# Patient Record
Sex: MALE | Race: WHITE | NOT HISPANIC OR LATINO | Employment: OTHER | URBAN - METROPOLITAN AREA
[De-identification: names, ages, dates, MRNs, and addresses within clinical notes are randomized per-mention and may not be internally consistent; named-entity substitution may affect disease eponyms.]

---

## 2017-03-02 ENCOUNTER — HOSPITAL ENCOUNTER (EMERGENCY)
Facility: HOSPITAL | Age: 47
Discharge: HOME | End: 2017-03-02
Attending: EMERGENCY MEDICINE
Payer: MEDICARE

## 2017-03-02 VITALS
DIASTOLIC BLOOD PRESSURE: 87 MMHG | HEIGHT: 71 IN | HEART RATE: 80 BPM | TEMPERATURE: 98 F | OXYGEN SATURATION: 98 % | WEIGHT: 210 LBS | BODY MASS INDEX: 29.4 KG/M2 | SYSTOLIC BLOOD PRESSURE: 131 MMHG | RESPIRATION RATE: 18 BRPM

## 2017-03-02 DIAGNOSIS — M25.551 ACUTE HIP PAIN, RIGHT: Primary | ICD-10-CM

## 2017-03-02 PROCEDURE — 25000003 PHARM REV CODE 250: Performed by: PHYSICIAN ASSISTANT

## 2017-03-02 PROCEDURE — 96372 THER/PROPH/DIAG INJ SC/IM: CPT

## 2017-03-02 PROCEDURE — 63600175 PHARM REV CODE 636 W HCPCS: Performed by: PHYSICIAN ASSISTANT

## 2017-03-02 PROCEDURE — 99283 EMERGENCY DEPT VISIT LOW MDM: CPT | Mod: 25

## 2017-03-02 RX ORDER — CYCLOBENZAPRINE HCL 10 MG
10 TABLET ORAL EVERY 8 HOURS PRN
Qty: 9 TABLET | Refills: 0 | Status: SHIPPED | OUTPATIENT
Start: 2017-03-02 | End: 2017-03-25

## 2017-03-02 RX ORDER — HYDROCODONE BITARTRATE AND ACETAMINOPHEN 5; 325 MG/1; MG/1
1 TABLET ORAL EVERY 6 HOURS PRN
Qty: 12 TABLET | Refills: 0 | Status: SHIPPED | OUTPATIENT
Start: 2017-03-02 | End: 2017-03-25

## 2017-03-02 RX ORDER — CYCLOBENZAPRINE HCL 10 MG
10 TABLET ORAL
Status: COMPLETED | OUTPATIENT
Start: 2017-03-02 | End: 2017-03-02

## 2017-03-02 RX ORDER — HYDROMORPHONE HYDROCHLORIDE 2 MG/ML
1 INJECTION, SOLUTION INTRAMUSCULAR; INTRAVENOUS; SUBCUTANEOUS
Status: COMPLETED | OUTPATIENT
Start: 2017-03-02 | End: 2017-03-02

## 2017-03-02 RX ADMIN — CYCLOBENZAPRINE HYDROCHLORIDE 10 MG: 10 TABLET, FILM COATED ORAL at 07:03

## 2017-03-02 RX ADMIN — HYDROMORPHONE HYDROCHLORIDE 1 MG: 2 INJECTION INTRAMUSCULAR; INTRAVENOUS; SUBCUTANEOUS at 07:03

## 2017-03-02 NOTE — ED AVS SNAPSHOT
[facility]  [place] [address]               Keon Schneider Jr.   3/2/2017  7:07 AM   ED    Description:  Male : 1970   Department:  [facility]           Your Care was Coordinated By:     Provider Role From To    Noe Jean MD Attending Provider 17 0759 --    BROOK Bedolla Physician Assistant 17 [phone] --      Reason for Visit     Hip Pain           Diagnoses this Visit        Comments    Acute hip pain, right    -  Primary       ED Disposition     None           To Do List           Follow-up Information     Follow up with Suri Suazo MD.    Specialties:  Sports Medicine, Orthopedic Surgery    Why:  Follow up with orthopedics within 2 days.  Call to schedule an appointment.    Contact information:    [address] [place] [address] [phone]         These Medications        Disp Refills Start End    cyclobenzaprine (FLEXERIL) 10 MG tablet 9 tablet 0 3/2/2017     Take 1 tablet (10 mg total) by mouth every 8 (eight) hours as needed for Muscle spasms. - Oral    [facility] [address] [place] [address] [facility] Ph #: [phone]       Notes to Pharmacy: No alcohol, no driving, no swimming, no operating machinery, no working while taking this medication.    hydrocodone-acetaminophen 5-325mg (NORCO) 5-325 mg per tablet 12 tablet 0 3/2/2017     Take 1 tablet by mouth every 6 (six) hours as needed for Pain. - Oral    Pharmacy: [facility] [address] [place] [address]  [facility] AT [facility] Ph #: [phone]       Notes to Pharmacy: No alcohol, no driving, no swimming, no operating machinery, no working, no extra Tylenol (Acetaminophen) while taking this medication.      [facility]     [facility] On Call Nurse Care Line -  Assistance  Registered nurses in the [facility] provide clinical advisement, health  education, appointment booking, and other advisory services.  Call for this free service at [phone].             Medications           Message regarding Medications     Verify the changes and/or additions to your medication regime listed below are the same as discussed with your clinician today.  If any of these changes or additions are incorrect, please notify your healthcare provider.        START taking these NEW medications        Refills    cyclobenzaprine (FLEXERIL) 10 MG tablet 0    Sig: Take 1 tablet (10 mg total) by mouth every 8 (eight) hours as needed for Muscle spasms.    Class: Print    Route: Oral    hydrocodone-acetaminophen 5-325mg (NORCO) 5-325 mg per tablet 0    Sig: Take 1 tablet by mouth every 6 (six) hours as needed for Pain.    Class: Print    Route: Oral      These medications were administered today        Dose Freq    hydromorphone (PF) injection 1 mg 1 mg ED 1 Time    Sig: Inject 0.5 mLs (1 mg total) into the muscle ED 1 Time.    Class: Normal    Route: Intramuscular    cyclobenzaprine tablet 10 mg 10 mg ED 1 Time    Sig: Take 1 tablet (10 mg total) by mouth ED 1 Time.    Class: Normal    Route: Oral      STOP taking these medications     oxycodone-acetaminophen (PERCOCET) 7.5-325 mg per tablet Take 1 tablet by mouth every 4 (four) hours as needed. for pain.    tramadol (ULTRAM) 50 mg tablet Take 50 mg by mouth every 6 (six) hours as needed for Pain.    doxycycline (VIBRA-TABS) 100 MG tablet TK 1 T PO  BID           Verify that the below list of medications is an accurate representation of the medications you are currently taking.  If none reported, the list may be blank. If incorrect, please contact your healthcare provider. Carry this list with you in case of emergency.           Current Medications     alprazolam (XANAX) 2 MG Tab Take by mouth nightly as needed.    cyclobenzaprine (FLEXERIL) 10 MG tablet Take 1 tablet (10 mg total) by mouth every 8 (eight) hours as needed for  Muscle spasms.    cyclobenzaprine tablet 10 mg Take 1 tablet (10 mg total) by mouth ED 1 Time.    hydrocodone-acetaminophen 5-325mg (NORCO) 5-325 mg per tablet Take 1 tablet by mouth every 6 (six) hours as needed for Pain.    pregabalin (LYRICA) 75 MG capsule Take 1 capsule (75 mg total) by mouth 3 (three) times daily.    sulindac (CLINORIL) 200 MG Tab Take 1 tablet (200 mg total) by mouth 2 (two) times daily.           Clinical Reference Information           Your Vitals Were     BP                   158/92           Allergies as of 3/2/2017        Reactions    Toradol [Ketorolac] Hives, Nausea And Vomiting      Immunizations Administered on Date of Encounter - 3/2/2017     None      ED Micro, Lab, POCT     None      ED Imaging Orders     Start Ordered       Status Ordering Provider    03/02/17 0742 03/02/17 0742  X-Ray Hip 2 View Right  1 time imaging      Final result         Discharge Instructions       The patient is discharged to home.  You are to follow up as directed above.  Rest.  Return to the ED for any new or worsening symptoms: fever, increased pain, redness/warmth to your hip, numbness, weakness, or any other concerns.    [facility] Sign-Up     Activating your [facility] account is as easy as 1-2-3!     1) Visit [email or web address], select Sign Up Now, enter this activation code and your date of birth, then select Next.  [email or web address]  Expires: 4/16/2017  8:49 AM      2) Create a username and password to use when you visit [facility] in the future and select a security question in case you lose your password and select Next.    3) Enter your e-mail address and click Sign Up!    Additional Information  If you have questions, please e-mail [email or web address] or call [phone] to talk to our [facility] staff. Remember, [facility] is NOT to be used for urgent needs. For medical emergencies, dial 911.         Smoking Cessation     If you would like to quit smoking:   You may be eligible for free services  if you are a [place] resident and started smoking cigarettes before September 1, 1988.  Call the Smoking Cessation Trust (SCT) toll free at (255) 334-7439 or (952) 305-3108.   Call 1-800-QUIT-NOW if you do not meet the above criteria.             [facility] complies with applicable Federal civil rights laws and does not discriminate on the basis of race, color, national origin, age, disability, or sex.        Language Assistance Services     ATTENTION: Language assistance services are available, free of charge. Please call [phone].      ATENCIÓN: Si habla español, tiene a woods disposición servicios gratuitos de asistencia lingüística. Llame al [phone].     CHÚ Ý: N?u b?n nói Ti?ng Vi?t, có các d?ch v? h? tr? ngôn ng? mi?n phí dành cho b?n. G?i s? [phone].

## 2017-03-02 NOTE — DISCHARGE INSTRUCTIONS
The patient is discharged to home.  You are to follow up as directed above.  Rest.  Return to the ED for any new or worsening symptoms: fever, increased pain, redness/warmth to your hip, numbness, weakness, or any other concerns.

## 2017-03-02 NOTE — ED PROVIDER NOTES
"Encounter Date: 3/2/2017    SCRIBE #1 NOTE: I, Natasha Fuentes, am scribing for, and in the presence of,  Suri Garcia PA-C. I have scribed the following portions of the note - Other sections scribed: HPI and ROS.       History     Chief Complaint   Patient presents with    Hip Pain     " I have double hip implants and it feels like the right hip is moving" also right leg pain     Review of patient's allergies indicates:   Allergen Reactions    Toradol [ketorolac] Hives and Nausea And Vomiting     HPI Comments: CC: Hip Pain  HPI: This 46 year old male presents to the emergency department complaining of right hip pain for the past 3 weeks.  Patient had a hip replacement 11 years ago secondary to avascular necrosis.  He states he slipped and felt something "pop" 3 weeks ago.  Pain has gradually worsened since injury.  His pain is 10/10, burning, and constant.  It radiates down his right leg.  He denies fever, abdominal pain, chest pain, shortness of breath, numbness, bowel or bladder incontinence, and saddle anesthesia.  Patient had not had relief with Advil or Tylenol.    The history is provided by the patient. No  was used.     Past Medical History:   Diagnosis Date    Anxiety     Arthritis     Asthma     Carpal tunnel syndrome     Chronic pain     Depression     Other injury of other sites of trunk 12/28/2011    Spondylolisthesis     lumbar; myofascial pain    Staph infection     Ulnar neuropathy     left and rt arm     Past Surgical History:   Procedure Laterality Date    HIP SURGERY  3/06; 6/06    hip arthroplasty    HIP SURGERY      bilateral hip replacement    JOINT REPLACEMENT      SHOULDER SURGERY  2012    right shoulder    SHOULDER SURGERY       Family History   Problem Relation Age of Onset    Cancer Mother      Social History   Substance Use Topics    Smoking status: Former Smoker     Packs/day: 1.00     Years: 10.00     Quit date: 10/13/2012    Smokeless tobacco: " Never Used    Alcohol use 7.2 oz/week     12 Cans of beer per week      Comment: on the weekend     Review of Systems   Constitutional: Negative for fever.   HENT: Negative for trouble swallowing.    Respiratory: Negative for shortness of breath.    Cardiovascular: Negative for chest pain.   Gastrointestinal: Negative for abdominal pain, nausea and vomiting.   Genitourinary: Negative for difficulty urinating.   Musculoskeletal: Negative for back pain.        +Right hip pain.   Skin: Negative for color change.   Allergic/Immunologic: Negative for immunocompromised state.   Neurological: Negative for seizures, weakness and numbness.   Psychiatric/Behavioral: Negative for confusion.       Physical Exam   Initial Vitals   BP Pulse Resp Temp SpO2   03/02/17 0653 03/02/17 0653 03/02/17 0653 03/02/17 0653 03/02/17 0653   158/92 83 20 98.2 °F (36.8 °C) 98 %     Physical Exam    Constitutional: He appears well-developed and well-nourished. He is cooperative.  Non-toxic appearance.   The patient is distressed in pain.   HENT:   Head: Normocephalic and atraumatic.   Mouth/Throat: Mucous membranes are normal. No trismus in the jaw.   Neck: Trachea normal, normal range of motion, full passive range of motion without pain and phonation normal. Neck supple. No stridor present. No rigidity.   Cardiovascular: Normal rate, regular rhythm and normal heart sounds. Exam reveals no gallop.    Pulmonary/Chest: Effort normal and breath sounds normal. No tachypnea. No respiratory distress. He has no decreased breath sounds. He has no wheezes. He has no rhonchi. He has no rales.   Abdominal: Soft. Normal appearance. There is no tenderness. There is no rigidity, no rebound and no guarding.   Musculoskeletal:   Pelvis is stable.  +Diffuse tenderness to palpation to the left hip without erythema or warmth.  +Decreased right hip ROM secondary to pain.  No left knee tenderness to palpation.  No midline lumbar tenderness to palpation, step-off,  "or crepitus.  + 2+ right and left dorsalis pedis pulses, capillary refill less than 2 seconds.   Neurological: He is alert and oriented to person, place, and time. He has normal strength. No sensory deficit.   Skin: Skin is warm, dry and intact. No erythema.         ED Course   Procedures  Labs Reviewed - No data to display             Additional MDM:   Comments: Patient with right hip pain after slipping and feeling a "pop" in his right hip.  He has history of right hip replacement.  He is neurovascularly intact.  There is decreased right hip range of motion secondary to pain.  There is no erythema or warmth to the right hip.  There is no midline lumbar tenderness to palpation, step-off, or crepitus.  Radiographs of the right hip were independently reviewed and interpreted by Dr. Jean and myself as no fracture, dislocation, or evidence of acute hardware failure.  I doubt septic joint.  He will be discharged home with supportive care, Norco, and Flexeril.  He is to closely follow-up with orthopedics.  He was given oral Flexeril and intramuscular Dilaudid in the emergency department for pain.  Careful ED warnings and return instructions given.  This patient's case was discussed with Dr. Jean, he is in agreement with the assessment and plan..          Scribe Attestation:   Scribe #1: I performed the above scribed service and the documentation accurately describes the services I performed. I attest to the accuracy of the note.    Attending Attestation:           Physician Attestation for Scribe:  Physician Attestation Statement for Scribe #1: I, Suri Garcia PA-C, reviewed documentation, as scribed by Sada Fuentes in my presence, and it is both accurate and complete.                 ED Course     Clinical Impression:   The encounter diagnosis was Acute hip pain, right.          BROOK Bedolla  03/02/17 2307    "

## 2017-03-25 ENCOUNTER — HOSPITAL ENCOUNTER (EMERGENCY)
Facility: HOSPITAL | Age: 47
Discharge: HOME | End: 2017-03-25
Attending: EMERGENCY MEDICINE
Payer: MEDICARE

## 2017-03-25 VITALS
SYSTOLIC BLOOD PRESSURE: 140 MMHG | HEART RATE: 90 BPM | WEIGHT: 211 LBS | DIASTOLIC BLOOD PRESSURE: 88 MMHG | OXYGEN SATURATION: 98 % | BODY MASS INDEX: 29.54 KG/M2 | TEMPERATURE: 99 F | RESPIRATION RATE: 20 BRPM | HEIGHT: 71 IN

## 2017-03-25 DIAGNOSIS — R05.9 COUGH: ICD-10-CM

## 2017-03-25 DIAGNOSIS — J20.9 ACUTE BRONCHITIS WITH BRONCHOSPASM: Primary | ICD-10-CM

## 2017-03-25 PROCEDURE — 94640 AIRWAY INHALATION TREATMENT: CPT

## 2017-03-25 PROCEDURE — 99284 EMERGENCY DEPT VISIT MOD MDM: CPT | Mod: 25

## 2017-03-25 PROCEDURE — 25000242 PHARM REV CODE 250 ALT 637 W/ HCPCS: Performed by: NURSE PRACTITIONER

## 2017-03-25 PROCEDURE — 96372 THER/PROPH/DIAG INJ SC/IM: CPT

## 2017-03-25 PROCEDURE — 63600175 PHARM REV CODE 636 W HCPCS: Performed by: NURSE PRACTITIONER

## 2017-03-25 RX ORDER — HYDROCODONE BITARTRATE AND HOMATROPINE METHYLBROMIDE ORAL SOLUTION 5; 1.5 MG/5ML; MG/5ML
5 LIQUID ORAL EVERY 4 HOURS PRN
Qty: 90 ML | Refills: 0 | Status: SHIPPED | OUTPATIENT
Start: 2017-03-25 | End: 2017-05-07

## 2017-03-25 RX ORDER — GUAIFENESIN 100 MG/5ML
200 SOLUTION ORAL 3 TIMES DAILY PRN
COMMUNITY
End: 2017-07-22

## 2017-03-25 RX ORDER — ALBUTEROL SULFATE 90 UG/1
1-2 AEROSOL, METERED RESPIRATORY (INHALATION) EVERY 6 HOURS PRN
Qty: 1 INHALER | Refills: 0 | Status: SHIPPED | OUTPATIENT
Start: 2017-03-25 | End: 2017-07-22

## 2017-03-25 RX ORDER — DOXYCYCLINE 100 MG/1
100 CAPSULE ORAL 2 TIMES DAILY
Qty: 20 CAPSULE | Refills: 0 | Status: SHIPPED | OUTPATIENT
Start: 2017-03-25 | End: 2017-04-04

## 2017-03-25 RX ORDER — IPRATROPIUM BROMIDE AND ALBUTEROL SULFATE 2.5; .5 MG/3ML; MG/3ML
3 SOLUTION RESPIRATORY (INHALATION)
Status: COMPLETED | OUTPATIENT
Start: 2017-03-25 | End: 2017-03-25

## 2017-03-25 RX ORDER — BETAMETHASONE SODIUM PHOSPHATE AND BETAMETHASONE ACETATE 3; 3 MG/ML; MG/ML
6 INJECTION, SUSPENSION INTRA-ARTICULAR; INTRALESIONAL; INTRAMUSCULAR; SOFT TISSUE
Status: COMPLETED | OUTPATIENT
Start: 2017-03-25 | End: 2017-03-25

## 2017-03-25 RX ADMIN — BETAMETHASONE SODIUM PHOSPHATE AND BETAMETHASONE ACETATE 6 MG: 3; 3 INJECTION, SUSPENSION INTRA-ARTICULAR; INTRALESIONAL; INTRAMUSCULAR at 08:03

## 2017-03-25 RX ADMIN — IPRATROPIUM BROMIDE AND ALBUTEROL SULFATE 3 ML: .5; 3 SOLUTION RESPIRATORY (INHALATION) at 07:03

## 2017-03-25 NOTE — ED AVS SNAPSHOT
[facility]  [place] [address]               Keon Schneider Jr.   3/25/2017  6:58 AM   ED    Description:  Male : 1970   Department:  [facility]           Your Care was Coordinated By:     Provider Role From To    Chico Vallecillo MD Attending Provider 17 0706 --    Mai Reed NP Nurse Practitioner 17 07 --      Reason for Visit     Cough           Diagnoses this Visit        Comments    Acute bronchitis with bronchospasm    -  Primary     Cough           ED Disposition     None           To Do List           Follow-up Information     Schedule an appointment as soon as possible for a visit with Johnie Gutierrez MD.    Specialty:  Family Medicine    Why:  As needed, If symptoms worsen    Contact information:    [address] [facility] [id number] [phone]         These Medications        Disp Refills Start End    hydrocodone-homatropine 5-1.5 mg/5 ml (HYCODAN) 5-1.5 mg/5 mL Syrp 90 mL 0 3/25/2017     Take 5 mLs by mouth every 4 (four) hours as needed (cough). Please do not drink alcohol, drive, operate heavy machinery, work or swim while taking this medication as it can cause mental impairment. - Oral    Pharmacy: [facility] [address] [place] [address] [place] -  [facility] #: [phone]       albuterol 90 mcg/actuation inhaler 1 Inhaler 0 3/25/2017 3/25/2018    Inhale 1-2 puffs into the lungs every 6 (six) hours as needed for Wheezing. Rescue - Inhalation    Pharmacy: [facility] [address] [place] [address] [place] -  [facility] #: [phone]       doxycycline (VIBRAMYCIN) 100 MG Cap 20 capsule 0 3/25/2017 2017    Take 1 capsule (100 mg total) by mouth 2 (two) times daily. - Oral    Pharmacy: [facility] [address] [facility] [place] -  [facility] Ph #: [phone]         [facility]     [facility]  Nurse Care Line - 24/7 Assistance  Registered nurses in the [facility] provide clinical advisement, health education, appointment booking, and other advisory services.  Call for this free service at [phone].             Medications           Message regarding Medications     Verify the changes and/or additions to your medication regime listed below are the same as discussed with your clinician today.  If any of these changes or additions are incorrect, please notify your healthcare provider.        START taking these NEW medications        Refills    hydrocodone-homatropine 5-1.5 mg/5 ml (HYCODAN) 5-1.5 mg/5 mL Syrp 0    Sig: Take 5 mLs by mouth every 4 (four) hours as needed (cough). Please do not drink alcohol, drive, operate heavy machinery, work or swim while taking this medication as it can cause mental impairment.    Class: Print    Route: Oral    albuterol 90 mcg/actuation inhaler 0    Sig: Inhale 1-2 puffs into the lungs every 6 (six) hours as needed for Wheezing. Rescue    Class: Print    Route: Inhalation    doxycycline (VIBRAMYCIN) 100 MG Cap 0    Sig: Take 1 capsule (100 mg total) by mouth 2 (two) times daily.    Class: Print    Route: Oral      These medications were administered today        Dose Freq    albuterol-ipratropium 2.5mg-0.5mg/3mL nebulizer solution 3 mL 3 mL ED 1 Time    Sig: Take 3 mLs by nebulization ED 1 Time.    Class: Normal    Route: Nebulization    betamethasone acetate-betamethasone sodium phosphate injection 6 mg 6 mg ED 1 Time    Sig: Inject 1 mL (6 mg total) into the muscle ED 1 Time.    Class: Normal    Route: Intramuscular      STOP taking these medications     cyclobenzaprine (FLEXERIL) 10 MG tablet Take 1 tablet (10 mg total) by mouth every 8 (eight) hours as needed for Muscle spasms.    hydrocodone-acetaminophen 5-325mg (NORCO) 5-325 mg per tablet Take 1 tablet by mouth every 6 (six) hours as needed for Pain.           Verify that the below list of  "medications is an accurate representation of the medications you are currently taking.  If none reported, the list may be blank. If incorrect, please contact your healthcare provider. Carry this list with you in case of emergency.           Current Medications     guaifenesin 100 mg/5 ml (ROBITUSSIN) 100 mg/5 mL syrup Take 200 mg by mouth 3 (three) times daily as needed for Cough.    GUAIFENESIN/PHENYLEPHRINE HCL (MUCINEX COLD ORAL) Take by mouth.    albuterol 90 mcg/actuation inhaler Inhale 1-2 puffs into the lungs every 6 (six) hours as needed for Wheezing. Rescue    alprazolam (XANAX) 2 MG Tab Take by mouth nightly as needed.    betamethasone acetate-betamethasone sodium phosphate injection 6 mg Inject 1 mL (6 mg total) into the muscle ED 1 Time.    doxycycline (VIBRAMYCIN) 100 MG Cap Take 1 capsule (100 mg total) by mouth 2 (two) times daily.    hydrocodone-homatropine 5-1.5 mg/5 ml (HYCODAN) 5-1.5 mg/5 mL Syrp Take 5 mLs by mouth every 4 (four) hours as needed (cough). Please do not drink alcohol, drive, operate heavy machinery, work or swim while taking this medication as it can cause mental impairment.    pregabalin (LYRICA) 75 MG capsule Take 1 capsule (75 mg total) by mouth 3 (three) times daily.    sulindac (CLINORIL) 200 MG Tab Take 1 tablet (200 mg total) by mouth 2 (two) times daily.           Clinical Reference Information           Your Vitals Were     BP Pulse Temp Resp Height Weight    142/93 (BP Location: Left arm, Patient Position: Sitting) 94 98.9 °F (37.2 °C) (Oral) 19 5' 11" (1.803 m) 95.7 kg (211 lb)    SpO2 BMI             98% 29.43 kg/m2         Allergies as of 3/25/2017        Reactions    Toradol [Ketorolac] Hives, Nausea And Vomiting      Immunizations Administered on Date of Encounter - 3/25/2017     None      ED Micro, Lab, POCT     None      ED Imaging Orders     Start Ordered       Status Ordering Provider    03/25/17 0641 03/25/17 0641  X-Ray Chest PA And Lateral  1 time imaging      " Final result         Discharge Instructions       Please return to the ED for any new or worsening symptoms: chest pain, shortness of breath, loss of consciousness or any other concerns. Please follow up with primary care within in the week. You may also call [phone] for the [facility] same day appointment line.    Discharge References/Attachments     BRONCHITIS WITH WHEEZING (ADULT) (ENGLISH)    BRONCHITIS, ANTIOBIOTIC TREATMENT (ADULT) (ENGLISH)      [facility] Sign-Up     Activating your [facility] account is as easy as 1-2-3!     1) Visit [email or web address], select Sign Up Now, enter this activation code and your date of birth, then select Next.  [email or web address]  Expires: 4/16/2017  9:49 AM      2) Create a username and password to use when you visit [facility] in the future and select a security question in case you lose your password and select Next.    3) Enter your e-mail address and click Sign Up!    Additional Information  If you have questions, please e-mail [email or web address] or call [phone] to talk to our [facility] staff. Remember, [facility] is NOT to be used for urgent needs. For medical emergencies, dial 911.         Smoking Cessation     If you would like to quit smoking:   You may be eligible for free services if you are a [place] resident and started smoking cigarettes before September 1, 1988.  Call the Smoking Cessation Trust (SCT) toll free at (110) 454-4342 or (552) 713-6453.   Call 8-160-QUIT-NOW if you do not meet the above criteria.             [facility] complies with applicable Federal civil rights laws and does not discriminate on the basis of race, color, national origin, age, disability, or sex.        Language Assistance Services     ATTENTION: Language assistance services are available, free of charge. Please call [phone].      ATENCIÓN: Si habla español, tiene a woods disposición servicios gratuitos de asistencia lingüística. Llame al  [phone].     DAVID Ý: N?u b?n nói Ti?ng Vi?t, có các d?ch v? h? tr? ngôn ng? mi?n phí dành cho b?n. G?i s? [phone].

## 2017-03-25 NOTE — ED TRIAGE NOTES
Fever since yesterday cough for a week green phlegm sore throat head pressure lungs hurt from coughing

## 2017-03-25 NOTE — DISCHARGE INSTRUCTIONS
Please return to the ED for any new or worsening symptoms: chest pain, shortness of breath, loss of consciousness or any other concerns. Please follow up with primary care within in the week. You may also call [phone] for the [facility] same day appointment line.

## 2017-03-25 NOTE — ED PROVIDER NOTES
"Encounter Date: 3/25/2017    SCRIBE #1 NOTE: I, Irma Duttonter, am scribing for, and in the presence of,  Mai Reed NP. I have scribed the following portions of the note - Other sections scribed: HPI/ROS.       History     Chief Complaint   Patient presents with    Cough     "I think I have pneumonia I have dizziness, fever and keep coughing with muscus."     Review of patient's allergies indicates:   Allergen Reactions    Toradol [ketorolac] Hives and Nausea And Vomiting     HPI Comments: CC: Cough    HPI: This 46 y.o. M who has history of asthma, carpal tunnel syndrome, ulnar neuropathy, and spondylolisthesis presents to the ED for evaluation of acute onset productive cough with associated SOB, subjective fever, post-tussive emesis, and sore throat for 1 week. The pt reports moderate chest pain when coughing. He has attempted treatment with Robitussin and Mucinex with no relief. He notes that he has not smoked in the last 5 months but typically smokes 1ppd. The pt denies diarrhea, abdominal pain, nausea, and any other associated symptoms. PCP is Dr. Johnie Gutierrez.      The history is provided by the patient. No  was used.     Past Medical History:   Diagnosis Date    Anxiety     Arthritis     Asthma     Carpal tunnel syndrome     Chronic pain     Depression     Other injury of other sites of trunk 12/28/2011    Pneumonia     Spondylolisthesis     lumbar; myofascial pain    Staph infection     Ulnar neuropathy     left and rt arm     Past Surgical History:   Procedure Laterality Date    HIP SURGERY  3/06; 6/06    hip arthroplasty    HIP SURGERY      bilateral hip replacement    JOINT REPLACEMENT      SHOULDER SURGERY  2012    right shoulder    SHOULDER SURGERY       Family History   Problem Relation Age of Onset    Cancer Mother      Social History   Substance Use Topics    Smoking status: Current Some Day Smoker     Packs/day: 1.00     Years: 10.00     Last attempt " to quit: 10/13/2012    Smokeless tobacco: Never Used    Alcohol use 7.2 oz/week     12 Cans of beer per week      Comment: on the weekend     Review of Systems   Constitutional: Positive for fever (subjective). Negative for chills.   HENT: Positive for sore throat. Negative for congestion and rhinorrhea.    Eyes: Negative for visual disturbance.   Respiratory: Positive for cough (productive) and shortness of breath.    Cardiovascular: Positive for chest pain (when coughing).   Gastrointestinal: Positive for vomiting (post-tussive). Negative for abdominal pain, diarrhea and nausea.   Genitourinary: Negative for dysuria and frequency.   Musculoskeletal: Negative for myalgias.   Skin: Negative for rash.   Neurological: Negative for headaches.       Physical Exam   Initial Vitals   BP Pulse Resp Temp SpO2   03/25/17 0558 03/25/17 0558 03/25/17 0558 03/25/17 0558 03/25/17 0558   142/93 92 18 98.9 °F (37.2 °C) 95 %     Physical Exam    Constitutional: He appears well-developed and well-nourished.  Non-toxic appearance.   HENT:   Head: Normocephalic.   Mouth/Throat: Uvula is midline and mucous membranes are normal. No trismus in the jaw. Posterior oropharyngeal erythema present. No oropharyngeal exudate or tonsillar abscesses.   Eyes: EOM are normal.   Neck: Full passive range of motion without pain. Neck supple. No rigidity.   Cardiovascular: Normal rate, S1 normal, S2 normal and normal heart sounds. Exam reveals no gallop.    No murmur heard.  Pulmonary/Chest: Effort normal. No tachypnea. He has no decreased breath sounds. He has wheezes. He has no rhonchi. He has no rales.   Lymphadenopathy:     He has cervical adenopathy.        Right cervical: Superficial cervical adenopathy present.        Left cervical: Superficial cervical adenopathy present.   Neurological: He is alert and oriented to person, place, and time. GCS eye subscore is 4. GCS verbal subscore is 5. GCS motor subscore is 6.   Skin: Skin is warm, dry and  intact. No rash noted.   Psychiatric: He has a normal mood and affect.         ED Course   Procedures  Labs Reviewed - No data to display          Medical Decision Making:   ED Management:  This is a 46-year-old male who presents to the ED with complaints of cough, shortness of breath and subjective fever.  He is afebrile and nontoxic-appearing.  He is coughing profusely in the exam room.  On exam, faint wheezes noted.  He is not tachypnea.  Oxygen saturation is normal on room air.  Chest x-ray negative for pneumonia however, I will treat empirically given risk factors (smoking).  DuoNeb and Celestone injection given in the ED.  No evidence of PE or cardiac etiology.  Discharged home with prescriptions for Hycodan cough syrup, doxycycline and albuterol inhaler.  Instructions given for supportive care and follow-up.  Return precautions given.  Patient's case is discussed with Dr. Vallecillo, who agrees with his plan of care.            Scribe Attestation:   Scribe #1: I performed the above scribed service and the documentation accurately describes the services I performed. I attest to the accuracy of the note.    Attending Attestation:     Physician Attestation Statement for NP/PA:   I discussed this assessment and plan of this patient with the NP/PA, but I did not personally examine the patient. The face to face encounter was performed by the NP/PA.    Other NP/PA Attestation Additions:      Medical Decision Making: Patient presents with cough.  Patient is a smoker.  Patient states been coughing up colored sputum.  Chest x-ray shows pneumonia feel the patient needs steroids and antibiotics and bronchodilators.  I agree with assessment and plan.       Physician Attestation for Scribe:  Physician Attestation Statement for Scribe #1: I, Mia Reed NP, reviewed documentation, as scribed by Irma Gipson in my presence, and it is both accurate and complete.                 ED Course     Clinical Impression:    The primary encounter diagnosis was Acute bronchitis with bronchospasm. A diagnosis of Cough was also pertinent to this visit.    Disposition:   Disposition: Discharged  Condition: Stable       Mai Reed NP  03/25/17 0842       Chico Vallecillo MD  04/18/17 0838

## 2017-05-07 PROBLEM — M25.561 ACUTE PAIN OF RIGHT KNEE: Status: ACTIVE | Noted: 2017-05-07

## 2017-09-27 ENCOUNTER — HOSPITAL ENCOUNTER (EMERGENCY)
Facility: HOSPITAL | Age: 47
Discharge: LEFT BEFORE DISCHARGE | End: 2017-09-27
Attending: EMERGENCY MEDICINE
Payer: MEDICARE

## 2017-09-27 VITALS
OXYGEN SATURATION: 98 % | SYSTOLIC BLOOD PRESSURE: 143 MMHG | HEIGHT: 71 IN | HEART RATE: 70 BPM | BODY MASS INDEX: 28 KG/M2 | WEIGHT: 200 LBS | RESPIRATION RATE: 18 BRPM | DIASTOLIC BLOOD PRESSURE: 81 MMHG | TEMPERATURE: 98 F

## 2017-09-27 DIAGNOSIS — R52 PAIN: ICD-10-CM

## 2017-09-27 DIAGNOSIS — G89.29 CHRONIC HIP PAIN, UNSPECIFIED LATERALITY: Primary | ICD-10-CM

## 2017-09-27 DIAGNOSIS — M25.559 CHRONIC HIP PAIN, UNSPECIFIED LATERALITY: Primary | ICD-10-CM

## 2017-09-27 PROCEDURE — 99283 EMERGENCY DEPT VISIT LOW MDM: CPT

## 2017-09-27 RX ORDER — ACETAMINOPHEN 325 MG/1
325 TABLET ORAL EVERY 6 HOURS PRN
COMMUNITY
End: 2017-09-27 | Stop reason: ALTCHOICE

## 2017-09-27 RX ORDER — ACETAMINOPHEN 325 MG/1
650 TABLET ORAL EVERY 6 HOURS PRN
Qty: 60 TABLET | Refills: 0 | Status: ON HOLD | OUTPATIENT
Start: 2017-09-27 | End: 2018-03-26 | Stop reason: HOSPADM

## 2017-09-27 NOTE — ED TRIAGE NOTES
Right hip and lower back pain for a week after moving furniture has hip implants and say it feels like its moving

## 2017-09-27 NOTE — ED PROVIDER NOTES
"Encounter Date: 9/27/2017    SCRIBE #1 NOTE: I, Karina Hansen, am scribing for, and in the presence of,  Margareth Spence NP. I have scribed the following portions of the note - Other sections scribed: HPI/ROS.       History     Chief Complaint   Patient presents with    Hip Pain     rt hip pain. pt reports moving furniture for last week and has complete hip implants and feels light its moving     CC: Hip Pain    HPI: This 47 y.o. Male with a medical history of anxiety, arthritis, asthma, avascular necrosis, pneumonia, spondylolisthesis, and ulnar neuropathy presents to the ED c/o severe (10/10), gradually worsening right hip pain shooting down into right lower extremity and lower back for the past 6 months. Patient reports that he was moving furniture when he felt pain to his right hip in the process. He states, "it feels like it's grinding like something's in there when I turn." He notes having bilateral hip replacement (metal-on-metal) 11 years ago by a surgeon at the [facility]. Patient also reports last X-ray on hips was 6 months ago where there were no significant findings although he kept stating that his pain was getting worse and something was wrong at that time. Patient was also supposed to get an MRI completed but he reports never hearing back. No prior tx. No alleviating factors. Patient denies fever, chills, abdominal pain, N/V/D, chest pain, SOB, numbness, and weakness.     Side note: Patient reports that either Tylenol or Advil makes him bleed.       The history is provided by the patient. No  was used.     Review of patient's allergies indicates:   Allergen Reactions    Toradol [ketorolac] Hives and Nausea And Vomiting     Past Medical History:   Diagnosis Date    Anxiety     Arthritis     Asthma     Avascular necrosis     Carpal tunnel syndrome     Chronic pain     Depression     Other injury of other sites of trunk 12/28/2011    Pneumonia     " Spondylolisthesis     lumbar; myofascial pain    Staph infection     Ulnar neuropathy     left and rt arm     Past Surgical History:   Procedure Laterality Date    HIP SURGERY  3/06; 6/06    hip arthroplasty    HIP SURGERY      bilateral hip replacement    JOINT REPLACEMENT      SHOULDER SURGERY  2012    right shoulder    SHOULDER SURGERY       Family History   Problem Relation Age of Onset    Cancer Mother      Social History   Substance Use Topics    Smoking status: Former Smoker     Packs/day: 1.00     Years: 10.00     Quit date: 10/13/2012    Smokeless tobacco: Never Used    Alcohol use 7.2 oz/week     12 Cans of beer per week      Comment: on the weekend     Review of Systems   Constitutional: Negative for chills and fever.   HENT: Negative for congestion, ear pain, rhinorrhea and sore throat.    Eyes: Negative for pain and visual disturbance.   Respiratory: Negative for cough and shortness of breath.    Cardiovascular: Negative for chest pain.   Gastrointestinal: Negative for abdominal pain, diarrhea, nausea and vomiting.   Genitourinary: Negative for dysuria.   Musculoskeletal: Negative for back pain and neck pain.        (+) Right Hip Pain   Skin: Negative for rash.   Neurological: Negative for headaches.       Physical Exam     Initial Vitals [09/27/17 0657]   BP Pulse Resp Temp SpO2   (!) 143/81 70 18 97.9 °F (36.6 °C) 98 %      MAP       101.67         Physical Exam    Nursing note and vitals reviewed.  Constitutional: He appears well-developed and well-nourished.   HENT:   Head: Normocephalic.   Eyes: Conjunctivae are normal.   Neck: Normal range of motion. Neck supple.   Musculoskeletal: Normal range of motion. He exhibits no edema or tenderness.   Passive range of motion does not elicit any pop, grinding, or instability of right hip.   Neurological: He is alert and oriented to person, place, and time. He has normal strength. No sensory deficit.   Skin: Skin is warm and dry. Capillary refill  "takes less than 2 seconds.   Psychiatric: He has a normal mood and affect.         ED Course   Procedures  Labs Reviewed - No data to display          Medical Decision Making:   Initial Assessment:   47-year-old male presents with right hip pain  Differential Diagnosis:   Prosthetic loosening  Infection  Muscle strain  ED Management:  Diagnosis management comments: This is an urgent evaluation of a 47-year-old male  that presented to the ER with c/o a 6 month history of right hip pain . Pts exam was as above.     Previous notes were reviewed.  It appears patient has been having chronic pain in this hip for greater than 6 months.  Patient states that he has not followed up with sports medicine because "they just want to give me injections and those don't work".  Patient had a MRI 6 months ago which had no suspicious findings.    xrays were reviewed and discussed with pt. When I informed patient I would not be giving him opioid pain medicine and that he needs to follow-up with his orthopedic surgeon or with sports medicine for pain management, patient eloped from emergency department.     Case discussed with attending who agrees with assessment and plan.               Scribe Attestation:   Scribe #1: I performed the above scribed service and the documentation accurately describes the services I performed. I attest to the accuracy of the note.    Attending Attestation:     Physician Attestation Statement for NP/PA:   I discussed this assessment and plan of this patient with the NP/PA, but I did not personally examine the patient. The face to face encounter was performed by the NP/PA.        Physician Attestation for Scribe:  Physician Attestation Statement for Scribe #1: I, Margareth Spence NP, reviewed documentation, as scribed by Karina Hansen in my presence, and it is both accurate and complete.                 ED Course      Clinical Impression:   The primary encounter diagnosis was Chronic hip pain, " unspecified laterality. A diagnosis of Pain was also pertinent to this visit.    Disposition:   Disposition: Discharged  Condition: Stable                        Margareth Spence NP  09/27/17 0690       Juan David Ramirez MD  09/29/17 1013

## 2018-03-24 ENCOUNTER — HOSPITAL ENCOUNTER (INPATIENT)
Facility: HOSPITAL | Age: 48
LOS: 2 days | Discharge: HOME | DRG: 872 | End: 2018-03-26
Attending: INTERNAL MEDICINE | Admitting: INTERNAL MEDICINE
Payer: MEDICARE

## 2018-03-24 DIAGNOSIS — K57.32 DIVERTICULITIS OF SIGMOID COLON: Primary | ICD-10-CM

## 2018-03-24 PROBLEM — A41.9 SEPSIS: Status: ACTIVE | Noted: 2018-03-24

## 2018-03-24 PROBLEM — N17.9 AKI (ACUTE KIDNEY INJURY): Status: ACTIVE | Noted: 2018-03-24

## 2018-03-24 LAB
ALBUMIN SERPL BCP-MCNC: 4.3 G/DL
ALP SERPL-CCNC: 58 U/L
ALT SERPL W/O P-5'-P-CCNC: 28 U/L
ANION GAP SERPL CALC-SCNC: 13 MMOL/L
AST SERPL-CCNC: 23 U/L
BASOPHILS # BLD AUTO: 0.04 K/UL
BASOPHILS NFR BLD: 0.2 %
BILIRUB SERPL-MCNC: 0.8 MG/DL
BILIRUB UR QL STRIP: NEGATIVE
BUN SERPL-MCNC: 17 MG/DL
CALCIUM SERPL-MCNC: 9.8 MG/DL
CHLORIDE SERPL-SCNC: 100 MMOL/L
CLARITY UR REFRACT.AUTO: CLEAR
CO2 SERPL-SCNC: 25 MMOL/L
COLOR UR AUTO: YELLOW
CREAT SERPL-MCNC: 1.5 MG/DL
DIFFERENTIAL METHOD: ABNORMAL
EOSINOPHIL # BLD AUTO: 0 K/UL
EOSINOPHIL NFR BLD: 0 %
ERYTHROCYTE [DISTWIDTH] IN BLOOD BY AUTOMATED COUNT: 13.9 %
EST. GFR  (AFRICAN AMERICAN): >60 ML/MIN/1.73 M^2
EST. GFR  (NON AFRICAN AMERICAN): 54.7 ML/MIN/1.73 M^2
GLUCOSE SERPL-MCNC: 80 MG/DL
GLUCOSE UR QL STRIP: NEGATIVE
HCT VFR BLD AUTO: 39.8 %
HGB BLD-MCNC: 13.5 G/DL
HGB UR QL STRIP: NEGATIVE
KETONES UR QL STRIP: ABNORMAL
LACTATE SERPL-SCNC: 1.4 MMOL/L
LEUKOCYTE ESTERASE UR QL STRIP: NEGATIVE
LYMPHOCYTES # BLD AUTO: 1.7 K/UL
LYMPHOCYTES NFR BLD: 7 %
MCH RBC QN AUTO: 31.8 PG
MCHC RBC AUTO-ENTMCNC: 33.9 G/DL
MCV RBC AUTO: 94 FL
MONOCYTES # BLD AUTO: 2.2 K/UL
MONOCYTES NFR BLD: 9.3 %
NEUTROPHILS # BLD AUTO: 19.7 K/UL
NEUTROPHILS NFR BLD: 83.5 %
NITRITE UR QL STRIP: NEGATIVE
PH UR STRIP: 6 [PH] (ref 5–8)
PLATELET # BLD AUTO: 285 K/UL
PMV BLD AUTO: 9 FL
POTASSIUM SERPL-SCNC: 4 MMOL/L
PROCALCITONIN SERPL IA-MCNC: 0.11 NG/ML
PROT SERPL-MCNC: 7.9 G/DL
PROT UR QL STRIP: ABNORMAL
RBC # BLD AUTO: 4.24 M/UL
SODIUM SERPL-SCNC: 138 MMOL/L
SP GR UR STRIP: 1.01 (ref 1–1.03)
URN SPEC COLLECT METH UR: ABNORMAL
UROBILINOGEN UR STRIP-ACNC: <2 EU/DL
WBC # BLD AUTO: 23.65 K/UL

## 2018-03-24 PROCEDURE — 25000003 PHARM REV CODE 250: Performed by: INTERNAL MEDICINE

## 2018-03-24 PROCEDURE — 96361 HYDRATE IV INFUSION ADD-ON: CPT

## 2018-03-24 PROCEDURE — 83605 ASSAY OF LACTIC ACID: CPT

## 2018-03-24 PROCEDURE — 85025 COMPLETE CBC W/AUTO DIFF WBC: CPT

## 2018-03-24 PROCEDURE — G0378 HOSPITAL OBSERVATION PER HR: HCPCS

## 2018-03-24 PROCEDURE — 80053 COMPREHEN METABOLIC PANEL: CPT

## 2018-03-24 PROCEDURE — 96368 THER/DIAG CONCURRENT INF: CPT

## 2018-03-24 PROCEDURE — 25500020 PHARM REV CODE 255: Performed by: INTERNAL MEDICINE

## 2018-03-24 PROCEDURE — S0030 INJECTION, METRONIDAZOLE: HCPCS | Performed by: INTERNAL MEDICINE

## 2018-03-24 PROCEDURE — 63600175 PHARM REV CODE 636 W HCPCS: Performed by: INTERNAL MEDICINE

## 2018-03-24 PROCEDURE — 81003 URINALYSIS AUTO W/O SCOPE: CPT

## 2018-03-24 PROCEDURE — 84145 PROCALCITONIN (PCT): CPT

## 2018-03-24 PROCEDURE — 87040 BLOOD CULTURE FOR BACTERIA: CPT | Mod: 59

## 2018-03-24 PROCEDURE — 96375 TX/PRO/DX INJ NEW DRUG ADDON: CPT

## 2018-03-24 PROCEDURE — 11000001 HC ACUTE MED/SURG PRIVATE ROOM

## 2018-03-24 PROCEDURE — 96365 THER/PROPH/DIAG IV INF INIT: CPT

## 2018-03-24 PROCEDURE — 99285 EMERGENCY DEPT VISIT HI MDM: CPT | Mod: 25

## 2018-03-24 RX ORDER — ENOXAPARIN SODIUM 100 MG/ML
40 INJECTION SUBCUTANEOUS EVERY 24 HOURS
Status: DISCONTINUED | OUTPATIENT
Start: 2018-03-24 | End: 2018-03-26 | Stop reason: HOSPADM

## 2018-03-24 RX ORDER — IPRATROPIUM BROMIDE AND ALBUTEROL SULFATE 2.5; .5 MG/3ML; MG/3ML
3 SOLUTION RESPIRATORY (INHALATION) EVERY 4 HOURS PRN
Status: DISCONTINUED | OUTPATIENT
Start: 2018-03-24 | End: 2018-03-26 | Stop reason: HOSPADM

## 2018-03-24 RX ORDER — SODIUM CHLORIDE 9 MG/ML
INJECTION, SOLUTION INTRAVENOUS CONTINUOUS
Status: DISCONTINUED | OUTPATIENT
Start: 2018-03-24 | End: 2018-03-26 | Stop reason: HOSPADM

## 2018-03-24 RX ORDER — CIPROFLOXACIN 2 MG/ML
400 INJECTION, SOLUTION INTRAVENOUS
Status: COMPLETED | OUTPATIENT
Start: 2018-03-24 | End: 2018-03-24

## 2018-03-24 RX ORDER — METRONIDAZOLE 500 MG/100ML
500 INJECTION, SOLUTION INTRAVENOUS
Status: DISCONTINUED | OUTPATIENT
Start: 2018-03-24 | End: 2018-03-26 | Stop reason: HOSPADM

## 2018-03-24 RX ORDER — ONDANSETRON 2 MG/ML
4 INJECTION INTRAMUSCULAR; INTRAVENOUS
Status: COMPLETED | OUTPATIENT
Start: 2018-03-24 | End: 2018-03-24

## 2018-03-24 RX ORDER — HYDROMORPHONE HYDROCHLORIDE 1 MG/ML
1 INJECTION, SOLUTION INTRAMUSCULAR; INTRAVENOUS; SUBCUTANEOUS EVERY 4 HOURS PRN
Status: DISCONTINUED | OUTPATIENT
Start: 2018-03-24 | End: 2018-03-25

## 2018-03-24 RX ORDER — CIPROFLOXACIN 2 MG/ML
400 INJECTION, SOLUTION INTRAVENOUS
Status: DISCONTINUED | OUTPATIENT
Start: 2018-03-25 | End: 2018-03-26 | Stop reason: HOSPADM

## 2018-03-24 RX ORDER — ONDANSETRON 8 MG/1
8 TABLET, ORALLY DISINTEGRATING ORAL EVERY 8 HOURS PRN
Status: DISCONTINUED | OUTPATIENT
Start: 2018-03-24 | End: 2018-03-24

## 2018-03-24 RX ORDER — OXYCODONE HYDROCHLORIDE 5 MG/1
5 TABLET ORAL EVERY 4 HOURS PRN
Status: DISCONTINUED | OUTPATIENT
Start: 2018-03-24 | End: 2018-03-26 | Stop reason: HOSPADM

## 2018-03-24 RX ORDER — PANTOPRAZOLE SODIUM 40 MG/1
40 TABLET, DELAYED RELEASE ORAL DAILY
Status: DISCONTINUED | OUTPATIENT
Start: 2018-03-25 | End: 2018-03-26 | Stop reason: HOSPADM

## 2018-03-24 RX ORDER — FENTANYL CITRATE 50 UG/ML
50 INJECTION, SOLUTION INTRAMUSCULAR; INTRAVENOUS
Status: COMPLETED | OUTPATIENT
Start: 2018-03-24 | End: 2018-03-24

## 2018-03-24 RX ORDER — ALPRAZOLAM 1 MG/1
2 TABLET ORAL 2 TIMES DAILY PRN
Status: DISCONTINUED | OUTPATIENT
Start: 2018-03-24 | End: 2018-03-26 | Stop reason: HOSPADM

## 2018-03-24 RX ORDER — HYDRALAZINE HYDROCHLORIDE 20 MG/ML
10 INJECTION INTRAMUSCULAR; INTRAVENOUS EVERY 6 HOURS PRN
Status: DISCONTINUED | OUTPATIENT
Start: 2018-03-24 | End: 2018-03-26 | Stop reason: HOSPADM

## 2018-03-24 RX ORDER — METRONIDAZOLE 500 MG/100ML
500 INJECTION, SOLUTION INTRAVENOUS
Status: COMPLETED | OUTPATIENT
Start: 2018-03-24 | End: 2018-03-24

## 2018-03-24 RX ORDER — SODIUM CHLORIDE 9 MG/ML
1000 INJECTION, SOLUTION INTRAVENOUS
Status: COMPLETED | OUTPATIENT
Start: 2018-03-24 | End: 2018-03-24

## 2018-03-24 RX ORDER — MORPHINE SULFATE 4 MG/ML
4 INJECTION, SOLUTION INTRAMUSCULAR; INTRAVENOUS
Status: COMPLETED | OUTPATIENT
Start: 2018-03-24 | End: 2018-03-24

## 2018-03-24 RX ADMIN — METRONIDAZOLE 500 MG: 500 INJECTION, SOLUTION INTRAVENOUS at 10:03

## 2018-03-24 RX ADMIN — SODIUM CHLORIDE 1000 ML: 0.9 INJECTION, SOLUTION INTRAVENOUS at 01:03

## 2018-03-24 RX ADMIN — METRONIDAZOLE 500 MG: 500 INJECTION, SOLUTION INTRAVENOUS at 03:03

## 2018-03-24 RX ADMIN — SODIUM CHLORIDE 125 ML/HR: 0.9 INJECTION, SOLUTION INTRAVENOUS at 05:03

## 2018-03-24 RX ADMIN — ONDANSETRON 4 MG: 2 INJECTION INTRAMUSCULAR; INTRAVENOUS at 01:03

## 2018-03-24 RX ADMIN — HYDROMORPHONE HYDROCHLORIDE 1 MG: 1 INJECTION, SOLUTION INTRAMUSCULAR; INTRAVENOUS; SUBCUTANEOUS at 05:03

## 2018-03-24 RX ADMIN — FENTANYL CITRATE 50 MCG: 50 INJECTION, SOLUTION INTRAMUSCULAR; INTRAVENOUS at 02:03

## 2018-03-24 RX ADMIN — ALPRAZOLAM 2 MG: 1 TABLET ORAL at 10:03

## 2018-03-24 RX ADMIN — HYDROMORPHONE HYDROCHLORIDE 1 MG: 1 INJECTION, SOLUTION INTRAMUSCULAR; INTRAVENOUS; SUBCUTANEOUS at 10:03

## 2018-03-24 RX ADMIN — CIPROFLOXACIN 400 MG: 2 INJECTION, SOLUTION INTRAVENOUS at 02:03

## 2018-03-24 RX ADMIN — ENOXAPARIN SODIUM 40 MG: 100 INJECTION SUBCUTANEOUS at 10:03

## 2018-03-24 RX ADMIN — MORPHINE SULFATE 4 MG: 4 INJECTION INTRAVENOUS at 01:03

## 2018-03-24 RX ADMIN — IOHEXOL 75 ML: 350 INJECTION, SOLUTION INTRAVENOUS at 03:03

## 2018-03-24 RX ADMIN — SODIUM CHLORIDE 1000 ML: 0.9 INJECTION, SOLUTION INTRAVENOUS at 02:03

## 2018-03-24 NOTE — ED PROVIDER NOTES
Encounter Date: 3/24/2018       History     Chief Complaint   Patient presents with    Flank Pain     L side flank pain radiating to lower abd.     Presents with right lower abdominal pain since last night. Pain is sharp, intermittent, radiating to lower back and flank, associated to nausea, no fever. Denies hematuria or dysuria.      The history is provided by the patient.     Review of patient's allergies indicates:   Allergen Reactions    Toradol [ketorolac] Hives and Nausea And Vomiting     Past Medical History:   Diagnosis Date    Anxiety     Arthritis     Asthma     Avascular necrosis     Carpal tunnel syndrome     Chronic pain     Depression     Other injury of other sites of trunk 12/28/2011    Pneumonia     Spondylolisthesis     lumbar; myofascial pain    Staph infection     Ulnar neuropathy     left and rt arm     Past Surgical History:   Procedure Laterality Date    HIP SURGERY  3/06; 6/06    hip arthroplasty    HIP SURGERY      bilateral hip replacement    JOINT REPLACEMENT      SHOULDER SURGERY  2012    right shoulder    SHOULDER SURGERY       Family History   Problem Relation Age of Onset    Cancer Mother      Social History   Substance Use Topics    Smoking status: Former Smoker     Packs/day: 1.00     Years: 10.00     Quit date: 10/13/2012    Smokeless tobacco: Never Used    Alcohol use 7.2 oz/week     12 Cans of beer per week      Comment: on the weekend     Review of Systems   Constitutional: Negative for chills and fever.   HENT: Negative for dental problem and sore throat.    Eyes: Negative for visual disturbance.   Respiratory: Negative for cough and shortness of breath.    Cardiovascular: Negative for chest pain.   Gastrointestinal: Positive for abdominal pain and nausea. Negative for blood in stool, diarrhea and vomiting.   Genitourinary: Positive for flank pain. Negative for difficulty urinating, discharge, dysuria and testicular pain.   Musculoskeletal: Positive for  back pain. Negative for myalgias.   Skin: Negative for rash.   Neurological: Negative for weakness and headaches.   Psychiatric/Behavioral: Negative for sleep disturbance.   All other systems reviewed and are negative.      Physical Exam     Initial Vitals [03/24/18 1330]   BP Pulse Resp Temp SpO2   117/67 88 20 99.2 °F (37.3 °C) 98 %      MAP       83.67         Physical Exam    Nursing note and vitals reviewed.  Constitutional: He appears well-developed and well-nourished. No distress.   HENT:   Head: Normocephalic and atraumatic.   Eyes: Conjunctivae are normal. Pupils are equal, round, and reactive to light.   Neck: Normal range of motion. Neck supple.   Cardiovascular: Normal rate, regular rhythm, normal heart sounds and intact distal pulses.   Pulmonary/Chest: Breath sounds normal. No respiratory distress.   Abdominal: Soft. Bowel sounds are normal. He exhibits no distension. There is tenderness (Rt lower abdomen, McBurney Positive; Negative Monet). There is guarding (Voluntary, RLQ). There is no rebound.   Musculoskeletal: Normal range of motion. He exhibits no edema.   Neurological: He is alert and oriented to person, place, and time. He has normal strength.   Skin: Skin is warm and dry. No rash noted.   Psychiatric: His behavior is normal.         ED Course   Procedures  Labs Reviewed   CBC W/ AUTO DIFFERENTIAL - Abnormal; Notable for the following:        Result Value    WBC 23.65 (*)     RBC 4.24 (*)     Hemoglobin 13.5 (*)     Hematocrit 39.8 (*)     MCH 31.8 (*)     MPV 9.0 (*)     Gran # (ANC) 19.7 (*)     Mono # 2.2 (*)     Gran% 83.5 (*)     Lymph% 7.0 (*)     All other components within normal limits   COMPREHENSIVE METABOLIC PANEL - Abnormal; Notable for the following:     Creatinine 1.5 (*)     eGFR if non  54.7 (*)     All other components within normal limits   URINALYSIS - Abnormal; Notable for the following:     Protein, UA Trace (*)     Ketones, UA 1+ (*)     All other  components within normal limits   CULTURE, BLOOD   CULTURE, BLOOD   PROCALCITONIN   LACTIC ACID, PLASMA         Results for orders placed or performed during the hospital encounter of 03/24/18   CBC auto differential   Result Value Ref Range    WBC 23.65 (H) 3.90 - 12.70 K/uL    RBC 4.24 (L) 4.60 - 6.20 M/uL    Hemoglobin 13.5 (L) 14.0 - 18.0 g/dL    Hematocrit 39.8 (L) 40.0 - 54.0 %    MCV 94 82 - 98 fL    MCH 31.8 (H) 27.0 - 31.0 pg    MCHC 33.9 32.0 - 36.0 g/dL    RDW 13.9 11.5 - 14.5 %    Platelets 285 150 - 350 K/uL    MPV 9.0 (L) 9.2 - 12.9 fL    Gran # (ANC) 19.7 (H) 1.8 - 7.7 K/uL    Lymph # 1.7 1.0 - 4.8 K/uL    Mono # 2.2 (H) 0.3 - 1.0 K/uL    Eos # 0.0 0.0 - 0.5 K/uL    Baso # 0.04 0.00 - 0.20 K/uL    Gran% 83.5 (H) 38.0 - 73.0 %    Lymph% 7.0 (L) 18.0 - 48.0 %    Mono% 9.3 4.0 - 15.0 %    Eosinophil% 0.0 0.0 - 8.0 %    Basophil% 0.2 0.0 - 1.9 %    Differential Method Automated    Comprehensive metabolic panel   Result Value Ref Range    Sodium 138 136 - 145 mmol/L    Potassium 4.0 3.5 - 5.1 mmol/L    Chloride 100 95 - 110 mmol/L    CO2 25 23 - 29 mmol/L    Glucose 80 70 - 110 mg/dL    BUN, Bld 17 6 - 20 mg/dL    Creatinine 1.5 (H) 0.5 - 1.4 mg/dL    Calcium 9.8 8.7 - 10.5 mg/dL    Total Protein 7.9 6.0 - 8.4 g/dL    Albumin 4.3 3.5 - 5.2 g/dL    Total Bilirubin 0.8 0.1 - 1.0 mg/dL    Alkaline Phosphatase 58 55 - 135 U/L    AST 23 10 - 40 U/L    ALT 28 10 - 44 U/L    Anion Gap 13 8 - 16 mmol/L    eGFR if African American >60.0 >60 mL/min/1.73 m^2    eGFR if non  54.7 (A) >60 mL/min/1.73 m^2   Urinalysis Clean Catch   Result Value Ref Range    Specimen UA Urine, Clean Catch     Color, UA Yellow Yellow, Straw, Sharron    Appearance, UA Clear Clear    pH, UA 6.0 5.0 - 8.0    Specific Gravity, UA 1.015 1.005 - 1.030    Protein, UA Trace (A) Negative    Glucose, UA Negative Negative    Ketones, UA 1+ (A) Negative    Bilirubin (UA) Negative Negative    Occult Blood UA Negative Negative    Nitrite,  UA Negative Negative    Urobilinogen, UA <2.0 <2.0 EU/dL    Leukocytes, UA Negative Negative   Procalcitonin   Result Value Ref Range    Procalcitonin 0.11 <0.25 ng/mL   Lactic acid, plasma   Result Value Ref Range    Lactate (Lactic Acid) 1.4 0.5 - 2.2 mmol/L       Imaging Results          CT Abdomen Pelvis With Contrast (Final result)  Result time 03/24/18 15:59:34    Final result by Nicolas Fabian MD (03/24/18 15:59:34)                 Impression:     Sigmoid diverticulitis. See above. No complicating process identified. Suggest posttreatment followup.    All CT scans at this facility use dose modulation, iterative reconstruction, and/or weight-based dosing when appropriate to reduce radiation dose to as low as reasonably achievable.      Electronically signed by: NICOLAS FABIAN MD  Date:     03/24/18  Time:    15:59              Narrative:    Exam: CT of the abdomen and pelvis with contrast.    History: Right lower quadrant abdominal pain. Possible appendicitis.    Protocol: Thin section axial images were acquired following IV administration of 75 cc Omnipaque 350.    Findings: The liver, spleen, pancreas, kidneys, and adrenal glands are unremarkable.     The appendix is unremarkable. Sigmoid diverticulosis again noted. There is more pronounced wall thickening in the mid sigmoid segment. Regional inflammatory changes are apparent, difficult to definitively assess secondary to streak artifact emanating from patient's bilateral hip arthroplasty. A few small mesenteric air collections are apparent. No drainable fluid collection identified    Visualized lung bases are clear.                                Case discussed with Jeff (NP) and Dr. Denny of [facility]. Dr. Denny will admit the patient for Acute Diverticulitis and agrees with treatment given while in ED. Discussed test results, treatment plan, and need for admission with patient and family members who voice understanding and agree to plan. All  questions have been answered at this time.    Admitting service: Hospital Medicine    Admitting physician: Dr. Denny    Admit to: Observation Med Surg       Pt Keon Schneider Jr have a diagnosis of acute sigmoid diverticulitis with sepsis requires evaluation and management by Internal Medicine service. At this free standing facility we do not have admission capability for which will need to transfer to our [facility]  with capability and capacity. Pt was informed about his condition and the recommendation of transfer for specialty care, Pt understood and agrees with transfer recommendation. This case was discuss with Dr. Denny at [facility] who accepted the patient for transfer and assures capacity and capability for this emergency department case. Pt will be transfer by ([facility]) by ([employer]) using BLS. Treatment in route will be saline lock with NS at 150 ml/hr.  The risk of transfer are Motor Vehicle Accident, Respiratory Failure, Cardiac Dysrhythmias, Progression of Sepsis, or Death.  The benefits of the transfer are adequate evaluation and management by a specialist in the area of Internal Medicine.  At this time Pt is stable for transfer.                  Clinical Impression:   The encounter diagnosis was Diverticulitis of sigmoid colon.    Disposition:   Disposition: Admitted  Condition: Stable  Pt Keon Schneider Jr have a diagnosis of acute sigmoid diverticulitis with sepsis requires evaluation and management by Internal Medicine service. At this free standing facility we do not have admission capability for which will need to transfer to our [facility]  with capability and capacity. Pt was informed about his condition and the recommendation of transfer for specialty care, Pt understood and agrees with transfer recommendation. This case was discuss with Dr. Denny at [facility] who accepted the patient for transfer and  assures capacity and capability for this emergency department case. Pt will be transfer by ([facility]) by ([employer]) using BLS. Treatment in route will be saline lock with NS at 150 ml/hr.  The risk of transfer are Motor Vehicle Accident, Respiratory Failure, Cardiac Dysrhythmias, Progression of Sepsis, or Death.  The benefits of the transfer are adequate evaluation and management by a specialist in the area of Internal Medicine.  At this time Pt is stable for transfer.                        Osmar Lepe MD  03/24/18 4943

## 2018-03-24 NOTE — ED NOTES
Patient to er with the report of right lower quad abd pain  That radiates to back and down to testicles. He reports onset last pm. He reports having an inflamed appendix in the past and a twin brother with kidney stones. Last po intake approx 1 hour PTA.  Patient reports pain with ambulation, pain with cough to RLQ. Intermittent nausea. JULIANNE. MD at bedside to evaluate patient.

## 2018-03-24 NOTE — ED NOTES
Patient reports pain 10/10 held orders released. Patient aware that he is awaiting bed assignment and will be transferred to main campus. Patient father left to go home. Will continue to monitor.

## 2018-03-24 NOTE — ED NOTES
Patient in CR at present for his abd pain. He reports pain medication does not work well for him due to a tolerance because he had been on medication for a long time in his past. Will continue to monitor patient.

## 2018-03-25 LAB
ALBUMIN SERPL BCP-MCNC: 4.1 G/DL
ALP SERPL-CCNC: 51 U/L
ALT SERPL W/O P-5'-P-CCNC: 18 U/L
ANION GAP SERPL CALC-SCNC: 8 MMOL/L
AST SERPL-CCNC: 17 U/L
BASOPHILS # BLD AUTO: 0.02 K/UL
BASOPHILS NFR BLD: 0.1 %
BILIRUB SERPL-MCNC: 0.6 MG/DL
BUN SERPL-MCNC: 11 MG/DL
CALCIUM SERPL-MCNC: 9.7 MG/DL
CHLORIDE SERPL-SCNC: 101 MMOL/L
CHOLEST SERPL-MCNC: 161 MG/DL
CHOLEST/HDLC SERPL: 3.7 {RATIO}
CO2 SERPL-SCNC: 29 MMOL/L
CREAT SERPL-MCNC: 1.1 MG/DL
DIFFERENTIAL METHOD: ABNORMAL
EOSINOPHIL # BLD AUTO: 0.1 K/UL
EOSINOPHIL NFR BLD: 0.4 %
ERYTHROCYTE [DISTWIDTH] IN BLOOD BY AUTOMATED COUNT: 14.1 %
EST. GFR  (AFRICAN AMERICAN): >60 ML/MIN/1.73 M^2
EST. GFR  (NON AFRICAN AMERICAN): >60 ML/MIN/1.73 M^2
ESTIMATED AVG GLUCOSE: 108 MG/DL
GLUCOSE SERPL-MCNC: 84 MG/DL
HBA1C MFR BLD HPLC: 5.4 %
HCT VFR BLD AUTO: 38.6 %
HDLC SERPL-MCNC: 44 MG/DL
HDLC SERPL: 27.3 %
HGB BLD-MCNC: 12.6 G/DL
LDLC SERPL CALC-MCNC: 92.4 MG/DL
LYMPHOCYTES # BLD AUTO: 1.8 K/UL
LYMPHOCYTES NFR BLD: 12.8 %
MAGNESIUM SERPL-MCNC: 2 MG/DL
MCH RBC QN AUTO: 31.4 PG
MCHC RBC AUTO-ENTMCNC: 32.6 G/DL
MCV RBC AUTO: 96 FL
MONOCYTES # BLD AUTO: 1.7 K/UL
MONOCYTES NFR BLD: 11.6 %
NEUTROPHILS # BLD AUTO: 10.6 K/UL
NEUTROPHILS NFR BLD: 75.1 %
NONHDLC SERPL-MCNC: 117 MG/DL
PHOSPHATE SERPL-MCNC: 3.8 MG/DL
PLATELET # BLD AUTO: 214 K/UL
PMV BLD AUTO: 8.8 FL
POTASSIUM SERPL-SCNC: 4.2 MMOL/L
PROT SERPL-MCNC: 7.6 G/DL
RBC # BLD AUTO: 4.01 M/UL
SODIUM SERPL-SCNC: 138 MMOL/L
TRIGL SERPL-MCNC: 123 MG/DL
WBC # BLD AUTO: 14.17 K/UL

## 2018-03-25 PROCEDURE — 63600175 PHARM REV CODE 636 W HCPCS: Performed by: NURSE PRACTITIONER

## 2018-03-25 PROCEDURE — 84100 ASSAY OF PHOSPHORUS: CPT

## 2018-03-25 PROCEDURE — S0030 INJECTION, METRONIDAZOLE: HCPCS | Performed by: INTERNAL MEDICINE

## 2018-03-25 PROCEDURE — 25000003 PHARM REV CODE 250: Performed by: INTERNAL MEDICINE

## 2018-03-25 PROCEDURE — 83735 ASSAY OF MAGNESIUM: CPT

## 2018-03-25 PROCEDURE — 80061 LIPID PANEL: CPT

## 2018-03-25 PROCEDURE — 85025 COMPLETE CBC W/AUTO DIFF WBC: CPT

## 2018-03-25 PROCEDURE — 36415 COLL VENOUS BLD VENIPUNCTURE: CPT

## 2018-03-25 PROCEDURE — 11000001 HC ACUTE MED/SURG PRIVATE ROOM

## 2018-03-25 PROCEDURE — 63600175 PHARM REV CODE 636 W HCPCS: Performed by: INTERNAL MEDICINE

## 2018-03-25 PROCEDURE — 83036 HEMOGLOBIN GLYCOSYLATED A1C: CPT

## 2018-03-25 PROCEDURE — 80053 COMPREHEN METABOLIC PANEL: CPT

## 2018-03-25 RX ORDER — HYDROMORPHONE HYDROCHLORIDE 1 MG/ML
0.5 INJECTION, SOLUTION INTRAMUSCULAR; INTRAVENOUS; SUBCUTANEOUS EVERY 4 HOURS PRN
Status: DISCONTINUED | OUTPATIENT
Start: 2018-03-25 | End: 2018-03-26

## 2018-03-25 RX ORDER — ACETAMINOPHEN 325 MG/1
650 TABLET ORAL EVERY 6 HOURS PRN
Status: DISCONTINUED | OUTPATIENT
Start: 2018-03-25 | End: 2018-03-26 | Stop reason: HOSPADM

## 2018-03-25 RX ADMIN — HYDROMORPHONE HYDROCHLORIDE 1 MG: 1 INJECTION, SOLUTION INTRAMUSCULAR; INTRAVENOUS; SUBCUTANEOUS at 02:03

## 2018-03-25 RX ADMIN — OXYCODONE HYDROCHLORIDE 5 MG: 5 TABLET ORAL at 03:03

## 2018-03-25 RX ADMIN — CIPROFLOXACIN 400 MG: 2 INJECTION, SOLUTION INTRAVENOUS at 03:03

## 2018-03-25 RX ADMIN — OXYCODONE HYDROCHLORIDE 5 MG: 5 TABLET ORAL at 08:03

## 2018-03-25 RX ADMIN — HYDROMORPHONE HYDROCHLORIDE 0.5 MG: 1 INJECTION, SOLUTION INTRAMUSCULAR; INTRAVENOUS; SUBCUTANEOUS at 04:03

## 2018-03-25 RX ADMIN — ALPRAZOLAM 2 MG: 1 TABLET ORAL at 10:03

## 2018-03-25 RX ADMIN — HYDROMORPHONE HYDROCHLORIDE 1 MG: 1 INJECTION, SOLUTION INTRAMUSCULAR; INTRAVENOUS; SUBCUTANEOUS at 12:03

## 2018-03-25 RX ADMIN — OXYCODONE HYDROCHLORIDE 5 MG: 5 TABLET ORAL at 02:03

## 2018-03-25 RX ADMIN — SODIUM CHLORIDE: 0.9 INJECTION, SOLUTION INTRAVENOUS at 05:03

## 2018-03-25 RX ADMIN — SODIUM CHLORIDE: 0.9 INJECTION, SOLUTION INTRAVENOUS at 02:03

## 2018-03-25 RX ADMIN — CIPROFLOXACIN 400 MG: 2 INJECTION, SOLUTION INTRAVENOUS at 02:03

## 2018-03-25 RX ADMIN — ALPRAZOLAM 2 MG: 1 TABLET ORAL at 09:03

## 2018-03-25 RX ADMIN — ENOXAPARIN SODIUM 40 MG: 100 INJECTION SUBCUTANEOUS at 04:03

## 2018-03-25 RX ADMIN — HYDROMORPHONE HYDROCHLORIDE 0.5 MG: 1 INJECTION, SOLUTION INTRAMUSCULAR; INTRAVENOUS; SUBCUTANEOUS at 09:03

## 2018-03-25 RX ADMIN — METRONIDAZOLE 500 MG: 500 INJECTION, SOLUTION INTRAVENOUS at 07:03

## 2018-03-25 RX ADMIN — METRONIDAZOLE 500 MG: 500 INJECTION, SOLUTION INTRAVENOUS at 10:03

## 2018-03-25 RX ADMIN — PANTOPRAZOLE SODIUM 40 MG: 40 TABLET, DELAYED RELEASE ORAL at 08:03

## 2018-03-25 RX ADMIN — HYDROMORPHONE HYDROCHLORIDE 1 MG: 1 INJECTION, SOLUTION INTRAMUSCULAR; INTRAVENOUS; SUBCUTANEOUS at 07:03

## 2018-03-25 RX ADMIN — METRONIDAZOLE 500 MG: 500 INJECTION, SOLUTION INTRAVENOUS at 04:03

## 2018-03-25 NOTE — H&P
[facility] Hospital Medicine  History & Physical    Patient Name: Keon Schneider Jr.  MRN: [id number]  Admission Date: 3/24/2018  Attending Physician: Abner Moses MD  Primary Care Provider: Nancy Light MD         Patient information was obtained from patient and ER records.     Subjective:     Principal Problem:Sepsis    Chief Complaint:   Chief Complaint   Patient presents with    Flank Pain     L side flank pain radiating to lower abd.        HPI: Mr. Schneider is a 48 y/o  male with h/o chronic pain syndrome due to multiple orthopedic surgeries, presented to [facility] ED c.o lower abodminal pain since last night associated with dizziness and chills. In the ED, he was found to have WBC 25,000, no bands. CT abdomen shows sigmoid diverticulitis. Started on Cipro and Flagyl and IV fluids. Deneis any other complaints.    Past Medical History:   Diagnosis Date    Anxiety     Arthritis     Asthma     Avascular necrosis     Carpal tunnel syndrome     Chronic pain     Depression     Other injury of other sites of trunk 12/28/2011    Pneumonia     Spondylolisthesis     lumbar; myofascial pain    Staph infection     Ulnar neuropathy     left and rt arm       Past Surgical History:   Procedure Laterality Date    HIP SURGERY  3/06; 6/06    hip arthroplasty    HIP SURGERY      bilateral hip replacement    JOINT REPLACEMENT      SHOULDER SURGERY  2012    right shoulder    SHOULDER SURGERY         Review of patient's allergies indicates:   Allergen Reactions    Toradol [ketorolac] Hives and Nausea And Vomiting       No current facility-administered medications on file prior to encounter.      Current Outpatient Prescriptions on File Prior to Encounter   Medication Sig    acetaminophen (TYLENOL) 325 MG tablet Take 2 tablets (650 mg total) by mouth every 6 (six) hours as needed for Pain (or fever).    alprazolam (XANAX) 2 MG Tab Take 1 tablet (2 mg total) by mouth daily as  needed (back pain).    diazePAM (VALIUM) 5 MG tablet Take 1 tablet (5 mg total) by mouth nightly as needed (muscle pain).    pregabalin (LYRICA) 75 MG capsule Take 1 capsule (75 mg total) by mouth 3 (three) times daily.    sulindac (CLINORIL) 200 MG Tab Take 1 tablet (200 mg total) by mouth 2 (two) times daily.    [DISCONTINUED] pantoprazole (PROTONIX) 40 MG tablet Take 1 tablet (40 mg total) by mouth once daily.     Family History     Problem Relation (Age of Onset)    Cancer Mother        Social History Main Topics    Smoking status: Former Smoker     Packs/day: 1.00     Years: 10.00     Quit date: 10/13/2012    Smokeless tobacco: Never Used    Alcohol use 7.2 oz/week     12 Cans of beer per week      Comment: on the weekend    Drug use: No    Sexual activity: Yes     Partners: Female     Review of Systems   Constitutional: Positive for chills. Negative for fatigue and fever.   HENT: Negative for congestion, sore throat and trouble swallowing.    Eyes: Negative for photophobia and visual disturbance.   Respiratory: Negative for cough, shortness of breath and wheezing.    Cardiovascular: Negative for chest pain, palpitations and leg swelling.   Gastrointestinal: Positive for abdominal pain and nausea. Negative for blood in stool, diarrhea and vomiting.   Endocrine: Negative for polyuria.   Genitourinary: Negative for dysuria, flank pain, frequency and hematuria.   Musculoskeletal: Negative for back pain and joint swelling.   Skin: Negative for color change and pallor.   Allergic/Immunologic: Negative for food allergies and immunocompromised state.   Neurological: Negative for dizziness, speech difficulty, numbness and headaches.   Hematological: Negative for adenopathy. Does not bruise/bleed easily.   Psychiatric/Behavioral: Negative for confusion and decreased concentration. The patient is not nervous/anxious.    All other systems reviewed and are negative.    Objective:     Vital Signs (Most  Recent):  Temp: 98.8 °F (37.1 °C) (03/24/18 2124)  Pulse: 69 (03/24/18 2124)  Resp: 18 (03/24/18 2124)  BP: 128/69 (03/24/18 2124)  SpO2: 96 % (03/24/18 2124) Vital Signs (24h Range):  Temp:  [98.3 °F (36.8 °C)-99.2 °F (37.3 °C)] 98.8 °F (37.1 °C)  Pulse:  [66-88] 69  Resp:  [16-20] 18  SpO2:  [95 %-99 %] 96 %  BP: (101-128)/(56-75) 128/69     Weight: 93 kg (205 lb 0.4 oz)  Body mass index is 27.81 kg/m².    Physical Exam   Constitutional: He is oriented to person, place, and time. He appears well-developed and well-nourished. No distress.   HENT:   Head: Normocephalic and atraumatic.   Eyes: Conjunctivae and EOM are normal. Pupils are equal, round, and reactive to light. No scleral icterus.   Neck: Normal range of motion. Neck supple. No thyromegaly present.   Cardiovascular: Normal rate, regular rhythm and normal heart sounds.    No murmur heard.  Pulmonary/Chest: Effort normal and breath sounds normal. No respiratory distress. He has no wheezes. He exhibits no tenderness.   Abdominal: Soft. Bowel sounds are normal. He exhibits no distension. There is tenderness (lower abdominal and right sided).   Musculoskeletal: Normal range of motion. He exhibits no edema or tenderness.   Lymphadenopathy:     He has no cervical adenopathy.   Neurological: He is alert and oriented to person, place, and time. No cranial nerve deficit. He exhibits normal muscle tone. Coordination normal.   Skin: Skin is warm and dry. He is not diaphoretic.   Psychiatric: He has a normal mood and affect. His behavior is normal. Judgment and thought content normal.   Nursing note and vitals reviewed.        CRANIAL NERVES     CN III, IV, VI   Pupils are equal, round, and reactive to light.  Extraocular motions are normal.        Significant Labs:   BMP:   Recent Labs  Lab 03/24/18  1338   GLU 80      K 4.0      CO2 25   BUN 17   CREATININE 1.5*   CALCIUM 9.8     CBC:   Recent Labs  Lab 03/24/18  1338   WBC 23.65*   HGB 13.5*   HCT 39.8*         CMP:   Recent Labs  Lab 03/24/18  1338      K 4.0      CO2 25   GLU 80   BUN 17   CREATININE 1.5*   CALCIUM 9.8   PROT 7.9   ALBUMIN 4.3   BILITOT 0.8   ALKPHOS 58   AST 23   ALT 28   ANIONGAP 13   EGFRNONAA 54.7*     Lactic Acid:   Recent Labs  Lab 03/24/18  1445   LACTATE 1.4     Troponin: No results for input(s): TROPONINI in the last 48 hours.  Urine Studies:   Recent Labs  Lab 03/24/18  1338   COLORU Yellow   APPEARANCEUA Clear   PHUR 6.0   SPECGRAV 1.015   PROTEINUA Trace*   GLUCUA Negative   KETONESU 1+*   BILIRUBINUA Negative   OCCULTUA Negative   NITRITE Negative   UROBILINOGEN <2.0   LEUKOCYTESUR Negative     All pertinent labs within the past 24 hours have been reviewed.    Significant Imaging: I have reviewed and interpreted all pertinent imaging results/findings within the past 24 hours.     Imaging Results          CT Abdomen Pelvis With Contrast (Final result)  Result time 03/24/18 15:59:34    Final result by Nicolas Fabian MD (03/24/18 15:59:34)                 Impression:     Sigmoid diverticulitis. See above. No complicating process identified. Suggest posttreatment followup.    All CT scans at this facility use dose modulation, iterative reconstruction, and/or weight-based dosing when appropriate to reduce radiation dose to as low as reasonably achievable.      Electronically signed by: NICOLAS FABIAN MD  Date:     03/24/18  Time:    15:59              Narrative:    Exam: CT of the abdomen and pelvis with contrast.    History: Right lower quadrant abdominal pain. Possible appendicitis.    Protocol: Thin section axial images were acquired following IV administration of 75 cc Omnipaque 350.    Findings: The liver, spleen, pancreas, kidneys, and adrenal glands are unremarkable.     The appendix is unremarkable. Sigmoid diverticulosis again noted. There is more pronounced wall thickening in the mid sigmoid segment. Regional inflammatory changes are apparent, difficult to  definitively assess secondary to streak artifact emanating from patient's bilateral hip arthroplasty. A few small mesenteric air collections are apparent. No drainable fluid collection identified    Visualized lung bases are clear.                              I have independently reviewed all pertinent labs within the past 24 hours.    I have independently reviewed, visualized and interpreted all pertinent imaging results within the past 24 hours and discussed the findings with the ED physician.            Assessment/Plan:     * Sepsis    Subjective fever, chills with WBC 25,000.  Due to sigmoid diverticulitis.  IV fluids.  IV Cipro and Flagyl.            Diverticulitis of sigmoid colon    CT abdomen reviewed.  Continue IV Cipro and IV Flagyl.  Continue IV fluids.  Labs in AM.          AJ (acute kidney injury)    Creatinine 1.5, baseline 1.1  Continue IV fluids.  Labs in AM.          Chronic back pain    Chronic pain syndrome due to multiple orthopedic surgeries in the past.  Dilaudid 1 mg IV q4 prn for abdominal pain today.  Monitor in AM.          Anxiety    Resume home dose xanax.            VTE Risk Mitigation         Ordered     enoxaparin injection 40 mg  Daily     Route:  Subcutaneous        03/24/18 2220     IP VTE LOW RISK PATIENT  Once      03/24/18 2116             Abner Moses MD  Department of Hospital Medicine   [facility]

## 2018-03-25 NOTE — ASSESSMENT & PLAN NOTE
Subjective fever, chills with WBC 25,000.  Due to sigmoid diverticulitis.  IV fluids.  IV Cipro and Flagyl.    3/25:  --afebrile  --WBC 14K  --continue IVF's, IV cipro, ad IV flagyl

## 2018-03-25 NOTE — ASSESSMENT & PLAN NOTE
Subjective fever, chills with WBC 25,000.  Due to sigmoid diverticulitis.  IV fluids.  IV Cipro and Flagyl.

## 2018-03-25 NOTE — SUBJECTIVE & OBJECTIVE
Past Medical History:   Diagnosis Date    Anxiety     Arthritis     Asthma     Avascular necrosis     Carpal tunnel syndrome     Chronic pain     Depression     Other injury of other sites of trunk 12/28/2011    Pneumonia     Spondylolisthesis     lumbar; myofascial pain    Staph infection     Ulnar neuropathy     left and rt arm       Past Surgical History:   Procedure Laterality Date    HIP SURGERY  3/06; 6/06    hip arthroplasty    HIP SURGERY      bilateral hip replacement    JOINT REPLACEMENT      SHOULDER SURGERY  2012    right shoulder    SHOULDER SURGERY         Review of patient's allergies indicates:   Allergen Reactions    Toradol [ketorolac] Hives and Nausea And Vomiting       No current facility-administered medications on file prior to encounter.      Current Outpatient Prescriptions on File Prior to Encounter   Medication Sig    acetaminophen (TYLENOL) 325 MG tablet Take 2 tablets (650 mg total) by mouth every 6 (six) hours as needed for Pain (or fever).    alprazolam (XANAX) 2 MG Tab Take 1 tablet (2 mg total) by mouth daily as needed (back pain).    diazePAM (VALIUM) 5 MG tablet Take 1 tablet (5 mg total) by mouth nightly as needed (muscle pain).    pregabalin (LYRICA) 75 MG capsule Take 1 capsule (75 mg total) by mouth 3 (three) times daily.    sulindac (CLINORIL) 200 MG Tab Take 1 tablet (200 mg total) by mouth 2 (two) times daily.    [DISCONTINUED] pantoprazole (PROTONIX) 40 MG tablet Take 1 tablet (40 mg total) by mouth once daily.     Family History     Problem Relation (Age of Onset)    Cancer Mother        Social History Main Topics    Smoking status: Former Smoker     Packs/day: 1.00     Years: 10.00     Quit date: 10/13/2012    Smokeless tobacco: Never Used    Alcohol use 7.2 oz/week     12 Cans of beer per week      Comment: on the weekend    Drug use: No    Sexual activity: Yes     Partners: Female     Review of Systems   Constitutional: Positive for chills.  Negative for fatigue and fever.   HENT: Negative for congestion, sore throat and trouble swallowing.    Eyes: Negative for photophobia and visual disturbance.   Respiratory: Negative for cough, shortness of breath and wheezing.    Cardiovascular: Negative for chest pain, palpitations and leg swelling.   Gastrointestinal: Positive for abdominal pain and nausea. Negative for blood in stool, diarrhea and vomiting.   Endocrine: Negative for polyuria.   Genitourinary: Negative for dysuria, flank pain, frequency and hematuria.   Musculoskeletal: Negative for back pain and joint swelling.   Skin: Negative for color change and pallor.   Allergic/Immunologic: Negative for food allergies and immunocompromised state.   Neurological: Negative for dizziness, speech difficulty, numbness and headaches.   Hematological: Negative for adenopathy. Does not bruise/bleed easily.   Psychiatric/Behavioral: Negative for confusion and decreased concentration. The patient is not nervous/anxious.    All other systems reviewed and are negative.    Objective:     Vital Signs (Most Recent):  Temp: 98.8 °F (37.1 °C) (03/24/18 2124)  Pulse: 69 (03/24/18 2124)  Resp: 18 (03/24/18 2124)  BP: 128/69 (03/24/18 2124)  SpO2: 96 % (03/24/18 2124) Vital Signs (24h Range):  Temp:  [98.3 °F (36.8 °C)-99.2 °F (37.3 °C)] 98.8 °F (37.1 °C)  Pulse:  [66-88] 69  Resp:  [16-20] 18  SpO2:  [95 %-99 %] 96 %  BP: (101-128)/(56-75) 128/69     Weight: 93 kg (205 lb 0.4 oz)  Body mass index is 27.81 kg/m².    Physical Exam   Constitutional: He is oriented to person, place, and time. He appears well-developed and well-nourished. No distress.   HENT:   Head: Normocephalic and atraumatic.   Eyes: Conjunctivae and EOM are normal. Pupils are equal, round, and reactive to light. No scleral icterus.   Neck: Normal range of motion. Neck supple. No thyromegaly present.   Cardiovascular: Normal rate, regular rhythm and normal heart sounds.    No murmur heard.  Pulmonary/Chest:  Effort normal and breath sounds normal. No respiratory distress. He has no wheezes. He exhibits no tenderness.   Abdominal: Soft. Bowel sounds are normal. He exhibits no distension. There is tenderness (lower abdominal and right sided).   Musculoskeletal: Normal range of motion. He exhibits no edema or tenderness.   Lymphadenopathy:     He has no cervical adenopathy.   Neurological: He is alert and oriented to person, place, and time. No cranial nerve deficit. He exhibits normal muscle tone. Coordination normal.   Skin: Skin is warm and dry. He is not diaphoretic.   Psychiatric: He has a normal mood and affect. His behavior is normal. Judgment and thought content normal.   Nursing note and vitals reviewed.        CRANIAL NERVES     CN III, IV, VI   Pupils are equal, round, and reactive to light.  Extraocular motions are normal.        Significant Labs:   BMP:   Recent Labs  Lab 03/24/18  1338   GLU 80      K 4.0      CO2 25   BUN 17   CREATININE 1.5*   CALCIUM 9.8     CBC:   Recent Labs  Lab 03/24/18  1338   WBC 23.65*   HGB 13.5*   HCT 39.8*        CMP:   Recent Labs  Lab 03/24/18  1338      K 4.0      CO2 25   GLU 80   BUN 17   CREATININE 1.5*   CALCIUM 9.8   PROT 7.9   ALBUMIN 4.3   BILITOT 0.8   ALKPHOS 58   AST 23   ALT 28   ANIONGAP 13   EGFRNONAA 54.7*     Lactic Acid:   Recent Labs  Lab 03/24/18  1445   LACTATE 1.4     Troponin: No results for input(s): TROPONINI in the last 48 hours.  Urine Studies:   Recent Labs  Lab 03/24/18  1338   COLORU Yellow   APPEARANCEUA Clear   PHUR 6.0   SPECGRAV 1.015   PROTEINUA Trace*   GLUCUA Negative   KETONESU 1+*   BILIRUBINUA Negative   OCCULTUA Negative   NITRITE Negative   UROBILINOGEN <2.0   LEUKOCYTESUR Negative     All pertinent labs within the past 24 hours have been reviewed.    Significant Imaging: I have reviewed and interpreted all pertinent imaging results/findings within the past 24 hours.     Imaging Results          CT Abdomen  Pelvis With Contrast (Final result)  Result time 03/24/18 15:59:34    Final result by Nicolas Fabian MD (03/24/18 15:59:34)                 Impression:     Sigmoid diverticulitis. See above. No complicating process identified. Suggest posttreatment followup.    All CT scans at this facility use dose modulation, iterative reconstruction, and/or weight-based dosing when appropriate to reduce radiation dose to as low as reasonably achievable.      Electronically signed by: NICOLAS FABIAN MD  Date:     03/24/18  Time:    15:59              Narrative:    Exam: CT of the abdomen and pelvis with contrast.    History: Right lower quadrant abdominal pain. Possible appendicitis.    Protocol: Thin section axial images were acquired following IV administration of 75 cc Omnipaque 350.    Findings: The liver, spleen, pancreas, kidneys, and adrenal glands are unremarkable.     The appendix is unremarkable. Sigmoid diverticulosis again noted. There is more pronounced wall thickening in the mid sigmoid segment. Regional inflammatory changes are apparent, difficult to definitively assess secondary to streak artifact emanating from patient's bilateral hip arthroplasty. A few small mesenteric air collections are apparent. No drainable fluid collection identified    Visualized lung bases are clear.                              I have independently reviewed all pertinent labs within the past 24 hours.    I have independently reviewed, visualized and interpreted all pertinent imaging results within the past 24 hours and discussed the findings with the ED physician.

## 2018-03-25 NOTE — HPI
Mr. Schneider is a 46 y/o  male with h/o chronic pain syndrome due to multiple orthopedic surgeries, presented to [facility] ED c.o lower abodminal pain since last night associated with dizziness and chills. In the ED, he was found to have WBC 25,000, no bands. CT abdomen shows sigmoid diverticulitis. Started on Cipro and Flagyl and IV fluids. Deneis any other complaints.

## 2018-03-25 NOTE — PROGRESS NOTES
[facility] Hospital Medicine  Progress Note    Patient Name: Keon Schneider Jr.  MRN: [id number]  Patient Class: OP- Observation   Admission Date: 3/24/2018  Length of Stay: 0 days  Attending Physician: Odessa Denny MD  Primary Care Provider: Nancy Light MD        Subjective:     Principal Problem:Sepsis    HPI:  Mr. Schneider is a 46 y/o  male with h/o chronic pain syndrome due to multiple orthopedic surgeries, presented to [facility] ED c.o lower abodminal pain since last night associated with dizziness and chills. In the ED, he was found to have WBC 25,000, no bands. CT abdomen shows sigmoid diverticulitis. Started on Cipro and Flagyl and IV fluids. Deneis any other complaints.      Interval History: Patient was kept for OBS for sepsis under the care of Hospital Medicine. He was given IVF's, IV cipro and IV flagyl with a clear liquid diet and PRN analgesia. Pt reports he is better today, passing gas and had a bowel movement but still has significant pain. WBC decreased to 14K. Will continue current plan    Review of Systems   Constitutional: Negative for chills, fatigue and fever.   HENT: Negative for congestion, rhinorrhea and sore throat.    Eyes: Negative for pain and visual disturbance.   Respiratory: Negative for cough, shortness of breath and wheezing.    Cardiovascular: Negative for chest pain, palpitations and leg swelling.   Gastrointestinal: Positive for abdominal pain and nausea. Negative for abdominal distention, blood in stool, diarrhea and vomiting.   Endocrine: Negative for polyphagia and polyuria.   Genitourinary: Negative for dysuria, flank pain, frequency and hematuria.   Musculoskeletal: Negative for back pain and joint swelling.   Skin: Negative for color change and pallor.   Allergic/Immunologic: Negative for food allergies and immunocompromised state.   Neurological: Negative for dizziness, speech difficulty, numbness and headaches.   Hematological: Negative.     Psychiatric/Behavioral: Negative for agitation, behavioral problems and confusion. The patient is not nervous/anxious.    All other systems reviewed and are negative.    Objective:     Vital Signs (Most Recent):  Temp: 97.9 °F (36.6 °C) (03/25/18 0811)  Pulse: 62 (03/25/18 0811)  Resp: 17 (03/25/18 0811)  BP: 109/64 (03/25/18 0811)  SpO2: 95 % (03/25/18 0811) Vital Signs (24h Range):  Temp:  [97.9 °F (36.6 °C)-99.2 °F (37.3 °C)] 97.9 °F (36.6 °C)  Pulse:  [62-88] 62  Resp:  [16-20] 17  SpO2:  [95 %-99 %] 95 %  BP: (101-130)/(56-83) 109/64     Weight: 93 kg (205 lb 0.4 oz)  Body mass index is 27.81 kg/m².    Intake/Output Summary (Last 24 hours) at 03/25/18 1118  Last data filed at 03/25/18 0521   Gross per 24 hour   Intake          4296.67 ml   Output              500 ml   Net          3796.67 ml      Physical Exam   Constitutional: He is oriented to person, place, and time. He appears well-developed and well-nourished. No distress.   HENT:   Head: Normocephalic and atraumatic.   Nose: Nose normal.   Mouth/Throat: Oropharynx is clear and moist.   Eyes: Conjunctivae and EOM are normal. Pupils are equal, round, and reactive to light. No scleral icterus.   Neck: Normal range of motion. Neck supple. No JVD present.   Cardiovascular: Normal rate, regular rhythm, normal heart sounds and intact distal pulses.    No murmur heard.  Pulmonary/Chest: Effort normal and breath sounds normal. No respiratory distress. He has no wheezes. He exhibits no tenderness.   Abdominal: Soft. Bowel sounds are normal. He exhibits no distension. There is tenderness (lower abdominal and right sided).   LBM this AM   Genitourinary:   Genitourinary Comments: deferred   Musculoskeletal: Normal range of motion. He exhibits no edema or tenderness.   Neurological: He is alert and oriented to person, place, and time. No cranial nerve deficit. He exhibits normal muscle tone. Coordination normal.   Skin: Skin is warm and dry. Capillary refill takes 2  to 3 seconds. He is not diaphoretic. No erythema.   Psychiatric: He has a normal mood and affect. His behavior is normal. Judgment and thought content normal.   Nursing note and vitals reviewed.      Significant Labs:   Recent Lab Results       03/25/18  0447 03/24/18  1445 03/24/18  1338      Procalcitonin  0.11  Comment:  A concentration < 0.25 ng/mL represents a low risk bacterial   infection.  Procalcitonin may not be accurate among patients with localized   infection, recent trauma or major surgery, immunosuppressed state,   invasive fungal infection, renal dysfunction. Decisions regarding   initiation or continuation of antibiotic therapy should not be based   solely on procalcitonin levels.        Albumin 4.1  4.3     Alkaline Phosphatase 51(L)  58     ALT 18  28     Anion Gap 8  13     Appearance, UA   Clear     AST 17  23     Baso # 0.02  0.04     Basophil% 0.1  0.2     Bilirubin (UA)   Negative     Total Bilirubin 0.6  Comment:  For infants and newborns, interpretation of results should be based  on gestational age, weight and in agreement with clinical  observations.  Premature Infant recommended reference ranges:  Up to 24 hours.............<8.0 mg/dL  Up to 48 hours............<12.0 mg/dL  3-5 days..................<15.0 mg/dL  6-29 days.................<15.0 mg/dL    0.8  Comment:  For infants and newborns, interpretation of results should be based  on gestational age, weight and in agreement with clinical  observations.  Premature Infant recommended reference ranges:  Up to 24 hours.............<8.0 mg/dL  Up to 48 hours............<12.0 mg/dL  3-5 days..................<15.0 mg/dL  6-29 days.................<15.0 mg/dL       BUN, Bld 11  17     Calcium 9.7  9.8     Chloride 101  100     CO2 29  25     Color, UA   Yellow     Creatinine 1.1  1.5(H)     Differential Method Automated  Automated     eGFR if  >60  >60.0     eGFR if non  >60  Comment:  Calculation used to  obtain the estimated glomerular filtration  rate (eGFR) is the CKD-EPI equation.     54.7  Comment:  Calculation used to obtain the estimated glomerular filtration  rate (eGFR) is the CKD-EPI equation.   (A)     Eos # 0.1  0.0     Eosinophil% 0.4  0.0     Glucose 84  80     Glucose, UA   Negative     Gran # (ANC) 10.6(H)  19.7(H)     Gran% 75.1(H)  83.5(H)     Hematocrit 38.6(L)  39.8(L)     Hemoglobin 12.6(L)  13.5(L)     Ketones, UA   1+(A)     Lactate, Jaylan  1.4  Comment:  Falsely low lactic acid results can be found in samples   containing >=13.0 mg/dL total bilirubin and/or >=3.5 mg/dL   direct bilirubin.        Leukocytes, UA   Negative     Lymph # 1.8  1.7     Lymph% 12.8(L)  7.0(L)     Magnesium 2.0       MCH 31.4(H)  31.8(H)     MCHC 32.6  33.9     MCV 96  94     Mono # 1.7(H)  2.2(H)     Mono% 11.6  9.3     MPV 8.8(L)  9.0(L)     Nitrite, UA   Negative     Occult Blood UA   Negative     pH, UA   6.0     Phosphorus 3.8       Platelets 214  285     Potassium 4.2  4.0     Total Protein 7.6  7.9     Protein, UA   Trace  Comment:  Recommend a 24 hour urine protein or a urine   protein/creatinine ratio if globulin induced proteinuria is  clinically suspected.  (A)     RBC 4.01(L)  4.24(L)     RDW 14.1  13.9     Sodium 138  138     Specific Gravity, UA   1.015     Specimen UA   Urine, Clean Catch     Urobilinogen, UA   <2.0     WBC 14.17(H)  23.65(H)         All pertinent labs within the past 24 hours have been reviewed.    Significant Imaging: I have reviewed all pertinent imaging results/findings within the past 24 hours.    Assessment/Plan:      * Sepsis    Subjective fever, chills with WBC 25,000.  Due to sigmoid diverticulitis.  IV fluids.  IV Cipro and Flagyl.    3/25:  --afebrile  --WBC 14K  --continue IVF's, IV cipro, ad IV flagyl          AJ (acute kidney injury)    Creatinine 1.5, baseline 1.1  Continue IV fluids.  Labs in AM.    3/25:  --improved with IVF's        Diverticulitis of sigmoid colon    CT  abdomen reviewed.  Continue IV Cipro and IV Flagyl.  Continue IV fluids.  Labs in AM.          Chronic back pain    Chronic pain syndrome due to multiple orthopedic surgeries in the past.  Dilaudid 1 mg IV q4 prn for abdominal pain today.  Monitor in AM.          Anxiety    Resume home dose xanax.            VTE Risk Mitigation         Ordered     enoxaparin injection 40 mg  Daily     Route:  Subcutaneous        03/24/18 2220     IP VTE LOW RISK PATIENT  Once      03/24/18 2116              TORRI Gonzales-ANGELIA  Department of Hospital Medicine   [facility]

## 2018-03-25 NOTE — PLAN OF CARE
Problem: Patient Care Overview  Goal: Plan of Care Review  Outcome: Ongoing (interventions implemented as appropriate)  Shift assessment completed. Patient updated on POC for the day. Complaints of pain - PRN medication administered. IVF infusing:  NS at 125ml/hr. Neuro: Alert & oriented x4. Sats=greater than 99% on room air. Patient voids per urinal. No nausea or vomiting. Complaints of stabbing pain to the right lower quadrant. Skin:scars. Patient is on a liquid diet to be advanced as tolerated. Patient requested and ate 2 orange popsicles, 5 containers of vanilla ice cream, 2 orange jello, 1 cup of chicken broth, and 2 ice pitchers of water. Will continue to monitor.

## 2018-03-25 NOTE — SUBJECTIVE & OBJECTIVE
Interval History: Patient was kept for OBS for sepsis under the care of [facility]. He was given IVF's, IV cipro and IV flagyl with a clear liquid diet and PRN analgesia. Pt reports he is better today, passing gas and had a bowel movement but still has significant pain. WBC decreased to 14K. Will continue current plan    Review of Systems   Constitutional: Negative for chills, fatigue and fever.   HENT: Negative for congestion, rhinorrhea and sore throat.    Eyes: Negative for pain and visual disturbance.   Respiratory: Negative for cough, shortness of breath and wheezing.    Cardiovascular: Negative for chest pain, palpitations and leg swelling.   Gastrointestinal: Positive for abdominal pain and nausea. Negative for abdominal distention, blood in stool, diarrhea and vomiting.   Endocrine: Negative for polyphagia and polyuria.   Genitourinary: Negative for dysuria, flank pain, frequency and hematuria.   Musculoskeletal: Negative for back pain and joint swelling.   Skin: Negative for color change and pallor.   Allergic/Immunologic: Negative for food allergies and immunocompromised state.   Neurological: Negative for dizziness, speech difficulty, numbness and headaches.   Hematological: Negative.    Psychiatric/Behavioral: Negative for agitation, behavioral problems and confusion. The patient is not nervous/anxious.    All other systems reviewed and are negative.    Objective:     Vital Signs (Most Recent):  Temp: 97.9 °F (36.6 °C) (03/25/18 0811)  Pulse: 62 (03/25/18 0811)  Resp: 17 (03/25/18 0811)  BP: 109/64 (03/25/18 0811)  SpO2: 95 % (03/25/18 0811) Vital Signs (24h Range):  Temp:  [97.9 °F (36.6 °C)-99.2 °F (37.3 °C)] 97.9 °F (36.6 °C)  Pulse:  [62-88] 62  Resp:  [16-20] 17  SpO2:  [95 %-99 %] 95 %  BP: (101-130)/(56-83) 109/64     Weight: 93 kg (205 lb 0.4 oz)  Body mass index is 27.81 kg/m².    Intake/Output Summary (Last 24 hours) at 03/25/18 1118  Last data filed at 03/25/18 0521   Gross per 24 hour    Intake          4296.67 ml   Output              500 ml   Net          3796.67 ml      Physical Exam   Constitutional: He is oriented to person, place, and time. He appears well-developed and well-nourished. No distress.   HENT:   Head: Normocephalic and atraumatic.   Nose: Nose normal.   Mouth/Throat: Oropharynx is clear and moist.   Eyes: Conjunctivae and EOM are normal. Pupils are equal, round, and reactive to light. No scleral icterus.   Neck: Normal range of motion. Neck supple. No JVD present.   Cardiovascular: Normal rate, regular rhythm, normal heart sounds and intact distal pulses.    No murmur heard.  Pulmonary/Chest: Effort normal and breath sounds normal. No respiratory distress. He has no wheezes. He exhibits no tenderness.   Abdominal: Soft. Bowel sounds are normal. He exhibits no distension. There is tenderness (lower abdominal and right sided).   LBM this AM   Genitourinary:   Genitourinary Comments: deferred   Musculoskeletal: Normal range of motion. He exhibits no edema or tenderness.   Neurological: He is alert and oriented to person, place, and time. No cranial nerve deficit. He exhibits normal muscle tone. Coordination normal.   Skin: Skin is warm and dry. Capillary refill takes 2 to 3 seconds. He is not diaphoretic. No erythema.   Psychiatric: He has a normal mood and affect. His behavior is normal. Judgment and thought content normal.   Nursing note and vitals reviewed.      Significant Labs:   Recent Lab Results       03/25/18  0447 03/24/18  1445 03/24/18  1338      Procalcitonin  0.11  Comment:  A concentration < 0.25 ng/mL represents a low risk bacterial   infection.  Procalcitonin may not be accurate among patients with localized   infection, recent trauma or major surgery, immunosuppressed state,   invasive fungal infection, renal dysfunction. Decisions regarding   initiation or continuation of antibiotic therapy should not be based   solely on procalcitonin levels.        Albumin 4.1   4.3     Alkaline Phosphatase 51(L)  58     ALT 18  28     Anion Gap 8  13     Appearance, UA   Clear     AST 17  23     Baso # 0.02  0.04     Basophil% 0.1  0.2     Bilirubin (UA)   Negative     Total Bilirubin 0.6  Comment:  For infants and newborns, interpretation of results should be based  on gestational age, weight and in agreement with clinical  observations.  Premature Infant recommended reference ranges:  Up to 24 hours.............<8.0 mg/dL  Up to 48 hours............<12.0 mg/dL  3-5 days..................<15.0 mg/dL  6-29 days.................<15.0 mg/dL    0.8  Comment:  For infants and newborns, interpretation of results should be based  on gestational age, weight and in agreement with clinical  observations.  Premature Infant recommended reference ranges:  Up to 24 hours.............<8.0 mg/dL  Up to 48 hours............<12.0 mg/dL  3-5 days..................<15.0 mg/dL  6-29 days.................<15.0 mg/dL       BUN, Bld 11  17     Calcium 9.7  9.8     Chloride 101  100     CO2 29  25     Color, UA   Yellow     Creatinine 1.1  1.5(H)     Differential Method Automated  Automated     eGFR if  >60  >60.0     eGFR if non  >60  Comment:  Calculation used to obtain the estimated glomerular filtration  rate (eGFR) is the CKD-EPI equation.     54.7  Comment:  Calculation used to obtain the estimated glomerular filtration  rate (eGFR) is the CKD-EPI equation.   (A)     Eos # 0.1  0.0     Eosinophil% 0.4  0.0     Glucose 84  80     Glucose, UA   Negative     Gran # (ANC) 10.6(H)  19.7(H)     Gran% 75.1(H)  83.5(H)     Hematocrit 38.6(L)  39.8(L)     Hemoglobin 12.6(L)  13.5(L)     Ketones, UA   1+(A)     Lactate, Jaylan  1.4  Comment:  Falsely low lactic acid results can be found in samples   containing >=13.0 mg/dL total bilirubin and/or >=3.5 mg/dL   direct bilirubin.        Leukocytes, UA   Negative     Lymph # 1.8  1.7     Lymph% 12.8(L)  7.0(L)     Magnesium 2.0       MCH  31.4(H)  31.8(H)     MCHC 32.6  33.9     MCV 96  94     Mono # 1.7(H)  2.2(H)     Mono% 11.6  9.3     MPV 8.8(L)  9.0(L)     Nitrite, UA   Negative     Occult Blood UA   Negative     pH, UA   6.0     Phosphorus 3.8       Platelets 214  285     Potassium 4.2  4.0     Total Protein 7.6  7.9     Protein, UA   Trace  Comment:  Recommend a 24 hour urine protein or a urine   protein/creatinine ratio if globulin induced proteinuria is  clinically suspected.  (A)     RBC 4.01(L)  4.24(L)     RDW 14.1  13.9     Sodium 138  138     Specific Gravity, UA   1.015     Specimen UA   Urine, Clean Catch     Urobilinogen, UA   <2.0     WBC 14.17(H)  23.65(H)         All pertinent labs within the past 24 hours have been reviewed.    Significant Imaging: I have reviewed all pertinent imaging results/findings within the past 24 hours.

## 2018-03-25 NOTE — PROGRESS NOTES
Patient is more awake this afternoon - reporting he is having 10/10 pain to RLQ. Discussed diverticulitis s/s - that he will go home on oral Abx and need to f/u with PCP after hospitalization. Pt verbalized understanding.

## 2018-03-25 NOTE — PLAN OF CARE
Problem: Patient Care Overview  Goal: Plan of Care Review  Outcome: Ongoing (interventions implemented as appropriate)  Pt has been free of falls. PIV intact. Fluids infusing. Clear liquid. Pt is on RA. Pt has c/o pain, PRN meds given. IV antibiotics given. Call light in reach and hourly rounding made.

## 2018-03-25 NOTE — ASSESSMENT & PLAN NOTE
Chronic pain syndrome due to multiple orthopedic surgeries in the past.  Dilaudid 1 mg IV q4 prn for abdominal pain today.  Monitor in AM.

## 2018-03-26 VITALS
RESPIRATION RATE: 16 BRPM | DIASTOLIC BLOOD PRESSURE: 90 MMHG | SYSTOLIC BLOOD PRESSURE: 139 MMHG | HEART RATE: 71 BPM | OXYGEN SATURATION: 97 % | BODY MASS INDEX: 27.77 KG/M2 | HEIGHT: 72 IN | TEMPERATURE: 98 F | WEIGHT: 205 LBS

## 2018-03-26 LAB
ALBUMIN SERPL BCP-MCNC: 3.7 G/DL
ALP SERPL-CCNC: 49 U/L
ALT SERPL W/O P-5'-P-CCNC: 16 U/L
ANION GAP SERPL CALC-SCNC: 7 MMOL/L
AST SERPL-CCNC: 13 U/L
BASOPHILS # BLD AUTO: 0.02 K/UL
BASOPHILS NFR BLD: 0.2 %
BILIRUB SERPL-MCNC: 0.3 MG/DL
BUN SERPL-MCNC: 9 MG/DL
CALCIUM SERPL-MCNC: 9.4 MG/DL
CHLORIDE SERPL-SCNC: 103 MMOL/L
CO2 SERPL-SCNC: 27 MMOL/L
CREAT SERPL-MCNC: 1 MG/DL
DIFFERENTIAL METHOD: ABNORMAL
EOSINOPHIL # BLD AUTO: 0.2 K/UL
EOSINOPHIL NFR BLD: 2.2 %
ERYTHROCYTE [DISTWIDTH] IN BLOOD BY AUTOMATED COUNT: 13.7 %
EST. GFR  (AFRICAN AMERICAN): >60 ML/MIN/1.73 M^2
EST. GFR  (NON AFRICAN AMERICAN): >60 ML/MIN/1.73 M^2
GLUCOSE SERPL-MCNC: 108 MG/DL
HCT VFR BLD AUTO: 33.9 %
HGB BLD-MCNC: 11.4 G/DL
LYMPHOCYTES # BLD AUTO: 1.5 K/UL
LYMPHOCYTES NFR BLD: 13.3 %
MCH RBC QN AUTO: 31.5 PG
MCHC RBC AUTO-ENTMCNC: 33.6 G/DL
MCV RBC AUTO: 94 FL
MONOCYTES # BLD AUTO: 1.2 K/UL
MONOCYTES NFR BLD: 10.9 %
NEUTROPHILS # BLD AUTO: 8.2 K/UL
NEUTROPHILS NFR BLD: 73.4 %
PLATELET # BLD AUTO: 197 K/UL
PMV BLD AUTO: 8.9 FL
POTASSIUM SERPL-SCNC: 4 MMOL/L
PROT SERPL-MCNC: 6.9 G/DL
RBC # BLD AUTO: 3.62 M/UL
SODIUM SERPL-SCNC: 137 MMOL/L
WBC # BLD AUTO: 11.13 K/UL

## 2018-03-26 PROCEDURE — 63600175 PHARM REV CODE 636 W HCPCS: Performed by: INTERNAL MEDICINE

## 2018-03-26 PROCEDURE — 25000003 PHARM REV CODE 250: Performed by: NURSE PRACTITIONER

## 2018-03-26 PROCEDURE — 96365 THER/PROPH/DIAG IV INF INIT: CPT

## 2018-03-26 PROCEDURE — 85025 COMPLETE CBC W/AUTO DIFF WBC: CPT

## 2018-03-26 PROCEDURE — 25000003 PHARM REV CODE 250: Performed by: INTERNAL MEDICINE

## 2018-03-26 PROCEDURE — 36415 COLL VENOUS BLD VENIPUNCTURE: CPT

## 2018-03-26 PROCEDURE — 96367 TX/PROPH/DG ADDL SEQ IV INF: CPT

## 2018-03-26 PROCEDURE — 96376 TX/PRO/DX INJ SAME DRUG ADON: CPT

## 2018-03-26 PROCEDURE — 96375 TX/PRO/DX INJ NEW DRUG ADDON: CPT

## 2018-03-26 PROCEDURE — S0030 INJECTION, METRONIDAZOLE: HCPCS | Performed by: INTERNAL MEDICINE

## 2018-03-26 PROCEDURE — 80053 COMPREHEN METABOLIC PANEL: CPT

## 2018-03-26 RX ORDER — CIPROFLOXACIN 500 MG/1
500 TABLET ORAL 2 TIMES DAILY
Qty: 14 TABLET | Refills: 0 | Status: SHIPPED | OUTPATIENT
Start: 2018-03-26 | End: 2018-04-05

## 2018-03-26 RX ORDER — OXYCODONE HYDROCHLORIDE 5 MG/1
5 TABLET ORAL EVERY 6 HOURS PRN
Qty: 15 TABLET | Refills: 0 | Status: SHIPPED | OUTPATIENT
Start: 2018-03-26 | End: 2020-01-23

## 2018-03-26 RX ORDER — OXYCODONE HYDROCHLORIDE 5 MG/1
10 TABLET ORAL EVERY 4 HOURS PRN
Status: DISCONTINUED | OUTPATIENT
Start: 2018-03-26 | End: 2018-03-26 | Stop reason: HOSPADM

## 2018-03-26 RX ORDER — METRONIDAZOLE 500 MG/1
500 TABLET ORAL EVERY 8 HOURS
Qty: 21 TABLET | Refills: 0 | Status: ON HOLD | OUTPATIENT
Start: 2018-03-26 | End: 2018-04-10 | Stop reason: HOSPADM

## 2018-03-26 RX ADMIN — OXYCODONE HYDROCHLORIDE 10 MG: 5 TABLET ORAL at 08:03

## 2018-03-26 RX ADMIN — ALPRAZOLAM 2 MG: 1 TABLET ORAL at 10:03

## 2018-03-26 RX ADMIN — CIPROFLOXACIN 400 MG: 2 INJECTION, SOLUTION INTRAVENOUS at 03:03

## 2018-03-26 RX ADMIN — OXYCODONE HYDROCHLORIDE 10 MG: 5 TABLET ORAL at 02:03

## 2018-03-26 RX ADMIN — SODIUM CHLORIDE: 0.9 INJECTION, SOLUTION INTRAVENOUS at 02:03

## 2018-03-26 RX ADMIN — PANTOPRAZOLE SODIUM 40 MG: 40 TABLET, DELAYED RELEASE ORAL at 08:03

## 2018-03-26 RX ADMIN — METRONIDAZOLE 500 MG: 500 INJECTION, SOLUTION INTRAVENOUS at 06:03

## 2018-03-26 NOTE — HOSPITAL COURSE
Mr Schneider is a 47 year old male with history of Chronic pain who present to [facility] with abdominal pain and elevated WBC. CT of abdomen showed Sigmoid diverticulitis. Patient was started on Cipro and Flagyl and started on IVF's. Pt had AJ on admit, however improved with IVF. He is now tolerating regular diet and abdomen pain has decreased. Vital signs stable. WBC are now normal and blood cultures show no growth. He currently denies associated symptom of chest pain, shortness of breath, palpitations, syncope or dizziness. He was be discharge on Cipro and Flagyl, will follow up with PCP. He was seen and examined, patient is ready for discharge.

## 2018-03-26 NOTE — PLAN OF CARE
Problem: Patient Care Overview  Goal: Plan of Care Review  Outcome: Ongoing (interventions implemented as appropriate)  Pt remained free of injury during shift, stable condition, pain adequately controlled with PRN medication (reports 10/10 pain but nonverbal indicators of pain absent as he is eating frequently, watching television, calm, sleeping, and is in no apparent distress) and sleeping between care, no acute distress, receiving IV fluids, receiving antibiotics, tolerating regular diet well and having bowel movements, and will continue to monitor. 24hr chart review performed.

## 2018-03-26 NOTE — NURSING
Pt tolerating regular diet and expected to discharge tomorrow, needs to transition to oral medications. Contacted Alysa Talley NP, she ordered oxycodone immediate release 10mg PO Q4 for 7-10/10 severe pain, discontinue dilaudid 0.5mg IVP. Telephone with readback.

## 2018-03-26 NOTE — PROGRESS NOTES
Discharge instructions given, verbalized understanding. IV removed by patient. Rx received from pharmacy.

## 2018-03-26 NOTE — PLAN OF CARE
Patient discharged prior to bedside assessment.  CM completed by medical record review     03/26/18 2310   Discharge Assessment   Assessment Type Discharge Planning Assessment   Confirmed/corrected address and phone number on facesheet? Yes   Assessment information obtained from? Medical Record   Expected Length of Stay (days) 2   Prior to hospitilization cognitive status: Alert/Oriented   Current cognitive status: Alert/Oriented   Facility Arrived From: home   Able to Return to Prior Arrangements yes   Is patient able to care for self after discharge? Yes   Patient's perception of discharge disposition home or selfcare   Readmission Within The Last 30 Days no previous admission in last 30 days   Patient currently being followed by outpatient case management? No   Patient currently receives any other outside agency services? No   Dialysis Name and Scheduled days NA   Discharge Plan A Home with family   Patient/Family In Agreement With Plan unable to assess

## 2018-03-26 NOTE — PLAN OF CARE
Problem: Patient Care Overview  Goal: Plan of Care Review  Outcome: Outcome(s) achieved Date Met: 03/26/18  Remained free from injury. Tolerating diet. Ambulating independently. 12 hour chart check complete.

## 2018-03-26 NOTE — DISCHARGE SUMMARY
[facility]  Hospital Medicine  Discharge Summary      Patient Name: Keon Schneider Jr.  MRN: [id number]  Admission Date: 3/24/2018  Hospital Length of Stay: 1 days  Discharge Date and Time: 3/26/2018  2:59 PM  Attending Physician: No att. providers found   Discharging Provider: Theo Yoo NP  Primary Care Provider: Nancy Light MD      HPI:   Mr. Schneider is a 48 y/o  male with h/o chronic pain syndrome due to multiple orthopedic surgeries, presented to [facility] ED c.o lower abodminal pain since last night associated with dizziness and chills. In the ED, he was found to have WBC 25,000, no bands. CT abdomen shows sigmoid diverticulitis. Started on Cipro and Flagyl and IV fluids. Deneis any other complaints.    * No surgery found *      Hospital Course:   Mr Schneider is a 47 year old male with history of Chronic pain who present to [facility] with abdominal pain and elevated WBC. CT of abdomen showed Sigmoid diverticulitis. Patient was started on Cipro and Flagyl and started on IVF's. Pt had AJ on admit, however improved with IVF. He is now tolerating regular diet and abdomen pain has decreased. Vital signs stable. WBC are now normal and blood cultures show no growth. He currently denies associated symptom of chest pain, shortness of breath, palpitations, syncope or dizziness. He was be discharge on Cipro and Flagyl, will follow up with PCP. He was seen and examined, patient is ready for discharge.      Consults:     No new Assessment & Plan notes have been filed under this hospital service since the last note was generated.  Service: Hospital Medicine    Final Active Diagnoses:    Diagnosis Date Noted POA    PRINCIPAL PROBLEM:  Sepsis [A41.9] 03/24/2018 Yes    Diverticulitis of sigmoid colon [K57.32] 03/24/2018 Yes    AJ (acute kidney injury) [N17.9] 03/24/2018 Yes    Chronic back pain [M54.9, G89.29] 11/24/2013 Yes    Anxiety [F41.9] 11/24/2013 Yes      Problems Resolved  During this Admission:    Diagnosis Date Noted Date Resolved POA       Discharged Condition: stable    Disposition: Home or Self Care    Follow Up:  Follow-up Information     Schedule an appointment as soon as possible for a visit with Nancy Light MD.    Specialty:  Internal Medicine  Why:  hospital follow up  Contact information:  [address]  [place] [address]  [phone]                 Patient Instructions:     Activity as tolerated     Notify your health care provider if you experience any of the following:  severe uncontrolled pain         Significant Diagnostic Studies: Labs:   CMP   Recent Labs  Lab 03/25/18  0447 03/26/18  1102    137   K 4.2 4.0    103   CO2 29 27   GLU 84 108   BUN 11 9   CREATININE 1.1 1.0   CALCIUM 9.7 9.4   PROT 7.6 6.9   ALBUMIN 4.1 3.7   BILITOT 0.6 0.3   ALKPHOS 51* 49*   AST 17 13   ALT 18 16   ANIONGAP 8 7*   ESTGFRAFRICA >60 >60   EGFRNONAA >60 >60    and CBC   Recent Labs  Lab 03/25/18 0447 03/26/18  1102   WBC 14.17* 11.13   HGB 12.6* 11.4*   HCT 38.6* 33.9*    197       Pending Diagnostic Studies:     None         Medications:  Reconciled Home Medications:   Discharge Medication List as of 3/26/2018  2:53 PM      START taking these medications    Details   ciprofloxacin HCl (CIPRO) 500 MG tablet Take 1 tablet (500 mg total) by mouth 2 (two) times daily., Starting Mon 3/26/2018, Until Thu 4/5/2018, Normal      metroNIDAZOLE (FLAGYL) 500 MG tablet Take 1 tablet (500 mg total) by mouth every 8 (eight) hours., Starting Mon 3/26/2018, Normal      oxyCODONE (ROXICODONE) 5 MG immediate release tablet Take 1 tablet (5 mg total) by mouth every 6 (six) hours as needed for Pain., Starting Mon 3/26/2018, Print         CONTINUE these medications which have NOT CHANGED    Details   alprazolam (XANAX) 2 MG Tab Take 1 tablet (2 mg total) by mouth daily as needed (back pain)., Starting Sat 7/22/2017, No Print      ranitidine (ZANTAC) 150 MG tablet Take  150 mg by mouth 2 (two) times daily., Historical Med      pregabalin (LYRICA) 75 MG capsule Take 1 capsule (75 mg total) by mouth 3 (three) times daily., Starting 11/7/2016, Until Wed 12/7/16, Print      sulindac (CLINORIL) 200 MG Tab Take 1 tablet (200 mg total) by mouth 2 (two) times daily., Starting 11/7/2016, Until Wed 12/7/16, Normal         STOP taking these medications       acetaminophen (TYLENOL) 325 MG tablet Comments:   Reason for Stopping:         diazePAM (VALIUM) 5 MG tablet Comments:   Reason for Stopping:               Indwelling Lines/Drains at time of discharge:   Lines/Drains/Airways          No matching active lines, drains, or airways          Time spent on the discharge of patient: 45 minutes  Patient was seen and examined on the date of discharge and determined to be suitable for discharge.         Theo Yoo NP  Department of Hospital Medicine  [facility]

## 2018-03-27 NOTE — PLAN OF CARE
03/27/18 0846   Final Note   Assessment Type Final Discharge Note   Discharge Disposition Home   Right Care Referral Info   Post Acute Recommendation No Care

## 2018-03-30 ENCOUNTER — HOSPITAL ENCOUNTER (EMERGENCY)
Facility: HOSPITAL | Age: 48
Discharge: HOME | End: 2018-03-30
Attending: EMERGENCY MEDICINE
Payer: MEDICARE

## 2018-03-30 VITALS
OXYGEN SATURATION: 95 % | BODY MASS INDEX: 27.09 KG/M2 | SYSTOLIC BLOOD PRESSURE: 119 MMHG | WEIGHT: 200 LBS | DIASTOLIC BLOOD PRESSURE: 68 MMHG | HEIGHT: 72 IN | HEART RATE: 77 BPM | TEMPERATURE: 99 F | RESPIRATION RATE: 18 BRPM

## 2018-03-30 DIAGNOSIS — K57.30 SIGMOID DIVERTICULOSIS: Primary | ICD-10-CM

## 2018-03-30 DIAGNOSIS — R11.2 NON-INTRACTABLE VOMITING WITH NAUSEA, UNSPECIFIED VOMITING TYPE: ICD-10-CM

## 2018-03-30 LAB
ALBUMIN SERPL BCP-MCNC: 4.2 G/DL
ALP SERPL-CCNC: 51 U/L
ALT SERPL W/O P-5'-P-CCNC: 19 U/L
ANION GAP SERPL CALC-SCNC: 11 MMOL/L
AST SERPL-CCNC: 19 U/L
BACTERIA BLD CULT: NORMAL
BACTERIA BLD CULT: NORMAL
BASOPHILS # BLD AUTO: 0.05 K/UL
BASOPHILS NFR BLD: 0.6 %
BILIRUB SERPL-MCNC: 0.2 MG/DL
BILIRUB UR QL STRIP: NEGATIVE
BUN SERPL-MCNC: 25 MG/DL
CALCIUM SERPL-MCNC: 10 MG/DL
CHLORIDE SERPL-SCNC: 104 MMOL/L
CLARITY UR REFRACT.AUTO: CLEAR
CO2 SERPL-SCNC: 19 MMOL/L
COLOR UR AUTO: YELLOW
CREAT SERPL-MCNC: 1.3 MG/DL
DIFFERENTIAL METHOD: ABNORMAL
EOSINOPHIL # BLD AUTO: 0.4 K/UL
EOSINOPHIL NFR BLD: 4.8 %
ERYTHROCYTE [DISTWIDTH] IN BLOOD BY AUTOMATED COUNT: 13.1 %
EST. GFR  (AFRICAN AMERICAN): >60 ML/MIN/1.73 M^2
EST. GFR  (NON AFRICAN AMERICAN): >60 ML/MIN/1.73 M^2
GLUCOSE SERPL-MCNC: 132 MG/DL
GLUCOSE UR QL STRIP: NEGATIVE
HCT VFR BLD AUTO: 41.3 %
HGB BLD-MCNC: 14 G/DL
HGB UR QL STRIP: NEGATIVE
IMM GRANULOCYTES # BLD AUTO: 0.08 K/UL
IMM GRANULOCYTES NFR BLD AUTO: 0.9 %
KETONES UR QL STRIP: NEGATIVE
LACTATE SERPL-SCNC: 1.5 MMOL/L
LEUKOCYTE ESTERASE UR QL STRIP: ABNORMAL
LIPASE SERPL-CCNC: 49 U/L
LYMPHOCYTES # BLD AUTO: 1.8 K/UL
LYMPHOCYTES NFR BLD: 20.5 %
MCH RBC QN AUTO: 31 PG
MCHC RBC AUTO-ENTMCNC: 33.9 G/DL
MCV RBC AUTO: 92 FL
MICROSCOPIC COMMENT: NORMAL
MONOCYTES # BLD AUTO: 0.9 K/UL
MONOCYTES NFR BLD: 11 %
NEUTROPHILS # BLD AUTO: 5.3 K/UL
NEUTROPHILS NFR BLD: 62.2 %
NITRITE UR QL STRIP: NEGATIVE
NRBC BLD-RTO: 0 /100 WBC
PH UR STRIP: 5 [PH] (ref 5–8)
PLATELET # BLD AUTO: 313 K/UL
PMV BLD AUTO: 8.7 FL
POTASSIUM SERPL-SCNC: 4.5 MMOL/L
PROT SERPL-MCNC: 8.2 G/DL
PROT UR QL STRIP: NEGATIVE
RBC # BLD AUTO: 4.51 M/UL
SODIUM SERPL-SCNC: 134 MMOL/L
SP GR UR STRIP: 1.01 (ref 1–1.03)
URN SPEC COLLECT METH UR: ABNORMAL
UROBILINOGEN UR STRIP-ACNC: NEGATIVE EU/DL
WBC # BLD AUTO: 8.54 K/UL
WBC #/AREA URNS AUTO: 0 /HPF (ref 0–5)

## 2018-03-30 PROCEDURE — 96365 THER/PROPH/DIAG IV INF INIT: CPT

## 2018-03-30 PROCEDURE — 85025 COMPLETE CBC W/AUTO DIFF WBC: CPT

## 2018-03-30 PROCEDURE — 81001 URINALYSIS AUTO W/SCOPE: CPT

## 2018-03-30 PROCEDURE — 99284 EMERGENCY DEPT VISIT MOD MDM: CPT | Mod: 25

## 2018-03-30 PROCEDURE — 96376 TX/PRO/DX INJ SAME DRUG ADON: CPT

## 2018-03-30 PROCEDURE — S0030 INJECTION, METRONIDAZOLE: HCPCS | Performed by: STUDENT IN AN ORGANIZED HEALTH CARE EDUCATION/TRAINING PROGRAM

## 2018-03-30 PROCEDURE — 96375 TX/PRO/DX INJ NEW DRUG ADDON: CPT

## 2018-03-30 PROCEDURE — 96366 THER/PROPH/DIAG IV INF ADDON: CPT

## 2018-03-30 PROCEDURE — 25000003 PHARM REV CODE 250: Performed by: EMERGENCY MEDICINE

## 2018-03-30 PROCEDURE — 80053 COMPREHEN METABOLIC PANEL: CPT

## 2018-03-30 PROCEDURE — 96368 THER/DIAG CONCURRENT INF: CPT

## 2018-03-30 PROCEDURE — 83690 ASSAY OF LIPASE: CPT

## 2018-03-30 PROCEDURE — 25500020 PHARM REV CODE 255: Performed by: EMERGENCY MEDICINE

## 2018-03-30 PROCEDURE — 99285 EMERGENCY DEPT VISIT HI MDM: CPT | Mod: ,,, | Performed by: EMERGENCY MEDICINE

## 2018-03-30 PROCEDURE — 25000003 PHARM REV CODE 250: Performed by: STUDENT IN AN ORGANIZED HEALTH CARE EDUCATION/TRAINING PROGRAM

## 2018-03-30 PROCEDURE — 63600175 PHARM REV CODE 636 W HCPCS: Performed by: EMERGENCY MEDICINE

## 2018-03-30 PROCEDURE — 63600175 PHARM REV CODE 636 W HCPCS: Performed by: STUDENT IN AN ORGANIZED HEALTH CARE EDUCATION/TRAINING PROGRAM

## 2018-03-30 PROCEDURE — 83605 ASSAY OF LACTIC ACID: CPT

## 2018-03-30 PROCEDURE — 96367 TX/PROPH/DG ADDL SEQ IV INF: CPT

## 2018-03-30 RX ORDER — METRONIDAZOLE 500 MG/1
500 TABLET ORAL 3 TIMES DAILY
Qty: 18 TABLET | Refills: 0 | Status: SHIPPED | OUTPATIENT
Start: 2018-03-30 | End: 2018-04-05

## 2018-03-30 RX ORDER — MORPHINE SULFATE 4 MG/ML
4 INJECTION, SOLUTION INTRAMUSCULAR; INTRAVENOUS
Status: COMPLETED | OUTPATIENT
Start: 2018-03-30 | End: 2018-03-30

## 2018-03-30 RX ORDER — ONDANSETRON 8 MG/1
8 TABLET, ORALLY DISINTEGRATING ORAL ONCE
Status: COMPLETED | OUTPATIENT
Start: 2018-03-30 | End: 2018-03-30

## 2018-03-30 RX ORDER — METRONIDAZOLE 500 MG/100ML
500 INJECTION, SOLUTION INTRAVENOUS
Status: DISCONTINUED | OUTPATIENT
Start: 2018-03-30 | End: 2018-03-30 | Stop reason: HOSPADM

## 2018-03-30 RX ORDER — CIPROFLOXACIN 2 MG/ML
400 INJECTION, SOLUTION INTRAVENOUS
Status: DISCONTINUED | OUTPATIENT
Start: 2018-03-30 | End: 2018-03-30 | Stop reason: RX

## 2018-03-30 RX ORDER — CIPROFLOXACIN 500 MG/1
500 TABLET ORAL EVERY 12 HOURS
Qty: 12 TABLET | Refills: 0 | Status: SHIPPED | OUTPATIENT
Start: 2018-03-30 | End: 2018-04-05

## 2018-03-30 RX ORDER — ONDANSETRON 4 MG/1
4 TABLET, ORALLY DISINTEGRATING ORAL
Status: COMPLETED | OUTPATIENT
Start: 2018-03-30 | End: 2018-03-30

## 2018-03-30 RX ADMIN — SODIUM CHLORIDE, POTASSIUM CHLORIDE, SODIUM LACTATE AND CALCIUM CHLORIDE 1000 ML: 600; 310; 30; 20 INJECTION, SOLUTION INTRAVENOUS at 09:03

## 2018-03-30 RX ADMIN — MORPHINE SULFATE 4 MG: 4 INJECTION INTRAVENOUS at 01:03

## 2018-03-30 RX ADMIN — MORPHINE SULFATE 4 MG: 4 INJECTION INTRAVENOUS at 11:03

## 2018-03-30 RX ADMIN — MORPHINE SULFATE 4 MG: 4 INJECTION INTRAVENOUS at 10:03

## 2018-03-30 RX ADMIN — SODIUM CHLORIDE, POTASSIUM CHLORIDE, SODIUM LACTATE AND CALCIUM CHLORIDE 1000 ML: 600; 310; 30; 20 INJECTION, SOLUTION INTRAVENOUS at 11:03

## 2018-03-30 RX ADMIN — ONDANSETRON 4 MG: 4 TABLET, ORALLY DISINTEGRATING ORAL at 11:03

## 2018-03-30 RX ADMIN — CEFTRIAXONE SODIUM 2 G: 2 INJECTION, POWDER, FOR SOLUTION INTRAMUSCULAR; INTRAVENOUS at 10:03

## 2018-03-30 RX ADMIN — ONDANSETRON 8 MG: 8 TABLET, ORALLY DISINTEGRATING ORAL at 09:03

## 2018-03-30 RX ADMIN — METRONIDAZOLE 500 MG: 500 INJECTION, SOLUTION INTRAVENOUS at 09:03

## 2018-03-30 RX ADMIN — IOHEXOL 100 ML: 350 INJECTION, SOLUTION INTRAVENOUS at 11:03

## 2018-03-30 NOTE — ED PROVIDER NOTES
Encounter Date: 3/30/2018    SCRIBE #1 NOTE: I, Servando Douglas, am scribing for, and in the presence of,  Dr. Brink. I have scribed the following portions of the note - the Resident attestation.       History     Chief Complaint   Patient presents with    Abdominal Pain     on meds for divertic, pale     47 years old male patient recently diagnosed with sigmoidal diverticulitis on 3/24 and discharged home after 1 day with oral ciprofloxacin and flagyl, per patient he started to feel better at the hospital with IV antibiotic but since discharged he is been having nausea, vomited several time NBNB, yesterdays he vomited 7 time last one at 2 Am, unable to tolerate oral intake for days, but he is trying to keep him self hydrated, his abdominal pain mostly on the right lower side radiating to the back feels like his intestine ripping apart, associated with headache and dizziness, had fever, non measured 2 days ago, sweating, denied diarrhea, chills, dysurea.          Review of patient's allergies indicates:   Allergen Reactions    Toradol [ketorolac] Hives and Nausea And Vomiting     Past Medical History:   Diagnosis Date    Anxiety     Arthritis     Asthma     Avascular necrosis     Carpal tunnel syndrome     Chronic pain     Depression     Other injury of other sites of trunk 12/28/2011    Pneumonia     Spondylolisthesis     lumbar; myofascial pain    Staph infection     Ulnar neuropathy     left and rt arm     Past Surgical History:   Procedure Laterality Date    HIP SURGERY  3/06; 6/06    hip arthroplasty    HIP SURGERY      bilateral hip replacement    JOINT REPLACEMENT      SHOULDER SURGERY  2012    right shoulder    SHOULDER SURGERY       Family History   Problem Relation Age of Onset    Cancer Mother      Social History   Substance Use Topics    Smoking status: Former Smoker     Packs/day: 1.00     Years: 10.00     Quit date: 10/13/2012    Smokeless tobacco: Never Used    Alcohol use 7.2  oz/week     12 Cans of beer per week      Comment: on the weekend     Review of Systems   Constitutional: Positive for activity change, appetite change, fatigue and fever.   HENT: Negative for congestion and rhinorrhea.    Eyes: Negative for pain and discharge.   Respiratory: Negative for chest tightness and shortness of breath.    Cardiovascular: Negative for chest pain and palpitations.   Gastrointestinal: Positive for abdominal pain, nausea and vomiting. Negative for abdominal distention, blood in stool and diarrhea.   Endocrine: Negative for cold intolerance and heat intolerance.   Genitourinary: Negative for dysuria and flank pain.   Musculoskeletal: Negative for back pain and myalgias.   Skin: Negative for pallor and rash.   Neurological: Positive for dizziness and headaches.   Hematological: Negative for adenopathy. Does not bruise/bleed easily.   Psychiatric/Behavioral: Negative for agitation and confusion.       Physical Exam     Initial Vitals [03/30/18 0843]   BP Pulse Resp Temp SpO2   (!) 160/90 93 18 98.6 °F (37 °C) 96 %      MAP       113.33         Physical Exam    Vitals reviewed.  Constitutional: He appears well-developed and well-nourished. He is not diaphoretic. He appears distressed.   HENT:   Head: Normocephalic and atraumatic.   Mouth/Throat: No oropharyngeal exudate.   Eyes: Conjunctivae are normal. Pupils are equal, round, and reactive to light. No scleral icterus.   Neck: Normal range of motion.   Cardiovascular: Normal rate, regular rhythm and normal heart sounds.   No murmur heard.  Pulmonary/Chest: Breath sounds normal. No respiratory distress. He has no wheezes. He has no rales.   Abdominal: Soft. Bowel sounds are normal. There is tenderness (RLQ ). There is rebound.   Musculoskeletal: He exhibits no edema.   Lymphadenopathy:     He has no cervical adenopathy.   Neurological: He is alert and oriented to person, place, and time.   Skin: No rash and no abscess noted.   Psychiatric: He has  a normal mood and affect. His behavior is normal. Thought content normal.         ED Course   Procedures  Labs Reviewed   CBC W/ AUTO DIFFERENTIAL - Abnormal; Notable for the following:        Result Value    RBC 4.51 (*)     MPV 8.7 (*)     Immature Granulocytes 0.9 (*)     Immature Grans (Abs) 0.08 (*)     All other components within normal limits   COMPREHENSIVE METABOLIC PANEL - Abnormal; Notable for the following:     Sodium 134 (*)     CO2 19 (*)     Glucose 132 (*)     BUN, Bld 25 (*)     Alkaline Phosphatase 51 (*)     All other components within normal limits   URINALYSIS - Abnormal; Notable for the following:     Leukocytes, UA Trace (*)     All other components within normal limits    Narrative:     1 cup of urine   LIPASE   LACTIC ACID, PLASMA   URINALYSIS MICROSCOPIC    Narrative:     1 cup of urine        Imaging Results          CT Abdomen Pelvis With Contrast (Final result)  Result time 03/30/18 12:10:33    Final result by Sang Almaguer MD (03/30/18 12:10:33)                 Impression:      Findings consistent with sigmoid diverticulitis, improved when compared to CT examination 03/24/2018.  No focal collection to suggest abscess, noting that bilateral total hip arthroplasties obscure portions of the deep pelvis.    Additional findings as above.    COMMUNICATION  The preliminary findings were discovered/received at 11 20.  The information above was relayed directly by me by telephone to Dr. Ozzy Brink on 03/30/2018 at 11 40.    Electronically signed by resident: Nicolas Farfan  Date:    03/30/2018  Time:    11:35    Electronically signed by: Sang Almaguer MD  Date:    03/30/2018  Time:    12:10             Narrative:    EXAMINATION:  CT ABDOMEN PELVIS WITH CONTRAST    CLINICAL HISTORY:  right lower quadrant pain;    TECHNIQUE:  Low dose axial images, sagittal and coronal reformations were obtained from the lung bases to the pubic symphysis following the IV administration of 100 mL of  Omnipaque 350 and Oral contrast was not used.    COMPARISON:  CT abdomen/pelvis with contrast 03/24/2018    FINDINGS:  Note is made that deep structures of the pelvis are obscured by artifact from bilateral total hip arthroplasties.  Specifically, parts of the bladder, rectum, and prostate.    ABDOMEN/LOWER THORAX:    - Visualized heart: No cardiomegaly. No significant pericardial effusion.    - Lung bases/pleura: No consolidation, mass, or pneumothorax.  No pleural effusion.    - Liver: Normal in size, homogeneous in attenuation, and without focal lesion.    - Gallbladder: Contracted without calcified gallstones.    - Bile Ducts: No evidence of intra or extra hepatic biliary ductal dilation.    - Spleen: Negative.    - Kidneys: Normal morphology.  Concentrate/excrete contrast appropriately.  No mass or hydronephrosis.  Ureters are unremarkable.  Partially visualized bladder is unremarkable.    - Adrenals: Unremarkable.    - Pancreas: No mass or peripancreatic fat stranding.    - Retroperitoneum:  No significant adenopathy.    - Vascular: No abdominal aortic aneurysm.    - Abdominal wall:  Unremarkable.    PELVIS:    Partially obscured, as above.  Visualized structures demonstrate no mass, adenopathy, or significant free fluid.    BOWEL/MESENTERY/PERITONEUM:    No evidence of small bowel obstruction or inflammation.  There are numerous sigmoid diverticuli with mild adjacent fat stranding and edema, improved when compared to CT examination 03/24/2018.  No focal collection to suggest abscess formation.  The remaining visualized large bowel is unremarkable.  Appendix is unremarkable.  No significant mesenteric edema or adenopathy.  No free air or ascites.    BONES: No acute fracture or aggressive osseous lesions. Mild degenerative change.  Postoperative change of bilateral total hip arthroplasties.                                   Medical Decision Making:   History:   Old Medical Records: I decided to obtain old  medical records.  Initial Assessment:   47 years old with recent diagnosis of diverticulitis, has history of chronic pain on opioid, depression and anxiety on xanax, presented with abdominal pain, nausea and vomiting .  Differential Diagnosis:   - opioid withdrawal.  - benzodiazapine withdrawal.  - diverticulitis.  - intra-abdominal abscess.     Less likely to be opioid withdrawal given no increase in Bms, symptoms and imaging most consistent with sigmoid diverticulitis undertreated due to vomiting, CT abdomin confermed diverticulitis, no abscess.    Clinical Tests:   Lab Tests: Ordered and Reviewed  The following lab test(s) were unremarkable: CBC, CMP and Urinalysis  Radiological Study: Ordered and Reviewed  Medical Tests: Ordered and Reviewed  ED Management:  - RL IV for hydration.  - Cipro and flagyl for intraabdominal infection.  - Zofran for nausea.  - morphine for pain.  - Lab work significant fo r WBC improvement.  - CT abdomin -ve for appendisitis , pancreatis, +ve for sigmoid colitis.  - patient n/v improved, tolerated oral intake  - discharged home with PO flagyl and cipro for 10 days  - instruct patient to avoid alcohol while on flagyl.     Other:   I discussed test(s) with the performing physician.       APC / Resident Notes:   47 recently diagnosed with diverticulitis, started on oral ABX continue to have N/V and worsening abdominal pain, WBC improving, CT abdomin -ve for any new inflammation other than sigmoid diverticulitis.  patient n/v improved, tolerated oral intake,discharged home with PO flagyl and cipro for 10 days instruct patient to avoid alcohol while on flagyl.        Scribe Attestation:   Scribe #1: I performed the above scribed service and the documentation accurately describes the services I performed. I attest to the accuracy of the note.    Attending Attestation:   Physician Attestation Statement for Resident:  As the supervising MD   Physician Attestation Statement: I have personally  seen and examined this patient.   I agree with the above history. -: 47 y.o. man recently diagnosed with diverticulitis presents for evaluation of nausea, vomiting, and worsening abdominal pain   As the supervising MD I agree with the above PE.    As the supervising MD I agree with the above treatment, course, plan, and disposition.  I have reviewed and agree with the residents interpretation of the following: lab data and CT scans.                       Clinical Impression:   The primary encounter diagnosis was Sigmoid diverticulosis. A diagnosis of Non-intractable vomiting with nausea, unspecified vomiting type was also pertinent to this visit.    Disposition:   Disposition: Discharged  Condition: Stable                        MADHAVI Dent  Resident  03/30/18 1531       Ozzy Brink MD  03/31/18 2222

## 2018-03-30 NOTE — ED NOTES
"The patient came to the ER today with c/o abd pain, weakness, dizziness, and vomiting. States, "I ain't ate in 2 days because eating makes me sick". Recent admission to [facility] for Diverticulitis on 3/24. The patient is currently on Ranitidine, Cipro, and Flagyl  "

## 2018-04-08 ENCOUNTER — HOSPITAL ENCOUNTER (INPATIENT)
Facility: HOSPITAL | Age: 48
LOS: 2 days | Discharge: HOME | DRG: 392 | End: 2018-04-10
Attending: EMERGENCY MEDICINE | Admitting: EMERGENCY MEDICINE
Payer: MEDICARE

## 2018-04-08 DIAGNOSIS — F41.9 ANXIETY: Chronic | ICD-10-CM

## 2018-04-08 DIAGNOSIS — G89.29 CHRONIC MIDLINE LOW BACK PAIN WITH SCIATICA, SCIATICA LATERALITY UNSPECIFIED: Chronic | ICD-10-CM

## 2018-04-08 DIAGNOSIS — R55 SYNCOPE: ICD-10-CM

## 2018-04-08 DIAGNOSIS — K57.92 DIVERTICULITIS: Primary | ICD-10-CM

## 2018-04-08 DIAGNOSIS — R55 SYNCOPE, UNSPECIFIED SYNCOPE TYPE: ICD-10-CM

## 2018-04-08 DIAGNOSIS — M54.40 CHRONIC MIDLINE LOW BACK PAIN WITH SCIATICA, SCIATICA LATERALITY UNSPECIFIED: Chronic | ICD-10-CM

## 2018-04-08 PROBLEM — A41.9 SEPSIS: Status: RESOLVED | Noted: 2018-03-24 | Resolved: 2018-04-08

## 2018-04-08 PROBLEM — M25.561 ACUTE PAIN OF RIGHT KNEE: Status: RESOLVED | Noted: 2017-05-07 | Resolved: 2018-04-08

## 2018-04-08 PROBLEM — N17.9 AKI (ACUTE KIDNEY INJURY): Status: RESOLVED | Noted: 2018-03-24 | Resolved: 2018-04-08

## 2018-04-08 LAB
ALBUMIN SERPL BCP-MCNC: 4 G/DL
ALP SERPL-CCNC: 48 U/L
ALT SERPL W/O P-5'-P-CCNC: 25 U/L
ANION GAP SERPL CALC-SCNC: 9 MMOL/L
AST SERPL-CCNC: 26 U/L
BACTERIA #/AREA URNS AUTO: NORMAL /HPF
BASOPHILS # BLD AUTO: 0.07 K/UL
BASOPHILS NFR BLD: 0.8 %
BILIRUB SERPL-MCNC: 0.4 MG/DL
BILIRUB UR QL STRIP: NEGATIVE
BUN SERPL-MCNC: 9 MG/DL
CALCIUM SERPL-MCNC: 9.2 MG/DL
CHLORIDE SERPL-SCNC: 105 MMOL/L
CLARITY UR REFRACT.AUTO: CLEAR
CO2 SERPL-SCNC: 25 MMOL/L
COLOR UR AUTO: YELLOW
CREAT SERPL-MCNC: 1 MG/DL
DIFFERENTIAL METHOD: ABNORMAL
EOSINOPHIL # BLD AUTO: 0.1 K/UL
EOSINOPHIL NFR BLD: 1.2 %
ERYTHROCYTE [DISTWIDTH] IN BLOOD BY AUTOMATED COUNT: 13.1 %
EST. GFR  (AFRICAN AMERICAN): >60 ML/MIN/1.73 M^2
EST. GFR  (NON AFRICAN AMERICAN): >60 ML/MIN/1.73 M^2
GLUCOSE SERPL-MCNC: 144 MG/DL
GLUCOSE UR QL STRIP: NEGATIVE
HCT VFR BLD AUTO: 39.6 %
HGB BLD-MCNC: 13.2 G/DL
HGB UR QL STRIP: NEGATIVE
IMM GRANULOCYTES # BLD AUTO: 0.04 K/UL
IMM GRANULOCYTES NFR BLD AUTO: 0.4 %
KETONES UR QL STRIP: NEGATIVE
LEUKOCYTE ESTERASE UR QL STRIP: ABNORMAL
LIPASE SERPL-CCNC: 37 U/L
LYMPHOCYTES # BLD AUTO: 1.6 K/UL
LYMPHOCYTES NFR BLD: 18 %
MCH RBC QN AUTO: 30.6 PG
MCHC RBC AUTO-ENTMCNC: 33.3 G/DL
MCV RBC AUTO: 92 FL
MICROSCOPIC COMMENT: NORMAL
MONOCYTES # BLD AUTO: 0.5 K/UL
MONOCYTES NFR BLD: 5.1 %
NEUTROPHILS # BLD AUTO: 6.7 K/UL
NEUTROPHILS NFR BLD: 74.5 %
NITRITE UR QL STRIP: NEGATIVE
NRBC BLD-RTO: 0 /100 WBC
PH UR STRIP: 7 [PH] (ref 5–8)
PLATELET # BLD AUTO: 284 K/UL
PMV BLD AUTO: 9.1 FL
POTASSIUM SERPL-SCNC: 4.1 MMOL/L
PROT SERPL-MCNC: 7.4 G/DL
PROT UR QL STRIP: NEGATIVE
RBC # BLD AUTO: 4.31 M/UL
RBC #/AREA URNS AUTO: 1 /HPF (ref 0–4)
SODIUM SERPL-SCNC: 139 MMOL/L
SP GR UR STRIP: 1.01 (ref 1–1.03)
SQUAMOUS #/AREA URNS AUTO: 0 /HPF
URN SPEC COLLECT METH UR: ABNORMAL
UROBILINOGEN UR STRIP-ACNC: NEGATIVE EU/DL
WBC # BLD AUTO: 9.05 K/UL
WBC #/AREA URNS AUTO: 1 /HPF (ref 0–5)

## 2018-04-08 PROCEDURE — G0378 HOSPITAL OBSERVATION PER HR: HCPCS

## 2018-04-08 PROCEDURE — 99220 PR INITIAL OBSERVATION CARE,LEVL III: CPT | Mod: ,,, | Performed by: PHYSICIAN ASSISTANT

## 2018-04-08 PROCEDURE — 11000001 HC ACUTE MED/SURG PRIVATE ROOM

## 2018-04-08 PROCEDURE — 96374 THER/PROPH/DIAG INJ IV PUSH: CPT

## 2018-04-08 PROCEDURE — 63600175 PHARM REV CODE 636 W HCPCS: Performed by: EMERGENCY MEDICINE

## 2018-04-08 PROCEDURE — 25000003 PHARM REV CODE 250: Performed by: EMERGENCY MEDICINE

## 2018-04-08 PROCEDURE — 83690 ASSAY OF LIPASE: CPT

## 2018-04-08 PROCEDURE — 85025 COMPLETE CBC W/AUTO DIFF WBC: CPT

## 2018-04-08 PROCEDURE — 93010 ELECTROCARDIOGRAM REPORT: CPT | Mod: ,,, | Performed by: INTERNAL MEDICINE

## 2018-04-08 PROCEDURE — 99285 EMERGENCY DEPT VISIT HI MDM: CPT | Mod: ,,, | Performed by: EMERGENCY MEDICINE

## 2018-04-08 PROCEDURE — 80053 COMPREHEN METABOLIC PANEL: CPT

## 2018-04-08 PROCEDURE — 93005 ELECTROCARDIOGRAM TRACING: CPT

## 2018-04-08 PROCEDURE — 81001 URINALYSIS AUTO W/SCOPE: CPT

## 2018-04-08 PROCEDURE — 25500020 PHARM REV CODE 255: Performed by: EMERGENCY MEDICINE

## 2018-04-08 PROCEDURE — 99285 EMERGENCY DEPT VISIT HI MDM: CPT

## 2018-04-08 PROCEDURE — 96376 TX/PRO/DX INJ SAME DRUG ADON: CPT

## 2018-04-08 PROCEDURE — 96375 TX/PRO/DX INJ NEW DRUG ADDON: CPT

## 2018-04-08 RX ORDER — AMOXICILLIN 250 MG
1 CAPSULE ORAL 2 TIMES DAILY
Status: DISCONTINUED | OUTPATIENT
Start: 2018-04-08 | End: 2018-04-10 | Stop reason: HOSPADM

## 2018-04-08 RX ORDER — CIPROFLOXACIN 2 MG/ML
400 INJECTION, SOLUTION INTRAVENOUS
Status: DISCONTINUED | OUTPATIENT
Start: 2018-04-09 | End: 2018-04-10 | Stop reason: HOSPADM

## 2018-04-08 RX ORDER — SODIUM CHLORIDE 0.9 % (FLUSH) 0.9 %
5 SYRINGE (ML) INJECTION
Status: DISCONTINUED | OUTPATIENT
Start: 2018-04-09 | End: 2018-04-10 | Stop reason: HOSPADM

## 2018-04-08 RX ORDER — ONDANSETRON 8 MG/1
8 TABLET, ORALLY DISINTEGRATING ORAL EVERY 8 HOURS PRN
Status: DISCONTINUED | OUTPATIENT
Start: 2018-04-09 | End: 2018-04-10 | Stop reason: HOSPADM

## 2018-04-08 RX ORDER — FAMOTIDINE 20 MG/1
20 TABLET, FILM COATED ORAL 2 TIMES DAILY
Status: DISCONTINUED | OUTPATIENT
Start: 2018-04-09 | End: 2018-04-10 | Stop reason: HOSPADM

## 2018-04-08 RX ORDER — GLUCAGON 1 MG
1 KIT INJECTION
Status: DISCONTINUED | OUTPATIENT
Start: 2018-04-09 | End: 2018-04-10 | Stop reason: HOSPADM

## 2018-04-08 RX ORDER — ACETAMINOPHEN 325 MG/1
650 TABLET ORAL EVERY 4 HOURS PRN
Status: DISCONTINUED | OUTPATIENT
Start: 2018-04-09 | End: 2018-04-10 | Stop reason: HOSPADM

## 2018-04-08 RX ORDER — SODIUM CHLORIDE 9 MG/ML
1000 INJECTION, SOLUTION INTRAVENOUS CONTINUOUS
Status: ACTIVE | OUTPATIENT
Start: 2018-04-08 | End: 2018-04-10

## 2018-04-08 RX ORDER — MORPHINE SULFATE ORAL SOLUTION 10 MG/5ML
5 SOLUTION ORAL EVERY 6 HOURS PRN
Status: DISCONTINUED | OUTPATIENT
Start: 2018-04-09 | End: 2018-04-09

## 2018-04-08 RX ORDER — MORPHINE SULFATE 4 MG/ML
6 INJECTION, SOLUTION INTRAMUSCULAR; INTRAVENOUS
Status: COMPLETED | OUTPATIENT
Start: 2018-04-08 | End: 2018-04-08

## 2018-04-08 RX ORDER — IPRATROPIUM BROMIDE AND ALBUTEROL SULFATE 2.5; .5 MG/3ML; MG/3ML
3 SOLUTION RESPIRATORY (INHALATION) EVERY 4 HOURS PRN
Status: DISCONTINUED | OUTPATIENT
Start: 2018-04-09 | End: 2018-04-10 | Stop reason: HOSPADM

## 2018-04-08 RX ORDER — ALPRAZOLAM 1 MG/1
2 TABLET ORAL DAILY PRN
Status: DISCONTINUED | OUTPATIENT
Start: 2018-04-09 | End: 2018-04-10 | Stop reason: HOSPADM

## 2018-04-08 RX ORDER — METRONIDAZOLE 500 MG/100ML
500 INJECTION, SOLUTION INTRAVENOUS
Status: COMPLETED | OUTPATIENT
Start: 2018-04-08 | End: 2018-04-09

## 2018-04-08 RX ORDER — MEPERIDINE HYDROCHLORIDE 50 MG/ML
12.5 INJECTION INTRAMUSCULAR; INTRAVENOUS; SUBCUTANEOUS
Status: DISCONTINUED | OUTPATIENT
Start: 2018-04-08 | End: 2018-04-09

## 2018-04-08 RX ORDER — IBUPROFEN 200 MG
24 TABLET ORAL
Status: DISCONTINUED | OUTPATIENT
Start: 2018-04-09 | End: 2018-04-10 | Stop reason: HOSPADM

## 2018-04-08 RX ORDER — RAMELTEON 8 MG/1
8 TABLET ORAL NIGHTLY PRN
Status: DISCONTINUED | OUTPATIENT
Start: 2018-04-09 | End: 2018-04-10 | Stop reason: HOSPADM

## 2018-04-08 RX ORDER — IBUPROFEN 200 MG
16 TABLET ORAL
Status: DISCONTINUED | OUTPATIENT
Start: 2018-04-09 | End: 2018-04-10 | Stop reason: HOSPADM

## 2018-04-08 RX ORDER — METRONIDAZOLE 500 MG/100ML
500 INJECTION, SOLUTION INTRAVENOUS
Status: DISCONTINUED | OUTPATIENT
Start: 2018-04-09 | End: 2018-04-10 | Stop reason: HOSPADM

## 2018-04-08 RX ORDER — CIPROFLOXACIN 2 MG/ML
400 INJECTION, SOLUTION INTRAVENOUS
Status: COMPLETED | OUTPATIENT
Start: 2018-04-08 | End: 2018-04-08

## 2018-04-08 RX ORDER — PROMETHAZINE HYDROCHLORIDE 12.5 MG/1
25 TABLET ORAL EVERY 6 HOURS PRN
Status: DISCONTINUED | OUTPATIENT
Start: 2018-04-09 | End: 2018-04-10 | Stop reason: HOSPADM

## 2018-04-08 RX ORDER — ONDANSETRON 4 MG/1
4 TABLET, ORALLY DISINTEGRATING ORAL
Status: COMPLETED | OUTPATIENT
Start: 2018-04-08 | End: 2018-04-08

## 2018-04-08 RX ADMIN — IOHEXOL 100 ML: 350 INJECTION, SOLUTION INTRAVENOUS at 09:04

## 2018-04-08 RX ADMIN — MORPHINE SULFATE 6 MG: 4 INJECTION INTRAVENOUS at 09:04

## 2018-04-08 RX ADMIN — MORPHINE SULFATE 6 MG: 4 INJECTION INTRAVENOUS at 08:04

## 2018-04-08 RX ADMIN — ONDANSETRON 4 MG: 4 TABLET, ORALLY DISINTEGRATING ORAL at 09:04

## 2018-04-08 RX ADMIN — CIPROFLOXACIN 400 MG: 2 INJECTION, SOLUTION INTRAVENOUS at 10:04

## 2018-04-08 NOTE — ED TRIAGE NOTES
Pt reports ad pain and syncope. Pt states had 3 syncopal episodes today. Pt states was recently admitted in [place] due to diverticulitis. Pt states vomited and diarrhea. Pt alert and oriented. No distress noted.

## 2018-04-09 LAB
ALBUMIN SERPL BCP-MCNC: 3.4 G/DL
ALP SERPL-CCNC: 38 U/L
ALT SERPL W/O P-5'-P-CCNC: 22 U/L
ANION GAP SERPL CALC-SCNC: 8 MMOL/L
AST SERPL-CCNC: 20 U/L
BASOPHILS # BLD AUTO: 0.05 K/UL
BASOPHILS NFR BLD: 0.8 %
BILIRUB SERPL-MCNC: 0.2 MG/DL
BUN SERPL-MCNC: 10 MG/DL
BUN SERPL-MCNC: 8 MG/DL (ref 6–30)
CALCIUM SERPL-MCNC: 8.4 MG/DL
CHLORIDE SERPL-SCNC: 103 MMOL/L (ref 95–110)
CHLORIDE SERPL-SCNC: 108 MMOL/L
CO2 SERPL-SCNC: 23 MMOL/L
CREAT SERPL-MCNC: 0.9 MG/DL
CREAT SERPL-MCNC: 1.1 MG/DL (ref 0.5–1.4)
DIASTOLIC DYSFUNCTION: NO
DIFFERENTIAL METHOD: ABNORMAL
EOSINOPHIL # BLD AUTO: 0.3 K/UL
EOSINOPHIL NFR BLD: 4.4 %
ERYTHROCYTE [DISTWIDTH] IN BLOOD BY AUTOMATED COUNT: 13.4 %
EST. GFR  (AFRICAN AMERICAN): >60 ML/MIN/1.73 M^2
EST. GFR  (NON AFRICAN AMERICAN): >60 ML/MIN/1.73 M^2
ESTIMATED AVG GLUCOSE: 111 MG/DL
ESTIMATED PA SYSTOLIC PRESSURE: 23.61
GLUCOSE SERPL-MCNC: 151 MG/DL (ref 70–110)
GLUCOSE SERPL-MCNC: 80 MG/DL
HBA1C MFR BLD HPLC: 5.5 %
HCT VFR BLD AUTO: 34.5 %
HCT VFR BLD CALC: 40 %PCV (ref 36–54)
HGB BLD-MCNC: 11.6 G/DL
IMM GRANULOCYTES # BLD AUTO: 0.01 K/UL
IMM GRANULOCYTES NFR BLD AUTO: 0.2 %
LYMPHOCYTES # BLD AUTO: 1.7 K/UL
LYMPHOCYTES NFR BLD: 26.1 %
MAGNESIUM SERPL-MCNC: 2 MG/DL
MCH RBC QN AUTO: 30.9 PG
MCHC RBC AUTO-ENTMCNC: 33.6 G/DL
MCV RBC AUTO: 92 FL
MITRAL VALVE MOBILITY: NORMAL
MITRAL VALVE REGURGITATION: NORMAL
MONOCYTES # BLD AUTO: 0.6 K/UL
MONOCYTES NFR BLD: 9.7 %
NEUTROPHILS # BLD AUTO: 3.9 K/UL
NEUTROPHILS NFR BLD: 58.8 %
NRBC BLD-RTO: 0 /100 WBC
PHOSPHATE SERPL-MCNC: 3.9 MG/DL
PLATELET # BLD AUTO: 220 K/UL
PMV BLD AUTO: 9 FL
POC IONIZED CALCIUM: 1.15 MMOL/L (ref 1.06–1.42)
POC TCO2 (MEASURED): 26 MMOL/L (ref 23–29)
POTASSIUM BLD-SCNC: 3.9 MMOL/L (ref 3.5–5.1)
POTASSIUM SERPL-SCNC: 4.1 MMOL/L
PROT SERPL-MCNC: 6.1 G/DL
RBC # BLD AUTO: 3.75 M/UL
RETIRED EF AND QEF - SEE NOTES: 60 (ref 55–65)
SAMPLE: ABNORMAL
SODIUM BLD-SCNC: 142 MMOL/L (ref 136–145)
SODIUM SERPL-SCNC: 139 MMOL/L
TRICUSPID VALVE REGURGITATION: NORMAL
WBC # BLD AUTO: 6.58 K/UL

## 2018-04-09 PROCEDURE — 25000003 PHARM REV CODE 250: Performed by: EMERGENCY MEDICINE

## 2018-04-09 PROCEDURE — 25000003 PHARM REV CODE 250: Performed by: PHYSICIAN ASSISTANT

## 2018-04-09 PROCEDURE — S0030 INJECTION, METRONIDAZOLE: HCPCS | Performed by: EMERGENCY MEDICINE

## 2018-04-09 PROCEDURE — 36415 COLL VENOUS BLD VENIPUNCTURE: CPT

## 2018-04-09 PROCEDURE — 83036 HEMOGLOBIN GLYCOSYLATED A1C: CPT

## 2018-04-09 PROCEDURE — 63600175 PHARM REV CODE 636 W HCPCS: Performed by: PHYSICIAN ASSISTANT

## 2018-04-09 PROCEDURE — 93306 TTE W/DOPPLER COMPLETE: CPT

## 2018-04-09 PROCEDURE — 85025 COMPLETE CBC W/AUTO DIFF WBC: CPT

## 2018-04-09 PROCEDURE — 11000001 HC ACUTE MED/SURG PRIVATE ROOM

## 2018-04-09 PROCEDURE — 99232 SBSQ HOSP IP/OBS MODERATE 35: CPT | Mod: ,,, | Performed by: PHYSICIAN ASSISTANT

## 2018-04-09 PROCEDURE — 93306 TTE W/DOPPLER COMPLETE: CPT | Mod: 26,,, | Performed by: INTERNAL MEDICINE

## 2018-04-09 PROCEDURE — 84100 ASSAY OF PHOSPHORUS: CPT

## 2018-04-09 PROCEDURE — S0030 INJECTION, METRONIDAZOLE: HCPCS | Performed by: PHYSICIAN ASSISTANT

## 2018-04-09 PROCEDURE — 80053 COMPREHEN METABOLIC PANEL: CPT

## 2018-04-09 PROCEDURE — 83735 ASSAY OF MAGNESIUM: CPT

## 2018-04-09 RX ORDER — MORPHINE SULFATE ORAL SOLUTION 10 MG/5ML
5 SOLUTION ORAL EVERY 6 HOURS PRN
Status: DISCONTINUED | OUTPATIENT
Start: 2018-04-09 | End: 2018-04-10 | Stop reason: HOSPADM

## 2018-04-09 RX ORDER — OXYCODONE AND ACETAMINOPHEN 10; 325 MG/1; MG/1
1 TABLET ORAL EVERY 6 HOURS PRN
Status: DISCONTINUED | OUTPATIENT
Start: 2018-04-09 | End: 2018-04-10 | Stop reason: HOSPADM

## 2018-04-09 RX ADMIN — SODIUM CHLORIDE 1000 ML: 0.9 INJECTION, SOLUTION INTRAVENOUS at 12:04

## 2018-04-09 RX ADMIN — SODIUM CHLORIDE 1000 ML: 0.9 INJECTION, SOLUTION INTRAVENOUS at 08:04

## 2018-04-09 RX ADMIN — ALPRAZOLAM 2 MG: 1 TABLET ORAL at 06:04

## 2018-04-09 RX ADMIN — ONDANSETRON 8 MG: 8 TABLET, ORALLY DISINTEGRATING ORAL at 05:04

## 2018-04-09 RX ADMIN — SODIUM CHLORIDE 1000 ML: 0.9 INJECTION, SOLUTION INTRAVENOUS at 09:04

## 2018-04-09 RX ADMIN — ALPRAZOLAM 2 MG: 1 TABLET ORAL at 12:04

## 2018-04-09 RX ADMIN — METRONIDAZOLE 500 MG: 500 INJECTION, SOLUTION INTRAVENOUS at 08:04

## 2018-04-09 RX ADMIN — CIPROFLOXACIN 400 MG: 2 INJECTION, SOLUTION INTRAVENOUS at 09:04

## 2018-04-09 RX ADMIN — MEPERIDINE HYDROCHLORIDE 12.5 MG: 50 INJECTION INTRAMUSCULAR; INTRAVENOUS; SUBCUTANEOUS at 05:04

## 2018-04-09 RX ADMIN — FAMOTIDINE 20 MG: 20 TABLET, FILM COATED ORAL at 08:04

## 2018-04-09 RX ADMIN — METRONIDAZOLE 500 MG: 500 INJECTION, SOLUTION INTRAVENOUS at 12:04

## 2018-04-09 RX ADMIN — METRONIDAZOLE 500 MG: 500 INJECTION, SOLUTION INTRAVENOUS at 11:04

## 2018-04-09 RX ADMIN — FAMOTIDINE 20 MG: 20 TABLET, FILM COATED ORAL at 09:04

## 2018-04-09 RX ADMIN — MORPHINE SULFATE 5 MG: 10 SOLUTION ORAL at 10:04

## 2018-04-09 RX ADMIN — STANDARDIZED SENNA CONCENTRATE AND DOCUSATE SODIUM 1 TABLET: 8.6; 5 TABLET, FILM COATED ORAL at 08:04

## 2018-04-09 RX ADMIN — CIPROFLOXACIN 400 MG: 2 INJECTION, SOLUTION INTRAVENOUS at 10:04

## 2018-04-09 RX ADMIN — MEPERIDINE HYDROCHLORIDE 12.5 MG: 50 INJECTION INTRAMUSCULAR; INTRAVENOUS; SUBCUTANEOUS at 12:04

## 2018-04-09 RX ADMIN — METRONIDAZOLE 500 MG: 500 INJECTION, SOLUTION INTRAVENOUS at 02:04

## 2018-04-09 RX ADMIN — OXYCODONE HYDROCHLORIDE AND ACETAMINOPHEN 1 TABLET: 10; 325 TABLET ORAL at 11:04

## 2018-04-09 RX ADMIN — MEPERIDINE HYDROCHLORIDE 12.5 MG: 50 INJECTION INTRAMUSCULAR; INTRAVENOUS; SUBCUTANEOUS at 08:04

## 2018-04-09 RX ADMIN — STANDARDIZED SENNA CONCENTRATE AND DOCUSATE SODIUM 1 TABLET: 8.6; 5 TABLET, FILM COATED ORAL at 09:04

## 2018-04-09 RX ADMIN — OXYCODONE HYDROCHLORIDE AND ACETAMINOPHEN 1 TABLET: 10; 325 TABLET ORAL at 05:04

## 2018-04-09 NOTE — PLAN OF CARE
04/09/18 0930   Discharge Assessment   Assessment Type Discharge Planning Assessment   Confirmed/corrected address and phone number on facesheet? Yes   Assessment information obtained from? Patient   Expected Length of Stay (days) 2   Communicated expected length of stay with patient/caregiver yes   Prior to hospitilization cognitive status: Alert/Oriented   Prior to hospitalization functional status: Independent   Current cognitive status: Alert/Oriented   Current Functional Status: Independent   Lives With alone   Able to Return to Prior Arrangements yes   Is patient able to care for self after discharge? Yes   Patient's perception of discharge disposition home or selfcare   Readmission Within The Last 30 Days no previous admission in last 30 days   Patient currently being followed by outpatient case management? No   Patient currently receives any other outside agency services? No   Equipment Currently Used at Home none   Do you have any problems affording any of your prescribed medications? No   Is the patient taking medications as prescribed? yes   Does the patient have transportation home? Yes   Transportation Available family or friend will provide   Does the patient receive services at the [facility]? No   Discharge Plan A Home   Discharge Plan B Home Health   Patient/Family In Agreement With Plan yes     Patient resting quietly in bed when CM rounded. No family present. Patient was admitted with diverticulitis. Patient lives alone & is independent of all ADLs. Plan to discharge patient home with support or home with home health when medically stable. Patient denied the need for assistance with transportation at time of discharge. Hospital follow up appointment scheduled for the patient with Dr. Johnie Ferguson (PCP) on 4/20/18 at 0930. Will continue to follow.

## 2018-04-09 NOTE — ED NOTES
Psychosocial:  Patient is calm and cooperative.  Patients insight and judgement are appropriate to situation.  Appears clean, well maintained, with clothing appropriate to environment.  No evidence of hallucinations, delusions, or psychosis.    Neuro:  Eyes open spontaneously.  Awake, alert.  Oriented x 4.  Speech clear and appropriate.  Tolerating saliva secretions well.  Able to follow commands, demonstrating ability to actively and appropriately communicate within context of current conversation.  Symmetrical facial muscles.  Moving all extremities well with no noted weakness.  Adequate muscle tone present.  Movement in purposeful.     Airway:  Bilateral chest rise and fall.  RR regular and non labored.  Air entry patent.    Circulatory:  Skin warm, dry, and pink.     Abdomen: Right lower quadrant pain.    Extremities:  No redness, heat, swelling, deformity, or pain noted.     Skin:  Intact with no bruising/discolorations noted.

## 2018-04-09 NOTE — ASSESSMENT & PLAN NOTE
- Likely secondary to dehydration  - ECG showed NSR. No chest pain, SOB  - CT head with no acute changes  - Orthostatics in ER unremarkable  - 2d CFD shows pEF, no WMA  - Monitor on telemetry, IVFs, fall precautions

## 2018-04-09 NOTE — PROGRESS NOTES
[facility] Hospital Medicine  Progress Note    Patient Name: Keon Schneider Jr.  MRN: [id number]  Patient Class: OP- Observation   Admission Date: 4/8/2018  Length of Stay: 0 days  Attending Physician: Margaret Vargas MD  Primary Care Provider: Nancy Light MD    [facility] Medicine Team: [facility] F Nidia Strickland PA-C    Subjective:     Principal Problem:Diverticulitis    HPI:  Patient is a 47 year old gentleman with a h/o anxiety and chronic pain.  He presents with RLQ abdominal pain and vomiting.  Patient was admitted to [facility] on 3/24/2018 with similar symptoms.  He was diagnosed with diverticulitis and discharged the following day on Cipro and Flagyl.  Patient states he initially felt better, but then the nausea, vomiting, and pain returned.  He went to the ER here on 3/30/2018 and received IVFs, anti-emetics, morphine, Cipro, and Flagyl.  He was able to tolerate PO so was discharged with ten days of Cipro and Flagyl.  Patient states that since he has had N/V, he has not been consistently taking his antibiotics.  He has had decreased PO intake, including water.  He also states that he passed out while mowing the lawn today.  He denies chest pain, SOB, palpitations.  Complains of fever, dizziness, decreased PO intake, and one episode of diarrhea.    Hospital Course:  Pt admitted to observation for diverticulitis. Afebrile without leukocytosis. Continue cipro/flagyl. CLD, ADAT. Analgesics PRN and supportive care.    Interval History: No acute events overnight. This AM, pt lying in bed resting comfortably. Continues to endorse abdominal pain. Denies N/V/D. Tolerating PO. Discussed plan of care including advancing diet and weaning analgesics.    Review of Systems   Constitutional: Positive for activity change and appetite change. Negative for chills, diaphoresis, fatigue, fever and unexpected weight change.   Respiratory: Negative for cough, choking, chest tightness, shortness  of breath and wheezing.    Cardiovascular: Negative for chest pain, palpitations and leg swelling.   Gastrointestinal: Positive for abdominal pain. Negative for abdominal distention, anal bleeding, blood in stool, constipation, diarrhea, nausea and vomiting.   Neurological: Negative for dizziness, seizures, syncope, facial asymmetry, speech difficulty, weakness, light-headedness, numbness and headaches.     Objective:     Vital Signs (Most Recent):  Temp: 97.7 °F (36.5 °C) (04/09/18 1112)  Pulse: 77 (04/09/18 1112)  Resp: 14 (04/09/18 1112)  BP: 120/71 (04/09/18 1112)  SpO2: 96 % (04/09/18 1112) Vital Signs (24h Range):  Temp:  [96.7 °F (35.9 °C)-97.9 °F (36.6 °C)] 97.7 °F (36.5 °C)  Pulse:  [51-89] 77  Resp:  [14-22] 14  SpO2:  [95 %-99 %] 96 %  BP: (120-147)/(71-95) 120/71     Weight: 91.5 kg (201 lb 11.5 oz)  Body mass index is 27.36 kg/m².    Intake/Output Summary (Last 24 hours) at 04/09/18 1131  Last data filed at 04/09/18 0916   Gross per 24 hour   Intake             1640 ml   Output                0 ml   Net             1640 ml      Physical Exam   Constitutional: He is oriented to person, place, and time. He appears well-developed and well-nourished. No distress.   Neck: Carotid bruit is not present.   Cardiovascular: Normal rate and regular rhythm.  Exam reveals no gallop.    No murmur heard.  Pulmonary/Chest: Effort normal and breath sounds normal. No respiratory distress. He has no wheezes.   Abdominal: Soft. Bowel sounds are normal. He exhibits no distension. There is no splenomegaly or hepatomegaly. There is no tenderness.   Musculoskeletal: Normal range of motion. He exhibits no edema.   Neurological: He is alert and oriented to person, place, and time. No cranial nerve deficit or sensory deficit.   Skin: Skin is warm and dry. No rash noted. He is not diaphoretic.   Psychiatric: He has a normal mood and affect. His behavior is normal.       Significant Labs: All pertinent labs within the past 24 hours  have been reviewed.    Significant Imaging: I have reviewed all pertinent imaging results/findings within the past 24 hours.    Assessment/Plan:      * Diverticulitis    - CT abdomen/pelvis showed interval development of subtle inflammation about a prominent diverticulum within the mid descending colon and continued improvement of inflammation about the mid sigmoid colon in this patient with diverticulitis in this region on previous CT.  No new large focal collection or free air.  - Afebrile with no evidence of leukocytosis.  - Cipro and Flagyl ordered.  - Supportive care.  Clear liquid diet, advance as tolerated.  4/9: Advanced diet. Plan to transition to oral analgesics  - Continue antibiotics and supportive care        Syncope    - Likely secondary to dehydration  - ECG showed NSR. No chest pain, SOB  - CT head with no acute changes  - Orthostatics in ER unremarkable  - 2d CFD shows pEF, no WMA  - Monitor on telemetry, IVFs, fall precautions        Chronic back pain    - Supportive care  - Patient reports he did not tolerate Lyrica  - LA  reviewed        Anxiety    - Continue Xanax 2mg BID PRN          VTE Risk Mitigation         Ordered     Place TRACEY hose  Until discontinued      04/08/18 2306     Place sequential compression device  Until discontinued      04/08/18 2306     IP VTE LOW RISK PATIENT  Once      04/08/18 2306              Nidia Strickland PA-C  Department of Hospital Medicine   [facility]  Discussed with staff: Dr. Vargas

## 2018-04-09 NOTE — ASSESSMENT & PLAN NOTE
- Likely secondary to dehydration.  - ECG showed NSR.  No chest pain, SOB.  - CT head with no acute changes.  - Monitor on telemetry, IVFs, echo.  Fall precautions, ambulate with assistance.  - Orthostatics in ER unremarkable.

## 2018-04-09 NOTE — PLAN OF CARE
Problem: Patient Care Overview  Goal: Plan of Care Review  Outcome: Ongoing (interventions implemented as appropriate)  Safety measures maintained this shift. VSS on RA. Heart monitor remains in place. IV abx administered as scheduled. IVF infusing @ 100 cc/hr to IV in LAC. C/o R sided pain prn meds administered. Diet advanced to regular. Pt tolerating well. No c/o N/V. Ambulates independently. Bed in lowest locked position and call light within reach.

## 2018-04-09 NOTE — SUBJECTIVE & OBJECTIVE
Interval History: No acute events overnight. This AM, pt lying in bed resting comfortably. Continues to endorse abdominal pain. Denies N/V/D. Tolerating PO. Discussed plan of care including advancing diet and weaning analgesics.    Review of Systems   Constitutional: Positive for activity change and appetite change. Negative for chills, diaphoresis, fatigue, fever and unexpected weight change.   Respiratory: Negative for cough, choking, chest tightness, shortness of breath and wheezing.    Cardiovascular: Negative for chest pain, palpitations and leg swelling.   Gastrointestinal: Positive for abdominal pain. Negative for abdominal distention, anal bleeding, blood in stool, constipation, diarrhea, nausea and vomiting.   Neurological: Negative for dizziness, seizures, syncope, facial asymmetry, speech difficulty, weakness, light-headedness, numbness and headaches.     Objective:     Vital Signs (Most Recent):  Temp: 97.7 °F (36.5 °C) (04/09/18 1112)  Pulse: 77 (04/09/18 1112)  Resp: 14 (04/09/18 1112)  BP: 120/71 (04/09/18 1112)  SpO2: 96 % (04/09/18 1112) Vital Signs (24h Range):  Temp:  [96.7 °F (35.9 °C)-97.9 °F (36.6 °C)] 97.7 °F (36.5 °C)  Pulse:  [51-89] 77  Resp:  [14-22] 14  SpO2:  [95 %-99 %] 96 %  BP: (120-147)/(71-95) 120/71     Weight: 91.5 kg (201 lb 11.5 oz)  Body mass index is 27.36 kg/m².    Intake/Output Summary (Last 24 hours) at 04/09/18 1131  Last data filed at 04/09/18 0916   Gross per 24 hour   Intake             1640 ml   Output                0 ml   Net             1640 ml      Physical Exam   Constitutional: He is oriented to person, place, and time. He appears well-developed and well-nourished. No distress.   Neck: Carotid bruit is not present.   Cardiovascular: Normal rate and regular rhythm.  Exam reveals no gallop.    No murmur heard.  Pulmonary/Chest: Effort normal and breath sounds normal. No respiratory distress. He has no wheezes.   Abdominal: Soft. Bowel sounds are normal. He exhibits  no distension. There is no splenomegaly or hepatomegaly. There is no tenderness.   Musculoskeletal: Normal range of motion. He exhibits no edema.   Neurological: He is alert and oriented to person, place, and time. No cranial nerve deficit or sensory deficit.   Skin: Skin is warm and dry. No rash noted. He is not diaphoretic.   Psychiatric: He has a normal mood and affect. His behavior is normal.       Significant Labs: All pertinent labs within the past 24 hours have been reviewed.    Significant Imaging: I have reviewed all pertinent imaging results/findings within the past 24 hours.

## 2018-04-09 NOTE — ED PROVIDER NOTES
Encounter Date: 4/8/2018    SCRIBE #1 NOTE: I, Dr. Carrillo, am scribing for, and in the presence of, Avelina Hammond.       History     Chief Complaint   Patient presents with    Abdominal Pain     with vomiting. Passed out 3 times today. hx of diverticulitis.      Time seen by provider: 7:10 PM    This is a 47 y.o. male with medical conditions including anxiety, chronic pain, who presents with complaint of vomiting and abdominal pain. The patient reports being admitted for diverticulitis 2 times in the past few weeks. He states his pain now feels the same, sharp pain to the right lower quadrant that wraps around to his mid back. The patient reports having vomiting with any PO intake including water and he passed out from the pain today in his backyard while cutting grass.         The history is provided by the patient.     Review of patient's allergies indicates:   Allergen Reactions    Toradol [ketorolac] Hives and Nausea And Vomiting     Past Medical History:   Diagnosis Date    Anxiety     Arthritis     Asthma     Avascular necrosis     Carpal tunnel syndrome     Chronic pain     Depression     Other injury of other sites of trunk 12/28/2011    Pneumonia     Spondylolisthesis     lumbar; myofascial pain    Staph infection     Ulnar neuropathy     left and rt arm     Past Surgical History:   Procedure Laterality Date    HIP SURGERY  3/06; 6/06    hip arthroplasty    HIP SURGERY      bilateral hip replacement    JOINT REPLACEMENT      SHOULDER SURGERY  2012    right shoulder    SHOULDER SURGERY       Family History   Problem Relation Age of Onset    Cancer Mother      Social History   Substance Use Topics    Smoking status: Former Smoker     Packs/day: 1.00     Years: 10.00     Quit date: 10/13/2012    Smokeless tobacco: Never Used    Alcohol use 7.2 oz/week     12 Cans of beer per week      Comment: on the weekend     Review of Systems   Constitutional: Negative for chills and fever.   HENT:  Negative for facial swelling and nosebleeds.    Eyes: Negative for visual disturbance.   Respiratory: Negative for shortness of breath.    Cardiovascular: Negative for chest pain.   Gastrointestinal: Positive for abdominal pain, nausea and vomiting.   Genitourinary: Negative for difficulty urinating.   Musculoskeletal: Negative for gait problem.   Skin: Negative for rash.   Neurological: Positive for syncope and headaches.       Physical Exam     Initial Vitals [04/08/18 1851]   BP Pulse Resp Temp SpO2   (!) 146/87 82 18 97.8 °F (36.6 °C) 98 %      MAP       106.67         Physical Exam    Nursing note and vitals reviewed.  Constitutional: He appears well-developed and well-nourished. He is not diaphoretic. No distress.   HENT:   Head: Normocephalic and atraumatic.   Mouth/Throat: Oropharynx is clear and moist.   Eyes: Conjunctivae and EOM are normal. Pupils are equal, round, and reactive to light.   Neck: Normal range of motion. Neck supple. No JVD present.   Cardiovascular: Normal rate, regular rhythm and normal heart sounds.   No murmur heard.  Pulmonary/Chest: Breath sounds normal. No respiratory distress. He has no wheezes. He has no rhonchi. He has no rales.   Abdominal: Soft. Bowel sounds are normal. He exhibits no distension and no mass. There is tenderness. There is no rebound and no guarding.   Tenderness to lower abdomen.    Musculoskeletal: Normal range of motion. He exhibits no edema or tenderness.   Neurological: He is alert and oriented to person, place, and time. He has normal strength. No cranial nerve deficit or sensory deficit.   Normal speech   Skin: Skin is warm and dry. No rash noted.   Psychiatric: He has a normal mood and affect.         ED Course   Procedures  Labs Reviewed   CBC W/ AUTO DIFFERENTIAL - Abnormal; Notable for the following:        Result Value    RBC 4.31 (*)     Hemoglobin 13.2 (*)     Hematocrit 39.6 (*)     MPV 9.1 (*)     Gran% 74.5 (*)     All other components within  normal limits   COMPREHENSIVE METABOLIC PANEL - Abnormal; Notable for the following:     Glucose 144 (*)     Alkaline Phosphatase 48 (*)     All other components within normal limits   URINALYSIS - Abnormal; Notable for the following:     Leukocytes, UA Trace (*)     All other components within normal limits   LIPASE   URINALYSIS MICROSCOPIC   ISTAT CHEM8     EKG Readings: (Independently Interpreted)   Rhythm: Normal Sinus Rhythm. Heart Rate: 78.   Lead V2 missing          Medical Decision Making:   History:   Old Medical Records: I decided to obtain old medical records.  Initial Assessment:   Patient with persistent abdominal pain for several weeks. Will investigate with labs and CT. Patient also reports syncopal episode of unclear etiology. Will obtain head CT, EKG, and orthostatics.  Independently Interpreted Test(s):   I have ordered and independently interpreted EKG Reading(s) - see prior notes  Clinical Tests:   Lab Tests: Ordered and Reviewed  Radiological Study: Ordered and Reviewed  Medical Tests: Ordered and Reviewed            Scribe Attestation:   Scribe #1: I performed the above scribed service and the documentation accurately describes the services I performed. I attest to the accuracy of the note.       d/w Internal medicine - will obs for syncope - unclear etiology   Will give cipro/flagyl     Clinical Impression:   The primary encounter diagnosis was Diverticulitis. A diagnosis of Syncope was also pertinent to this visit.    Disposition:   Disposition: Placed in Observation  Condition: Stable                        Matthew Carrillo MD  04/08/18 4765

## 2018-04-09 NOTE — H&P
[facility] Hospital Medicine  History & Physical    Patient Name: Keon Schneider Jr.  MRN: [id number]  Admission Date: 4/8/2018  Attending Physician: Margaret Vargas MD   Primary Care Provider: Nancy Light MD    [facility] Medicine Team: [facility]OSP MED F Cece Padilla PA-C     Patient information was obtained from patient, past medical records and ER records.     Subjective:     Principal Problem:Diverticulitis    Chief Complaint:   Chief Complaint   Patient presents with    Abdominal Pain     with vomiting. Passed out 3 times today. hx of diverticulitis.         HPI: Patient is a 47 year old gentleman with a h/o anxiety and chronic pain.  He presents with RLQ abdominal pain and vomiting.  Patient was admitted to [facility] on 3/24/2018 with similar symptoms.  He was diagnosed with diverticulitis and discharged the following day on Cipro and Flagyl.  Patient states he initially felt better, but then the nausea, vomiting, and pain returned.  He went to the ER here on 3/30/2018 and received IVFs, anti-emetics, morphine, Cipro, and Flagyl.  He was able to tolerate PO so was discharged with ten days of Cipro and Flagyl.  Patient states that since he has had N/V, he has not been consistently taking his antibiotics.  He has had decreased PO intake, including water.  He also states that he passed out while mowing the lawn today.  He denies chest pain, SOB, palpitations.  Complains of fever, dizziness, decreased PO intake, and one episode of diarrhea.    Past Medical History:   Diagnosis Date    Anxiety     Arthritis     Asthma     Avascular necrosis     Carpal tunnel syndrome     Chronic pain     Depression     Other injury of other sites of trunk 12/28/2011    Pneumonia     Spondylolisthesis     lumbar; myofascial pain    Staph infection     Ulnar neuropathy     left and rt arm       Past Surgical History:   Procedure Laterality Date    HIP SURGERY  3/06; 6/06    hip  arthroplasty    HIP SURGERY      bilateral hip replacement    JOINT REPLACEMENT      SHOULDER SURGERY  2012    right shoulder    SHOULDER SURGERY         Review of patient's allergies indicates:   Allergen Reactions    Toradol [ketorolac] Hives and Nausea And Vomiting       No current facility-administered medications on file prior to encounter.      Current Outpatient Prescriptions on File Prior to Encounter   Medication Sig    alprazolam (XANAX) 2 MG Tab Take 1 tablet (2 mg total) by mouth daily as needed (back pain).    ranitidine (ZANTAC) 150 MG tablet Take 150 mg by mouth 2 (two) times daily.    metroNIDAZOLE (FLAGYL) 500 MG tablet Take 1 tablet (500 mg total) by mouth every 8 (eight) hours.    oxyCODONE (ROXICODONE) 5 MG immediate release tablet Take 1 tablet (5 mg total) by mouth every 6 (six) hours as needed for Pain.    pregabalin (LYRICA) 75 MG capsule Take 1 capsule (75 mg total) by mouth 3 (three) times daily.    sulindac (CLINORIL) 200 MG Tab Take 1 tablet (200 mg total) by mouth 2 (two) times daily.    [DISCONTINUED] pantoprazole (PROTONIX) 40 MG tablet Take 1 tablet (40 mg total) by mouth once daily.     Family History     Problem Relation (Age of Onset)    Cancer Mother        Social History Main Topics    Smoking status: Former Smoker     Packs/day: 1.00     Years: 10.00     Quit date: 10/13/2012    Smokeless tobacco: Never Used    Alcohol use 7.2 oz/week     12 Cans of beer per week      Comment: on the weekend    Drug use: No    Sexual activity: Yes     Partners: Female     Review of Systems   Constitutional: Positive for activity change, appetite change and fever. Negative for chills, diaphoresis, fatigue and unexpected weight change.   HENT: Negative for congestion, rhinorrhea, sore throat, trouble swallowing and voice change.    Eyes: Negative for visual disturbance.   Respiratory: Negative for cough, choking, chest tightness, shortness of breath and wheezing.     Cardiovascular: Negative for chest pain, palpitations and leg swelling.   Gastrointestinal: Positive for abdominal pain, diarrhea, nausea and vomiting. Negative for abdominal distention, anal bleeding, blood in stool and constipation.   Endocrine: Negative for cold intolerance, heat intolerance, polydipsia and polyuria.   Genitourinary: Negative for dysuria, flank pain, frequency, hematuria and urgency.   Musculoskeletal: Negative for arthralgias, back pain, joint swelling and myalgias.   Skin: Negative for color change and rash.   Neurological: Positive for dizziness, syncope and light-headedness. Negative for seizures, facial asymmetry, speech difficulty, weakness, numbness and headaches.   Hematological: Negative for adenopathy. Does not bruise/bleed easily.   Psychiatric/Behavioral: Negative for agitation, confusion, hallucinations and suicidal ideas.     Objective:     Vital Signs (Most Recent):  Temp: 97.8 °F (36.6 °C) (04/08/18 1851)  Pulse: 66 (04/08/18 2239)  Resp: 20 (04/08/18 2239)  BP: (!) 142/86 (04/08/18 2238)  SpO2: 97 % (04/08/18 2239) Vital Signs (24h Range):  Temp:  [97.8 °F (36.6 °C)] 97.8 °F (36.6 °C)  Pulse:  [66-89] 66  Resp:  [18-22] 20  SpO2:  [96 %-98 %] 97 %  BP: (125-147)/(78-94) 142/86     Weight: 90.7 kg (200 lb)  Body mass index is 27.12 kg/m².    Physical Exam   Constitutional: He is oriented to person, place, and time. He appears well-developed and well-nourished. No distress.   HENT:   Head: Normocephalic and atraumatic.   Neck: Neck supple. Carotid bruit is not present. No thyromegaly present.   Cardiovascular: Normal rate and regular rhythm.  Exam reveals no gallop.    No murmur heard.  Pulmonary/Chest: Effort normal and breath sounds normal. No respiratory distress. He has no wheezes.   Abdominal: Bowel sounds are normal. He exhibits no distension. There is no splenomegaly or hepatomegaly. There is tenderness in the right lower quadrant.   Musculoskeletal: Normal range of  motion. He exhibits no edema.   Neurological: He is alert and oriented to person, place, and time. No cranial nerve deficit or sensory deficit.   Skin: Skin is warm and dry. No rash noted.   Psychiatric: He has a normal mood and affect. His behavior is normal.           Significant Labs:   CBC:   Recent Labs  Lab 04/08/18 1944   WBC 9.05   HGB 13.2*   HCT 39.6*        CMP:   Recent Labs  Lab 04/08/18 1944      K 4.1      CO2 25   *   BUN 9   CREATININE 1.0   CALCIUM 9.2   PROT 7.4   ALBUMIN 4.0   BILITOT 0.4   ALKPHOS 48*   AST 26   ALT 25   ANIONGAP 9   EGFRNONAA >60.0     Lactic Acid: No results for input(s): LACTATE in the last 48 hours.  Lipase:   Recent Labs  Lab 04/08/18 1944   LIPASE 37       Significant Imaging: I have reviewed all pertinent imaging results/findings within the past 24 hours.    Assessment/Plan:     * Diverticulitis    - CT abdomen/pelvis showed interval development of subtle inflammation about a prominent diverticulum within the mid descending colon and continued improvement of inflammation about the mid sigmoid colon in this patient with diverticulitis in this region on previous CT.  No new large focal collection or free air.  - Afebrile with no evidence of leukocytosis.  - Cipro and Flagyl ordered.  - Supportive care.  Clear liquid diet, advance as tolerated.          Syncope    - Likely secondary to dehydration.  - ECG showed NSR.  No chest pain, SOB.  - CT head with no acute changes.  - Monitor on telemetry, IVFs, echo.  Fall precautions, ambulate with assistance.  - Orthostatics in ER unremarkable.          Chronic back pain    - Supportive care.  - Patient reports he did not tolerate Lyrica.  - LA  reviewed.          Anxiety    - Continue Xanax 2mg BID PRN.            VTE Risk Mitigation         Ordered     IP VTE LOW RISK PATIENT  Once      04/08/18 2306     Place TRACEY hose  Until discontinued      04/08/18 2306     Place sequential compression device   Until discontinued      04/08/18 2306     IP VTE LOW RISK PATIENT  Once      04/08/18 2306             Cece Padilla PA-C  Department of Hospital Medicine   [facility]

## 2018-04-09 NOTE — ASSESSMENT & PLAN NOTE
- CT abdomen/pelvis showed interval development of subtle inflammation about a prominent diverticulum within the mid descending colon and continued improvement of inflammation about the mid sigmoid colon in this patient with diverticulitis in this region on previous CT.  No new large focal collection or free air.  - Afebrile with no evidence of leukocytosis.  - Cipro and Flagyl ordered.  - Supportive care.  Clear liquid diet, advance as tolerated.

## 2018-04-09 NOTE — ED NOTES
ISTAT Chem8    NA  142  K  3.9  Cl  103  Ca  1.15  Co2  26  Glucose  151  BUN  8  Creat  1.1  HCT  40%

## 2018-04-09 NOTE — SUBJECTIVE & OBJECTIVE
Past Medical History:   Diagnosis Date    Anxiety     Arthritis     Asthma     Avascular necrosis     Carpal tunnel syndrome     Chronic pain     Depression     Other injury of other sites of trunk 12/28/2011    Pneumonia     Spondylolisthesis     lumbar; myofascial pain    Staph infection     Ulnar neuropathy     left and rt arm       Past Surgical History:   Procedure Laterality Date    HIP SURGERY  3/06; 6/06    hip arthroplasty    HIP SURGERY      bilateral hip replacement    JOINT REPLACEMENT      SHOULDER SURGERY  2012    right shoulder    SHOULDER SURGERY         Review of patient's allergies indicates:   Allergen Reactions    Toradol [ketorolac] Hives and Nausea And Vomiting       No current facility-administered medications on file prior to encounter.      Current Outpatient Prescriptions on File Prior to Encounter   Medication Sig    alprazolam (XANAX) 2 MG Tab Take 1 tablet (2 mg total) by mouth daily as needed (back pain).    ranitidine (ZANTAC) 150 MG tablet Take 150 mg by mouth 2 (two) times daily.    metroNIDAZOLE (FLAGYL) 500 MG tablet Take 1 tablet (500 mg total) by mouth every 8 (eight) hours.    oxyCODONE (ROXICODONE) 5 MG immediate release tablet Take 1 tablet (5 mg total) by mouth every 6 (six) hours as needed for Pain.    pregabalin (LYRICA) 75 MG capsule Take 1 capsule (75 mg total) by mouth 3 (three) times daily.    sulindac (CLINORIL) 200 MG Tab Take 1 tablet (200 mg total) by mouth 2 (two) times daily.    [DISCONTINUED] pantoprazole (PROTONIX) 40 MG tablet Take 1 tablet (40 mg total) by mouth once daily.     Family History     Problem Relation (Age of Onset)    Cancer Mother        Social History Main Topics    Smoking status: Former Smoker     Packs/day: 1.00     Years: 10.00     Quit date: 10/13/2012    Smokeless tobacco: Never Used    Alcohol use 7.2 oz/week     12 Cans of beer per week      Comment: on the weekend    Drug use: No    Sexual activity: Yes      Partners: Female     Review of Systems   Constitutional: Positive for activity change, appetite change and fever. Negative for chills, diaphoresis, fatigue and unexpected weight change.   HENT: Negative for congestion, rhinorrhea, sore throat, trouble swallowing and voice change.    Eyes: Negative for visual disturbance.   Respiratory: Negative for cough, choking, chest tightness, shortness of breath and wheezing.    Cardiovascular: Negative for chest pain, palpitations and leg swelling.   Gastrointestinal: Positive for abdominal pain, diarrhea, nausea and vomiting. Negative for abdominal distention, anal bleeding, blood in stool and constipation.   Endocrine: Negative for cold intolerance, heat intolerance, polydipsia and polyuria.   Genitourinary: Negative for dysuria, flank pain, frequency, hematuria and urgency.   Musculoskeletal: Negative for arthralgias, back pain, joint swelling and myalgias.   Skin: Negative for color change and rash.   Neurological: Positive for dizziness, syncope and light-headedness. Negative for seizures, facial asymmetry, speech difficulty, weakness, numbness and headaches.   Hematological: Negative for adenopathy. Does not bruise/bleed easily.   Psychiatric/Behavioral: Negative for agitation, confusion, hallucinations and suicidal ideas.     Objective:     Vital Signs (Most Recent):  Temp: 97.8 °F (36.6 °C) (04/08/18 1851)  Pulse: 66 (04/08/18 2239)  Resp: 20 (04/08/18 2239)  BP: (!) 142/86 (04/08/18 2238)  SpO2: 97 % (04/08/18 2239) Vital Signs (24h Range):  Temp:  [97.8 °F (36.6 °C)] 97.8 °F (36.6 °C)  Pulse:  [66-89] 66  Resp:  [18-22] 20  SpO2:  [96 %-98 %] 97 %  BP: (125-147)/(78-94) 142/86     Weight: 90.7 kg (200 lb)  Body mass index is 27.12 kg/m².    Physical Exam   Constitutional: He is oriented to person, place, and time. He appears well-developed and well-nourished. No distress.   HENT:   Head: Normocephalic and atraumatic.   Neck: Neck supple. Carotid bruit is not present.  No thyromegaly present.   Cardiovascular: Normal rate and regular rhythm.  Exam reveals no gallop.    No murmur heard.  Pulmonary/Chest: Effort normal and breath sounds normal. No respiratory distress. He has no wheezes.   Abdominal: Bowel sounds are normal. He exhibits no distension. There is no splenomegaly or hepatomegaly. There is tenderness in the right lower quadrant.   Musculoskeletal: Normal range of motion. He exhibits no edema.   Neurological: He is alert and oriented to person, place, and time. No cranial nerve deficit or sensory deficit.   Skin: Skin is warm and dry. No rash noted.   Psychiatric: He has a normal mood and affect. His behavior is normal.           Significant Labs:   CBC:   Recent Labs  Lab 04/08/18 1944   WBC 9.05   HGB 13.2*   HCT 39.6*        CMP:   Recent Labs  Lab 04/08/18 1944      K 4.1      CO2 25   *   BUN 9   CREATININE 1.0   CALCIUM 9.2   PROT 7.4   ALBUMIN 4.0   BILITOT 0.4   ALKPHOS 48*   AST 26   ALT 25   ANIONGAP 9   EGFRNONAA >60.0     Lactic Acid: No results for input(s): LACTATE in the last 48 hours.  Lipase:   Recent Labs  Lab 04/08/18 1944   LIPASE 37       Significant Imaging: I have reviewed all pertinent imaging results/findings within the past 24 hours.

## 2018-04-09 NOTE — PLAN OF CARE
Problem: Patient Care Overview  Goal: Plan of Care Review  Outcome: Ongoing (interventions implemented as appropriate)  Pt. Admitted with diverticulitis,medicated with cipro and flagyl,afebrile,WBC Count 9.05. Pt. Placed on fall precautions due to narcotic medications being given for pain. Pt. States he passed out at home due to the pain,b/p WNL,no complaints of dizziness. Pt. Medicated with MS 12 mg total in ER and as soon as he arrived on the unit he stated I need my pain medicine the Dr said it would be waiting for me. Informed his medication was ordered every 3 hours as needed. Continue current plan of care.

## 2018-04-09 NOTE — HOSPITAL COURSE
Pt admitted to observation for diverticulitis. Afebrile without leukocytosis. Initiated cipro/flagyl IV (3 doses cipro received, 5 doses of flagyl received). Pt discharged with oral cipro and flagyl to complete a total of 10 days of treatment.  Analgesics PRN and supportive care.  Pt tolerating solids at discharge.  Pt given handout on discharge and diverticulitis.

## 2018-04-09 NOTE — ASSESSMENT & PLAN NOTE
- CT abdomen/pelvis showed interval development of subtle inflammation about a prominent diverticulum within the mid descending colon and continued improvement of inflammation about the mid sigmoid colon in this patient with diverticulitis in this region on previous CT.  No new large focal collection or free air.  - Afebrile with no evidence of leukocytosis.  - Cipro and Flagyl ordered.  - Supportive care.  Clear liquid diet, advance as tolerated.  4/9: Advanced diet. Plan to transition to oral analgesics  - Continue antibiotics and supportive care

## 2018-04-09 NOTE — HPI
Patient is a 47 year old gentleman with a h/o anxiety and chronic pain.  He presents with RLQ abdominal pain and vomiting.  Patient was admitted to [facility] on 3/24/2018 with similar symptoms.  He was diagnosed with diverticulitis and discharged the following day on Cipro and Flagyl.  Patient states he initially felt better, but then the nausea, vomiting, and pain returned.  He went to the ER here on 3/30/2018 and received IVFs, anti-emetics, morphine, Cipro, and Flagyl.  He was able to tolerate PO so was discharged with ten days of Cipro and Flagyl.  Patient states that since he has had N/V, he has not been consistently taking his antibiotics.  He has had decreased PO intake, including water.  He also states that he passed out while mowing the lawn today.  He denies chest pain, SOB, palpitations.  Complains of fever, dizziness, decreased PO intake, and one episode of diarrhea.

## 2018-04-10 VITALS
HEIGHT: 72 IN | OXYGEN SATURATION: 94 % | TEMPERATURE: 96 F | DIASTOLIC BLOOD PRESSURE: 87 MMHG | WEIGHT: 204.38 LBS | BODY MASS INDEX: 27.68 KG/M2 | RESPIRATION RATE: 16 BRPM | SYSTOLIC BLOOD PRESSURE: 140 MMHG | HEART RATE: 58 BPM

## 2018-04-10 LAB
ALBUMIN SERPL BCP-MCNC: 3.2 G/DL
ALP SERPL-CCNC: 38 U/L
ALT SERPL W/O P-5'-P-CCNC: 20 U/L
ANION GAP SERPL CALC-SCNC: 6 MMOL/L
AST SERPL-CCNC: 18 U/L
BASOPHILS # BLD AUTO: 0.04 K/UL
BASOPHILS NFR BLD: 0.6 %
BILIRUB SERPL-MCNC: 0.3 MG/DL
BUN SERPL-MCNC: 9 MG/DL
CALCIUM SERPL-MCNC: 8.8 MG/DL
CHLORIDE SERPL-SCNC: 106 MMOL/L
CO2 SERPL-SCNC: 26 MMOL/L
CREAT SERPL-MCNC: 0.9 MG/DL
DIFFERENTIAL METHOD: ABNORMAL
EOSINOPHIL # BLD AUTO: 0.4 K/UL
EOSINOPHIL NFR BLD: 5.7 %
ERYTHROCYTE [DISTWIDTH] IN BLOOD BY AUTOMATED COUNT: 13.2 %
EST. GFR  (AFRICAN AMERICAN): >60 ML/MIN/1.73 M^2
EST. GFR  (NON AFRICAN AMERICAN): >60 ML/MIN/1.73 M^2
GLUCOSE SERPL-MCNC: 87 MG/DL
HCT VFR BLD AUTO: 33.9 %
HGB BLD-MCNC: 11.2 G/DL
IMM GRANULOCYTES # BLD AUTO: 0.02 K/UL
IMM GRANULOCYTES NFR BLD AUTO: 0.3 %
LYMPHOCYTES # BLD AUTO: 1.7 K/UL
LYMPHOCYTES NFR BLD: 24.6 %
MCH RBC QN AUTO: 31.3 PG
MCHC RBC AUTO-ENTMCNC: 33 G/DL
MCV RBC AUTO: 95 FL
MONOCYTES # BLD AUTO: 0.6 K/UL
MONOCYTES NFR BLD: 8.2 %
NEUTROPHILS # BLD AUTO: 4.2 K/UL
NEUTROPHILS NFR BLD: 60.6 %
NRBC BLD-RTO: 0 /100 WBC
PLATELET # BLD AUTO: 189 K/UL
PMV BLD AUTO: 9.2 FL
POTASSIUM SERPL-SCNC: 4 MMOL/L
PROT SERPL-MCNC: 5.8 G/DL
RBC # BLD AUTO: 3.58 M/UL
SODIUM SERPL-SCNC: 138 MMOL/L
WBC # BLD AUTO: 6.98 K/UL

## 2018-04-10 PROCEDURE — 25000003 PHARM REV CODE 250: Performed by: PHYSICIAN ASSISTANT

## 2018-04-10 PROCEDURE — 99239 HOSP IP/OBS DSCHRG MGMT >30: CPT | Mod: ,,, | Performed by: PHYSICIAN ASSISTANT

## 2018-04-10 PROCEDURE — 36415 COLL VENOUS BLD VENIPUNCTURE: CPT

## 2018-04-10 PROCEDURE — 80053 COMPREHEN METABOLIC PANEL: CPT

## 2018-04-10 PROCEDURE — 85025 COMPLETE CBC W/AUTO DIFF WBC: CPT

## 2018-04-10 PROCEDURE — S0030 INJECTION, METRONIDAZOLE: HCPCS | Performed by: PHYSICIAN ASSISTANT

## 2018-04-10 RX ORDER — OXYCODONE AND ACETAMINOPHEN 10; 325 MG/1; MG/1
1 TABLET ORAL EVERY 6 HOURS PRN
Qty: 8 TABLET | Refills: 0 | Status: SHIPPED | OUTPATIENT
Start: 2018-04-10 | End: 2020-01-23

## 2018-04-10 RX ORDER — CIPROFLOXACIN 500 MG/1
500 TABLET ORAL 2 TIMES DAILY
Qty: 17 TABLET | Refills: 0 | Status: SHIPPED | OUTPATIENT
Start: 2018-04-10 | End: 2019-10-18 | Stop reason: CLARIF

## 2018-04-10 RX ORDER — METRONIDAZOLE 500 MG/1
500 TABLET ORAL 3 TIMES DAILY
Qty: 25 TABLET | Refills: 0 | Status: SHIPPED | OUTPATIENT
Start: 2018-04-10 | End: 2019-10-18 | Stop reason: CLARIF

## 2018-04-10 RX ADMIN — FAMOTIDINE 20 MG: 20 TABLET, FILM COATED ORAL at 08:04

## 2018-04-10 RX ADMIN — ALPRAZOLAM 2 MG: 1 TABLET ORAL at 06:04

## 2018-04-10 RX ADMIN — STANDARDIZED SENNA CONCENTRATE AND DOCUSATE SODIUM 1 TABLET: 8.6; 5 TABLET, FILM COATED ORAL at 08:04

## 2018-04-10 RX ADMIN — MORPHINE SULFATE 5 MG: 10 SOLUTION ORAL at 05:04

## 2018-04-10 RX ADMIN — METRONIDAZOLE 500 MG: 500 INJECTION, SOLUTION INTRAVENOUS at 08:04

## 2018-04-10 NOTE — PLAN OF CARE
Problem: Patient Care Overview  Goal: Plan of Care Review  Outcome: Ongoing (interventions implemented as appropriate)  Pt progressing towards goals.remains on telemetry,NSR.continue iv antibiotics.fide regular diet.denies nausea.reports adequate pain control.safety precautions maintained.pt free of falls or injuries.continue plan of care.

## 2018-04-10 NOTE — PLAN OF CARE
Problem: Patient Care Overview  Goal: Plan of Care Review  Outcome: Ongoing (interventions implemented as appropriate)  Discharge summary reviewed with pt. Pt verbalized understanding and all questions were answered. Prescription for Percocet given to pt. IV catheter and heart monitor d/c. Pt refused transport. Ambulated off unit.

## 2018-04-10 NOTE — PLAN OF CARE
04/10/18 1415   Final Note   Assessment Type Final Discharge Note     Patient discharged home with no needs 4/10/18. Discharge summary faxed to Dr. Johnie Ferguson (PCP) f [phone].

## 2018-04-10 NOTE — ASSESSMENT & PLAN NOTE
- CT abdomen/pelvis showed interval development of subtle inflammation about a prominent diverticulum within the mid descending colon and continued improvement of inflammation about the mid sigmoid colon in this patient with diverticulitis in this region on previous CT.  No new large focal collection or free air.  - Afebrile with no evidence of leukocytosis.  - Cipro and Flagyl ordered.  - Supportive care.  Clear liquid diet, advance as tolerated.  4/9: Advanced diet. Plan to transition to oral analgesics  - Continue antibiotics and supportive care  4/10 tolerating diet and stable for discharge.  Pt to complete oral antibiotics (cipro bid, flagyl tid) to receive 10 day course of treatment.

## 2018-04-10 NOTE — DISCHARGE SUMMARY
[facility] Hospital Medicine  Discharge Summary      Patient Name: Keon Schneider Jr.  MRN: [id number]  Admission Date: 4/8/2018  Hospital Length of Stay: 1 days  Discharge Date and Time:  04/10/2018 9:16 AM  Attending Physician: DAVIS Edgar MD   Discharging Provider: Janine Gaspar PA-C  Primary Care Provider: Johnie Gutierrez MD  [facility] Medicine Team: [facility]OSP MED F Janine Gaspar PA-C    HPI:   Patient is a 47 year old gentleman with a h/o anxiety and chronic pain.  He presents with RLQ abdominal pain and vomiting.  Patient was admitted to [facility] on 3/24/2018 with similar symptoms.  He was diagnosed with diverticulitis and discharged the following day on Cipro and Flagyl.  Patient states he initially felt better, but then the nausea, vomiting, and pain returned.  He went to the ER here on 3/30/2018 and received IVFs, anti-emetics, morphine, Cipro, and Flagyl.  He was able to tolerate PO so was discharged with ten days of Cipro and Flagyl.  Patient states that since he has had N/V, he has not been consistently taking his antibiotics.  He has had decreased PO intake, including water.  He also states that he passed out while mowing the lawn today.  He denies chest pain, SOB, palpitations.  Complains of fever, dizziness, decreased PO intake, and one episode of diarrhea.    * No surgery found *      Hospital Course:   Pt admitted to observation for diverticulitis. Afebrile without leukocytosis. Initiated cipro/flagyl IV (3 doses cipro received, 5 doses of flagyl received). Pt discharged with oral cipro and flagyl to complete a total of 10 days of treatment.  Analgesics PRN and supportive care.  Pt tolerating solids at discharge.  Pt given handout on discharge and diverticulitis.     Consults:     * Diverticulitis    - CT abdomen/pelvis showed interval development of subtle inflammation about a prominent diverticulum within the mid descending colon and continued improvement of  inflammation about the mid sigmoid colon in this patient with diverticulitis in this region on previous CT.  No new large focal collection or free air.  - Afebrile with no evidence of leukocytosis.  - Cipro and Flagyl ordered.  - Supportive care.  Clear liquid diet, advance as tolerated.  4/9: Advanced diet. Plan to transition to oral analgesics  - Continue antibiotics and supportive care  4/10 tolerating diet and stable for discharge.  Pt to complete oral antibiotics (cipro bid, flagyl tid) to receive 10 day course of treatment.        Chronic back pain    - Supportive care  - Patient reports he did not tolerate Lyrica  - [place]  reviewed        Anxiety    - Continue Xanax 2mg BID PRN          Final Active Diagnoses:    Diagnosis Date Noted POA    PRINCIPAL PROBLEM:  Diverticulitis [K57.92] 04/08/2018 Yes    Chronic back pain [M54.9, G89.29] 11/24/2013 Yes     Chronic    Anxiety [F41.9] 11/24/2013 Yes     Chronic      Problems Resolved During this Admission:    Diagnosis Date Noted Date Resolved POA    Syncope [R55] 03/01/2013 04/10/2018 Yes       Discharged Condition: good    Disposition: Home or Self Care    Follow Up:  Follow-up Information     Johnie Gutierrez MD On 4/20/2018.    Specialty:  Family Medicine  Why:  at 9:30 AM  Contact information:  [address] [facility]  [place] [address] [phone]                 Patient Instructions:     Notify your health care provider if you experience any of the following:  temperature >100.4     Notify your health care provider if you experience any of the following:  persistent nausea and vomiting or diarrhea     Notify your health care provider if you experience any of the following:  severe uncontrolled pain         Significant Diagnostic Studies: Labs: All labs within the past 24 hours have been reviewed    Pending Diagnostic Studies:     None         Medications:  Reconciled Home Medications:      Medication List      START taking these medications    ciprofloxacin HCl  500 MG tablet  Commonly known as:  CIPRO  Take 1 tablet (500 mg total) by mouth 2 (two) times daily.     oxyCODONE-acetaminophen  mg per tablet  Commonly known as:  PERCOCET  Take 1 tablet by mouth every 6 (six) hours as needed for Pain.        CHANGE how you take these medications    metroNIDAZOLE 500 MG tablet  Commonly known as:  FLAGYL  Take 1 tablet (500 mg total) by mouth 3 (three) times daily.  What changed:  when to take this        CONTINUE taking these medications    ALPRAZolam 2 MG Tab  Commonly known as:  XANAX  Take 1 tablet (2 mg total) by mouth daily as needed (back pain).     oxyCODONE 5 MG immediate release tablet  Commonly known as:  ROXICODONE  Take 1 tablet (5 mg total) by mouth every 6 (six) hours as needed for Pain.     pregabalin 75 MG capsule  Commonly known as:  LYRICA  Take 1 capsule (75 mg total) by mouth 3 (three) times daily.     ranitidine 150 MG tablet  Commonly known as:  ZANTAC        STOP taking these medications    sulindac 200 MG Tab  Commonly known as:  CLINORIL           Where to Get Your Medications      These medications were sent to [id number]  [place] - [address] [address] [facility]  [address], [place] [address]    Hours:  24-hours Phone:  [phone]   · ciprofloxacin HCl 500 MG tablet  · metroNIDAZOLE 500 MG tablet     You can get these medications from any pharmacy    Bring a paper prescription for each of these medications  · oxyCODONE-acetaminophen  mg per tablet         Indwelling Lines/Drains at time of discharge:   Lines/Drains/Airways          No matching active lines, drains, or airways          Time spent on the discharge of patient: >30 minutes  Patient was seen and examined on the date of discharge and determined to be suitable for discharge.         Janine Gaspar PA-C  Department of Hospital Medicine  [facility]

## 2018-05-16 ENCOUNTER — HOSPITAL ENCOUNTER (EMERGENCY)
Facility: HOSPITAL | Age: 48
Discharge: HOME | End: 2018-05-16
Attending: EMERGENCY MEDICINE
Payer: MEDICARE

## 2018-05-16 VITALS
DIASTOLIC BLOOD PRESSURE: 80 MMHG | SYSTOLIC BLOOD PRESSURE: 127 MMHG | HEIGHT: 72 IN | BODY MASS INDEX: 25.73 KG/M2 | HEART RATE: 75 BPM | WEIGHT: 190 LBS | RESPIRATION RATE: 16 BRPM | TEMPERATURE: 98 F | OXYGEN SATURATION: 98 %

## 2018-05-16 DIAGNOSIS — T14.8XXA MUSCLE STRAIN: Primary | ICD-10-CM

## 2018-05-16 DIAGNOSIS — R52 PAIN: ICD-10-CM

## 2018-05-16 PROCEDURE — 99283 EMERGENCY DEPT VISIT LOW MDM: CPT | Mod: ,,, | Performed by: EMERGENCY MEDICINE

## 2018-05-16 PROCEDURE — 99283 EMERGENCY DEPT VISIT LOW MDM: CPT

## 2018-05-16 PROCEDURE — 25000003 PHARM REV CODE 250: Performed by: EMERGENCY MEDICINE

## 2018-05-16 RX ORDER — DIAZEPAM 5 MG/1
5 TABLET ORAL
Status: COMPLETED | OUTPATIENT
Start: 2018-05-16 | End: 2018-05-16

## 2018-05-16 RX ORDER — IBUPROFEN 600 MG/1
600 TABLET ORAL
Status: COMPLETED | OUTPATIENT
Start: 2018-05-16 | End: 2018-05-16

## 2018-05-16 RX ORDER — ACETAMINOPHEN 500 MG
1000 TABLET ORAL
Status: COMPLETED | OUTPATIENT
Start: 2018-05-16 | End: 2018-05-16

## 2018-05-16 RX ADMIN — DIAZEPAM 5 MG: 5 TABLET ORAL at 10:05

## 2018-05-16 RX ADMIN — IBUPROFEN 600 MG: 600 TABLET, FILM COATED ORAL at 10:05

## 2018-05-16 RX ADMIN — ACETAMINOPHEN 1000 MG: 500 TABLET, FILM COATED ORAL at 10:05

## 2018-05-16 NOTE — ED PROVIDER NOTES
"SCRIBE #1 NOTE: I, Corine Rich, am scribing for, and in the presence of, Dr. Amor .       CC: Cervical Spine Pain (C-spine) (was lifting motor out of car, something popped and now have knot,)      HPI: Keon Schneider Jr. is a 47 y.o. year old male who presents to the ED complaining of posterior neck pain x 2 days.   Pt states that 2 days ago, on Monday of this week, he was taking the mold out of a truck, which involved heavy lifting, when he felt a sharp pain in his posterior neck at the level of his cervical spine. He endorses neck pain and upper back pain, and limited range of motion of right shoulder due to pain since then, and reports that it feels like a "knot." Pain has been getting progressively worse over the last few days. Patient has been taking Tylenol, no relief. He has a PSHx of total right shoulder reconstruction about 5 years ago. PMHx also includes anxiety, arthritis, asthma, carpal tunnel syndrome, chronic pain, depression, pneumonia, ulnar neuropathy and spondylolisthesis.       Past Medical History:   Diagnosis Date    Anxiety     Arthritis     Asthma     Avascular necrosis     Carpal tunnel syndrome     Chronic pain     Depression     Other injury of other sites of trunk 12/28/2011    Pneumonia     Spondylolisthesis     lumbar; myofascial pain    Staph infection     Ulnar neuropathy     left and rt arm     Past Surgical History:   Procedure Laterality Date    HIP SURGERY  3/06; 6/06    hip arthroplasty    HIP SURGERY      bilateral hip replacement    JOINT REPLACEMENT      SHOULDER SURGERY  2012    right shoulder    SHOULDER SURGERY       Family History   Problem Relation Age of Onset    Cancer Mother      No current facility-administered medications on file prior to encounter.      Current Outpatient Prescriptions on File Prior to Encounter   Medication Sig Dispense Refill    alprazolam (XANAX) 2 MG Tab Take 1 tablet (2 mg total) by mouth daily as needed (back pain).  "     ranitidine (ZANTAC) 150 MG tablet Take 150 mg by mouth 2 (two) times daily.      ciprofloxacin HCl (CIPRO) 500 MG tablet Take 1 tablet (500 mg total) by mouth 2 (two) times daily. 17 tablet 0    metroNIDAZOLE (FLAGYL) 500 MG tablet Take 1 tablet (500 mg total) by mouth 3 (three) times daily. 25 tablet 0    oxyCODONE (ROXICODONE) 5 MG immediate release tablet Take 1 tablet (5 mg total) by mouth every 6 (six) hours as needed for Pain. (Patient not taking: Reported on 4/9/2018) 15 tablet 0    oxyCODONE-acetaminophen (PERCOCET)  mg per tablet Take 1 tablet by mouth every 6 (six) hours as needed for Pain. 8 tablet 0    pregabalin (LYRICA) 75 MG capsule Take 1 capsule (75 mg total) by mouth 3 (three) times daily. (Patient not taking: Reported on 4/9/2018) 90 capsule 3    [DISCONTINUED] pantoprazole (PROTONIX) 40 MG tablet Take 1 tablet (40 mg total) by mouth once daily. 30 tablet 1     Toradol [ketorolac]  Social History     Social History    Marital status: Single     Spouse name: N/A    Number of children: N/A    Years of education: N/A     Social History Main Topics    Smoking status: Former Smoker     Packs/day: 1.00     Years: 10.00     Quit date: 10/13/2012    Smokeless tobacco: Never Used    Alcohol use 7.2 oz/week     12 Cans of beer per week      Comment: on the weekend    Drug use: No    Sexual activity: Yes     Partners: Female     Other Topics Concern    None     Social History Narrative    None       ROS:     Constitutional : neg  HEENT neg  Resp neg  Cardiac  neg  GI neg   neg  Neuro neg  Heme/Immune: neg  Endo neg  Skin neg  MSK: Positive for posterior neck and upper back pain, right shoulder pain    PHYSICAL EXAM:  Vitals:    05/16/18 1047   BP: 127/80   Pulse: 75   Resp: 16   Temp: 98.2 °F (36.8 °C)         PHYSICAL EXAM:   general: comfortable, in no acute distress  VS: triage VS reviewed  HEENT: NC/AT, PERRL, EOMI  Neck: trachea midline  CV: RRR, no m/r/g, no LE pitting  edema  Resp: CTAB  ABD:  ND, + normal BS, NT  Renal: No CVAT  Neuro: AAO x 3, 5/5 muscle strength in upper and lower extremities, sensation grossly intact, face symmetric, speech normal  Ext: no deformity, +2 symmetrical peripheral pulses  Back w/ soft tissue edema no erythema/induration over C5-T3 w/ ttp  No ttp of the right shoulder, no deformity of the RUE  +2 rad pulse  5/5 right hand , elbow flex/ext, shoulder flex/ext  Able to abduct shoulder to 90 w/out pain (pain reported in the midline cervical area), full passive rom shoulder        DATA & INTERVENTIONS:    LABS reviewed:  Labs Reviewed - No data to display    RADIOLOGY reviewed:  Imaging Results          X-Ray Cervical Spine AP And Lateral (Final result)  Result time 05/16/18 10:39:28    Final result by Juan David Khan MD (05/16/18 10:39:28)                 Impression:      See above      Electronically signed by: Juan David Khan MD  Date:    05/16/2018  Time:    10:39             Narrative:    EXAMINATION:  XR CERVICAL SPINE AP LATERAL    CLINICAL HISTORY:  pain w/ swelling at approx C6-T3;    TECHNIQUE:  AP, lateral and open mouth views of the cervical spine were performed.    COMPARISON:  None.    FINDINGS:  Mild DJD.  No significant disc space narrowing identified.  No fracture or bone destruction.  Calcification in the ligamentum Nuchae noted                               X-Ray Thoracic Spine AP Lateral (Final result)  Result time 05/16/18 10:37:57    Final result by Juan David Khan MD (05/16/18 10:37:57)                 Impression:      See above      Electronically signed by: Juan David Khan MD  Date:    05/16/2018  Time:    10:37             Narrative:    EXAMINATION:  XR THORACIC SPINE AP LATERAL    CLINICAL HISTORY:  Pain, unspecified    COMPARISON:  None    FINDINGS:  Mild DJD.  Bridging osteophytosis in the lower thoracic spine.  No fracture or dislocation.  No bone destruction identified                                 MEDICATIONS/FLUIDS:  Medications   ibuprofen tablet 600 mg (600 mg Oral Given 5/16/18 1009)   acetaminophen tablet 1,000 mg (1,000 mg Oral Given 5/16/18 1055)   diazePAM tablet 5 mg (5 mg Oral Given 5/16/18 1055)         MDM:   48 yo M w/ midline soft tissue edema C5-T3 w/ ttp started while carrying a motor  xr c-spine and T spine no fx  No neurologic deficit UE  Likely muscle strain. Rest/ice/analgesics      IMPRESSION:  1.) back muscle strain      Dispo: Discharge    Critical Care Time: N/A      Scribe Attestation:   Scribe #1: I performed the above scribed service and the documentation accurately describes the services I performed. I attest to the accuracy of the note.          Ayaka Amor MD  05/16/18 [id number]

## 2018-05-16 NOTE — ED TRIAGE NOTES
"Pt lifted a engine out of a truck on tow days ago and now complains of pain to neck and right side of upper back with "big lump"  Rates his pain a 10/10  States he can not " bend my arms or my head backwards "   "

## 2018-05-16 NOTE — ED NOTES
Pt identifiers Keon Alonsomoises Schneider Jr. were checked and are correct  LOC: The patient is awake, alert, aware of environment with an appropriate affect. Oriented x3, speaking appropriately  APPEARANCE: Pt rates pain to neck and right upper back a 10/10 , in no acute distress, pt is clean and well groomed, clothing properly fastened  SKIN: Skin warm, dry and intact, normal skin turgor, moist mucus membranes  RESPIRATORY: Airway is open and patent, respirations are spontaneous, even and unlabored, normal effort and rate  CARDIAC: Normal rate and rhythm, no peripheral edema noted, capillary refill < 3 seconds, bilateral radial pulses 2+  ABDOMEN: Soft, nontender, nondistended.  NEUROLOGIC: facial expression is symmetrical, patient moving all extremities spontaneously, normal sensation in all extremities when touched with a finger.  Follows all commands appropriately  MUSCULOSKELETAL: Swelling noted to right upper back

## 2018-06-19 ENCOUNTER — PES CALL (OUTPATIENT)
Dept: ADMINISTRATIVE | Facility: CLINIC | Age: 48
End: 2018-06-19

## 2019-04-13 ENCOUNTER — HOSPITAL ENCOUNTER (EMERGENCY)
Facility: HOSPITAL | Age: 49
Discharge: HOME | End: 2019-04-13
Attending: EMERGENCY MEDICINE
Payer: MEDICARE

## 2019-04-13 VITALS
BODY MASS INDEX: 29.62 KG/M2 | HEIGHT: 69 IN | OXYGEN SATURATION: 98 % | RESPIRATION RATE: 18 BRPM | WEIGHT: 200 LBS | TEMPERATURE: 98 F | SYSTOLIC BLOOD PRESSURE: 126 MMHG | DIASTOLIC BLOOD PRESSURE: 84 MMHG | HEART RATE: 57 BPM

## 2019-04-13 DIAGNOSIS — H65.192 ACUTE MEE (MIDDLE EAR EFFUSION), LEFT: Primary | ICD-10-CM

## 2019-04-13 PROCEDURE — 99284 EMERGENCY DEPT VISIT MOD MDM: CPT | Mod: ,,, | Performed by: EMERGENCY MEDICINE

## 2019-04-13 PROCEDURE — 99284 PR EMERGENCY DEPT VISIT,LEVEL IV: ICD-10-PCS | Mod: ,,, | Performed by: EMERGENCY MEDICINE

## 2019-04-13 PROCEDURE — 99284 EMERGENCY DEPT VISIT MOD MDM: CPT | Mod: HCNC

## 2019-04-13 RX ORDER — CETIRIZINE HYDROCHLORIDE 10 MG/1
10 TABLET ORAL DAILY
Qty: 30 TABLET | Refills: 0 | Status: ON HOLD | OUTPATIENT
Start: 2019-04-13 | End: 2022-05-31

## 2019-04-13 RX ORDER — FLUTICASONE PROPIONATE 50 MCG
1 SPRAY, SUSPENSION (ML) NASAL 2 TIMES DAILY PRN
Qty: 15 G | Refills: 0 | Status: SHIPPED | OUTPATIENT
Start: 2019-04-13 | End: 2019-04-28

## 2019-04-13 NOTE — ED PROVIDER NOTES
SCRIBE #1 NOTE: IJoyce , am scribing for, and in the presence of,  Dr. Amor. I have scribed the entire note.     SCRIBE #2 NOTE: IBertin , am scribing for, and in the presence of,  Dr. Amor. I have scribed the parts of the note not scribed by Scribe #1.     CC: Otalgia (left constant ear pressure and decreased hearing x 2 weeks, and right ear buzzing. finished ax treatment on monday for strep throat.)      History provided by: Patient     HPI: Keon Schneider Jr. is a 48 y.o. year old male who presents to the ED complaining of left ear pain with decreased hearing x2 days.   Pt states that his symptoms began 2 weeks ago with sore throat and congestion. He describes his current symptoms as left ear pressure and congestion. No nausea or vomiting. No weakness or numbness in the face or anywhere in his body. No history of ear problems. He was started on antibiotics for strep throat and bilateral otitis media, and finished the course of antibiotics 5 days ago. His sore throat has resolved.still feels congested. No unsteady gait, no focal weakness/numbness, no fever        Past Medical History:   Diagnosis Date    Anxiety     Arthritis     Asthma     Avascular necrosis     Carpal tunnel syndrome     Chronic pain     Depression     Other injury of other sites of trunk 12/28/2011    Pneumonia     Spondylolisthesis     lumbar; myofascial pain    Staph infection     Ulnar neuropathy     left and rt arm     Past Surgical History:   Procedure Laterality Date    HIP SURGERY  3/06; 6/06    hip arthroplasty    HIP SURGERY      bilateral hip replacement    JOINT REPLACEMENT      SHOULDER SURGERY  2012    right shoulder    SHOULDER SURGERY       Family History   Problem Relation Age of Onset    Cancer Mother      No current facility-administered medications on file prior to encounter.      Current Outpatient Medications on File Prior to Encounter   Medication Sig Dispense Refill    alprazolam  (XANAX) 2 MG Tab Take 1 tablet (2 mg total) by mouth daily as needed (back pain).      ciprofloxacin HCl (CIPRO) 500 MG tablet Take 1 tablet (500 mg total) by mouth 2 (two) times daily. 17 tablet 0    metroNIDAZOLE (FLAGYL) 500 MG tablet Take 1 tablet (500 mg total) by mouth 3 (three) times daily. 25 tablet 0    oxyCODONE (ROXICODONE) 5 MG immediate release tablet Take 1 tablet (5 mg total) by mouth every 6 (six) hours as needed for Pain. (Patient not taking: Reported on 2018) 15 tablet 0    oxyCODONE-acetaminophen (PERCOCET)  mg per tablet Take 1 tablet by mouth every 6 (six) hours as needed for Pain. 8 tablet 0    pregabalin (LYRICA) 75 MG capsule Take 1 capsule (75 mg total) by mouth 3 (three) times daily. (Patient not taking: Reported on 2018) 90 capsule 3    ranitidine (ZANTAC) 150 MG tablet Take 150 mg by mouth 2 (two) times daily.      [DISCONTINUED] pantoprazole (PROTONIX) 40 MG tablet Take 1 tablet (40 mg total) by mouth once daily. 30 tablet 1     Toradol [ketorolac]  Social History     Socioeconomic History    Marital status: Single     Spouse name: Not on file    Number of children: Not on file    Years of education: Not on file    Highest education level: Not on file   Occupational History    Not on file   Social Needs    Financial resource strain: Not on file    Food insecurity:     Worry: Not on file     Inability: Not on file    Transportation needs:     Medical: Not on file     Non-medical: Not on file   Tobacco Use    Smoking status: Former Smoker     Packs/day: 1.00     Years: 10.00     Pack years: 10.00     Last attempt to quit: 10/13/2012     Years since quittin.5    Smokeless tobacco: Never Used   Substance and Sexual Activity    Alcohol use: Yes     Alcohol/week: 7.2 oz     Types: 12 Cans of beer per week     Comment: on the weekend    Drug use: No    Sexual activity: Yes     Partners: Female   Lifestyle    Physical activity:     Days per week: Not on  file     Minutes per session: Not on file    Stress: Not on file   Relationships    Social connections:     Talks on phone: Not on file     Gets together: Not on file     Attends Spiritism service: Not on file     Active member of club or organization: Not on file     Attends meetings of clubs or organizations: Not on file     Relationship status: Not on file   Other Topics Concern    Not on file   Social History Narrative    Not on file       ROS:     Constitutional : neg  HEENT: positive for left ear pain, + congestion.   Positive for sore throat (resolved).  Resp neg  Cardiac  neg  GI negative for nausea and vomiting.    neg  Neuro negative for weakness or numbness in the face or anywhere in the body.  Heme/Immune: neg  Endo neg  Skin neg    PHYSICAL EXAM:  Vitals:    04/13/19 0614   BP: 126/84   Pulse: (!) 57   Resp: 18   Temp: 98.2 °F (36.8 °C)         PHYSICAL EXAM:   general: comfortable, in no acute distress  VS: triage VS reviewed  HEENT: NC/AT, PERRL, EOMI, Left ear: external ear canal no erythema or discharge, TM is bulging but no erythema, no tenderness to palpation over mastoid or pulling of the ear,  Neck: trachea midline  CV: RRR, no m/r/g, no LE pitting edema  Resp: CTAB  ABD:  ND, + normal BS, NT  Renal: No CVAT  Neuro: AAO x 3, cranial nerves 2-12 grossly normal, 5/5 muscle strength in upper and lower extremities, sensation grossly intact, face symmetric, speech normal  Ext: no deformity, +2 symmetrical peripheral pulses          DATA & INTERVENTIONS:    LABS reviewed:  Labs Reviewed - No data to display    RADIOLOGY reviewed:  Imaging Results    None         MEDICATIONS/FLUIDS:  Medications - No data to display      MDM:  Keon Cabrera Wyatt Herrera is a 48 y.o. year old male who presents to the ED complaining of bilateral ear pressure, decreased hearing on the left side and tinnitus on the right side. No fever or pain over the mastoid to suggest mastoiditis. No neuro deficits or vertigo.  Effusion present on the left side. Patient still reports to be congested. Will give Flonase, Zyrtec, and referral to ENT. Signs and symptoms that require return to the ED were discussed with the patient.     DDX includes but not limited to: Otitis Media with Effusion, acute otitis media vs mastoiditis vs labyrinthitis        IMPRESSION:  1.)left side  Otitis Media with Effusion and decreased hearing      Dispo: Discharge    Critical Care Time: N/A       Ayaka Amor MD  04/13/19 1950

## 2019-04-13 NOTE — DISCHARGE INSTRUCTIONS
Please return to the ED for unsteady balance,dizziness, headache, pain behind the ear, fluid drainage from the ear,. Fever or any other concerns

## 2019-04-13 NOTE — ED NOTES
LOC: Pt is awake, alert, oriented x4. Affect is appropriate. Speech clear and appropriate.      Appearance: Pt resting comfortably in no acute distress. Pt clean and well groomed.     Skin: Skin warm and dry. Color consistent with ethnicity. Skin turgor normal. Mucus membranes moist. Skin intact. No breakdown or bruising noted.     Musculoskeletal: Pt moving upper and lower extremities without difficulty. Denies weakness.     Respiratory: Airway open and patent. Respirations spontaneous, even, easy, and unlabored. Pt has normal effort and rate. No accessory muscle use noted. Denies cough. Denies shortness of breath.     Cardiovascular: No peripheral edema noted. No complaints of chest pain. S1S2 present. Rate regular. Radial pulses 2+.     Abdominal: Abdomen soft and nontender. No distention noted. Denies n/v. Bowels sounds active x 4 quadrants.     Neurological: Eyes open spontaneously. Behavior appropriate to situation. Follows commands appropriately. Facial expression symmetrical. Purposeful motor response. Sensation intact.     Pt cc of deafness to left ear and pain and buzzing noise to right ear. Pt states recently on antibiotics for strepthroat and ear infection. Has finished antibiotics but no improvement in ear symptoms.

## 2019-04-13 NOTE — ED TRIAGE NOTES
Pt presents to ed with cc of deafness to left ear and buzzing noise in right ear. Pt recently on antibiotics for strep throat and bilateral ear infections. Denies fever and chills

## 2019-06-20 ENCOUNTER — PES CALL (OUTPATIENT)
Dept: ADMINISTRATIVE | Facility: CLINIC | Age: 49
End: 2019-06-20

## 2019-08-21 ENCOUNTER — PES CALL (OUTPATIENT)
Dept: ADMINISTRATIVE | Facility: CLINIC | Age: 49
End: 2019-08-21

## 2019-10-19 ENCOUNTER — HOSPITAL ENCOUNTER (INPATIENT)
Facility: HOSPITAL | Age: 49
LOS: 2 days | Discharge: HOME | DRG: 392 | End: 2019-10-21
Attending: EMERGENCY MEDICINE | Admitting: EMERGENCY MEDICINE
Payer: MEDICARE

## 2019-10-19 DIAGNOSIS — K57.92 DIVERTICULITIS: Primary | ICD-10-CM

## 2019-10-19 PROBLEM — R10.32 LEFT LOWER QUADRANT ABDOMINAL PAIN: Status: ACTIVE | Noted: 2019-10-19

## 2019-10-19 PROBLEM — F14.11 HISTORY OF COCAINE ABUSE: Status: ACTIVE | Noted: 2019-10-19

## 2019-10-19 LAB
ALBUMIN SERPL BCP-MCNC: 4.3 G/DL (ref 3.5–5.2)
ALP SERPL-CCNC: 50 U/L (ref 55–135)
ALT SERPL W/O P-5'-P-CCNC: 32 U/L (ref 10–44)
ANION GAP SERPL CALC-SCNC: 9 MMOL/L (ref 8–16)
AST SERPL-CCNC: 20 U/L (ref 10–40)
BASOPHILS # BLD AUTO: 0.02 K/UL (ref 0–0.2)
BASOPHILS NFR BLD: 0.2 % (ref 0–1.9)
BILIRUB SERPL-MCNC: 0.9 MG/DL (ref 0.1–1)
BUN SERPL-MCNC: 14 MG/DL (ref 6–20)
CALCIUM SERPL-MCNC: 9.2 MG/DL (ref 8.7–10.5)
CHLORIDE SERPL-SCNC: 102 MMOL/L (ref 95–110)
CO2 SERPL-SCNC: 24 MMOL/L (ref 23–29)
CREAT SERPL-MCNC: 1.1 MG/DL (ref 0.5–1.4)
DIFFERENTIAL METHOD: ABNORMAL
EOSINOPHIL # BLD AUTO: 0.1 K/UL (ref 0–0.5)
EOSINOPHIL NFR BLD: 0.5 % (ref 0–8)
ERYTHROCYTE [DISTWIDTH] IN BLOOD BY AUTOMATED COUNT: 13.1 % (ref 11.5–14.5)
EST. GFR  (AFRICAN AMERICAN): >60 ML/MIN/1.73 M^2
EST. GFR  (NON AFRICAN AMERICAN): >60 ML/MIN/1.73 M^2
GLUCOSE SERPL-MCNC: 105 MG/DL (ref 70–110)
HCT VFR BLD AUTO: 36.4 % (ref 40–54)
HGB BLD-MCNC: 12.2 G/DL (ref 14–18)
IMM GRANULOCYTES # BLD AUTO: 0.04 K/UL (ref 0–0.04)
IMM GRANULOCYTES NFR BLD AUTO: 0.3 % (ref 0–0.5)
LIPASE SERPL-CCNC: 9 U/L (ref 4–60)
LYMPHOCYTES # BLD AUTO: 0.9 K/UL (ref 1–4.8)
LYMPHOCYTES NFR BLD: 7.5 % (ref 18–48)
MCH RBC QN AUTO: 31.8 PG (ref 27–31)
MCHC RBC AUTO-ENTMCNC: 33.5 G/DL (ref 32–36)
MCV RBC AUTO: 95 FL (ref 82–98)
MONOCYTES # BLD AUTO: 1.3 K/UL (ref 0.3–1)
MONOCYTES NFR BLD: 11.6 % (ref 4–15)
NEUTROPHILS # BLD AUTO: 9.1 K/UL (ref 1.8–7.7)
NEUTROPHILS NFR BLD: 79.9 % (ref 38–73)
NRBC BLD-RTO: 0 /100 WBC
PLATELET # BLD AUTO: 187 K/UL (ref 150–350)
PMV BLD AUTO: 9.1 FL (ref 9.2–12.9)
POTASSIUM SERPL-SCNC: 3.8 MMOL/L (ref 3.5–5.1)
PROT SERPL-MCNC: 7.1 G/DL (ref 6–8.4)
RBC # BLD AUTO: 3.84 M/UL (ref 4.6–6.2)
SODIUM SERPL-SCNC: 135 MMOL/L (ref 136–145)
WBC # BLD AUTO: 11.45 K/UL (ref 3.9–12.7)

## 2019-10-19 PROCEDURE — 11000001 HC ACUTE MED/SURG PRIVATE ROOM: Mod: HCNC

## 2019-10-19 PROCEDURE — 85025 COMPLETE CBC W/AUTO DIFF WBC: CPT | Mod: HCNC

## 2019-10-19 PROCEDURE — 63600175 PHARM REV CODE 636 W HCPCS: Mod: HCNC | Performed by: EMERGENCY MEDICINE

## 2019-10-19 PROCEDURE — S0028 INJECTION, FAMOTIDINE, 20 MG: HCPCS | Mod: HCNC | Performed by: INTERNAL MEDICINE

## 2019-10-19 PROCEDURE — 99285 EMERGENCY DEPT VISIT HI MDM: CPT | Mod: 25,HCNC

## 2019-10-19 PROCEDURE — 96375 TX/PRO/DX INJ NEW DRUG ADDON: CPT | Mod: HCNC

## 2019-10-19 PROCEDURE — 25000003 PHARM REV CODE 250: Mod: HCNC | Performed by: INTERNAL MEDICINE

## 2019-10-19 PROCEDURE — 63600175 PHARM REV CODE 636 W HCPCS: Mod: HCNC | Performed by: INTERNAL MEDICINE

## 2019-10-19 PROCEDURE — S0030 INJECTION, METRONIDAZOLE: HCPCS | Mod: HCNC | Performed by: INTERNAL MEDICINE

## 2019-10-19 PROCEDURE — 80053 COMPREHEN METABOLIC PANEL: CPT | Mod: HCNC

## 2019-10-19 PROCEDURE — 83690 ASSAY OF LIPASE: CPT | Mod: HCNC

## 2019-10-19 PROCEDURE — 96365 THER/PROPH/DIAG IV INF INIT: CPT | Mod: HCNC

## 2019-10-19 RX ORDER — HYDROMORPHONE HYDROCHLORIDE 2 MG/ML
1 INJECTION, SOLUTION INTRAMUSCULAR; INTRAVENOUS; SUBCUTANEOUS EVERY 4 HOURS PRN
Status: DISCONTINUED | OUTPATIENT
Start: 2019-10-19 | End: 2019-10-21

## 2019-10-19 RX ORDER — ALPRAZOLAM 0.5 MG/1
2 TABLET ORAL 2 TIMES DAILY PRN
Status: DISCONTINUED | OUTPATIENT
Start: 2019-10-19 | End: 2019-10-21 | Stop reason: HOSPADM

## 2019-10-19 RX ORDER — SODIUM CHLORIDE AND POTASSIUM CHLORIDE 150; 900 MG/100ML; MG/100ML
INJECTION, SOLUTION INTRAVENOUS CONTINUOUS
Status: DISCONTINUED | OUTPATIENT
Start: 2019-10-19 | End: 2019-10-20

## 2019-10-19 RX ORDER — ONDANSETRON 2 MG/ML
4 INJECTION INTRAMUSCULAR; INTRAVENOUS EVERY 8 HOURS PRN
Status: DISCONTINUED | OUTPATIENT
Start: 2019-10-19 | End: 2019-10-21 | Stop reason: HOSPADM

## 2019-10-19 RX ORDER — CIPROFLOXACIN 2 MG/ML
400 INJECTION, SOLUTION INTRAVENOUS
Status: DISCONTINUED | OUTPATIENT
Start: 2019-10-19 | End: 2019-10-21 | Stop reason: HOSPADM

## 2019-10-19 RX ORDER — FAMOTIDINE 10 MG/ML
20 INJECTION INTRAVENOUS 2 TIMES DAILY
Status: DISCONTINUED | OUTPATIENT
Start: 2019-10-19 | End: 2019-10-21 | Stop reason: HOSPADM

## 2019-10-19 RX ORDER — PANTOPRAZOLE SODIUM 40 MG/1
40 TABLET, DELAYED RELEASE ORAL DAILY
Status: DISCONTINUED | OUTPATIENT
Start: 2019-10-19 | End: 2019-10-19

## 2019-10-19 RX ORDER — SODIUM CHLORIDE 0.9 % (FLUSH) 0.9 %
10 SYRINGE (ML) INJECTION
Status: DISCONTINUED | OUTPATIENT
Start: 2019-10-19 | End: 2019-10-21 | Stop reason: HOSPADM

## 2019-10-19 RX ORDER — METRONIDAZOLE 500 MG/100ML
500 INJECTION, SOLUTION INTRAVENOUS
Status: DISCONTINUED | OUTPATIENT
Start: 2019-10-19 | End: 2019-10-21 | Stop reason: HOSPADM

## 2019-10-19 RX ORDER — HYDROMORPHONE HYDROCHLORIDE 2 MG/ML
1 INJECTION, SOLUTION INTRAMUSCULAR; INTRAVENOUS; SUBCUTANEOUS
Status: COMPLETED | OUTPATIENT
Start: 2019-10-19 | End: 2019-10-19

## 2019-10-19 RX ORDER — MORPHINE SULFATE 10 MG/ML
2 INJECTION INTRAMUSCULAR; INTRAVENOUS; SUBCUTANEOUS EVERY 4 HOURS PRN
Status: DISCONTINUED | OUTPATIENT
Start: 2019-10-19 | End: 2019-10-21

## 2019-10-19 RX ORDER — HYDROMORPHONE HYDROCHLORIDE 2 MG/ML
1 INJECTION, SOLUTION INTRAMUSCULAR; INTRAVENOUS; SUBCUTANEOUS ONCE
Status: COMPLETED | OUTPATIENT
Start: 2019-10-19 | End: 2019-10-19

## 2019-10-19 RX ADMIN — ALPRAZOLAM 2 MG: 0.5 TABLET ORAL at 11:10

## 2019-10-19 RX ADMIN — PIPERACILLIN AND TAZOBACTAM 4.5 G: 4; .5 INJECTION, POWDER, LYOPHILIZED, FOR SOLUTION INTRAVENOUS; PARENTERAL at 07:10

## 2019-10-19 RX ADMIN — HYDROMORPHONE HYDROCHLORIDE 1 MG: 2 INJECTION, SOLUTION INTRAMUSCULAR; INTRAVENOUS; SUBCUTANEOUS at 11:10

## 2019-10-19 RX ADMIN — FAMOTIDINE 20 MG: 10 INJECTION INTRAVENOUS at 11:10

## 2019-10-19 RX ADMIN — METRONIDAZOLE 500 MG: 500 INJECTION, SOLUTION INTRAVENOUS at 11:10

## 2019-10-19 RX ADMIN — MORPHINE SULFATE 2 MG: 10 INJECTION INTRAVENOUS at 06:10

## 2019-10-19 RX ADMIN — CIPROFLOXACIN 400 MG: 2 INJECTION, SOLUTION INTRAVENOUS at 05:10

## 2019-10-19 RX ADMIN — HYDROMORPHONE HYDROCHLORIDE 1 MG: 2 INJECTION, SOLUTION INTRAMUSCULAR; INTRAVENOUS; SUBCUTANEOUS at 09:10

## 2019-10-19 RX ADMIN — SODIUM CHLORIDE AND POTASSIUM CHLORIDE: .9; .15 SOLUTION INTRAVENOUS at 11:10

## 2019-10-19 RX ADMIN — SODIUM CHLORIDE AND POTASSIUM CHLORIDE: .9; .15 SOLUTION INTRAVENOUS at 08:10

## 2019-10-19 RX ADMIN — MORPHINE SULFATE 2 MG: 10 INJECTION INTRAVENOUS at 02:10

## 2019-10-19 RX ADMIN — METRONIDAZOLE 500 MG: 500 INJECTION, SOLUTION INTRAVENOUS at 05:10

## 2019-10-19 RX ADMIN — SODIUM CHLORIDE 1000 ML: 0.9 INJECTION, SOLUTION INTRAVENOUS at 07:10

## 2019-10-19 RX ADMIN — HYDROMORPHONE HYDROCHLORIDE 1 MG: 2 INJECTION, SOLUTION INTRAMUSCULAR; INTRAVENOUS; SUBCUTANEOUS at 07:10

## 2019-10-19 RX ADMIN — FAMOTIDINE 20 MG: 10 INJECTION INTRAVENOUS at 08:10

## 2019-10-19 NOTE — ED TRIAGE NOTES
pt reports LLQ abdominal pain; pt reports being discharged from [facility] ED at 1am this morning after being seen for same complaint; pt was dx with diverticulitis and told to return to ED if pain worsened; pt reports pain has worsened since leaving and pain medicine prescription wasn't ready yet at the pharmacy

## 2019-10-19 NOTE — H&P
"[facility] Medicine  History & Physical    Patient Name: Keon Schneider Jr.  MRN: [id number]  Admission Date: 10/19/2019  Attending Physician: Pati Bailey MD   Primary Care Provider: Johnie Gutierrez MD         Patient information was obtained from patient, past medical records and ER records.     Subjective:     Principal Problem:Acute diverticulitis of intestine    Chief Complaint:   Chief Complaint   Patient presents with    Abdominal Pain     pt reports LLQ abdominal pain; pt reports being discharged from [facility] ED at 1am this morning after being seen for same complaint; pt was dx with diverticulitis and told to return to ED if pain worsened; pt reports pain has worsened since leaving and pain medicine prescription wasn't ready yet at the pharmacy        HPI: 50 yo male with history of hip arthroplasty, GERD, Hx of cocaine abuse and chronic use of alprazolam who presented with worsening LLQ abdominal pain of 3 days in evolution. Patient stated his symptoms are consistent with prior episodes of diverticulitis. States pain is main issue. Denied nausea, vomiting, diarrhea, constipation or bleeding. Although no nausea or vomiting patient states pain is so severe that is limiting his oral intake. Home meds include xanax and ranitidine. Patient was seen at [facility] yesterday for mentioned symptoms. A CT scan of the abdomen was obtained which showed "Circumferential wall thickening and inflammatory change involving the distal descending colon and sigmoid colon thought likely representative of acute diverticulitis as discussed above." He was prescribed oral cipro, flagyl and norco, and discharged home for uncomplicated diverticulitis. Patient states pain worsened therefore presented to our facility. States he was unable to fill his prescriptions.     In the ED, patient was afebrile, stable at room air, hypertensive. Patient appeared in pain and very tender to palpation " "at LLQ. No leukocytosis. Patient admitted as inpatient mainly for "PO intolerance" and pain control in addition to IV abx therapy.     Past Medical History:   Diagnosis Date    Anxiety     Arthritis     Asthma     Avascular necrosis     Carpal tunnel syndrome     Chronic pain     Depression     Diverticulitis     Other injury of other sites of trunk 12/28/2011    Pneumonia     Spondylolisthesis     lumbar; myofascial pain    Staph infection     Ulnar neuropathy     left and rt arm       Past Surgical History:   Procedure Laterality Date    HIP SURGERY  3/06; 6/06    hip arthroplasty    HIP SURGERY      bilateral hip replacement    JOINT REPLACEMENT      SHOULDER SURGERY  2012    right shoulder    SHOULDER SURGERY         Review of patient's allergies indicates:   Allergen Reactions    Toradol [ketorolac] Hives and Nausea And Vomiting       Current Facility-Administered Medications on File Prior to Encounter   Medication    [COMPLETED] ciprofloxacin HCl tablet 500 mg    [COMPLETED] iohexol (OMNIPAQUE 350) injection 100 mL    [COMPLETED] metoclopramide HCl injection 10 mg    [COMPLETED] metroNIDAZOLE tablet 500 mg    [COMPLETED] morphine injection 4 mg    [COMPLETED] morphine injection 4 mg    [COMPLETED] ondansetron injection 4 mg    [COMPLETED] sodium chloride 0.9% bolus 1,000 mL     Current Outpatient Medications on File Prior to Encounter   Medication Sig    alprazolam (XANAX) 2 MG Tab Take 1 tablet (2 mg total) by mouth daily as needed (back pain).    cetirizine (ZYRTEC) 10 MG tablet Take 1 tablet (10 mg total) by mouth once daily. for 15 days    ciprofloxacin HCl (CIPRO) 500 MG tablet Take 1 tablet (500 mg total) by mouth every 12 (twelve) hours. for 10 days    HYDROcodone-acetaminophen (NORCO)  mg per tablet Take 1 tablet by mouth every 4 (four) hours as needed for Pain.    metroNIDAZOLE (FLAGYL) 500 MG tablet Take 1 tablet (500 mg total) by mouth every 12 (twelve) hours. " for 10 days    oxyCODONE (ROXICODONE) 5 MG immediate release tablet Take 1 tablet (5 mg total) by mouth every 6 (six) hours as needed for Pain. (Patient not taking: Reported on 2018)    oxyCODONE-acetaminophen (PERCOCET)  mg per tablet Take 1 tablet by mouth every 6 (six) hours as needed for Pain.    pregabalin (LYRICA) 75 MG capsule Take 1 capsule (75 mg total) by mouth 3 (three) times daily. (Patient not taking: Reported on 2018)    ranitidine (ZANTAC) 150 MG tablet Take 150 mg by mouth 2 (two) times daily.    [DISCONTINUED] pantoprazole (PROTONIX) 40 MG tablet Take 1 tablet (40 mg total) by mouth once daily.     Family History     Problem Relation (Age of Onset)    Cancer Mother        Tobacco Use    Smoking status: Former Smoker     Packs/day: 1.00     Years: 10.00     Pack years: 10.00     Last attempt to quit: 10/13/2012     Years since quittin.0    Smokeless tobacco: Never Used   Substance and Sexual Activity    Alcohol use: Yes     Alcohol/week: 12.0 standard drinks     Types: 12 Cans of beer per week     Comment: on the weekend    Drug use: No    Sexual activity: Yes     Partners: Female     Review of Systems   Constitutional: Negative.    HENT: Negative.    Eyes: Negative.    Respiratory: Negative.    Cardiovascular: Negative.    Gastrointestinal: Positive for abdominal pain. Negative for abdominal distention, anal bleeding, constipation, diarrhea, nausea and vomiting.   Endocrine: Negative.    Genitourinary: Negative.    Musculoskeletal:        Chronic MSK pain   Skin: Negative.    Neurological: Negative.    Hematological: Negative.    Psychiatric/Behavioral: Negative.      Objective:     Vital Signs (Most Recent):  Temp: 98.9 °F (37.2 °C) (10/19/19 0615)  Pulse: 87 (10/19/19 0914)  Resp: 20 (10/19/19 0615)  BP: 112/63 (10/19/19 0914)  SpO2: (!) 92 % (10/19/19 0831) Vital Signs (24h Range):  Temp:  [98.3 °F (36.8 °C)-98.9 °F (37.2 °C)] 98.9 °F (37.2 °C)  Pulse:  [84-98]  87  Resp:  [18-20] 20  SpO2:  [92 %-100 %] 92 %  BP: (112-169)/(61-94) 112/63     Weight: 90.7 kg (200 lb)  Body mass index is 27.89 kg/m².    Physical Exam   Constitutional: He is oriented to person, place, and time. He appears well-developed.   Appears in distress   HENT:   Head: Normocephalic and atraumatic.   Eyes: Conjunctivae and EOM are normal.   Neck: Normal range of motion.   Cardiovascular: Normal rate and regular rhythm.   Pulmonary/Chest: Effort normal and breath sounds normal.   Abdominal: Soft. Bowel sounds are normal. He exhibits no distension. There is tenderness (LLQ). There is guarding (LLQ).   Musculoskeletal: He exhibits no edema.   Neurological: He is alert and oriented to person, place, and time.   Skin: Skin is warm and dry. Capillary refill takes less than 2 seconds. He is not diaphoretic.   Psychiatric: He has a normal mood and affect. His behavior is normal. Judgment and thought content normal.   Nursing note and vitals reviewed.        CRANIAL NERVES     CN III, IV, VI   Extraocular motions are normal.        Significant Labs: All pertinent labs within the past 24 hours have been reviewed.    Significant Imaging: I have reviewed all pertinent imaging results/findings within the past 24 hours.  I have reviewed and interpreted all pertinent imaging results/findings within the past 24 hours.    Assessment/Plan:     * Acute diverticulitis of intestine  Does not meet criteria for sepsis however with severe pain and reported oral intolerance although no nausea or vomiting.  Start with isotonic fluids, empiric IV cipro/flagyl and pain management  Antiemetics and clear liquid diet      Left lower quadrant abdominal pain  2/2 acute diverticulitis      History of cocaine abuse  Will discuss when appropriate      Chronic back pain  Pain management  Mobilize as tolerated      Anxiety  No acute issues  Resume home alprazolam PRN        VTE Risk Mitigation (From admission, onward)         Ordered     IP  VTE LOW RISK PATIENT  Once      10/19/19 0935     Place TRACEY hose  Until discontinued      10/19/19 0935                   Pati Chau MD  Department of Hospital Medicine   [facility]

## 2019-10-19 NOTE — ED PROVIDER NOTES
Encounter Date: 10/19/2019    SCRIBE #1 NOTE: I, Hao Khan, am scribing for, and in the presence of,  Chico Vallecillo MD. I have scribed the following portions of the note - Other sections scribed: HPI, ROS, PE.       History     Chief Complaint   Patient presents with    Abdominal Pain     pt reports LLQ abdominal pain; pt reports being discharged from [facility] ED at 1am this morning after being seen for same complaint; pt was dx with diverticulitis and told to return to ED if pain worsened; pt reports pain has worsened since leaving and pain medicine prescription wasn't ready yet at the pharmacy     CC: Abdominal Pain    HPI: This 49 y.o. Male with asthma, diverticulitis, pneumonia and ulnar neuropathy presents to the ED for an evaluation of LLQ abdominal pain that worsened today. Pt reports being discharged from [facility] at 1am this morning for the same complaint. She was dx with diverticulitis and told to return if the pain worsened. Pt had a CT yesterday and was rx Ciprofloxacin 500 mg. He denies fever, back pain or neck pan. Pt has abdominal burning with eating. He presents to the ED b/c his pain worsened and he was unable to  his Ciprofloxacin rx.    The history is provided by the patient. No  was used.     Review of patient's allergies indicates:   Allergen Reactions    Toradol [ketorolac] Hives and Nausea And Vomiting     Past Medical History:   Diagnosis Date    Anxiety     Arthritis     Asthma     Avascular necrosis     Carpal tunnel syndrome     Chronic pain     Depression     Diverticulitis     Other injury of other sites of trunk 12/28/2011    Pneumonia     Spondylolisthesis     lumbar; myofascial pain    Staph infection     Ulnar neuropathy     left and rt arm     Past Surgical History:   Procedure Laterality Date    HIP SURGERY  3/06; 6/06    hip arthroplasty    HIP SURGERY      bilateral hip replacement    JOINT REPLACEMENT      SHOULDER  SURGERY  2012    right shoulder    SHOULDER SURGERY       Family History   Problem Relation Age of Onset    Cancer Mother      Social History     Tobacco Use    Smoking status: Former Smoker     Packs/day: 1.00     Years: 10.00     Pack years: 10.00     Last attempt to quit: 10/13/2012     Years since quittin.0    Smokeless tobacco: Never Used   Substance Use Topics    Alcohol use: Yes     Alcohol/week: 12.0 standard drinks     Types: 12 Cans of beer per week     Comment: on the weekend    Drug use: No     Review of Systems   Constitutional: Negative for fever.   HENT: Negative for sore throat.    Respiratory: Negative for shortness of breath.    Cardiovascular: Negative for chest pain.   Gastrointestinal: Positive for abdominal pain. Negative for nausea.   Genitourinary: Negative for dysuria.   Musculoskeletal: Negative for back pain and neck pain.   Skin: Negative for rash.   Neurological: Negative for weakness.   Hematological: Does not bruise/bleed easily.       Physical Exam     Initial Vitals [10/19/19 0615]   BP Pulse Resp Temp SpO2   135/81 98 20 98.9 °F (37.2 °C) 96 %      MAP       --         Physical Exam    Constitutional: He appears well-nourished.   HENT:   Head: Normocephalic.   Eyes: Conjunctivae are normal. No scleral icterus.   Neck: Normal range of motion. Neck supple.   Cardiovascular: Normal heart sounds and intact distal pulses.   No murmur heard.  Pulmonary/Chest: Breath sounds normal. No respiratory distress.   Abdominal: Soft. He exhibits no distension. There is tenderness in the left lower quadrant. There is rebound and guarding.   Musculoskeletal: Normal range of motion. He exhibits no edema.   Neurological: He is alert and oriented to person, place, and time.   Skin: Skin is warm and dry.   Psychiatric: He has a normal mood and affect. His behavior is normal. Judgment and thought content normal.         ED Course   Procedures  Labs Reviewed   CBC W/ AUTO DIFFERENTIAL - Abnormal;  Notable for the following components:       Result Value    RBC 3.84 (*)     Hemoglobin 12.2 (*)     Hematocrit 36.4 (*)     Mean Corpuscular Hemoglobin 31.8 (*)     MPV 9.1 (*)     Gran # (ANC) 9.1 (*)     Lymph # 0.9 (*)     Mono # 1.3 (*)     Gran% 79.9 (*)     Lymph% 7.5 (*)     All other components within normal limits   COMPREHENSIVE METABOLIC PANEL - Abnormal; Notable for the following components:    Sodium 135 (*)     Alkaline Phosphatase 50 (*)     All other components within normal limits   LIPASE          Imaging Results    None     patient diagnosed with acute uncomplicated diverticulitis yesterday. States returns today due to uncontrolled n/v and pain. States unable to hold down anything including medications. Will admit for IV ab due to failed outpatient treatment. Discussed with Dr. Chau.                 Zack Attestation:   Scribe #1: I performed the above scribed service and the documentation accurately describes the services I performed. I attest to the accuracy of the note.               Clinical Impression:       ICD-10-CM ICD-9-CM   1. Diverticulitis K57.92 562.11            Scribe attestation: I, Chico Vallecillo, personally performed the services described in this documentation. All medical record entries made by the scribe were at my direction and in my presence.  I have reviewed the chart and agree that the record reflects my personal performance and is accurate and complete.                    Chico Vallecillo MD  10/20/19 1211

## 2019-10-19 NOTE — ASSESSMENT & PLAN NOTE
Does not meet criteria for sepsis however with severe pain and reported oral intolerance although no nausea or vomiting.  Start with isotonic fluids, empiric IV cipro/flagyl and pain management  Antiemetics and clear liquid diet

## 2019-10-19 NOTE — SUBJECTIVE & OBJECTIVE
Past Medical History:   Diagnosis Date    Anxiety     Arthritis     Asthma     Avascular necrosis     Carpal tunnel syndrome     Chronic pain     Depression     Diverticulitis     Other injury of other sites of trunk 12/28/2011    Pneumonia     Spondylolisthesis     lumbar; myofascial pain    Staph infection     Ulnar neuropathy     left and rt arm       Past Surgical History:   Procedure Laterality Date    HIP SURGERY  3/06; 6/06    hip arthroplasty    HIP SURGERY      bilateral hip replacement    JOINT REPLACEMENT      SHOULDER SURGERY  2012    right shoulder    SHOULDER SURGERY         Review of patient's allergies indicates:   Allergen Reactions    Toradol [ketorolac] Hives and Nausea And Vomiting       Current Facility-Administered Medications on File Prior to Encounter   Medication    [COMPLETED] ciprofloxacin HCl tablet 500 mg    [COMPLETED] iohexol (OMNIPAQUE 350) injection 100 mL    [COMPLETED] metoclopramide HCl injection 10 mg    [COMPLETED] metroNIDAZOLE tablet 500 mg    [COMPLETED] morphine injection 4 mg    [COMPLETED] morphine injection 4 mg    [COMPLETED] ondansetron injection 4 mg    [COMPLETED] sodium chloride 0.9% bolus 1,000 mL     Current Outpatient Medications on File Prior to Encounter   Medication Sig    alprazolam (XANAX) 2 MG Tab Take 1 tablet (2 mg total) by mouth daily as needed (back pain).    cetirizine (ZYRTEC) 10 MG tablet Take 1 tablet (10 mg total) by mouth once daily. for 15 days    ciprofloxacin HCl (CIPRO) 500 MG tablet Take 1 tablet (500 mg total) by mouth every 12 (twelve) hours. for 10 days    HYDROcodone-acetaminophen (NORCO)  mg per tablet Take 1 tablet by mouth every 4 (four) hours as needed for Pain.    metroNIDAZOLE (FLAGYL) 500 MG tablet Take 1 tablet (500 mg total) by mouth every 12 (twelve) hours. for 10 days    oxyCODONE (ROXICODONE) 5 MG immediate release tablet Take 1 tablet (5 mg total) by mouth every 6 (six) hours as needed  for Pain. (Patient not taking: Reported on 2018)    oxyCODONE-acetaminophen (PERCOCET)  mg per tablet Take 1 tablet by mouth every 6 (six) hours as needed for Pain.    pregabalin (LYRICA) 75 MG capsule Take 1 capsule (75 mg total) by mouth 3 (three) times daily. (Patient not taking: Reported on 2018)    ranitidine (ZANTAC) 150 MG tablet Take 150 mg by mouth 2 (two) times daily.    [DISCONTINUED] pantoprazole (PROTONIX) 40 MG tablet Take 1 tablet (40 mg total) by mouth once daily.     Family History     Problem Relation (Age of Onset)    Cancer Mother        Tobacco Use    Smoking status: Former Smoker     Packs/day: 1.00     Years: 10.00     Pack years: 10.00     Last attempt to quit: 10/13/2012     Years since quittin.0    Smokeless tobacco: Never Used   Substance and Sexual Activity    Alcohol use: Yes     Alcohol/week: 12.0 standard drinks     Types: 12 Cans of beer per week     Comment: on the weekend    Drug use: No    Sexual activity: Yes     Partners: Female     Review of Systems   Constitutional: Negative.    HENT: Negative.    Eyes: Negative.    Respiratory: Negative.    Cardiovascular: Negative.    Gastrointestinal: Positive for abdominal pain. Negative for abdominal distention, anal bleeding, constipation, diarrhea, nausea and vomiting.   Endocrine: Negative.    Genitourinary: Negative.    Musculoskeletal:        Chronic MSK pain   Skin: Negative.    Neurological: Negative.    Hematological: Negative.    Psychiatric/Behavioral: Negative.      Objective:     Vital Signs (Most Recent):  Temp: 98.9 °F (37.2 °C) (10/19/19 0615)  Pulse: 87 (10/19/19 0914)  Resp: 20 (10/19/19 0615)  BP: 112/63 (10/19/19 0914)  SpO2: (!) 92 % (10/19/19 0831) Vital Signs (24h Range):  Temp:  [98.3 °F (36.8 °C)-98.9 °F (37.2 °C)] 98.9 °F (37.2 °C)  Pulse:  [84-98] 87  Resp:  [18-20] 20  SpO2:  [92 %-100 %] 92 %  BP: (112-169)/(61-94) 112/63     Weight: 90.7 kg (200 lb)  Body mass index is 27.89  kg/m².    Physical Exam   Constitutional: He is oriented to person, place, and time. He appears well-developed.   Appears in distress   HENT:   Head: Normocephalic and atraumatic.   Eyes: Conjunctivae and EOM are normal.   Neck: Normal range of motion.   Cardiovascular: Normal rate and regular rhythm.   Pulmonary/Chest: Effort normal and breath sounds normal.   Abdominal: Soft. Bowel sounds are normal. He exhibits no distension. There is tenderness (LLQ). There is guarding (LLQ).   Musculoskeletal: He exhibits no edema.   Neurological: He is alert and oriented to person, place, and time.   Skin: Skin is warm and dry. Capillary refill takes less than 2 seconds. He is not diaphoretic.   Psychiatric: He has a normal mood and affect. His behavior is normal. Judgment and thought content normal.   Nursing note and vitals reviewed.        CRANIAL NERVES     CN III, IV, VI   Extraocular motions are normal.        Significant Labs: All pertinent labs within the past 24 hours have been reviewed.    Significant Imaging: I have reviewed all pertinent imaging results/findings within the past 24 hours.  I have reviewed and interpreted all pertinent imaging results/findings within the past 24 hours.

## 2019-10-19 NOTE — HPI
"48 yo male with history of hip arthroplasty, GERD, Hx of cocaine abuse and chronic use of alprazolam who presented with worsening LLQ abdominal pain of 3 days in evolution. Patient stated his symptoms are consistent with prior episodes of diverticulitis. States pain is main issue. Denied nausea, vomiting, diarrhea, constipation or bleeding. Although no nausea or vomiting patient states pain is so severe that is limiting his oral intake. Home meds include xanax and ranitidine. Patient was seen at [facility] in [facility] yesterday for mentioned symptoms. A CT scan of the abdomen was obtained which showed "Circumferential wall thickening and inflammatory change involving the distal descending colon and sigmoid colon thought likely representative of acute diverticulitis as discussed above." He was prescribed oral cipro, flagyl and norco, and discharged home for uncomplicated diverticulitis. Patient states pain worsened therefore presented to our facility. States he was unable to fill his prescriptions.     In the ED, patient was afebrile, stable at room air, hypertensive. Patient appeared in pain and very tender to palpation at LLQ. No leukocytosis. Patient admitted as inpatient mainly for "PO intolerance" and pain control in addition to IV abx therapy.   "

## 2019-10-20 LAB
ANION GAP SERPL CALC-SCNC: 6 MMOL/L (ref 8–16)
BASOPHILS # BLD AUTO: 0.02 K/UL (ref 0–0.2)
BASOPHILS NFR BLD: 0.2 % (ref 0–1.9)
BUN SERPL-MCNC: 8 MG/DL (ref 6–20)
CALCIUM SERPL-MCNC: 8.2 MG/DL (ref 8.7–10.5)
CHLORIDE SERPL-SCNC: 103 MMOL/L (ref 95–110)
CO2 SERPL-SCNC: 26 MMOL/L (ref 23–29)
CREAT SERPL-MCNC: 1.1 MG/DL (ref 0.5–1.4)
DIFFERENTIAL METHOD: ABNORMAL
EOSINOPHIL # BLD AUTO: 0.2 K/UL (ref 0–0.5)
EOSINOPHIL NFR BLD: 2.2 % (ref 0–8)
ERYTHROCYTE [DISTWIDTH] IN BLOOD BY AUTOMATED COUNT: 12.8 % (ref 11.5–14.5)
EST. GFR  (AFRICAN AMERICAN): >60 ML/MIN/1.73 M^2
EST. GFR  (NON AFRICAN AMERICAN): >60 ML/MIN/1.73 M^2
GLUCOSE SERPL-MCNC: 105 MG/DL (ref 70–110)
HCT VFR BLD AUTO: 34.9 % (ref 40–54)
HGB BLD-MCNC: 11.2 G/DL (ref 14–18)
IMM GRANULOCYTES # BLD AUTO: 0.03 K/UL (ref 0–0.04)
IMM GRANULOCYTES NFR BLD AUTO: 0.3 % (ref 0–0.5)
LYMPHOCYTES # BLD AUTO: 1.3 K/UL (ref 1–4.8)
LYMPHOCYTES NFR BLD: 13.8 % (ref 18–48)
MCH RBC QN AUTO: 31.4 PG (ref 27–31)
MCHC RBC AUTO-ENTMCNC: 32.1 G/DL (ref 32–36)
MCV RBC AUTO: 98 FL (ref 82–98)
MONOCYTES # BLD AUTO: 1.2 K/UL (ref 0.3–1)
MONOCYTES NFR BLD: 12.8 % (ref 4–15)
NEUTROPHILS # BLD AUTO: 6.5 K/UL (ref 1.8–7.7)
NEUTROPHILS NFR BLD: 70.7 % (ref 38–73)
NRBC BLD-RTO: 0 /100 WBC
PLATELET # BLD AUTO: 163 K/UL (ref 150–350)
PMV BLD AUTO: 9 FL (ref 9.2–12.9)
POTASSIUM SERPL-SCNC: 4.2 MMOL/L (ref 3.5–5.1)
RBC # BLD AUTO: 3.57 M/UL (ref 4.6–6.2)
SODIUM SERPL-SCNC: 135 MMOL/L (ref 136–145)
WBC # BLD AUTO: 9.15 K/UL (ref 3.9–12.7)

## 2019-10-20 PROCEDURE — 11000001 HC ACUTE MED/SURG PRIVATE ROOM: Mod: HCNC

## 2019-10-20 PROCEDURE — 63600175 PHARM REV CODE 636 W HCPCS: Mod: HCNC | Performed by: EMERGENCY MEDICINE

## 2019-10-20 PROCEDURE — 85025 COMPLETE CBC W/AUTO DIFF WBC: CPT | Mod: HCNC

## 2019-10-20 PROCEDURE — 63600175 PHARM REV CODE 636 W HCPCS: Mod: HCNC | Performed by: INTERNAL MEDICINE

## 2019-10-20 PROCEDURE — 25000003 PHARM REV CODE 250: Mod: HCNC | Performed by: INTERNAL MEDICINE

## 2019-10-20 PROCEDURE — 94761 N-INVAS EAR/PLS OXIMETRY MLT: CPT | Mod: HCNC

## 2019-10-20 PROCEDURE — 36415 COLL VENOUS BLD VENIPUNCTURE: CPT | Mod: HCNC

## 2019-10-20 PROCEDURE — S0030 INJECTION, METRONIDAZOLE: HCPCS | Mod: HCNC | Performed by: INTERNAL MEDICINE

## 2019-10-20 PROCEDURE — S0028 INJECTION, FAMOTIDINE, 20 MG: HCPCS | Mod: HCNC | Performed by: INTERNAL MEDICINE

## 2019-10-20 PROCEDURE — 80048 BASIC METABOLIC PNL TOTAL CA: CPT | Mod: HCNC

## 2019-10-20 RX ORDER — SODIUM CHLORIDE AND POTASSIUM CHLORIDE 150; 900 MG/100ML; MG/100ML
INJECTION, SOLUTION INTRAVENOUS CONTINUOUS
Status: ACTIVE | OUTPATIENT
Start: 2019-10-20 | End: 2019-10-20

## 2019-10-20 RX ADMIN — FAMOTIDINE 20 MG: 10 INJECTION INTRAVENOUS at 08:10

## 2019-10-20 RX ADMIN — HYDROMORPHONE HYDROCHLORIDE 1 MG: 2 INJECTION, SOLUTION INTRAMUSCULAR; INTRAVENOUS; SUBCUTANEOUS at 01:10

## 2019-10-20 RX ADMIN — ALPRAZOLAM 2 MG: 0.5 TABLET ORAL at 06:10

## 2019-10-20 RX ADMIN — CIPROFLOXACIN 400 MG: 2 INJECTION, SOLUTION INTRAVENOUS at 03:10

## 2019-10-20 RX ADMIN — METRONIDAZOLE 500 MG: 500 INJECTION, SOLUTION INTRAVENOUS at 11:10

## 2019-10-20 RX ADMIN — HYDROMORPHONE HYDROCHLORIDE 1 MG: 2 INJECTION, SOLUTION INTRAMUSCULAR; INTRAVENOUS; SUBCUTANEOUS at 07:10

## 2019-10-20 RX ADMIN — HYDROMORPHONE HYDROCHLORIDE 1 MG: 2 INJECTION, SOLUTION INTRAMUSCULAR; INTRAVENOUS; SUBCUTANEOUS at 06:10

## 2019-10-20 RX ADMIN — HYDROMORPHONE HYDROCHLORIDE 1 MG: 2 INJECTION, SOLUTION INTRAMUSCULAR; INTRAVENOUS; SUBCUTANEOUS at 03:10

## 2019-10-20 RX ADMIN — HYDROMORPHONE HYDROCHLORIDE 1 MG: 2 INJECTION, SOLUTION INTRAMUSCULAR; INTRAVENOUS; SUBCUTANEOUS at 10:10

## 2019-10-20 RX ADMIN — METRONIDAZOLE 500 MG: 500 INJECTION, SOLUTION INTRAVENOUS at 07:10

## 2019-10-20 RX ADMIN — FAMOTIDINE 20 MG: 10 INJECTION INTRAVENOUS at 09:10

## 2019-10-20 RX ADMIN — ALPRAZOLAM 2 MG: 0.5 TABLET ORAL at 09:10

## 2019-10-20 RX ADMIN — SODIUM CHLORIDE AND POTASSIUM CHLORIDE: .9; .15 SOLUTION INTRAVENOUS at 05:10

## 2019-10-20 RX ADMIN — METRONIDAZOLE 500 MG: 500 INJECTION, SOLUTION INTRAVENOUS at 03:10

## 2019-10-20 NOTE — ASSESSMENT & PLAN NOTE
Does not meet criteria for sepsis however with severe pain and reported oral intolerance although no nausea or vomiting.  Continue isotonic fluids, empiric IV cipro/flagyl and pain management  Upgrade diet to full liquid  Antiemetics PRN

## 2019-10-20 NOTE — PROGRESS NOTES
"[facility] Medicine  Progress Note    Patient Name: Keon Schneider Jr.  MRN: [id number]  Patient Class: IP- Inpatient   Admission Date: 10/19/2019  Length of Stay: 1 days  Attending Physician: Pati Bailey MD  Primary Care Provider: Johnie Gutierrez MD        Subjective:     Principal Problem:Acute diverticulitis of intestine        HPI:  50 yo male with history of hip arthroplasty, GERD, Hx of cocaine abuse and chronic use of alprazolam who presented with worsening LLQ abdominal pain of 3 days in evolution. Patient stated his symptoms are consistent with prior episodes of diverticulitis. States pain is main issue. Denied nausea, vomiting, diarrhea, constipation or bleeding. Although no nausea or vomiting patient states pain is so severe that is limiting his oral intake. Home meds include xanax and ranitidine. Patient was seen at [facility] in [facility] yesterday for mentioned symptoms. A CT scan of the abdomen was obtained which showed "Circumferential wall thickening and inflammatory change involving the distal descending colon and sigmoid colon thought likely representative of acute diverticulitis as discussed above." He was prescribed oral cipro, flagyl and norco, and discharged home for uncomplicated diverticulitis. Patient states pain worsened therefore presented to our facility. States he was unable to fill his prescriptions.     In the ED, patient was afebrile, stable at room air, hypertensive. Patient appeared in pain and very tender to palpation at LLQ. No leukocytosis. Patient admitted as inpatient mainly for "PO intolerance" and pain control in addition to IV abx therapy.     Overview/Hospital Course:  Mr Schneider presented with acute diverticulitis of distal descending colon and sigmoid colon. Initially diagnosed as uncomplicated diverticulitis but had worsening severe pain in LLQ limiting his PO intake and functional status. No nausea, vomiting, constipation, bleeding or " diarrhea. Did not meet sepsis criteria. Started on clear liquid diet, antiemetics PRN, empiric IV cipro/flagyl, pain management and isotonic fluids.     Interval History: feeling a little better. Seen ambulating down hallway. Well appearing but still pretty tender to palpation. Had BM this morning. No n/v. Would like slow upgrade of his diet.     Review of Systems   Constitutional: Negative.    Respiratory: Negative.    Cardiovascular: Negative.    Gastrointestinal: Positive for abdominal pain.     Objective:     Vital Signs (Most Recent):  Temp: 98.2 °F (36.8 °C) (10/20/19 0813)  Pulse: 83 (10/20/19 0813)  Resp: 18 (10/20/19 0813)  BP: 117/71 (10/20/19 0813)  SpO2: 95 % (10/20/19 0813) Vital Signs (24h Range):  Temp:  [98.2 °F (36.8 °C)-99.5 °F (37.5 °C)] 98.2 °F (36.8 °C)  Pulse:  [73-89] 83  Resp:  [18] 18  SpO2:  [95 %-97 %] 95 %  BP: (115-124)/(68-75) 117/71     Weight: 90.2 kg (198 lb 12.5 oz)  Body mass index is 27.72 kg/m².  No intake or output data in the 24 hours ending 10/20/19 1025   Physical Exam   Constitutional: He is oriented to person, place, and time. He appears well-developed. No distress.   Cardiovascular: Normal rate and regular rhythm.   Pulmonary/Chest: Effort normal and breath sounds normal.   Abdominal: Soft. He exhibits no distension. There is tenderness (LLQ). There is guarding (LLQ).   Hypoactive bowel sounds   Musculoskeletal: He exhibits no edema.   Neurological: He is alert and oriented to person, place, and time.   Skin: Skin is warm and dry. Capillary refill takes less than 2 seconds. He is not diaphoretic.   Psychiatric: He has a normal mood and affect. His behavior is normal. Judgment and thought content normal.   Nursing note and vitals reviewed.      Significant Labs: All pertinent labs within the past 24 hours have been reviewed.    Significant Imaging: I have reviewed all pertinent imaging results/findings within the past 24 hours.  I have reviewed and interpreted all pertinent  imaging results/findings within the past 24 hours.      Assessment/Plan:      * Acute diverticulitis of intestine  Does not meet criteria for sepsis however with severe pain and reported oral intolerance although no nausea or vomiting.  Continue isotonic fluids, empiric IV cipro/flagyl and pain management  Upgrade diet to full liquid  Antiemetics PRN      Left lower quadrant abdominal pain  2/2 acute diverticulitis      History of cocaine abuse  Will discuss when appropriate      Chronic back pain  Pain management  Mobilize as tolerated      Anxiety  No acute issues  Resumed home alprazolam PRN      VTE Risk Mitigation (From admission, onward)         Ordered     IP VTE LOW RISK PATIENT  Once      10/19/19 0935     Place TRACEY hose  Until discontinued      10/19/19 0935              Dispo: home in 1-2 days pending clinical improvement.     Pati Chau MD  Department of Hospital Medicine   [facility]

## 2019-10-20 NOTE — NURSING
AOX4, remains free from fall or injury. Pain managed with PRN pain medication. VSS. Remains on IVF. Remains on IV antibiotics. Safety maintained. Call light in reach. Will continue to monitor.

## 2019-10-20 NOTE — PLAN OF CARE
"SW met with patient to complete discharge needs assessment. SW reviewed with patient contents of "[facility]" including "help at home", "things patient responsible for to manage his health at home" and "preferences". Patient reported that he lacks help due to his family members living out of state. SW discussed with patient the things he's responsible for to manage his  health at home would be by going on his doctor appointments, taking medications as prescribed, and getting prescriptions filled. SW wrote name and phone number on white communication board. Patient prefers afternoon appointments.    Johnie Gutierrez MD     [facility]  [address] [place] [address]  [phone]    Extended Emergency Contact Information  Primary Emergency Contact: Keon Schneider Sr.  Address: [address] [place] [address]           [place] [address] [facility]  Home Phone: [phone]  Mobile Phone: [phone]  Relation: Father  Secondary Emergency Contact: London Schneider   [facility]  Home Phone: [phone]  Mobile Phone: [phone]  Relation: Brother         10/20/19 1355   Discharge Assessment   Assessment Type Discharge Planning Assessment   Confirmed/corrected address and phone number on facesheet? Yes   Assessment information obtained from? Patient   Prior to hospitilization cognitive status: Alert/Oriented   Prior to hospitalization functional status: Independent   Current cognitive status: Alert/Oriented   Current Functional Status: Independent   Lives With alone   Able to Return to Prior Arrangements yes   Is patient able to care for self after discharge? Yes   Who are your caregiver(s) and their phone number(s)? Pt reported that his family members live out of state.     Patient's perception of discharge disposition home or selfcare   Readmission Within the Last 30 Days no previous admission in last 30 days   Patient currently being followed by outpatient case " management? No   Patient currently receives any other outside agency services? No   Equipment Currently Used at Home none   Do you have any problems affording any of your prescribed medications? No   Is the patient taking medications as prescribed? yes   Does the patient have transportation home? Yes   Transportation Anticipated car, drives self   Does the patient receive services at the [facility]? No   Discharge Plan A Home   DME Needed Upon Discharge  none   Patient/Family in Agreement with Plan yes

## 2019-10-20 NOTE — SUBJECTIVE & OBJECTIVE
Interval History: feeling a little better. Seen ambulating down hallway. Well appearing but still pretty tender to palpation. Had BM this morning. No n/v. Would like slow upgrade of his diet.     Review of Systems   Constitutional: Negative.    Respiratory: Negative.    Cardiovascular: Negative.    Gastrointestinal: Positive for abdominal pain.     Objective:     Vital Signs (Most Recent):  Temp: 98.2 °F (36.8 °C) (10/20/19 0813)  Pulse: 83 (10/20/19 0813)  Resp: 18 (10/20/19 0813)  BP: 117/71 (10/20/19 0813)  SpO2: 95 % (10/20/19 0813) Vital Signs (24h Range):  Temp:  [98.2 °F (36.8 °C)-99.5 °F (37.5 °C)] 98.2 °F (36.8 °C)  Pulse:  [73-89] 83  Resp:  [18] 18  SpO2:  [95 %-97 %] 95 %  BP: (115-124)/(68-75) 117/71     Weight: 90.2 kg (198 lb 12.5 oz)  Body mass index is 27.72 kg/m².  No intake or output data in the 24 hours ending 10/20/19 1025   Physical Exam   Constitutional: He is oriented to person, place, and time. He appears well-developed. No distress.   Cardiovascular: Normal rate and regular rhythm.   Pulmonary/Chest: Effort normal and breath sounds normal.   Abdominal: Soft. He exhibits no distension. There is tenderness (LLQ). There is guarding (LLQ).   Hypoactive bowel sounds   Musculoskeletal: He exhibits no edema.   Neurological: He is alert and oriented to person, place, and time.   Skin: Skin is warm and dry. Capillary refill takes less than 2 seconds. He is not diaphoretic.   Psychiatric: He has a normal mood and affect. His behavior is normal. Judgment and thought content normal.   Nursing note and vitals reviewed.      Significant Labs: All pertinent labs within the past 24 hours have been reviewed.    Significant Imaging: I have reviewed all pertinent imaging results/findings within the past 24 hours.  I have reviewed and interpreted all pertinent imaging results/findings within the past 24 hours.

## 2019-10-20 NOTE — NURSING
Patient ambulating in the marks independently. No complaint of pain. No SOB. Will continue to monitor. WIll continue with plan of care.

## 2019-10-20 NOTE — HOSPITAL COURSE
Mr Schneider presented with acute diverticulitis of distal descending colon and sigmoid colon. Initially diagnosed as uncomplicated diverticulitis but had worsening severe pain in LLQ limiting his PO intake and functional status. No nausea, vomiting, constipation, bleeding or diarrhea. Did not meet sepsis criteria. Started on clear liquid diet, antiemetics PRN, empiric IV cipro/flagyl, pain management and isotonic fluids. LLQ pain persisted but improved some and able to advance diet to full liquid. Was seen ambulating in hallway multiple times and well appearing. He was advised to fill prescriptions for cipro and flagyl provided in the ED day prior to presenting to our facility. Prescription is for 10 days of abx which is appropriate addition to 2 days of IV abx he received here. He was also prescribed norco which he hasn't filled either. Advance diet as tolerated. Activity as tolerated. SW to schedule f/u with PCP.

## 2019-10-21 VITALS
TEMPERATURE: 98 F | RESPIRATION RATE: 18 BRPM | WEIGHT: 198.81 LBS | OXYGEN SATURATION: 97 % | HEART RATE: 66 BPM | SYSTOLIC BLOOD PRESSURE: 129 MMHG | HEIGHT: 71 IN | BODY MASS INDEX: 27.83 KG/M2 | DIASTOLIC BLOOD PRESSURE: 86 MMHG

## 2019-10-21 PROCEDURE — S0030 INJECTION, METRONIDAZOLE: HCPCS | Mod: HCNC | Performed by: INTERNAL MEDICINE

## 2019-10-21 PROCEDURE — 63600175 PHARM REV CODE 636 W HCPCS: Mod: HCNC | Performed by: EMERGENCY MEDICINE

## 2019-10-21 PROCEDURE — S0028 INJECTION, FAMOTIDINE, 20 MG: HCPCS | Mod: HCNC | Performed by: INTERNAL MEDICINE

## 2019-10-21 PROCEDURE — 63600175 PHARM REV CODE 636 W HCPCS: Mod: HCNC | Performed by: INTERNAL MEDICINE

## 2019-10-21 PROCEDURE — 25000003 PHARM REV CODE 250: Mod: HCNC | Performed by: INTERNAL MEDICINE

## 2019-10-21 RX ORDER — HYDROMORPHONE HYDROCHLORIDE 2 MG/ML
0.5 INJECTION, SOLUTION INTRAMUSCULAR; INTRAVENOUS; SUBCUTANEOUS ONCE
Status: COMPLETED | OUTPATIENT
Start: 2019-10-21 | End: 2019-10-21

## 2019-10-21 RX ADMIN — HYDROMORPHONE HYDROCHLORIDE 0.5 MG: 2 INJECTION, SOLUTION INTRAMUSCULAR; INTRAVENOUS; SUBCUTANEOUS at 08:10

## 2019-10-21 RX ADMIN — HYDROMORPHONE HYDROCHLORIDE 1 MG: 2 INJECTION, SOLUTION INTRAMUSCULAR; INTRAVENOUS; SUBCUTANEOUS at 02:10

## 2019-10-21 RX ADMIN — FAMOTIDINE 20 MG: 10 INJECTION INTRAVENOUS at 08:10

## 2019-10-21 RX ADMIN — METRONIDAZOLE 500 MG: 500 INJECTION, SOLUTION INTRAVENOUS at 07:10

## 2019-10-21 RX ADMIN — CIPROFLOXACIN 400 MG: 2 INJECTION, SOLUTION INTRAVENOUS at 04:10

## 2019-10-21 NOTE — PROGRESS NOTES
[facility] WRITTEN HEALTHCARE AND DISCHARGE INFORMATION:.  Follow-up Information     Johnie Gutierrez MD On 10/25/2019.    Specialty:  Family Medicine  Why:  out patient services:  1:00pm follow up from the hospital  Contact information:  [address] [place] [address]  [phone]                   Help at Home           [phone]  After discharge for assistance [facility] On Call Nurse Care Line 24/7  Assistance   Things You are responsible For To Manage Your Care At Home:  1.    Getting your prescriptions filled   2.    Taking your medications as directed, DO NOT MISS ANY DOSES!  3.    Going to your follow-up doctor appointment. This is important because it  allow the doctor to monitor your progress and determine if  any changes need to made to your treatment plan.   Thank you for choosing [facility] for your care.  Please answer any calls you may receive from [facility] we want to continue to support you as you manage your healthcare needs. [facility] is happy to have the opportunity to serve you.    Sincerely,  Your [facility] Team,  Mary Kate Nova RN, BSN, STN [facility]  10/21/2019  [phone]

## 2019-10-21 NOTE — PLAN OF CARE
10/21/19 1032   Post-Acute Status   Post-Acute Authorization Other   Discharge Delays (!) Other  (d/c TEACHING)     D/C TIME WAS BACKED UP.    Mary Kate Nova, RN, BSN, STN [facility]  10/21/2019

## 2019-10-21 NOTE — DISCHARGE SUMMARY
"[facility] Medicine  Discharge Summary      Patient Name: Keon Schneider Jr.  MRN: [id number]  Admission Date: 10/19/2019  Hospital Length of Stay: 2 days  Discharge Date and Time:  10/21/2019 8:21 AM  Attending Physician: Pati Bailey MD   Discharging Provider: Pati Chau MD  Primary Care Provider: Johnie Gutierrez MD      HPI:   50 yo male with history of hip arthroplasty, GERD, Hx of cocaine abuse and chronic use of alprazolam who presented with worsening LLQ abdominal pain of 3 days in evolution. Patient stated his symptoms are consistent with prior episodes of diverticulitis. States pain is main issue. Denied nausea, vomiting, diarrhea, constipation or bleeding. Although no nausea or vomiting patient states pain is so severe that is limiting his oral intake. Home meds include xanax and ranitidine. Patient was seen at [facility] in [facility] yesterday for mentioned symptoms. A CT scan of the abdomen was obtained which showed "Circumferential wall thickening and inflammatory change involving the distal descending colon and sigmoid colon thought likely representative of acute diverticulitis as discussed above." He was prescribed oral cipro, flagyl and norco, and discharged home for uncomplicated diverticulitis. Patient states pain worsened therefore presented to our facility. States he was unable to fill his prescriptions.     In the ED, patient was afebrile, stable at room air, hypertensive. Patient appeared in pain and very tender to palpation at LLQ. No leukocytosis. Patient admitted as inpatient mainly for "PO intolerance" and pain control in addition to IV abx therapy.     * No surgery found *      Hospital Course:   Mr Schneider presented with acute diverticulitis of distal descending colon and sigmoid colon. Initially diagnosed as uncomplicated diverticulitis but had worsening severe pain in LLQ limiting his PO intake and functional status. No nausea, vomiting, constipation, " bleeding or diarrhea. Did not meet sepsis criteria. Started on clear liquid diet, antiemetics PRN, empiric IV cipro/flagyl, pain management and isotonic fluids. LLQ pain persisted but improved some and able to advance diet to full liquid. Was seen ambulating in hallway multiple times and well appearing. He was advised to fill prescriptions for cipro and flagyl provided in the ED day prior to presenting to our facility. Prescription is for 10 days of abx which is appropriate addition to 2 days of IV abx he received here. He was also prescribed norco which he hasn't filled either. Advance diet as tolerated. Activity as tolerated. SW to schedule f/u with PCP.      Final Active Diagnoses:    Diagnosis Date Noted POA    PRINCIPAL PROBLEM:  Acute diverticulitis of intestine [K57.92] 04/08/2018 Yes    Left lower quadrant abdominal pain [R10.32] 10/19/2019 Yes    History of cocaine abuse [F14.11] 10/19/2019 Yes    Anxiety [F41.9] 11/24/2013 Yes     Chronic    Chronic back pain [M54.9, G89.29] 11/24/2013 Yes     Chronic      Problems Resolved During this Admission:       Discharged Condition: good    Disposition: Home or Self Care    Follow Up: SW to schedule f/u with Dr Gutierrez (PCP)    Medications:  Reconciled Home Medications:      Medication List      CONTINUE taking these medications    ALPRAZolam 2 MG Tab  Commonly known as:  XANAX  Take 1 tablet (2 mg total) by mouth daily as needed (back pain).     cetirizine 10 MG tablet  Commonly known as:  ZYRTEC  Take 1 tablet (10 mg total) by mouth once daily. for 15 days     ciprofloxacin HCl 500 MG tablet  Commonly known as:  CIPRO  Take 1 tablet (500 mg total) by mouth every 12 (twelve) hours. for 10 days     HYDROcodone-acetaminophen  mg per tablet  Commonly known as:  NORCO  Take 1 tablet by mouth every 4 (four) hours as needed for Pain.     metroNIDAZOLE 500 MG tablet  Commonly known as:  FLAGYL  Take 1 tablet (500 mg total) by mouth every 12 (twelve) hours. for  10 days     pregabalin 75 MG capsule  Commonly known as:  LYRICA  Take 1 capsule (75 mg total) by mouth 3 (three) times daily.     ranitidine 150 MG tablet  Commonly known as:  ZANTAC  Take 150 mg by mouth 2 (two) times daily.            Indwelling Lines/Drains at time of discharge:   Lines/Drains/Airways     None                 Time spent on the discharge of patient: > 35 minutes  Patient was seen and examined on the date of discharge and determined to be suitable for discharge.         Pati Chau MD  Department of Hospital Medicine  [facility]

## 2019-10-21 NOTE — PROGRESS NOTES
10:37 AM TN was unable to to talk with patient, med surg nurse Yasemin, says she let patient go after she talked with RADHA Cronin who Yasemin say OK'd that the patient could leave. TN did not receive a call...Mary Kate Nova RN, BSN, [facility]  10/21/2019

## 2019-10-21 NOTE — NURSING
Dr Baliey came to me and told me that the patient is ready to be discharged- send him out . I asked about follow up and she said he's ready to go now. IV removed. Reviewed discharge instructions with patient. Answered questions. Escorted to car by staff.

## 2019-10-21 NOTE — NURSING
"Patient noted to have confusion and hallucinations. He does not know where, why, or how he got here. Nurse oriented pt on treatment plan and why he is here. VSS. Patient is in and out of confusion. Patient states I'll just take my xanax, when asked if he took anything besides what he is getting here he says "no". He says his xanax is in his truck. States he wants to go home, is now fully dressed and asking where his truck is. MD notified. Bed alarm on and audible. Call light in reach.  Will cont to monitor.   "

## 2019-10-21 NOTE — NURSING
AOX4, remains free from fall or injury. Patient ambulating in hallways calmly throughout shift, seen conversing with staff and others. Patient requesting pain medication every 4 hours around the clock, c/o severe pain each time. Remains on IV antibiotics as ordered. Call light in reach. Will cont to monitor.

## 2019-10-21 NOTE — PLAN OF CARE
10/21/19 1044   Final Note   Assessment Type Final Discharge Note   Anticipated Discharge Disposition Home   What phone number can be called within the next 1-3 days to see how you are doing after discharge?   (SEE CHART)   Hospital Follow Up  Appt(s) scheduled? Yes   Discharge plans and expectations educations in teach back method with documentation complete? No  (TN was unable to to talk with patient, med surg nurse Yasemin, says she let patient go after she talked with RADHA Cronin who Yasemin say OK'd that the patient could leave. TN did not receive a call.)   Right Care Referral Info   Referral Type NO CARE

## 2019-10-28 ENCOUNTER — HOSPITAL ENCOUNTER (EMERGENCY)
Facility: HOSPITAL | Age: 49
Discharge: HOME | End: 2019-10-28
Attending: EMERGENCY MEDICINE
Payer: MEDICARE

## 2019-10-28 VITALS
SYSTOLIC BLOOD PRESSURE: 106 MMHG | HEART RATE: 96 BPM | OXYGEN SATURATION: 95 % | BODY MASS INDEX: 30.8 KG/M2 | TEMPERATURE: 98 F | HEIGHT: 71 IN | DIASTOLIC BLOOD PRESSURE: 67 MMHG | WEIGHT: 220 LBS | RESPIRATION RATE: 19 BRPM

## 2019-10-28 DIAGNOSIS — K21.9 GASTROESOPHAGEAL REFLUX DISEASE, ESOPHAGITIS PRESENCE NOT SPECIFIED: ICD-10-CM

## 2019-10-28 DIAGNOSIS — R07.9 CHEST PAIN, UNSPECIFIED TYPE: Primary | ICD-10-CM

## 2019-10-28 DIAGNOSIS — R07.9 CHEST PAIN: ICD-10-CM

## 2019-10-28 LAB
ALBUMIN SERPL BCP-MCNC: 4.3 G/DL (ref 3.5–5.2)
ALP SERPL-CCNC: 40 U/L (ref 55–135)
ALT SERPL W/O P-5'-P-CCNC: 25 U/L (ref 10–44)
ANION GAP SERPL CALC-SCNC: 8 MMOL/L (ref 8–16)
AST SERPL-CCNC: 22 U/L (ref 10–40)
BASOPHILS # BLD AUTO: 0.05 K/UL (ref 0–0.2)
BASOPHILS NFR BLD: 0.7 % (ref 0–1.9)
BILIRUB SERPL-MCNC: 0.3 MG/DL (ref 0.1–1)
BNP SERPL-MCNC: 13 PG/ML (ref 0–99)
BUN SERPL-MCNC: 20 MG/DL (ref 6–20)
CALCIUM SERPL-MCNC: 9.3 MG/DL (ref 8.7–10.5)
CHLORIDE SERPL-SCNC: 107 MMOL/L (ref 95–110)
CO2 SERPL-SCNC: 24 MMOL/L (ref 23–29)
CREAT SERPL-MCNC: 1.3 MG/DL (ref 0.5–1.4)
DIFFERENTIAL METHOD: ABNORMAL
EOSINOPHIL # BLD AUTO: 0.2 K/UL (ref 0–0.5)
EOSINOPHIL NFR BLD: 2.8 % (ref 0–8)
ERYTHROCYTE [DISTWIDTH] IN BLOOD BY AUTOMATED COUNT: 12.8 % (ref 11.5–14.5)
EST. GFR  (AFRICAN AMERICAN): >60 ML/MIN/1.73 M^2
EST. GFR  (NON AFRICAN AMERICAN): >60 ML/MIN/1.73 M^2
GLUCOSE SERPL-MCNC: 99 MG/DL (ref 70–110)
HCT VFR BLD AUTO: 38.6 % (ref 40–54)
HGB BLD-MCNC: 12.7 G/DL (ref 14–18)
IMM GRANULOCYTES # BLD AUTO: 0.03 K/UL (ref 0–0.04)
IMM GRANULOCYTES NFR BLD AUTO: 0.4 % (ref 0–0.5)
LYMPHOCYTES # BLD AUTO: 1.3 K/UL (ref 1–4.8)
LYMPHOCYTES NFR BLD: 18.7 % (ref 18–48)
MCH RBC QN AUTO: 31 PG (ref 27–31)
MCHC RBC AUTO-ENTMCNC: 32.9 G/DL (ref 32–36)
MCV RBC AUTO: 94 FL (ref 82–98)
MONOCYTES # BLD AUTO: 0.7 K/UL (ref 0.3–1)
MONOCYTES NFR BLD: 11 % (ref 4–15)
NEUTROPHILS # BLD AUTO: 4.5 K/UL (ref 1.8–7.7)
NEUTROPHILS NFR BLD: 66.4 % (ref 38–73)
NRBC BLD-RTO: 0 /100 WBC
PLATELET # BLD AUTO: 258 K/UL (ref 150–350)
PMV BLD AUTO: 8.8 FL (ref 9.2–12.9)
POTASSIUM SERPL-SCNC: 4 MMOL/L (ref 3.5–5.1)
PROT SERPL-MCNC: 7.3 G/DL (ref 6–8.4)
RBC # BLD AUTO: 4.1 M/UL (ref 4.6–6.2)
SODIUM SERPL-SCNC: 139 MMOL/L (ref 136–145)
TROPONIN I SERPL DL<=0.01 NG/ML-MCNC: <0.006 NG/ML (ref 0–0.03)
WBC # BLD AUTO: 6.75 K/UL (ref 3.9–12.7)

## 2019-10-28 PROCEDURE — 83880 ASSAY OF NATRIURETIC PEPTIDE: CPT | Mod: HCNC

## 2019-10-28 PROCEDURE — 80053 COMPREHEN METABOLIC PANEL: CPT | Mod: HCNC

## 2019-10-28 PROCEDURE — 85025 COMPLETE CBC W/AUTO DIFF WBC: CPT | Mod: HCNC

## 2019-10-28 PROCEDURE — 99285 EMERGENCY DEPT VISIT HI MDM: CPT | Mod: 25,HCNC

## 2019-10-28 PROCEDURE — 84484 ASSAY OF TROPONIN QUANT: CPT | Mod: HCNC

## 2019-10-28 PROCEDURE — 93010 ELECTROCARDIOGRAM REPORT: CPT | Mod: HCNC,,, | Performed by: INTERNAL MEDICINE

## 2019-10-28 PROCEDURE — 93005 ELECTROCARDIOGRAM TRACING: CPT | Mod: HCNC

## 2019-10-28 PROCEDURE — 63600175 PHARM REV CODE 636 W HCPCS: Mod: HCNC | Performed by: EMERGENCY MEDICINE

## 2019-10-28 PROCEDURE — 93010 EKG 12-LEAD: ICD-10-PCS | Mod: HCNC,,, | Performed by: INTERNAL MEDICINE

## 2019-10-28 PROCEDURE — 25000003 PHARM REV CODE 250: Mod: HCNC | Performed by: EMERGENCY MEDICINE

## 2019-10-28 RX ORDER — NITROGLYCERIN 0.4 MG/1
0.4 TABLET SUBLINGUAL EVERY 5 MIN PRN
Status: DISCONTINUED | OUTPATIENT
Start: 2019-10-28 | End: 2019-10-28 | Stop reason: HOSPADM

## 2019-10-28 RX ORDER — PANTOPRAZOLE SODIUM 40 MG/1
40 TABLET, DELAYED RELEASE ORAL 2 TIMES DAILY
Qty: 60 TABLET | Refills: 1 | Status: ON HOLD | OUTPATIENT
Start: 2019-10-28 | End: 2022-05-31

## 2019-10-28 RX ORDER — PANTOPRAZOLE SODIUM 40 MG/1
40 TABLET, DELAYED RELEASE ORAL
Status: COMPLETED | OUTPATIENT
Start: 2019-10-28 | End: 2019-10-28

## 2019-10-28 RX ADMIN — PANTOPRAZOLE SODIUM 40 MG: 40 TABLET, DELAYED RELEASE ORAL at 01:10

## 2019-10-28 RX ADMIN — LIDOCAINE HYDROCHLORIDE: 20 SOLUTION ORAL; TOPICAL at 01:10

## 2019-10-28 RX ADMIN — NITROGLYCERIN 0.4 MG: 0.4 TABLET SUBLINGUAL at 11:10

## 2019-10-28 NOTE — ED TRIAGE NOTES
Pt reports sharp left sided chest pain and SOB that started last night. Pt. Also reports dizziness upon standing. Pt. Took one 325 asa at 6pm last night, another at 12 am and another at 6 am today. Pt is alert and oriented, ambulatory, respirations even unlabored.

## 2019-10-28 NOTE — MEDICAL/APP STUDENT
"  History     Chief Complaint   Patient presents with    Chest Pain     " I started having chest pain (left) last night". Pt reports takign aspirin this morning.     48 y/o male with a significant hx of recurrent diverticulitis, asthma, HLD, and GERD presents to the ED c/o of chest pain. Pain is sharp, stabbing, intermittent (every 5-10minutes) 10/10 radiating to the right shoulder, up the right neck, and head. Pain comes on at rest. Patient states that the chest pain is followed by a squeezing pressure like sensation. He also c/o pain on inspiration, dizziness , SOB, and sweating during these episodes. Patient has never had this pain before. Onset was 6pm last night, he has taken 3 ASA that has not helped. No personal or family hx of heart problems.     The history is provided by the patient.       Past Medical History:   Diagnosis Date    Anxiety     Arthritis     Asthma     Avascular necrosis     Carpal tunnel syndrome     Chronic pain     Depression     Diverticulitis     Other injury of other sites of trunk 2011    Pneumonia     Spondylolisthesis     lumbar; myofascial pain    Staph infection     Ulnar neuropathy     left and rt arm       Past Surgical History:   Procedure Laterality Date    HIP SURGERY  3/06;     hip arthroplasty    HIP SURGERY      bilateral hip replacement    JOINT REPLACEMENT      SHOULDER SURGERY  2012    right shoulder    SHOULDER SURGERY         Family History   Problem Relation Age of Onset    Cancer Mother        Social History     Tobacco Use    Smoking status: Former Smoker     Packs/day: 1.00     Years: 10.00     Pack years: 10.00     Last attempt to quit: 10/13/2012     Years since quittin.0    Smokeless tobacco: Never Used   Substance Use Topics    Alcohol use: Yes     Alcohol/week: 12.0 standard drinks     Types: 12 Cans of beer per week     Comment: on the weekend    Drug use: No       Review of Systems   Constitutional: Positive for " "diaphoresis. Negative for chills and fever.   HENT: Negative for congestion and trouble swallowing.    Eyes: Negative.    Respiratory: Positive for chest tightness and shortness of breath. Negative for cough, wheezing and stridor.    Cardiovascular: Positive for chest pain. Negative for palpitations and leg swelling.   Gastrointestinal: Positive for abdominal pain. Negative for anal bleeding, constipation, diarrhea, nausea and vomiting.   Genitourinary: Negative.    Musculoskeletal: Positive for arthralgias and myalgias.   Neurological: Negative for dizziness, syncope, light-headedness and headaches.       Physical Exam   /72 (BP Location: Right arm, Patient Position: Sitting)   Pulse 70   Temp 98.2 °F (36.8 °C) (Oral)   Resp 20   Ht 5' 11" (1.803 m)   Wt 99.8 kg (220 lb)   SpO2 98%   BMI 30.68 kg/m²     Physical Exam    Constitutional: He appears well-developed and well-nourished. No distress.   HENT:   Head: Normocephalic and atraumatic.   Cardiovascular: Normal rate, regular rhythm, normal heart sounds and normal pulses.   Pulmonary/Chest: Breath sounds normal. No respiratory distress. He has no wheezes. He exhibits no tenderness.   Abdominal: Soft. Normal appearance and bowel sounds are normal. He exhibits no distension and no abdominal bruit. There is tenderness in the left lower quadrant.   Neurological: He is alert and oriented to person, place, and time.   Skin: Skin is warm and dry.         ED Course         "

## 2019-10-28 NOTE — ED PROVIDER NOTES
"Encounter Date: 10/28/2019    SCRIBE #1 NOTE: I, Melissa Flanagan, am scribing for, and in the presence of,  Chico Vallecillo MD. I have scribed the following portions of the note - Other sections scribed: HPI, ROS, PE.       History     Chief Complaint   Patient presents with    Chest Pain     " I started having chest pain (left) last night". Pt reports takign aspirin this morning.     CC: Chest pain    HPI: The patient is a 49 y.o. M who has Asthma, Diverticulitis, and Hx of Pneumonia who presents to the ED for emergent evaluation of acute and intermittent chest pain with associated intermittent SOB that began 2 days ago. Pt's CP is severe, and 10/10 lasting 5-10 minutes during each episode. Pt describes the CP as stabbing/shocking pain during initial and pressure-like sensation as the pain settles. He states that the pain radiates to the posterior aspect of the neck. He took 3 Masood aspirins without relief. Chest pain and SOB is exacerbated with deep breathing. CP is alleviating with ambulating. Pt denies SOB on exertion. Pt does not have a Hx of similar problem. Additionally, the pt reports acid reflux flare for the past 2 days, which he usually takes Ranitidine for acid reflux. Pt was recently hospitalized for 3 days for Diverticulitis and discharged a couple of days ago. He was prescribed a 14 day course of antibiotics 8 days ago, which he is compliant. Pt reports improvement with abdominal pain since previous hospitalization. Pt is a former smoker. He quit smoking 1 year ago. Pt denies calf pain or leg swelling.     The history is provided by the patient. No  was used.     Review of patient's allergies indicates:   Allergen Reactions    Toradol [ketorolac] Hives and Nausea And Vomiting     Past Medical History:   Diagnosis Date    Anxiety     Arthritis     Asthma     Avascular necrosis     Carpal tunnel syndrome     Chronic pain     Depression     Diverticulitis     Other injury " of other sites of trunk 2011    Pneumonia     Spondylolisthesis     lumbar; myofascial pain    Staph infection     Ulnar neuropathy     left and rt arm     Past Surgical History:   Procedure Laterality Date    HIP SURGERY  3/06;     hip arthroplasty    HIP SURGERY      bilateral hip replacement    JOINT REPLACEMENT      SHOULDER SURGERY  2012    right shoulder    SHOULDER SURGERY       Family History   Problem Relation Age of Onset    Cancer Mother      Social History     Tobacco Use    Smoking status: Former Smoker     Packs/day: 1.00     Years: 10.00     Pack years: 10.00     Last attempt to quit: 10/13/2012     Years since quittin.0    Smokeless tobacco: Never Used   Substance Use Topics    Alcohol use: Yes     Alcohol/week: 12.0 standard drinks     Types: 12 Cans of beer per week     Comment: on the weekend    Drug use: No     Review of Systems   Constitutional: Negative for fever.   HENT: Negative for sore throat.    Respiratory: Negative for shortness of breath.    Cardiovascular: Positive for chest pain. Negative for leg swelling.   Gastrointestinal: Negative for abdominal pain and nausea.   Genitourinary: Negative for dysuria.   Musculoskeletal: Negative for back pain.   Skin: Negative for rash.   Neurological: Negative for weakness.   Hematological: Does not bruise/bleed easily.       Physical Exam     Initial Vitals [10/28/19 1128]   BP Pulse Resp Temp SpO2   139/72 70 20 98.2 °F (36.8 °C) 98 %      MAP       --         Physical Exam    Nursing note and vitals reviewed.  Constitutional: He appears well-developed and well-nourished.   HENT:   Head: Atraumatic.   Eyes: EOM are normal. Pupils are equal, round, and reactive to light.   Neck: Normal range of motion. Neck supple. No JVD present.   Cardiovascular: Normal rate, regular rhythm, normal heart sounds and intact distal pulses. Exam reveals no gallop and no friction rub.    No murmur heard.  Pulmonary/Chest: Effort normal and  breath sounds normal. No accessory muscle usage or stridor. No tachypnea. No respiratory distress. He has no decreased breath sounds. He has no wheezes. He has no rhonchi. He has no rales.   Abdominal: Soft. Bowel sounds are normal.   Musculoskeletal: Normal range of motion.   Lymphadenopathy:     He has no cervical adenopathy.   Neurological: He is alert and oriented to person, place, and time. He has normal strength.   Skin: Skin is warm and dry.   Psychiatric: He has a normal mood and affect. Thought content normal.         ED Course   Procedures  Labs Reviewed   CBC W/ AUTO DIFFERENTIAL - Abnormal; Notable for the following components:       Result Value    RBC 4.10 (*)     Hemoglobin 12.7 (*)     Hematocrit 38.6 (*)     MPV 8.8 (*)     All other components within normal limits   COMPREHENSIVE METABOLIC PANEL - Abnormal; Notable for the following components:    Alkaline Phosphatase 40 (*)     All other components within normal limits   TROPONIN I   B-TYPE NATRIURETIC PEPTIDE     EKG Readings: (Independently Interpreted)   Initial Reading: No STEMI. Rhythm: Normal Sinus Rhythm. Heart Rate: 63. Ectopy: No Ectopy. Conduction: Normal. ST Segments: Normal ST Segments.     ECG Results          EKG 12-lead (In process)  Result time 10/28/19 13:05:21    In process by Interface, Lab In [facility] (10/28/19 13:05:21)                 Narrative:    Test Reason : R07.9,    Vent. Rate : 065 BPM     Atrial Rate : 357 BPM     P-R Int : 000 ms          QRS Dur : 076 ms      QT Int : 390 ms       P-R-T Axes : 000 043 035 degrees     QTc Int : 405 ms    Junctional rhythm  Abnormal ECG  When compared with ECG of 08-APR-2018 20:06,  Significant changes have occurred    Referred By: [email or web address]   SELF           Confirmed By:                   In process by Interface, Lab In [facility] (10/28/19 13:01:08)                 Narrative:    Test Reason : R07.9,    Vent. Rate : 065 BPM     Atrial Rate : 357 BPM     P-R Int : 000 ms          QRS Dur  : 076 ms      QT Int : 390 ms       P-R-T Axes : 000 043 035 degrees     QTc Int : 405 ms    Junctional rhythm  Abnormal ECG  When compared with ECG of 28-OCT-2019 11:23,  Significant changes have occurred    Referred By: [email or web address]   SELF           Confirmed By:                             Imaging Results          X-Ray Chest AP Portable (Final result)  Result time 10/28/19 12:50:15    Final result by Mei Crocker MD (10/28/19 12:50:15)                 Impression:      Clear lungs.      Electronically signed by: Mei Crocker MD  Date:    10/28/2019  Time:    12:50             Narrative:    EXAMINATION:  XR CHEST AP PORTABLE    CLINICAL HISTORY:  Chest Pain;    TECHNIQUE:  Single frontal view of the chest was performed.    COMPARISON:  Prior from 04/08/2018    FINDINGS:  The mediastinal structures are midline.  The cardiac silhouette is not enlarged the lungs appear grossly clear.    There is degenerative change of the spine and shoulders.                              X-Rays:   Independently Interpreted Readings:   Chest X-Ray: Normal heart size.  No infiltrates.  No acute abnormalities.     Patient presents with markedly atypical chest pain.  States it is a sharp shocking pain localized in 1 area in the left lower sternal border that only last for seconds to min ongoing intermittently for several days.  He then feels a little bit of chest pressure pressure lasts about 2 min.  This shocking pain radiates to the back and neck.  EKG is normal. Chest x-ray is normal.  Lab work is normal. Patient has no tachypnea, hypoxia, tachycardia.  Patient was in the recently in the hospital.  No calf pain or leg swelling. The during my evaluation patient took deep breaths without pain.  I do not feel the patient is having pulmonary embolism.                       Clinical Impression:       ICD-10-CM ICD-9-CM   1. Chest pain, unspecified type R07.9 786.50   2. Chest pain R07.9 786.50   3. Gastroesophageal reflux  disease, esophagitis presence not specified K21.9 530.81                                Chico Vallecillo MD  10/28/19 2399

## 2020-05-16 ENCOUNTER — HOSPITAL ENCOUNTER (INPATIENT)
Facility: HOSPITAL | Age: 50
LOS: 3 days | Discharge: LEFT BEFORE DISCHARGE | DRG: 392 | End: 2020-05-19
Attending: EMERGENCY MEDICINE | Admitting: EMERGENCY MEDICINE
Payer: MEDICARE

## 2020-05-16 DIAGNOSIS — K57.92 DIVERTICULITIS: ICD-10-CM

## 2020-05-16 LAB
ALBUMIN SERPL BCP-MCNC: 4.1 G/DL (ref 3.5–5.2)
ALP SERPL-CCNC: 62 U/L (ref 55–135)
ALT SERPL W/O P-5'-P-CCNC: 28 U/L (ref 10–44)
ANION GAP SERPL CALC-SCNC: 12 MMOL/L (ref 8–16)
AST SERPL-CCNC: 28 U/L (ref 10–40)
BASOPHILS # BLD AUTO: 0.04 K/UL (ref 0–0.2)
BASOPHILS NFR BLD: 0.3 % (ref 0–1.9)
BILIRUB SERPL-MCNC: 0.6 MG/DL (ref 0.1–1)
BILIRUB UR QL STRIP: NEGATIVE
BUN SERPL-MCNC: 16 MG/DL (ref 6–20)
CALCIUM SERPL-MCNC: 9.7 MG/DL (ref 8.7–10.5)
CHLORIDE SERPL-SCNC: 102 MMOL/L (ref 95–110)
CLARITY UR: CLEAR
CO2 SERPL-SCNC: 20 MMOL/L (ref 23–29)
COLOR UR: NORMAL
CREAT SERPL-MCNC: 1 MG/DL (ref 0.5–1.4)
DIFFERENTIAL METHOD: ABNORMAL
EOSINOPHIL # BLD AUTO: 0.1 K/UL (ref 0–0.5)
EOSINOPHIL NFR BLD: 1.1 % (ref 0–8)
ERYTHROCYTE [DISTWIDTH] IN BLOOD BY AUTOMATED COUNT: 12.8 % (ref 11.5–14.5)
EST. GFR  (AFRICAN AMERICAN): >60 ML/MIN/1.73 M^2
EST. GFR  (NON AFRICAN AMERICAN): >60 ML/MIN/1.73 M^2
GLUCOSE SERPL-MCNC: 94 MG/DL (ref 70–110)
GLUCOSE UR QL STRIP: NEGATIVE
HCT VFR BLD AUTO: 39 % (ref 40–54)
HGB BLD-MCNC: 13.1 G/DL (ref 14–18)
HGB UR QL STRIP: NEGATIVE
IMM GRANULOCYTES # BLD AUTO: 0.05 K/UL (ref 0–0.04)
IMM GRANULOCYTES NFR BLD AUTO: 0.4 % (ref 0–0.5)
KETONES UR QL STRIP: NEGATIVE
LACTATE SERPL-SCNC: 0.9 MMOL/L (ref 0.5–2.2)
LEUKOCYTE ESTERASE UR QL STRIP: NEGATIVE
LIPASE SERPL-CCNC: 14 U/L (ref 4–60)
LYMPHOCYTES # BLD AUTO: 1.5 K/UL (ref 1–4.8)
LYMPHOCYTES NFR BLD: 11.6 % (ref 18–48)
MCH RBC QN AUTO: 31.5 PG (ref 27–31)
MCHC RBC AUTO-ENTMCNC: 33.6 G/DL (ref 32–36)
MCV RBC AUTO: 94 FL (ref 82–98)
MONOCYTES # BLD AUTO: 1.5 K/UL (ref 0.3–1)
MONOCYTES NFR BLD: 11.5 % (ref 4–15)
NEUTROPHILS # BLD AUTO: 9.5 K/UL (ref 1.8–7.7)
NEUTROPHILS NFR BLD: 75.1 % (ref 38–73)
NITRITE UR QL STRIP: NEGATIVE
NRBC BLD-RTO: 0 /100 WBC
PH UR STRIP: 6 [PH] (ref 5–8)
PLATELET # BLD AUTO: 233 K/UL (ref 150–350)
PMV BLD AUTO: 9.3 FL (ref 9.2–12.9)
POTASSIUM SERPL-SCNC: 4.1 MMOL/L (ref 3.5–5.1)
PROT SERPL-MCNC: 7.9 G/DL (ref 6–8.4)
PROT UR QL STRIP: NEGATIVE
RBC # BLD AUTO: 4.16 M/UL (ref 4.6–6.2)
SODIUM SERPL-SCNC: 134 MMOL/L (ref 136–145)
SP GR UR STRIP: 1 (ref 1–1.03)
URN SPEC COLLECT METH UR: NORMAL
UROBILINOGEN UR STRIP-ACNC: NEGATIVE EU/DL
WBC # BLD AUTO: 12.71 K/UL (ref 3.9–12.7)

## 2020-05-16 PROCEDURE — 99285 EMERGENCY DEPT VISIT HI MDM: CPT | Mod: 25

## 2020-05-16 PROCEDURE — 96376 TX/PRO/DX INJ SAME DRUG ADON: CPT

## 2020-05-16 PROCEDURE — 25500020 PHARM REV CODE 255: Performed by: EMERGENCY MEDICINE

## 2020-05-16 PROCEDURE — 81003 URINALYSIS AUTO W/O SCOPE: CPT

## 2020-05-16 PROCEDURE — 12000002 HC ACUTE/MED SURGE SEMI-PRIVATE ROOM

## 2020-05-16 PROCEDURE — 96361 HYDRATE IV INFUSION ADD-ON: CPT

## 2020-05-16 PROCEDURE — 85025 COMPLETE CBC W/AUTO DIFF WBC: CPT

## 2020-05-16 PROCEDURE — 83605 ASSAY OF LACTIC ACID: CPT

## 2020-05-16 PROCEDURE — 25000003 PHARM REV CODE 250: Performed by: EMERGENCY MEDICINE

## 2020-05-16 PROCEDURE — 63600175 PHARM REV CODE 636 W HCPCS: Performed by: EMERGENCY MEDICINE

## 2020-05-16 PROCEDURE — 83690 ASSAY OF LIPASE: CPT

## 2020-05-16 PROCEDURE — 96365 THER/PROPH/DIAG IV INF INIT: CPT

## 2020-05-16 PROCEDURE — 80053 COMPREHEN METABOLIC PANEL: CPT

## 2020-05-16 PROCEDURE — 96375 TX/PRO/DX INJ NEW DRUG ADDON: CPT

## 2020-05-16 RX ORDER — FAMOTIDINE 40 MG/1
TABLET, FILM COATED ORAL
COMMUNITY
Start: 2020-04-27 | End: 2020-06-08

## 2020-05-16 RX ORDER — HYDROMORPHONE HYDROCHLORIDE 2 MG/ML
1 INJECTION, SOLUTION INTRAMUSCULAR; INTRAVENOUS; SUBCUTANEOUS
Status: COMPLETED | OUTPATIENT
Start: 2020-05-16 | End: 2020-05-16

## 2020-05-16 RX ADMIN — HYDROMORPHONE HYDROCHLORIDE 1 MG: 2 INJECTION INTRAMUSCULAR; INTRAVENOUS; SUBCUTANEOUS at 10:05

## 2020-05-16 RX ADMIN — SODIUM CHLORIDE 1000 ML: 0.9 INJECTION, SOLUTION INTRAVENOUS at 08:05

## 2020-05-16 RX ADMIN — PIPERACILLIN SODIUM AND TAZOBACTAM SODIUM 4.5 G: 4; .5 INJECTION, POWDER, LYOPHILIZED, FOR SOLUTION INTRAVENOUS at 10:05

## 2020-05-16 RX ADMIN — HYDROMORPHONE HYDROCHLORIDE 1 MG: 2 INJECTION INTRAMUSCULAR; INTRAVENOUS; SUBCUTANEOUS at 09:05

## 2020-05-16 RX ADMIN — IOHEXOL 85 ML: 350 INJECTION, SOLUTION INTRAVENOUS at 10:05

## 2020-05-17 PROBLEM — K57.92 DIVERTICULITIS: Status: ACTIVE | Noted: 2020-05-17

## 2020-05-17 PROBLEM — K21.9 GERD (GASTROESOPHAGEAL REFLUX DISEASE): Status: ACTIVE | Noted: 2020-05-17

## 2020-05-17 PROBLEM — K76.0 HEPATIC STEATOSIS: Status: ACTIVE | Noted: 2020-05-17

## 2020-05-17 LAB
ALBUMIN SERPL BCP-MCNC: 3.7 G/DL (ref 3.5–5.2)
ALP SERPL-CCNC: 60 U/L (ref 55–135)
ALT SERPL W/O P-5'-P-CCNC: 26 U/L (ref 10–44)
ANION GAP SERPL CALC-SCNC: 10 MMOL/L (ref 8–16)
AST SERPL-CCNC: 23 U/L (ref 10–40)
BASOPHILS # BLD AUTO: 0.03 K/UL (ref 0–0.2)
BASOPHILS NFR BLD: 0.3 % (ref 0–1.9)
BILIRUB SERPL-MCNC: 0.5 MG/DL (ref 0.1–1)
BUN SERPL-MCNC: 14 MG/DL (ref 6–20)
CALCIUM SERPL-MCNC: 8.7 MG/DL (ref 8.7–10.5)
CHLORIDE SERPL-SCNC: 105 MMOL/L (ref 95–110)
CHOLEST SERPL-MCNC: 174 MG/DL (ref 120–199)
CHOLEST/HDLC SERPL: 4.5 {RATIO} (ref 2–5)
CO2 SERPL-SCNC: 20 MMOL/L (ref 23–29)
CREAT SERPL-MCNC: 1 MG/DL (ref 0.5–1.4)
DIFFERENTIAL METHOD: ABNORMAL
EOSINOPHIL # BLD AUTO: 0.2 K/UL (ref 0–0.5)
EOSINOPHIL NFR BLD: 2.1 % (ref 0–8)
ERYTHROCYTE [DISTWIDTH] IN BLOOD BY AUTOMATED COUNT: 12.9 % (ref 11.5–14.5)
EST. GFR  (AFRICAN AMERICAN): >60 ML/MIN/1.73 M^2
EST. GFR  (NON AFRICAN AMERICAN): >60 ML/MIN/1.73 M^2
GLUCOSE SERPL-MCNC: 84 MG/DL (ref 70–110)
HCT VFR BLD AUTO: 38.1 % (ref 40–54)
HDLC SERPL-MCNC: 39 MG/DL (ref 40–75)
HDLC SERPL: 22.4 % (ref 20–50)
HGB BLD-MCNC: 12.4 G/DL (ref 14–18)
IMM GRANULOCYTES # BLD AUTO: 0.04 K/UL (ref 0–0.04)
IMM GRANULOCYTES NFR BLD AUTO: 0.4 % (ref 0–0.5)
LDLC SERPL CALC-MCNC: 123.4 MG/DL (ref 63–159)
LYMPHOCYTES # BLD AUTO: 1.7 K/UL (ref 1–4.8)
LYMPHOCYTES NFR BLD: 16.1 % (ref 18–48)
MCH RBC QN AUTO: 31.9 PG (ref 27–31)
MCHC RBC AUTO-ENTMCNC: 32.5 G/DL (ref 32–36)
MCV RBC AUTO: 98 FL (ref 82–98)
MONOCYTES # BLD AUTO: 1.3 K/UL (ref 0.3–1)
MONOCYTES NFR BLD: 12.4 % (ref 4–15)
NEUTROPHILS # BLD AUTO: 7.3 K/UL (ref 1.8–7.7)
NEUTROPHILS NFR BLD: 68.7 % (ref 38–73)
NONHDLC SERPL-MCNC: 135 MG/DL
NRBC BLD-RTO: 0 /100 WBC
PLATELET # BLD AUTO: 216 K/UL (ref 150–350)
PMV BLD AUTO: 9 FL (ref 9.2–12.9)
POTASSIUM SERPL-SCNC: 4.3 MMOL/L (ref 3.5–5.1)
PROT SERPL-MCNC: 7.3 G/DL (ref 6–8.4)
RBC # BLD AUTO: 3.89 M/UL (ref 4.6–6.2)
SARS-COV-2 RDRP RESP QL NAA+PROBE: NEGATIVE
SODIUM SERPL-SCNC: 135 MMOL/L (ref 136–145)
TRIGL SERPL-MCNC: 58 MG/DL (ref 30–150)
WBC # BLD AUTO: 10.64 K/UL (ref 3.9–12.7)

## 2020-05-17 PROCEDURE — 25000003 PHARM REV CODE 250: Performed by: EMERGENCY MEDICINE

## 2020-05-17 PROCEDURE — 80061 LIPID PANEL: CPT

## 2020-05-17 PROCEDURE — 25000003 PHARM REV CODE 250: Performed by: NURSE PRACTITIONER

## 2020-05-17 PROCEDURE — 63600175 PHARM REV CODE 636 W HCPCS: Performed by: EMERGENCY MEDICINE

## 2020-05-17 PROCEDURE — 36415 COLL VENOUS BLD VENIPUNCTURE: CPT

## 2020-05-17 PROCEDURE — 12000002 HC ACUTE/MED SURGE SEMI-PRIVATE ROOM

## 2020-05-17 PROCEDURE — 25000003 PHARM REV CODE 250: Performed by: FAMILY MEDICINE

## 2020-05-17 PROCEDURE — 80074 ACUTE HEPATITIS PANEL: CPT

## 2020-05-17 PROCEDURE — 85025 COMPLETE CBC W/AUTO DIFF WBC: CPT

## 2020-05-17 PROCEDURE — 80053 COMPREHEN METABOLIC PANEL: CPT

## 2020-05-17 PROCEDURE — 63600175 PHARM REV CODE 636 W HCPCS: Performed by: FAMILY MEDICINE

## 2020-05-17 PROCEDURE — 63600175 PHARM REV CODE 636 W HCPCS: Performed by: NURSE PRACTITIONER

## 2020-05-17 PROCEDURE — U0002 COVID-19 LAB TEST NON-CDC: HCPCS

## 2020-05-17 RX ORDER — HYDROMORPHONE HYDROCHLORIDE 2 MG/ML
1 INJECTION, SOLUTION INTRAMUSCULAR; INTRAVENOUS; SUBCUTANEOUS
Status: DISCONTINUED | OUTPATIENT
Start: 2020-05-17 | End: 2020-05-18

## 2020-05-17 RX ORDER — ACETAMINOPHEN 325 MG/1
650 TABLET ORAL EVERY 6 HOURS PRN
Status: DISCONTINUED | OUTPATIENT
Start: 2020-05-17 | End: 2020-05-19 | Stop reason: HOSPADM

## 2020-05-17 RX ORDER — SODIUM CHLORIDE 9 MG/ML
INJECTION, SOLUTION INTRAVENOUS CONTINUOUS
Status: DISCONTINUED | OUTPATIENT
Start: 2020-05-17 | End: 2020-05-19 | Stop reason: HOSPADM

## 2020-05-17 RX ORDER — ALPRAZOLAM 0.5 MG/1
2 TABLET ORAL DAILY PRN
Status: DISCONTINUED | OUTPATIENT
Start: 2020-05-17 | End: 2020-05-19 | Stop reason: HOSPADM

## 2020-05-17 RX ORDER — HYDROMORPHONE HYDROCHLORIDE 2 MG/ML
2 INJECTION, SOLUTION INTRAMUSCULAR; INTRAVENOUS; SUBCUTANEOUS
Status: COMPLETED | OUTPATIENT
Start: 2020-05-17 | End: 2020-05-17

## 2020-05-17 RX ORDER — ENOXAPARIN SODIUM 100 MG/ML
40 INJECTION SUBCUTANEOUS EVERY 24 HOURS
Status: DISCONTINUED | OUTPATIENT
Start: 2020-05-17 | End: 2020-05-19 | Stop reason: HOSPADM

## 2020-05-17 RX ORDER — PANTOPRAZOLE SODIUM 40 MG/1
40 TABLET, DELAYED RELEASE ORAL DAILY
Status: DISCONTINUED | OUTPATIENT
Start: 2020-05-17 | End: 2020-05-19 | Stop reason: HOSPADM

## 2020-05-17 RX ORDER — ONDANSETRON 2 MG/ML
4 INJECTION INTRAMUSCULAR; INTRAVENOUS EVERY 8 HOURS PRN
Status: DISCONTINUED | OUTPATIENT
Start: 2020-05-17 | End: 2020-05-19 | Stop reason: HOSPADM

## 2020-05-17 RX ORDER — SODIUM CHLORIDE 0.9 % (FLUSH) 0.9 %
10 SYRINGE (ML) INJECTION
Status: DISCONTINUED | OUTPATIENT
Start: 2020-05-17 | End: 2020-05-19 | Stop reason: HOSPADM

## 2020-05-17 RX ORDER — HYDROMORPHONE HYDROCHLORIDE 2 MG/ML
1 INJECTION, SOLUTION INTRAMUSCULAR; INTRAVENOUS; SUBCUTANEOUS EVERY 4 HOURS PRN
Status: DISCONTINUED | OUTPATIENT
Start: 2020-05-17 | End: 2020-05-17

## 2020-05-17 RX ADMIN — PIPERACILLIN SODIUM AND TAZOBACTAM SODIUM 4.5 G: 4; .5 INJECTION, POWDER, LYOPHILIZED, FOR SOLUTION INTRAVENOUS at 11:05

## 2020-05-17 RX ADMIN — PIPERACILLIN SODIUM AND TAZOBACTAM SODIUM 4.5 G: 4; .5 INJECTION, POWDER, LYOPHILIZED, FOR SOLUTION INTRAVENOUS at 02:05

## 2020-05-17 RX ADMIN — HYDROMORPHONE HYDROCHLORIDE 1 MG: 2 INJECTION INTRAMUSCULAR; INTRAVENOUS; SUBCUTANEOUS at 10:05

## 2020-05-17 RX ADMIN — HYDROMORPHONE HYDROCHLORIDE 1 MG: 2 INJECTION INTRAMUSCULAR; INTRAVENOUS; SUBCUTANEOUS at 01:05

## 2020-05-17 RX ADMIN — PIPERACILLIN SODIUM AND TAZOBACTAM SODIUM 4.5 G: 4; .5 INJECTION, POWDER, LYOPHILIZED, FOR SOLUTION INTRAVENOUS at 08:05

## 2020-05-17 RX ADMIN — PANTOPRAZOLE SODIUM 40 MG: 40 TABLET, DELAYED RELEASE ORAL at 08:05

## 2020-05-17 RX ADMIN — HYDROMORPHONE HYDROCHLORIDE 1 MG: 2 INJECTION INTRAMUSCULAR; INTRAVENOUS; SUBCUTANEOUS at 08:05

## 2020-05-17 RX ADMIN — SODIUM CHLORIDE: 0.9 INJECTION, SOLUTION INTRAVENOUS at 03:05

## 2020-05-17 RX ADMIN — ALPRAZOLAM 2 MG: 0.5 TABLET ORAL at 02:05

## 2020-05-17 RX ADMIN — HYDROMORPHONE HYDROCHLORIDE 1 MG: 2 INJECTION INTRAMUSCULAR; INTRAVENOUS; SUBCUTANEOUS at 06:05

## 2020-05-17 RX ADMIN — HYDROMORPHONE HYDROCHLORIDE 1 MG: 2 INJECTION INTRAMUSCULAR; INTRAVENOUS; SUBCUTANEOUS at 05:05

## 2020-05-17 RX ADMIN — HYDROMORPHONE HYDROCHLORIDE 2 MG: 2 INJECTION INTRAMUSCULAR; INTRAVENOUS; SUBCUTANEOUS at 12:05

## 2020-05-17 RX ADMIN — HYDROMORPHONE HYDROCHLORIDE 1 MG: 2 INJECTION INTRAMUSCULAR; INTRAVENOUS; SUBCUTANEOUS at 11:05

## 2020-05-17 RX ADMIN — HYDROMORPHONE HYDROCHLORIDE 1 MG: 2 INJECTION INTRAMUSCULAR; INTRAVENOUS; SUBCUTANEOUS at 02:05

## 2020-05-17 RX ADMIN — ENOXAPARIN SODIUM 40 MG: 100 INJECTION SUBCUTANEOUS at 05:05

## 2020-05-17 NOTE — HPI
This is a 48 y/o male with medical history of recurrent diverticulitis, asthma, anxiety, hip arthroplasty, GERD, hx of cocaine abuse and chronic use of alprazolam who presents to the hospital with a complaint of worsening LLQ abdominal pain for 5 days. Patient describes the pain is dull aching and sometime feel like cramping on LLQ and not radiating and intensity 10/10 . Pain worsens with moving/turing and when apply pressure. Patient denies any fever, chill, chest pain, N/V/D, dysuria or blood in urine/stool. Last BM was yesterday and normal. He has hx of diverticulitis and he states this is the 4th time happened to him for the last 2 years. He reports today pain is similar with the pain he got when he had diverticulitis in the past.    In ED, CT abdominal pelvic with contrast shows acute sigmoid diverticulitis, hepatic steatosis, hepatomegaly, and 3 mm left lower lobe pulmonary nodule. No COVID test result.    Labs with leukocytosis WBC of 12. Unremarkable CMP and normal lipase and normal lactate. UA no infection    Patient is admitted to inpatient with hospital medicine for further evaluation of diverticulitis.

## 2020-05-17 NOTE — PLAN OF CARE
Problem: Adult Inpatient Plan of Care  Goal: Plan of Care Review  Outcome: Ongoing, Progressing  Pt remained free of falls during current shift. Pt reported having pain to the abdomen and received prn pain medications. IV fluid and IV antibiotics are infusing per MD order. General surgery consulted and pt remains NPO. Plan of care and fall precautions reviewed with pt and verbalized understanding. Bed locked, lowered, SR up x2 and call light placed within reach.

## 2020-05-17 NOTE — NURSING TRANSFER
Nursing Transfer Note      5/17/2020     Transfer From: ED; To: Tele room 330B    Transfer via wheelchair    Transfer with IV fluid    Transported by Transport tech    Medicines sent: Normal Saline    Chart send with patient: Yes    Notified: brother    Patient reassessed at: 0501 on 5/17/2020    Upon arrival to floor: cardiac monitor applied, patient oriented to room, call bell in reach and bed in lowest position and SR up x2. Pt is AAO x4. Pt reports having pain to left lower abdomen, however pt is in no distress. Skin is clean, dry and intact. IV fluid infusing to PIV in right AC. Admit completed per Navigator. Plan of care reviewed with pt and pt verbalized understanding. Will continue to monitor pt closely.

## 2020-05-17 NOTE — ED TRIAGE NOTES
"Pt presents to ED via personal transportation with c/o abdominal pain 10/10. Pt states "I have diverticulitis which is causing me to have abdominal pain."   "

## 2020-05-17 NOTE — PROGRESS NOTES
[facility] WRITTEN HEALTHCARE AND DISCHARGE INFORMATION.  Follow-up Information     Johnie Gutierrez MD. Schedule an appointment as soon as possible for a visit in 1 week.    Specialty:  Family Medicine  Why:  out patient services  Contact information:  [address] [place] [address]  [phone]                   Help at Home           [phone]  After discharge for assistance [facility] On Call Nurse Care Line 24/7 Assistance.   Things You are responsible For To Manage Your Care At Home:  1.    Getting your prescriptions filled   2.    Taking your medications as directed, DO NOT MISS ANY DOSES!  3.    Going to your follow-up doctor appointment. This is important because it  allow the doctor to monitor your progress and determine if  any changes need to made to your treatment plan.   Thank you for choosing [facility] for your care. [facility] is happy to have the opportunity to serve you.    Sincerely,  Your [facility] Team,  Mary Kate Nova RN, BSN, [facility]  [phone]  5/17/2020

## 2020-05-17 NOTE — ASSESSMENT & PLAN NOTE
CT shows hepatic steatosis and hepatomegaly. AST/ALT/Lipase normal. Not on any statin      Lab Results   Component Value Date    CHOL 161 03/25/2018    CHOL 197 08/25/2016    CHOL 207 (H) 08/12/2011     Lab Results   Component Value Date    HDL 44 03/25/2018    HDL 41 08/25/2016    HDL 33 (L) 08/12/2011     Lab Results   Component Value Date    LDLCALC 92.4 03/25/2018    LDLCALC 132.6 08/25/2016    LDLCALC 152.4 08/12/2011     Lab Results   Component Value Date    TRIG 123 03/25/2018    TRIG 117 08/25/2016    TRIG 108 08/12/2011     Lab Results   Component Value Date    CHOLHDL 27.3 03/25/2018    CHOLHDL 20.8 08/25/2016    CHOLHDL 15.9 (L) 08/12/2011     - Lipid panel/Hepatitis panel pending

## 2020-05-17 NOTE — ASSESSMENT & PLAN NOTE
C/o LLQ pain x 5 days. Denies any N/V/D. Last BM was yesterday and normal. Leukocytosis WBC 12. Denies fever. CT abd with acute sigmoid diverticulitis. Hx recurrent diverticulitis. Patient reports this is the 4th time for him to have this problem in 2 years. Last admission for his diverticulitis was 10/2019 and was treated with Cipro/Flagyl. Of note, the patient just recently finished his Augmentin abx course for dog bite last month 4/13/2020.    - Zosyn started in ED - will continue  - Bowel rest NPO  - IVF  - General surgery consult for recurrent diverticulitis  - Control pain

## 2020-05-17 NOTE — ED PROVIDER NOTES
Encounter Date: 2020       History     Chief Complaint   Patient presents with    Abdominal Pain     Patient c/o LLQ abd pain x 5 days.  Reports hx of Diverticulitis.  Denies n/v, diarrhea, urinary symptoms, or fever.     HPI   49-year-old male with a history of diverticulitis, other musculoskeletal medical problems, anxiety, asthma presents to the ER with left lower quadrant pain for 4 days.  The pain is rated 10/10 and is dull with exacerbations of feel like cramping.  Some elevated temperatures at home of 100.  Denies any chills, nausea, vomiting, diarrhea.  He has not seen any blood in his stool.  He states that this has happened to him before when he has had diverticulitis in the past.  Review of patient's allergies indicates:   Allergen Reactions    Toradol [ketorolac] Hives and Nausea And Vomiting     Past Medical History:   Diagnosis Date    Anxiety     Arthritis     Asthma     Avascular necrosis     Carpal tunnel syndrome     Chronic pain     Depression     Diverticulitis     Other injury of other sites of trunk 2011    Pneumonia     Spondylolisthesis     lumbar; myofascial pain    Staph infection     Ulnar neuropathy     left and rt arm     Past Surgical History:   Procedure Laterality Date    HIP SURGERY  3/06;     hip arthroplasty    HIP SURGERY      bilateral hip replacement (titanium)    JOINT REPLACEMENT      SHOULDER SURGERY  2012    right shoulder    SHOULDER SURGERY       Family History   Problem Relation Age of Onset    Cancer Mother      Social History     Tobacco Use    Smoking status: Former Smoker     Packs/day: 1.00     Years: 10.00     Pack years: 10.00     Last attempt to quit: 10/13/2012     Years since quittin.5    Smokeless tobacco: Never Used   Substance Use Topics    Alcohol use: Yes     Alcohol/week: 12.0 standard drinks     Types: 12 Cans of beer per week     Comment: on the weekend    Drug use: No     Review of Systems   Constitutional:  Negative for fever.   HENT: Negative for sore throat.    Respiratory: Negative for shortness of breath.    Cardiovascular: Negative for chest pain.   Gastrointestinal: Positive for abdominal pain. Negative for nausea.   Genitourinary: Negative for dysuria.   Musculoskeletal: Negative for back pain.   Skin: Negative for rash.   Neurological: Negative for weakness.   Hematological: Does not bruise/bleed easily.       Physical Exam     Initial Vitals [05/16/20 1953]   BP Pulse Resp Temp SpO2   139/86 106 18 98.7 °F (37.1 °C) 97 %      MAP       --         Physical Exam    Constitutional: He appears well-nourished. No distress.   HENT:   Head: Normocephalic.   Eyes: Conjunctivae and EOM are normal.   Cardiovascular: Normal rate, regular rhythm, normal heart sounds and intact distal pulses.   No murmur heard.  Pulmonary/Chest: Breath sounds normal. No respiratory distress.   Abdominal: Soft. He exhibits no distension. There is tenderness in the left lower quadrant. There is guarding. There is no rigidity.   Musculoskeletal: Normal range of motion. He exhibits no edema or tenderness.   Neurological: He is alert and oriented to person, place, and time.   Skin: Skin is warm.   Psychiatric: He has a normal mood and affect. His behavior is normal. Judgment and thought content normal.         ED Course   Procedures  Labs Reviewed   CBC W/ AUTO DIFFERENTIAL - Abnormal; Notable for the following components:       Result Value    WBC 12.71 (*)     RBC 4.16 (*)     Hemoglobin 13.1 (*)     Hematocrit 39.0 (*)     Mean Corpuscular Hemoglobin 31.5 (*)     Gran # (ANC) 9.5 (*)     Immature Grans (Abs) 0.05 (*)     Mono # 1.5 (*)     Gran% 75.1 (*)     Lymph% 11.6 (*)     All other components within normal limits   COMPREHENSIVE METABOLIC PANEL - Abnormal; Notable for the following components:    Sodium 134 (*)     CO2 20 (*)     All other components within normal limits   LIPASE   URINALYSIS, REFLEX TO URINE CULTURE    Narrative:      Preferred Collection Type->Urine, Clean Catch   LACTIC ACID, PLASMA    Narrative:     Recoll. [id number] by [facility] at 05/16/2020 22:23, reason: GROSSLY   HEMOLYZED. SPOKE TO LALITHA   SARS-COV-2 RNA AMPLIFICATION, QUAL          Imaging Results           CT Abdomen Pelvis With Contrast (Final result)  Result time 05/16/20 22:54:00    Final result by Kiki Castelan MD (05/16/20 22:54:00)                 Impression:      Acute sigmoid diverticulitis.    Hepatic steatosis.  Hepatomegaly.    3 mm left lower lobe pulmonary nodule.  For a solid nodule <6 mm, Fleischner Society 2017 guidelines recommend no routine follow up for a low risk patient, or follow-up with non-contrast chest CT at 12 months in a high risk patient.    This report was flagged in Epic as abnormal.      Electronically signed by: Kiki Castelan  Date:    05/16/2020  Time:    22:54             Narrative:    EXAMINATION:  CT OF ABDOMEN PELVIS WITH    CLINICAL HISTORY:  LLQ pain, suspect diverticulitis;    TECHNIQUE:  5 mm enhanced axial images were obtained from the lung bases through the greater trochanters.  Eighty-five mL of Omnipaque 350 was injected.    COMPARISON:  10/18/2019    FINDINGS:  The liver is enlarged and fatty infiltrated.    Spleen, pancreas, kidneys, and adrenal glands are unremarkable. The gallbladder .    There is no definite evidence for abdominal adenopathy or ascites.  Mild vascular calcifications are present.    Streak artifact from bilateral hip arthroplasties limits interpretation of the pelvis.  There is focal thickening of a short segment of the sigmoid colon with extensive pericolonic infiltration.  There is thickening along the left pericolic gutter fascia.  Findings are concerning for acute diverticulitis.  There are no pelvic masses or adenopathy.  The appendix is unremarkable.    There is no free fluid in the pelvis.    There is mild bibasilar atelectasis.  There is a 3 mm left lower lobe pulmonary nodule (series 2  axial image 16).                                49-year-old male with history of diverticulitis among other things presents with left lower quadrant pain that is consistent with his previous diverticulitis.  My differential includes diverticulitis but also considering other intra-abdominal process such as but not limited to appendicitis, other colitis, gastritis, cholecystitis. Vascular and renal etiologies also considered but less likely given that his history is consistent with but is feeling today.  Labs ordered, CT ordered, fluids ordered and pain control.  Patient is not nauseous at this time.      CT consistent with diverticulitis.  Labs also with elevated white count.  Zosyn was ordered and given.  Patient has required multiple doses of IV pain medication.  He remains NPO at this time and admitted for continued evaluation and treatment of his diverticulitis.                                     Clinical Impression:       ICD-10-CM ICD-9-CM   1. Diverticulitis K57.92 562.11             ED Disposition Condition    Admit                           Radha Montano MD  05/17/20 0815       Radha Montano MD  05/17/20 0815

## 2020-05-17 NOTE — SUBJECTIVE & OBJECTIVE
Past Medical History:   Diagnosis Date    Anxiety     Arthritis     Asthma     Avascular necrosis     Carpal tunnel syndrome     Chronic pain     Depression     Diverticulitis     Other injury of other sites of trunk 2011    Pneumonia     Spondylolisthesis     lumbar; myofascial pain    Staph infection     Ulnar neuropathy     left and rt arm       Past Surgical History:   Procedure Laterality Date    HIP SURGERY  3/06;     hip arthroplasty    HIP SURGERY      bilateral hip replacement    JOINT REPLACEMENT      SHOULDER SURGERY  2012    right shoulder    SHOULDER SURGERY         Review of patient's allergies indicates:   Allergen Reactions    Toradol [ketorolac] Hives and Nausea And Vomiting       No current facility-administered medications on file prior to encounter.      Current Outpatient Medications on File Prior to Encounter   Medication Sig    alprazolam (XANAX) 2 MG Tab Take 1 tablet (2 mg total) by mouth daily as needed (back pain).    famotidine (PEPCID) 40 MG tablet     amoxicillin-clavulanate 875-125mg (AUGMENTIN) 875-125 mg per tablet Take 1 tablet by mouth 2 (two) times daily.    betamethasone dipropionate (DIPROLENE) 0.05 % cream Apply topically 2 (two) times daily.    cetirizine (ZYRTEC) 10 MG tablet Take 1 tablet (10 mg total) by mouth once daily. for 15 days    oxyCODONE-acetaminophen (PERCOCET) 5-325 mg per tablet Take 1 tablet by mouth every 4 (four) hours as needed for Pain.    pantoprazole (PROTONIX) 40 MG tablet Take 1 tablet (40 mg total) by mouth 2 (two) times daily.     Family History     Problem Relation (Age of Onset)    Cancer Mother        Tobacco Use    Smoking status: Former Smoker     Packs/day: 1.00     Years: 10.00     Pack years: 10.00     Last attempt to quit: 10/13/2012     Years since quittin.5    Smokeless tobacco: Never Used   Substance and Sexual Activity    Alcohol use: Yes     Alcohol/week: 12.0 standard drinks     Types: 12 Cans  of beer per week     Comment: on the weekend    Drug use: No    Sexual activity: Yes     Partners: Female     Review of Systems   Constitutional: Negative for appetite change, chills, fatigue and fever.   HENT: Negative for congestion and sore throat.    Eyes: Negative for visual disturbance.   Respiratory: Negative for apnea, cough, chest tightness, shortness of breath and wheezing.    Cardiovascular: Negative for chest pain, palpitations and leg swelling.   Gastrointestinal: Positive for abdominal distention and abdominal pain (dull/aching constant pain at LLQ with intensity 10/10). Negative for blood in stool, diarrhea, nausea and vomiting.   Genitourinary: Negative for flank pain and hematuria.   Musculoskeletal: Negative for myalgias.   Skin: Negative.    Neurological: Positive for dizziness. Negative for syncope, weakness and headaches.   Psychiatric/Behavioral: Negative for confusion.     Objective:     Vital Signs (Most Recent):  Temp: 98.7 °F (37.1 °C) (05/16/20 1953)  Pulse: 90 (05/17/20 0129)  Resp: 18 (05/17/20 0129)  BP: 132/78 (05/17/20 0129)  SpO2: 96 % (05/17/20 0129) Vital Signs (24h Range):  Temp:  [98.7 °F (37.1 °C)] 98.7 °F (37.1 °C)  Pulse:  [] 90  Resp:  [18] 18  SpO2:  [96 %-97 %] 96 %  BP: (132-142)/(76-86) 132/78     Weight: 97.5 kg (215 lb)  Body mass index is 31.75 kg/m².    Physical Exam   Constitutional: He is oriented to person, place, and time. He appears well-developed and well-nourished.   HENT:   Head: Normocephalic and atraumatic.   Mouth/Throat: Oropharynx is clear and moist.   Eyes: Pupils are equal, round, and reactive to light.   Neck: Normal range of motion. No JVD present.   Cardiovascular: Normal rate and regular rhythm.   Pulmonary/Chest: Effort normal and breath sounds normal. He has no wheezes. He has no rales. He exhibits no tenderness.   Abdominal: Soft. Bowel sounds are normal. There is tenderness (tenderness at LLQ. Worse with pressure. rate 7/10 on exam).  There is no rebound and no guarding.   Negative for Monet's sign. Negative or Rovsing's sign.   Musculoskeletal: Normal range of motion. He exhibits no edema.   Neurological: He is alert and oriented to person, place, and time.   Skin: Skin is warm and dry. Capillary refill takes less than 2 seconds.   Psychiatric: He has a normal mood and affect.         CRANIAL NERVES     CN III, IV, VI   Pupils are equal, round, and reactive to light.       Significant Labs:   CBC:   Recent Labs   Lab 05/16/20 2043   WBC 12.71*   HGB 13.1*   HCT 39.0*        CMP:   Recent Labs   Lab 05/16/20 2043   *   K 4.1      CO2 20*   GLU 94   BUN 16   CREATININE 1.0   CALCIUM 9.7   PROT 7.9   ALBUMIN 4.1   BILITOT 0.6   ALKPHOS 62   AST 28   ALT 28   ANIONGAP 12   EGFRNONAA >60     Cardiac Markers: No results for input(s): CKMB, MYOGLOBIN, BNP, TROPISTAT in the last 48 hours.  Lipase:   Recent Labs   Lab 05/16/20 2043   LIPASE 14     POCT Glucose: No results for input(s): POCTGLUCOSE in the last 48 hours.  Troponin: No results for input(s): TROPONINI in the last 48 hours.  Urine Studies:   Recent Labs   Lab 05/16/20 2054   COLORU Straw   APPEARANCEUA Clear   PHUR 6.0   SPECGRAV 1.005   PROTEINUA Negative   GLUCUA Negative   KETONESU Negative   BILIRUBINUA Negative   OCCULTUA Negative   NITRITE Negative   UROBILINOGEN Negative   LEUKOCYTESUR Negative       Significant Imaging: I have reviewed and interpreted all pertinent imaging results/findings within the past 24 hours.

## 2020-05-17 NOTE — CONSULTS
gen surg  Consult  Consult from Dr. Erickson  Consult directed to Dr. Nicolas Geiger concerning her current diverticulitis  This is a 49-year-old male who has been hospitalized 4 times in the last 2 years for sigmoid diverticulitis.  Several days ago after eating crawfish he had a recurrent episode of left lower abdominal pain and cramping.  He has been having about 1 bowel movement each day where he normally has 2 each day.  He denies fever or chills.  He denies pneumaturia or fecal urea.  He states that his last colonoscopy was about 4 years ago.  Past medical history arthritis carpal tunnel syndrome as above pneumonia  Past surgical history bilateral total knee arthroplasty, right shoulder surgery  Allergies Toradol which produces nausea  Social history he is a former smoker who quit about 7 years ago he drinks about 12 beers per week  Home medications Xanax Pepcid Zyrtec Percocet Protonix  Physical exam  Vital signs blood pressure 108/62, respiratory rate 16, pulse 69, temperature 98.6°  General alert and oriented x4 in no acute distress  HEENT no cervical or supraclavicular masses  Lungs clear to auscultation bilaterally  Cardiovascular regular rate and rhythm  Axilla no masses bilaterally  Extremities palpable radial pulse bilaterally   no inguinal hernia bilaterally  Abdomen soft, positive bowel sounds, nondistended, no masses, moderate tenderness to palpation left lower quadrant with guarding, no peritoneal signs  White blood cell count 10 which is down from 12 yesterday, hematocrit 36, platelet count 216  CT scan of the abdomen revealed fatty liver and acute sigmoid diverticulitis-I have reviewed the films assessment Fort/plan recurrent sigmoid diverticulitis-agree with admit bowel rest and intravenous antibiotics, if fails to improve could require resection this hospital stay, if resolves with IV antibiotics which is likely would recommend elective resection in near future due to multiple recurrences,  discussed with patient at length, he should follow up in our office as an outpatient to arrange this, thank you for consult, will follow

## 2020-05-17 NOTE — PLAN OF CARE
05/17/20 1632   Discharge Assessment   Assessment Type Discharge Planning Assessment   Assessment information obtained from? Medical Record   Prior to hospitilization cognitive status: Alert/Oriented   Prior to hospitalization functional status: Independent   Current cognitive status: Alert/Oriented   Current Functional Status: Independent   Lives With alone   Able to Return to Prior Arrangements yes   Is patient able to care for self after discharge? Yes   Who are your caregiver(s) and their phone number(s)?   (brother, London Schneider [phone])   Patient's perception of discharge disposition admitted as an inpatient   Readmission Within the Last 30 Days no previous admission in last 30 days   Patient currently being followed by outpatient case management? No   Patient currently receives any other outside agency services? No   Equipment Currently Used at Home none   Do you have any problems affording any of your prescribed medications? No   Is the patient taking medications as prescribed? yes   Does the patient have transportation home? Yes   Transportation Anticipated family or friend will provide   Discharge Plan A Home   Discharge Plan B Home   DME Needed Upon Discharge  none   Patient/Family in Agreement with Plan yes   Does the patient have transportation to healthcare appointments? Yes   [facility] on [address]

## 2020-05-17 NOTE — H&P
[facility] Hospital Medicine  History & Physical    Patient Name: Keon Schneider Jr.  MRN: [id number]  Admission Date: 5/16/2020  Attending Physician: Radha Montano MD   Primary Care Provider: Johnie Gutierrez MD         Patient information was obtained from patient and ER records.     Subjective:     Principal Problem:Diverticulitis of sigmoid colon    Chief Complaint:   Chief Complaint   Patient presents with    Abdominal Pain     Patient c/o LLQ abd pain x 5 days.  Reports hx of Diverticulitis.  Denies n/v, diarrhea, urinary symptoms, or fever.        HPI: This is a 48 y/o male with medical history of recurrent diverticulitis, asthma, anxiety, hip arthroplasty, GERD, hx of cocaine abuse and chronic use of alprazolam who presents to the hospital with a complaint of worsening LLQ abdominal pain for 5 days. Patient describes the pain is dull aching and sometime feel like cramping on LLQ and not radiating and intensity 10/10 . Pain worsens with moving/turing and when apply pressure. Patient denies any fever, chill, chest pain, N/V/D, dysuria or blood in urine/stool. Last BM was yesterday and normal. He has hx of diverticulitis and he states this is the 4th time happened to him for the last 2 years. He reports today pain is similar with the pain he got when he had diverticulitis in the past.    In ED, CT abdominal pelvic with contrast shows acute sigmoid diverticulitis, hepatic steatosis, hepatomegaly, and 3 mm left lower lobe pulmonary nodule. No COVID test result.    Labs with leukocytosis WBC of 12. Unremarkable CMP and normal lipase and normal lactate. UA no infection    Patient is admitted to inpatient with hospital medicine for further evaluation of diverticulitis.      Past Medical History:   Diagnosis Date    Anxiety     Arthritis     Asthma     Avascular necrosis     Carpal tunnel syndrome     Chronic pain     Depression     Diverticulitis     Other injury of other sites of  trunk 2011    Pneumonia     Spondylolisthesis     lumbar; myofascial pain    Staph infection     Ulnar neuropathy     left and rt arm       Past Surgical History:   Procedure Laterality Date    HIP SURGERY  3/06;     hip arthroplasty    HIP SURGERY      bilateral hip replacement    JOINT REPLACEMENT      SHOULDER SURGERY  2012    right shoulder    SHOULDER SURGERY         Review of patient's allergies indicates:   Allergen Reactions    Toradol [ketorolac] Hives and Nausea And Vomiting       No current facility-administered medications on file prior to encounter.      Current Outpatient Medications on File Prior to Encounter   Medication Sig    alprazolam (XANAX) 2 MG Tab Take 1 tablet (2 mg total) by mouth daily as needed (back pain).    famotidine (PEPCID) 40 MG tablet     amoxicillin-clavulanate 875-125mg (AUGMENTIN) 875-125 mg per tablet Take 1 tablet by mouth 2 (two) times daily.    betamethasone dipropionate (DIPROLENE) 0.05 % cream Apply topically 2 (two) times daily.    cetirizine (ZYRTEC) 10 MG tablet Take 1 tablet (10 mg total) by mouth once daily. for 15 days    oxyCODONE-acetaminophen (PERCOCET) 5-325 mg per tablet Take 1 tablet by mouth every 4 (four) hours as needed for Pain.    pantoprazole (PROTONIX) 40 MG tablet Take 1 tablet (40 mg total) by mouth 2 (two) times daily.     Family History     Problem Relation (Age of Onset)    Cancer Mother        Tobacco Use    Smoking status: Former Smoker     Packs/day: 1.00     Years: 10.00     Pack years: 10.00     Last attempt to quit: 10/13/2012     Years since quittin.5    Smokeless tobacco: Never Used   Substance and Sexual Activity    Alcohol use: Yes     Alcohol/week: 12.0 standard drinks     Types: 12 Cans of beer per week     Comment: on the weekend    Drug use: No    Sexual activity: Yes     Partners: Female     Review of Systems   Constitutional: Negative for appetite change, chills, fatigue and fever.   HENT:  Negative for congestion and sore throat.    Eyes: Negative for visual disturbance.   Respiratory: Negative for apnea, cough, chest tightness, shortness of breath and wheezing.    Cardiovascular: Negative for chest pain, palpitations and leg swelling.   Gastrointestinal: Positive for abdominal distention and abdominal pain (dull/aching constant pain at LLQ with intensity 10/10). Negative for blood in stool, diarrhea, nausea and vomiting.   Genitourinary: Negative for flank pain and hematuria.   Musculoskeletal: Negative for myalgias.   Skin: Negative.    Neurological: Positive for dizziness. Negative for syncope, weakness and headaches.   Psychiatric/Behavioral: Negative for confusion.     Objective:     Vital Signs (Most Recent):  Temp: 98.7 °F (37.1 °C) (05/16/20 1953)  Pulse: 90 (05/17/20 0129)  Resp: 18 (05/17/20 0129)  BP: 132/78 (05/17/20 0129)  SpO2: 96 % (05/17/20 0129) Vital Signs (24h Range):  Temp:  [98.7 °F (37.1 °C)] 98.7 °F (37.1 °C)  Pulse:  [] 90  Resp:  [18] 18  SpO2:  [96 %-97 %] 96 %  BP: (132-142)/(76-86) 132/78     Weight: 97.5 kg (215 lb)  Body mass index is 31.75 kg/m².    Physical Exam   Constitutional: He is oriented to person, place, and time. He appears well-developed and well-nourished.   HENT:   Head: Normocephalic and atraumatic.   Mouth/Throat: Oropharynx is clear and moist.   Eyes: Pupils are equal, round, and reactive to light.   Neck: Normal range of motion. No JVD present.   Cardiovascular: Normal rate and regular rhythm.   Pulmonary/Chest: Effort normal and breath sounds normal. He has no wheezes. He has no rales. He exhibits no tenderness.   Abdominal: Soft. Bowel sounds are normal. There is tenderness (tenderness at LLQ. Worse with pressure. rate 7/10 on exam). There is no rebound and no guarding.   Negative for Monet's sign. Negative or Rovsing's sign.   Musculoskeletal: Normal range of motion. He exhibits no edema.   Neurological: He is alert and oriented to person,  place, and time.   Skin: Skin is warm and dry. Capillary refill takes less than 2 seconds.   Psychiatric: He has a normal mood and affect.         CRANIAL NERVES     CN III, IV, VI   Pupils are equal, round, and reactive to light.       Significant Labs:   CBC:   Recent Labs   Lab 05/16/20 2043   WBC 12.71*   HGB 13.1*   HCT 39.0*        CMP:   Recent Labs   Lab 05/16/20 2043   *   K 4.1      CO2 20*   GLU 94   BUN 16   CREATININE 1.0   CALCIUM 9.7   PROT 7.9   ALBUMIN 4.1   BILITOT 0.6   ALKPHOS 62   AST 28   ALT 28   ANIONGAP 12   EGFRNONAA >60     Cardiac Markers: No results for input(s): CKMB, MYOGLOBIN, BNP, TROPISTAT in the last 48 hours.  Lipase:   Recent Labs   Lab 05/16/20 2043   LIPASE 14     POCT Glucose: No results for input(s): POCTGLUCOSE in the last 48 hours.  Troponin: No results for input(s): TROPONINI in the last 48 hours.  Urine Studies:   Recent Labs   Lab 05/16/20 2054   COLORU Straw   APPEARANCEUA Clear   PHUR 6.0   SPECGRAV 1.005   PROTEINUA Negative   GLUCUA Negative   KETONESU Negative   BILIRUBINUA Negative   OCCULTUA Negative   NITRITE Negative   UROBILINOGEN Negative   LEUKOCYTESUR Negative       Significant Imaging: I have reviewed and interpreted all pertinent imaging results/findings within the past 24 hours.    Assessment/Plan:     * Diverticulitis of sigmoid colon  C/o LLQ pain x 5 days. Denies any N/V/D. Last BM was yesterday and normal. Leukocytosis WBC 12. Denies fever. CT abd with acute sigmoid diverticulitis. Hx recurrent diverticulitis. Patient reports this is the 4th time for him to have this problem in 2 years. Last admission for his diverticulitis was 10/2019 and was treated with Cipro/Flagyl. Of note, the patient just recently finished his Augmentin abx course for dog bite last month 4/13/2020.    - Zosyn started in ED - will continue  - Bowel rest NPO  - IVF  - General surgery consult for recurrent diverticulitis  - Control pain      Hepatic  steatosis  CT shows hepatic steatosis and hepatomegaly. AST/ALT/Lipase normal. Not on any statin      Lab Results   Component Value Date    CHOL 161 03/25/2018    CHOL 197 08/25/2016    CHOL 207 (H) 08/12/2011     Lab Results   Component Value Date    HDL 44 03/25/2018    HDL 41 08/25/2016    HDL 33 (L) 08/12/2011     Lab Results   Component Value Date    LDLCALC 92.4 03/25/2018    LDLCALC 132.6 08/25/2016    LDLCALC 152.4 08/12/2011     Lab Results   Component Value Date    TRIG 123 03/25/2018    TRIG 117 08/25/2016    TRIG 108 08/12/2011     Lab Results   Component Value Date    CHOLHDL 27.3 03/25/2018    CHOLHDL 20.8 08/25/2016    CHOLHDL 15.9 (L) 08/12/2011     - Lipid panel/Hepatitis panel pending    Anxiety  On home Xanax.  reviewed. Last filled date was 4/28/2020  - May resume home Xanax if needed      GERD (gastroesophageal reflux disease)  On home Pepcid 40 mg.  - Protonix while inpatient      Asthma  No wheezing on exam. Patient reports he doesn't need inhaler at home. No asthma flare up      VTE Risk Mitigation (From admission, onward)         Ordered     enoxaparin injection 40 mg  Daily      05/17/20 0252     IP VTE HIGH RISK PATIENT  Once      05/17/20 0249     Place sequential compression device  Until discontinued      05/17/20 0249                   Alfred Willams NP  Department of Hospital Medicine   [facility]

## 2020-05-18 LAB
ALBUMIN SERPL BCP-MCNC: 3.5 G/DL (ref 3.5–5.2)
ALP SERPL-CCNC: 61 U/L (ref 55–135)
ALT SERPL W/O P-5'-P-CCNC: 22 U/L (ref 10–44)
ANION GAP SERPL CALC-SCNC: 8 MMOL/L (ref 8–16)
AST SERPL-CCNC: 19 U/L (ref 10–40)
BASOPHILS # BLD AUTO: 0.04 K/UL (ref 0–0.2)
BASOPHILS NFR BLD: 0.5 % (ref 0–1.9)
BILIRUB SERPL-MCNC: 0.5 MG/DL (ref 0.1–1)
BUN SERPL-MCNC: 10 MG/DL (ref 6–20)
CALCIUM SERPL-MCNC: 8.5 MG/DL (ref 8.7–10.5)
CHLORIDE SERPL-SCNC: 102 MMOL/L (ref 95–110)
CO2 SERPL-SCNC: 24 MMOL/L (ref 23–29)
CREAT SERPL-MCNC: 1 MG/DL (ref 0.5–1.4)
DIFFERENTIAL METHOD: ABNORMAL
EOSINOPHIL # BLD AUTO: 0.3 K/UL (ref 0–0.5)
EOSINOPHIL NFR BLD: 3.5 % (ref 0–8)
ERYTHROCYTE [DISTWIDTH] IN BLOOD BY AUTOMATED COUNT: 12.6 % (ref 11.5–14.5)
EST. GFR  (AFRICAN AMERICAN): >60 ML/MIN/1.73 M^2
EST. GFR  (NON AFRICAN AMERICAN): >60 ML/MIN/1.73 M^2
ETHANOL SERPL-MCNC: <10 MG/DL
GLUCOSE SERPL-MCNC: 80 MG/DL (ref 70–110)
HAV IGM SERPL QL IA: NEGATIVE
HBV CORE IGM SERPL QL IA: NEGATIVE
HBV SURFACE AG SERPL QL IA: NEGATIVE
HCT VFR BLD AUTO: 36.5 % (ref 40–54)
HCV AB SERPL QL IA: NEGATIVE
HGB BLD-MCNC: 11.9 G/DL (ref 14–18)
IMM GRANULOCYTES # BLD AUTO: 0.02 K/UL (ref 0–0.04)
IMM GRANULOCYTES NFR BLD AUTO: 0.2 % (ref 0–0.5)
LYMPHOCYTES # BLD AUTO: 1.7 K/UL (ref 1–4.8)
LYMPHOCYTES NFR BLD: 19.3 % (ref 18–48)
MCH RBC QN AUTO: 31.9 PG (ref 27–31)
MCHC RBC AUTO-ENTMCNC: 32.6 G/DL (ref 32–36)
MCV RBC AUTO: 98 FL (ref 82–98)
MONOCYTES # BLD AUTO: 0.9 K/UL (ref 0.3–1)
MONOCYTES NFR BLD: 10.3 % (ref 4–15)
NEUTROPHILS # BLD AUTO: 5.8 K/UL (ref 1.8–7.7)
NEUTROPHILS NFR BLD: 66.2 % (ref 38–73)
NRBC BLD-RTO: 0 /100 WBC
PLATELET # BLD AUTO: 211 K/UL (ref 150–350)
PMV BLD AUTO: 8.9 FL (ref 9.2–12.9)
POTASSIUM SERPL-SCNC: 4.1 MMOL/L (ref 3.5–5.1)
PROT SERPL-MCNC: 7 G/DL (ref 6–8.4)
RBC # BLD AUTO: 3.73 M/UL (ref 4.6–6.2)
SODIUM SERPL-SCNC: 134 MMOL/L (ref 136–145)
WBC # BLD AUTO: 8.82 K/UL (ref 3.9–12.7)

## 2020-05-18 PROCEDURE — 12000002 HC ACUTE/MED SURGE SEMI-PRIVATE ROOM

## 2020-05-18 PROCEDURE — 25000003 PHARM REV CODE 250: Performed by: EMERGENCY MEDICINE

## 2020-05-18 PROCEDURE — 63600175 PHARM REV CODE 636 W HCPCS: Performed by: EMERGENCY MEDICINE

## 2020-05-18 PROCEDURE — 80320 DRUG SCREEN QUANTALCOHOLS: CPT

## 2020-05-18 PROCEDURE — 36415 COLL VENOUS BLD VENIPUNCTURE: CPT

## 2020-05-18 PROCEDURE — 25000003 PHARM REV CODE 250: Performed by: NURSE PRACTITIONER

## 2020-05-18 PROCEDURE — 63600175 PHARM REV CODE 636 W HCPCS: Performed by: SURGERY

## 2020-05-18 PROCEDURE — 85025 COMPLETE CBC W/AUTO DIFF WBC: CPT

## 2020-05-18 PROCEDURE — 80053 COMPREHEN METABOLIC PANEL: CPT

## 2020-05-18 PROCEDURE — 63600175 PHARM REV CODE 636 W HCPCS: Performed by: FAMILY MEDICINE

## 2020-05-18 PROCEDURE — 25000003 PHARM REV CODE 250: Performed by: FAMILY MEDICINE

## 2020-05-18 RX ORDER — HYDROMORPHONE HYDROCHLORIDE 2 MG/ML
1 INJECTION, SOLUTION INTRAMUSCULAR; INTRAVENOUS; SUBCUTANEOUS EVERY 4 HOURS PRN
Status: DISCONTINUED | OUTPATIENT
Start: 2020-05-18 | End: 2020-05-19

## 2020-05-18 RX ADMIN — PIPERACILLIN SODIUM AND TAZOBACTAM SODIUM 4.5 G: 4; .5 INJECTION, POWDER, LYOPHILIZED, FOR SOLUTION INTRAVENOUS at 06:05

## 2020-05-18 RX ADMIN — PIPERACILLIN SODIUM AND TAZOBACTAM SODIUM 4.5 G: 4; .5 INJECTION, POWDER, LYOPHILIZED, FOR SOLUTION INTRAVENOUS at 11:05

## 2020-05-18 RX ADMIN — HYDROMORPHONE HYDROCHLORIDE 1 MG: 2 INJECTION INTRAMUSCULAR; INTRAVENOUS; SUBCUTANEOUS at 07:05

## 2020-05-18 RX ADMIN — ALPRAZOLAM 2 MG: 0.5 TABLET ORAL at 10:05

## 2020-05-18 RX ADMIN — HYDROMORPHONE HYDROCHLORIDE 1 MG: 2 INJECTION INTRAMUSCULAR; INTRAVENOUS; SUBCUTANEOUS at 02:05

## 2020-05-18 RX ADMIN — PANTOPRAZOLE SODIUM 40 MG: 40 TABLET, DELAYED RELEASE ORAL at 10:05

## 2020-05-18 RX ADMIN — HYDROMORPHONE HYDROCHLORIDE 1 MG: 2 INJECTION INTRAMUSCULAR; INTRAVENOUS; SUBCUTANEOUS at 06:05

## 2020-05-18 RX ADMIN — PIPERACILLIN SODIUM AND TAZOBACTAM SODIUM 4.5 G: 4; .5 INJECTION, POWDER, LYOPHILIZED, FOR SOLUTION INTRAVENOUS at 03:05

## 2020-05-18 RX ADMIN — HYDROMORPHONE HYDROCHLORIDE 1 MG: 2 INJECTION INTRAMUSCULAR; INTRAVENOUS; SUBCUTANEOUS at 03:05

## 2020-05-18 RX ADMIN — SODIUM CHLORIDE: 0.9 INJECTION, SOLUTION INTRAVENOUS at 10:05

## 2020-05-18 RX ADMIN — SODIUM CHLORIDE: 0.9 INJECTION, SOLUTION INTRAVENOUS at 01:05

## 2020-05-18 RX ADMIN — HYDROMORPHONE HYDROCHLORIDE 1 MG: 2 INJECTION INTRAMUSCULAR; INTRAVENOUS; SUBCUTANEOUS at 11:05

## 2020-05-18 RX ADMIN — HYDROMORPHONE HYDROCHLORIDE 1 MG: 2 INJECTION INTRAMUSCULAR; INTRAVENOUS; SUBCUTANEOUS at 10:05

## 2020-05-18 NOTE — ASSESSMENT & PLAN NOTE
-CT abd with acute sigmoid diverticulitis. Hx recurrent diverticulitis. Patient reports this is the 4th time for him to have this problem in 2 years. Last admission for his diverticulitis was 10/2019 and was treated with Cipro/Flagyl. Of note, the patient just recently finished his Augmentin abx course for dog bite last month 4/13/2020.  - Zosyn started in ED - continue  - IVF  - General surgery consult for recurrent diverticulitis.  Surgery deferred till after infection clears  - Control pain  -Clear liquid diet started today  -Dc home on oral antibiotics when tolerates solid diet and surgery clears for DC

## 2020-05-18 NOTE — PROGRESS NOTES
[facility] Medicine  Progress Note    Patient Name: Keon Schneider Jr.  MRN: [id number]  Patient Class: IP- Inpatient   Admission Date: 5/16/2020  Length of Stay: 1 days  Attending Physician: Brendon Forman MD  Primary Care Provider: Johnie Gutierrez MD        Subjective:     Principal Problem:Diverticulitis of sigmoid colon        HPI:  This is a 50 y/o male with medical history of recurrent diverticulitis, asthma, anxiety, hip arthroplasty, GERD, hx of cocaine abuse and chronic use of alprazolam who presents to the hospital with a complaint of worsening LLQ abdominal pain for 5 days. Patient describes the pain is dull aching and sometime feel like cramping on LLQ and not radiating and intensity 10/10 . Pain worsens with moving/turing and when apply pressure. Patient denies any fever, chill, chest pain, N/V/D, dysuria or blood in urine/stool. Last BM was yesterday and normal. He has hx of diverticulitis and he states this is the 4th time happened to him for the last 2 years. He reports today pain is similar with the pain he got when he had diverticulitis in the past.    In ED, CT abdominal pelvic with contrast shows acute sigmoid diverticulitis, hepatic steatosis, hepatomegaly, and 3 mm left lower lobe pulmonary nodule. No COVID test result.    Labs with leukocytosis WBC of 12. Unremarkable CMP and normal lipase and normal lactate. UA no infection    Patient is admitted to inpatient with hospital medicine for further evaluation of diverticulitis.      Overview/Hospital Course:  Pt admitted for recurrent diverticulitis (4th episode) in 2 years.  Surgery was consulted and elective surgery option chosen after resolution of infection.  Pt improved on IV antibiotics and started on clears by surgery.      Interval History: Pt stable, in NAD.  Denies any new complaints.  Pain is better.  Started on clears by surgery.      Review of Systems  Objective:     Vital Signs (Most Recent):  Temp: 97.4 °F  (36.3 °C) (05/18/20 1522)  Pulse: 70 (05/18/20 1522)  Resp: 17 (05/18/20 1522)  BP: (!) 140/82 (05/18/20 1522)  SpO2: 95 % (05/18/20 1522) Vital Signs (24h Range):  Temp:  [97.4 °F (36.3 °C)-98.1 °F (36.7 °C)] 97.4 °F (36.3 °C)  Pulse:  [70-87] 70  Resp:  [17-18] 17  SpO2:  [94 %-98 %] 95 %  BP: (111-140)/(67-82) 140/82     Weight: 100.1 kg (220 lb 10.9 oz)  Body mass index is 32.59 kg/m².  No intake or output data in the 24 hours ending 05/18/20 1556   Physical Exam   Constitutional: He is oriented to person, place, and time.   Cardiovascular: Normal rate, regular rhythm and normal heart sounds.   No murmur heard.  Pulmonary/Chest: Effort normal and breath sounds normal. No respiratory distress. He has no wheezes. He has no rales.   Abdominal: Soft. Bowel sounds are normal. He exhibits no distension. There is tenderness.   Musculoskeletal: He exhibits no edema.   Neurological: He is alert and oriented to person, place, and time.             Assessment/Plan:      * Diverticulitis of sigmoid colon  -CT abd with acute sigmoid diverticulitis. Hx recurrent diverticulitis. Patient reports this is the 4th time for him to have this problem in 2 years. Last admission for his diverticulitis was 10/2019 and was treated with Cipro/Flagyl. Of note, the patient just recently finished his Augmentin abx course for dog bite last month 4/13/2020.  - Zosyn started in ED - continue  - IVF  - General surgery consult for recurrent diverticulitis.  Surgery deferred till after infection clears  - Control pain  -Clear liquid diet started today  -Dc home on oral antibiotics when tolerates solid diet and surgery clears for DC      GERD (gastroesophageal reflux disease)  On home Pepcid 40 mg.  - Protonix while inpatient      Hepatic steatosis  CT shows hepatic steatosis and hepatomegaly. AST/ALT/Lipase normal. Not on any statin      Lab Results   Component Value Date    CHOL 161 03/25/2018    CHOL 197 08/25/2016    CHOL 207 (H) 08/12/2011      Lab Results   Component Value Date    HDL 44 03/25/2018    HDL 41 08/25/2016    HDL 33 (L) 08/12/2011     Lab Results   Component Value Date    LDLCALC 92.4 03/25/2018    LDLCALC 132.6 08/25/2016    LDLCALC 152.4 08/12/2011     Lab Results   Component Value Date    TRIG 123 03/25/2018    TRIG 117 08/25/2016    TRIG 108 08/12/2011     Lab Results   Component Value Date    CHOLHDL 27.3 03/25/2018    CHOLHDL 20.8 08/25/2016    CHOLHDL 15.9 (L) 08/12/2011     - Lipid panel/Hepatitis panel pending    Asthma  No wheezing on exam. Patient reports he doesn't need inhaler at home. No asthma flare up      Anxiety  On home Xanax.  reviewed. Last filled date was 4/28/2020  - May resume home Xanax if needed        VTE Risk Mitigation (From admission, onward)         Ordered     enoxaparin injection 40 mg  Daily      05/17/20 0252     IP VTE HIGH RISK PATIENT  Once      05/17/20 0249     Place sequential compression device  Until discontinued      05/17/20 0249                      Brendon Forman MD  Department of Hospital Medicine   [facility]

## 2020-05-18 NOTE — HOSPITAL COURSE
This is a 50 y/o male with medical history of recurrent diverticulitis, asthma, anxiety, hip arthroplasty, GERD, hx of cocaine abuse and chronic use of alprazolam who presents to the hospital with a complaint of worsening LLQ abdominal pain for 5 days. Patient describes the pain is dull aching and sometime feel like cramping on LLQ and not radiating and intensity 10/10 . Pain worsens with moving/turing and when apply pressure. Patient denies any fever, chill, chest pain, N/V/D, dysuria or blood in urine/stool. Last BM was day before admission,and normal. He has hx of diverticulitis and he states this is the 4th time happened to him for the last 2 years. He reports  pain is similar with the pain he got when he had diverticulitis in the past.    In ED, CT abdominal pelvic with contrast shows acute sigmoid diverticulitis, hepatic steatosis, hepatomegaly, and 3 mm left lower lobe pulmonary nodule. No COVID test result.    Labs with leukocytosis WBC of 12. Unremarkable CMP and normal lipase and normal lactate. UA no infection  Pt admitted for recurrent diverticulitis (4th episode) in 2 years.  Surgery was consulted and elective surgery option chosen after resolution of infection.  Pt improved on IV antibiotics and started on clears by surgery. His diet is advanced by surgery,but he still was complaining of pain.unfortunately patient decide leave AMA.

## 2020-05-18 NOTE — PROGRESS NOTES
"gen surg  Hungry, having bms, less RLQ pain  Blood pressure 126/78, pulse 87, temperature 97.9 °F (36.6 °C), temperature source Oral, resp. rate 18, height 5' 9" (1.753 m), weight 100.1 kg (220 lb 10.9 oz), SpO2 98 %.  abd soft, bs present, nd, less tender LLQ but still guarding, no mass  A/p recurrent sigmoid diverticulitis- ok for clears, cont iv abx, improving, hopefully convert to elective resection  "

## 2020-05-18 NOTE — SUBJECTIVE & OBJECTIVE
Interval History: Pt stable, in NAD.  Denies any new complaints.  Pain is better.  Started on clears by surgery.      Review of Systems  Objective:     Vital Signs (Most Recent):  Temp: 97.4 °F (36.3 °C) (05/18/20 1522)  Pulse: 70 (05/18/20 1522)  Resp: 17 (05/18/20 1522)  BP: (!) 140/82 (05/18/20 1522)  SpO2: 95 % (05/18/20 1522) Vital Signs (24h Range):  Temp:  [97.4 °F (36.3 °C)-98.1 °F (36.7 °C)] 97.4 °F (36.3 °C)  Pulse:  [70-87] 70  Resp:  [17-18] 17  SpO2:  [94 %-98 %] 95 %  BP: (111-140)/(67-82) 140/82     Weight: 100.1 kg (220 lb 10.9 oz)  Body mass index is 32.59 kg/m².  No intake or output data in the 24 hours ending 05/18/20 1556   Physical Exam   Constitutional: He is oriented to person, place, and time.   Cardiovascular: Normal rate, regular rhythm and normal heart sounds.   No murmur heard.  Pulmonary/Chest: Effort normal and breath sounds normal. No respiratory distress. He has no wheezes. He has no rales.   Abdominal: Soft. Bowel sounds are normal. He exhibits no distension. There is tenderness.   Musculoskeletal: He exhibits no edema.   Neurological: He is alert and oriented to person, place, and time.

## 2020-05-19 VITALS
HEART RATE: 69 BPM | RESPIRATION RATE: 16 BRPM | BODY MASS INDEX: 31.87 KG/M2 | OXYGEN SATURATION: 97 % | HEIGHT: 69 IN | SYSTOLIC BLOOD PRESSURE: 122 MMHG | TEMPERATURE: 98 F | DIASTOLIC BLOOD PRESSURE: 90 MMHG | WEIGHT: 215.19 LBS

## 2020-05-19 PROBLEM — J45.20 MILD INTERMITTENT ASTHMA WITHOUT COMPLICATION: Status: ACTIVE | Noted: 2020-05-17

## 2020-05-19 LAB
ALBUMIN SERPL BCP-MCNC: 3.7 G/DL (ref 3.5–5.2)
ALP SERPL-CCNC: 51 U/L (ref 55–135)
ALT SERPL W/O P-5'-P-CCNC: 18 U/L (ref 10–44)
ANION GAP SERPL CALC-SCNC: 8 MMOL/L (ref 8–16)
AST SERPL-CCNC: 21 U/L (ref 10–40)
BASOPHILS # BLD AUTO: 0.03 K/UL (ref 0–0.2)
BASOPHILS NFR BLD: 0.5 % (ref 0–1.9)
BILIRUB SERPL-MCNC: 0.4 MG/DL (ref 0.1–1)
BUN SERPL-MCNC: 8 MG/DL (ref 6–20)
CALCIUM SERPL-MCNC: 8.9 MG/DL (ref 8.7–10.5)
CHLORIDE SERPL-SCNC: 106 MMOL/L (ref 95–110)
CO2 SERPL-SCNC: 25 MMOL/L (ref 23–29)
CREAT SERPL-MCNC: 1 MG/DL (ref 0.5–1.4)
DIFFERENTIAL METHOD: ABNORMAL
EOSINOPHIL # BLD AUTO: 0.4 K/UL (ref 0–0.5)
EOSINOPHIL NFR BLD: 5.9 % (ref 0–8)
ERYTHROCYTE [DISTWIDTH] IN BLOOD BY AUTOMATED COUNT: 12.5 % (ref 11.5–14.5)
EST. GFR  (AFRICAN AMERICAN): >60 ML/MIN/1.73 M^2
EST. GFR  (NON AFRICAN AMERICAN): >60 ML/MIN/1.73 M^2
GLUCOSE SERPL-MCNC: 88 MG/DL (ref 70–110)
HCT VFR BLD AUTO: 36 % (ref 40–54)
HGB BLD-MCNC: 11.9 G/DL (ref 14–18)
IMM GRANULOCYTES # BLD AUTO: 0.02 K/UL (ref 0–0.04)
IMM GRANULOCYTES NFR BLD AUTO: 0.3 % (ref 0–0.5)
LYMPHOCYTES # BLD AUTO: 1.5 K/UL (ref 1–4.8)
LYMPHOCYTES NFR BLD: 23.7 % (ref 18–48)
MCH RBC QN AUTO: 31.6 PG (ref 27–31)
MCHC RBC AUTO-ENTMCNC: 33.1 G/DL (ref 32–36)
MCV RBC AUTO: 96 FL (ref 82–98)
MONOCYTES # BLD AUTO: 0.9 K/UL (ref 0.3–1)
MONOCYTES NFR BLD: 13.7 % (ref 4–15)
NEUTROPHILS # BLD AUTO: 3.6 K/UL (ref 1.8–7.7)
NEUTROPHILS NFR BLD: 55.9 % (ref 38–73)
NRBC BLD-RTO: 0 /100 WBC
PLATELET # BLD AUTO: 224 K/UL (ref 150–350)
PMV BLD AUTO: 9.5 FL (ref 9.2–12.9)
POTASSIUM SERPL-SCNC: 4.2 MMOL/L (ref 3.5–5.1)
PROT SERPL-MCNC: 7.3 G/DL (ref 6–8.4)
RBC # BLD AUTO: 3.77 M/UL (ref 4.6–6.2)
SODIUM SERPL-SCNC: 139 MMOL/L (ref 136–145)
WBC # BLD AUTO: 6.49 K/UL (ref 3.9–12.7)

## 2020-05-19 PROCEDURE — 25000003 PHARM REV CODE 250: Performed by: SURGERY

## 2020-05-19 PROCEDURE — 63600175 PHARM REV CODE 636 W HCPCS: Performed by: SURGERY

## 2020-05-19 PROCEDURE — 63600175 PHARM REV CODE 636 W HCPCS: Performed by: EMERGENCY MEDICINE

## 2020-05-19 PROCEDURE — 25000003 PHARM REV CODE 250: Performed by: EMERGENCY MEDICINE

## 2020-05-19 PROCEDURE — 85025 COMPLETE CBC W/AUTO DIFF WBC: CPT

## 2020-05-19 PROCEDURE — 80053 COMPREHEN METABOLIC PANEL: CPT

## 2020-05-19 PROCEDURE — 25000003 PHARM REV CODE 250: Performed by: NURSE PRACTITIONER

## 2020-05-19 RX ORDER — HYDROCODONE BITARTRATE AND ACETAMINOPHEN 10; 325 MG/1; MG/1
1 TABLET ORAL EVERY 4 HOURS PRN
Status: DISCONTINUED | OUTPATIENT
Start: 2020-05-19 | End: 2020-05-19 | Stop reason: HOSPADM

## 2020-05-19 RX ORDER — HYDROMORPHONE HYDROCHLORIDE 2 MG/ML
1 INJECTION, SOLUTION INTRAMUSCULAR; INTRAVENOUS; SUBCUTANEOUS EVERY 4 HOURS PRN
Status: DISCONTINUED | OUTPATIENT
Start: 2020-05-19 | End: 2020-05-19 | Stop reason: HOSPADM

## 2020-05-19 RX ADMIN — PANTOPRAZOLE SODIUM 40 MG: 40 TABLET, DELAYED RELEASE ORAL at 11:05

## 2020-05-19 RX ADMIN — HYDROMORPHONE HYDROCHLORIDE 1 MG: 2 INJECTION INTRAMUSCULAR; INTRAVENOUS; SUBCUTANEOUS at 06:05

## 2020-05-19 RX ADMIN — HYDROCODONE BITARTRATE AND ACETAMINOPHEN 1 TABLET: 10; 325 TABLET ORAL at 11:05

## 2020-05-19 RX ADMIN — HYDROMORPHONE HYDROCHLORIDE 1 MG: 2 INJECTION INTRAMUSCULAR; INTRAVENOUS; SUBCUTANEOUS at 03:05

## 2020-05-19 RX ADMIN — PIPERACILLIN SODIUM AND TAZOBACTAM SODIUM 4.5 G: 4; .5 INJECTION, POWDER, LYOPHILIZED, FOR SOLUTION INTRAVENOUS at 03:05

## 2020-05-19 RX ADMIN — HYDROCODONE BITARTRATE AND ACETAMINOPHEN 1 TABLET: 10; 325 TABLET ORAL at 03:05

## 2020-05-19 RX ADMIN — PIPERACILLIN SODIUM AND TAZOBACTAM SODIUM 4.5 G: 4; .5 INJECTION, POWDER, LYOPHILIZED, FOR SOLUTION INTRAVENOUS at 06:05

## 2020-05-19 NOTE — NURSING
Remains talkative and pacing in room but  easily directed.  Tolerating pain meds well. No significant events throughout night.

## 2020-05-19 NOTE — PROGRESS NOTES
Pt heard moving around room. He called for more pain medication. When I went into room this pt was seen turning head quickly and pretending to be asleep (Snoring). It had been one minute to his request. I simply informed him it was too early for the next dose of pain medication. He said I hope the doctor don't forget to write my pain medication for home.

## 2020-05-19 NOTE — SUBJECTIVE & OBJECTIVE
Interval History: still complain of pain.    Review of Systems   Constitutional: Positive for appetite change. Negative for activity change.   HENT: Negative for congestion and drooling.    Eyes: Negative for pain.   Gastrointestinal: Positive for abdominal pain.   Endocrine: Negative for heat intolerance.   Genitourinary: Negative for difficulty urinating.   Musculoskeletal: Negative for arthralgias and back pain.   Neurological: Negative for dizziness and facial asymmetry.     Objective:     Vital Signs (Most Recent):  Temp: 98.1 °F (36.7 °C) (05/19/20 0800)  Pulse: 67 (05/19/20 0800)  Resp: 16 (05/19/20 0800)  BP: 128/85 (05/19/20 0800)  SpO2: 97 % (05/19/20 0800) Vital Signs (24h Range):  Temp:  [97.4 °F (36.3 °C)-98.4 °F (36.9 °C)] 98.1 °F (36.7 °C)  Pulse:  [67-81] 67  Resp:  [16-20] 16  SpO2:  [95 %-97 %] 97 %  BP: (122-142)/(81-92) 128/85     Weight: 97.6 kg (215 lb 2.7 oz)  Body mass index is 31.77 kg/m².    Intake/Output Summary (Last 24 hours) at 5/19/2020 0920  Last data filed at 5/18/2020 1800  Gross per 24 hour   Intake 5100 ml   Output --   Net 5100 ml      Physical Exam   HENT:   Head: Atraumatic.   Eyes: EOM are normal.   Neck: Neck supple.   Cardiovascular: Normal rate and regular rhythm.   Pulmonary/Chest: Effort normal and breath sounds normal.   Abdominal: Soft. There is tenderness.   Musculoskeletal: Normal range of motion. He exhibits no deformity.   Neurological: No cranial nerve deficit. Coordination normal.       Significant Labs:   CBC:   Recent Labs   Lab 05/18/20  0345 05/19/20  0509   WBC 8.82 6.49   HGB 11.9* 11.9*   HCT 36.5* 36.0*    224     CMP:   Recent Labs   Lab 05/18/20  0345 05/19/20  0509   * 139   K 4.1 4.2    106   CO2 24 25   GLU 80 88   BUN 10 8   CREATININE 1.0 1.0   CALCIUM 8.5* 8.9   PROT 7.0 7.3   ALBUMIN 3.5 3.7   BILITOT 0.5 0.4   ALKPHOS 61 51*   AST 19 21   ALT 22 18   ANIONGAP 8 8   EGFRNONAA >60 >60       Significant Imaging:reviewed.

## 2020-05-19 NOTE — PROGRESS NOTES
[facility] Hospital Medicine  Progress Note    Patient Name: Keon Schneider Jr.  MRN: [id number]  Patient Class: IP- Inpatient   Admission Date: 5/16/2020  Length of Stay: 2 days  Attending Physician: Jim Villarreal MD  Primary Care Provider: Johnie Gutierrez MD        Subjective:     Principal Problem:Diverticulitis of sigmoid colon        HPI:  This is a 50 y/o male with medical history of recurrent diverticulitis, asthma, anxiety, hip arthroplasty, GERD, hx of cocaine abuse and chronic use of alprazolam who presents to the hospital with a complaint of worsening LLQ abdominal pain for 5 days. Patient describes the pain is dull aching and sometime feel like cramping on LLQ and not radiating and intensity 10/10 . Pain worsens with moving/turing and when apply pressure. Patient denies any fever, chill, chest pain, N/V/D, dysuria or blood in urine/stool. Last BM was yesterday and normal. He has hx of diverticulitis and he states this is the 4th time happened to him for the last 2 years. He reports today pain is similar with the pain he got when he had diverticulitis in the past.    In ED, CT abdominal pelvic with contrast shows acute sigmoid diverticulitis, hepatic steatosis, hepatomegaly, and 3 mm left lower lobe pulmonary nodule. No COVID test result.    Labs with leukocytosis WBC of 12. Unremarkable CMP and normal lipase and normal lactate. UA no infection    Patient is admitted to inpatient with hospital medicine for further evaluation of diverticulitis.      Overview/Hospital Course:  Pt admitted for recurrent diverticulitis (4th episode) in 2 years.  Surgery was consulted and elective surgery option chosen after resolution of infection.  Pt improved on IV antibiotics and started on clears by surgery. His diet is advanced by surgery,but he still complain of pain.    Interval History: still complain of pain.    Review of Systems   Constitutional: Positive for appetite change. Negative for  activity change.   HENT: Negative for congestion and drooling.    Eyes: Negative for pain.   Gastrointestinal: Positive for abdominal pain.   Endocrine: Negative for heat intolerance.   Genitourinary: Negative for difficulty urinating.   Musculoskeletal: Negative for arthralgias and back pain.   Neurological: Negative for dizziness and facial asymmetry.     Objective:     Vital Signs (Most Recent):  Temp: 98.1 °F (36.7 °C) (05/19/20 0800)  Pulse: 67 (05/19/20 0800)  Resp: 16 (05/19/20 0800)  BP: 128/85 (05/19/20 0800)  SpO2: 97 % (05/19/20 0800) Vital Signs (24h Range):  Temp:  [97.4 °F (36.3 °C)-98.4 °F (36.9 °C)] 98.1 °F (36.7 °C)  Pulse:  [67-81] 67  Resp:  [16-20] 16  SpO2:  [95 %-97 %] 97 %  BP: (122-142)/(81-92) 128/85     Weight: 97.6 kg (215 lb 2.7 oz)  Body mass index is 31.77 kg/m².    Intake/Output Summary (Last 24 hours) at 5/19/2020 0920  Last data filed at 5/18/2020 1800  Gross per 24 hour   Intake 5100 ml   Output --   Net 5100 ml      Physical Exam   HENT:   Head: Atraumatic.   Eyes: EOM are normal.   Neck: Neck supple.   Cardiovascular: Normal rate and regular rhythm.   Pulmonary/Chest: Effort normal and breath sounds normal.   Abdominal: Soft. There is tenderness.   Musculoskeletal: Normal range of motion. He exhibits no deformity.   Neurological: No cranial nerve deficit. Coordination normal.       Significant Labs:   CBC:   Recent Labs   Lab 05/18/20  0345 05/19/20  0509   WBC 8.82 6.49   HGB 11.9* 11.9*   HCT 36.5* 36.0*    224     CMP:   Recent Labs   Lab 05/18/20  0345 05/19/20  0509   * 139   K 4.1 4.2    106   CO2 24 25   GLU 80 88   BUN 10 8   CREATININE 1.0 1.0   CALCIUM 8.5* 8.9   PROT 7.0 7.3   ALBUMIN 3.5 3.7   BILITOT 0.5 0.4   ALKPHOS 61 51*   AST 19 21   ALT 22 18   ANIONGAP 8 8   EGFRNONAA >60 >60       Significant Imaging:reviewed.      Assessment/Plan:      * Diverticulitis of sigmoid colon  -CT abd with acute sigmoid diverticulitis. Hx recurrent diverticulitis.  Patient reports this is the 4th time for him to have this problem in 2 years. Last admission for his diverticulitis was 10/2019 and was treated with Cipro/Flagyl. Of note, the patient just recently finished his Augmentin abx course for dog bite last month 4/13/2020.  - Zosyn started in ED - continue  - IVF  - General surgery consult for recurrent diverticulitis.  Surgery deferred till after infection clears  - Control pain   His diet is advanced by surgery,but he still complain of pain.      GERD (gastroesophageal reflux disease)  On home Pepcid 40 mg.  - Protonix while inpatient      Hepatic steatosis  CT shows hepatic steatosis and hepatomegaly. AST/ALT/Lipase normal. Not on any statin      Lab Results   Component Value Date    CHOL 161 03/25/2018    CHOL 197 08/25/2016    CHOL 207 (H) 08/12/2011     Lab Results   Component Value Date    HDL 44 03/25/2018    HDL 41 08/25/2016    HDL 33 (L) 08/12/2011     Lab Results   Component Value Date    LDLCALC 92.4 03/25/2018    LDLCALC 132.6 08/25/2016    LDLCALC 152.4 08/12/2011     Lab Results   Component Value Date    TRIG 123 03/25/2018    TRIG 117 08/25/2016    TRIG 108 08/12/2011     Lab Results   Component Value Date    CHOLHDL 27.3 03/25/2018    CHOLHDL 20.8 08/25/2016    CHOLHDL 15.9 (L) 08/12/2011     - Lipid panel/Hepatitis panel pending    Mild intermittent asthma without complication  No wheezing on exam. Patient reports he doesn't need inhaler at home. No asthma flare up      Anxiety  On home Xanax.  reviewed. Last filled date was 4/28/2020  - May resume home Xanax if needed        VTE Risk Mitigation (From admission, onward)         Ordered     enoxaparin injection 40 mg  Daily      05/17/20 0252     IP VTE HIGH RISK PATIENT  Once      05/17/20 0249     Place sequential compression device  Until discontinued      05/17/20 0249                      Jim Villarreal MD  Department of Hospital Medicine   [facility]

## 2020-05-19 NOTE — PROGRESS NOTES
Pt left AMA. He understood risk by leaving AMA. Provider notified. IV device removed prior to pt leaving

## 2020-05-19 NOTE — PLAN OF CARE
05/19/20 1824   Final Note   Assessment Type Final Discharge Note   Anticipated Discharge Disposition Left Against

## 2020-05-19 NOTE — PROGRESS NOTES
"gen surg  Cont to feel better, less LLQ pain, fide liquids, hungry, having bms  Blood pressure 130/85, pulse 72, temperature 97.8 °F (36.6 °C), temperature source Oral, resp. rate 19, height 5' 9" (1.753 m), weight 97.6 kg (215 lb 2.7 oz), SpO2 95 %.  abd soft, nl bs, nd, less LLQ tenderness, no mass  A/p recurrent sigmoid diverticultis-cont iv abx, adv diet, may be ready to transisiton to po abx in next day or so, hopefully convert to elective resection  "

## 2020-05-19 NOTE — ASSESSMENT & PLAN NOTE
-CT abd with acute sigmoid diverticulitis. Hx recurrent diverticulitis. Patient reports this is the 4th time for him to have this problem in 2 years. Last admission for his diverticulitis was 10/2019 and was treated with Cipro/Flagyl. Of note, the patient just recently finished his Augmentin abx course for dog bite last month 4/13/2020.  - Zosyn started in ED - continue  - IVF  - General surgery consult for recurrent diverticulitis.  Surgery deferred till after infection clears  - Control pain   His diet is advanced by surgery,but he still complain of pain.

## 2020-05-19 NOTE — PROGRESS NOTES
Monitored this shift. This patient has requested/eaten at least 8 pitchers of ice. Pt appears over stimulated after receiving Dilaudid for pain and goes to the bathroom a lot.  I expressed safety issues with him. Pt verbalizes understanding.

## 2020-05-20 NOTE — DISCHARGE SUMMARY
[facility] Hospital Medicine  Discharge Summary      Patient Name: Keon Schneider Jr.  MRN: [id number]  Admission Date: 5/16/2020  Hospital Length of Stay: 3 days  Discharge Date and Time: 5.19.20  Attending Physician: No att. providers found   Discharging Provider: Jim Villarreal MD  Primary Care Provider: Johnie Gutierrez MD      HPI:   This is a 50 y/o male with medical history of recurrent diverticulitis, asthma, anxiety, hip arthroplasty, GERD, hx of cocaine abuse and chronic use of alprazolam who presents to the hospital with a complaint of worsening LLQ abdominal pain for 5 days. Patient describes the pain is dull aching and sometime feel like cramping on LLQ and not radiating and intensity 10/10 . Pain worsens with moving/turing and when apply pressure. Patient denies any fever, chill, chest pain, N/V/D, dysuria or blood in urine/stool. Last BM was yesterday and normal. He has hx of diverticulitis and he states this is the 4th time happened to him for the last 2 years. He reports today pain is similar with the pain he got when he had diverticulitis in the past.    In ED, CT abdominal pelvic with contrast shows acute sigmoid diverticulitis, hepatic steatosis, hepatomegaly, and 3 mm left lower lobe pulmonary nodule. No COVID test result.    Labs with leukocytosis WBC of 12. Unremarkable CMP and normal lipase and normal lactate. UA no infection    Patient is admitted to inpatient with hospital medicine for further evaluation of diverticulitis.      * No surgery found *      Hospital Course:   This is a 50 y/o male with medical history of recurrent diverticulitis, asthma, anxiety, hip arthroplasty, GERD, hx of cocaine abuse and chronic use of alprazolam who presents to the hospital with a complaint of worsening LLQ abdominal pain for 5 days. Patient describes the pain is dull aching and sometime feel like cramping on LLQ and not radiating and intensity 10/10 . Pain worsens with  moving/turing and when apply pressure. Patient denies any fever, chill, chest pain, N/V/D, dysuria or blood in urine/stool. Last BM was day before admission,and normal. He has hx of diverticulitis and he states this is the 4th time happened to him for the last 2 years. He reports  pain is similar with the pain he got when he had diverticulitis in the past.    In ED, CT abdominal pelvic with contrast shows acute sigmoid diverticulitis, hepatic steatosis, hepatomegaly, and 3 mm left lower lobe pulmonary nodule. No COVID test result.    Labs with leukocytosis WBC of 12. Unremarkable CMP and normal lipase and normal lactate. UA no infection  Pt admitted for recurrent diverticulitis (4th episode) in 2 years.  Surgery was consulted and elective surgery option chosen after resolution of infection.  Pt improved on IV antibiotics and started on clears by surgery. His diet is advanced by surgery,but he still was complaining of pain.unfortunately patient decide leave AMA.     Consults:   Consults (From admission, onward)        Status Ordering Provider     Inpatient consult to General Surgery  Once     Provider:  Nicolas Geiger III, MD    Completed DARSHANA VICENTE new Assessment & Plan notes have been filed under this hospital service since the last note was generated.  Service: Hospital Medicine    Final Active Diagnoses:    Diagnosis Date Noted POA    PRINCIPAL PROBLEM:  Diverticulitis of sigmoid colon [K57.32] 03/24/2018 Yes    Mild intermittent asthma without complication [J45.20] 05/17/2020 Yes    Hepatic steatosis [K76.0] 05/17/2020 Yes    GERD (gastroesophageal reflux disease) [K21.9] 05/17/2020 Yes    Anxiety [F41.9] 11/24/2013 Yes     Chronic      Problems Resolved During this Admission:       Discharged Condition: against medical advice    Disposition: Left Against Medical Adv*    Follow Up:  Follow-up Information     Johnie Gutierrez MD. Schedule an appointment as soon as possible for a visit  in 1 week.    Specialty:  Family Medicine  Why:  out patient services  Contact information:  [id number] [place] [id number] [phone]                 Patient Instructions:   No discharge procedures on file.    Significant Diagnostic Studies: Labs:   BMP:   Recent Labs   Lab 05/19/20  0509   GLU 88      K 4.2      CO2 25   BUN 8   CREATININE 1.0   CALCIUM 8.9   , CMP   Recent Labs   Lab 05/19/20  0509      K 4.2      CO2 25   GLU 88   BUN 8   CREATININE 1.0   CALCIUM 8.9   PROT 7.3   ALBUMIN 3.7   BILITOT 0.4   ALKPHOS 51*   AST 21   ALT 18   ANIONGAP 8   ESTGFRAFRICA >60   EGFRNONAA >60    and CBC   Recent Labs   Lab 05/19/20  0509   WBC 6.49   HGB 11.9*   HCT 36.0*        Microbiology:   Blood Culture   Lab Results   Component Value Date    LABBLOO No growth after 5 days. 03/24/2018     Radiology: CT scan: CT ABDOMEN PELVIS WITH CONTRAST:   Results for orders placed or performed during the hospital encounter of 05/16/20   CT Abdomen Pelvis With Contrast    Narrative    EXAMINATION:  CT OF ABDOMEN PELVIS WITH    CLINICAL HISTORY:  LLQ pain, suspect diverticulitis;    TECHNIQUE:  5 mm enhanced axial images were obtained from the lung bases through the greater trochanters.  Eighty-five mL of Omnipaque 350 was injected.    COMPARISON:  10/18/2019    FINDINGS:  The liver is enlarged and fatty infiltrated.    Spleen, pancreas, kidneys, and adrenal glands are unremarkable. The gallbladder .    There is no definite evidence for abdominal adenopathy or ascites.  Mild vascular calcifications are present.    Streak artifact from bilateral hip arthroplasties limits interpretation of the pelvis.  There is focal thickening of a short segment of the sigmoid colon with extensive pericolonic infiltration.  There is thickening along the left pericolic gutter fascia.  Findings are concerning for acute diverticulitis.  There are no pelvic masses or adenopathy.  The appendix is  unremarkable.    There is no free fluid in the pelvis.    There is mild bibasilar atelectasis.  There is a 3 mm left lower lobe pulmonary nodule (series 2 axial image 16).      Impression    Acute sigmoid diverticulitis.    Hepatic steatosis.  Hepatomegaly.    3 mm left lower lobe pulmonary nodule.  For a solid nodule <6 mm, Fleischner Society 2017 guidelines recommend no routine follow up for a low risk patient, or follow-up with non-contrast chest CT at 12 months in a high risk patient.    This report was flagged in Epic as abnormal.      Electronically signed by: Kiki Castelan  Date:    05/16/2020  Time:    22:54    and CT ABDOMEN PELVIS WITHOUT CONTRAST: No results found for this visit on 05/16/20.    Pending Diagnostic Studies:     None         Medications:  Reconciled Home Medications:      Medication List      ASK your doctor about these medications    ALPRAZolam 2 MG Tab  Commonly known as:  XANAX  Take 1 tablet (2 mg total) by mouth daily as needed (back pain).     amoxicillin-clavulanate 875-125mg 875-125 mg per tablet  Commonly known as:  AUGMENTIN  Take 1 tablet by mouth 2 (two) times daily.     betamethasone dipropionate 0.05 % cream  Commonly known as:  DIPROLENE  Apply topically 2 (two) times daily.     cetirizine 10 MG tablet  Commonly known as:  ZYRTEC  Take 1 tablet (10 mg total) by mouth once daily. for 15 days     famotidine 40 MG tablet  Commonly known as:  PEPCID     oxyCODONE-acetaminophen 5-325 mg per tablet  Commonly known as:  PERCOCET  Take 1 tablet by mouth every 4 (four) hours as needed for Pain.     pantoprazole 40 MG tablet  Commonly known as:  PROTONIX  Take 1 tablet (40 mg total) by mouth 2 (two) times daily.            Indwelling Lines/Drains at time of discharge:   Lines/Drains/Airways     None                 Time spent on the discharge of patient: over 30  minutes  Patient was seen and examined on the date of discharge and determined to be suitable for discharge.          Jim Villarreal MD  Department of Hospital Medicine  [facility]

## 2020-06-08 ENCOUNTER — HOSPITAL ENCOUNTER (OUTPATIENT)
Facility: HOSPITAL | Age: 50
LOS: 1 days | Discharge: HOME | End: 2020-06-08
Attending: EMERGENCY MEDICINE | Admitting: INTERNAL MEDICINE
Payer: MEDICARE

## 2020-06-08 VITALS
RESPIRATION RATE: 16 BRPM | WEIGHT: 222 LBS | OXYGEN SATURATION: 96 % | HEIGHT: 69 IN | TEMPERATURE: 97 F | DIASTOLIC BLOOD PRESSURE: 70 MMHG | SYSTOLIC BLOOD PRESSURE: 108 MMHG | HEART RATE: 56 BPM | BODY MASS INDEX: 32.88 KG/M2

## 2020-06-08 DIAGNOSIS — K57.92 DIVERTICULITIS: Primary | ICD-10-CM

## 2020-06-08 DIAGNOSIS — K57.92 ACUTE DIVERTICULITIS: ICD-10-CM

## 2020-06-08 DIAGNOSIS — R07.9 CHEST PAIN: ICD-10-CM

## 2020-06-08 PROBLEM — Z75.8 DISCHARGE PLANNING ISSUES: Status: ACTIVE | Noted: 2020-06-08

## 2020-06-08 PROBLEM — Z75.8 DISCHARGE PLANNING ISSUES: Status: RESOLVED | Noted: 2020-06-08 | Resolved: 2020-06-08

## 2020-06-08 PROBLEM — F10.10 ALCOHOL ABUSE: Status: RESOLVED | Noted: 2020-06-08 | Resolved: 2020-06-08

## 2020-06-08 PROBLEM — F10.10 ALCOHOL ABUSE: Status: ACTIVE | Noted: 2020-06-08

## 2020-06-08 LAB
ALBUMIN SERPL BCP-MCNC: 4.2 G/DL (ref 3.5–5.2)
ALP SERPL-CCNC: 71 U/L (ref 55–135)
ALT SERPL W/O P-5'-P-CCNC: 23 U/L (ref 10–44)
ANION GAP SERPL CALC-SCNC: 9 MMOL/L (ref 8–16)
AST SERPL-CCNC: 17 U/L (ref 10–40)
BASOPHILS # BLD AUTO: 0.04 K/UL (ref 0–0.2)
BASOPHILS NFR BLD: 0.4 % (ref 0–1.9)
BILIRUB SERPL-MCNC: 0.3 MG/DL (ref 0.1–1)
BILIRUB UR QL STRIP: NEGATIVE
BUN SERPL-MCNC: 15 MG/DL (ref 6–20)
CALCIUM SERPL-MCNC: 9.7 MG/DL (ref 8.7–10.5)
CHLORIDE SERPL-SCNC: 105 MMOL/L (ref 95–110)
CLARITY UR REFRACT.AUTO: CLEAR
CO2 SERPL-SCNC: 22 MMOL/L (ref 23–29)
COLOR UR AUTO: COLORLESS
CREAT SERPL-MCNC: 1 MG/DL (ref 0.5–1.4)
DIFFERENTIAL METHOD: ABNORMAL
EOSINOPHIL # BLD AUTO: 0.5 K/UL (ref 0–0.5)
EOSINOPHIL NFR BLD: 4.6 % (ref 0–8)
ERYTHROCYTE [DISTWIDTH] IN BLOOD BY AUTOMATED COUNT: 13 % (ref 11.5–14.5)
EST. GFR  (AFRICAN AMERICAN): >60 ML/MIN/1.73 M^2
EST. GFR  (NON AFRICAN AMERICAN): >60 ML/MIN/1.73 M^2
GLUCOSE SERPL-MCNC: 99 MG/DL (ref 70–110)
GLUCOSE UR QL STRIP: NEGATIVE
HCT VFR BLD AUTO: 40.8 % (ref 40–54)
HGB BLD-MCNC: 13.6 G/DL (ref 14–18)
HGB UR QL STRIP: NEGATIVE
IMM GRANULOCYTES # BLD AUTO: 0.04 K/UL (ref 0–0.04)
IMM GRANULOCYTES NFR BLD AUTO: 0.4 % (ref 0–0.5)
KETONES UR QL STRIP: NEGATIVE
LACTATE SERPL-SCNC: 0.8 MMOL/L (ref 0.5–2.2)
LEUKOCYTE ESTERASE UR QL STRIP: NEGATIVE
LIPASE SERPL-CCNC: 31 U/L (ref 4–60)
LYMPHOCYTES # BLD AUTO: 1.8 K/UL (ref 1–4.8)
LYMPHOCYTES NFR BLD: 18.1 % (ref 18–48)
MCH RBC QN AUTO: 32 PG (ref 27–31)
MCHC RBC AUTO-ENTMCNC: 33.3 G/DL (ref 32–36)
MCV RBC AUTO: 96 FL (ref 82–98)
MONOCYTES # BLD AUTO: 1 K/UL (ref 0.3–1)
MONOCYTES NFR BLD: 10 % (ref 4–15)
NEUTROPHILS # BLD AUTO: 6.7 K/UL (ref 1.8–7.7)
NEUTROPHILS NFR BLD: 66.5 % (ref 38–73)
NITRITE UR QL STRIP: NEGATIVE
NRBC BLD-RTO: 0 /100 WBC
PH UR STRIP: 6 [PH] (ref 5–8)
PLATELET # BLD AUTO: 192 K/UL (ref 150–350)
PMV BLD AUTO: 9.4 FL (ref 9.2–12.9)
POTASSIUM SERPL-SCNC: 4.4 MMOL/L (ref 3.5–5.1)
PROT SERPL-MCNC: 7.9 G/DL (ref 6–8.4)
PROT UR QL STRIP: NEGATIVE
RBC # BLD AUTO: 4.25 M/UL (ref 4.6–6.2)
SARS-COV-2 RDRP RESP QL NAA+PROBE: NEGATIVE
SODIUM SERPL-SCNC: 136 MMOL/L (ref 136–145)
SP GR UR STRIP: 1 (ref 1–1.03)
URN SPEC COLLECT METH UR: ABNORMAL
WBC # BLD AUTO: 10.08 K/UL (ref 3.9–12.7)

## 2020-06-08 PROCEDURE — G0378 HOSPITAL OBSERVATION PER HR: HCPCS

## 2020-06-08 PROCEDURE — 83605 ASSAY OF LACTIC ACID: CPT

## 2020-06-08 PROCEDURE — 25000003 PHARM REV CODE 250: Performed by: STUDENT IN AN ORGANIZED HEALTH CARE EDUCATION/TRAINING PROGRAM

## 2020-06-08 PROCEDURE — S0030 INJECTION, METRONIDAZOLE: HCPCS | Performed by: STUDENT IN AN ORGANIZED HEALTH CARE EDUCATION/TRAINING PROGRAM

## 2020-06-08 PROCEDURE — 83690 ASSAY OF LIPASE: CPT

## 2020-06-08 PROCEDURE — 96376 TX/PRO/DX INJ SAME DRUG ADON: CPT

## 2020-06-08 PROCEDURE — S0030 INJECTION, METRONIDAZOLE: HCPCS | Performed by: EMERGENCY MEDICINE

## 2020-06-08 PROCEDURE — 99285 EMERGENCY DEPT VISIT HI MDM: CPT | Mod: ,,, | Performed by: EMERGENCY MEDICINE

## 2020-06-08 PROCEDURE — 81003 URINALYSIS AUTO W/O SCOPE: CPT

## 2020-06-08 PROCEDURE — U0002 COVID-19 LAB TEST NON-CDC: HCPCS

## 2020-06-08 PROCEDURE — 63600175 PHARM REV CODE 636 W HCPCS: Performed by: STUDENT IN AN ORGANIZED HEALTH CARE EDUCATION/TRAINING PROGRAM

## 2020-06-08 PROCEDURE — 80053 COMPREHEN METABOLIC PANEL: CPT

## 2020-06-08 PROCEDURE — 63600175 PHARM REV CODE 636 W HCPCS: Performed by: EMERGENCY MEDICINE

## 2020-06-08 PROCEDURE — 99285 EMERGENCY DEPT VISIT HI MDM: CPT | Mod: 25,CS

## 2020-06-08 PROCEDURE — 99224 PR SUBSEQUENT OBSERVATION CARE,LEVEL I: ICD-10-PCS | Mod: GC,,, | Performed by: INTERNAL MEDICINE

## 2020-06-08 PROCEDURE — 99224 PR SUBSEQUENT OBSERVATION CARE,LEVEL I: CPT | Mod: GC,,, | Performed by: INTERNAL MEDICINE

## 2020-06-08 PROCEDURE — 25000003 PHARM REV CODE 250: Performed by: EMERGENCY MEDICINE

## 2020-06-08 PROCEDURE — 96374 THER/PROPH/DIAG INJ IV PUSH: CPT | Mod: 59

## 2020-06-08 PROCEDURE — 25500020 PHARM REV CODE 255: Performed by: EMERGENCY MEDICINE

## 2020-06-08 PROCEDURE — 85025 COMPLETE CBC W/AUTO DIFF WBC: CPT

## 2020-06-08 PROCEDURE — 96361 HYDRATE IV INFUSION ADD-ON: CPT

## 2020-06-08 PROCEDURE — 99285 PR EMERGENCY DEPT VISIT,LEVEL V: ICD-10-PCS | Mod: ,,, | Performed by: EMERGENCY MEDICINE

## 2020-06-08 PROCEDURE — 96375 TX/PRO/DX INJ NEW DRUG ADDON: CPT

## 2020-06-08 RX ORDER — ONDANSETRON 8 MG/1
8 TABLET, ORALLY DISINTEGRATING ORAL EVERY 8 HOURS PRN
Status: DISCONTINUED | OUTPATIENT
Start: 2020-06-08 | End: 2020-06-08 | Stop reason: HOSPADM

## 2020-06-08 RX ORDER — OXYCODONE HYDROCHLORIDE 5 MG/1
5 TABLET ORAL EVERY 6 HOURS PRN
Status: DISCONTINUED | OUTPATIENT
Start: 2020-06-08 | End: 2020-06-08 | Stop reason: HOSPADM

## 2020-06-08 RX ORDER — PANTOPRAZOLE SODIUM 40 MG/1
40 TABLET, DELAYED RELEASE ORAL 2 TIMES DAILY
Status: DISCONTINUED | OUTPATIENT
Start: 2020-06-08 | End: 2020-06-08 | Stop reason: HOSPADM

## 2020-06-08 RX ORDER — ONDANSETRON 2 MG/ML
4 INJECTION INTRAMUSCULAR; INTRAVENOUS
Status: COMPLETED | OUTPATIENT
Start: 2020-06-08 | End: 2020-06-08

## 2020-06-08 RX ORDER — GLUCAGON 1 MG
1 KIT INJECTION
Status: DISCONTINUED | OUTPATIENT
Start: 2020-06-08 | End: 2020-06-08 | Stop reason: HOSPADM

## 2020-06-08 RX ORDER — OXYCODONE AND ACETAMINOPHEN 5; 325 MG/1; MG/1
1 TABLET ORAL EVERY 4 HOURS PRN
Status: DISCONTINUED | OUTPATIENT
Start: 2020-06-08 | End: 2020-06-08

## 2020-06-08 RX ORDER — DEXTROSE MONOHYDRATE 100 MG/ML
25 INJECTION, SOLUTION INTRAVENOUS
Status: DISCONTINUED | OUTPATIENT
Start: 2020-06-08 | End: 2020-06-08

## 2020-06-08 RX ORDER — METRONIDAZOLE 500 MG/100ML
500 INJECTION, SOLUTION INTRAVENOUS
Status: COMPLETED | OUTPATIENT
Start: 2020-06-08 | End: 2020-06-08

## 2020-06-08 RX ORDER — SODIUM CHLORIDE 0.9 % (FLUSH) 0.9 %
10 SYRINGE (ML) INJECTION
Status: DISCONTINUED | OUTPATIENT
Start: 2020-06-08 | End: 2020-06-08 | Stop reason: HOSPADM

## 2020-06-08 RX ORDER — IBUPROFEN 200 MG
16 TABLET ORAL
Status: DISCONTINUED | OUTPATIENT
Start: 2020-06-08 | End: 2020-06-08 | Stop reason: HOSPADM

## 2020-06-08 RX ORDER — OXYCODONE HYDROCHLORIDE 5 MG/1
5 TABLET ORAL EVERY 6 HOURS PRN
Qty: 10 TABLET | Refills: 0 | Status: SHIPPED | OUTPATIENT
Start: 2020-06-08 | End: 2020-06-08

## 2020-06-08 RX ORDER — ACETAMINOPHEN 325 MG/1
650 TABLET ORAL EVERY 4 HOURS PRN
Status: DISCONTINUED | OUTPATIENT
Start: 2020-06-08 | End: 2020-06-08 | Stop reason: HOSPADM

## 2020-06-08 RX ORDER — OXYCODONE HYDROCHLORIDE 5 MG/1
5 TABLET ORAL EVERY 6 HOURS PRN
Qty: 10 TABLET | Refills: 0 | Status: SHIPPED | OUTPATIENT
Start: 2020-06-08 | End: 2020-06-13

## 2020-06-08 RX ORDER — CIPROFLOXACIN 2 MG/ML
400 INJECTION, SOLUTION INTRAVENOUS
Status: COMPLETED | OUTPATIENT
Start: 2020-06-08 | End: 2020-06-08

## 2020-06-08 RX ORDER — CIPROFLOXACIN 500 MG/1
500 TABLET ORAL EVERY 12 HOURS
Qty: 14 TABLET | Refills: 0 | Status: SHIPPED | OUTPATIENT
Start: 2020-06-08 | End: 2020-06-15

## 2020-06-08 RX ORDER — IBUPROFEN 200 MG
24 TABLET ORAL
Status: DISCONTINUED | OUTPATIENT
Start: 2020-06-08 | End: 2020-06-08 | Stop reason: HOSPADM

## 2020-06-08 RX ORDER — METRONIDAZOLE 500 MG/100ML
500 INJECTION, SOLUTION INTRAVENOUS
Status: DISCONTINUED | OUTPATIENT
Start: 2020-06-08 | End: 2020-06-08 | Stop reason: HOSPADM

## 2020-06-08 RX ORDER — DEXTROSE MONOHYDRATE 100 MG/ML
12.5 INJECTION, SOLUTION INTRAVENOUS
Status: DISCONTINUED | OUTPATIENT
Start: 2020-06-08 | End: 2020-06-08

## 2020-06-08 RX ORDER — METRONIDAZOLE 500 MG/1
500 TABLET ORAL 3 TIMES DAILY
Qty: 21 TABLET | Refills: 0 | Status: SHIPPED | OUTPATIENT
Start: 2020-06-08 | End: 2020-06-15

## 2020-06-08 RX ORDER — TALC
6 POWDER (GRAM) TOPICAL NIGHTLY PRN
Status: DISCONTINUED | OUTPATIENT
Start: 2020-06-08 | End: 2020-06-08 | Stop reason: HOSPADM

## 2020-06-08 RX ORDER — ALPRAZOLAM 0.5 MG/1
2 TABLET ORAL DAILY PRN
Status: DISCONTINUED | OUTPATIENT
Start: 2020-06-08 | End: 2020-06-08

## 2020-06-08 RX ORDER — ONDANSETRON 8 MG/1
8 TABLET, ORALLY DISINTEGRATING ORAL EVERY 8 HOURS PRN
Qty: 15 TABLET | Refills: 0 | Status: SHIPPED | OUTPATIENT
Start: 2020-06-08 | End: 2020-06-13

## 2020-06-08 RX ORDER — MORPHINE SULFATE 2 MG/ML
6 INJECTION, SOLUTION INTRAMUSCULAR; INTRAVENOUS
Status: COMPLETED | OUTPATIENT
Start: 2020-06-08 | End: 2020-06-08

## 2020-06-08 RX ORDER — CIPROFLOXACIN 2 MG/ML
400 INJECTION, SOLUTION INTRAVENOUS
Status: DISCONTINUED | OUTPATIENT
Start: 2020-06-08 | End: 2020-06-08 | Stop reason: HOSPADM

## 2020-06-08 RX ORDER — MORPHINE SULFATE 4 MG/ML
4 INJECTION, SOLUTION INTRAMUSCULAR; INTRAVENOUS EVERY 4 HOURS PRN
Status: DISCONTINUED | OUTPATIENT
Start: 2020-06-08 | End: 2020-06-08

## 2020-06-08 RX ORDER — NALOXONE HCL 0.4 MG/ML
0.4 VIAL (ML) INJECTION
Status: DISCONTINUED | OUTPATIENT
Start: 2020-06-08 | End: 2020-06-08

## 2020-06-08 RX ADMIN — IOHEXOL 100 ML: 350 INJECTION, SOLUTION INTRAVENOUS at 02:06

## 2020-06-08 RX ADMIN — METRONIDAZOLE 500 MG: 500 INJECTION, SOLUTION INTRAVENOUS at 01:06

## 2020-06-08 RX ADMIN — MORPHINE SULFATE 2 MG: 2 INJECTION, SOLUTION INTRAMUSCULAR; INTRAVENOUS at 02:06

## 2020-06-08 RX ADMIN — METRONIDAZOLE 500 MG: 500 INJECTION, SOLUTION INTRAVENOUS at 04:06

## 2020-06-08 RX ADMIN — OXYCODONE 5 MG: 5 TABLET ORAL at 04:06

## 2020-06-08 RX ADMIN — CIPROFLOXACIN 400 MG: 2 INJECTION, SOLUTION INTRAVENOUS at 03:06

## 2020-06-08 RX ADMIN — MORPHINE SULFATE 4 MG: 4 INJECTION INTRAVENOUS at 10:06

## 2020-06-08 RX ADMIN — PANTOPRAZOLE SODIUM 40 MG: 40 TABLET, DELAYED RELEASE ORAL at 08:06

## 2020-06-08 RX ADMIN — MORPHINE SULFATE 4 MG: 4 INJECTION INTRAVENOUS at 06:06

## 2020-06-08 RX ADMIN — OXYCODONE 5 MG: 5 TABLET ORAL at 02:06

## 2020-06-08 RX ADMIN — SODIUM CHLORIDE 1000 ML: 0.9 INJECTION, SOLUTION INTRAVENOUS at 02:06

## 2020-06-08 RX ADMIN — MORPHINE SULFATE 6 MG: 2 INJECTION, SOLUTION INTRAMUSCULAR; INTRAVENOUS at 02:06

## 2020-06-08 RX ADMIN — ONDANSETRON HYDROCHLORIDE 4 MG: 2 SOLUTION INTRAMUSCULAR; INTRAVENOUS at 01:06

## 2020-06-08 NOTE — H&P
[facility] Medicine  History & Physical    Patient Name: Keon Schneider Jr.  MRN: [id number]  Admission Date: 6/8/2020  Attending Physician: Ozzy Hinojosa MD   Primary Care Provider: Johnie Gutierrez MD    [facility] Medicine Team: [facility] 2 Abner Stevens MD     Patient information was obtained from patient, past medical records and ER records.     Subjective:     Principal Problem:Acute diverticulitis    Chief Complaint:   Chief Complaint   Patient presents with    Abdominal Pain     Pt presents with abdominal pain x3 days stating hx of diverticulitis. Pt states lower abdominal pain with nasuea and vomitting. Pt denies blood in stool and urine. Pt states admitted 3 weeks ago for same problem.        HPI: Mr. Schneider is a 49 year old gentleman with a past medical history significant for anxiety, chronic pain, cocaine abuse, and asthma who presents to [facility] ED in the early AM of 6/8 for evaluation of abdominal pain. Of note the patient was recently hospitalized on 5/19 for similar symptoms. He has had multiple admissions for diverticulitis over the past 3 years during which he receives antibiotics and improves. During the last hospitalization he was started on IV antibiotics. General surgery was consulted and discussed an elective sigmoidectomy after acute inflammation improved as an outpatient. During that hospitalization the patient's father got into a car accident and was in intensive care so patient left AMA to be with his father. He was not prescribed any antibiotics at that time. Since discharge the patient states he felt well until about roughly 5 days ago when he noticed a return or 10/10 cramping LLQ pain which is typical of his diverticular pain. He denies any bloody bowel movements. The patient is a heavy drinker and states that earlier that day he was at a party and drank heavily. He and his friends drink multiple 12/pack cases of beer frequently. Denies any  history of withdrawals. He tries to avoid seeds or other foods that he states may exacerbate his diverticulitis. He denies an fevers or chills. He has been having diarrhea with intermittent mucous.     In the ED, the patient was hemodynamically stable and afebrile. Labs were largely WNL, however patient with significant abdominal pain requiring multiple doses of IV morphine so patient was admitted for uncomplicated diverticulitis with sever abdominal pain.        Past Medical History:   Diagnosis Date    Anxiety     Arthritis     Asthma     Avascular necrosis     Carpal tunnel syndrome     Chronic pain     Depression     Diverticulitis     Other injury of other sites of trunk 12/28/2011    Pneumonia     Spondylolisthesis     lumbar; myofascial pain    Staph infection     Ulnar neuropathy     left and rt arm       Past Surgical History:   Procedure Laterality Date    HIP SURGERY  3/06; 6/06    hip arthroplasty    HIP SURGERY      bilateral hip replacement (titanium)    JOINT REPLACEMENT      SHOULDER SURGERY  2012    right shoulder    SHOULDER SURGERY         Review of patient's allergies indicates:   Allergen Reactions    Toradol [ketorolac] Hives and Nausea And Vomiting       No current facility-administered medications on file prior to encounter.      Current Outpatient Medications on File Prior to Encounter   Medication Sig    alprazolam (XANAX) 2 MG Tab Take 1 tablet (2 mg total) by mouth daily as needed (back pain).    cetirizine (ZYRTEC) 10 MG tablet Take 1 tablet (10 mg total) by mouth once daily. for 15 days    oxyCODONE-acetaminophen (PERCOCET) 5-325 mg per tablet Take 1 tablet by mouth every 4 (four) hours as needed for Pain.    pantoprazole (PROTONIX) 40 MG tablet Take 1 tablet (40 mg total) by mouth 2 (two) times daily.    [DISCONTINUED] amoxicillin-clavulanate 875-125mg (AUGMENTIN) 875-125 mg per tablet Take 1 tablet by mouth 2 (two) times daily.    [DISCONTINUED] betamethasone  dipropionate (DIPROLENE) 0.05 % cream Apply topically 2 (two) times daily.    [DISCONTINUED] famotidine (PEPCID) 40 MG tablet      Family History     Problem Relation (Age of Onset)    Cancer Mother        Tobacco Use    Smoking status: Former Smoker     Packs/day: 1.00     Years: 10.00     Pack years: 10.00     Last attempt to quit: 10/13/2012     Years since quittin.6    Smokeless tobacco: Never Used   Substance and Sexual Activity    Alcohol use: Yes     Alcohol/week: 12.0 standard drinks     Types: 12 Cans of beer per week     Comment: on the weekend    Drug use: No    Sexual activity: Yes     Partners: Female     Review of Systems   Constitutional: Negative for chills, diaphoresis, fever and unexpected weight change.   HENT: Negative for sore throat.    Eyes: Negative for visual disturbance.   Respiratory: Negative for cough, shortness of breath and wheezing.    Cardiovascular: Negative for chest pain, palpitations and leg swelling.   Gastrointestinal: Positive for abdominal pain and diarrhea. Negative for abdominal distention, constipation, nausea and vomiting.   Genitourinary: Negative for difficulty urinating and dysuria.   Musculoskeletal: Positive for arthralgias (chronic pain from prior joint surgeries). Negative for myalgias.   Skin: Negative for pallor and rash.   Neurological: Negative for weakness and headaches.   Hematological: Negative for adenopathy.   Psychiatric/Behavioral: Negative for agitation and confusion.     Objective:     Vital Signs (Most Recent):  Temp: 98.1 °F (36.7 °C) (20 0540)  Pulse: 63 (20 0540)  Resp: 16 (20 0540)  BP: 117/69 (20 0540)  SpO2: 96 % (20 0540) Vital Signs (24h Range):  Temp:  [98.1 °F (36.7 °C)-98.8 °F (37.1 °C)] 98.1 °F (36.7 °C)  Pulse:  [63-79] 63  Resp:  [16-20] 16  SpO2:  [96 %-99 %] 96 %  BP: (117-198)/() 117/69     Weight: 100.7 kg (222 lb)  Body mass index is 32.78 kg/m².    Physical Exam   Constitutional: He is  oriented to person, place, and time. He appears well-developed and well-nourished. No distress.   HENT:   Head: Normocephalic and atraumatic.   Mouth/Throat: No oropharyngeal exudate.   Eyes: Pupils are equal, round, and reactive to light. EOM are normal. No scleral icterus.   Neck: Normal range of motion. Neck supple.   Cardiovascular: Normal rate, regular rhythm, normal heart sounds and intact distal pulses.   No murmur heard.  Pulmonary/Chest: Effort normal and breath sounds normal. No respiratory distress. He has no wheezes. He has no rales.   Abdominal: Soft. Bowel sounds are normal. He exhibits no distension and no mass. There is tenderness (LLQ). There is no guarding.   Musculoskeletal: Normal range of motion. He exhibits no edema.   Lymphadenopathy:     He has no cervical adenopathy.   Neurological: He is alert and oriented to person, place, and time.   Skin: Skin is warm and dry.   Nursing note and vitals reviewed.        CRANIAL NERVES     CN III, IV, VI   Pupils are equal, round, and reactive to light.  Extraocular motions are normal.        Significant Labs:   CBC:   Recent Labs   Lab 06/08/20  0109   WBC 10.08   HGB 13.6*   HCT 40.8        CMP:   Recent Labs   Lab 06/08/20  0109      K 4.4      CO2 22*   GLU 99   BUN 15   CREATININE 1.0   CALCIUM 9.7   PROT 7.9   ALBUMIN 4.2   BILITOT 0.3   ALKPHOS 71   AST 17   ALT 23   ANIONGAP 9   EGFRNONAA >60.0     All pertinent labs within the past 24 hours have been reviewed.    Significant Imaging: I have reviewed all pertinent imaging results/findings within the past 24 hours.     Narrative     EXAMINATION:  CT ABDOMEN PELVIS WITH CONTRAST    CLINICAL HISTORY:  LLQ pain, suspect diverticulitis;    TECHNIQUE:  Low dose axial images, sagittal and coronal reformations were obtained from the lung bases to the pubic symphysis following the IV administration of 100 mL of Omnipaque 350.Oral contrast was not given.  Evaluation of pelvis is limited  secondary to beam hardening artifact from bilateral hip prostheses.    COMPARISON:  CT abdomen pelvis 05/16/2020, 10/18/2019, 4818, 03/30/2018, 03/24/2018, 01/18/2016    FINDINGS:  Heart/Vasculature: No cardiomegaly or pericardial effusion.  Aorta is normal in caliber with atherosclerotic calcification along the course of the infrarenal abdominal aorta.    Lung Bases: Symmetrically expanded and clear.  Stable 3 mm nodule left base posteriorly.    Liver: Enlarged measuring up to approximately 21.1 cm in maximal craniocaudal dimension.  No focal lesion identified.    Gallbladder: Normal appearance without evidence for cholecystitis.    Bile Ducts: No intra or extrahepatic biliary ductal dilation.    Pancreas: No pancreatic mass lesion or peripancreatic inflammatory change.    Spleen: Normal.    Adrenals: Normal.    Kidneys/Ureters: Stable in size and location.  No focal renal abnormality, nephrolithiasis, or hydroureteronephrosis.  Urinary bladder is not well evaluated secondary to beam hardening artifact.    Reproductive organs: Not well evaluated secondary to beam hardening artifact.    GI tract/Mesentery: Several diverticula again noted in the sigmoid colon with associated colonic wall thickening and fat stranding and prominence of the vascular recta in this region.  No perforation or abscess noting evaluation of pelvis is limited secondary to beam hardening artifact from bilateral hip prostheses.  Findings appear improved when compared to prior examination dated 05/16/2020.  The small bowel is normal caliber without evidence for inflammation or obstruction.  The appendix is normal.    Peritoneal Space: No abdominopelvic ascites, intraperitoneal free air or significant adenopathy.    Abdominal wall/Extraperitoneal soft tissues: Unremarkable.    Bones: Degenerative changes without acute fracture or osseous destructive lesion identified.  Bilateral total hip prostheses noted.      Impression       Several diverticula  again noted in the sigmoid colon with associated wall thickening, adjacent fat stranding, and prominence of vascular recta.  Findings are slightly improved when compared with prior studies and suggest resolving diverticulitis or colitis.  Given the persistence of findings, sigmoidoscopy follow up to exclude colon carcinoma should also be considered.  No complication such as perforation or abscess.    Hepatomegaly.    Stable 3 mm nodule left lung base posteriorly.  See prior report May 16, 2020 for recommendations.    Additional findings as above.    This report was flagged in Epic as abnormal.    Electronically signed by resident: Saman Marley  Date: 06/08/2020  Time: 02:17    Electronically signed by: Josh Corrales MD  Date: 06/08/2020  Time: 02:50         Assessment/Plan:     * Acute diverticulitis  Longstanding history of known sigmoid diverticulitis. Patient with multiple hospitalizations, most recently on 5/20 for similar symptoms. At that time patient with mild WBC and fever, treated with IV antibiotics with clinical improvement however patient had to leave AMA 2/2 family emergency. He states he was not prescribed any oral antibiotics at time of discharge (MAR includes augmentin but that was not a new prescription at that time).    Repeat CT in ED showing moderate improvement in fat stranding / inflammatory changes in area of known sigmoid diverticulitis. No complications on CT, however patient with intractable pain in ED requiring IV narcotics so patient admitted. Of note, patient has been discussing with surgery about possible sigmoidectomy as an outpatient.    Plan:  Suspect 2/2 inadequately treated prior diverticulitis  - Cipro and flagyl IV for diverticulitis  - Patient tolerating clears so holding off on IVF  - Advance diet as tolerated  - Will need follow up with general surgery on discharge for possible operative management    Discharge planning issues  Dispo: Likely home with no needs  Follow-Up:  Will need follow up with general surgery on discharge      Alcohol abuse  Endorsing significant amount of alcohol use, can drink a case of beer per day. Denies history if withdrawals. Did have significant amount of alcohol prior to onset of symptoms 4 days ago. Lipase negative and no signs of pancreatitis on history or CT.     Plan:  - CIWA-AR score q4h ordered  - Consider benzo taper if patient becomes symptomatic  - Discuss quitting with patient and provide resources if interested      Mild intermittent asthma without complication  Patient reports history of asthma but not on any inhalers, even rescue albuterol. No wheeze on exam, on room air.    - continue to monitor      Chronic back pain  Prior history of opiate dependence, now not on any opiates.   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 Will not add to PRN at this time as patient also receiving opiates for pain and would like to avoid combination of medications that would depress respiratory drive.        VTE Risk Mitigation (From admission, onward)         Ordered     IP VTE HIGH RISK PATIENT  Once      06/08/20 0330     Place sequential compression device  Until discontinued      06/08/20 0330                   Abner Stevens MD  Department of Hospital Medicine   [facility]

## 2020-06-08 NOTE — ASSESSMENT & PLAN NOTE
Longstanding history of known sigmoid diverticulitis. Patient with multiple hospitalizations, most recently on 5/20 for similar symptoms. At that time patient with mild WBC and fever, treated with IV antibiotics with clinical improvement however patient had to leave AMA 2/2 family emergency. He states he was not prescribed any oral antibiotics at time of discharge (MAR includes augmentin but that was not a new prescription at that time).    Repeat CT in ED showing moderate improvement in fat stranding / inflammatory changes in area of known sigmoid diverticulitis. No complications on CT, however patient with intractable pain in ED requiring IV narcotics so patient admitted. Of note, patient has been discussing with surgery about possible sigmoidectomy as an outpatient.    Plan:  Suspect 2/2 inadequately treated prior diverticulitis  - Cipro and flagyl oral for 7 days for diverticulitis  - Patient tolerating adult regular diet  - Will need follow up with general surgery on discharge for possible operative management

## 2020-06-08 NOTE — ED PROVIDER NOTES
Source of History:  Patient    Chief complaint:  Abdominal Pain (Pt presents with abdominal pain x3 days stating hx of diverticulitis. Pt states lower abdominal pain with nasuea and vomitting. Pt denies blood in stool and urine. Pt states admitted 3 weeks ago for same problem.)    HPI:  Keon Schneider Jr. is a 50 y.o. male with history of recurrent sigmoid diverticulitis, GERD, asthma, bipolar disorder, cocaine abuse, opioid dependence, presenting to emergency department with complaint of abdominal pain.  Patient states pain has been gradually worsening over the past 3 days.  Pain is located in the left lower quadrant, is severe, constant, nonradiating.  No testicular pain or swelling.  No urinary complaints.  Normal stools.  He has not noted fevers or chills.  Patient states pain feels similar to his previous episodes of diverticulitis.    Patient had recent 3 day admission from 05/16 through 05/19/2020 for diverticulitis.  Surgery was consulted, and elective option shows after resolution of infection.  Patient improved on IV antibiotics.  He left AMA on the 3rd day after a family member had a car accident.    ROS: As per HPI and below:  Review of Systems   Constitutional: Negative for fever.   HENT: Negative for sore throat.    Eyes: Negative for double vision.   Respiratory: Negative for cough and shortness of breath.    Cardiovascular: Negative for chest pain.   Gastrointestinal: Positive for abdominal pain. Negative for vomiting.   Genitourinary: Negative for dysuria.   Musculoskeletal: Negative for falls.   Skin: Negative for rash.   Neurological: Negative for headaches.     Review of patient's allergies indicates:   Allergen Reactions    Toradol [ketorolac] Hives and Nausea And Vomiting       No current facility-administered medications on file prior to encounter.      Current Outpatient Medications on File Prior to Encounter   Medication Sig Dispense Refill    alprazolam (XANAX) 2 MG Tab Take 1 tablet  (2 mg total) by mouth daily as needed (back pain).      cetirizine (ZYRTEC) 10 MG tablet Take 1 tablet (10 mg total) by mouth once daily. for 15 days 30 tablet 0    oxyCODONE-acetaminophen (PERCOCET) 5-325 mg per tablet Take 1 tablet by mouth every 4 (four) hours as needed for Pain. 10 tablet 0    pantoprazole (PROTONIX) 40 MG tablet Take 1 tablet (40 mg total) by mouth 2 (two) times daily. 60 tablet 1    [DISCONTINUED] amoxicillin-clavulanate 875-125mg (AUGMENTIN) 875-125 mg per tablet Take 1 tablet by mouth 2 (two) times daily. 14 tablet 0    [DISCONTINUED] betamethasone dipropionate (DIPROLENE) 0.05 % cream Apply topically 2 (two) times daily. 15 g 0    [DISCONTINUED] famotidine (PEPCID) 40 MG tablet          PMH:  As per HPI and below:  Past Medical History:   Diagnosis Date    Anxiety     Arthritis     Asthma     Avascular necrosis     Carpal tunnel syndrome     Chronic pain     Depression     Diverticulitis     Other injury of other sites of trunk 12/28/2011    Pneumonia     Spondylolisthesis     lumbar; myofascial pain    Staph infection     Ulnar neuropathy     left and rt arm     Past Surgical History:   Procedure Laterality Date    HIP SURGERY  3/06; 6/06    hip arthroplasty    HIP SURGERY      bilateral hip replacement (titanium)    JOINT REPLACEMENT      SHOULDER SURGERY  2012    right shoulder    SHOULDER SURGERY         Social History     Socioeconomic History    Marital status: Single     Spouse name: Not on file    Number of children: Not on file    Years of education: Not on file    Highest education level: Not on file   Occupational History    Not on file   Social Needs    Financial resource strain: Not on file    Food insecurity:     Worry: Not on file     Inability: Not on file    Transportation needs:     Medical: Not on file     Non-medical: Not on file   Tobacco Use    Smoking status: Former Smoker     Packs/day: 1.00     Years: 10.00     Pack years: 10.00      Last attempt to quit: 10/13/2012     Years since quittin.6    Smokeless tobacco: Never Used   Substance and Sexual Activity    Alcohol use: Yes     Alcohol/week: 12.0 standard drinks     Types: 12 Cans of beer per week     Comment: on the weekend    Drug use: No    Sexual activity: Yes     Partners: Female   Lifestyle    Physical activity:     Days per week: Not on file     Minutes per session: Not on file    Stress: Not on file   Relationships    Social connections:     Talks on phone: Not on file     Gets together: Not on file     Attends Jew service: Not on file     Active member of club or organization: Not on file     Attends meetings of clubs or organizations: Not on file     Relationship status: Not on file   Other Topics Concern    Not on file   Social History Narrative    Not on file       Family History   Problem Relation Age of Onset    Cancer Mother        Physical Exam:      Vitals:    20 0257   BP: 121/74   Pulse: 66   Resp: 20   Temp:      Gen:  Uncomfortable.  Mental Status:  Alert and oriented x 3.  Appropriate, conversant.  Skin: Warm, dry. No rashes seen.  Eyes: No conjunctival injection.  Pulm: No increased work of breathing.  No significant tachypnea.  No audible stridor or wheezing.  No conversational dyspnea.    CV: Regular rate. Regular rhythm. Normal peripheral perfusion. No lower extremity edema.  Abd: Soft.  Not distended.  Left lower quadrant tenderness to palpation without rebound tenderness or guarding.  MSK: Good range of motion all joints.  No deformities.    Neuro: Awake. Speech normal. No focal neuro deficit observed.    Laboratory Studies:  Labs Reviewed   CBC W/ AUTO DIFFERENTIAL - Abnormal; Notable for the following components:       Result Value    RBC 4.25 (*)     Hemoglobin 13.6 (*)     Mean Corpuscular Hemoglobin 32.0 (*)     All other components within normal limits   COMPREHENSIVE METABOLIC PANEL - Abnormal; Notable for the following components:     CO2 22 (*)     All other components within normal limits   URINALYSIS, REFLEX TO URINE CULTURE - Abnormal; Notable for the following components:    Color, UA Colorless (*)     All other components within normal limits    Narrative:     Preferred Collection Type->Urine, Clean Catch   LIPASE   LACTIC ACID, PLASMA   SARS-COV-2 RNA AMPLIFICATION, QUAL     Chart reviewed.     Imaging Results           CT Abdomen Pelvis With Contrast (Final result)  Result time 06/08/20 02:50:12    Final result by Josh Corrales MD (06/08/20 02:50:12)                 Impression:      Several diverticula again noted in the sigmoid colon with associated wall thickening, adjacent fat stranding, and prominence of vascular recta.  Findings are slightly improved when compared with prior studies and suggest resolving diverticulitis or colitis.  Given the persistence of findings, sigmoidoscopy follow up to exclude colon carcinoma should also be considered.  No complication such as perforation or abscess.    Hepatomegaly.    Stable 3 mm nodule left lung base posteriorly.  See prior report May 16, 2020 for recommendations.    Additional findings as above.    This report was flagged in Epic as abnormal.    Electronically signed by resident: Saman Marley  Date:    06/08/2020  Time:    02:17    Electronically signed by: Josh Corrales MD  Date:    06/08/2020  Time:    02:50             Narrative:    EXAMINATION:  CT ABDOMEN PELVIS WITH CONTRAST    CLINICAL HISTORY:  LLQ pain, suspect diverticulitis;    TECHNIQUE:  Low dose axial images, sagittal and coronal reformations were obtained from the lung bases to the pubic symphysis following the IV administration of 100 mL of Omnipaque 350.Oral contrast was not given.  Evaluation of pelvis is limited secondary to beam hardening artifact from bilateral hip prostheses.    COMPARISON:  CT abdomen pelvis 05/16/2020, 10/18/2019, 4818, 03/30/2018, 03/24/2018, 01/18/2016    FINDINGS:  Heart/Vasculature: No  cardiomegaly or pericardial effusion.  Aorta is normal in caliber with atherosclerotic calcification along the course of the infrarenal abdominal aorta.    Lung Bases: Symmetrically expanded and clear.  Stable 3 mm nodule left base posteriorly.    Liver: Enlarged measuring up to approximately 21.1 cm in maximal craniocaudal dimension.  No focal lesion identified.    Gallbladder: Normal appearance without evidence for cholecystitis.    Bile Ducts: No intra or extrahepatic biliary ductal dilation.    Pancreas: No pancreatic mass lesion or peripancreatic inflammatory change.    Spleen: Normal.    Adrenals: Normal.    Kidneys/Ureters: Stable in size and location.  No focal renal abnormality, nephrolithiasis, or hydroureteronephrosis.  Urinary bladder is not well evaluated secondary to beam hardening artifact.    Reproductive organs: Not well evaluated secondary to beam hardening artifact.    GI tract/Mesentery: Several diverticula again noted in the sigmoid colon with associated colonic wall thickening and fat stranding and prominence of the vascular recta in this region.  No perforation or abscess noting evaluation of pelvis is limited secondary to beam hardening artifact from bilateral hip prostheses.  Findings appear improved when compared to prior examination dated 05/16/2020.  The small bowel is normal caliber without evidence for inflammation or obstruction.  The appendix is normal.    Peritoneal Space: No abdominopelvic ascites, intraperitoneal free air or significant adenopathy.    Abdominal wall/Extraperitoneal soft tissues: Unremarkable.    Bones: Degenerative changes without acute fracture or osseous destructive lesion identified.  Bilateral total hip prostheses noted.                                Medications Given:  Medications   metronidazole IVPB 500 mg (has no administration in time range)   ALPRAZolam tablet 2 mg (has no administration in time range)   oxyCODONE-acetaminophen 5-325 mg per tablet 1  tablet (has no administration in time range)   pantoprazole EC tablet 40 mg (has no administration in time range)   sodium chloride 0.9% flush 10 mL (has no administration in time range)   glucose chewable tablet 16 g (has no administration in time range)   glucose chewable tablet 24 g (has no administration in time range)   dextrose 10 % infusion (has no administration in time range)   dextrose 10 % infusion (has no administration in time range)   glucagon (human recombinant) injection 1 mg (has no administration in time range)   acetaminophen tablet 650 mg (has no administration in time range)   oxyCODONE immediate release tablet 5 mg (5 mg Oral Given 6/8/20 0415)   morphine injection 4 mg (has no administration in time range)   ondansetron disintegrating tablet 8 mg (has no administration in time range)   melatonin tablet 6 mg (has no administration in time range)   ciprofloxacin (CIPRO)400mg/200ml D5W IVPB 400 mg (has no administration in time range)   metronidazole IVPB 500 mg (has no administration in time range)   sodium chloride 0.9% bolus 1,000 mL (0 mLs Intravenous Stopped 6/8/20 0408)   ondansetron injection 4 mg (4 mg Intravenous Given 6/8/20 0159)   morphine injection 6 mg (2 mg Intravenous Given 6/8/20 0200)   iohexoL (OMNIPAQUE 350) injection 100 mL (100 mLs Intravenous Given 6/8/20 0208)   morphine injection 6 mg (6 mg Intravenous Given 6/8/20 0257)   ciprofloxacin (CIPRO)400mg/200ml D5W IVPB 400 mg (400 mg Intravenous New Bag 6/8/20 0322)     Discussed with:  Internal medicine    MDM:    50 y.o. male with complaint of left lower quadrant abdominal pain.  He is afebrile, stable, nontoxic.    Suspect recurrent diverticulitis as this feels similar to his previous episodes.    Labs reviewed, no acute abnormality noted.  Urinalysis unremarkable.  COVID negative.  CT with diverticulitis.    Patient has required multiple doses of opioid analgesics for pain control.  With his diverticulitis in the past, he  is typically require hospitalization for IV antibiotics and has been hospitalized for 3+ days.  He does not feel comfortable with discharge.  Will give Cipro and Flagyl.  Patient admitted to Internal Medicine.    Diagnostic Impression:    1. Diverticulitis    2. Chest pain         ED Disposition Condition    Admit              Patient and/or family understands the plan and is in agreement, verbalized understanding, questions answered    Nidia Horner MD  Emergency Medicine       Nidia Horner MD  06/08/20 [id number]

## 2020-06-08 NOTE — PLAN OF CARE
Spoke with Patient to complete d/c planning assessment. Patient lives alone and drove himself to ED, his vehicle remains in the parking lot. Independent with ADL's. PCP and Pharmacy verified. Spoke with Dr. Peter of  2 who reports patient transferring to 7th floor OBS unit with potential for discharge later this afternoon if symptoms remain managed. 7th floor CM will follow for d/c needs as none are noted at this time.     06/08/20 0944   Discharge Assessment   Assessment Type Discharge Planning Assessment   Confirmed/corrected address and phone number on facesheet? Yes   Assessment information obtained from? Patient   Expected Length of Stay (days)   (1)   Communicated expected length of stay with patient/caregiver yes   Prior to hospitilization cognitive status: Alert/Oriented   Prior to hospitalization functional status: Independent   Current cognitive status: Alert/Oriented   Current Functional Status: Independent   Facility Arrived From: Home   Lives With alone   Able to Return to Prior Arrangements yes   Is patient able to care for self after discharge? Yes   Who are your caregiver(s) and their phone number(s)?   (Keon Schneider SR. [phone])   Patient's perception of discharge disposition home or selfcare   Readmission Within the Last 30 Days no previous admission in last 30 days   Patient currently being followed by outpatient case management? No   Patient currently receives any other outside agency services? No   Equipment Currently Used at Home none   Do you have any problems affording any of your prescribed medications? No   Is the patient taking medications as prescribed? yes   Does the patient have transportation home? Yes   Transportation Anticipated car, drives self   Does the patient receive services at the [facility]? No   Discharge Plan A Home   Discharge Plan B Home   DME Needed Upon Discharge  none   Patient/Family in Agreement with Plan yes

## 2020-06-08 NOTE — ASSESSMENT & PLAN NOTE
Longstanding history of known sigmoid diverticulitis. Patient with multiple hospitalizations, most recently on 5/20 for similar symptoms. At that time patient with mild WBC and fever, treated with IV antibiotics with clinical improvement however patient had to leave AMA 2/2 family emergency. He states he was not prescribed any oral antibiotics at time of discharge (MAR includes augmentin but that was not a new prescription at that time).    Repeat CT in ED showing moderate improvement in fat stranding / inflammatory changes in area of known sigmoid diverticulitis. No complications on CT, however patient with intractable pain in ED requiring IV narcotics so patient admitted. Of note, patient has been discussing with surgery about possible sigmoidectomy as an outpatient.    Plan:  Suspect 2/2 inadequately treated prior diverticulitis  - Cipro and flagyl IV for diverticulitis  - Patient tolerating clears so holding off on IVF  - Advance diet as tolerated  - Will need follow up with general surgery on discharge for possible operative management

## 2020-06-08 NOTE — ASSESSMENT & PLAN NOTE
Prior history of opiate dependence, now not on any opiates.   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

## 2020-06-08 NOTE — ASSESSMENT & PLAN NOTE
Chronic, patient states that almost every day he takes one ativan. Will not add to PRN at this time as patient also receiving opiates for pain and would like to avoid combination of medications that would depress respiratory drive.

## 2020-06-08 NOTE — HOSPITAL COURSE
Patient admitted to Hospital Medicine due to the fact that he had severe abdominal pain,diverticulitis diagnosed on CT [place], and that he felt uncomfortable going home. Patient started on cipro/flagyl IV and given morphine for pain and zofran for nausea. Patient improved overnight and diet advanced. As long as patient can tolerate a meal, he will be diagnosed with 1 week Cipro Flagyl and close follow up with General Surgery.

## 2020-06-08 NOTE — ASSESSMENT & PLAN NOTE
Patient reports history of asthma but not on any inhalers, even rescue albuterol. No wheeze on exam, on room air.    - continue to monitor

## 2020-06-08 NOTE — ASSESSMENT & PLAN NOTE
Endorsing significant amount of alcohol use, can drink a case of beer per day. Denies history if withdrawals. Did have significant amount of alcohol prior to onset of symptoms 4 days ago. Lipase negative and no signs of pancreatitis on history or CT.     Plan:  - DERICK-AR score q4h ordered  - Consider benzo taper if patient becomes symptomatic  - Discuss quitting with patient and provide resources if interested

## 2020-06-08 NOTE — NURSING
Pt arrived to the 5th floor via wheelchair @ 0523 and placed into room 505. Pt orientated to the room, white board updated, call light within reach and instructed on how to use it, bed in lowest position, side rails up x2. IS and hibiclens @ bedside. SCD applied and turned on. VS taken and stable, IVF continued.     No indicators present

## 2020-06-08 NOTE — HPI
Mr. Schneider is a 49 year old gentleman with a past medical history significant for anxiety, chronic pain, cocaine abuse, and asthma who presents to [facility] ED in the early AM of 6/8 for evaluation of abdominal pain. Of note the patient was recently hospitalized on 5/19 for similar symptoms. He has had multiple admissions for diverticulitis over the past 3 years during which he receives antibiotics and improves. During the last hospitalization he was started on IV antibiotics. General surgery was consulted and discussed an elective sigmoidectomy after acute inflammation improved as an outpatient. During that hospitalization the patient's father got into a car accident and was in intensive care so patient left AMA to be with his father. He was not prescribed any antibiotics at that time. Since discharge the patient states he felt well until about roughly 5 days ago when he noticed a return or 10/10 cramping LLQ pain which is typical of his diverticular pain. He denies any bloody bowel movements. The patient is a heavy drinker and states that earlier that day he was at a party and drank heavily. He and his friends drink multiple 12/pack cases of beer frequently. Denies any history of withdrawals. He tries to avoid seeds or other foods that he states may exacerbate his diverticulitis. He denies an fevers or chills. He has been having diarrhea with intermittent mucous.     In the ED, the patient was hemodynamically stable and afebrile. Labs were largely WNL, however patient with significant abdominal pain requiring multiple doses of IV morphine so patient was admitted for uncomplicated diverticulitis with sever abdominal pain.

## 2020-06-08 NOTE — ASSESSMENT & PLAN NOTE
Dispo: Likely home with no needs  Follow-Up: Will need follow up with general surgery on discharge

## 2020-06-08 NOTE — DISCHARGE SUMMARY
[facility] Hospital Medicine  Discharge Summary      Patient Name: Keon Schneider Jr.  MRN: [id number]  Admission Date: 6/8/2020  Hospital Length of Stay: 1 days  Discharge Date and Time:  06/08/2020 3:27 PM  Attending Physician: Ozzy Hinojosa MD   Discharging Provider: Anita Peter MD  Primary Care Provider: Johnie Gutierrez MD  Hospital Medicine Team: [facility] HOSP MED 2 Anita Peter MD    HPI:   Mr. Schneider is a 49 year old gentleman with a past medical history significant for anxiety, chronic pain, cocaine abuse, and asthma who presents to [facility] ED in the early AM of 6/8 for evaluation of abdominal pain. Of note the patient was recently hospitalized on 5/19 for similar symptoms. He has had multiple admissions for diverticulitis over the past 3 years during which he receives antibiotics and improves. During the last hospitalization he was started on IV antibiotics. General surgery was consulted and discussed an elective sigmoidectomy after acute inflammation improved as an outpatient. During that hospitalization the patient's father got into a car accident and was in intensive care so patient left AMA to be with his father. He was not prescribed any antibiotics at that time. Since discharge the patient states he felt well until about roughly 5 days ago when he noticed a return or 10/10 cramping LLQ pain which is typical of his diverticular pain. He denies any bloody bowel movements. The patient is a heavy drinker and states that earlier that day he was at a party and drank heavily. He and his friends drink multiple 12/pack cases of beer frequently. Denies any history of withdrawals. He tries to avoid seeds or other foods that he states may exacerbate his diverticulitis. He denies an fevers or chills. He has been having diarrhea with intermittent mucous.     In the ED, the patient was hemodynamically stable and afebrile. Labs were largely WNL, however patient with significant abdominal pain  requiring multiple doses of IV morphine so patient was admitted for uncomplicated diverticulitis with sever abdominal pain.        * No surgery found *      Hospital Course:   Patient admitted to Hospital Medicine due to the fact that he had severe abdominal pain,diverticulitis diagnosed on CT Abdo, and that he felt uncomfortable going home. Patient started on cipro/flagyl IV and given morphine for pain and zofran for nausea. Patient improved overnight and diet advanced. As long as patient can tolerate a meal, he will be diagnosed with 1 week Cipro Flagyl and close follow up with General Surgery.      Vitals:    06/08/20 0824   BP: 108/70   Pulse: (!) 56   Resp: 16   Temp: 96.8 °F (36 °C)     Physical Exam   Constitutional: He is oriented to person, place, and time. He appears well-developed and well-nourished.   Neck: Normal range of motion. Neck supple.   Cardiovascular: Normal rate, regular rhythm and normal heart sounds.   No murmur heard.  Pulmonary/Chest: Effort normal and breath sounds normal. No respiratory distress. He has no rales.   Abdominal: He exhibits distension. There is tenderness (LLQ).   Hyperactive bowel sounds   Musculoskeletal: Normal range of motion. He exhibits no edema or tenderness.   Neurological: He is alert and oriented to person, place, and time. No cranial nerve deficit.   Skin: Skin is warm and dry.   Psychiatric: He has a normal mood and affect. His behavior is normal. Thought content normal.   Nursing note and vitals reviewed.        Consults:     * Acute diverticulitis  Longstanding history of known sigmoid diverticulitis. Patient with multiple hospitalizations, most recently on 5/20 for similar symptoms. At that time patient with mild WBC and fever, treated with IV antibiotics with clinical improvement however patient had to leave A 2/2 family emergency. He states he was not prescribed any oral antibiotics at time of discharge (MAR includes augmentin but that was not a new  prescription at that time).    Repeat CT in ED showing moderate improvement in fat stranding / inflammatory changes in area of known sigmoid diverticulitis. No complications on CT, however patient with intractable pain in ED requiring IV narcotics so patient admitted. Of note, patient has been discussing with surgery about possible sigmoidectomy as an outpatient.    Plan:  Suspect 2/2 inadequately treated prior diverticulitis  - Cipro and flagyl oral for 7 days for diverticulitis  - Patient tolerating adult regular diet  - Will need follow up with general surgery on discharge for possible operative management    Chronic back pain  Prior history of opiate dependence, now not on any opiates.   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        Final Active Diagnoses:    Diagnosis Date Noted POA    PRINCIPAL PROBLEM:  Acute diverticulitis [K57.92] 05/17/2020 Yes    Mild intermittent asthma without complication [J45.20] 05/17/2020 Yes    Anxiety [F41.9] 11/24/2013 Yes     Chronic    Chronic back pain [M54.9, G89.29] 11/24/2013 Yes     Chronic      Problems Resolved During this Admission:    Diagnosis Date Noted Date Resolved POA    Alcohol abuse [F10.10] 06/08/2020 06/08/2020 Yes    Discharge planning issues [Z02.9] 06/08/2020 06/08/2020 Not Applicable       Discharged Condition: good    Disposition:     Follow Up:  Follow-up Information     Austyn Carrillo MD.    Specialty:  General Surgery  Why:  Surgery follow-up scheduled for 6/22/20 @ 1030AM  Contact information:  [address] [facility] [address]  [phone]             Johnie Gutierrez MD.    Specialty:  Family Medicine  Why:  Patient may schedule appt as needed  Contact information:  [address]  [place] [address]  [phone]                 Patient Instructions:      Ambulatory referral/consult to General Surgery   Standing Status: Future   Referral Priority: Routine Referral Type: Consultation   Referral Reason: Specialty Services Required   Requested Specialty: General Surgery   Number of Visits Requested: 1       Significant Diagnostic Studies: Labs:   CMP   Recent Labs   Lab 06/08/20  0109      K 4.4      CO2 22*   GLU 99   BUN 15   CREATININE 1.0   CALCIUM 9.7   PROT 7.9   ALBUMIN 4.2   BILITOT 0.3   ALKPHOS 71   AST 17   ALT 23   ANIONGAP 9   ESTGFRAFRICA >60.0   EGFRNONAA >60.0   , CBC   Recent Labs   Lab 06/08/20  0109   WBC 10.08   HGB 13.6*   HCT 40.8       and INR   Lab Results   Component Value Date    INR 0.9 01/18/2016    INR 1.0 07/16/2014    INR 1.0 11/23/2013     Radiology: CT scan: CT ABDOMEN PELVIS WITH CONTRAST:   Results for orders placed or performed during the hospital encounter of 06/08/20   CT Abdomen Pelvis With Contrast    Narrative    EXAMINATION:  CT ABDOMEN PELVIS WITH CONTRAST    CLINICAL HISTORY:  LLQ pain, suspect diverticulitis;    TECHNIQUE:  Low dose axial images, sagittal and coronal reformations were obtained from the lung bases to the pubic symphysis following the IV administration of 100 mL of Omnipaque 350.Oral contrast was not given.  Evaluation of pelvis is limited secondary to beam hardening artifact from bilateral hip prostheses.    COMPARISON:  CT abdomen pelvis 05/16/2020, 10/18/2019, 4818, 03/30/2018, 03/24/2018, 01/18/2016    FINDINGS:  Heart/Vasculature: No cardiomegaly or pericardial effusion.  Aorta is normal in caliber with atherosclerotic calcification along the course of the infrarenal abdominal aorta.    Lung Bases: Symmetrically expanded and clear.  Stable 3 mm nodule left base posteriorly.    Liver: Enlarged measuring up to approximately 21.1 cm in maximal craniocaudal dimension.  No focal lesion identified.    Gallbladder:  Normal appearance without evidence for cholecystitis.    Bile Ducts: No intra or extrahepatic biliary ductal dilation.    Pancreas: No pancreatic mass lesion or peripancreatic inflammatory change.    Spleen: Normal.    Adrenals: Normal.    Kidneys/Ureters: Stable in size and location.  No focal renal abnormality, nephrolithiasis, or hydroureteronephrosis.  Urinary bladder is not well evaluated secondary to beam hardening artifact.    Reproductive organs: Not well evaluated secondary to beam hardening artifact.    GI tract/Mesentery: Several diverticula again noted in the sigmoid colon with associated colonic wall thickening and fat stranding and prominence of the vascular recta in this region.  No perforation or abscess noting evaluation of pelvis is limited secondary to beam hardening artifact from bilateral hip prostheses.  Findings appear improved when compared to prior examination dated 05/16/2020.  The small bowel is normal caliber without evidence for inflammation or obstruction.  The appendix is normal.    Peritoneal Space: No abdominopelvic ascites, intraperitoneal free air or significant adenopathy.    Abdominal wall/Extraperitoneal soft tissues: Unremarkable.    Bones: Degenerative changes without acute fracture or osseous destructive lesion identified.  Bilateral total hip prostheses noted.      Impression    Several diverticula again noted in the sigmoid colon with associated wall thickening, adjacent fat stranding, and prominence of vascular recta.  Findings are slightly improved when compared with prior studies and suggest resolving diverticulitis or colitis.  Given the persistence of findings, sigmoidoscopy follow up to exclude colon carcinoma should also be considered.  No complication such as perforation or abscess.    Hepatomegaly.    Stable 3 mm nodule left lung base posteriorly.  See prior report May 16, 2020 for recommendations.    Additional findings as above.    This report was flagged in Epic  as abnormal.    Electronically signed by resident: Saman Marley  Date:    06/08/2020  Time:    02:17    Electronically signed by: Josh Corrales MD  Date:    06/08/2020  Time:    02:50       Pending Diagnostic Studies:     None         Medications:  Reconciled Home Medications:      Medication List      START taking these medications    ciprofloxacin HCl 500 MG tablet  Commonly known as:  CIPRO  Take 1 tablet (500 mg total) by mouth every 12 (twelve) hours. for 7 days     metroNIDAZOLE 500 MG tablet  Commonly known as:  FLAGYL  Take 1 tablet (500 mg total) by mouth 3 (three) times daily. for 7 days. DO NOT DRINK ALCOHOL WHILE ON THIS MEDICATION     ondansetron 8 MG Tbdl  Commonly known as:  ZOFRAN-ODT  Dissolve 1 tablet (8 mg total) by mouth every 8 (eight) hours as needed.     oxyCODONE 5 MG immediate release tablet  Commonly known as:  ROXICODONE  Take 1 tablet (5 mg total) by mouth every 6 (six) hours as needed for Pain.        CONTINUE taking these medications    ALPRAZolam 2 MG Tab  Commonly known as:  XANAX  Take 1 tablet (2 mg total) by mouth daily as needed (back pain).     cetirizine 10 MG tablet  Commonly known as:  ZYRTEC  Take 1 tablet (10 mg total) by mouth once daily. for 15 days     pantoprazole 40 MG tablet  Commonly known as:  PROTONIX  Take 1 tablet (40 mg total) by mouth 2 (two) times daily.        STOP taking these medications    amoxicillin-clavulanate 875-125mg 875-125 mg per tablet  Commonly known as:  AUGMENTIN     betamethasone dipropionate 0.05 % cream  Commonly known as:  DIPROLENE     famotidine 40 MG tablet  Commonly known as:  PEPCID            Indwelling Lines/Drains at time of discharge:   Lines/Drains/Airways     None                 Time spent on the discharge of patient: 45 minutes  Patient was seen and examined on the date of discharge and determined to be suitable for discharge.         Anita Peter MD  Department of Hospital Medicine  [facility]

## 2020-06-08 NOTE — SUBJECTIVE & OBJECTIVE
Past Medical History:   Diagnosis Date    Anxiety     Arthritis     Asthma     Avascular necrosis     Carpal tunnel syndrome     Chronic pain     Depression     Diverticulitis     Other injury of other sites of trunk 2011    Pneumonia     Spondylolisthesis     lumbar; myofascial pain    Staph infection     Ulnar neuropathy     left and rt arm       Past Surgical History:   Procedure Laterality Date    HIP SURGERY  3/06;     hip arthroplasty    HIP SURGERY      bilateral hip replacement (titanium)    JOINT REPLACEMENT      SHOULDER SURGERY  2012    right shoulder    SHOULDER SURGERY         Review of patient's allergies indicates:   Allergen Reactions    Toradol [ketorolac] Hives and Nausea And Vomiting       No current facility-administered medications on file prior to encounter.      Current Outpatient Medications on File Prior to Encounter   Medication Sig    alprazolam (XANAX) 2 MG Tab Take 1 tablet (2 mg total) by mouth daily as needed (back pain).    cetirizine (ZYRTEC) 10 MG tablet Take 1 tablet (10 mg total) by mouth once daily. for 15 days    oxyCODONE-acetaminophen (PERCOCET) 5-325 mg per tablet Take 1 tablet by mouth every 4 (four) hours as needed for Pain.    pantoprazole (PROTONIX) 40 MG tablet Take 1 tablet (40 mg total) by mouth 2 (two) times daily.    [DISCONTINUED] amoxicillin-clavulanate 875-125mg (AUGMENTIN) 875-125 mg per tablet Take 1 tablet by mouth 2 (two) times daily.    [DISCONTINUED] betamethasone dipropionate (DIPROLENE) 0.05 % cream Apply topically 2 (two) times daily.    [DISCONTINUED] famotidine (PEPCID) 40 MG tablet      Family History     Problem Relation (Age of Onset)    Cancer Mother        Tobacco Use    Smoking status: Former Smoker     Packs/day: 1.00     Years: 10.00     Pack years: 10.00     Last attempt to quit: 10/13/2012     Years since quittin.6    Smokeless tobacco: Never Used   Substance and Sexual Activity    Alcohol use: Yes      Alcohol/week: 12.0 standard drinks     Types: 12 Cans of beer per week     Comment: on the weekend    Drug use: No    Sexual activity: Yes     Partners: Female     Review of Systems   Constitutional: Negative for chills, diaphoresis, fever and unexpected weight change.   HENT: Negative for sore throat.    Eyes: Negative for visual disturbance.   Respiratory: Negative for cough, shortness of breath and wheezing.    Cardiovascular: Negative for chest pain, palpitations and leg swelling.   Gastrointestinal: Positive for abdominal pain and diarrhea. Negative for abdominal distention, constipation, nausea and vomiting.   Genitourinary: Negative for difficulty urinating and dysuria.   Musculoskeletal: Positive for arthralgias (chronic pain from prior joint surgeries). Negative for myalgias.   Skin: Negative for pallor and rash.   Neurological: Negative for weakness and headaches.   Hematological: Negative for adenopathy.   Psychiatric/Behavioral: Negative for agitation and confusion.     Objective:     Vital Signs (Most Recent):  Temp: 98.1 °F (36.7 °C) (06/08/20 0540)  Pulse: 63 (06/08/20 0540)  Resp: 16 (06/08/20 0540)  BP: 117/69 (06/08/20 0540)  SpO2: 96 % (06/08/20 0540) Vital Signs (24h Range):  Temp:  [98.1 °F (36.7 °C)-98.8 °F (37.1 °C)] 98.1 °F (36.7 °C)  Pulse:  [63-79] 63  Resp:  [16-20] 16  SpO2:  [96 %-99 %] 96 %  BP: (117-198)/() 117/69     Weight: 100.7 kg (222 lb)  Body mass index is 32.78 kg/m².    Physical Exam   Constitutional: He is oriented to person, place, and time. He appears well-developed and well-nourished. No distress.   HENT:   Head: Normocephalic and atraumatic.   Mouth/Throat: No oropharyngeal exudate.   Eyes: Pupils are equal, round, and reactive to light. EOM are normal. No scleral icterus.   Neck: Normal range of motion. Neck supple.   Cardiovascular: Normal rate, regular rhythm, normal heart sounds and intact distal pulses.   No murmur heard.  Pulmonary/Chest: Effort normal and  breath sounds normal. No respiratory distress. He has no wheezes. He has no rales.   Abdominal: Soft. Bowel sounds are normal. He exhibits no distension and no mass. There is tenderness (LLQ). There is no guarding.   Musculoskeletal: Normal range of motion. He exhibits no edema.   Lymphadenopathy:     He has no cervical adenopathy.   Neurological: He is alert and oriented to person, place, and time.   Skin: Skin is warm and dry.   Nursing note and vitals reviewed.        CRANIAL NERVES     CN III, IV, VI   Pupils are equal, round, and reactive to light.  Extraocular motions are normal.        Significant Labs:   CBC:   Recent Labs   Lab 06/08/20  0109   WBC 10.08   HGB 13.6*   HCT 40.8        CMP:   Recent Labs   Lab 06/08/20 0109      K 4.4      CO2 22*   GLU 99   BUN 15   CREATININE 1.0   CALCIUM 9.7   PROT 7.9   ALBUMIN 4.2   BILITOT 0.3   ALKPHOS 71   AST 17   ALT 23   ANIONGAP 9   EGFRNONAA >60.0     All pertinent labs within the past 24 hours have been reviewed.    Significant Imaging: I have reviewed all pertinent imaging results/findings within the past 24 hours.     Narrative     EXAMINATION:  CT ABDOMEN PELVIS WITH CONTRAST    CLINICAL HISTORY:  LLQ pain, suspect diverticulitis;    TECHNIQUE:  Low dose axial images, sagittal and coronal reformations were obtained from the lung bases to the pubic symphysis following the IV administration of 100 mL of Omnipaque 350.Oral contrast was not given.  Evaluation of pelvis is limited secondary to beam hardening artifact from bilateral hip prostheses.    COMPARISON:  CT abdomen pelvis 05/16/2020, 10/18/2019, 4818, 03/30/2018, 03/24/2018, 01/18/2016    FINDINGS:  Heart/Vasculature: No cardiomegaly or pericardial effusion.  Aorta is normal in caliber with atherosclerotic calcification along the course of the infrarenal abdominal aorta.    Lung Bases: Symmetrically expanded and clear.  Stable 3 mm nodule left base posteriorly.    Liver: Enlarged  measuring up to approximately 21.1 cm in maximal craniocaudal dimension.  No focal lesion identified.    Gallbladder: Normal appearance without evidence for cholecystitis.    Bile Ducts: No intra or extrahepatic biliary ductal dilation.    Pancreas: No pancreatic mass lesion or peripancreatic inflammatory change.    Spleen: Normal.    Adrenals: Normal.    Kidneys/Ureters: Stable in size and location.  No focal renal abnormality, nephrolithiasis, or hydroureteronephrosis.  Urinary bladder is not well evaluated secondary to beam hardening artifact.    Reproductive organs: Not well evaluated secondary to beam hardening artifact.    GI tract/Mesentery: Several diverticula again noted in the sigmoid colon with associated colonic wall thickening and fat stranding and prominence of the vascular recta in this region.  No perforation or abscess noting evaluation of pelvis is limited secondary to beam hardening artifact from bilateral hip prostheses.  Findings appear improved when compared to prior examination dated 05/16/2020.  The small bowel is normal caliber without evidence for inflammation or obstruction.  The appendix is normal.    Peritoneal Space: No abdominopelvic ascites, intraperitoneal free air or significant adenopathy.    Abdominal wall/Extraperitoneal soft tissues: Unremarkable.    Bones: Degenerative changes without acute fracture or osseous destructive lesion identified.  Bilateral total hip prostheses noted.      Impression       Several diverticula again noted in the sigmoid colon with associated wall thickening, adjacent fat stranding, and prominence of vascular recta.  Findings are slightly improved when compared with prior studies and suggest resolving diverticulitis or colitis.  Given the persistence of findings, sigmoidoscopy follow up to exclude colon carcinoma should also be considered.  No complication such as perforation or abscess.    Hepatomegaly.    Stable 3 mm nodule left lung base  posteriorly.  See prior report May 16, 2020 for recommendations.    Additional findings as above.    This report was flagged in Epic as abnormal.    Electronically signed by resident: Saman Marley  Date: 06/08/2020  Time: 02:17    Electronically signed by: Josh Corrales MD  Date: 06/08/2020  Time: 02:50

## 2020-06-09 NOTE — PLAN OF CARE
Patient discharged home to care of self on 6/8/20.     06/09/20 1334   Final Note   Assessment Type Final Discharge Note   Anticipated Discharge Disposition Home   What phone number can be called within the next 1-3 days to see how you are doing after discharge?   ([phone])   Hospital Follow Up  Appt(s) scheduled? Yes   Discharge plans and expectations educations in teach back method with documentation complete? Yes   Right Care Referral Info   Post Acute Recommendation No Care

## 2020-06-22 ENCOUNTER — OFFICE VISIT (OUTPATIENT)
Dept: SURGERY | Facility: CLINIC | Age: 50
End: 2020-06-22
Payer: MEDICARE

## 2020-06-22 VITALS
BODY MASS INDEX: 32.57 KG/M2 | DIASTOLIC BLOOD PRESSURE: 77 MMHG | HEART RATE: 57 BPM | WEIGHT: 219.94 LBS | SYSTOLIC BLOOD PRESSURE: 126 MMHG | TEMPERATURE: 98 F | HEIGHT: 69 IN

## 2020-06-22 DIAGNOSIS — K57.92 ACUTE DIVERTICULITIS: ICD-10-CM

## 2020-06-22 DIAGNOSIS — K57.32 DIVERTICULITIS OF LARGE INTESTINE WITHOUT PERFORATION OR ABSCESS WITHOUT BLEEDING: Primary | ICD-10-CM

## 2020-06-22 PROCEDURE — 99999 PR PBB SHADOW E&M-EST. PATIENT-LVL III: CPT | Mod: PBBFAC,,, | Performed by: SURGERY

## 2020-06-22 PROCEDURE — 99213 OFFICE O/P EST LOW 20 MIN: CPT | Mod: PBBFAC | Performed by: SURGERY

## 2020-06-22 PROCEDURE — 99999 PR PBB SHADOW E&M-EST. PATIENT-LVL III: ICD-10-PCS | Mod: PBBFAC,,, | Performed by: SURGERY

## 2020-06-22 PROCEDURE — 99203 OFFICE O/P NEW LOW 30 MIN: CPT | Mod: S$PBB,,, | Performed by: SURGERY

## 2020-06-22 PROCEDURE — 99203 PR OFFICE/OUTPT VISIT, NEW, LEVL III, 30-44 MIN: ICD-10-PCS | Mod: S$PBB,,, | Performed by: SURGERY

## 2020-06-22 RX ORDER — METRONIDAZOLE 500 MG/1
500 TABLET ORAL SEE ADMIN INSTRUCTIONS
Qty: 3 TABLET | Refills: 0 | Status: SHIPPED | OUTPATIENT
Start: 2020-06-22 | End: 2021-06-10

## 2020-06-22 RX ORDER — POLYETHYLENE GLYCOL 3350 17 G/17G
17 POWDER, FOR SOLUTION ORAL DAILY
Qty: 2 PACKET | Refills: 0 | Status: SHIPPED | OUTPATIENT
Start: 2020-06-22 | End: 2021-06-10 | Stop reason: ALTCHOICE

## 2020-06-22 RX ORDER — NEOMYCIN SULFATE 500 MG/1
1000 TABLET ORAL SEE ADMIN INSTRUCTIONS
Qty: 6 TABLET | Refills: 0 | Status: SHIPPED | OUTPATIENT
Start: 2020-06-22 | End: 2021-06-10

## 2020-06-22 NOTE — PROGRESS NOTES
History & Physical    SUBJECTIVE:     History of Present Illness:  Patient is a 50 y.o. male with no significant medical hx presents for evaluation of sigmoid diverticulitis. The patient has had >10 hospital admissions over the past few years, most recently 2 times in one month, for diverticulitis without perforation or abscess formation. He states that the frequent recurrences are becoming life altering and he would like to discuss definitive management. He is active, hunts and races cars on a regular basis, has no cardiac hx, is a remote smoker, is non diabetic, has had no previous abdominal surgeries. He is interested in surgical treatment.     Chief Complaint   Patient presents with    Consult       Review of patient's allergies indicates:   Allergen Reactions    Toradol [ketorolac] Hives and Nausea And Vomiting       Current Outpatient Medications   Medication Sig Dispense Refill    alprazolam (XANAX) 2 MG Tab Take 1 tablet (2 mg total) by mouth daily as needed (back pain).      cetirizine (ZYRTEC) 10 MG tablet Take 1 tablet (10 mg total) by mouth once daily. for 15 days 30 tablet 0    metroNIDAZOLE (FLAGYL) 500 MG tablet Take 1 tablet (500 mg total) by mouth As instructed. Take at 2pm, 3pm, and 10pm day before surgery 3 tablet 0    neomycin (MYCIFRADIN) 500 mg Tab Take 2 tablets (1,000 mg total) by mouth As instructed. Take at 2pm day before surgery, then 3pm and again at 10pm. 6 tablet 0    pantoprazole (PROTONIX) 40 MG tablet Take 1 tablet (40 mg total) by mouth 2 (two) times daily. 60 tablet 1    polyethylene glycol (GLYCOLAX) 17 gram PwPk Take 17 g by mouth once daily. Take twice the day before surgery 2 packet 0     No current facility-administered medications for this visit.        Past Medical History:   Diagnosis Date    Anxiety     Arthritis     Asthma     Avascular necrosis     Carpal tunnel syndrome     Chronic pain     Depression     Diverticulitis     Other injury of other sites  "of trunk 2011    Pneumonia     Spondylolisthesis     lumbar; myofascial pain    Staph infection     Ulnar neuropathy     left and rt arm     Past Surgical History:   Procedure Laterality Date    HIP SURGERY  3/06;     hip arthroplasty    HIP SURGERY      bilateral hip replacement (titanium)    JOINT REPLACEMENT      SHOULDER SURGERY  2012    right shoulder    SHOULDER SURGERY       Family History   Problem Relation Age of Onset    Cancer Mother      Social History     Tobacco Use    Smoking status: Former Smoker     Packs/day: 1.00     Years: 10.00     Pack years: 10.00     Quit date: 10/13/2012     Years since quittin.6    Smokeless tobacco: Never Used   Substance Use Topics    Alcohol use: Yes     Alcohol/week: 12.0 standard drinks     Types: 12 Cans of beer per week     Comment: on the weekend    Drug use: No        Review of Systems:  Review of Systems   Constitutional: Negative for chills and fever.   Respiratory: Negative for chest tightness and shortness of breath.    Cardiovascular: Negative for chest pain.   Gastrointestinal: Negative for abdominal pain, constipation, diarrhea, nausea and vomiting.       OBJECTIVE:     Vital Signs (Most Recent)  Temp: 98.1 °F (36.7 °C) (20 1043)  Pulse: (!) 57 (20 1043)  BP: 126/77 (20 1043)  5' 9" (1.753 m)  99.7 kg (219 lb 14.5 oz)     Physical Exam:  Physical Exam  Constitutional:       Appearance: Normal appearance.   HENT:      Head: Normocephalic and atraumatic.   Cardiovascular:      Rate and Rhythm: Normal rate.   Pulmonary:      Effort: No respiratory distress.   Abdominal:      General: Abdomen is flat. There is no distension.      Palpations: Abdomen is soft. There is no mass.      Tenderness: There is no abdominal tenderness. There is no guarding or rebound.      Hernia: No hernia is present.   Neurological:      General: No focal deficit present.      Mental Status: He is alert and oriented to person, place, and " time.   Psychiatric:         Mood and Affect: Mood normal.         Behavior: Behavior normal.         Diagnostic Results:  CT: Reviewed    ASSESSMENT/PLAN:     Keon Schneider Jr. is a 50 y.o. male with recurrent bouts of diverticulitis with repeated and frequent hospital admissions requiring IV abx, pt desires surgery    Plan for sigmoid colectomy  Clear liquid diet x1 day prior to surgery  Prep and abx Rx'd  c-scope prior to surgery    Consent not obtained prior to patient leaving clinic    Gilmar Garcia MD PGY V  [phone]

## 2020-06-22 NOTE — LETTER
June 22, 2020      Anita Peter MD  [address] [facility] [address]           [facility] - General Surgery  [address]  [place] [address]  Phone: [phone]          Patient: Keon Schneider Jr.   MR Number: [id number]   YOB: 1970   Date of Visit: 6/22/2020       Dear Dr. Anita Peter:    Thank you for referring Keon Schneider to me for evaluation. Attached you will find relevant portions of my assessment and plan of care.    If you have questions, please do not hesitate to call me. I look forward to following Keon Schneider along with you.    Sincerely,    Austyn Carrillo MD    Enclosure  CC:  No Recipients    If you would like to receive this communication electronically, please contact [email or web address] or [phone] to request more information on [employer] access.    For providers and/or their staff who would like to refer a patient to [facility], please contact us through our one-stop-shop provider referral line, [facility], [place], at [phone].    If you feel you have received this communication in error or would no longer like to receive these types of communications, please e-mail [email or web address]

## 2020-06-23 DIAGNOSIS — K57.32 DIVERTICULITIS OF LARGE INTESTINE WITHOUT PERFORATION OR ABSCESS WITHOUT BLEEDING: Primary | ICD-10-CM

## 2020-06-24 DIAGNOSIS — Z12.11 COLON CANCER SCREENING: Primary | ICD-10-CM

## 2020-07-06 ENCOUNTER — ANESTHESIA (OUTPATIENT)
Dept: ENDOSCOPY | Facility: HOSPITAL | Age: 50
End: 2020-07-06
Payer: MEDICARE

## 2020-07-06 ENCOUNTER — HOSPITAL ENCOUNTER (OUTPATIENT)
Facility: HOSPITAL | Age: 50
Discharge: HOME | End: 2020-07-06
Attending: INTERNAL MEDICINE | Admitting: INTERNAL MEDICINE
Payer: MEDICARE

## 2020-07-06 ENCOUNTER — ANESTHESIA EVENT (OUTPATIENT)
Dept: ENDOSCOPY | Facility: HOSPITAL | Age: 50
End: 2020-07-06
Payer: MEDICARE

## 2020-07-06 VITALS
SYSTOLIC BLOOD PRESSURE: 138 MMHG | TEMPERATURE: 98 F | OXYGEN SATURATION: 98 % | RESPIRATION RATE: 18 BRPM | HEART RATE: 72 BPM | DIASTOLIC BLOOD PRESSURE: 90 MMHG

## 2020-07-06 DIAGNOSIS — Z12.11 COLON CANCER SCREENING: ICD-10-CM

## 2020-07-06 LAB — SARS-COV-2 RDRP RESP QL NAA+PROBE: NEGATIVE

## 2020-07-06 PROCEDURE — D9220A PRA ANESTHESIA: ICD-10-PCS | Mod: ANES,,, | Performed by: ANESTHESIOLOGY

## 2020-07-06 PROCEDURE — U0002 COVID-19 LAB TEST NON-CDC: HCPCS

## 2020-07-06 PROCEDURE — 45385 COLONOSCOPY W/LESION REMOVAL: CPT | Performed by: INTERNAL MEDICINE

## 2020-07-06 PROCEDURE — 37000008 HC ANESTHESIA 1ST 15 MINUTES: Performed by: INTERNAL MEDICINE

## 2020-07-06 PROCEDURE — 63600175 PHARM REV CODE 636 W HCPCS: Performed by: NURSE ANESTHETIST, CERTIFIED REGISTERED

## 2020-07-06 PROCEDURE — 27201089 HC SNARE, DISP (ANY): Performed by: INTERNAL MEDICINE

## 2020-07-06 PROCEDURE — 88305 TISSUE EXAM BY PATHOLOGIST: ICD-10-PCS | Mod: 26,,, | Performed by: PATHOLOGY

## 2020-07-06 PROCEDURE — 88305 TISSUE EXAM BY PATHOLOGIST: CPT | Mod: 26,,, | Performed by: PATHOLOGY

## 2020-07-06 PROCEDURE — 45385 COLONOSCOPY W/LESION REMOVAL: CPT | Mod: ,,, | Performed by: INTERNAL MEDICINE

## 2020-07-06 PROCEDURE — 88305 TISSUE EXAM BY PATHOLOGIST: CPT | Performed by: PATHOLOGY

## 2020-07-06 PROCEDURE — D9220A PRA ANESTHESIA: Mod: ANES,,, | Performed by: ANESTHESIOLOGY

## 2020-07-06 PROCEDURE — 37000009 HC ANESTHESIA EA ADD 15 MINS: Performed by: INTERNAL MEDICINE

## 2020-07-06 PROCEDURE — D9220A PRA ANESTHESIA: Mod: CRNA,,, | Performed by: NURSE ANESTHETIST, CERTIFIED REGISTERED

## 2020-07-06 PROCEDURE — D9220A PRA ANESTHESIA: ICD-10-PCS | Mod: CRNA,,, | Performed by: NURSE ANESTHETIST, CERTIFIED REGISTERED

## 2020-07-06 PROCEDURE — 45385 PR COLONOSCOPY,REMV LESN,SNARE: ICD-10-PCS | Mod: ,,, | Performed by: INTERNAL MEDICINE

## 2020-07-06 PROCEDURE — 25000003 PHARM REV CODE 250: Performed by: NURSE ANESTHETIST, CERTIFIED REGISTERED

## 2020-07-06 RX ORDER — LIDOCAINE HYDROCHLORIDE 20 MG/ML
INJECTION INTRAVENOUS
Status: DISCONTINUED | OUTPATIENT
Start: 2020-07-06 | End: 2020-07-06

## 2020-07-06 RX ORDER — PROPOFOL 10 MG/ML
INJECTION, EMULSION INTRAVENOUS
Status: COMPLETED
Start: 2020-07-06 | End: 2020-07-06

## 2020-07-06 RX ORDER — PROPOFOL 10 MG/ML
VIAL (ML) INTRAVENOUS
Status: DISCONTINUED | OUTPATIENT
Start: 2020-07-06 | End: 2020-07-06

## 2020-07-06 RX ORDER — LIDOCAINE HYDROCHLORIDE 20 MG/ML
INJECTION, SOLUTION EPIDURAL; INFILTRATION; INTRACAUDAL; PERINEURAL
Status: DISCONTINUED
Start: 2020-07-06 | End: 2020-07-06 | Stop reason: HOSPADM

## 2020-07-06 RX ORDER — SODIUM CHLORIDE 9 MG/ML
INJECTION, SOLUTION INTRAVENOUS CONTINUOUS PRN
Status: DISCONTINUED | OUTPATIENT
Start: 2020-07-06 | End: 2020-07-06

## 2020-07-06 RX ADMIN — PROPOFOL 50 MG: 10 INJECTION, EMULSION INTRAVENOUS at 08:07

## 2020-07-06 RX ADMIN — Medication 60 MG: at 08:07

## 2020-07-06 RX ADMIN — PROPOFOL 100 MG: 10 INJECTION, EMULSION INTRAVENOUS at 08:07

## 2020-07-06 RX ADMIN — PROPOFOL 20 MG: 10 INJECTION, EMULSION INTRAVENOUS at 08:07

## 2020-07-06 RX ADMIN — SODIUM CHLORIDE: 0.9 INJECTION, SOLUTION INTRAVENOUS at 07:07

## 2020-07-06 NOTE — ANESTHESIA POSTPROCEDURE EVALUATION
Anesthesia Post Evaluation    Patient: Keon Schneider Jr.    Procedure(s) Performed: Procedure(s) (LRB):  COLONOSCOPY (N/A)    Final Anesthesia Type: general    Patient location during evaluation: GI PACU  Patient participation: Yes- Able to Participate  Level of consciousness: awake and alert  Post-procedure vital signs: reviewed and stable  Pain management: adequate  Airway patency: patent    PONV status at discharge: No PONV  Anesthetic complications: no      Cardiovascular status: blood pressure returned to baseline  Respiratory status: unassisted and spontaneous ventilation  Hydration status: euvolemic  Follow-up not needed.          Vitals Value Taken Time   /90 07/06/20 0915   Temp 36.6 °C (97.9 °F) 07/06/20 0849   Pulse 72 07/06/20 0915   Resp 18 07/06/20 0915   SpO2 98 % 07/06/20 0915         Event Time   Out of Recovery 09:15:59         Pain/Ilda Score: Ilda Score: 10 (7/6/2020  9:15 AM)

## 2020-07-06 NOTE — DISCHARGE SUMMARY
[facility]  Discharge Summary      Admit Date: 7/6/2020    Discharge Date and Time:  07/06/2020 8:47 AM    Attending Physician: Broderick Moise MD     Reason for Admission: Diverticulitis    Procedures Performed: Procedure(s) (LRB):  COLONOSCOPY (N/A)    Hospital Course (synopsis of major diagnoses, care, treatment, and services provided during the course of the hospital stay): Outpatient colonoscopy     Consults: none    Significant Diagnostic Studies: Colonoscopy    Final Diagnoses:    Principal Problem: <principal problem not specified>   Secondary Diagnoses: Diverticulitis    Discharged Condition: good    Disposition: Home or Self Care    Follow Up/Patient Instructions: Follow-up with referring physician             Resume previous diet and activity.    Medications:  Reconciled Home Medications:      Medication List      ASK your doctor about these medications    ALPRAZolam 2 MG Tab  Commonly known as: XANAX  Take 1 tablet (2 mg total) by mouth daily as needed (back pain).     cetirizine 10 MG tablet  Commonly known as: ZYRTEC  Take 1 tablet (10 mg total) by mouth once daily. for 15 days     metroNIDAZOLE 500 MG tablet  Commonly known as: FLAGYL  Take 1 tablet (500 mg total) by mouth As instructed. Take at 2pm, 3pm, and 10pm day before surgery     neomycin 500 mg Tab  Commonly known as: MYCIFRADIN  Take 2 tablets (1,000 mg total) by mouth As instructed. Take at 2pm day before surgery, then 3pm and again at 10pm.     pantoprazole 40 MG tablet  Commonly known as: PROTONIX  Take 1 tablet (40 mg total) by mouth 2 (two) times daily.     polyethylene glycol 17 gram Pwpk  Commonly known as: GLYCOLAX  Take 17 g by mouth once daily. Take twice the day before surgery     polyethylene glycol [id number] -5.84 gram Solr  Commonly known as: COLYTE  Take 4,000 mLs (4 L total) by mouth once. for 1 dose  Ask about: Should I take this medication?          No discharge procedures on file.
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
## 2020-07-06 NOTE — H&P
"Chief Complaint: "I need a colonoscopy."    HPI:  The patient is a 50 year old man presenting for a colonoscopy.  He had a colonoscopy in  in which he had a fair prep to the cecum but an otherwise normal colonoscopy.  The patient recently had diverticulitis.  The patient denies any abdominal pain, weight loss, nausea, emesis, diarrhea, constipation, melena, or hematochezia.  The patient also denies a family history of colon cancer.    Past Medical History:   Diagnosis Date    Anxiety     Arthritis     Asthma     Avascular necrosis     Carpal tunnel syndrome     Chronic pain     Depression     Diverticulitis     Other injury of other sites of trunk 2011    Pneumonia     Spondylolisthesis     lumbar; myofascial pain    Staph infection     Ulnar neuropathy     left and rt arm     Past Surgical History:   Procedure Laterality Date    HIP SURGERY  3/06;     hip arthroplasty    HIP SURGERY      bilateral hip replacement (titanium)    JOINT REPLACEMENT      SHOULDER SURGERY  2012    right shoulder    SHOULDER SURGERY       Family History   Problem Relation Age of Onset    Cancer Mother      Social History     Socioeconomic History    Marital status: Single     Spouse name: Not on file    Number of children: Not on file    Years of education: Not on file    Highest education level: Not on file   Occupational History    Not on file   Social Needs    Financial resource strain: Not on file    Food insecurity     Worry: Not on file     Inability: Not on file    Transportation needs     Medical: Not on file     Non-medical: Not on file   Tobacco Use    Smoking status: Former Smoker     Packs/day: 1.00     Years: 10.00     Pack years: 10.00     Quit date: 10/13/2012     Years since quittin.7    Smokeless tobacco: Never Used   Substance and Sexual Activity    Alcohol use: Yes     Alcohol/week: 12.0 standard drinks     Types: 12 Cans of beer per week     Comment: on the weekend    " Drug use: No    Sexual activity: Yes     Partners: Female   Lifestyle    Physical activity     Days per week: Not on file     Minutes per session: Not on file    Stress: Not on file   Relationships    Social connections     Talks on phone: Not on file     Gets together: Not on file     Attends Tenriism service: Not on file     Active member of club or organization: Not on file     Attends meetings of clubs or organizations: Not on file     Relationship status: Not on file   Other Topics Concern    Not on file   Social History Narrative    Not on file      lidocaine (PF) 20 mg/mL (2%)        propofoL         Review of patient's allergies indicates:   Allergen Reactions    Toradol [ketorolac] Hives and Nausea And Vomiting     ROS:  No chest pain or dyspnea.  No dysuria.  No heartburn or dysphagia.  Otherwise as stated above.  Ten other systems negative.    Vitals:    07/06/20 0727   BP: (!) 135/90   Pulse: 69   Resp: 18   Temp: 98.9 °F (37.2 °C)   SpO2: 97%     P.E.:  GEN: A x O x 3, NAD  SKIN: No jaundice  HEENT: EOMI, PERRL, anicteric sclera  CV: RRR, no M/R/G  Chest: CTA B  Abdomen: soft, NTND, normoactive BS  Ext: No C/C/E.  2+ dorsalis pedis pulses B  Neuro: No asterixes or tremors.  CN II-XII intact  Musculoskeletal: 5/5 strength bilaterally    Labs:  Lab Results   Component Value Date    WBC 7.05 06/18/2020    HGB 13.7 (L) 06/18/2020    HCT 40.4 06/18/2020    MCV 94 06/18/2020     06/18/2020       CMP  Sodium   Date Value Ref Range Status   06/18/2020 144 136 - 145 mmol/L Final     Potassium   Date Value Ref Range Status   06/18/2020 3.6 3.5 - 5.1 mmol/L Final     Chloride   Date Value Ref Range Status   06/18/2020 105 95 - 110 mmol/L Final     CO2   Date Value Ref Range Status   06/18/2020 22 (L) 23 - 29 mmol/L Final     Glucose   Date Value Ref Range Status   06/18/2020 115 (H) 70 - 110 mg/dL Final     BUN, Bld   Date Value Ref Range Status   06/18/2020 12 2 - 20 mg/dL Final     Creatinine    Date Value Ref Range Status   06/18/2020 0.85 0.50 - 1.40 mg/dL Final     Calcium   Date Value Ref Range Status   06/18/2020 9.8 8.7 - 10.5 mg/dL Final     Total Protein   Date Value Ref Range Status   06/18/2020 8.0 6.0 - 8.4 g/dL Final     Albumin   Date Value Ref Range Status   06/18/2020 4.9 3.5 - 5.2 g/dL Final     Total Bilirubin   Date Value Ref Range Status   06/18/2020 0.3 0.1 - 1.0 mg/dL Final     Comment:     For infants and newborns, interpretation of results should be based  on gestational age, weight and in agreement with clinical  observations.  Premature Infant recommended reference ranges:  Up to 24 hours.............<8.0 mg/dL  Up to 48 hours............<12.0 mg/dL  3-5 days..................<15.0 mg/dL  6-29 days.................<15.0 mg/dL       Alkaline Phosphatase   Date Value Ref Range Status   06/18/2020 50 38 - 126 U/L Final     AST   Date Value Ref Range Status   06/18/2020 43 15 - 46 U/L Final     ALT   Date Value Ref Range Status   06/18/2020 41 10 - 44 U/L Final     Anion Gap   Date Value Ref Range Status   06/18/2020 17 (H) 8 - 16 mmol/L Final     eGFR if    Date Value Ref Range Status   06/18/2020 >60.0 >60 mL/min/1.73 m^2 Final     eGFR if non    Date Value Ref Range Status   06/18/2020 >60.0 >60 mL/min/1.73 m^2 Final     Comment:     Calculation used to obtain the estimated glomerular filtration  rate (eGFR) is the CKD-EPI equation.          A/P:  The patient is a 50 year old man presenting for a colonoscopy.  1.  Colonoscopy - he can undergo a colonoscopy.  I have explained the risks, benefits, and alternatives of the procedure in detail.  The patient voices understanding and all questions have been answered.  The patient agrees to proceed as planned.

## 2020-07-06 NOTE — [employer] PATIENT INSTRUCTIONS
Discharge Summary/Instructions after an Endoscopic Procedure  Patient Name: Keon Schneider  Patient MRN: [id number]  Patient YOB: 1970 Monday, July 6, 2020  Broderick Moise MD  RESTRICTIONS:  During your procedure today, you received medications for sedation.  These   medications may affect your judgment, balance and coordination.  Therefore,   for 24 hours, you have the following restrictions:   - DO NOT drive a car, operate machinery, make legal/financial decisions,   sign important papers or drink alcohol.    ACTIVITY:  Today: no heavy lifting, straining or running due to procedural   sedation/anesthesia.  The following day: return to full activity including work.  DIET:  Eat and drink normally unless instructed otherwise.     TREATMENT FOR COMMON SIDE EFFECTS:  - Mild abdominal pain, nausea, belching, bloating or excessive gas:  rest,   eat lightly and use a heating pad.  - Sore Throat: treat with throat lozenges and/or gargle with warm salt   water.  - Because air was used during the procedure, expelling large amounts of air   from your rectum or belching is normal.  - If a bowel prep was taken, you may not have a bowel movement for 1-3 days.    This is normal.  SYMPTOMS TO WATCH FOR AND REPORT TO YOUR PHYSICIAN:  1. Abdominal pain or bloating, other than gas cramps.  2. Chest pain.  3. Back pain.  4. Signs of infection such as: chills or fever occurring within 24 hours   after the procedure.  5. Rectal bleeding, which would show as bright red, maroon, or black stools.   (A tablespoon of blood from the rectum is not serious, especially if   hemorrhoids are present.)  6. Vomiting.  7. Weakness or dizziness.  GO DIRECTLY TO THE NEAREST EMERGENCY ROOM IF YOU HAVE ANY OF THE FOLLOWING:      Difficulty breathing              Chills and/or fever over 101 F   Persistent vomiting and/or vomiting blood   Severe abdominal pain   Severe chest pain   Black, tarry stools   Bleeding- more than one  tablespoon   Any other symptom or condition that you feel may need urgent attention  Your doctor recommends these additional instructions:  If any biopsies were taken, your doctors clinic will contact you in 1 to 2   weeks with any results.  - Await pathology results.   - Repeat colonoscopy in 5 years for surveillance based on pathology results.     - Return to referring physician as previously scheduled.   - Discharge patient to home (via wheelchair).  For questions, problems or results please call your physician - Broderick Moise MD at Work:  [phone].  [facility] can be reached at [phone]     IF A COMPLICATION OR EMERGENCY SITUATION ARISES AND YOU ARE UNABLE TO REACH   YOUR PHYSICIAN - GO DIRECTLY TO THE EMERGENCY ROOM.  Broderick Moise MD  7/6/2020 8:50:53 AM  This report has been verified and signed electronically.  [employer]

## 2020-07-06 NOTE — TRANSFER OF CARE
Anesthesia Transfer of Care Note    Patient: Keon Schneider Jr.    Procedure(s) Performed: Procedure(s) (LRB):  COLONOSCOPY (N/A)    Patient location: GI    Anesthesia Type: general    Transport from OR: Transported from OR on room air with adequate spontaneous ventilation    Post pain: adequate analgesia    Post assessment: no apparent anesthetic complications and tolerated procedure well    Post vital signs: stable    Level of consciousness: awake, alert and oriented    Nausea/Vomiting: no nausea/vomiting    Complications: none    Transfer of care protocol was followed      Last vitals:   Visit Vitals  /85 (BP Location: Left arm, Patient Position: Lying)   Pulse 84   Temp 36.6 °C (97.9 °F) (Oral)   Resp 16   SpO2 99%

## 2020-07-08 LAB
FINAL PATHOLOGIC DIAGNOSIS: NORMAL
GROSS: NORMAL

## 2020-09-26 ENCOUNTER — HOSPITAL ENCOUNTER (EMERGENCY)
Facility: HOSPITAL | Age: 50
Discharge: HOME | End: 2020-09-26
Attending: EMERGENCY MEDICINE
Payer: MEDICARE

## 2020-09-26 VITALS
WEIGHT: 210 LBS | SYSTOLIC BLOOD PRESSURE: 112 MMHG | HEIGHT: 69 IN | BODY MASS INDEX: 31.1 KG/M2 | TEMPERATURE: 98 F | RESPIRATION RATE: 18 BRPM | HEART RATE: 84 BPM | DIASTOLIC BLOOD PRESSURE: 70 MMHG | OXYGEN SATURATION: 93 %

## 2020-09-26 DIAGNOSIS — R07.89 CHEST WALL PAIN: ICD-10-CM

## 2020-09-26 DIAGNOSIS — M25.551 RIGHT HIP PAIN: ICD-10-CM

## 2020-09-26 DIAGNOSIS — M79.601 RIGHT ARM PAIN: ICD-10-CM

## 2020-09-26 LAB
ALBUMIN SERPL BCP-MCNC: 4.5 G/DL (ref 3.5–5.2)
ALP SERPL-CCNC: 61 U/L (ref 55–135)
ALT SERPL W/O P-5'-P-CCNC: 30 U/L (ref 10–44)
ANION GAP SERPL CALC-SCNC: 11 MMOL/L (ref 8–16)
AST SERPL-CCNC: 25 U/L (ref 10–40)
BASOPHILS # BLD AUTO: 0.05 K/UL (ref 0–0.2)
BASOPHILS NFR BLD: 0.6 % (ref 0–1.9)
BILIRUB SERPL-MCNC: 0.3 MG/DL (ref 0.1–1)
BILIRUB UR QL STRIP: NEGATIVE
BUN SERPL-MCNC: 15 MG/DL (ref 6–20)
BUN SERPL-MCNC: 15 MG/DL (ref 6–30)
CALCIUM SERPL-MCNC: 9.2 MG/DL (ref 8.7–10.5)
CHLORIDE SERPL-SCNC: 104 MMOL/L (ref 95–110)
CHLORIDE SERPL-SCNC: 104 MMOL/L (ref 95–110)
CLARITY UR REFRACT.AUTO: CLEAR
CO2 SERPL-SCNC: 22 MMOL/L (ref 23–29)
COLOR UR AUTO: COLORLESS
CREAT SERPL-MCNC: 1.1 MG/DL (ref 0.5–1.4)
CREAT SERPL-MCNC: 1.3 MG/DL (ref 0.5–1.4)
CTP QC/QA: YES
DIFFERENTIAL METHOD: ABNORMAL
EOSINOPHIL # BLD AUTO: 0.3 K/UL (ref 0–0.5)
EOSINOPHIL NFR BLD: 3 % (ref 0–8)
ERYTHROCYTE [DISTWIDTH] IN BLOOD BY AUTOMATED COUNT: 13.2 % (ref 11.5–14.5)
EST. GFR  (AFRICAN AMERICAN): >60 ML/MIN/1.73 M^2
EST. GFR  (NON AFRICAN AMERICAN): >60 ML/MIN/1.73 M^2
GLUCOSE SERPL-MCNC: 102 MG/DL (ref 70–110)
GLUCOSE SERPL-MCNC: 98 MG/DL (ref 70–110)
GLUCOSE UR QL STRIP: NEGATIVE
HCT VFR BLD AUTO: 40.4 % (ref 40–54)
HCT VFR BLD CALC: 41 %PCV (ref 36–54)
HGB BLD-MCNC: 13.4 G/DL (ref 14–18)
HGB UR QL STRIP: NEGATIVE
IMM GRANULOCYTES # BLD AUTO: 0.03 K/UL (ref 0–0.04)
IMM GRANULOCYTES NFR BLD AUTO: 0.3 % (ref 0–0.5)
KETONES UR QL STRIP: NEGATIVE
LEUKOCYTE ESTERASE UR QL STRIP: NEGATIVE
LYMPHOCYTES # BLD AUTO: 2.3 K/UL (ref 1–4.8)
LYMPHOCYTES NFR BLD: 25.6 % (ref 18–48)
MCH RBC QN AUTO: 31.6 PG (ref 27–31)
MCHC RBC AUTO-ENTMCNC: 33.2 G/DL (ref 32–36)
MCV RBC AUTO: 95 FL (ref 82–98)
MONOCYTES # BLD AUTO: 0.8 K/UL (ref 0.3–1)
MONOCYTES NFR BLD: 8.6 % (ref 4–15)
NEUTROPHILS # BLD AUTO: 5.5 K/UL (ref 1.8–7.7)
NEUTROPHILS NFR BLD: 61.9 % (ref 38–73)
NITRITE UR QL STRIP: NEGATIVE
NRBC BLD-RTO: 0 /100 WBC
PH UR STRIP: 6 [PH] (ref 5–8)
PLATELET # BLD AUTO: 213 K/UL (ref 150–350)
PMV BLD AUTO: 8.8 FL (ref 9.2–12.9)
POC IONIZED CALCIUM: 1.15 MMOL/L (ref 1.06–1.42)
POC TCO2 (MEASURED): 23 MMOL/L (ref 23–29)
POTASSIUM BLD-SCNC: 3.8 MMOL/L (ref 3.5–5.1)
POTASSIUM SERPL-SCNC: 3.9 MMOL/L (ref 3.5–5.1)
PROT SERPL-MCNC: 8.1 G/DL (ref 6–8.4)
PROT UR QL STRIP: NEGATIVE
RBC # BLD AUTO: 4.24 M/UL (ref 4.6–6.2)
SAMPLE: NORMAL
SARS-COV-2 RDRP RESP QL NAA+PROBE: NEGATIVE
SODIUM BLD-SCNC: 137 MMOL/L (ref 136–145)
SODIUM SERPL-SCNC: 137 MMOL/L (ref 136–145)
SP GR UR STRIP: 1 (ref 1–1.03)
URN SPEC COLLECT METH UR: ABNORMAL
WBC # BLD AUTO: 8.86 K/UL (ref 3.9–12.7)

## 2020-09-26 PROCEDURE — 80047 BASIC METABLC PNL IONIZED CA: CPT

## 2020-09-26 PROCEDURE — 82330 ASSAY OF CALCIUM: CPT

## 2020-09-26 PROCEDURE — 99285 EMERGENCY DEPT VISIT HI MDM: CPT | Mod: ,,, | Performed by: PHYSICIAN ASSISTANT

## 2020-09-26 PROCEDURE — 81003 URINALYSIS AUTO W/O SCOPE: CPT

## 2020-09-26 PROCEDURE — 63600175 PHARM REV CODE 636 W HCPCS: Performed by: PHYSICIAN ASSISTANT

## 2020-09-26 PROCEDURE — 80053 COMPREHEN METABOLIC PANEL: CPT

## 2020-09-26 PROCEDURE — 25500020 PHARM REV CODE 255: Performed by: EMERGENCY MEDICINE

## 2020-09-26 PROCEDURE — U0002 COVID-19 LAB TEST NON-CDC: HCPCS | Performed by: PHYSICIAN ASSISTANT

## 2020-09-26 PROCEDURE — 99285 EMERGENCY DEPT VISIT HI MDM: CPT | Mod: 25

## 2020-09-26 PROCEDURE — 96376 TX/PRO/DX INJ SAME DRUG ADON: CPT | Mod: 59

## 2020-09-26 PROCEDURE — 96374 THER/PROPH/DIAG INJ IV PUSH: CPT

## 2020-09-26 PROCEDURE — 25000003 PHARM REV CODE 250: Performed by: PHYSICIAN ASSISTANT

## 2020-09-26 PROCEDURE — 85025 COMPLETE CBC W/AUTO DIFF WBC: CPT

## 2020-09-26 PROCEDURE — 99285 PR EMERGENCY DEPT VISIT,LEVEL V: ICD-10-PCS | Mod: ,,, | Performed by: PHYSICIAN ASSISTANT

## 2020-09-26 RX ORDER — HYDROMORPHONE HYDROCHLORIDE 1 MG/ML
INJECTION, SOLUTION INTRAMUSCULAR; INTRAVENOUS; SUBCUTANEOUS
Status: DISCONTINUED
Start: 2020-09-26 | End: 2020-09-26 | Stop reason: HOSPADM

## 2020-09-26 RX ORDER — HYDROMORPHONE HYDROCHLORIDE 1 MG/ML
1 INJECTION, SOLUTION INTRAMUSCULAR; INTRAVENOUS; SUBCUTANEOUS
Status: COMPLETED | OUTPATIENT
Start: 2020-09-26 | End: 2020-09-26

## 2020-09-26 RX ORDER — HYDROCODONE BITARTRATE AND ACETAMINOPHEN 5; 325 MG/1; MG/1
1 TABLET ORAL
Status: COMPLETED | OUTPATIENT
Start: 2020-09-26 | End: 2020-09-26

## 2020-09-26 RX ORDER — HYDROCODONE BITARTRATE AND ACETAMINOPHEN 5; 325 MG/1; MG/1
1 TABLET ORAL EVERY 4 HOURS PRN
Qty: 12 TABLET | Refills: 0 | Status: SHIPPED | OUTPATIENT
Start: 2020-09-26 | End: 2021-06-10

## 2020-09-26 RX ADMIN — HYDROMORPHONE HYDROCHLORIDE 1 MG: 1 INJECTION, SOLUTION INTRAMUSCULAR; INTRAVENOUS; SUBCUTANEOUS at 12:09

## 2020-09-26 RX ADMIN — HYDROCODONE BITARTRATE AND ACETAMINOPHEN 1 TABLET: 5; 325 TABLET ORAL at 03:09

## 2020-09-26 RX ADMIN — HYDROMORPHONE HYDROCHLORIDE 1 MG: 1 INJECTION, SOLUTION INTRAMUSCULAR; INTRAVENOUS; SUBCUTANEOUS at 02:09

## 2020-09-26 RX ADMIN — IOHEXOL 100 ML: 350 INJECTION, SOLUTION INTRAVENOUS at 01:09

## 2020-09-26 NOTE — ED PROVIDER NOTES
Encounter Date: 9/26/2020       History     Chief Complaint   Patient presents with    Fall     PT fell 8 feet from shrimp boat to ground onto right shoulder. PT has HX of full reconstructive SX to shoulder in 2008. -LOC, - blood thinners PT denies headache or vision changes.       50-year-old male presents to the ER after mechanical slip and fall from 10 ft in the air.  The patient was working on his boat when he lost his footing and fell to the ground.  The patient has  Had multiple orthopedic surgeries in the past including procedures to the right shoulder and bilateral hips.  He landed on his right shoulder and injured his neck, right shoulder, right flank and right hip.  He is complaining of pain primarily to the right shoulder and the right hip.   He states that he is unable to move the right arm of the right shoulder secondary to the pain.  There appears to be a deformity noted to the right shoulder.  He also has severe pain to the right hip.  He is unable to flex the right hip or perform any abduction or adduction.  He does appear to be moderately distressed.  A C-collar was placed by triage.  He does have discomfort over the neck.  There was no head injury or LOC.  He does not take blood thinners or anti-platelet medications.  He denies any abdominal pain.        Review of patient's allergies indicates:   Allergen Reactions    Toradol [ketorolac] Hives and Nausea And Vomiting     Past Medical History:   Diagnosis Date    Anxiety     Arthritis     Asthma     Avascular necrosis     Carpal tunnel syndrome     Chronic pain     Depression     Diverticulitis     Other injury of other sites of trunk 12/28/2011    Pneumonia     Spondylolisthesis     lumbar; myofascial pain    Staph infection     Ulnar neuropathy     left and rt arm     Past Surgical History:   Procedure Laterality Date    COLONOSCOPY N/A 7/6/2020    Procedure: COLONOSCOPY;  Surgeon: Broderick Moise MD;  Location: [facility];   Service: Endoscopy;  Laterality: N/A;    HIP SURGERY  3/06;     hip arthroplasty    HIP SURGERY      bilateral hip replacement (titanium)    JOINT REPLACEMENT      SHOULDER SURGERY      right shoulder    SHOULDER SURGERY       Family History   Problem Relation Age of Onset    Cancer Mother      Social History     Tobacco Use    Smoking status: Former Smoker     Packs/day: 1.00     Years: 10.00     Pack years: 10.00     Quit date: 10/13/2012     Years since quittin.9    Smokeless tobacco: Never Used   Substance Use Topics    Alcohol use: Yes     Alcohol/week: 12.0 standard drinks     Types: 12 Cans of beer per week     Comment: on the weekend    Drug use: No     Review of Systems   Constitutional: Negative for fever.   HENT: Negative for sore throat.    Respiratory: Positive for chest tightness. Negative for shortness of breath.    Cardiovascular: Negative for chest pain.   Gastrointestinal: Negative for nausea.   Genitourinary: Negative for dysuria.   Musculoskeletal: Positive for arthralgias, gait problem and neck pain. Negative for back pain.   Skin: Negative for rash.   Neurological: Negative for weakness.   Hematological: Does not bruise/bleed easily.       Physical Exam     Initial Vitals [20 0011]   BP Pulse Resp Temp SpO2   (!) 137/91 95 16 97.7 °F (36.5 °C) 95 %      MAP       --         Physical Exam    Constitutional: Vital signs are normal. He appears well-developed and well-nourished. He appears distressed.   HENT:   Head: Normocephalic and atraumatic.   Right Ear: Hearing normal.   Left Ear: Hearing normal.   Eyes: Conjunctivae are normal.   Cardiovascular: Normal rate and regular rhythm.   Pulmonary/Chest:   Right-sided chest wall tenderness, lungs clear  No signs of a flail chest on exam   Abdominal: Soft. Normal appearance and bowel sounds are normal.   The right side of the abdomen is tender   Musculoskeletal: Normal range of motion.      Comments: The right shoulder and  humerus are exquisitely tender, range of motion is limited due to pain.  There appears to be a deformity of the proximal humerus.    That forearm and hand appear to be unaffected.  He has normal range of motion in this area and is without discomfort.    The right hip is very tender.  The patient is unable to form any flexion extension or abduction of the right hip   the neck is also tender.  The patient is in a C-collar.  The lumbar spine and the thoracic spine is nontender.   Neurological: He is alert and oriented to person, place, and time.   Skin: Skin is warm and intact.   Psychiatric: He has a normal mood and affect. His speech is normal and behavior is normal. Cognition and memory are normal.         ED Course   Procedures  Labs Reviewed   CBC W/ AUTO DIFFERENTIAL - Abnormal; Notable for the following components:       Result Value    RBC 4.24 (*)     Hemoglobin 13.4 (*)     Mean Corpuscular Hemoglobin 31.6 (*)     MPV 8.8 (*)     All other components within normal limits   COMPREHENSIVE METABOLIC PANEL - Abnormal; Notable for the following components:    CO2 22 (*)     All other components within normal limits   URINALYSIS, REFLEX TO URINE CULTURE - Abnormal; Notable for the following components:    Color, UA Colorless (*)     Specific Stanton, UA 1.000 (*)     All other components within normal limits    Narrative:     Specimen Source->Urine   SARS-COV-2 RDRP GENE   ISTAT PROCEDURE   ISTAT CHEM8          Imaging Results          X-Ray Shoulder Trauma Right (Final result)  Result time 09/26/20 03:22:45    Final result by Josh Corrales MD (09/26/20 03:22:45)                 Impression:      No acute fracture.    Widening of the right acromioclavicular joint appears improved compared to prior.      Electronically signed by: Josh Corrales MD  Date:    09/26/2020  Time:    03:22             Narrative:    EXAMINATION:  XR SHOULDER TRAUMA 3 VIEW RIGHT    CLINICAL HISTORY:  Pain in right  arm    TECHNIQUE:  Three views of the right shoulder were performed.    COMPARISON:  May 19, 2016.    FINDINGS:  Bones: No fracture.  No lytic or blastic lesion.    Joints: No evidence for glenohumeral dislocation.  Widening of the right acromioclavicular joint, improved compared to prior.    Soft tissues: Unremarkable.                               X-Ray Pelvis Routine AP (Final result)  Result time 09/26/20 03:13:13    Final result by Noel Nguyen MD (09/26/20 03:13:13)                 Impression:      Postoperative change of bilateral hip arthroplasties.  No radiographic evidence of acute fracture or dislocation.      Electronically signed by: Noel Nguyen MD  Date:    09/26/2020  Time:    03:13             Narrative:    EXAMINATION:  XR PELVIS ROUTINE AP    CLINICAL HISTORY:  right hip pain;    TECHNIQUE:  AP view of the pelvis was performed.    COMPARISON:  09/27/2017    FINDINGS:  There is postoperative change of prior bilateral hip arthroplasties.  No definite evidence of acute displaced fracture or dislocation.  The Ilioischial and iliopectineal lines appear maintained.  There is no pubic symphyseal or sacroiliac joint diastasis.                               X-Ray Humerus 2 View Right (Final result)  Result time 09/26/20 03:10:54    Final result by Josh Corrales MD (09/26/20 03:10:54)                 Impression:      There is no evidence of fracture or subluxation.      Electronically signed by: Josh Corrales MD  Date:    09/26/2020  Time:    03:10             Narrative:    EXAMINATION:  XR HUMERUS 2 VIEW RIGHT    CLINICAL HISTORY:  Pain in right arm    TECHNIQUE:  Right humerus two views    COMPARISON:  February 26, 2012.    FINDINGS:  No fractures or dislocations.  Unremarkable visualized bony structures.                               X-Ray Femur Ap/Lat Right (Final result)  Result time 09/26/20 03:15:39    Final result by Josh Corrales MD (09/26/20 03:15:39)                  Impression:      There is no evidence of fracture or subluxation.    Right hip prosthesis appears unchanged from prior.      Electronically signed by: Josh Corrales MD  Date:    09/26/2020  Time:    03:15             Narrative:    EXAMINATION:  XR FEMUR 2 VIEW RIGHT    CLINICAL HISTORY:  Pain in right hip    TECHNIQUE:  AP and lateral views of the right femur were performed.    COMPARISON:  Right hip 09/27/2017.    FINDINGS:  Right hip prosthesis appears unchanged.  No acute fracture right femur.  No dislocation.                               X-Ray Elbow Complete Right (Final result)  Result time 09/26/20 03:10:03    Final result by Josh Corrales MD (09/26/20 03:10:03)                 Impression:      No acute fracture.      Electronically signed by: Josh Corrales MD  Date:    09/26/2020  Time:    03:10             Narrative:    EXAMINATION:  XR ELBOW COMPLETE 3 VIEW RIGHT    CLINICAL HISTORY:  . Pain in right arm    TECHNIQUE:  AP, lateral, and radial head views of the right elbow were performed.    COMPARISON:  May 19, 2016.    FINDINGS:  No fracture or dislocation.  Degenerative spurring at the coronoid process of the ulna, similar to prior.  No fat pad elevation.  Cartilage spaces are maintained.  Soft tissues are unremarkable.                               X-Ray Chest 1 View (Final result)  Result time 09/26/20 03:08:27    Final result by Josh Corrales MD (09/26/20 03:08:27)                 Impression:      No acute findings.    No significant change from prior study.      Electronically signed by: Josh Corrales MD  Date:    09/26/2020  Time:    03:08             Narrative:    EXAMINATION:  XR CHEST 1 VIEW    CLINICAL HISTORY:  Other chest pain    TECHNIQUE:  Single frontal view of the chest was performed.    COMPARISON:  October 28, 2019.    FINDINGS:  Lungs clear.  No pneumothorax or effusion.    Heart and lungs  appear unchanged when allowing for differences in technique and positioning.                                CT Chest Abdomen Pelvis With Contrast (Final result)  Result time 09/26/20 03:36:22    Final result by Josh Corrales MD (09/26/20 03:36:22)                 Impression:      1. No acute abnormalities.  2. Hepatomegaly.  Ill-defined region of hyperattenuation left hepatic lobe likely variant perfusion or artifact, as above.  3. Splenomegaly.  4. Diverticulosis coli without evidence of acute diverticulitis.  5. Postsurgical changes of the bilateral hips.  6. Additional findings as above.    Electronically signed by resident: Huber Genao  Date:    09/26/2020  Time:    03:09    Electronically signed by: Josh Corrales MD  Date:    09/26/2020  Time:    03:36             Narrative:    EXAMINATION:  CT CHEST ABDOMEN PELVIS WITH CONTRAST (XPD)    CLINICAL HISTORY:  Chest-abdomen-pelvis trauma, blunt;    TECHNIQUE:  Low dose axial images, sagittal and coronal reformations were obtained from the neck base to the pubic symphysis after the administration of 100 cc Omnipaque 350 intravenous contrast.  Oral contrast was not administered.    COMPARISON:  CT cervical spine of same date    CT abdomen pelvis 06/18/2020, 06/08/2020    FINDINGS:  Structures at the base of the neck normal.  Normal 3 vessel aortic arch.  Heart is normal in size, no pericardial effusion.  Lungs are clear, no focal consolidation or pleural effusion.  Subtle bibasilar atelectasis.    Mild hepatomegaly, ill-defined region of hyperattenuation in left hepatic lobe likely representing variant perfusion or artifact from adjacent gastric air bubble, no other focal hepatic lesions.  Gallbladder is normal, no intra or extrahepatic biliary ductal dilatation.  Splenomegaly.  Pancreas and adrenal glands appear normal.  No mesenteric free fluid, inflammatory changes or lymphadenopathy noting 0.7 cm mason hepatis lymph node.    Kidneys are normal in size, normal cortical enhancement, no hydronephrosis or hydroureter, no obstructive  nephrolithiasis or appreciable renal mass.    Esophagus, stomach and duodenum are normal.  Visualized loops of bowel demonstrate no inflammatory changes or obstructive pattern.  Diverticulosis coli of the descending and sigmoid colon without evidence of acute diverticulitis.    Postsurgical changes of the bilateral hips resulting in streak artifact and limiting pelvic evaluation.  No obvious free fluid or lymphadenopathy in the pelvis, bladder appears normal.    Osseous structures: Degenerative changes of the spine without evidence of acute fracture.  No lytic or destructive osseous lesions.  Bilateral hip postsurgical changes.    Vasculature: Aorta tapers normally, no aneurysmal dilatation.  Mild aortic calcification.  Major aortic branch vessels appear patent and otherwise unremarkable.    Extra thoracic/extraperitoneal soft tissues: No significant abnormalities.                               CT Cervical Spine Without Contrast (Final result)  Result time 09/26/20 03:26:23    Final result by Josh Corrales MD (09/26/20 03:26:23)                 Impression:      No acute fracture or significant spinal canal stenosis throughout the cervical spine.    Cervical spondylosis resulting in degenerative changes as detailed above.    Numerous bilateral cervical chain lymph nodes measuring up to 1.1 cm.    Electronically signed by resident: Huber Genao  Date:    09/26/2020  Time:    03:00    Electronically signed by: Josh Corrales MD  Date:    09/26/2020  Time:    03:26             Narrative:    EXAMINATION:  CT CERVICAL SPINE WITHOUT CONTRAST    CLINICAL HISTORY:  Neck pain, recent trauma    TECHNIQUE:  Low dose axial images, sagittal and coronal reformations were performed through the cervical spine.  Contrast was not administered.    COMPARISON:  CT chest abdomen pelvis of same date    Cervical spine radiograph 05/16/2018    FINDINGS:  Skull base and craniocervical junction (partially imaged): No significant  abnormality.    Spinal alignment: Straightening of the normal cervical lordosis.  No spondylolisthesis.    Vertebrae: Anterior and posterior arches of C1 are normal. Ondontoid process is intact. Vertebral body heights are well maintained.  No evidence of fracture or dislocation.    Discs: Multilevel reduced disc space height most prominent at the C5-C6, C7-T1 and T1-T2 levels.    Degenerative changes:    C2-C3:No significant spinal stenosis or neural foraminal narrowing.    C3-C4:Right-sided facet hypertrophy, no significant spinal canal stenosis or neural foraminal narrowing.    C4-C5:Right-sided facet hypertrophy, no significant spinal canal stenosis or neural foraminal narrowing.    C5-C6:Right-sided facet hypertrophy and bilateral uncovertebral spurring resulting in mild right sided neural foraminal narrowing.    C6-C7:Bilateral facet hypertrophy and uncovertebral spurring resulting in mild bilateral neural foraminal narrowing.    C7-T1:No significant spinal canal stenosis or neural foraminal narrowing.    The soft tissue structures visualized in the neck demonstrate numerous bilateral cervical chain lymph nodes measuring up to 1.1 cm.    The airway is patent and the lung apices are unremarkable.  The visualized portions of the brain demonstrate no significant abnormality.                    Preliminary result by Josh Corrales MD (09/26/20 03:07:06)                 Impression:      No acute fracture or significant spinal canal stenosis throughout the cervical spine.    Cervical spondylosis resulting in degenerative changes as detailed above.    Numerous bilateral cervical chain lymph nodes measuring up to 1.1 cm.    Electronically signed by resident: Huber Genao  Date:    09/26/2020  Time:    03:00               Narrative:    EXAMINATION:  CT CERVICAL SPINE WITHOUT CONTRAST    CLINICAL HISTORY:  Neck pain, recent trauma;Neck trauma, dangerous injury mechanism (Age < 65y);    TECHNIQUE:  Low dose axial  images, sagittal and coronal reformations were performed through the cervical spine.  Contrast was not administered.    COMPARISON:  CT chest abdomen pelvis of same date    Cervical spine radiograph 05/16/2018    FINDINGS:  Skull base and craniocervical junction (partially imaged): No significant abnormality.    Spinal alignment: Straightening of the normal cervical lordosis.  No spondylolisthesis.    Vertebrae: Anterior and posterior arches of C1 are normal. Ondontoid process is intact. Vertebral body heights are well maintained.  No evidence of fracture or dislocation.    Discs: Multilevel reduced disc space height most prominent at the C5-C6, C7-T1 and T1-T2 levels.    Degenerative changes:    C2-C3:No significant spinal stenosis or neural foraminal narrowing.    C3-C4:Right-sided facet hypertrophy, no significant spinal canal stenosis or neural foraminal narrowing.    C4-C5:Right-sided facet hypertrophy, no significant spinal canal stenosis or neural foraminal narrowing.    C5-C6:Right-sided facet hypertrophy and bilateral uncovertebral spurring resulting in mild right sided neural foraminal narrowing.    C6-C7:Bilateral facet hypertrophy and uncovertebral spurring resulting in mild bilateral neural foraminal narrowing.    C7-T1:No significant spinal canal stenosis or neural foraminal narrowing.    The soft tissue structures visualized in the neck demonstrate numerous bilateral cervical chain lymph nodes measuring up to 1.1 cm.    The airway is patent and the lung apices are unremarkable.  The visualized portions of the brain demonstrate no significant abnormality.                                 Medical Decision Making:   Initial Assessment:     50-year-old male presenting to the ER after mechanical slip and fall 10 ft in the air  Differential Diagnosis:    Fracture, contusion, dislocation, trauma, intra-abdominal bleed  Clinical Tests:   Lab Tests: Ordered and Reviewed  Radiological Study: Ordered and  Reviewed  ED Management:   Plan:   routine labs, fast exam, CT scan of the neck chest abdomen and pelvis and multiple x-rays.  Labs without acute findings  Imaging - Xrays and CT without acute findings, specifically, no evidence of fracture, dislocation, or acute pathology  I will DC the pt home with pain medication and clinic followup.   Return information given  Other:   I discussed test(s) with the performing physician.                             Clinical Impression:       ICD-10-CM ICD-9-CM   1. Right arm pain  M79.601 729.5   2. Right arm pain  M79.601 729.5   3. Right arm pain  M79.601 729.5   4. Chest wall pain  R07.89 786.52   5. Right hip pain  M25.551 719.45                      Disposition:   Disposition: Discharged  Condition: Stable     ED Disposition Condition    Discharge Stable        ED Prescriptions     Medication Sig Dispense Start Date End Date Auth. Provider    HYDROcodone-acetaminophen (NORCO) 5-325 mg per tablet Take 1 tablet by mouth every 4 (four) hours as needed for Pain. 12 tablet 9/26/2020  Juan Hung PA-C        Follow-up Information     Follow up With Specialties Details Why Contact Info    Johnie Gutierrez MD Family Medicine   [address]  [place] [address]  [phone]                                         Juan Hung PA-C  09/26/20 0344

## 2020-09-26 NOTE — DISCHARGE INSTRUCTIONS
Use norco for severe pain, tylenol for mild pain  Followup with primary care  Return to the ED as needed

## 2020-09-26 NOTE — ED NOTES
I-STAT Chem-8+ Results:   Value Reference Range   Sodium 137 136-145 mmol/L   Potassium  3.8 3.5-5.1 mmol/L   Chloride 104  mmol/L   Ionized Calcium 1.15 1.06-1.42 mmol/L   CO2 (measured) 23 23-29 mmol/L   Glucose 102  mg/dL   BUN 15 6-30 mg/dL   Creatinine 1.3 0.5-1.4 mg/dL   Hematocrit 41 36-54%

## 2020-09-26 NOTE — ED NOTES
Pt reports to ED after 10 foot fall from shrimp boat onto ground. Pt fell onto right shoulder, right hip, and right side. Pt c/o neck pain, right shoulder pain and decreased movement, right hip pain and no movement, chest tightness, and right side pain. Pt denies SOB, LOC, hitting head, vision changes, dizziness, blood thinner use, HA, abdominal pain, changes in urination/BM, N/V, numbness/tingling, loss of sensation.

## 2021-06-15 ENCOUNTER — HOSPITAL ENCOUNTER (EMERGENCY)
Facility: HOSPITAL | Age: 51
Discharge: HOME | End: 2021-06-15
Attending: EMERGENCY MEDICINE
Payer: MEDICARE

## 2021-06-15 VITALS
RESPIRATION RATE: 18 BRPM | TEMPERATURE: 98 F | WEIGHT: 210 LBS | HEART RATE: 88 BPM | DIASTOLIC BLOOD PRESSURE: 96 MMHG | OXYGEN SATURATION: 99 % | BODY MASS INDEX: 31.01 KG/M2 | SYSTOLIC BLOOD PRESSURE: 158 MMHG

## 2021-06-15 DIAGNOSIS — M54.50 ACUTE BILATERAL LOW BACK PAIN WITHOUT SCIATICA: Primary | ICD-10-CM

## 2021-06-15 LAB
ALBUMIN SERPL BCP-MCNC: 4.6 G/DL (ref 3.5–5.2)
ALP SERPL-CCNC: 67 U/L (ref 55–135)
ALT SERPL W/O P-5'-P-CCNC: 29 U/L (ref 10–44)
ANION GAP SERPL CALC-SCNC: 12 MMOL/L (ref 8–16)
AST SERPL-CCNC: 22 U/L (ref 10–40)
BASOPHILS # BLD AUTO: 0.07 K/UL (ref 0–0.2)
BASOPHILS NFR BLD: 0.7 % (ref 0–1.9)
BILIRUB SERPL-MCNC: 0.3 MG/DL (ref 0.1–1)
BILIRUB UR QL STRIP: NEGATIVE
BUN SERPL-MCNC: 24 MG/DL (ref 6–20)
CALCIUM SERPL-MCNC: 9.8 MG/DL (ref 8.7–10.5)
CHLORIDE SERPL-SCNC: 102 MMOL/L (ref 95–110)
CLARITY UR REFRACT.AUTO: CLEAR
CO2 SERPL-SCNC: 21 MMOL/L (ref 23–29)
COLOR UR AUTO: NORMAL
CREAT SERPL-MCNC: 1.5 MG/DL (ref 0.5–1.4)
CRP SERPL-MCNC: 2.4 MG/L (ref 0–8.2)
DIFFERENTIAL METHOD: ABNORMAL
EOSINOPHIL # BLD AUTO: 0.6 K/UL (ref 0–0.5)
EOSINOPHIL NFR BLD: 5.6 % (ref 0–8)
ERYTHROCYTE [DISTWIDTH] IN BLOOD BY AUTOMATED COUNT: 13.4 % (ref 11.5–14.5)
ERYTHROCYTE [SEDIMENTATION RATE] IN BLOOD BY WESTERGREN METHOD: 24 MM/HR (ref 0–23)
EST. GFR  (AFRICAN AMERICAN): >60 ML/MIN/1.73 M^2
EST. GFR  (NON AFRICAN AMERICAN): 53.1 ML/MIN/1.73 M^2
GLUCOSE SERPL-MCNC: 103 MG/DL (ref 70–110)
GLUCOSE UR QL STRIP: NEGATIVE
HCT VFR BLD AUTO: 44.3 % (ref 40–54)
HGB BLD-MCNC: 14.9 G/DL (ref 14–18)
HGB UR QL STRIP: NEGATIVE
IMM GRANULOCYTES # BLD AUTO: 0.08 K/UL (ref 0–0.04)
IMM GRANULOCYTES NFR BLD AUTO: 0.8 % (ref 0–0.5)
KETONES UR QL STRIP: NEGATIVE
LEUKOCYTE ESTERASE UR QL STRIP: NEGATIVE
LYMPHOCYTES # BLD AUTO: 2.5 K/UL (ref 1–4.8)
LYMPHOCYTES NFR BLD: 23.9 % (ref 18–48)
MCH RBC QN AUTO: 31.3 PG (ref 27–31)
MCHC RBC AUTO-ENTMCNC: 33.6 G/DL (ref 32–36)
MCV RBC AUTO: 93 FL (ref 82–98)
MONOCYTES # BLD AUTO: 1.3 K/UL (ref 0.3–1)
MONOCYTES NFR BLD: 12.4 % (ref 4–15)
NEUTROPHILS # BLD AUTO: 5.8 K/UL (ref 1.8–7.7)
NEUTROPHILS NFR BLD: 56.6 % (ref 38–73)
NITRITE UR QL STRIP: NEGATIVE
NRBC BLD-RTO: 0 /100 WBC
PH UR STRIP: 6 [PH] (ref 5–8)
PLATELET # BLD AUTO: 267 K/UL (ref 150–450)
PMV BLD AUTO: 8.9 FL (ref 9.2–12.9)
POTASSIUM SERPL-SCNC: 4.7 MMOL/L (ref 3.5–5.1)
PROT SERPL-MCNC: 8.3 G/DL (ref 6–8.4)
PROT UR QL STRIP: NEGATIVE
RBC # BLD AUTO: 4.76 M/UL (ref 4.6–6.2)
SODIUM SERPL-SCNC: 135 MMOL/L (ref 136–145)
SP GR UR STRIP: 1.01 (ref 1–1.03)
URN SPEC COLLECT METH UR: NORMAL
WBC # BLD AUTO: 10.32 K/UL (ref 3.9–12.7)

## 2021-06-15 PROCEDURE — 63600175 PHARM REV CODE 636 W HCPCS: Performed by: EMERGENCY MEDICINE

## 2021-06-15 PROCEDURE — 86703 HIV-1/HIV-2 1 RESULT ANTBDY: CPT | Performed by: EMERGENCY MEDICINE

## 2021-06-15 PROCEDURE — 85652 RBC SED RATE AUTOMATED: CPT | Performed by: EMERGENCY MEDICINE

## 2021-06-15 PROCEDURE — 81003 URINALYSIS AUTO W/O SCOPE: CPT | Performed by: EMERGENCY MEDICINE

## 2021-06-15 PROCEDURE — 86140 C-REACTIVE PROTEIN: CPT | Performed by: EMERGENCY MEDICINE

## 2021-06-15 PROCEDURE — 96374 THER/PROPH/DIAG INJ IV PUSH: CPT

## 2021-06-15 PROCEDURE — 85025 COMPLETE CBC W/AUTO DIFF WBC: CPT | Performed by: EMERGENCY MEDICINE

## 2021-06-15 PROCEDURE — 99285 PR EMERGENCY DEPT VISIT,LEVEL V: ICD-10-PCS | Mod: ,,, | Performed by: EMERGENCY MEDICINE

## 2021-06-15 PROCEDURE — 99285 EMERGENCY DEPT VISIT HI MDM: CPT | Mod: ,,, | Performed by: EMERGENCY MEDICINE

## 2021-06-15 PROCEDURE — 80053 COMPREHEN METABOLIC PANEL: CPT | Performed by: EMERGENCY MEDICINE

## 2021-06-15 PROCEDURE — 99284 EMERGENCY DEPT VISIT MOD MDM: CPT | Mod: 25

## 2021-06-15 PROCEDURE — 86803 HEPATITIS C AB TEST: CPT | Performed by: EMERGENCY MEDICINE

## 2021-06-15 PROCEDURE — 25000003 PHARM REV CODE 250: Performed by: EMERGENCY MEDICINE

## 2021-06-15 RX ORDER — LIDOCAINE 50 MG/G
1 PATCH TOPICAL DAILY
Qty: 14 PATCH | Refills: 0 | Status: SHIPPED | OUTPATIENT
Start: 2021-06-15 | End: 2021-06-29

## 2021-06-15 RX ORDER — DEXAMETHASONE SODIUM PHOSPHATE 4 MG/ML
12 INJECTION, SOLUTION INTRA-ARTICULAR; INTRALESIONAL; INTRAMUSCULAR; INTRAVENOUS; SOFT TISSUE
Status: COMPLETED | OUTPATIENT
Start: 2021-06-15 | End: 2021-06-15

## 2021-06-15 RX ORDER — ACETAMINOPHEN 500 MG
500 TABLET ORAL
Qty: 42 TABLET | Refills: 0 | Status: SHIPPED | OUTPATIENT
Start: 2021-06-15 | End: 2021-06-22

## 2021-06-15 RX ORDER — METHYLPREDNISOLONE 4 MG/1
TABLET ORAL
Qty: 1 PACKAGE | Refills: 0 | Status: SHIPPED | OUTPATIENT
Start: 2021-06-15 | End: 2021-07-06

## 2021-06-15 RX ORDER — ALPRAZOLAM 2 MG/1
2 TABLET ORAL 2 TIMES DAILY
COMMUNITY

## 2021-06-15 RX ORDER — ACETAMINOPHEN 500 MG
1000 TABLET ORAL
Status: COMPLETED | OUTPATIENT
Start: 2021-06-15 | End: 2021-06-15

## 2021-06-15 RX ORDER — LIDOCAINE 50 MG/G
1 PATCH TOPICAL
Status: DISCONTINUED | OUTPATIENT
Start: 2021-06-15 | End: 2021-06-16 | Stop reason: HOSPADM

## 2021-06-15 RX ORDER — OXYCODONE HYDROCHLORIDE 5 MG/1
10 TABLET ORAL
Status: COMPLETED | OUTPATIENT
Start: 2021-06-15 | End: 2021-06-15

## 2021-06-15 RX ADMIN — DEXAMETHASONE SODIUM PHOSPHATE 12 MG: 4 INJECTION INTRA-ARTICULAR; INTRALESIONAL; INTRAMUSCULAR; INTRAVENOUS; SOFT TISSUE at 08:06

## 2021-06-15 RX ADMIN — ACETAMINOPHEN 1000 MG: 500 TABLET, FILM COATED ORAL at 08:06

## 2021-06-15 RX ADMIN — OXYCODONE 10 MG: 5 TABLET ORAL at 08:06

## 2021-06-15 RX ADMIN — LIDOCAINE 1 PATCH: 50 PATCH TOPICAL at 08:06

## 2021-06-16 LAB
HCV AB SERPL QL IA: NEGATIVE
HIV 1+2 AB+HIV1 P24 AG SERPL QL IA: NEGATIVE

## 2021-09-19 ENCOUNTER — HOSPITAL ENCOUNTER (EMERGENCY)
Facility: HOSPITAL | Age: 51
Discharge: HOME | End: 2021-09-19
Attending: EMERGENCY MEDICINE
Payer: MEDICARE

## 2021-09-19 VITALS
DIASTOLIC BLOOD PRESSURE: 84 MMHG | HEART RATE: 65 BPM | TEMPERATURE: 98 F | BODY MASS INDEX: 29.62 KG/M2 | WEIGHT: 200 LBS | SYSTOLIC BLOOD PRESSURE: 134 MMHG | HEIGHT: 69 IN | RESPIRATION RATE: 18 BRPM | OXYGEN SATURATION: 99 %

## 2021-09-19 DIAGNOSIS — M25.519 SHOULDER PAIN: Primary | ICD-10-CM

## 2021-09-19 PROCEDURE — 25000003 PHARM REV CODE 250: Performed by: EMERGENCY MEDICINE

## 2021-09-19 PROCEDURE — 99284 PR EMERGENCY DEPT VISIT,LEVEL IV: ICD-10-PCS | Mod: ,,, | Performed by: EMERGENCY MEDICINE

## 2021-09-19 PROCEDURE — 99284 EMERGENCY DEPT VISIT MOD MDM: CPT | Mod: ,,, | Performed by: EMERGENCY MEDICINE

## 2021-09-19 PROCEDURE — 99282 EMERGENCY DEPT VISIT SF MDM: CPT | Mod: 25

## 2021-09-19 RX ORDER — ACETAMINOPHEN 500 MG
1000 TABLET ORAL
Status: DISCONTINUED | OUTPATIENT
Start: 2021-09-19 | End: 2021-09-19

## 2021-09-19 RX ORDER — OXYCODONE AND ACETAMINOPHEN 5; 325 MG/1; MG/1
2 TABLET ORAL
Status: COMPLETED | OUTPATIENT
Start: 2021-09-19 | End: 2021-09-19

## 2021-09-19 RX ADMIN — OXYCODONE HYDROCHLORIDE AND ACETAMINOPHEN 2 TABLET: 5; 325 TABLET ORAL at 07:09

## 2021-09-21 ENCOUNTER — HOSPITAL ENCOUNTER (EMERGENCY)
Facility: HOSPITAL | Age: 51
Discharge: HOME | End: 2021-09-21
Attending: EMERGENCY MEDICINE
Payer: MEDICARE

## 2021-09-21 VITALS
WEIGHT: 200 LBS | RESPIRATION RATE: 18 BRPM | TEMPERATURE: 98 F | BODY MASS INDEX: 28 KG/M2 | OXYGEN SATURATION: 99 % | SYSTOLIC BLOOD PRESSURE: 152 MMHG | HEART RATE: 72 BPM | HEIGHT: 71 IN | DIASTOLIC BLOOD PRESSURE: 79 MMHG

## 2021-09-21 DIAGNOSIS — M25.512 ACUTE PAIN OF LEFT SHOULDER: Primary | ICD-10-CM

## 2021-09-21 PROCEDURE — 25000003 PHARM REV CODE 250: Performed by: PHYSICIAN ASSISTANT

## 2021-09-21 PROCEDURE — 99284 EMERGENCY DEPT VISIT MOD MDM: CPT

## 2021-09-21 RX ORDER — HYDROCODONE BITARTRATE AND ACETAMINOPHEN 5; 325 MG/1; MG/1
1 TABLET ORAL
Status: COMPLETED | OUTPATIENT
Start: 2021-09-21 | End: 2021-09-21

## 2021-09-21 RX ORDER — CYCLOBENZAPRINE HCL 10 MG
10 TABLET ORAL
Status: COMPLETED | OUTPATIENT
Start: 2021-09-21 | End: 2021-09-21

## 2021-09-21 RX ORDER — NAPROXEN 500 MG/1
500 TABLET ORAL 2 TIMES DAILY WITH MEALS
Qty: 10 TABLET | Refills: 0 | Status: SHIPPED | OUTPATIENT
Start: 2021-09-21 | End: 2022-05-31

## 2021-09-21 RX ORDER — CYCLOBENZAPRINE HCL 10 MG
10 TABLET ORAL 3 TIMES DAILY PRN
Qty: 15 TABLET | Refills: 0 | Status: SHIPPED | OUTPATIENT
Start: 2021-09-21 | End: 2021-09-26

## 2021-09-21 RX ADMIN — CYCLOBENZAPRINE 10 MG: 10 TABLET, FILM COATED ORAL at 06:09

## 2021-09-21 RX ADMIN — HYDROCODONE BITARTRATE AND ACETAMINOPHEN 1 TABLET: 5; 325 TABLET ORAL at 06:09

## 2021-12-24 ENCOUNTER — HOSPITAL ENCOUNTER (EMERGENCY)
Facility: HOSPITAL | Age: 51
Discharge: HOME | End: 2021-12-24
Attending: EMERGENCY MEDICINE
Payer: MEDICARE

## 2021-12-24 VITALS
WEIGHT: 200 LBS | SYSTOLIC BLOOD PRESSURE: 184 MMHG | OXYGEN SATURATION: 97 % | HEART RATE: 88 BPM | HEIGHT: 72 IN | DIASTOLIC BLOOD PRESSURE: 95 MMHG | RESPIRATION RATE: 18 BRPM | BODY MASS INDEX: 27.09 KG/M2 | TEMPERATURE: 98 F

## 2021-12-24 DIAGNOSIS — M54.50 CHRONIC BILATERAL LOW BACK PAIN WITHOUT SCIATICA: Primary | ICD-10-CM

## 2021-12-24 DIAGNOSIS — G89.29 CHRONIC BILATERAL LOW BACK PAIN WITHOUT SCIATICA: Primary | ICD-10-CM

## 2021-12-24 PROCEDURE — 99284 PR EMERGENCY DEPT VISIT,LEVEL IV: ICD-10-PCS | Mod: ,,, | Performed by: EMERGENCY MEDICINE

## 2021-12-24 PROCEDURE — 96372 THER/PROPH/DIAG INJ SC/IM: CPT

## 2021-12-24 PROCEDURE — 63600175 PHARM REV CODE 636 W HCPCS: Performed by: EMERGENCY MEDICINE

## 2021-12-24 PROCEDURE — 25000003 PHARM REV CODE 250: Performed by: EMERGENCY MEDICINE

## 2021-12-24 PROCEDURE — 99284 EMERGENCY DEPT VISIT MOD MDM: CPT | Mod: ,,, | Performed by: EMERGENCY MEDICINE

## 2021-12-24 PROCEDURE — 99284 EMERGENCY DEPT VISIT MOD MDM: CPT | Mod: 25

## 2021-12-24 RX ORDER — OXYCODONE AND ACETAMINOPHEN 5; 325 MG/1; MG/1
1 TABLET ORAL
Status: COMPLETED | OUTPATIENT
Start: 2021-12-24 | End: 2021-12-24

## 2021-12-24 RX ORDER — LIDOCAINE 50 MG/G
1 PATCH TOPICAL
Status: DISCONTINUED | OUTPATIENT
Start: 2021-12-24 | End: 2021-12-24 | Stop reason: HOSPADM

## 2021-12-24 RX ORDER — LIDOCAINE 50 MG/G
1 PATCH TOPICAL DAILY
Qty: 5 PATCH | Refills: 0 | Status: SHIPPED | OUTPATIENT
Start: 2021-12-24 | End: 2021-12-29

## 2021-12-24 RX ORDER — CYCLOBENZAPRINE HCL 5 MG
10 TABLET ORAL
Status: COMPLETED | OUTPATIENT
Start: 2021-12-24 | End: 2021-12-24

## 2021-12-24 RX ORDER — METHOCARBAMOL 500 MG/1
1000 TABLET, FILM COATED ORAL 3 TIMES DAILY
Qty: 18 TABLET | Refills: 0 | Status: SHIPPED | OUTPATIENT
Start: 2021-12-24 | End: 2021-12-27

## 2021-12-24 RX ADMIN — OXYCODONE HYDROCHLORIDE AND ACETAMINOPHEN 1 TABLET: 5; 325 TABLET ORAL at 10:12

## 2021-12-24 RX ADMIN — LIDOCAINE 5% 1 PATCH: 700 PATCH TOPICAL at 10:12

## 2021-12-24 RX ADMIN — METHYLPREDNISOLONE SODIUM SUCCINATE 40 MG: 40 INJECTION, POWDER, FOR SOLUTION INTRAMUSCULAR; INTRAVENOUS at 10:12

## 2021-12-24 RX ADMIN — CYCLOBENZAPRINE HYDROCHLORIDE 10 MG: 5 TABLET, FILM COATED ORAL at 10:12

## 2021-12-24 NOTE — ED TRIAGE NOTES
Keon Schneider Jr., a 51 y.o. male presents to the ED w/ complaint of lower and mid back pain worsening x1 week. Hx of chronic back pain. Able to independently ambulate; does not shoot down legs but will radiate to neck if pt turns head to right side. Taking ASA and ibuprofen without relief. Reports stabbing 10/10 pain to lower and mid back.     Triage note:  Chief Complaint   Patient presents with    Back Pain     Review of patient's allergies indicates:   Allergen Reactions    Toradol [ketorolac] Hives and Nausea And Vomiting    Ibuprofen Other (See Comments)     States GI BLEED     Past Medical History:   Diagnosis Date    Anxiety     Arthritis     Asthma     Avascular necrosis     Carpal tunnel syndrome     Chronic pain     Depression     Diverticulitis     Gout     Other injury of other sites of trunk 12/28/2011    Pneumonia     Spondylolisthesis     lumbar; myofascial pain    Staph infection     Ulnar neuropathy     left and rt arm     LOC: The patient is awake, alert, and oriented to self, place, time, and situation. Pt is calm and cooperative. Affect is appropriate.  Speech is appropriate and clear.     APPEARANCE: Patient resting comfortably in no acute distress.  Patient is clean and well groomed.    SKIN: The skin is warm and dry; color consistent with ethnicity.  Patient has normal skin turgor and moist mucus membranes.  Skin intact; no breakdown or bruising noted.     MUSCULOSKELETAL: Patient moving upper and lower extremities without difficulty; denies pain in the extremities or back.  Denies weakness. Stable gait but difficulty standing d/t pain. C/o 10/10, stabbing back pain.    RESPIRATORY: Airway is open and patent. Respirations spontaneous, even, easy, and non-labored.  Patient has a normal effort and rate.  No accessory muscle use noted. Denies cough and SOB.     CARDIAC:  Normal rate noted.  No peripheral edema noted. No complaints of chest pain.     ABDOMEN: Soft and non  tender to palpation.  No distention noted. Pt denies abdominal pain; denies nausea, vomiting, diarrhea, or constipation.    NEUROLOGIC: Eyes open spontaneously.  Behavior appropriate to situation.  Follows commands; facial expression symmetrical.  Purposeful motor response noted; normal sensation in all extremities. Pt denies headache; denies lightheadedness or dizziness; denies visual disturbances; denies loss of balance; denies unilateral weakness.

## 2021-12-24 NOTE — DISCHARGE INSTRUCTIONS
Take Robaxin as prescribed if needed for muscle spasm.  Do not drink alcohol, drive, or operate machinery while taking Robaxin as it can make you drowsy.  Please follow-up with orthopedics for your back pain.

## 2021-12-24 NOTE — ED NOTES
Patient resting in chair with NAD noted. VSS, following commands, and speech clear and appropriate. Pt speaking excitedly on cell phone. All belongings within reach. Patient reports improvement in pain. Patient updated on plan of care and all questions addressed. Safety precautions remain in place.

## 2021-12-24 NOTE — ED PROVIDER NOTES
Encounter Date: 12/24/2021    SCRIBE #1 NOTE: I, Fela Brewster, am scribing for, and in the presence of,  Imani Oviedo MD. I have scribed the following portions of the note - Other sections scribed: HPI, ROS, PE, MDM.       History     Chief Complaint   Patient presents with    Back Pain     Time patient was seen by the provider: 10:02 AM      The patient is a 51 y.o. male with PMHx including: chronic back pain, arthritis, gout, anxiety, asthma, diverticulitis who presents to the ED with a complaint of back pain with onset last week. He reports having pain for the past 20 years, but it worsened after moving tarps and tools a couple of days ago. He reports that the pain radiates upwards from his mid back to his shoulders. He has taken aspirin and tylenol for the pain with little to no improvement. He has not followed up with his PCP. He explains that he tried to get an appointment with an orthopedist but never heard back. Denies SOB, cough, fever chills, and numbness. This document was recorded by a scribe and is not considered final until signed by attending physician.    The history is provided by the patient and medical records. No  was used.     Review of patient's allergies indicates:   Allergen Reactions    Toradol [ketorolac] Hives and Nausea And Vomiting    Ibuprofen Other (See Comments)     States GI BLEED     Past Medical History:   Diagnosis Date    Anxiety     Arthritis     Asthma     Avascular necrosis     Carpal tunnel syndrome     Chronic pain     Depression     Diverticulitis     Gout     Other injury of other sites of trunk 12/28/2011    Pneumonia     Spondylolisthesis     lumbar; myofascial pain    Staph infection     Ulnar neuropathy     left and rt arm     Past Surgical History:   Procedure Laterality Date    COLONOSCOPY N/A 7/6/2020    Procedure: COLONOSCOPY;  Surgeon: Broderick Moise MD;  Location: [facility];  Service: Endoscopy;  Laterality: N/A;     HIP SURGERY  3/06;     hip arthroplasty    HIP SURGERY      bilateral hip replacement (titanium)    JOINT REPLACEMENT      SHOULDER SURGERY  2012    right shoulder    SHOULDER SURGERY       Family History   Problem Relation Age of Onset    Cancer Mother      Social History     Tobacco Use    Smoking status: Former Smoker     Packs/day: 1.00     Years: 10.00     Pack years: 10.00     Quit date: 10/13/2012     Years since quittin.3    Smokeless tobacco: Never Used   Substance Use Topics    Alcohol use: Yes     Alcohol/week: 12.0 standard drinks     Types: 12 Cans of beer per week     Comment: on the weekend    Drug use: No     Review of Systems   Constitutional: Negative for chills, diaphoresis, fatigue and fever.   Respiratory: Negative for cough and shortness of breath.    Genitourinary: Negative for decreased urine volume, difficulty urinating, flank pain and urgency.   Musculoskeletal: Positive for arthralgias (Shoulder pain) and back pain. Negative for joint swelling.   Allergic/Immunologic: Negative for immunocompromised state.   Neurological: Negative for dizziness, weakness, numbness and headaches.       Physical Exam     Initial Vitals [21 0908]   BP Pulse Resp Temp SpO2   (!) 184/95 88 16 98.1 °F (36.7 °C) 97 %      MAP       --         Physical Exam    Nursing note and vitals reviewed.  Constitutional: He appears well-developed and well-nourished. He is not diaphoretic. No distress.   HENT:   Head: Normocephalic and atraumatic.   Nose: Nose normal.   Eyes: Conjunctivae and EOM are normal. Pupils are equal, round, and reactive to light.   Neck:   Normal range of motion.  Cardiovascular: Normal rate and regular rhythm.   Pulmonary/Chest: Breath sounds normal. No respiratory distress.   Musculoskeletal:         General: Tenderness present.      Cervical back: Normal range of motion.      Comments: Diffuse thoracolumbar tenderness  Paraspinal greater than midline  No CVAT      Neurological: He is alert and oriented to person, place, and time. He has normal strength.   Skin: Skin is warm and dry.   Psychiatric: He has a normal mood and affect. His behavior is normal. Thought content normal.         ED Course   Procedures  Labs Reviewed - No data to display       Imaging Results    None          Medications   cyclobenzaprine tablet 10 mg (10 mg Oral Given 12/24/21 1022)   methylPREDNISolone sodium succinate injection 40 mg (40 mg Intramuscular Given 12/24/21 1022)   oxyCODONE-acetaminophen 5-325 mg per tablet 1 tablet (1 tablet Oral Given 12/24/21 1022)     Medical Decision Making:   History:   Old Medical Records: I decided to obtain old medical records.  Old Records Summarized: records from previous admission(s) and other records.       <> Summary of Records: History of lower back pain, arthritis, gout, bilateral hip replacement, diverticulitis, and opioid dependence. Records reviewed. Seen in ED June 2021 for lumbar radiculopathy. MRI at that time showed degeneration and mild lumbar disc bulge without canal stenosis.  Initial Assessment:   Likely thoracolumbar muscle strain as more tender paraspinal with minimal midline tenderness. No symptoms of cauda equina or infection. Patient has a history of opioid abuse. Will review .  Differential Diagnosis:   Muscle strain, muscle spasm, see above  ED Management:  Patient prescribed Xanax 2 mg #60 last filled 2 weeks ago. Will avoid benzos for muscle spasm. Will give lidoderm patch, flexeril and steriod injection.     11:30 AM  Patient reports pain resolved. Able to stand erect and ambulate without difficulty. Stable for discharge.    Stable for d/c, I discussed outpatient follow up and return precautions with pt and answered all questions.          Scribe Attestation:   Scribe #1: I performed the above scribed service and the documentation accurately describes the services I performed. I attest to the accuracy of the note.                  Clinical Impression:   Final diagnoses:  [M54.50, G89.29] Chronic bilateral low back pain without sciatica (Primary)          ED Disposition Condition    Discharge Stable        ED Prescriptions     Medication Sig Dispense Start Date End Date Auth. Provider    LIDOcaine (LIDODERM) 5 % () Place 1 patch onto the skin once daily. Remove & Discard patch within 12 hours or as directed by MD for 5 days 5 patch 2021 Imani Oviedo MD    methocarbamoL (ROBAXIN) 500 MG Tab () Take 2 tablets (1,000 mg total) by mouth 3 (three) times daily. for 3 days 18 tablet 2021 Imani Oviedo MD        Follow-up Information     Follow up With Specialties Details Why Contact Info Additional Information    Jonathon Dean - Orthopedics [facility] Schedule an appointment as soon as possible for a visit   [address]  [facility] [address] [phone] [facility] - Main Building, 5th Floor Please park in [facility] and take Atrium elevator    Johnie Gutierrez MD Family Medicine Go to  As needed [address] [facility]  [place] [address]  [phone]       Jonathon Dean - Emergency Dept Emergency Medicine Go to  If symptoms worsen, As needed [address]  [facility] [address] [phone]            Imani Oviedo MD  [phone]

## 2022-05-30 ENCOUNTER — HOSPITAL ENCOUNTER (OUTPATIENT)
Facility: HOSPITAL | Age: 52
Discharge: HOME | End: 2022-06-01
Attending: EMERGENCY MEDICINE | Admitting: HOSPITALIST
Payer: MEDICARE

## 2022-05-30 DIAGNOSIS — F13.20 BENZODIAZEPINE DEPENDENCE: ICD-10-CM

## 2022-05-30 DIAGNOSIS — R10.32 LEFT LOWER QUADRANT ABDOMINAL PAIN: ICD-10-CM

## 2022-05-30 DIAGNOSIS — K57.32 DIVERTICULITIS OF SIGMOID COLON: ICD-10-CM

## 2022-05-30 DIAGNOSIS — K57.92 ACUTE DIVERTICULITIS: Primary | ICD-10-CM

## 2022-05-30 DIAGNOSIS — M54.40 CHRONIC MIDLINE LOW BACK PAIN WITH SCIATICA, SCIATICA LATERALITY UNSPECIFIED: Chronic | ICD-10-CM

## 2022-05-30 DIAGNOSIS — F41.9 ANXIETY: Chronic | ICD-10-CM

## 2022-05-30 DIAGNOSIS — G89.29 CHRONIC MIDLINE LOW BACK PAIN WITH SCIATICA, SCIATICA LATERALITY UNSPECIFIED: Chronic | ICD-10-CM

## 2022-05-30 LAB
ALBUMIN SERPL BCP-MCNC: 4.2 G/DL (ref 3.5–5.2)
ALP SERPL-CCNC: 69 U/L (ref 55–135)
ALT SERPL W/O P-5'-P-CCNC: 37 U/L (ref 10–44)
ANION GAP SERPL CALC-SCNC: 11 MMOL/L (ref 8–16)
AST SERPL-CCNC: 24 U/L (ref 10–40)
BASOPHILS # BLD AUTO: 0.04 K/UL (ref 0–0.2)
BASOPHILS NFR BLD: 0.4 % (ref 0–1.9)
BILIRUB SERPL-MCNC: 0.3 MG/DL (ref 0.1–1)
BILIRUB UR QL STRIP: NEGATIVE
BUN SERPL-MCNC: 15 MG/DL (ref 6–20)
CALCIUM SERPL-MCNC: 9.3 MG/DL (ref 8.7–10.5)
CHLORIDE SERPL-SCNC: 104 MMOL/L (ref 95–110)
CLARITY UR REFRACT.AUTO: CLEAR
CO2 SERPL-SCNC: 20 MMOL/L (ref 23–29)
COLOR UR AUTO: COLORLESS
CREAT SERPL-MCNC: 0.9 MG/DL (ref 0.5–1.4)
CTP QC/QA: YES
DIFFERENTIAL METHOD: ABNORMAL
EOSINOPHIL # BLD AUTO: 0.3 K/UL (ref 0–0.5)
EOSINOPHIL NFR BLD: 2.6 % (ref 0–8)
ERYTHROCYTE [DISTWIDTH] IN BLOOD BY AUTOMATED COUNT: 13.3 % (ref 11.5–14.5)
EST. GFR  (AFRICAN AMERICAN): >60 ML/MIN/1.73 M^2
EST. GFR  (NON AFRICAN AMERICAN): >60 ML/MIN/1.73 M^2
GLUCOSE SERPL-MCNC: 138 MG/DL (ref 70–110)
GLUCOSE UR QL STRIP: NEGATIVE
HCT VFR BLD AUTO: 38.2 % (ref 40–54)
HGB BLD-MCNC: 13.1 G/DL (ref 14–18)
HGB UR QL STRIP: NEGATIVE
IMM GRANULOCYTES # BLD AUTO: 0.03 K/UL (ref 0–0.04)
IMM GRANULOCYTES NFR BLD AUTO: 0.3 % (ref 0–0.5)
KETONES UR QL STRIP: NEGATIVE
LEUKOCYTE ESTERASE UR QL STRIP: NEGATIVE
LIPASE SERPL-CCNC: 26 U/L (ref 4–60)
LYMPHOCYTES # BLD AUTO: 1.5 K/UL (ref 1–4.8)
LYMPHOCYTES NFR BLD: 13.3 % (ref 18–48)
MCH RBC QN AUTO: 30.5 PG (ref 27–31)
MCHC RBC AUTO-ENTMCNC: 34.3 G/DL (ref 32–36)
MCV RBC AUTO: 89 FL (ref 82–98)
MONOCYTES # BLD AUTO: 1 K/UL (ref 0.3–1)
MONOCYTES NFR BLD: 9.4 % (ref 4–15)
NEUTROPHILS # BLD AUTO: 8.1 K/UL (ref 1.8–7.7)
NEUTROPHILS NFR BLD: 74 % (ref 38–73)
NITRITE UR QL STRIP: NEGATIVE
NRBC BLD-RTO: 0 /100 WBC
PH UR STRIP: 5 [PH] (ref 5–8)
PLATELET # BLD AUTO: 233 K/UL (ref 150–450)
PMV BLD AUTO: 8.9 FL (ref 9.2–12.9)
POTASSIUM SERPL-SCNC: 4.5 MMOL/L (ref 3.5–5.1)
PROT SERPL-MCNC: 7.5 G/DL (ref 6–8.4)
PROT UR QL STRIP: NEGATIVE
RBC # BLD AUTO: 4.3 M/UL (ref 4.6–6.2)
SARS-COV-2 RDRP RESP QL NAA+PROBE: NEGATIVE
SODIUM SERPL-SCNC: 135 MMOL/L (ref 136–145)
SP GR UR STRIP: 1.01 (ref 1–1.03)
URN SPEC COLLECT METH UR: ABNORMAL
WBC # BLD AUTO: 10.96 K/UL (ref 3.9–12.7)

## 2022-05-30 PROCEDURE — 83690 ASSAY OF LIPASE: CPT | Performed by: PHYSICIAN ASSISTANT

## 2022-05-30 PROCEDURE — 96365 THER/PROPH/DIAG IV INF INIT: CPT

## 2022-05-30 PROCEDURE — 96376 TX/PRO/DX INJ SAME DRUG ADON: CPT

## 2022-05-30 PROCEDURE — 87389 HIV-1 AG W/HIV-1&-2 AB AG IA: CPT | Performed by: EMERGENCY MEDICINE

## 2022-05-30 PROCEDURE — 80053 COMPREHEN METABOLIC PANEL: CPT | Performed by: PHYSICIAN ASSISTANT

## 2022-05-30 PROCEDURE — 99285 EMERGENCY DEPT VISIT HI MDM: CPT | Mod: 25

## 2022-05-30 PROCEDURE — 96375 TX/PRO/DX INJ NEW DRUG ADDON: CPT

## 2022-05-30 PROCEDURE — 99285 EMERGENCY DEPT VISIT HI MDM: CPT | Mod: CS,,, | Performed by: EMERGENCY MEDICINE

## 2022-05-30 PROCEDURE — 96374 THER/PROPH/DIAG INJ IV PUSH: CPT

## 2022-05-30 PROCEDURE — U0002 COVID-19 LAB TEST NON-CDC: HCPCS | Performed by: PHYSICIAN ASSISTANT

## 2022-05-30 PROCEDURE — 63600175 PHARM REV CODE 636 W HCPCS: Performed by: PHYSICIAN ASSISTANT

## 2022-05-30 PROCEDURE — 81003 URINALYSIS AUTO W/O SCOPE: CPT | Performed by: PHYSICIAN ASSISTANT

## 2022-05-30 PROCEDURE — 63600175 PHARM REV CODE 636 W HCPCS: Performed by: EMERGENCY MEDICINE

## 2022-05-30 PROCEDURE — G0378 HOSPITAL OBSERVATION PER HR: HCPCS

## 2022-05-30 PROCEDURE — 99285 PR EMERGENCY DEPT VISIT,LEVEL V: ICD-10-PCS | Mod: CS,,, | Performed by: EMERGENCY MEDICINE

## 2022-05-30 PROCEDURE — 85025 COMPLETE CBC W/AUTO DIFF WBC: CPT | Performed by: EMERGENCY MEDICINE

## 2022-05-30 PROCEDURE — 96361 HYDRATE IV INFUSION ADD-ON: CPT

## 2022-05-30 PROCEDURE — 86803 HEPATITIS C AB TEST: CPT | Performed by: EMERGENCY MEDICINE

## 2022-05-30 PROCEDURE — 25000003 PHARM REV CODE 250: Performed by: EMERGENCY MEDICINE

## 2022-05-30 RX ORDER — HYDROMORPHONE HYDROCHLORIDE 1 MG/ML
1 INJECTION, SOLUTION INTRAMUSCULAR; INTRAVENOUS; SUBCUTANEOUS
Status: COMPLETED | OUTPATIENT
Start: 2022-05-30 | End: 2022-05-30

## 2022-05-30 RX ORDER — MORPHINE SULFATE 4 MG/ML
4 INJECTION, SOLUTION INTRAMUSCULAR; INTRAVENOUS
Status: COMPLETED | OUTPATIENT
Start: 2022-05-30 | End: 2022-05-30

## 2022-05-30 RX ORDER — ONDANSETRON 2 MG/ML
4 INJECTION INTRAMUSCULAR; INTRAVENOUS
Status: COMPLETED | OUTPATIENT
Start: 2022-05-30 | End: 2022-05-30

## 2022-05-30 RX ORDER — AMOXICILLIN AND CLAVULANATE POTASSIUM 875; 125 MG/1; MG/1
1 TABLET, FILM COATED ORAL EVERY 12 HOURS
Qty: 19 TABLET | Refills: 0 | Status: SHIPPED | OUTPATIENT
Start: 2022-05-30 | End: 2022-05-30 | Stop reason: ALTCHOICE

## 2022-05-30 RX ADMIN — HYDROMORPHONE HYDROCHLORIDE 1 MG: 1 INJECTION, SOLUTION INTRAMUSCULAR; INTRAVENOUS; SUBCUTANEOUS at 09:05

## 2022-05-30 RX ADMIN — ONDANSETRON 4 MG: 2 INJECTION INTRAMUSCULAR; INTRAVENOUS at 07:05

## 2022-05-30 RX ADMIN — MORPHINE SULFATE 4 MG: 4 INJECTION INTRAVENOUS at 07:05

## 2022-05-30 RX ADMIN — HYDROMORPHONE HYDROCHLORIDE 1 MG: 1 INJECTION, SOLUTION INTRAMUSCULAR; INTRAVENOUS; SUBCUTANEOUS at 11:05

## 2022-05-30 RX ADMIN — PIPERACILLIN SODIUM AND TAZOBACTAM SODIUM 4.5 G: 4; .5 INJECTION, POWDER, LYOPHILIZED, FOR SOLUTION INTRAVENOUS at 07:05

## 2022-05-30 RX ADMIN — SODIUM CHLORIDE, SODIUM LACTATE, POTASSIUM CHLORIDE, AND CALCIUM CHLORIDE 1000 ML: .6; .31; .03; .02 INJECTION, SOLUTION INTRAVENOUS at 08:05

## 2022-05-31 PROBLEM — K57.92 ACUTE DIVERTICULITIS OF INTESTINE: Status: RESOLVED | Noted: 2018-04-08 | Resolved: 2022-05-31

## 2022-05-31 LAB
ALBUMIN SERPL BCP-MCNC: 4 G/DL (ref 3.5–5.2)
ALP SERPL-CCNC: 59 U/L (ref 55–135)
ALT SERPL W/O P-5'-P-CCNC: 29 U/L (ref 10–44)
ANION GAP SERPL CALC-SCNC: 9 MMOL/L (ref 8–16)
AST SERPL-CCNC: 19 U/L (ref 10–40)
BASOPHILS # BLD AUTO: 0.04 K/UL (ref 0–0.2)
BASOPHILS NFR BLD: 0.4 % (ref 0–1.9)
BILIRUB SERPL-MCNC: 0.5 MG/DL (ref 0.1–1)
BUN SERPL-MCNC: 13 MG/DL (ref 6–20)
CALCIUM SERPL-MCNC: 9.1 MG/DL (ref 8.7–10.5)
CHLORIDE SERPL-SCNC: 103 MMOL/L (ref 95–110)
CO2 SERPL-SCNC: 26 MMOL/L (ref 23–29)
CREAT SERPL-MCNC: 0.9 MG/DL (ref 0.5–1.4)
DIFFERENTIAL METHOD: ABNORMAL
EOSINOPHIL # BLD AUTO: 0.3 K/UL (ref 0–0.5)
EOSINOPHIL NFR BLD: 3 % (ref 0–8)
ERYTHROCYTE [DISTWIDTH] IN BLOOD BY AUTOMATED COUNT: 13.2 % (ref 11.5–14.5)
EST. GFR  (AFRICAN AMERICAN): >60 ML/MIN/1.73 M^2
EST. GFR  (NON AFRICAN AMERICAN): >60 ML/MIN/1.73 M^2
GLUCOSE SERPL-MCNC: 84 MG/DL (ref 70–110)
HCT VFR BLD AUTO: 38 % (ref 40–54)
HCV AB SERPL QL IA: ABNORMAL
HGB BLD-MCNC: 12.3 G/DL (ref 14–18)
HIV 1+2 AB+HIV1 P24 AG SERPL QL IA: NEGATIVE
IMM GRANULOCYTES # BLD AUTO: 0.03 K/UL (ref 0–0.04)
IMM GRANULOCYTES NFR BLD AUTO: 0.3 % (ref 0–0.5)
LYMPHOCYTES # BLD AUTO: 1.9 K/UL (ref 1–4.8)
LYMPHOCYTES NFR BLD: 19 % (ref 18–48)
MAGNESIUM SERPL-MCNC: 1.7 MG/DL (ref 1.6–2.6)
MCH RBC QN AUTO: 31 PG (ref 27–31)
MCHC RBC AUTO-ENTMCNC: 32.4 G/DL (ref 32–36)
MCV RBC AUTO: 96 FL (ref 82–98)
MONOCYTES # BLD AUTO: 1.1 K/UL (ref 0.3–1)
MONOCYTES NFR BLD: 11 % (ref 4–15)
NEUTROPHILS # BLD AUTO: 6.5 K/UL (ref 1.8–7.7)
NEUTROPHILS NFR BLD: 66.3 % (ref 38–73)
NRBC BLD-RTO: 0 /100 WBC
PHOSPHATE SERPL-MCNC: 3.7 MG/DL (ref 2.7–4.5)
PLATELET # BLD AUTO: 216 K/UL (ref 150–450)
PMV BLD AUTO: 9.7 FL (ref 9.2–12.9)
POTASSIUM SERPL-SCNC: 4.4 MMOL/L (ref 3.5–5.1)
PROT SERPL-MCNC: 7 G/DL (ref 6–8.4)
RBC # BLD AUTO: 3.97 M/UL (ref 4.6–6.2)
SODIUM SERPL-SCNC: 138 MMOL/L (ref 136–145)
WBC # BLD AUTO: 9.79 K/UL (ref 3.9–12.7)

## 2022-05-31 PROCEDURE — 80053 COMPREHEN METABOLIC PANEL: CPT

## 2022-05-31 PROCEDURE — 96361 HYDRATE IV INFUSION ADD-ON: CPT

## 2022-05-31 PROCEDURE — 25000003 PHARM REV CODE 250

## 2022-05-31 PROCEDURE — 63600175 PHARM REV CODE 636 W HCPCS

## 2022-05-31 PROCEDURE — G0378 HOSPITAL OBSERVATION PER HR: HCPCS

## 2022-05-31 PROCEDURE — 96375 TX/PRO/DX INJ NEW DRUG ADDON: CPT | Performed by: EMERGENCY MEDICINE

## 2022-05-31 PROCEDURE — 85025 COMPLETE CBC W/AUTO DIFF WBC: CPT

## 2022-05-31 PROCEDURE — 96376 TX/PRO/DX INJ SAME DRUG ADON: CPT | Performed by: EMERGENCY MEDICINE

## 2022-05-31 PROCEDURE — 96361 HYDRATE IV INFUSION ADD-ON: CPT | Performed by: EMERGENCY MEDICINE

## 2022-05-31 PROCEDURE — 96376 TX/PRO/DX INJ SAME DRUG ADON: CPT

## 2022-05-31 PROCEDURE — 96365 THER/PROPH/DIAG IV INF INIT: CPT | Mod: 59 | Performed by: EMERGENCY MEDICINE

## 2022-05-31 PROCEDURE — 84100 ASSAY OF PHOSPHORUS: CPT

## 2022-05-31 PROCEDURE — 36415 COLL VENOUS BLD VENIPUNCTURE: CPT

## 2022-05-31 PROCEDURE — 99220 PR INITIAL OBSERVATION CARE,LEVL III: ICD-10-PCS | Mod: GC,,, | Performed by: HOSPITALIST

## 2022-05-31 PROCEDURE — 83735 ASSAY OF MAGNESIUM: CPT

## 2022-05-31 PROCEDURE — 99220 PR INITIAL OBSERVATION CARE,LEVL III: CPT | Mod: GC,,, | Performed by: HOSPITALIST

## 2022-05-31 RX ORDER — MORPHINE SULFATE 4 MG/ML
4 INJECTION, SOLUTION INTRAMUSCULAR; INTRAVENOUS EVERY 4 HOURS PRN
Status: DISCONTINUED | OUTPATIENT
Start: 2022-05-31 | End: 2022-06-01 | Stop reason: HOSPADM

## 2022-05-31 RX ORDER — AMOXICILLIN 250 MG
1 CAPSULE ORAL 2 TIMES DAILY
Status: DISCONTINUED | OUTPATIENT
Start: 2022-05-31 | End: 2022-06-01 | Stop reason: HOSPADM

## 2022-05-31 RX ORDER — NALOXONE HCL 0.4 MG/ML
0.02 VIAL (ML) INJECTION
Status: DISCONTINUED | OUTPATIENT
Start: 2022-05-31 | End: 2022-06-01 | Stop reason: HOSPADM

## 2022-05-31 RX ORDER — GLUCAGON 1 MG
1 KIT INJECTION
Status: DISCONTINUED | OUTPATIENT
Start: 2022-05-31 | End: 2022-06-01 | Stop reason: HOSPADM

## 2022-05-31 RX ORDER — METRONIDAZOLE 500 MG/1
500 TABLET ORAL EVERY 8 HOURS
Status: DISCONTINUED | OUTPATIENT
Start: 2022-05-31 | End: 2022-06-01 | Stop reason: HOSPADM

## 2022-05-31 RX ORDER — SODIUM CHLORIDE, SODIUM LACTATE, POTASSIUM CHLORIDE, CALCIUM CHLORIDE 600; 310; 30; 20 MG/100ML; MG/100ML; MG/100ML; MG/100ML
INJECTION, SOLUTION INTRAVENOUS CONTINUOUS
Status: ACTIVE | OUTPATIENT
Start: 2022-05-31 | End: 2022-05-31

## 2022-05-31 RX ORDER — FAMOTIDINE 40 MG/1
40 TABLET, FILM COATED ORAL DAILY
COMMUNITY
Start: 2022-05-06

## 2022-05-31 RX ORDER — ALPRAZOLAM 1 MG/1
2 TABLET ORAL 2 TIMES DAILY PRN
Status: DISCONTINUED | OUTPATIENT
Start: 2022-05-31 | End: 2022-06-01 | Stop reason: HOSPADM

## 2022-05-31 RX ORDER — IBUPROFEN 200 MG
16 TABLET ORAL
Status: DISCONTINUED | OUTPATIENT
Start: 2022-05-31 | End: 2022-06-01 | Stop reason: HOSPADM

## 2022-05-31 RX ORDER — SODIUM CHLORIDE 0.9 % (FLUSH) 0.9 %
10 SYRINGE (ML) INJECTION EVERY 12 HOURS PRN
Status: DISCONTINUED | OUTPATIENT
Start: 2022-05-31 | End: 2022-06-01 | Stop reason: HOSPADM

## 2022-05-31 RX ORDER — ONDANSETRON 8 MG/1
8 TABLET, ORALLY DISINTEGRATING ORAL EVERY 8 HOURS PRN
Status: DISCONTINUED | OUTPATIENT
Start: 2022-05-31 | End: 2022-06-01 | Stop reason: HOSPADM

## 2022-05-31 RX ORDER — IBUPROFEN 200 MG
24 TABLET ORAL
Status: DISCONTINUED | OUTPATIENT
Start: 2022-05-31 | End: 2022-06-01 | Stop reason: HOSPADM

## 2022-05-31 RX ADMIN — METRONIDAZOLE 500 MG: 500 TABLET ORAL at 02:05

## 2022-05-31 RX ADMIN — SODIUM CHLORIDE, SODIUM LACTATE, POTASSIUM CHLORIDE, AND CALCIUM CHLORIDE 100 ML/HR: .6; .31; .03; .02 INJECTION, SOLUTION INTRAVENOUS at 01:05

## 2022-05-31 RX ADMIN — SENNOSIDES AND DOCUSATE SODIUM 1 TABLET: 50; 8.6 TABLET ORAL at 01:05

## 2022-05-31 RX ADMIN — METRONIDAZOLE 500 MG: 500 TABLET ORAL at 08:05

## 2022-05-31 RX ADMIN — MORPHINE SULFATE 4 MG: 4 INJECTION INTRAVENOUS at 11:05

## 2022-05-31 RX ADMIN — ALPRAZOLAM 2 MG: 1 TABLET ORAL at 02:05

## 2022-05-31 RX ADMIN — METRONIDAZOLE 500 MG: 500 TABLET ORAL at 06:05

## 2022-05-31 RX ADMIN — ALPRAZOLAM 2 MG: 1 TABLET ORAL at 09:05

## 2022-05-31 RX ADMIN — CEFTRIAXONE 2 G: 2 INJECTION, SOLUTION INTRAVENOUS at 04:05

## 2022-05-31 RX ADMIN — SENNOSIDES AND DOCUSATE SODIUM 1 TABLET: 50; 8.6 TABLET ORAL at 09:05

## 2022-05-31 RX ADMIN — SENNOSIDES AND DOCUSATE SODIUM 1 TABLET: 50; 8.6 TABLET ORAL at 08:05

## 2022-05-31 RX ADMIN — MORPHINE SULFATE 4 MG: 4 INJECTION INTRAVENOUS at 07:05

## 2022-05-31 RX ADMIN — MORPHINE SULFATE 4 MG: 4 INJECTION INTRAVENOUS at 01:05

## 2022-05-31 RX ADMIN — MORPHINE SULFATE 4 MG: 4 INJECTION INTRAVENOUS at 03:05

## 2022-05-31 RX ADMIN — MORPHINE SULFATE 4 MG: 4 INJECTION INTRAVENOUS at 06:05

## 2022-05-31 NOTE — ASSESSMENT & PLAN NOTE
Patient prescribed Xanax 2 mg p.o. p.r.n..  He states that he takes his Xanax nightly with an occasional 2nd dose during the day.     -- continue home Xanax

## 2022-05-31 NOTE — ED PROVIDER NOTES
Encounter Date: 2022       History     Chief Complaint   Patient presents with    Abdominal Pain     L lower abd pain. Hx divertic     51-year-old man with comorbidities of arthritis, anxiety, as well as recurrent bouts of diverticulitis presents to the emergency department for evaluation of worsening left-sided lower abdominal discomfort for the past 3 days similar in character to his for previous diverticulitis episodes.  He describes associated nausea with single episode of nonbilious vomiting without associated fever, chills, shortness of breath, chest pain, or syncope.        Review of patient's allergies indicates:   Allergen Reactions    Toradol [ketorolac] Hives and Nausea And Vomiting    Ibuprofen Other (See Comments)     States GI BLEED     Past Medical History:   Diagnosis Date    Anxiety     Benzodiazepine dependence    Arthritis     Asthma     Avascular necrosis     Carpal tunnel syndrome     Chronic pain     Depression     Diverticulitis     Gout     Other injury of other sites of trunk 2011    Pneumonia     Spondylolisthesis     lumbar; myofascial pain    Staph infection     Ulnar neuropathy     left and rt arm     Past Surgical History:   Procedure Laterality Date    COLONOSCOPY N/A 2020    Procedure: COLONOSCOPY;  Surgeon: Broderick Moise MD;  Location: [facility];  Service: Endoscopy;  Laterality: N/A;    HIP SURGERY  3/06;     hip arthroplasty    HIP SURGERY      bilateral hip replacement (titanium)    JOINT REPLACEMENT      SHOULDER SURGERY      right shoulder    SHOULDER SURGERY       Family History   Problem Relation Age of Onset    Cancer Mother      Social History     Tobacco Use    Smoking status: Former Smoker     Packs/day: 1.00     Years: 10.00     Pack years: 10.00     Quit date: 10/13/2012     Years since quittin.6    Smokeless tobacco: Never Used   Substance Use Topics    Alcohol use: Yes     Alcohol/week: 12.0 standard drinks      Types: 12 Cans of beer per week     Comment: 2x/week    Drug use: No     Review of Systems   Constitutional: Negative for chills and fever.   HENT: Negative for nosebleeds and trouble swallowing.    Respiratory: Negative for chest tightness and shortness of breath.    Cardiovascular: Negative for chest pain.   Gastrointestinal: Positive for abdominal pain, nausea and vomiting. Negative for anal bleeding.   Genitourinary: Negative for dysuria and flank pain.   Musculoskeletal: Negative for neck pain and neck stiffness.   Skin: Negative for rash and wound.   Neurological: Negative for seizures and syncope.       Physical Exam     Initial Vitals [05/30/22 1848]   BP Pulse Resp Temp SpO2   (!) 186/100 88 18 98.7 °F (37.1 °C) 96 %      MAP       --         Physical Exam    Vitals reviewed.  Constitutional:   51-year-old  man in moderate discomfort   HENT:   Head: Normocephalic and atraumatic.   Moderately desiccated mucous membranes noted without additional intraoral injury   Eyes: EOM are normal. Pupils are equal, round, and reactive to light.   Neck: No tracheal deviation present.   Cardiovascular:   Mild tachycardia is noted with intact distal pulses   Pulmonary/Chest: Breath sounds normal. No stridor. No respiratory distress.   Abdominal: Abdomen is soft.   There is localized exquisite tenderness to the left lower quadrant only   Musculoskeletal:         General: No edema. Normal range of motion.     Neurological: He is alert and oriented to person, place, and time.   Skin: Skin is warm and dry. No pallor.   Psychiatric: He has a normal mood and affect. Thought content normal.         ED Course   Procedures  Labs Reviewed   COMPREHENSIVE METABOLIC PANEL - Abnormal; Notable for the following components:       Result Value    Sodium 135 (*)     CO2 20 (*)     Glucose 138 (*)     All other components within normal limits    Narrative:     Release to patient->Immediate   URINALYSIS, REFLEX TO URINE CULTURE -  Abnormal; Notable for the following components:    Color, UA Colorless (*)     All other components within normal limits    Narrative:     Specimen Source->Urine   CBC W/ AUTO DIFFERENTIAL - Abnormal; Notable for the following components:    RBC 4.30 (*)     Hemoglobin 13.1 (*)     Hematocrit 38.2 (*)     MPV 8.9 (*)     Gran # (ANC) 8.1 (*)     Gran % 74.0 (*)     Lymph % 13.3 (*)     All other components within normal limits   LIPASE    Narrative:     Release to patient->Immediate   SARS-COV-2 RDRP GENE    Narrative:     This test utilizes isothermal nucleic acid amplification   technology to detect the SARS-CoV-2 RdRp nucleic acid segment.   The analytical sensitivity (limit of detection) is 125 genome   equivalents/mL.   A POSITIVE result implies infection with the SARS-CoV-2 virus;   the patient is presumed to be contagious.     A NEGATIVE result means that SARS-CoV-2 nucleic acids are not   present above the limit of detection. A NEGATIVE result should be   treated as presumptive. It does not rule out the possibility of   COVID-19 and should not be the sole basis for treatment decisions.   If COVID-19 is strongly suspected based on clinical and exposure   history, re-testing using an alternate molecular assay should be   considered.   This test is only for use under the Food and Drug   Administration s Emergency Use Authorization (EUA).   Commercial kits are provided by [employer].   Performance characteristics of the EUA have been independently   verified by [facility] Department of   Pathology and Laboratory Medicine.   _________________________________________________________________   The authorized Fact Sheet for Healthcare Providers and the authorized Fact   Sheet for Patients of the ID NOW COVID-19 are available on the FDA   website:     https://www.fda.gov/media/112726/download  https://www.fda.gov/media/545177/download                 Imaging Results           CT Abdomen Pelvis   Without Contrast (Final result)  Result time 05/30/22 21:04:14    Final result by Josué Musa MD (05/30/22 21:04:14)                 Impression:      Acute uncomplicated diverticulitis of the proximal sigmoid colon.    Unchanged 4 mm left lung base nodule.  No further follow-up is required as Fleischner society 2017 guidelines have been satisfied.    Hepatomegaly and hepatic steatosis.    Bilateral hip arthroplasties.    This report was flagged in Epic as abnormal.    Electronically signed by resident: Eyad Kellogg  Date:    05/30/2022  Time:    20:38    Electronically signed by: Josué Musa MD  Date:    05/30/2022  Time:    21:04             Narrative:    EXAMINATION:  CT ABDOMEN PELVIS WITHOUT CONTRAST    CLINICAL HISTORY:  LLQ abdominal pain;    TECHNIQUE:  Low dose axial images, sagittal and coronal reformations were obtained from the lung bases to the pubic symphysis.  Oral contrast was not administered.    COMPARISON:  CT chest abdomen pelvis 09/26/2020.    Multiple prior CT abdomen pelvis, most recents 06/08/2020, 05/16/2020.    FINDINGS:  Lower chest: Heart size is normal. Unchanged 4 mm left lung base nodule.  Otherwise, lung bases are essentially clear. No pleural or pericardial effusion.    Abdomen:    Evaluation of the solid abdominal organs and bowel is limited in the absence of IV contrast.    Liver is enlarged in size measuring up to 23 cm in craniocaudal dimension.  No contour deforming lesion.  There is mild hepatic steatosis.  Gallbladder is unremarkable. No calcified gallstones. No intra-or extrahepatic biliary ductal dilatation.    Spleen, adrenals, and pancreas are unremarkable.    Kidneys are symmetric.  No renal or ureteral stones, noting distal ureters are obscured by streak metal artifact from hip arthroplasty.  No hydronephrosis.    No distended loops of small bowel. Normal appendix.  Diverticular disease involving the colon with wall thickening and phlegmon surrounding the  proximal sigmoid colon.  No organized abscess.    Abdominal aorta is normal in course and caliber.  Minimal calcific atherosclerosis.    No abdominal lymphadenopathy.    No abdominal free fluid or pneumoperitoneum.    Pelvis: Pelvic contents are obscured by streak metal artifact from bilateral hip arthroplasties.  No free fluid in the pelvis. Partially imaged urinary bladder and rectum appear unremarkable.    Bones and soft tissues: No aggressive osseous lesions. Postoperative changes of bilateral hip arthroplasties.                                 Medications   lactated ringers infusion (0 mL/hr Intravenous Stopped 5/31/22 2037)   lactated ringers bolus 1,000 mL (0 mLs Intravenous Stopped 5/30/22 2106)   piperacillin-tazobactam 4.5 g in sodium chloride 0.9% 100 mL IVPB (ready to mix system) (0 g Intravenous Stopped 5/30/22 2024)   morphine injection 4 mg (4 mg Intravenous Given 5/30/22 1954)   ondansetron injection 4 mg (4 mg Intravenous Given 5/30/22 1954)   HYDROmorphone injection 1 mg (1 mg Intravenous Given 5/30/22 2123)   HYDROmorphone injection 1 mg (1 mg Intravenous Given 5/30/22 2315)     Medical Decision Making:   History:   Old Medical Records: I decided to obtain old medical records.  Differential Diagnosis:   Diverticulitis, intestinal abscess, intestinal perforation, sepsis, a KI  Clinical Tests:   Lab Tests: Ordered and Reviewed  Radiological Study: Ordered and Reviewed            Attending Attestation:             Attending ED Notes:   Shortly following initial evaluation, patient underwent fluid resuscitation as well as early empiric therapy with Zosyn 4.5 g IVPB as well as Opiate analgesia in this patient presenting for evaluation of worsening left lower quadrant abdominal discomfort.  CT scan of the abdomen pelvis without contrast veals evidence of acute uncomplicated diverticulitis.  The patient will be placed in observation for further management and therapy in fair  Condition.                Clinical Impression:   Final diagnoses:  [K57.92] Acute diverticulitis (Primary)  [R10.32] Left lower quadrant abdominal pain          ED Disposition Condition    Observation               Ozzy Brink MD  06/01/22 [id number]

## 2022-05-31 NOTE — ASSESSMENT & PLAN NOTE
Patient with a longstanding history of chronic low back pain.    -- IV analgesia (patient currently nauseous)

## 2022-05-31 NOTE — ED NOTES
Patient states LLQ abd pain x 3 days, emesis x3 today, reports seen x5 for h/o diverticulitis, Advil 0900

## 2022-05-31 NOTE — HPI
Keon Schneider Jr. Is a 51-year-old male with a past medical history of diverticulitis, arthritis, chronic pain, depression, anxiety, asthma who is being admitted for acute diverticulitis.  Patient presents to the ED with a 2 day history of left lower quadrant pain that began after eating his niece is cooking.  He describes his pain as a constant 9/10 sharp nonradiating pain in his left lower quadrant.  He states that his pain it is similar to his previous diverticulitis flares.  His initial diverticulitis flare was roughly 10 years ago and he has had 5 episodes since his initial flare.  He states that his last flare up was over a year ago.   Patient states that he drinks a case (24 pack) of beer through out the weekend.  He quit smoking 20 years ago.  He denies any illicit drug use.  His last bowel movement was today and was nonbloody. Patient endorses chills without fever, to episodes of emesis yesterday, continued nausea, intermittent dizziness, and fatigue.  Patient denies chest pain, shortness a breath, constipation, diarrhea, hematochezia, weakness, headaches.    In the ED patient had a negative UA, wbc's 10.9, CO2 20, and non elevated lipase.  CT abdomen pelvis demonstrates acute uncomplicated diverticulitis of the proximal sigmoid colon without evidence of abscess or perforation.  He received Zosyn 1 time, morphine 4 mg once, Dilaudid 1 mg twice, Zofran, 1 L of lactated Ringer's.

## 2022-05-31 NOTE — PROVIDER PROGRESS NOTES - EMERGENCY DEPT.
Encounter Date: 5/30/2022    ED Physician Progress Notes        Physician Note:   I took report on this patient From Dr Brink due to pending CT report and end of his shift. The patient presented to the ER for an acute episode of LLQ abdominal pain similar to previous episodes of diverticulitis. IV antibiotic and analgesic administered prior to hand off. ER physician wet read of CT completed- confirmed diverticulitis, also concern for abscess - discussed plan for CRS consult and admission - contingent on CT report.    Update: CT report positive for acute uncomplicated diverticulitis without abscess or perforation. I discussed results with the PM ER attending physician, Dr Hernandez, who recommends admission if uncontrolled pain and discharge of patient feels better and prefers to go home. I re-evaluated the patient and updated him on results. I offered him admission to the hospital for observation, pain control and IV antibiotic. However, the patient reports feeling better and is eager for discharge, declined admission, states that he will have a low thresh hold to return if unimproved or if worse.        Update: Pt now stating pain is returning and is requesting additional IV pain medication. Will cancel discharge and seek admission to hospital medicine.  specifies obs. Hospital medicine requests admission under Dr Delacruz.

## 2022-05-31 NOTE — PLAN OF CARE
[facility] ([place] [address])  Initial Discharge Assessment       Primary Care Provider: Johnie Gutierrez MD    Admission Diagnosis: Acute diverticulitis [K57.92]  Left lower quadrant abdominal pain [R10.32]    Admission Date: 5/30/2022  Expected Discharge Date: 6/2/2022    Discharge Barriers Identified: None    Payor: HU[employer] MANAGED MEDICARE / Plan: HUMANA SNP (SPECIAL NEEDS PLAN) / Product Type: Medicare Advantage /     Extended Emergency Contact Information  Primary Emergency Contact: Keon Schneider Sr.  Address: [address] [place] [address]           [place] [address] [facility]  Home Phone: [phone]  Mobile Phone: [phone]  Relation: Father  Secondary Emergency Contact: London Schneider   [facility]  Home Phone: [phone]  Mobile Phone: [phone]  Relation: Brother    Discharge Plan A: Home  Discharge Plan B: Home  [facility]  [address] [place] [address]          SW met w/ patient at bedside for discharge planning assessment. Patient was alert and coherent and able to answer questions. Per pt, HE lives alone in a one story home with 23 steps to enter.  Per pt., she was independent with ADL's and used no DME for ambulation.  [facility]  All questions were addressed and SW will f/u as needed.               No Pharmacies Listed    Initial Assessment (most recent)     Adult Discharge Assessment - 05/31/22 1003        Discharge Assessment    Assessment Type Discharge Planning Assessment     Confirmed/corrected address, phone number and insurance Yes     Confirmed Demographics Correct on Facesheet     Source of Information patient     Does patient/caregiver understand observation status Yes     Communicated VIVIANE with patient/caregiver Yes     Reason For Admission Diverticulitis of sigmoid colon     Lives With alone     Do you expect to return to your current living situation? Yes     Who are your  caregiver(s) and their phone number(s)? London Crowley [phone], Brother     Prior to hospitilization cognitive status: Alert/Oriented     Current cognitive status: Alert/Oriented     Walking or Climbing Stairs Difficulty none     Dressing/Bathing Difficulty none     Home Accessibility stairs to enter home     Number of Stairs, Main Entrance other (see comments)   23 stairs    Stairs Comment, Main Entrance Raised home     Home Layout Able to live on 1st floor     Equipment Currently Used at Home none     Readmission within 30 days? No     Do you have any problems affording any of your prescribed medications? No     Who is going to help you get home at discharge? London Crowley [phone], Brother     How do you get to doctors appointments? car, drives self     Are you on dialysis? No     Do you take coumadin? No     Discharge Plan A Home     Discharge Plan B Home     DME Needed Upon Discharge  none     Discharge Barriers Identified None

## 2022-05-31 NOTE — PHARMACY MED REC
"Admission Medication History     The home medication history was taken by Ligia Rowland.    You may go to "Admission" then "Reconcile Home Medications" tabs to review and/or act upon these items.      The home medication list has been updated by the Pharmacy department.    Please read ALL comments highlighted in yellow.    Please address this information as you see fit.     Feel free to contact us if you have any questions or require assistance.        Medications listed below were obtained from: Patient/family  PTA Medications   Medication Sig    ALPRAZolam (XANAX) 2 MG Tab Take 2 mg by mouth 2 (two) times a day.    famotidine (PEPCID) 40 MG tablet Take 40 mg by mouth once daily.           Ligia Rowland  EXT [id number]                .        "

## 2022-05-31 NOTE — SUBJECTIVE & OBJECTIVE
Past Medical History:   Diagnosis Date    Anxiety     Arthritis     Asthma     Avascular necrosis     Carpal tunnel syndrome     Chronic pain     Depression     Diverticulitis     Gout     Other injury of other sites of trunk 2011    Pneumonia     Spondylolisthesis     lumbar; myofascial pain    Staph infection     Ulnar neuropathy     left and rt arm       Past Surgical History:   Procedure Laterality Date    COLONOSCOPY N/A 2020    Procedure: COLONOSCOPY;  Surgeon: Broderick Moise MD;  Location: [facility];  Service: Endoscopy;  Laterality: N/A;    HIP SURGERY  3/06;     hip arthroplasty    HIP SURGERY      bilateral hip replacement (titanium)    JOINT REPLACEMENT      SHOULDER SURGERY      right shoulder    SHOULDER SURGERY         Review of patient's allergies indicates:   Allergen Reactions    Toradol [ketorolac] Hives and Nausea And Vomiting    Ibuprofen Other (See Comments)     States GI BLEED       No current facility-administered medications on file prior to encounter.     Current Outpatient Medications on File Prior to Encounter   Medication Sig    ALPRAZolam (XANAX) 2 MG Tab Take by mouth nightly as needed.    cetirizine (ZYRTEC) 10 MG tablet Take 1 tablet (10 mg total) by mouth once daily. for 15 days    pantoprazole (PROTONIX) 40 MG tablet Take 1 tablet (40 mg total) by mouth 2 (two) times daily.    [DISCONTINUED] naproxen (NAPROSYN) 500 MG tablet Take 1 tablet (500 mg total) by mouth 2 (two) times daily with meals.     Family History       Problem Relation (Age of Onset)    Cancer Mother          Tobacco Use    Smoking status: Former Smoker     Packs/day: 1.00     Years: 10.00     Pack years: 10.00     Quit date: 10/13/2012     Years since quittin.6    Smokeless tobacco: Never Used   Substance and Sexual Activity    Alcohol use: Yes     Alcohol/week: 12.0 standard drinks     Types: 12 Cans of beer per week     Comment: 2x/week    Drug use: No    Sexual activity: Yes     Partners:  Female     Review of Systems   Constitutional:  Positive for chills and fatigue. Negative for fever.   HENT:  Negative for sore throat and trouble swallowing.    Eyes:  Negative for visual disturbance.   Respiratory:  Negative for cough and shortness of breath.    Cardiovascular:  Negative for chest pain, palpitations and leg swelling.   Gastrointestinal:  Positive for abdominal pain, nausea and vomiting. Negative for constipation and diarrhea.   Genitourinary:  Negative for difficulty urinating and dysuria.   Musculoskeletal:  Positive for arthralgias and back pain (Chronic).   Skin:  Negative for rash.   Neurological:  Positive for dizziness. Negative for seizures, weakness and headaches.   Psychiatric/Behavioral:  Negative for confusion.    Objective:     Vital Signs (Most Recent):  Temp: 98.6 °F (37 °C) (05/30/22 2223)  Pulse: 85 (05/30/22 2223)  Resp: 16 (05/30/22 2315)  BP: (!) 173/82 (05/30/22 2223)  SpO2: 97 % (05/30/22 2223)   Vital Signs (24h Range):  Temp:  [98.6 °F (37 °C)-98.7 °F (37.1 °C)] 98.6 °F (37 °C)  Pulse:  [85-88] 85  Resp:  [16-20] 16  SpO2:  [96 %-97 %] 97 %  BP: (173-186)/() 173/82     Weight: 90.7 kg (200 lb)  Body mass index is 27.12 kg/m².    Physical Exam  Constitutional:       Appearance: Normal appearance.   HENT:      Head: Normocephalic and atraumatic.      Nose: Nose normal.      Mouth/Throat:      Mouth: Mucous membranes are moist.      Pharynx: Oropharynx is clear.   Eyes:      General: No scleral icterus.     Extraocular Movements: Extraocular movements intact.      Pupils: Pupils are equal, round, and reactive to light.   Cardiovascular:      Rate and Rhythm: Normal rate and regular rhythm.      Pulses: Normal pulses.      Heart sounds: Normal heart sounds.   Pulmonary:      Effort: Pulmonary effort is normal. No respiratory distress.      Breath sounds: Normal breath sounds.   Abdominal:      General: Abdomen is flat. Bowel sounds are normal.      Palpations: Abdomen is  soft.      Tenderness: There is abdominal tenderness (LLQ). There is no right CVA tenderness or left CVA tenderness.   Musculoskeletal:         General: No swelling.      Right lower leg: No edema.      Left lower leg: No edema.   Skin:     General: Skin is warm and dry.   Neurological:      General: No focal deficit present.      Mental Status: He is alert and oriented to person, place, and time.   Psychiatric:         Mood and Affect: Mood normal.         Behavior: Behavior normal.         CRANIAL NERVES     CN III, IV, VI   Pupils are equal, round, and reactive to light.     Significant Labs: All pertinent labs within the past 24 hours have been reviewed.  CBC:   Recent Labs   Lab 05/30/22 2012   WBC 10.96   HGB 13.1*   HCT 38.2*        CMP:   Recent Labs   Lab 05/30/22 1915   *   K 4.5      CO2 20*   *   BUN 15   CREATININE 0.9   CALCIUM 9.3   PROT 7.5   ALBUMIN 4.2   BILITOT 0.3   ALKPHOS 69   AST 24   ALT 37   ANIONGAP 11   EGFRNONAA >60.0     Lipase:   Recent Labs   Lab 05/30/22 1915   LIPASE 26     Urine Studies:   Recent Labs   Lab 05/30/22  2143   COLORU Colorless*   APPEARANCEUA Clear   PHUR 5.0   SPECGRAV 1.010   PROTEINUA Negative   GLUCUA Negative   KETONESU Negative   BILIRUBINUA Negative   OCCULTUA Negative   NITRITE Negative   LEUKOCYTESUR Negative       Significant Imaging: I have reviewed all pertinent imaging results/findings within the past 24 hours.  CT: I have reviewed all pertinent results/findings within the past 24 hours and my personal findings are:  Acute uncomplicated diverticulitis of the sigmoid colon without evidence of abscess or perforation.

## 2022-05-31 NOTE — H&P
[facility] - Emergency Dept  [facility] Medicine  History & Physical    Patient Name: Keon Schneider Jr.  MRN: [id number]  Patient Class: OP- Observation  Admission Date: 5/30/2022  Attending Physician: Thomas Delacruz MD   Primary Care Provider: Johnie Gutierrez MD         Patient information was obtained from patient, past medical records and ER records.     Subjective:     Principal Problem:Diverticulitis of sigmoid colon    Chief Complaint:   Chief Complaint   Patient presents with    Abdominal Pain     L lower abd pain. Hx divertic        HPI: Keon Schneider Jr. Is a 51-year-old male with a past medical history of diverticulitis, arthritis, chronic pain, depression, anxiety, asthma who is being admitted for acute diverticulitis.  Patient presents to the ED with a 2 day history of left lower quadrant pain that began after eating his niece is cooking.  He describes his pain as a constant 9/10 sharp nonradiating pain in his left lower quadrant.  He states that his pain it is similar to his previous diverticulitis flares.  His initial diverticulitis flare was roughly 10 years ago and he has had 5 episodes since his initial flare.  He states that his last flare up was over a year ago.   Patient states that he drinks a case (24 pack) of beer through out the weekend.  He quit smoking 20 years ago.  He denies any illicit drug use.  His last bowel movement was today and was nonbloody. Patient endorses chills without fever, to episodes of emesis yesterday, continued nausea, intermittent dizziness, and fatigue.  Patient denies chest pain, shortness a breath, constipation, diarrhea, hematochezia, weakness, headaches.    In the ED patient had a negative UA, wbc's 10.9, CO2 20, and non elevated lipase.  CT abdomen pelvis demonstrates acute uncomplicated diverticulitis of the proximal sigmoid colon without evidence of abscess or perforation.  He received Zosyn 1 time, morphine 4 mg once, Dilaudid 1 mg twice, Zofran, 1 L of  lactated Ringer's.      Past Medical History:   Diagnosis Date    Anxiety     Arthritis     Asthma     Avascular necrosis     Carpal tunnel syndrome     Chronic pain     Depression     Diverticulitis     Gout     Other injury of other sites of trunk 2011    Pneumonia     Spondylolisthesis     lumbar; myofascial pain    Staph infection     Ulnar neuropathy     left and rt arm       Past Surgical History:   Procedure Laterality Date    COLONOSCOPY N/A 2020    Procedure: COLONOSCOPY;  Surgeon: Broderick Moise MD;  [facility];  Service: Endoscopy;  Laterality: N/A;    HIP SURGERY  3/06;     hip arthroplasty    HIP SURGERY      bilateral hip replacement (titanium)    JOINT REPLACEMENT      SHOULDER SURGERY      right shoulder    SHOULDER SURGERY         Review of patient's allergies indicates:   Allergen Reactions    Toradol [ketorolac] Hives and Nausea And Vomiting    Ibuprofen Other (See Comments)     States GI BLEED       No current facility-administered medications on file prior to encounter.     Current Outpatient Medications on File Prior to Encounter   Medication Sig    ALPRAZolam (XANAX) 2 MG Tab Take by mouth nightly as needed.    cetirizine (ZYRTEC) 10 MG tablet Take 1 tablet (10 mg total) by mouth once daily. for 15 days    pantoprazole (PROTONIX) 40 MG tablet Take 1 tablet (40 mg total) by mouth 2 (two) times daily.    [DISCONTINUED] naproxen (NAPROSYN) 500 MG tablet Take 1 tablet (500 mg total) by mouth 2 (two) times daily with meals.     Family History       Problem Relation (Age of Onset)    Cancer Mother          Tobacco Use    Smoking status: Former Smoker     Packs/day: 1.00     Years: 10.00     Pack years: 10.00     Quit date: 10/13/2012     Years since quittin.6    Smokeless tobacco: Never Used   Substance and Sexual Activity    Alcohol use: Yes     Alcohol/week: 12.0 standard drinks     Types: 12 Cans of beer per week     Comment: 2x/week     Drug use: No    Sexual activity: Yes     Partners: Female     Review of Systems   Constitutional:  Positive for chills and fatigue. Negative for fever.   HENT:  Negative for sore throat and trouble swallowing.    Eyes:  Negative for visual disturbance.   Respiratory:  Negative for cough and shortness of breath.    Cardiovascular:  Negative for chest pain, palpitations and leg swelling.   Gastrointestinal:  Positive for abdominal pain, nausea and vomiting. Negative for constipation and diarrhea.   Genitourinary:  Negative for difficulty urinating and dysuria.   Musculoskeletal:  Positive for arthralgias and back pain (Chronic).   Skin:  Negative for rash.   Neurological:  Positive for dizziness. Negative for seizures, weakness and headaches.   Psychiatric/Behavioral:  Negative for confusion.    Objective:     Vital Signs (Most Recent):  Temp: 98.6 °F (37 °C) (05/30/22 2223)  Pulse: 85 (05/30/22 2223)  Resp: 16 (05/30/22 2315)  BP: (!) 173/82 (05/30/22 2223)  SpO2: 97 % (05/30/22 2223)   Vital Signs (24h Range):  Temp:  [98.6 °F (37 °C)-98.7 °F (37.1 °C)] 98.6 °F (37 °C)  Pulse:  [85-88] 85  Resp:  [16-20] 16  SpO2:  [96 %-97 %] 97 %  BP: (173-186)/() 173/82     Weight: 90.7 kg (200 lb)  Body mass index is 27.12 kg/m².    Physical Exam  Constitutional:       Appearance: Normal appearance.   HENT:      Head: Normocephalic and atraumatic.      Nose: Nose normal.      Mouth/Throat:      Mouth: Mucous membranes are moist.      Pharynx: Oropharynx is clear.   Eyes:      General: No scleral icterus.     Extraocular Movements: Extraocular movements intact.      Pupils: Pupils are equal, round, and reactive to light.   Cardiovascular:      Rate and Rhythm: Normal rate and regular rhythm.      Pulses: Normal pulses.      Heart sounds: Normal heart sounds.   Pulmonary:      Effort: Pulmonary effort is normal. No respiratory distress.      Breath sounds: Normal breath sounds.   Abdominal:      General: Abdomen is  flat. Bowel sounds are normal.      Palpations: Abdomen is soft.      Tenderness: There is abdominal tenderness (LLQ). There is no right CVA tenderness or left CVA tenderness.   Musculoskeletal:         General: No swelling.      Right lower leg: No edema.      Left lower leg: No edema.   Skin:     General: Skin is warm and dry.   Neurological:      General: No focal deficit present.      Mental Status: He is alert and oriented to person, place, and time.   Psychiatric:         Mood and Affect: Mood normal.         Behavior: Behavior normal.         CRANIAL NERVES     CN III, IV, VI   Pupils are equal, round, and reactive to light.     Significant Labs: All pertinent labs within the past 24 hours have been reviewed.  CBC:   Recent Labs   Lab 05/30/22 2012   WBC 10.96   HGB 13.1*   HCT 38.2*        CMP:   Recent Labs   Lab 05/30/22 1915   *   K 4.5      CO2 20*   *   BUN 15   CREATININE 0.9   CALCIUM 9.3   PROT 7.5   ALBUMIN 4.2   BILITOT 0.3   ALKPHOS 69   AST 24   ALT 37   ANIONGAP 11   EGFRNONAA >60.0     Lipase:   Recent Labs   Lab 05/30/22 1915   LIPASE 26     Urine Studies:   Recent Labs   Lab 05/30/22  2143   COLORU Colorless*   APPEARANCEUA Clear   PHUR 5.0   SPECGRAV 1.010   PROTEINUA Negative   GLUCUA Negative   KETONESU Negative   BILIRUBINUA Negative   OCCULTUA Negative   NITRITE Negative   LEUKOCYTESUR Negative       Significant Imaging: I have reviewed all pertinent imaging results/findings within the past 24 hours.  CT: I have reviewed all pertinent results/findings within the past 24 hours and my personal findings are:  Acute uncomplicated diverticulitis of the sigmoid colon without evidence of abscess or perforation.    Assessment/Plan:     * Diverticulitis of sigmoid colon  Patient past medical history diverticulitis (initial episode 10 years ago, and 5 episodes since) presents to the ED with 2 day history of left lower quadrant pain described as 10/10 constant sharp and  nonradiating pain.  Patient states that his pain is similar to his previous diverticulitis episodes.  His last colonoscopy was 2 years ago demonstrating mild diverticulosis and a single polyp which was removed.  In the ED he was found to have a non elevated white count and a non elevated lipase.  CT abdomen pelvis demonstrates acute uncomplicated diverticulitis is a sigmoid colon without evidence abscess or perforation.  He received Zosyn in the ED and IV analgesia.      -- NPO  -- Rocephin 2 g IV daily  -- Flagyl 500 mg p.o. q.8 hours  -- IVF  -- IV analgesia (patient currently nauseous)      Chronic back pain  Patient with a longstanding history of chronic low back pain.    -- IV analgesia (patient currently nauseous)    Anxiety  Patient prescribed Xanax 2 mg p.o. p.r.n..  He states that he takes his Xanax nightly with an occasional 2nd dose during the day.     -- continue home Xanax        VTE Risk Mitigation (From admission, onward)         Ordered     IP VTE LOW RISK PATIENT  Once         05/31/22 0020     Place sequential compression device  Until discontinued         05/31/22 0020                   Zafar Tam MD   PGY-1  Department of Hospital Medicine   [facility] Dept

## 2022-05-31 NOTE — ASSESSMENT & PLAN NOTE
Patient past medical history diverticulitis (initial episode 10 years ago, and 5 episodes since) presents to the ED with 2 day history of left lower quadrant pain described as 10/10 constant sharp and nonradiating pain.  Patient states that his pain is similar to his previous diverticulitis episodes.  His last colonoscopy was 2 years ago demonstrating mild diverticulosis and a single polyp which was removed.  In the ED he was found to have a non elevated white count and a non elevated lipase.  CT abdomen pelvis demonstrates acute uncomplicated diverticulitis is a sigmoid colon without evidence abscess or perforation.  He received Zosyn in the ED and IV analgesia.      -- NPO  -- Rocephin 2 g IV daily  -- Flagyl 500 mg p.o. q.8 hours  -- IVF  -- IV analgesia (patient currently nauseous)

## 2022-05-31 NOTE — FIRST PROVIDER EVALUATION
Emergency Department TeleTriage Encounter Note      CHIEF COMPLAINT    Chief Complaint   Patient presents with    Abdominal Pain     L lower abd pain. Hx divertic       VITAL SIGNS   Initial Vitals [05/30/22 1848]   BP Pulse Resp Temp SpO2   (!) 186/100 88 18 98.7 °F (37.1 °C) 96 %      MAP       --            ALLERGIES    Review of patient's allergies indicates:   Allergen Reactions    Toradol [ketorolac] Hives and Nausea And Vomiting    Ibuprofen Other (See Comments)     States GI BLEED       PROVIDER TRIAGE NOTE  This is a teletriage evaluation of a 51 y.o. male presenting to the ED complaining of abdominal pain. Patient reports LLQ abdominal pain for 3 days. Denies vomiting or diarrhea. States feels similar to previous diverticulitis flares.     Initial orders will be placed and care will be transferred to an alternate provider when patient is roomed for a full evaluation. Any additional orders and the final disposition will be determined by that provider.           ORDERS  Labs Reviewed   HIV 1 / 2 ANTIBODY   HEPATITIS C ANTIBODY   CBC W/ AUTO DIFFERENTIAL   COMPREHENSIVE METABOLIC PANEL   LIPASE   URINALYSIS, REFLEX TO URINE CULTURE       ED Orders (720h ago, onward)    Start Ordered     Status Ordering Provider    05/30/22 1903 05/30/22 1902  Vital signs  Every 2 hours         Ordered DANIAL, SUZI G.    05/30/22 1903 05/30/22 1902  Diet NPO  Diet effective now         Ordered DANIAL, SUZI G.    05/30/22 1903 05/30/22 1902  Insert peripheral IV  Once         Ordered DANIAL, SUZI G.    05/30/22 1903 05/30/22 1902  CBC W/ AUTO DIFFERENTIAL  STAT         Ordered DANIAL, SUZI G.    05/30/22 1903 05/30/22 1902  Comp. Metabolic Panel  STAT         Ordered DANIAL, SUZI G.    05/30/22 1903 05/30/22 1902  Lipase  STAT         Ordered DANIAL, SUZI G.    05/30/22 1903 05/30/22 1902  Urinalysis, Reflex to Urine Culture Urine, Clean Catch  STAT         Ordered DANIAL, SUZI G.    05/30/22 1850 05/30/22 1849  HIV 1/2  Ag/Ab (4th Gen)  STAT         Ordered JANA HAZEL    05/30/22 1850 05/30/22 1849  Hepatitis C Antibody  STAT         Ordered JANA HAZEL            Virtual Visit Note: The provider triage portion of this emergency department evaluation and documentation was performed via [employer], a HIPAA-compliant telemedicine application, in concert with a tele-presenter in the room. A face to face patient evaluation with one of my colleagues will occur once the patient is placed in an emergency department room.      DISCLAIMER: This note was prepared with [employer] voice recognition transcription software. Garbled syntax, mangled pronouns, and other bizarre constructions may be attributed to that software system.

## 2022-05-31 NOTE — ED NOTES
Patient verbalizes understanding of need of urine sample, patient unable to urinate at this time. Sample cup at recliner. Will continue to monitor.

## 2022-06-01 VITALS
RESPIRATION RATE: 18 BRPM | DIASTOLIC BLOOD PRESSURE: 87 MMHG | BODY MASS INDEX: 27.09 KG/M2 | SYSTOLIC BLOOD PRESSURE: 129 MMHG | TEMPERATURE: 98 F | HEART RATE: 102 BPM | OXYGEN SATURATION: 94 % | HEIGHT: 72 IN | WEIGHT: 200 LBS

## 2022-06-01 LAB
ALBUMIN SERPL BCP-MCNC: 3.8 G/DL (ref 3.5–5.2)
ALP SERPL-CCNC: 59 U/L (ref 55–135)
ALT SERPL W/O P-5'-P-CCNC: 31 U/L (ref 10–44)
ANION GAP SERPL CALC-SCNC: 8 MMOL/L (ref 8–16)
AST SERPL-CCNC: 22 U/L (ref 10–40)
BASOPHILS # BLD AUTO: 0.05 K/UL (ref 0–0.2)
BASOPHILS NFR BLD: 0.7 % (ref 0–1.9)
BILIRUB SERPL-MCNC: 0.5 MG/DL (ref 0.1–1)
BUN SERPL-MCNC: 9 MG/DL (ref 6–20)
CALCIUM SERPL-MCNC: 9.1 MG/DL (ref 8.7–10.5)
CHLORIDE SERPL-SCNC: 101 MMOL/L (ref 95–110)
CO2 SERPL-SCNC: 26 MMOL/L (ref 23–29)
CREAT SERPL-MCNC: 0.8 MG/DL (ref 0.5–1.4)
DIFFERENTIAL METHOD: ABNORMAL
EOSINOPHIL # BLD AUTO: 0.4 K/UL (ref 0–0.5)
EOSINOPHIL NFR BLD: 4.6 % (ref 0–8)
ERYTHROCYTE [DISTWIDTH] IN BLOOD BY AUTOMATED COUNT: 13.2 % (ref 11.5–14.5)
EST. GFR  (AFRICAN AMERICAN): >60 ML/MIN/1.73 M^2
EST. GFR  (NON AFRICAN AMERICAN): >60 ML/MIN/1.73 M^2
GLUCOSE SERPL-MCNC: 87 MG/DL (ref 70–110)
HCT VFR BLD AUTO: 37.7 % (ref 40–54)
HGB BLD-MCNC: 12.7 G/DL (ref 14–18)
IMM GRANULOCYTES # BLD AUTO: 0.02 K/UL (ref 0–0.04)
IMM GRANULOCYTES NFR BLD AUTO: 0.3 % (ref 0–0.5)
LYMPHOCYTES # BLD AUTO: 1.1 K/UL (ref 1–4.8)
LYMPHOCYTES NFR BLD: 14.8 % (ref 18–48)
MAGNESIUM SERPL-MCNC: 1.8 MG/DL (ref 1.6–2.6)
MCH RBC QN AUTO: 30.1 PG (ref 27–31)
MCHC RBC AUTO-ENTMCNC: 33.7 G/DL (ref 32–36)
MCV RBC AUTO: 89 FL (ref 82–98)
MONOCYTES # BLD AUTO: 0.7 K/UL (ref 0.3–1)
MONOCYTES NFR BLD: 9.8 % (ref 4–15)
NEUTROPHILS # BLD AUTO: 5.3 K/UL (ref 1.8–7.7)
NEUTROPHILS NFR BLD: 69.8 % (ref 38–73)
NRBC BLD-RTO: 0 /100 WBC
PHOSPHATE SERPL-MCNC: 3.4 MG/DL (ref 2.7–4.5)
PLATELET # BLD AUTO: 210 K/UL (ref 150–450)
PMV BLD AUTO: 9.1 FL (ref 9.2–12.9)
POTASSIUM SERPL-SCNC: 4.2 MMOL/L (ref 3.5–5.1)
PROT SERPL-MCNC: 6.9 G/DL (ref 6–8.4)
RBC # BLD AUTO: 4.22 M/UL (ref 4.6–6.2)
SODIUM SERPL-SCNC: 135 MMOL/L (ref 136–145)
WBC # BLD AUTO: 7.58 K/UL (ref 3.9–12.7)

## 2022-06-01 PROCEDURE — 80053 COMPREHEN METABOLIC PANEL: CPT

## 2022-06-01 PROCEDURE — 84100 ASSAY OF PHOSPHORUS: CPT

## 2022-06-01 PROCEDURE — 85025 COMPLETE CBC W/AUTO DIFF WBC: CPT

## 2022-06-01 PROCEDURE — 96376 TX/PRO/DX INJ SAME DRUG ADON: CPT | Performed by: EMERGENCY MEDICINE

## 2022-06-01 PROCEDURE — 80321 ALCOHOLS BIOMARKERS 1OR 2: CPT | Performed by: HOSPITALIST

## 2022-06-01 PROCEDURE — 83735 ASSAY OF MAGNESIUM: CPT

## 2022-06-01 PROCEDURE — 63600175 PHARM REV CODE 636 W HCPCS

## 2022-06-01 PROCEDURE — 36415 COLL VENOUS BLD VENIPUNCTURE: CPT | Performed by: HOSPITALIST

## 2022-06-01 PROCEDURE — 96366 THER/PROPH/DIAG IV INF ADDON: CPT | Performed by: EMERGENCY MEDICINE

## 2022-06-01 PROCEDURE — G0378 HOSPITAL OBSERVATION PER HR: HCPCS

## 2022-06-01 PROCEDURE — 25000003 PHARM REV CODE 250

## 2022-06-01 PROCEDURE — 99217 PR OBSERVATION CARE DISCHARGE: ICD-10-PCS | Mod: GC,,, | Performed by: HOSPITALIST

## 2022-06-01 PROCEDURE — 99217 PR OBSERVATION CARE DISCHARGE: CPT | Mod: GC,,, | Performed by: HOSPITALIST

## 2022-06-01 RX ORDER — METRONIDAZOLE 500 MG/1
500 TABLET ORAL EVERY 8 HOURS
Qty: 17 TABLET | Refills: 0 | Status: SHIPPED | OUTPATIENT
Start: 2022-06-01 | End: 2022-06-08

## 2022-06-01 RX ORDER — CIPROFLOXACIN 750 MG/1
750 TABLET, FILM COATED ORAL EVERY 12 HOURS
Qty: 10 TABLET | Refills: 0 | Status: SHIPPED | OUTPATIENT
Start: 2022-06-02 | End: 2022-06-07

## 2022-06-01 RX ADMIN — MORPHINE SULFATE 4 MG: 4 INJECTION INTRAVENOUS at 04:06

## 2022-06-01 RX ADMIN — MORPHINE SULFATE 4 MG: 4 INJECTION INTRAVENOUS at 08:06

## 2022-06-01 RX ADMIN — CEFTRIAXONE 2 G: 2 INJECTION, SOLUTION INTRAVENOUS at 04:06

## 2022-06-01 RX ADMIN — METRONIDAZOLE 500 MG: 500 TABLET ORAL at 05:06

## 2022-06-01 NOTE — NURSING
Patient given d/c instructions; IV removed with catheter intact; patient opted to  prescriptions in person at pharmacy due to lack of cash, stating his ride was bringing cash for him. Patient ambulated off unit independently with personal belongings.

## 2022-06-01 NOTE — HOSPITAL COURSE
Patient admitted to hospital medicine for diverticulitis. Treated with IV CTX and Flagyl and conservative management of IVF, anti-emetics, PO pain meds and CLD. Patient was able to tolerate his diet and it was advanced. Ultimately had significant symptomatic improvement of his abdominal pain and was stable on discharge with PO Flagyl and Cipro with plans to complete 7 day course. Has close follow up with his PCP.

## 2022-06-01 NOTE — PLAN OF CARE
Problem: Adult Inpatient Plan of Care  Goal: Readiness for Transition of Care  5/31/2022 1901 by Latosha Garces RN  Outcome: Ongoing, Progressing       Problem: Pain Acute  Goal: Acceptable Pain Control and Functional Ability  Outcome: Ongoing, Progressing   Pt c/o pain. Morphine given.    Pt informed of results. She has never followed up with a Urologist. Please advise.

## 2022-06-01 NOTE — DISCHARGE SUMMARY
[facility] - Intensive Care ([place] [address])  [facility] Medicine  Discharge Summary      Patient Name: Keon Schneider Jr.  MRN: [id number]  Patient Class: OP- Observation  Admission Date: 5/30/2022  Hospital Length of Stay: 0 days  Discharge Date and Time:  06/01/2022 12:22 PM  Attending Physician: Anjelica att. providers found   Discharging Provider: Mary Kate Suárez MD  Primary Care Provider: Johnie Gutierrez MD  [facility] Medicine Team: [facility] HOSP MED 1 Mary Kate Suárez MD    HPI:   Keon Schneider Jr. Is a 51-year-old male with a past medical history of diverticulitis, arthritis, chronic pain, depression, anxiety, asthma who is being admitted for acute diverticulitis.  Patient presents to the ED with a 2 day history of left lower quadrant pain that began after eating his niece is cooking.  He describes his pain as a constant 9/10 sharp nonradiating pain in his left lower quadrant.  He states that his pain it is similar to his previous diverticulitis flares.  His initial diverticulitis flare was roughly 10 years ago and he has had 5 episodes since his initial flare.  He states that his last flare up was over a year ago.   Patient states that he drinks a case (24 pack) of beer through out the weekend.  He quit smoking 20 years ago.  He denies any illicit drug use.  His last bowel movement was today and was nonbloody. Patient endorses chills without fever, to episodes of emesis yesterday, continued nausea, intermittent dizziness, and fatigue.  Patient denies chest pain, shortness a breath, constipation, diarrhea, hematochezia, weakness, headaches.    In the ED patient had a negative UA, wbc's 10.9, CO2 20, and non elevated lipase.  CT abdomen pelvis demonstrates acute uncomplicated diverticulitis of the proximal sigmoid colon without evidence of abscess or perforation.  He received Zosyn 1 time, morphine 4 mg once, Dilaudid 1 mg twice, Zofran, 1 L of lactated Ringer's.      * No surgery found *      Hospital Course:   Patient  admitted to hospital medicine for diverticulitis. Treated with IV CTX and Flagyl and conservative management of IVF, anti-emetics, PO pain meds and CLD. Patient was able to tolerate his diet and it was advanced. Ultimately had significant symptomatic improvement of his abdominal pain and was stable on discharge with PO Flagyl and Cipro with plans to complete 7 day course (estimated end date June 6th, 2022) . CM/SW alerted to set up close f/u with his PCP.     Physical Exam  Constitutional:       Appearance: Normal appearance.   HENT:      Head: Normocephalic and atraumatic.      Nose: Nose normal.      Mouth/Throat:      Mouth: Mucous membranes are moist.      Pharynx: Oropharynx is clear.   Eyes:      General: No scleral icterus.     Extraocular Movements: Extraocular movements intact.      Pupils: Pupils are equal, round, and reactive to light.   Cardiovascular:      Rate and Rhythm: Normal rate and regular rhythm.      Pulses: Normal pulses.      Heart sounds: Normal heart sounds.   Pulmonary:      Effort: Pulmonary effort is normal. No respiratory distress.      Breath sounds: Normal breath sounds.   Abdominal:      General: Abdomen is flat. Bowel sounds are normal.      Palpations: Abdomen is soft.      Tenderness: There is no abdominal tenderness. There is no right CVA tenderness or left CVA tenderness.   Musculoskeletal:         General: No swelling.      Right lower leg: No edema.      Left lower leg: No edema.   Skin:     General: Skin is warm and dry.   Neurological:      General: No focal deficit present.      Mental Status: He is alert and oriented to person, place, and time.   Psychiatric:         Mood and Affect: Mood normal.         Behavior: Behavior normal.      Goals of Care Treatment Preferences:  Code Status: Full Code      Consults:     No new Assessment & Plan notes have been filed under this hospital service since the last note was generated.  Service: Hospital Medicine    Final Active  Diagnoses:    Diagnosis Date Noted POA    PRINCIPAL PROBLEM:  Diverticulitis of sigmoid colon [K57.32] 03/24/2018 Yes    Anxiety [F41.9] 11/24/2013 Yes     Chronic    Chronic back pain [M54.9, G89.29] 11/24/2013 Yes     Chronic    Benzodiazepine dependence [F13.20] 12/11/2011 Yes      Problems Resolved During this Admission:       Discharged Condition: stable    Disposition: Home or Self Care    Follow Up:   Follow-up Information     Johnie Gutierrez MD. Schedule an appointment as soon as possible for a visit in 2 days.    Specialty: Family Medicine  Contact information:  [address] [facility]  [place] [address]  [phone]             [facility] - Emergency Dept .    Specialty: Emergency Medicine  Why: If symptoms worsen  Contact information:  [address] [facility] [address] [phone]           Johnie Gutierrez MD Follow up on 6/2/2022.    Specialty: Family Medicine  Why: @12pm  Contact information:  [address] [facility] [id number] [phone]                       Patient Instructions:   No discharge procedures on file.    Significant Diagnostic Studies: Labs:   CMP   Recent Labs   Lab 05/30/22 1915 05/31/22 0357 06/01/22  0400   * 138 135*   K 4.5 4.4 4.2    103 101   CO2 20* 26 26   * 84 87   BUN 15 13 9   CREATININE 0.9 0.9 0.8   CALCIUM 9.3 9.1 9.1   PROT 7.5 7.0 6.9   ALBUMIN 4.2 4.0 3.8   BILITOT 0.3 0.5 0.5   ALKPHOS 69 59 59   AST 24 19 22   ALT 37 29 31   ANIONGAP 11 9 8   ESTGFRAFRICA >60.0 >60.0 >60.0   EGFRNONAA >60.0 >60.0 >60.0    and CBC   Recent Labs   Lab 05/30/22 2012 05/31/22 0357 06/01/22  0400   WBC 10.96 9.79 7.58   HGB 13.1* 12.3* 12.7*   HCT 38.2* 38.0* 37.7*    216 210       Pending Diagnostic Studies:     Procedure Component Value Units Date/Time    Phosphatidylethanol (PETH) [[id number]] Collected: 06/01/22 0400    Order Status: Sent Lab Status: In process Updated: 06/01/22 0510    Specimen: Blood          Medications:  Reconciled Home  Medications:      Medication List      START taking these medications    ciprofloxacin HCl 750 MG tablet  Commonly known as: CIPRO  Take 1 tablet (750 mg total) by mouth every 12 (twelve) hours. for 5 days  Start taking on: June 2, 2022     metroNIDAZOLE 500 MG tablet  Commonly known as: FLAGYL  Take 1 tablet (500 mg total) by mouth every 8 (eight) hours. for 6 days        CONTINUE taking these medications    ALPRAZolam 2 MG Tab  Commonly known as: XANAX  Take 2 mg by mouth 2 (two) times a day.     famotidine 40 MG tablet  Commonly known as: PEPCID  Take 40 mg by mouth once daily.            Indwelling Lines/Drains at time of discharge:   Lines/Drains/Airways     None                 Time spent on the discharge of patient: 35 minutes         Mary Kate Suárez MD  Department of Hospital Medicine  [facility] - Intensive Care ([place])

## 2022-06-03 ENCOUNTER — HOSPITAL ENCOUNTER (EMERGENCY)
Facility: HOSPITAL | Age: 52
Discharge: HOME | End: 2022-06-03
Attending: EMERGENCY MEDICINE
Payer: MEDICARE

## 2022-06-03 VITALS
WEIGHT: 200 LBS | BODY MASS INDEX: 28 KG/M2 | RESPIRATION RATE: 19 BRPM | HEART RATE: 80 BPM | OXYGEN SATURATION: 96 % | SYSTOLIC BLOOD PRESSURE: 153 MMHG | TEMPERATURE: 98 F | DIASTOLIC BLOOD PRESSURE: 97 MMHG | HEIGHT: 71 IN

## 2022-06-03 DIAGNOSIS — R10.30 LOWER ABDOMINAL PAIN: ICD-10-CM

## 2022-06-03 DIAGNOSIS — K57.92 DIVERTICULITIS: Primary | ICD-10-CM

## 2022-06-03 LAB
ALBUMIN SERPL BCP-MCNC: 4.5 G/DL (ref 3.5–5.2)
ALP SERPL-CCNC: 60 U/L (ref 55–135)
ALT SERPL W/O P-5'-P-CCNC: 41 U/L (ref 10–44)
ANION GAP SERPL CALC-SCNC: 11 MMOL/L (ref 8–16)
AST SERPL-CCNC: 30 U/L (ref 10–40)
BASOPHILS # BLD AUTO: 0.06 K/UL (ref 0–0.2)
BASOPHILS NFR BLD: 0.7 % (ref 0–1.9)
BILIRUB SERPL-MCNC: 0.2 MG/DL (ref 0.1–1)
BILIRUB UR QL STRIP: NEGATIVE
BUN SERPL-MCNC: 18 MG/DL (ref 6–20)
CALCIUM SERPL-MCNC: 10.5 MG/DL (ref 8.7–10.5)
CHLORIDE SERPL-SCNC: 104 MMOL/L (ref 95–110)
CLARITY UR: CLEAR
CO2 SERPL-SCNC: 20 MMOL/L (ref 23–29)
COLOR UR: COLORLESS
CREAT SERPL-MCNC: 1.1 MG/DL (ref 0.5–1.4)
DIFFERENTIAL METHOD: ABNORMAL
EOSINOPHIL # BLD AUTO: 0.5 K/UL (ref 0–0.5)
EOSINOPHIL NFR BLD: 6 % (ref 0–8)
ERYTHROCYTE [DISTWIDTH] IN BLOOD BY AUTOMATED COUNT: 13 % (ref 11.5–14.5)
EST. GFR  (AFRICAN AMERICAN): >60 ML/MIN/1.73 M^2
EST. GFR  (NON AFRICAN AMERICAN): >60 ML/MIN/1.73 M^2
GLUCOSE SERPL-MCNC: 94 MG/DL (ref 70–110)
GLUCOSE UR QL STRIP: NEGATIVE
HCT VFR BLD AUTO: 43.9 % (ref 40–54)
HGB BLD-MCNC: 14.5 G/DL (ref 14–18)
HGB UR QL STRIP: NEGATIVE
IMM GRANULOCYTES # BLD AUTO: 0.03 K/UL (ref 0–0.04)
IMM GRANULOCYTES NFR BLD AUTO: 0.4 % (ref 0–0.5)
KETONES UR QL STRIP: NEGATIVE
LEUKOCYTE ESTERASE UR QL STRIP: NEGATIVE
LIPASE SERPL-CCNC: 47 U/L (ref 4–60)
LYMPHOCYTES # BLD AUTO: 1.5 K/UL (ref 1–4.8)
LYMPHOCYTES NFR BLD: 18.5 % (ref 18–48)
MAGNESIUM SERPL-MCNC: 2.3 MG/DL (ref 1.6–2.6)
MCH RBC QN AUTO: 31 PG (ref 27–31)
MCHC RBC AUTO-ENTMCNC: 33 G/DL (ref 32–36)
MCV RBC AUTO: 94 FL (ref 82–98)
MONOCYTES # BLD AUTO: 0.8 K/UL (ref 0.3–1)
MONOCYTES NFR BLD: 10.2 % (ref 4–15)
NEUTROPHILS # BLD AUTO: 5.2 K/UL (ref 1.8–7.7)
NEUTROPHILS NFR BLD: 64.2 % (ref 38–73)
NITRITE UR QL STRIP: NEGATIVE
NRBC BLD-RTO: 0 /100 WBC
PH UR STRIP: 7 [PH] (ref 5–8)
PLATELET # BLD AUTO: 281 K/UL (ref 150–450)
PMV BLD AUTO: 9.1 FL (ref 9.2–12.9)
POTASSIUM SERPL-SCNC: 5 MMOL/L (ref 3.5–5.1)
PROT SERPL-MCNC: 8.5 G/DL (ref 6–8.4)
PROT UR QL STRIP: NEGATIVE
RBC # BLD AUTO: 4.67 M/UL (ref 4.6–6.2)
SODIUM SERPL-SCNC: 135 MMOL/L (ref 136–145)
SP GR UR STRIP: 1.01 (ref 1–1.03)
URN SPEC COLLECT METH UR: ABNORMAL
UROBILINOGEN UR STRIP-ACNC: NEGATIVE EU/DL
WBC # BLD AUTO: 8.15 K/UL (ref 3.9–12.7)

## 2022-06-03 PROCEDURE — 80053 COMPREHEN METABOLIC PANEL: CPT | Performed by: EMERGENCY MEDICINE

## 2022-06-03 PROCEDURE — 81003 URINALYSIS AUTO W/O SCOPE: CPT | Performed by: EMERGENCY MEDICINE

## 2022-06-03 PROCEDURE — 96374 THER/PROPH/DIAG INJ IV PUSH: CPT

## 2022-06-03 PROCEDURE — 99285 EMERGENCY DEPT VISIT HI MDM: CPT | Mod: 25

## 2022-06-03 PROCEDURE — 25000003 PHARM REV CODE 250: Performed by: EMERGENCY MEDICINE

## 2022-06-03 PROCEDURE — 96376 TX/PRO/DX INJ SAME DRUG ADON: CPT

## 2022-06-03 PROCEDURE — 63600175 PHARM REV CODE 636 W HCPCS: Performed by: EMERGENCY MEDICINE

## 2022-06-03 PROCEDURE — 96375 TX/PRO/DX INJ NEW DRUG ADDON: CPT

## 2022-06-03 PROCEDURE — 96361 HYDRATE IV INFUSION ADD-ON: CPT

## 2022-06-03 PROCEDURE — 83690 ASSAY OF LIPASE: CPT | Performed by: EMERGENCY MEDICINE

## 2022-06-03 PROCEDURE — 85025 COMPLETE CBC W/AUTO DIFF WBC: CPT | Performed by: EMERGENCY MEDICINE

## 2022-06-03 PROCEDURE — 83735 ASSAY OF MAGNESIUM: CPT | Performed by: EMERGENCY MEDICINE

## 2022-06-03 RX ORDER — ONDANSETRON 2 MG/ML
4 INJECTION INTRAMUSCULAR; INTRAVENOUS
Status: COMPLETED | OUTPATIENT
Start: 2022-06-03 | End: 2022-06-03

## 2022-06-03 RX ORDER — OXYCODONE AND ACETAMINOPHEN 5; 325 MG/1; MG/1
1 TABLET ORAL
Status: COMPLETED | OUTPATIENT
Start: 2022-06-03 | End: 2022-06-03

## 2022-06-03 RX ORDER — CIPROFLOXACIN 500 MG/1
500 TABLET ORAL
Status: COMPLETED | OUTPATIENT
Start: 2022-06-03 | End: 2022-06-03

## 2022-06-03 RX ORDER — OXYCODONE AND ACETAMINOPHEN 5; 325 MG/1; MG/1
1 TABLET ORAL EVERY 6 HOURS PRN
Qty: 12 TABLET | Refills: 0 | Status: SHIPPED | OUTPATIENT
Start: 2022-06-03 | End: 2022-06-06

## 2022-06-03 RX ORDER — HYDROMORPHONE HYDROCHLORIDE 2 MG/ML
1 INJECTION, SOLUTION INTRAMUSCULAR; INTRAVENOUS; SUBCUTANEOUS
Status: COMPLETED | OUTPATIENT
Start: 2022-06-03 | End: 2022-06-03

## 2022-06-03 RX ORDER — DEXTROMETHORPHAN HYDROBROMIDE, GUAIFENESIN 5; 100 MG/5ML; MG/5ML
650 LIQUID ORAL EVERY 6 HOURS PRN
Qty: 20 TABLET | Refills: 0 | Status: SHIPPED | OUTPATIENT
Start: 2022-06-03 | End: 2022-06-08

## 2022-06-03 RX ORDER — METRONIDAZOLE 500 MG/1
500 TABLET ORAL
Status: COMPLETED | OUTPATIENT
Start: 2022-06-03 | End: 2022-06-03

## 2022-06-03 RX ADMIN — CIPROFLOXACIN 500 MG: 500 TABLET, FILM COATED ORAL at 10:06

## 2022-06-03 RX ADMIN — HYDROMORPHONE HYDROCHLORIDE 1 MG: 2 INJECTION, SOLUTION INTRAMUSCULAR; INTRAVENOUS; SUBCUTANEOUS at 07:06

## 2022-06-03 RX ADMIN — SODIUM CHLORIDE 1000 ML: 0.9 INJECTION, SOLUTION INTRAVENOUS at 06:06

## 2022-06-03 RX ADMIN — OXYCODONE AND ACETAMINOPHEN 1 TABLET: 5; 325 TABLET ORAL at 10:06

## 2022-06-03 RX ADMIN — METRONIDAZOLE 500 MG: 500 TABLET ORAL at 10:06

## 2022-06-03 RX ADMIN — ONDANSETRON 4 MG: 2 INJECTION INTRAMUSCULAR; INTRAVENOUS at 06:06

## 2022-06-03 RX ADMIN — HYDROMORPHONE HYDROCHLORIDE 1 MG: 2 INJECTION, SOLUTION INTRAMUSCULAR; INTRAVENOUS; SUBCUTANEOUS at 06:06

## 2022-06-03 NOTE — ED TRIAGE NOTES
"Pt to the ED with complaints of left lower abdominal pain described as "stabbing", nausea and vomiting x2 days, last episode of emesis this morning. Pt reports hx of diverticulitis and states this is what a flare up usually feels like. Pt was last admitted for diverticulitis on 05/30/2022 and was discharged 3 days ago. Pt denies chest pain, shortness of breath, dysuria.  "

## 2022-06-03 NOTE — ED PROVIDER NOTES
"Encounter Date: 6/3/2022    SCRIBE #1 NOTE: I, Lara Thomas, am scribing for, and in the presence of,  Vj Mcrae MD. I have scribed the following portions of the note - Other sections scribed: HPI, ROS, PE.       History     Chief Complaint   Patient presents with    Abdominal Pain     PT to ER with c/p right lower abd pain x 3 days with nausea. Pt reports recent discharge from hospital for diverticulitis flair up      Keon Schneider Jr. is a 51 y.o. male, with a past medical history of Diverticulitis, who presents to the ED with worsening abdominal pain onset prior to arrival. Patient describes his abdominal pain as located in the right lower quadrant. He notes associated symptoms of nausea, vomiting, diaphoresis, dizziness, and diarrhea. Patient reports that his diarrhea is exacerbated by the stool softeners he was recently prescribed. No other exacerbating or alleviating factors. Patient states that he was discharged 2 days ago from [facility] after being admitted for 2 days for acute diverticulitis. He states that his pain "went away" after this admission, but "is coming back." Patient denies a past medical history of DM, heart issues, or lung issues. He denies any past abdominal surgical history. Patient endorses being allergic to Toradol. Patient denies constipation, fever, chest pain, hematochezia, or other associated symptoms.       The history is provided by the patient.     Review of patient's allergies indicates:   Allergen Reactions    Toradol [ketorolac] Hives and Nausea And Vomiting    Ibuprofen Other (See Comments)     States GI BLEED     Past Medical History:   Diagnosis Date    Anxiety     Benzodiazepine dependence    Arthritis     Asthma     Avascular necrosis     Carpal tunnel syndrome     Chronic pain     Depression     Diverticulitis     Gout     Other injury of other sites of trunk 12/28/2011    Pneumonia     Spondylolisthesis     lumbar; myofascial pain    " Staph infection     Ulnar neuropathy     left and rt arm     Past Surgical History:   Procedure Laterality Date    COLONOSCOPY N/A 2020    Procedure: COLONOSCOPY;  Surgeon: Broderick Moise MD;  [facility];  Service: Endoscopy;  Laterality: N/A;    HIP SURGERY  3/06;     hip arthroplasty    HIP SURGERY      bilateral hip replacement (titanium)    JOINT REPLACEMENT      SHOULDER SURGERY      right shoulder    SHOULDER SURGERY       Family History   Problem Relation Age of Onset    Cancer Mother      Social History     Tobacco Use    Smoking status: Former Smoker     Packs/day: 1.00     Years: 10.00     Pack years: 10.00     Quit date: 10/13/2012     Years since quittin.6    Smokeless tobacco: Never Used   Substance Use Topics    Alcohol use: Yes     Alcohol/week: 12.0 standard drinks     Types: 12 Cans of beer per week     Comment: 2x/week    Drug use: No     Review of Systems   Constitutional: Positive for diaphoresis. Negative for chills and fever.   HENT: Negative for congestion, rhinorrhea and sore throat.    Eyes: Negative for pain and redness.   Respiratory: Negative for cough and shortness of breath.    Cardiovascular: Negative for chest pain.   Gastrointestinal: Positive for abdominal pain (RLQ), diarrhea, nausea and vomiting. Negative for abdominal distention, blood in stool and constipation.   Genitourinary: Negative for dysuria, frequency and hematuria.   Musculoskeletal: Negative for arthralgias and back pain.   Skin: Negative for rash and wound.   Neurological: Positive for dizziness. Negative for weakness and headaches.   Psychiatric/Behavioral: Negative for agitation, behavioral problems and confusion.       Physical Exam     Initial Vitals [22 1734]   BP Pulse Resp Temp SpO2   134/86 75 20 98.2 °F (36.8 °C) 96 %      MAP       --         Physical Exam    Nursing note and vitals reviewed.  Constitutional: He appears well-developed and well-nourished. He is not  diaphoretic. No distress.   HENT:   Head: Normocephalic and atraumatic.   Right Ear: External ear normal.   Left Ear: External ear normal.   Mouth/Throat: Oropharynx is clear and moist.   Eyes: Conjunctivae are normal. Right eye exhibits no discharge. Left eye exhibits no discharge. No scleral icterus.   Neck: No tracheal deviation present. No JVD present.   Cardiovascular: Normal rate, regular rhythm, normal heart sounds and intact distal pulses. Exam reveals no gallop and no friction rub.    No murmur heard.  Pulmonary/Chest: Breath sounds normal. No stridor. No respiratory distress. He has no wheezes. He has no rhonchi. He has no rales. He exhibits no tenderness.   Abdominal: Abdomen is soft. He exhibits no distension and no mass. There is abdominal tenderness (moderate, lower quadrants). There is no rebound and no guarding.   Musculoskeletal:         General: No tenderness or edema. Normal range of motion.     Neurological: He is alert and oriented to person, place, and time. He has normal strength. GCS score is 15. GCS eye subscore is 4. GCS verbal subscore is 5. GCS motor subscore is 6.   CELINE with NGND's   Skin: Skin is warm and dry. Capillary refill takes less than 2 seconds. No rash and no abscess noted. No erythema. No pallor.   Psychiatric: He has a normal mood and affect. His behavior is normal. Judgment and thought content normal.         ED Course   Procedures  Labs Reviewed   CBC W/ AUTO DIFFERENTIAL - Abnormal; Notable for the following components:       Result Value    MPV 9.1 (*)     All other components within normal limits   COMPREHENSIVE METABOLIC PANEL - Abnormal; Notable for the following components:    Sodium 135 (*)     CO2 20 (*)     Total Protein 8.5 (*)     All other components within normal limits   URINALYSIS, REFLEX TO URINE CULTURE - Abnormal; Notable for the following components:    Color, UA Colorless (*)     All other components within normal limits    Narrative:     Specimen  Source->Urine   LIPASE   MAGNESIUM          Imaging Results          CT Abdomen Pelvis  Without Contrast (Final result)  Result time 06/03/22 18:45:18    Final result by Susan Monroe MD (06/03/22 18:45:18)                 Impression:      1. Acute uncomplicated diverticulitis involving the proximal sigmoid colon.  No evidence of abscess or perforation.  2. Otherwise no significant change from recent CT exam 05/30/2022.      Electronically signed by: Susan Monroe MD  Date:    06/03/2022  Time:    18:45             Narrative:    EXAMINATION:  CT ABDOMEN PELVIS WITHOUT CONTRAST    CLINICAL HISTORY:  LLQ abdominal pain;Recently discharge following OBS for diverticulitis.  Returns with severe LLQ abdominal pain;    TECHNIQUE:  Low dose axial images, sagittal and coronal reformations were obtained from the lung bases to the pubic symphysis.  Oral contrast was not administered.    COMPARISON:  Recent CT abdomen and pelvis from 05/30/2022.    FINDINGS:  The visualized portion of the heart is unremarkable.  The lung bases are clear.    No significant hepatic abnormality seen on this noncontrast exam.  There is no intra-or extrahepatic biliary ductal dilatation.  The gallbladder is unremarkable.  The stomach, pancreas, spleen, and adrenal glands are unremarkable.    Kidneys show no evidence of stones or hydronephrosis. Ureters are unremarkable along their courses, however noting extensive metallic beam hardening artifact from bilateral hip arthroplasties obscures evaluation of the distal ureteral courses and lower pelvis including the urinary bladder.    Appendix is visualized and is unremarkable.  Acute uncomplicated diverticulitis is seen involving the proximal sigmoid colon.  Overall degree of inflammation appears mildly improved from recent exam.  No evidence of abscess or perforation.  No evidence of bowel obstruction.  No free air or free fluid.    Aorta tapers normally.    No acute osseous abnormality  identified.  Postsurgical changes of bilateral total hip arthroplasties are visualized.  Degenerative changes are seen in the spine.  Subcutaneous soft tissue structures are unremarkable.                                 Medications   HYDROmorphone (PF) injection 1 mg (1 mg Intravenous Given 6/3/22 1815)   ondansetron injection 4 mg (4 mg Intravenous Given 6/3/22 1815)   sodium chloride 0.9% bolus 1,000 mL (0 mLs Intravenous Stopped 6/3/22 1951)   HYDROmorphone (PF) injection 1 mg (1 mg Intravenous Given 6/3/22 1951)   oxyCODONE-acetaminophen 5-325 mg per tablet 1 tablet (1 tablet Oral Given 6/3/22 2225)   ciprofloxacin HCl tablet 500 mg (500 mg Oral Given 6/3/22 2225)   metroNIDAZOLE tablet 500 mg (500 mg Oral Given 6/3/22 2224)     Medical Decision Making:   History:   Old Medical Records: I decided to obtain old medical records.  Clinical Tests:   Lab Tests: Ordered and Reviewed  Radiological Study: Ordered and Reviewed          Scribe Attestation:   Scribe #1: I performed the above scribed service and the documentation accurately describes the services I performed. I attest to the accuracy of the note.                 Pt arrived alert, afebrile, non-toxic in appearance, in no acute respiratory distress with VSS.  Pain well improved following administration of meds. Labs returned unremarkable. CT revealed findings of diverticulitis with no associated abscess or perforation. Secondary exam was unremarkable with no findings to warrant further evaluation. Pt discharged and counseled on the need to return to the nearest emergency room if they experience any other concerning signs or symptoms.  Pt given information for outpatient follow-up as well.    Vj Mcrae MD              Clinical Impression:   Final diagnoses:  [R10.30] Lower abdominal pain  [K57.92] Diverticulitis (Primary)        I, Vj Mcrae, personally performed the services described in this documentation. All medical record entries made by  the scribe were at my direction and in my presence.  I have reviewed the chart and agree that the record reflects my personal performance and is accurate and complete.    Vj Mcrae MD           ED Disposition Condition    Discharge Stable        ED Prescriptions     Medication Sig Dispense Start Date End Date Auth. Provider    oxyCODONE-acetaminophen (PERCOCET) 5-325 mg per tablet Take 1 tablet by mouth every 6 (six) hours as needed for Pain. 12 tablet 6/3/2022 6/6/2022 Vj Mcrae MD    acetaminophen (TYLENOL) 650 MG TbSR Take 1 tablet (650 mg total) by mouth every 6 (six) hours as needed (pain). 20 tablet 6/3/2022 6/8/2022 Vj Mcrae MD        Follow-up Information     Follow up With Specialties Details Why Contact Info    Johnie Gutierrez MD Family Medicine Schedule an appointment as soon as possible for a visit in 1 week to follow-up on today's visit [address] [facility] [address]  [phone]      [facility] [place] Emergency Medicine Go to  As needed, If symptoms worsen [address]  [facility] [address] [phone]           Vj Mcrae MD  06/05/22 1942

## 2022-06-04 LAB
PETH 16:0/18.1 (POPETH): 128 NG/ML
PETH 16:0/18.2 (PLPETH): 172 NG/ML

## 2022-06-04 NOTE — ED NOTES
Pt is alert, calm, and oriented x4, speech coherent, ambulates with steady gait, no assistance needed. Pts brother providing ride home.

## 2022-06-04 NOTE — ED NOTES
Pt reports he is feeling nauseous again and pain has returned and is currently 10/10 to the LLQ MD notified.

## 2022-06-04 NOTE — ED NOTES
Received report from JOHN Lopez. Introduced self at bedside, NS bolus currently infusing.  Pt is alert, calm, and oriented x4, no acute distress noted.

## 2022-06-09 DIAGNOSIS — R76.8 HEPATITIS C ANTIBODY POSITIVE IN BLOOD: Primary | ICD-10-CM

## 2022-07-31 ENCOUNTER — HOSPITAL ENCOUNTER (EMERGENCY)
Facility: HOSPITAL | Age: 52
Discharge: HOME | End: 2022-07-31
Attending: EMERGENCY MEDICINE
Payer: MEDICARE

## 2022-07-31 VITALS
BODY MASS INDEX: 28 KG/M2 | DIASTOLIC BLOOD PRESSURE: 84 MMHG | WEIGHT: 200 LBS | RESPIRATION RATE: 18 BRPM | HEART RATE: 85 BPM | TEMPERATURE: 98 F | OXYGEN SATURATION: 97 % | SYSTOLIC BLOOD PRESSURE: 131 MMHG | HEIGHT: 71 IN

## 2022-07-31 DIAGNOSIS — M10.9 ACUTE GOUT INVOLVING TOE OF RIGHT FOOT, UNSPECIFIED CAUSE: Primary | ICD-10-CM

## 2022-07-31 PROCEDURE — 99284 EMERGENCY DEPT VISIT MOD MDM: CPT

## 2022-07-31 PROCEDURE — 25000003 PHARM REV CODE 250: Performed by: EMERGENCY MEDICINE

## 2022-07-31 PROCEDURE — 63600175 PHARM REV CODE 636 W HCPCS: Performed by: EMERGENCY MEDICINE

## 2022-07-31 RX ORDER — PREDNISONE 20 MG/1
60 TABLET ORAL DAILY
Qty: 15 TABLET | Refills: 0 | Status: SHIPPED | OUTPATIENT
Start: 2022-07-31 | End: 2022-08-05

## 2022-07-31 RX ORDER — OXYCODONE AND ACETAMINOPHEN 5; 325 MG/1; MG/1
1 TABLET ORAL EVERY 6 HOURS PRN
Qty: 5 TABLET | Refills: 0 | OUTPATIENT
Start: 2022-07-31 | End: 2023-02-05

## 2022-07-31 RX ORDER — PREDNISONE 20 MG/1
60 TABLET ORAL
Status: COMPLETED | OUTPATIENT
Start: 2022-07-31 | End: 2022-07-31

## 2022-07-31 RX ORDER — OXYCODONE AND ACETAMINOPHEN 5; 325 MG/1; MG/1
1 TABLET ORAL
Status: COMPLETED | OUTPATIENT
Start: 2022-07-31 | End: 2022-07-31

## 2022-07-31 RX ADMIN — PREDNISONE 60 MG: 20 TABLET ORAL at 11:07

## 2022-07-31 RX ADMIN — OXYCODONE AND ACETAMINOPHEN 1 TABLET: 5; 325 TABLET ORAL at 11:07

## 2022-08-01 NOTE — ED PROVIDER NOTES
Encounter Date: 2022       History     Chief Complaint   Patient presents with    Toe Pain     Right great toe pain x 3 days. Hx of gout. Denies taking any medication for it.      52-year-old male with history of gout presenting with right great toe pain.  Patient denies trauma.  Notes pain, redness, tenderness consistent with prior gout attacks.  Patient reports he feels like he cannot keep that she is on top of it due to pain.  Denies fevers, chills, nausea, vomiting, chest pain, shortness of breath, abdominal pain, rash.  Previously in usual state of health.        Review of patient's allergies indicates:   Allergen Reactions    Toradol [ketorolac] Hives and Nausea And Vomiting    Ibuprofen Other (See Comments)     States GI BLEED     Past Medical History:   Diagnosis Date    Anxiety     Benzodiazepine dependence    Arthritis     Asthma     Avascular necrosis     Carpal tunnel syndrome     Chronic pain     Depression     Diverticulitis     Gout     Other injury of other sites of trunk 2011    Pneumonia     Spondylolisthesis     lumbar; myofascial pain    Staph infection     Ulnar neuropathy     left and rt arm     Past Surgical History:   Procedure Laterality Date    COLONOSCOPY N/A 2020    Procedure: COLONOSCOPY;  Surgeon: Broderick Moise MD;  Location: [facility];  Service: Endoscopy;  Laterality: N/A;    HIP SURGERY  3/06;     hip arthroplasty    HIP SURGERY      bilateral hip replacement (titanium)    JOINT REPLACEMENT      SHOULDER SURGERY      right shoulder    SHOULDER SURGERY       Family History   Problem Relation Age of Onset    Cancer Mother      Social History     Tobacco Use    Smoking status: Former Smoker     Packs/day: 1.00     Years: 10.00     Pack years: 10.00     Quit date: 10/13/2012     Years since quittin.8    Smokeless tobacco: Never Used   Substance Use Topics    Alcohol use: Yes     Alcohol/week: 12.0 standard drinks     Types: 12 Cans  of beer per week     Comment: 2x/week    Drug use: No     Review of Systems   Constitutional: Negative for chills and fever.   HENT: Negative for sore throat.    Respiratory: Negative for shortness of breath.    Cardiovascular: Negative for chest pain.   Gastrointestinal: Negative for nausea.   Genitourinary: Negative for dysuria.   Musculoskeletal: Positive for arthralgias. Negative for back pain.   Skin: Negative for rash.   Neurological: Negative for weakness.       Physical Exam     Initial Vitals [07/31/22 2155]   BP Pulse Resp Temp SpO2   131/84 85 17 98.1 °F (36.7 °C) 97 %      MAP       --         Physical Exam    Nursing note and vitals reviewed.  Constitutional: He appears well-developed and well-nourished. He is not diaphoretic. No distress.   HENT:   Head: Normocephalic and atraumatic.   Right Ear: External ear normal.   Left Ear: External ear normal.   Eyes: EOM are normal. Pupils are equal, round, and reactive to light. Right eye exhibits no discharge. Left eye exhibits no discharge.   Neck:   Normal range of motion.  Cardiovascular: Normal rate.   Pulmonary/Chest: No respiratory distress.   Abdominal: He exhibits no distension. There is no guarding.   Musculoskeletal:         General: Tenderness and edema present.      Cervical back: Normal range of motion.      Comments: Right great toe warm, tender to touch, difficulty moving     Neurological: He is alert and oriented to person, place, and time. He has normal strength. GCS score is 15. GCS eye subscore is 4. GCS verbal subscore is 5. GCS motor subscore is 6.   Skin: Skin is warm and dry.   Psychiatric: He has a normal mood and affect. His behavior is normal.         ED Course   Procedures  Labs Reviewed - No data to display       Imaging Results    None          Medications   predniSONE tablet 60 mg (60 mg Oral Given 7/31/22 2311)   oxyCODONE-acetaminophen 5-325 mg per tablet 1 tablet (1 tablet Oral Given 7/31/22 2311)     Medical Decision Making:    Initial Assessment:   52-year-old male presenting with right great toe pain consistent with prior gout attacks.  On exam well appearing no acute distress.  Vitals normal.  Exam, history consistent with gout.  Prednisone worked in the past.  Will prescribe same.  Patient given dose of prednisone as well as analgesia here.  Discussed need to follow-up with primary care provider.  Discussed need to start allopurinol but will defer prescription to primary care.  Discussed return precautions.                      Clinical Impression:   Final diagnoses:  [M10.9] Acute gout involving toe of right foot, unspecified cause (Primary)          ED Disposition Condition    Discharge Stable        ED Prescriptions     Medication Sig Dispense Start Date End Date Auth. Provider    predniSONE (DELTASONE) 20 MG tablet Take 3 tablets (60 mg total) by mouth once daily. for 5 days 15 tablet 7/31/2022 8/5/2022 Atif Flores MD    oxyCODONE-acetaminophen (PERCOCET) 5-325 mg per tablet Take 1 tablet by mouth every 6 (six) hours as needed for Pain. 5 tablet 7/31/2022  Atif Flores MD        Follow-up Information     Follow up With Specialties Details Why Contact Info    Johnie Gutierrez MD Family Medicine Schedule an appointment as soon as possible for a visit   [address] [facility] [address]  [phone]      [facility] [place] - Emergency Dept Emergency Medicine  As needed, If symptoms worsen [facility] [place] [address] [phone]           Atif Flores MD  08/01/22 0351

## 2022-08-01 NOTE — DISCHARGE INSTRUCTIONS
You were seen in the emergency department for redness and pain in your foot.  We gave you anti-inflammatories and steroids and you felt better.  We think you have gout.  Please take your medications as directed.  Please return for any new or worsening redness, warmth, fever, swelling, changes in color, or you become concerned in any other way.  It is likely that you should start a medication to prevent gout attacks.  Please discuss this with your primary care provider.

## 2022-08-01 NOTE — ED NOTES
Pt states that his right great toe started hurting 3 days ago but today is the worst and has caused pain in all 5 toes. States that he has been at the camp where they have been eating MREs and drinking beer.

## 2022-09-02 ENCOUNTER — TELEPHONE (OUTPATIENT)
Dept: ORTHOPEDICS | Facility: CLINIC | Age: 52
End: 2022-09-02
Payer: MEDICARE

## 2022-09-02 ENCOUNTER — HOSPITAL ENCOUNTER (EMERGENCY)
Facility: HOSPITAL | Age: 52
Discharge: HOME | End: 2022-09-02
Attending: EMERGENCY MEDICINE
Payer: MEDICARE

## 2022-09-02 VITALS
WEIGHT: 200 LBS | SYSTOLIC BLOOD PRESSURE: 139 MMHG | HEIGHT: 72 IN | DIASTOLIC BLOOD PRESSURE: 81 MMHG | HEART RATE: 69 BPM | TEMPERATURE: 98 F | RESPIRATION RATE: 18 BRPM | BODY MASS INDEX: 27.09 KG/M2 | OXYGEN SATURATION: 97 %

## 2022-09-02 DIAGNOSIS — M54.30 SCIATICA, UNSPECIFIED LATERALITY: ICD-10-CM

## 2022-09-02 DIAGNOSIS — M54.41 MIDLINE LOW BACK PAIN WITH RIGHT-SIDED SCIATICA, UNSPECIFIED CHRONICITY: Primary | ICD-10-CM

## 2022-09-02 DIAGNOSIS — M51.36 DDD (DEGENERATIVE DISC DISEASE), LUMBAR: Primary | ICD-10-CM

## 2022-09-02 LAB
ALBUMIN SERPL BCP-MCNC: 4.1 G/DL (ref 3.5–5.2)
ALP SERPL-CCNC: 57 U/L (ref 55–135)
ALT SERPL W/O P-5'-P-CCNC: 28 U/L (ref 10–44)
ANION GAP SERPL CALC-SCNC: 7 MMOL/L (ref 8–16)
AST SERPL-CCNC: 17 U/L (ref 10–40)
BASOPHILS # BLD AUTO: 0.05 K/UL (ref 0–0.2)
BASOPHILS NFR BLD: 0.6 % (ref 0–1.9)
BILIRUB SERPL-MCNC: 0.2 MG/DL (ref 0.1–1)
BUN SERPL-MCNC: 19 MG/DL (ref 6–20)
CALCIUM SERPL-MCNC: 9.4 MG/DL (ref 8.7–10.5)
CHLORIDE SERPL-SCNC: 107 MMOL/L (ref 95–110)
CO2 SERPL-SCNC: 23 MMOL/L (ref 23–29)
CREAT SERPL-MCNC: 0.9 MG/DL (ref 0.5–1.4)
DIFFERENTIAL METHOD: ABNORMAL
EOSINOPHIL # BLD AUTO: 0.2 K/UL (ref 0–0.5)
EOSINOPHIL NFR BLD: 2.4 % (ref 0–8)
ERYTHROCYTE [DISTWIDTH] IN BLOOD BY AUTOMATED COUNT: 13.6 % (ref 11.5–14.5)
EST. GFR  (NO RACE VARIABLE): >60 ML/MIN/1.73 M^2
GLUCOSE SERPL-MCNC: 98 MG/DL (ref 70–110)
HCT VFR BLD AUTO: 38 % (ref 40–54)
HGB BLD-MCNC: 12.7 G/DL (ref 14–18)
IMM GRANULOCYTES # BLD AUTO: 0.1 K/UL (ref 0–0.04)
IMM GRANULOCYTES NFR BLD AUTO: 1.1 % (ref 0–0.5)
LYMPHOCYTES # BLD AUTO: 2 K/UL (ref 1–4.8)
LYMPHOCYTES NFR BLD: 22.4 % (ref 18–48)
MCH RBC QN AUTO: 31.4 PG (ref 27–31)
MCHC RBC AUTO-ENTMCNC: 33.4 G/DL (ref 32–36)
MCV RBC AUTO: 94 FL (ref 82–98)
MONOCYTES # BLD AUTO: 0.9 K/UL (ref 0.3–1)
MONOCYTES NFR BLD: 9.6 % (ref 4–15)
NEUTROPHILS # BLD AUTO: 5.7 K/UL (ref 1.8–7.7)
NEUTROPHILS NFR BLD: 63.9 % (ref 38–73)
NRBC BLD-RTO: 0 /100 WBC
PLATELET # BLD AUTO: 206 K/UL (ref 150–450)
PMV BLD AUTO: 8.9 FL (ref 9.2–12.9)
POTASSIUM SERPL-SCNC: 4.3 MMOL/L (ref 3.5–5.1)
PROT SERPL-MCNC: 7.1 G/DL (ref 6–8.4)
RBC # BLD AUTO: 4.05 M/UL (ref 4.6–6.2)
SODIUM SERPL-SCNC: 137 MMOL/L (ref 136–145)
WBC # BLD AUTO: 8.98 K/UL (ref 3.9–12.7)

## 2022-09-02 PROCEDURE — 80053 COMPREHEN METABOLIC PANEL: CPT | Performed by: EMERGENCY MEDICINE

## 2022-09-02 PROCEDURE — 99284 EMERGENCY DEPT VISIT MOD MDM: CPT | Mod: 25

## 2022-09-02 PROCEDURE — 99284 EMERGENCY DEPT VISIT MOD MDM: CPT | Mod: ,,, | Performed by: EMERGENCY MEDICINE

## 2022-09-02 PROCEDURE — 25000003 PHARM REV CODE 250: Performed by: EMERGENCY MEDICINE

## 2022-09-02 PROCEDURE — 85025 COMPLETE CBC W/AUTO DIFF WBC: CPT | Performed by: EMERGENCY MEDICINE

## 2022-09-02 PROCEDURE — 99284 PR EMERGENCY DEPT VISIT,LEVEL IV: ICD-10-PCS | Mod: ,,, | Performed by: EMERGENCY MEDICINE

## 2022-09-02 RX ORDER — OXYCODONE HYDROCHLORIDE 5 MG/1
5 TABLET ORAL
Status: COMPLETED | OUTPATIENT
Start: 2022-09-02 | End: 2022-09-02

## 2022-09-02 RX ORDER — ACETAMINOPHEN 500 MG
1000 TABLET ORAL
Status: COMPLETED | OUTPATIENT
Start: 2022-09-02 | End: 2022-09-02

## 2022-09-02 RX ADMIN — ACETAMINOPHEN 1000 MG: 500 TABLET ORAL at 07:09

## 2022-09-02 RX ADMIN — OXYCODONE 5 MG: 5 TABLET ORAL at 07:09

## 2022-09-02 NOTE — ED TRIAGE NOTES
"Keon Schneider Jr., a 52 y.o. male presents to the ED w/ complaint of lower back pain that is now going up to mid-back. Pt reports history of bulging disks and arthritis. Denies any new trauma/falls. Denies numbness/tingling to extremities. Pt reports his back pain is so severe, he "just wants  to stay in bed all day." Denies chest pain/SOB/n/v/d.    Triage note:  Chief Complaint   Patient presents with    Back Pain     Pt c/o worsening mid-lower back pain x 4 days. Hx of arthritis and bulging disks. Denies injury, urine/bowel incontinence, saddle anesthesia.      Review of patient's allergies indicates:   Allergen Reactions    Toradol [ketorolac] Hives and Nausea And Vomiting    Ibuprofen Other (See Comments)     States GI BLEED     Past Medical History:   Diagnosis Date    Anxiety     Benzodiazepine dependence    Arthritis     Asthma     Avascular necrosis     Carpal tunnel syndrome     Chronic pain     Depression     Diverticulitis     Gout     Other injury of other sites of trunk 12/28/2011    Pneumonia     Spondylolisthesis     lumbar; myofascial pain    Staph infection     Ulnar neuropathy     left and rt arm     Patient identifiers verified and correct for Keon Schneider Jr.    LOC: The patient is awake, alert, and aware of environment. The patient is AOX4 and speaking appropriately.   APPEARANCE: No acute distress noted.   HEENT: WDL, PERRLA  PSYCHOSOCIAL: Patient is calm and cooperative. Denies SI/HI.  SKIN: The skin is warm, dry, color consistent with ethnicity. No breakdown or brusing visible.  RESPIRATORY: Airway is open and patent. Bilateral chest rise and fall. Respiratory rate even and unlabored.  No accessory muscle use noted.  CARDIAC: Patient has a normal rate and rhythm. No complaints of chest pain.  ABDOMEN/GI: Soft, non tender. No distention noted. Denies n/v/d.   URINARY:  Voids independently without difficulty. No complaints of frequency, urgency, burning, or blood in urine. "   NEUROLOGIC: Eyes open spontaneously. Speech clear.  Able to follow commands, demonstrating ability to actively and appropriately communicate within context of current conversation. Symmetrical facial muscles. Movement is purposeful. Denies dizziness/lightheadedness.  MUSCULOSKELETAL: Reports mid and lower back pain x4days. No obvious deformities noted. Full ROM in all extremities.  PERIPHERAL VASCULAR: Cap refill <3 secs bilaterally. No peripheral edema noted. Denies numbness and tingling in extremities.

## 2022-09-02 NOTE — ED NOTES
Patient states he had Hepatitis drawn in PMD office in June, does not want blood drawn, CN aware, will cancel

## 2022-09-02 NOTE — Clinical Note
"Keon Valenzuela" Wyatt was seen and treated in our emergency department on 9/2/2022.  He may return to work on 09/06/2022.       If you have any questions or concerns, please don't hesitate to call.      Edilson Russell MD"

## 2022-09-02 NOTE — PROVIDER PROGRESS NOTES - EMERGENCY DEPT.
CELE Veterans Health Administration Carl T. Hayden Medical Center Phoenixing Thompson Memorial Medical Center Encounter Date: 9/2/2022    ED Physician Progress Notes            ED Physician Hand-off Note:    ED Course: I assumed care of patient from off-going ED physician, ***.  Briefly, Patient is a ***.    At the time of signout plan was pending ***.    Disposition: ***    Patient comfortable with ***. Patient counseled regarding exam, results, diagnosis, treatment, and plan.    Impression: ***    Final diagnoses:  None         

## 2022-09-06 ENCOUNTER — HOSPITAL ENCOUNTER (OUTPATIENT)
Dept: RADIOLOGY | Facility: HOSPITAL | Age: 52
Discharge: HOME | End: 2022-09-06
Attending: ORTHOPAEDIC SURGERY
Payer: MEDICARE

## 2022-09-06 ENCOUNTER — OFFICE VISIT (OUTPATIENT)
Dept: ORTHOPEDICS | Facility: CLINIC | Age: 52
End: 2022-09-06
Payer: MEDICARE

## 2022-09-06 VITALS — BODY MASS INDEX: 32.03 KG/M2 | WEIGHT: 236.44 LBS | HEIGHT: 72 IN

## 2022-09-06 DIAGNOSIS — M54.9 DORSALGIA, UNSPECIFIED: ICD-10-CM

## 2022-09-06 DIAGNOSIS — M51.36 DDD (DEGENERATIVE DISC DISEASE), LUMBAR: ICD-10-CM

## 2022-09-06 DIAGNOSIS — M47.816 LUMBAR SPONDYLOSIS: ICD-10-CM

## 2022-09-06 DIAGNOSIS — M51.36 DDD (DEGENERATIVE DISC DISEASE), LUMBAR: Primary | ICD-10-CM

## 2022-09-06 PROCEDURE — 99204 OFFICE O/P NEW MOD 45 MIN: CPT | Mod: S$GLB,,, | Performed by: ORTHOPAEDIC SURGERY

## 2022-09-06 PROCEDURE — 1160F PR REVIEW ALL MEDS BY PRESCRIBER/CLIN PHARMACIST DOCUMENTED: ICD-10-PCS | Mod: CPTII,S$GLB,, | Performed by: ORTHOPAEDIC SURGERY

## 2022-09-06 PROCEDURE — 72110 X-RAY EXAM L-2 SPINE 4/>VWS: CPT | Mod: TC

## 2022-09-06 PROCEDURE — 1160F RVW MEDS BY RX/DR IN RCRD: CPT | Mod: CPTII,S$GLB,, | Performed by: ORTHOPAEDIC SURGERY

## 2022-09-06 PROCEDURE — 3008F PR BODY MASS INDEX (BMI) DOCUMENTED: ICD-10-PCS | Mod: CPTII,S$GLB,, | Performed by: ORTHOPAEDIC SURGERY

## 2022-09-06 PROCEDURE — 72110 XR LUMBAR SPINE AP AND LAT WITH FLEX/EXT: ICD-10-PCS | Mod: 26,,, | Performed by: RADIOLOGY

## 2022-09-06 PROCEDURE — 99204 PR OFFICE/OUTPT VISIT, NEW, LEVL IV, 45-59 MIN: ICD-10-PCS | Mod: S$GLB,,, | Performed by: ORTHOPAEDIC SURGERY

## 2022-09-06 PROCEDURE — 1159F PR MEDICATION LIST DOCUMENTED IN MEDICAL RECORD: ICD-10-PCS | Mod: CPTII,S$GLB,, | Performed by: ORTHOPAEDIC SURGERY

## 2022-09-06 PROCEDURE — 3008F BODY MASS INDEX DOCD: CPT | Mod: CPTII,S$GLB,, | Performed by: ORTHOPAEDIC SURGERY

## 2022-09-06 PROCEDURE — 99999 PR PBB SHADOW E&M-EST. PATIENT-LVL III: CPT | Mod: PBBFAC,,, | Performed by: ORTHOPAEDIC SURGERY

## 2022-09-06 PROCEDURE — 99999 PR PBB SHADOW E&M-EST. PATIENT-LVL III: ICD-10-PCS | Mod: PBBFAC,,, | Performed by: ORTHOPAEDIC SURGERY

## 2022-09-06 PROCEDURE — 72110 X-RAY EXAM L-2 SPINE 4/>VWS: CPT | Mod: 26,,, | Performed by: RADIOLOGY

## 2022-09-06 PROCEDURE — 1159F MED LIST DOCD IN RCRD: CPT | Mod: CPTII,S$GLB,, | Performed by: ORTHOPAEDIC SURGERY

## 2022-09-06 RX ORDER — CYCLOBENZAPRINE HCL 10 MG
10 TABLET ORAL 3 TIMES DAILY PRN
Qty: 60 TABLET | Refills: 1 | Status: SHIPPED | OUTPATIENT
Start: 2022-09-06 | End: 2023-04-04

## 2022-09-06 NOTE — PROGRESS NOTES
DATE: 9/6/2022  PATIENT: Keon Schneider Jr.    Attending Physician: Tip Carrion M.D.    CHIEF COMPLAINT: LBP    HISTORY:  Keon Schneider Jr. is a 52 y.o. male here for initial evaluation of low back pain (Back - 8). The pain has been present for 9 years but it was acutely exacerbated in the past 2 months when he fell off 12 feet. The patient describes the pain as dull but it does not go down legs.  The pain is worse with activity and improved by rest. There is no associated numbness and tingling. There is LLE subjective weakness. Prior treatments have included nothing.    The Patient denies myelopathic symptoms such as handwriting changes or difficulty with buttons/coins/keys. Denies perineal paresthesias, bowel/bladder dysfunction.    The patient does not smoke, have DM or endorse IVDU. The patient is not on any blood thinners and does not take chronic narcotics. He works in construction. He likes to stay active; he likes to fish.    PAST MEDICAL/SURGICAL HISTORY:  Past Medical History:   Diagnosis Date    Anxiety     Benzodiazepine dependence    Arthritis     Asthma     Avascular necrosis     Carpal tunnel syndrome     Chronic pain     Depression     Diverticulitis     Gout     Other injury of other sites of trunk 12/28/2011    Pneumonia     Spondylolisthesis     lumbar; myofascial pain    Staph infection     Ulnar neuropathy     left and rt arm     Past Surgical History:   Procedure Laterality Date    COLONOSCOPY N/A 7/6/2020    Procedure: COLONOSCOPY;  Surgeon: Broderick Moise MD;  Location: [facility];  Service: Endoscopy;  Laterality: N/A;    HIP SURGERY  3/06; 6/06    hip arthroplasty    HIP SURGERY      bilateral hip replacement (titanium)    JOINT REPLACEMENT      SHOULDER SURGERY  2012    right shoulder    SHOULDER SURGERY         Current Medications:   Current Outpatient Medications:     ALPRAZolam (XANAX) 2 MG Tab, Take 2 mg by mouth 2 (two) times a day., Disp: , Rfl:     famotidine (PEPCID)  40 MG tablet, Take 40 mg by mouth once daily., Disp: , Rfl:     oxyCODONE-acetaminophen (PERCOCET) 5-325 mg per tablet, Take 1 tablet by mouth every 6 (six) hours as needed for Pain., Disp: 5 tablet, Rfl: 0    cyclobenzaprine (FLEXERIL) 10 MG tablet, Take 1 tablet (10 mg total) by mouth 3 (three) times daily as needed for Muscle spasms., Disp: 60 tablet, Rfl: 1    Social History:   Social History     Socioeconomic History    Marital status: Single   Tobacco Use    Smoking status: Former     Packs/day: 1.00     Years: 10.00     Pack years: 10.00     Types: Cigarettes     Quit date: 10/13/2012     Years since quittin.9    Smokeless tobacco: Never   Substance and Sexual Activity    Alcohol use: Yes     Alcohol/week: 12.0 standard drinks     Types: 12 Cans of beer per week     Comment: 2x/week    Drug use: No    Sexual activity: Yes     Partners: Female       REVIEW OF SYSTEMS:  Constitution: Negative. Negative for chills, fever and night sweats.   Cardiovascular: Negative for chest pain and syncope.   Respiratory: Negative for cough and shortness of breath.   Gastrointestinal: See HPI. Negative for nausea/vomiting. Negative for abdominal pain.  Genitourinary: See HPI. Negative for discoloration or dysuria.  Skin: Negative for dry skin, itching and rash.   Hematologic/Lymphatic: negative for bleeding/clotting disorders.   Musculoskeletal: Negative for falls and muscle weakness.   Neurological: See HPI. no history of seizures. no history of cranial surgery or shunts.  Endocrine: Negative for polydipsia, polyphagia and polyuria.   Allergic/Immunologic: Negative for hives and persistent infections.    PHYSICAL EXAMINATION:    Ht 6' (1.829 m)   Wt 107.2 kg (236 lb 7.1 oz)   BMI 32.07 kg/m²     General: The patient is a 52 y.o. male in no apparent distress, the patient is orientatied to person, place and time.   Psych: Normal mood and affect  HEENT: Vision grossly intact, hearing intact to the spoken word.  Lungs:  Respirations unlabored.  Gait: Normal station and gait, no difficulty with toe or heel walk.   Skin: Dorsal lumbar skin negative for rashes, lesions, hairy patches and surgical scars.  Range of motion: Lumbar range of motion is acceptable. There is lower lumbar tenderness to palpation.  Spinal Balance: Global saggital and coronal spinal balance acceptable, no significant for scoliosis and kyphosis.  Musculoskeletal: No pain with the range of motion of the bilateral hips. No trochanteric tenderness to palpation.  Vascular: Bilateral lower extremities warm and well perfused, Dorsalis pedis pulses 2+ bilaterally.  Neurological: Normal strength and tone in all major motor groups in the bilateral lower extremities. Normal sensation to light touch in the L2-S1 dermatomes bilaterally.  Deep tendon reflexes symmetric 2+ in the bilateral lower extremities.  Negative Babinski bilaterally.    IMAGING:   Today I independently reviewed the following images and my interpretations are as follows:    AP, Lat and Flex/Ex upright L-spine films demonstrate lumbar spondylosis and DDD without fractures or listhesis.     Body mass index is 32.07 kg/m².  Hemoglobin A1C   Date Value Ref Range Status   04/09/2018 5.5 4.0 - 5.6 % Final     Comment:     According to ADA guidelines, hemoglobin A1c <7.0% represents  optimal control in non-pregnant diabetic patients. Different  metrics may apply to specific patient populations.   Standards of Medical Care in Diabetes-2016.  For the purpose of screening for the presence of diabetes:  <5.7%     Consistent with the absence of diabetes  5.7-6.4%  Consistent with increasing risk for diabetes   (prediabetes)  >or=6.5%  Consistent with diabetes  Currently, no consensus exists for use of hemoglobin A1c  for diagnosis of diabetes for children.  This Hemoglobin A1c assay has significant interference with fetal   hemoglobin   (HbF). The results are invalid for patients with abnormal amounts of   HbF,    including those with known Hereditary Persistence   of Fetal Hemoglobin. Heterozygous hemoglobin variants (HbAS, HbAC,   HbAD, HbAE, HbA2) do not significantly interfere with this assay;   however, presence of multiple variants in a sample may impact the %   interference.     03/25/2018 5.4 4.0 - 5.6 % Final     Comment:     According to ADA guidelines, hemoglobin A1c <7.0% represents  optimal control in non-pregnant diabetic patients. Different  metrics may apply to specific patient populations.   Standards of Medical Care in Diabetes-2016.  For the purpose of screening for the presence of diabetes:  <5.7%     Consistent with the absence of diabetes  5.7-6.4%  Consistent with increasing risk for diabetes   (prediabetes)  >or=6.5%  Consistent with diabetes  Currently, no consensus exists for use of hemoglobin A1c  for diagnosis of diabetes for children.  This Hemoglobin A1c assay has significant interference with fetal   hemoglobin   (HbF). The results are invalid for patients with abnormal amounts of   HbF,   including those with known Hereditary Persistence   of Fetal Hemoglobin. Heterozygous hemoglobin variants (HbAS, HbAC,   HbAD, HbAE, HbA2) do not significantly interfere with this assay;   however, presence of multiple variants in a sample may impact the %   interference.           ASSESSMENT/PLAN:    Keon was seen today for pain.    Diagnoses and all orders for this visit:    DDD (degenerative disc disease), lumbar    Lumbar spondylosis  -     Ambulatory referral/consult to Physical/Occupational Therapy; Future  -     cyclobenzaprine (FLEXERIL) 10 MG tablet; Take 1 tablet (10 mg total) by mouth 3 (three) times daily as needed for Muscle spasms.    Dorsalgia, unspecified  -     MRI Lumbar Spine Without Contrast; Future      Follow up in about 3 weeks (around 9/27/2022).    Patient has lumbar spondylosis. I discussed the natural history of their diagnoses as well as surgical and nonsurgical treatment  options. I educated the patient on the importance of core/back strengthening, correct posture, bending/lifting ergonomics, and low-impact aerobic exercises (walking, elliptical, and aquatherapy). I prescribed flexeril. Continue medications. I will refer the patient to PT for core/back strengthening. I ordered lumbar MRI to look for stenosis or occult fractures. Patient will follow up in 3 weeks for MRI review.    Tip Carrion MD  Orthopaedic Spine Surgeon  Department of Orthopaedic Surgery  [phone]

## 2022-10-25 ENCOUNTER — HOSPITAL ENCOUNTER (OUTPATIENT)
Dept: RADIOLOGY | Facility: HOSPITAL | Age: 52
Discharge: HOME | End: 2022-10-25
Attending: ORTHOPAEDIC SURGERY
Payer: MEDICARE

## 2022-10-25 ENCOUNTER — OFFICE VISIT (OUTPATIENT)
Dept: ORTHOPEDICS | Facility: CLINIC | Age: 52
End: 2022-10-25
Payer: MEDICARE

## 2022-10-25 VITALS — HEIGHT: 72 IN | WEIGHT: 236 LBS | BODY MASS INDEX: 31.97 KG/M2

## 2022-10-25 DIAGNOSIS — M54.9 DORSALGIA, UNSPECIFIED: ICD-10-CM

## 2022-10-25 DIAGNOSIS — M54.16 LUMBAR RADICULOPATHY: ICD-10-CM

## 2022-10-25 DIAGNOSIS — M47.816 LUMBAR SPONDYLOSIS: Primary | ICD-10-CM

## 2022-10-25 DIAGNOSIS — M51.36 DDD (DEGENERATIVE DISC DISEASE), LUMBAR: ICD-10-CM

## 2022-10-25 PROCEDURE — 1159F PR MEDICATION LIST DOCUMENTED IN MEDICAL RECORD: ICD-10-PCS | Mod: CPTII,S$GLB,, | Performed by: ORTHOPAEDIC SURGERY

## 2022-10-25 PROCEDURE — 1160F RVW MEDS BY RX/DR IN RCRD: CPT | Mod: CPTII,S$GLB,, | Performed by: ORTHOPAEDIC SURGERY

## 2022-10-25 PROCEDURE — 99999 PR PBB SHADOW E&M-EST. PATIENT-LVL III: CPT | Mod: PBBFAC,,, | Performed by: ORTHOPAEDIC SURGERY

## 2022-10-25 PROCEDURE — 99999 PR PBB SHADOW E&M-EST. PATIENT-LVL III: ICD-10-PCS | Mod: PBBFAC,,, | Performed by: ORTHOPAEDIC SURGERY

## 2022-10-25 PROCEDURE — 1159F MED LIST DOCD IN RCRD: CPT | Mod: CPTII,S$GLB,, | Performed by: ORTHOPAEDIC SURGERY

## 2022-10-25 PROCEDURE — 1160F PR REVIEW ALL MEDS BY PRESCRIBER/CLIN PHARMACIST DOCUMENTED: ICD-10-PCS | Mod: CPTII,S$GLB,, | Performed by: ORTHOPAEDIC SURGERY

## 2022-10-25 PROCEDURE — 72148 MRI LUMBAR SPINE W/O DYE: CPT | Mod: 26,,, | Performed by: RADIOLOGY

## 2022-10-25 PROCEDURE — 99213 OFFICE O/P EST LOW 20 MIN: CPT | Mod: S$GLB,,, | Performed by: ORTHOPAEDIC SURGERY

## 2022-10-25 PROCEDURE — 99213 PR OFFICE/OUTPT VISIT, EST, LEVL III, 20-29 MIN: ICD-10-PCS | Mod: S$GLB,,, | Performed by: ORTHOPAEDIC SURGERY

## 2022-10-25 PROCEDURE — 72148 MRI LUMBAR SPINE WITHOUT CONTRAST: ICD-10-PCS | Mod: 26,,, | Performed by: RADIOLOGY

## 2022-10-25 PROCEDURE — 72148 MRI LUMBAR SPINE W/O DYE: CPT | Mod: TC

## 2022-10-25 NOTE — PROGRESS NOTES
DATE: 10/25/2022  PATIENT: Keon Schneider Jr.    Attending Physician: Tip Carrion M.D.    HISTORY:  Keon Schneider Jr. is a 52 y.o. male who returns to me today for MRI review. Patient continues to have LBP but the pain is now radiating posteriorly down RLE to the foot. He recently obtained Cobalt and Chromium levels and they are elevated. He wants to see a joints surgeon.    The patient does not smoke, have DM or endorse IVDU. The patient is not on any blood thinners and does not take chronic narcotics. He works in construction. He likes to stay active; he likes to fish.    PMH/PSH/FamHx/SocHx:  Unchanged from prior visit    ROS:  Positive for LBP and RLE pain  Denies perineal paresthesias, bowel or bladder incontinence    EXAM:  Ht 6' (1.829 m)   Wt 107 kg (236 lb)   BMI 32.01 kg/m²     My physical examination was notable for the following findings: motor intact BLE; SILT    IMAGING:  Today I independently reviewed the following images and my interpretations are as follows:    Previous L-spine XRs showed lumbar spondylosis and DDD without fractures or listhesis.      Lumbar MRI showed mild R L4-S1 foraminal stenosis. No significant central stenosis.    ASSESSMENT/PLAN:  Patient has lumbar spondylosis and RLE radiculopathy. No ortho spine surgical intervention warranted. I discussed the natural history of their diagnoses as well as surgical and nonsurgical treatment options. I educated the patient on the importance of core/back strengthening, correct posture, bending/lifting ergonomics, and low-impact aerobic exercises (walking, elliptical, and aquatherapy). Continue medications and exercises. I referred pt to pain mgmt for possible injections. I referred him to Joints for his R hip. Patient will follow up PRN.    Tip Carrion MD  Orthopaedic Spine Surgeon  Department of Orthopaedic Surgery  [phone]

## 2022-11-01 ENCOUNTER — HOSPITAL ENCOUNTER (OUTPATIENT)
Dept: RADIOLOGY | Facility: HOSPITAL | Age: 52
Discharge: HOME | End: 2022-11-01
Attending: PHYSICIAN ASSISTANT
Payer: MEDICARE

## 2022-11-01 ENCOUNTER — OFFICE VISIT (OUTPATIENT)
Dept: ORTHOPEDICS | Facility: CLINIC | Age: 52
End: 2022-11-01
Payer: MEDICARE

## 2022-11-01 VITALS
SYSTOLIC BLOOD PRESSURE: 141 MMHG | BODY MASS INDEX: 33.76 KG/M2 | DIASTOLIC BLOOD PRESSURE: 101 MMHG | HEIGHT: 72 IN | WEIGHT: 249.25 LBS | HEART RATE: 83 BPM

## 2022-11-01 DIAGNOSIS — M25.551 BILATERAL HIP PAIN: ICD-10-CM

## 2022-11-01 DIAGNOSIS — Z96.643 PRESENCE OF BOTH ARTIFICIAL HIP JOINTS: ICD-10-CM

## 2022-11-01 DIAGNOSIS — Z96.643 STATUS POST BILATERAL HIP REPLACEMENTS: ICD-10-CM

## 2022-11-01 DIAGNOSIS — T56.94XS: Primary | ICD-10-CM

## 2022-11-01 DIAGNOSIS — K21.9 GASTROESOPHAGEAL REFLUX DISEASE, UNSPECIFIED WHETHER ESOPHAGITIS PRESENT: ICD-10-CM

## 2022-11-01 DIAGNOSIS — F31.9 BIPOLAR AFFECTIVE DISORDER, REMISSION STATUS UNSPECIFIED: ICD-10-CM

## 2022-11-01 DIAGNOSIS — F11.29 OPIOID DEPENDENCE WITH OPIOID-INDUCED DISORDER: ICD-10-CM

## 2022-11-01 DIAGNOSIS — M25.552 BILATERAL HIP PAIN: ICD-10-CM

## 2022-11-01 PROCEDURE — 3008F PR BODY MASS INDEX (BMI) DOCUMENTED: ICD-10-PCS | Mod: CPTII,S$GLB,, | Performed by: PHYSICIAN ASSISTANT

## 2022-11-01 PROCEDURE — 1160F RVW MEDS BY RX/DR IN RCRD: CPT | Mod: CPTII,S$GLB,, | Performed by: PHYSICIAN ASSISTANT

## 2022-11-01 PROCEDURE — 3080F PR MOST RECENT DIASTOLIC BLOOD PRESSURE >= 90 MM HG: ICD-10-PCS | Mod: CPTII,S$GLB,, | Performed by: PHYSICIAN ASSISTANT

## 2022-11-01 PROCEDURE — 73521 X-RAY EXAM HIPS BI 2 VIEWS: CPT | Mod: 26,,, | Performed by: RADIOLOGY

## 2022-11-01 PROCEDURE — 73521 X-RAY EXAM HIPS BI 2 VIEWS: CPT | Mod: TC

## 2022-11-01 PROCEDURE — 99999 PR PBB SHADOW E&M-EST. PATIENT-LVL IV: CPT | Mod: PBBFAC,,, | Performed by: PHYSICIAN ASSISTANT

## 2022-11-01 PROCEDURE — 1159F PR MEDICATION LIST DOCUMENTED IN MEDICAL RECORD: ICD-10-PCS | Mod: CPTII,S$GLB,, | Performed by: PHYSICIAN ASSISTANT

## 2022-11-01 PROCEDURE — 3077F PR MOST RECENT SYSTOLIC BLOOD PRESSURE >= 140 MM HG: ICD-10-PCS | Mod: CPTII,S$GLB,, | Performed by: PHYSICIAN ASSISTANT

## 2022-11-01 PROCEDURE — 99214 PR OFFICE/OUTPT VISIT, EST, LEVL IV, 30-39 MIN: ICD-10-PCS | Mod: S$GLB,,, | Performed by: PHYSICIAN ASSISTANT

## 2022-11-01 PROCEDURE — 3008F BODY MASS INDEX DOCD: CPT | Mod: CPTII,S$GLB,, | Performed by: PHYSICIAN ASSISTANT

## 2022-11-01 PROCEDURE — 73521 XR HIPS BILATERAL 2 VIEW INCL AP PELVIS: ICD-10-PCS | Mod: 26,,, | Performed by: RADIOLOGY

## 2022-11-01 PROCEDURE — 1160F PR REVIEW ALL MEDS BY PRESCRIBER/CLIN PHARMACIST DOCUMENTED: ICD-10-PCS | Mod: CPTII,S$GLB,, | Performed by: PHYSICIAN ASSISTANT

## 2022-11-01 PROCEDURE — 3080F DIAST BP >= 90 MM HG: CPT | Mod: CPTII,S$GLB,, | Performed by: PHYSICIAN ASSISTANT

## 2022-11-01 PROCEDURE — 99214 OFFICE O/P EST MOD 30 MIN: CPT | Mod: S$GLB,,, | Performed by: PHYSICIAN ASSISTANT

## 2022-11-01 PROCEDURE — 3077F SYST BP >= 140 MM HG: CPT | Mod: CPTII,S$GLB,, | Performed by: PHYSICIAN ASSISTANT

## 2022-11-01 PROCEDURE — 99999 PR PBB SHADOW E&M-EST. PATIENT-LVL IV: ICD-10-PCS | Mod: PBBFAC,,, | Performed by: PHYSICIAN ASSISTANT

## 2022-11-01 PROCEDURE — 1159F MED LIST DOCD IN RCRD: CPT | Mod: CPTII,S$GLB,, | Performed by: PHYSICIAN ASSISTANT

## 2022-11-01 NOTE — PROGRESS NOTES
SUBJECTIVE:     Chief Complaint & History of Present Illness:  Keon Schneider Jr. is a Established patient 52 y.o. male who is seen here today with a complaint of    Chief Complaint   Patient presents with    Right Hip - Pain    .  He has patient well-known to us was last seen treated the clinic 10/25/2022 for his low back.  Patient complains of pain soreness in bilateral hips right much greater than left.  Primarily in the groin region with rotational movements.  He is status post bilateral total hip arthroplasties from 2006 performed by Dr. [facility].  For AVN.  He has done very well since his surgeries but has demonstrated elevated chromium and cobalt levels.  Last chromium level performed [place] was 4.4.  He comes to the clinic today with lab values from an outside lab of 4.8, and cobalt level of 3.9.  He denies any constitutional symptoms no fevers chills malaise decrease in energy body aches or fatigue  On a scale of 1-10, with 10 being worst pain imaginable, he rates this pain as 1 on good days and 4 on bad days.  he describes the pain as tender.      Past Medical History:   Diagnosis Date    Anxiety     Benzodiazepine dependence    Arthritis     Asthma     Avascular necrosis     Carpal tunnel syndrome     Chronic pain     Depression     Diverticulitis     Gout     Other injury of other sites of trunk 12/28/2011    Pneumonia     Spondylolisthesis     lumbar; myofascial pain    Staph infection     Ulnar neuropathy     left and rt arm       Past Surgical History:   Procedure Laterality Date    COLONOSCOPY N/A 7/6/2020    Procedure: COLONOSCOPY;  Surgeon: Broderick Moise MD;  Location: [facility];  Service: Endoscopy;  Laterality: N/A;    HIP SURGERY  3/06; 6/06    hip arthroplasty    HIP SURGERY      bilateral hip replacement (titanium)    JOINT REPLACEMENT      SHOULDER SURGERY  2012    right shoulder    SHOULDER SURGERY         Family History   Problem Relation Age of Onset    Cancer Mother     Cancer  Sister        Review of patient's allergies indicates:   Allergen Reactions    Toradol [ketorolac] Hives and Nausea And Vomiting    Ibuprofen Other (See Comments)     States GI BLEED         Current Outpatient Medications:     ALPRAZolam (XANAX) 2 MG Tab, Take 2 mg by mouth 2 (two) times a day., Disp: , Rfl:     cyclobenzaprine (FLEXERIL) 10 MG tablet, Take 1 tablet (10 mg total) by mouth 3 (three) times daily as needed for Muscle spasms., Disp: 60 tablet, Rfl: 1    famotidine (PEPCID) 40 MG tablet, Take 40 mg by mouth once daily., Disp: , Rfl:     oxyCODONE-acetaminophen (PERCOCET) 5-325 mg per tablet, Take 1 tablet by mouth every 6 (six) hours as needed for Pain., Disp: 5 tablet, Rfl: 0    Review of Systems:  ROS:  Constitutional: no fever or chills  Eyes: no visual changes  ENT: no nasal congestion or sore throat  Respiratory: no cough or shortness of breath, positive intermittent asthma  Cardiovascular: no chest pain or palpitations  Gastrointestinal: no nausea or vomiting, tolerating diet, diverticulitis GERD, Hepatic steatosis  Genitourinary: no hematuria or dysuria  Integument/Breast: no rash or pruritis  Hematologic/Lymphatic: no easy bruising or lymphadenopathy  Musculoskeletal: no arthralgias or myalgias, spondylolisthesis, gout, chronic low back pain status post bilateral hip replacements  Neurological: no seizures or tremors, positive chronic pain disorder, ulnar neuropathy, carpal tunnel syndrome, lumbar spondylosis, cerebral ischemia peripheral neuropathy  Behavioral/Psych: no auditory or visual hallucinations, positive depression, anxiety, opioid dependence, benzodiazepine dependence, bipolar disorder, history of cocaine abuse  Endocrine: no heat or cold intolerance      PE:  BP (!) 141/101 (BP Location: Left arm, Patient Position: Sitting, BP Method: X-Large (Automatic))   Pulse 83   Ht 6' (1.829 m)   Wt 113 kg (249 lb 3.7 oz)   BMI 33.80 kg/m²   General: Pleasant, cooperative, NAD   HEENT: NCAT,  sclera nonicteric   Lungs: Respirations are equal and unlabored.   Abdomen: Soft and non-tender.  CV: 2+ bilateral upper and lower extremity pulses.   Skin: Intact throughout LE with no rashes, erythema, or lesions  Extremities: No LE edema, NVI lower extremities        Hip Exam:   rightpositives: pain with rotation and negatives: no tenderness  no pain with heel impact  pulses full    125 degrees flexion  -05 degrees extension   30 degrees internal rotation  25 degrees external rotation  35 degrees abduction  -05 degrees adduction   0 flexion contracture    leftpositives: pain with rotation and negatives: no tenderness  no pain with heel impact  pulses full    125 degrees flexion  -05 degrees extension   40 degrees internal rotation  40 degrees external rotation  35 degrees abduction  -05 degrees adduction   0 flexion contracture    RADIOGRAPHS:  X-rays of the hips taken today films reviewed by me demonstrate well-fixed well-aligned prostheses no evidence of loosening wear damage or osteal lysis    ASSESSMENT/PLAN:       ICD-10-CM ICD-9-CM   1. Presence of both artificial hip joints  Z96.643 V43.64   2. Bilateral hip pain  M25.551 719.45    M25.552    3. Status post bilateral hip replacements  Z96.643 V43.64   4. Metallosis, undetermined intent, sequela  T56.94XS 909.1       Plan: We discussed with the patient at length all the different treatment options available for arthrosis of the hip including anti-inflammatories, acetaminophen, rest, ice, lower extremity strengthening exercise, occasional cortisone injections for temporary relief, and finally total hip arthroplasty.   CBC  ESR  CRP  MARS MRI Moustapha hips   Contact the patient following the MRI to discuss treatment options

## 2022-11-09 ENCOUNTER — HOSPITAL ENCOUNTER (EMERGENCY)
Facility: HOSPITAL | Age: 52
Discharge: HOME | End: 2022-11-09
Attending: EMERGENCY MEDICINE
Payer: MEDICARE

## 2022-11-09 VITALS
DIASTOLIC BLOOD PRESSURE: 98 MMHG | SYSTOLIC BLOOD PRESSURE: 169 MMHG | HEART RATE: 80 BPM | RESPIRATION RATE: 18 BRPM | TEMPERATURE: 99 F | OXYGEN SATURATION: 99 %

## 2022-11-09 DIAGNOSIS — M10.9 ACUTE GOUTY ARTHRITIS: Primary | ICD-10-CM

## 2022-11-09 PROCEDURE — 99284 PR EMERGENCY DEPT VISIT,LEVEL IV: ICD-10-PCS | Mod: ,,, | Performed by: EMERGENCY MEDICINE

## 2022-11-09 PROCEDURE — 25000003 PHARM REV CODE 250: Performed by: EMERGENCY MEDICINE

## 2022-11-09 PROCEDURE — 99284 EMERGENCY DEPT VISIT MOD MDM: CPT

## 2022-11-09 PROCEDURE — 63600175 PHARM REV CODE 636 W HCPCS: Performed by: EMERGENCY MEDICINE

## 2022-11-09 PROCEDURE — 99284 EMERGENCY DEPT VISIT MOD MDM: CPT | Mod: ,,, | Performed by: EMERGENCY MEDICINE

## 2022-11-09 RX ORDER — OXYCODONE HYDROCHLORIDE 5 MG/1
5 TABLET ORAL
Status: COMPLETED | OUTPATIENT
Start: 2022-11-09 | End: 2022-11-09

## 2022-11-09 RX ORDER — COLCHICINE 0.6 MG/1
0.6 CAPSULE ORAL DAILY PRN
Qty: 10 CAPSULE | Refills: 0 | Status: SHIPPED | OUTPATIENT
Start: 2022-11-09 | End: 2022-11-19

## 2022-11-09 RX ORDER — HYDROCODONE BITARTRATE AND ACETAMINOPHEN 5; 325 MG/1; MG/1
1 TABLET ORAL
Status: DISCONTINUED | OUTPATIENT
Start: 2022-11-09 | End: 2022-11-09 | Stop reason: HOSPADM

## 2022-11-09 RX ORDER — COLCHICINE 0.6 MG/1
0.6 TABLET, FILM COATED ORAL
Status: COMPLETED | OUTPATIENT
Start: 2022-11-09 | End: 2022-11-09

## 2022-11-09 RX ORDER — OXYCODONE HYDROCHLORIDE 5 MG/1
5 TABLET ORAL EVERY 4 HOURS PRN
Qty: 12 TABLET | Refills: 0 | Status: SHIPPED | OUTPATIENT
Start: 2022-11-09 | End: 2022-11-12

## 2022-11-09 RX ORDER — PREDNISONE 10 MG/1
10 TABLET ORAL DAILY
Qty: 21 TABLET | Refills: 0 | Status: SHIPPED | OUTPATIENT
Start: 2022-11-09 | End: 2023-04-04

## 2022-11-09 RX ORDER — PREDNISONE 20 MG/1
60 TABLET ORAL
Status: COMPLETED | OUTPATIENT
Start: 2022-11-09 | End: 2022-11-09

## 2022-11-09 RX ORDER — COLCHICINE 0.6 MG/1
0.6 CAPSULE ORAL DAILY PRN
Qty: 10 CAPSULE | Refills: 0 | OUTPATIENT
Start: 2022-11-09 | End: 2023-08-15

## 2022-11-09 RX ADMIN — OXYCODONE 5 MG: 5 TABLET ORAL at 05:11

## 2022-11-09 RX ADMIN — COLCHICINE 0.6 MG: 0.6 TABLET, FILM COATED ORAL at 05:11

## 2022-11-09 RX ADMIN — PREDNISONE 60 MG: 20 TABLET ORAL at 05:11

## 2022-11-09 NOTE — ED PROVIDER NOTES
"Encounter Date: 11/9/2022       History     Chief Complaint   Patient presents with    Gout     Pt reports right big toe gout flair up tonight     52-year-old male with history of gout, anxiety, arthritis, presents to the ED for right toe pain.  Patient reports that distal have been swelling and pain for for last 3 days, denies fever, chill, nausea or vomiting, no injury to his foot. States that it feels "exactly" like his gout flare before. Endorse heavy drinker and high protein diet. Reports having a PCP but hasn't been follow up, not on allopurinol. Unable to take NSAIDs due to hx of GI bleed.       Review of patient's allergies indicates:   Allergen Reactions    Toradol [ketorolac] Hives and Nausea And Vomiting    Ibuprofen Other (See Comments)     States GI BLEED     Past Medical History:   Diagnosis Date    Anxiety     Benzodiazepine dependence    Arthritis     Asthma     Avascular necrosis     Carpal tunnel syndrome     Chronic pain     Depression     Diverticulitis     Gout     Other injury of other sites of trunk 12/28/2011    Pneumonia     Spondylolisthesis     lumbar; myofascial pain    Staph infection     Ulnar neuropathy     left and rt arm     Past Surgical History:   Procedure Laterality Date    COLONOSCOPY N/A 7/6/2020    Procedure: COLONOSCOPY;  Surgeon: Broderick Moise MD;  Location: [facility];  Service: Endoscopy;  Laterality: N/A;    HIP SURGERY  3/06; 6/06    hip arthroplasty    HIP SURGERY      bilateral hip replacement (titanium)    JOINT REPLACEMENT      SHOULDER SURGERY  2012    right shoulder    SHOULDER SURGERY       Family History   Problem Relation Age of Onset    Cancer Mother     Cancer Sister      Social History     Tobacco Use    Smoking status: Former     Packs/day: 1.00     Years: 10.00     Pack years: 10.00     Types: Cigarettes     Quit date: 10/13/2012     Years since quitting: 10.0    Smokeless tobacco: Never   Substance Use Topics    Alcohol use: Yes     Alcohol/week: 12.0 " standard drinks     Types: 12 Cans of beer per week     Comment: 2x/week    Drug use: No     Review of Systems   Constitutional:  Negative for chills and fever.   HENT:  Negative for congestion and sore throat.    Eyes:  Negative for pain, redness and visual disturbance.   Respiratory:  Negative for cough and shortness of breath.    Cardiovascular:  Negative for chest pain and leg swelling.   Gastrointestinal:  Negative for abdominal pain, nausea and vomiting.   Genitourinary:  Negative for dysuria and flank pain.   Musculoskeletal:  Positive for joint swelling (right toe). Negative for back pain and neck pain.   Skin:  Negative for rash.   Neurological:  Negative for weakness and headaches.   Psychiatric/Behavioral:  Negative for behavioral problems, confusion and decreased concentration.      Physical Exam     Initial Vitals [11/09/22 0458]   BP Pulse Resp Temp SpO2   (!) 174/103 86 16 98.6 °F (37 °C) 95 %      MAP       --         Physical Exam    Nursing note and vitals reviewed.  Constitutional: He appears well-developed. No distress.   HENT:   Head: Normocephalic and atraumatic.   Nose: Nose normal.   Eyes: Conjunctivae and EOM are normal. Pupils are equal, round, and reactive to light.   Neck: No JVD present.   Normal range of motion.  Cardiovascular:  Normal rate, regular rhythm and normal heart sounds.           Pulmonary/Chest: Breath sounds normal. No respiratory distress.   Abdominal: Abdomen is soft. He exhibits no distension. There is no abdominal tenderness.   Musculoskeletal:         General: Tenderness and edema (right 1st MTP joint) present.      Cervical back: Normal range of motion.     Neurological: He is alert and oriented to person, place, and time. He has normal strength. No cranial nerve deficit.   Skin: Skin is warm and dry. Capillary refill takes less than 2 seconds.         ED Course   Procedures  Labs Reviewed - No data to display       Imaging Results    None          Medications    HYDROcodone-acetaminophen 5-325 mg per tablet 1 tablet (1 tablet Oral Not Given 11/9/22 0700)   colchicine capsule/tablet 0.6 mg (0.6 mg Oral Given 11/9/22 0524)   oxyCODONE immediate release tablet 5 mg (5 mg Oral Given 11/9/22 0524)   predniSONE tablet 60 mg (60 mg Oral Given 11/9/22 0524)     Medical Decision Making:   History:   Old Medical Records: I decided to obtain old medical records.  Initial Assessment:   52-year-old male with history of gout, anxiety, arthritis, presents to the ED for right toe pain.  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   Differential Diagnosis:   Gout arthritis, pseudogout arthritis, doubt septic arthritis, acute fracture  ED Management:  His pain is treated with colchicine, oxycodone, and prednisone in the ED. patient reports pain improvement, patient agreed to go home with pain medicine and prednisone taper.  Provided extended conversation about lifestyle change, decreased alcohol use and protein consumption, discuss about allopurinol for gout prophylaxis, will let his PCP initiate therapy.  Provided discharge instruction and return to the ED precaution.  No other question.          Attending Attestation:   Physician Attestation Statement for Resident:  As the supervising MD   Physician Attestation Statement: I have personally seen and examined this patient.   I agree with the above history.  -: Gout   As the supervising MD I agree with the above PE.     As the supervising MD I agree with the above treatment, course, plan, and disposition.                               Clinical Impression:   Final diagnoses:  [M10.9] Acute gouty arthritis (Primary)      ED Disposition Condition    Discharge Stable          ED Prescriptions       Medication Sig Dispense Start Date End Date  Auth. Provider    oxyCODONE (ROXICODONE) 5 MG immediate release tablet Take 1 tablet (5 mg total) by mouth every 4 (four) hours as needed for Pain. 12 tablet 11/9/2022 11/12/2022 Phoenix Elias MD    predniSONE (DELTASONE) 10 MG tablet Take 4 tablets by mouth once a day x 3 days, then take 2 tabs once daily x 3 days, then take 1 tab once daily x 3 days. 21 tablet 11/9/2022 -- Phoenix Elias MD    colchicine (MITIGARE) 0.6 mg Cap Take 1 capsule (0.6 mg total) by mouth daily as needed. 10 capsule 11/9/2022 11/19/2022 Phoenix Elias MD    colchicine (MITIGARE) 0.6 mg Cap Take 1 capsule (0.6 mg total) by mouth daily as needed. 10 capsule 11/9/2022 11/19/2022 Phoenix Elias MD          Follow-up Information       Follow up With Specialties Details Why Contact Info    Johnie Gutierrez MD Family Medicine In 1 week  [address]  [place] [address]  [phone]      [facility] - Emergency Dept Emergency Medicine  As needed [address] [address] [phone]             Phoenix Elias MD  Resident  11/09/22 0242       Tigre Mantilla III, MD  11/09/22 6704

## 2022-11-09 NOTE — ED NOTES
Pt arrives to Ed with c/o pain to 1st digit on right foot. Pt reports eating seafood and drinking beer, noncompliance with gout diet. Pt sitting in chair, respirations even, unlabored, NAD noted, answering questions appropriately

## 2022-11-09 NOTE — DISCHARGE INSTRUCTIONS
Please follow-up with your primary care provider within 1 week, consider allopurinol for gout prophylaxis.  Return to the ER if he has severe uncontrolled pain, developed fever, or other concerns.

## 2022-11-18 ENCOUNTER — HOSPITAL ENCOUNTER (OUTPATIENT)
Dept: RADIOLOGY | Facility: HOSPITAL | Age: 52
Discharge: HOME | End: 2022-11-18
Attending: PHYSICIAN ASSISTANT
Payer: MEDICARE

## 2022-11-18 DIAGNOSIS — T56.94XS: ICD-10-CM

## 2022-11-18 DIAGNOSIS — Z96.643 STATUS POST BILATERAL HIP REPLACEMENTS: ICD-10-CM

## 2022-11-18 DIAGNOSIS — Z96.643 PRESENCE OF BOTH ARTIFICIAL HIP JOINTS: ICD-10-CM

## 2022-11-18 PROCEDURE — 73721 MRI JNT OF LWR EXTRE W/O DYE: CPT | Mod: TC,RT

## 2022-11-18 PROCEDURE — 73721 MRI JNT OF LWR EXTRE W/O DYE: CPT | Mod: 26,LT,, | Performed by: RADIOLOGY

## 2022-11-18 PROCEDURE — 73721 MRI HIP WITHOUT CONTRAST LEFT: ICD-10-PCS | Mod: 26,LT,, | Performed by: RADIOLOGY

## 2022-11-18 PROCEDURE — 73721 MRI JNT OF LWR EXTRE W/O DYE: CPT | Mod: TC,LT

## 2022-11-18 PROCEDURE — 73721 MRI JNT OF LWR EXTRE W/O DYE: CPT | Mod: 26,RT,, | Performed by: RADIOLOGY

## 2022-11-18 PROCEDURE — 73721 MRI HIP WITHOUT CONTRAST RIGHT: ICD-10-PCS | Mod: 26,RT,, | Performed by: RADIOLOGY

## 2022-11-23 ENCOUNTER — TELEPHONE (OUTPATIENT)
Dept: ORTHOPEDICS | Facility: CLINIC | Age: 52
End: 2022-11-23
Payer: MEDICARE

## 2022-12-12 ENCOUNTER — OFFICE VISIT (OUTPATIENT)
Dept: PAIN MEDICINE | Facility: CLINIC | Age: 52
End: 2022-12-12
Payer: MEDICARE

## 2022-12-12 VITALS
HEART RATE: 94 BPM | DIASTOLIC BLOOD PRESSURE: 103 MMHG | SYSTOLIC BLOOD PRESSURE: 135 MMHG | HEIGHT: 72 IN | WEIGHT: 249 LBS | BODY MASS INDEX: 33.72 KG/M2 | RESPIRATION RATE: 18 BRPM

## 2022-12-12 DIAGNOSIS — M25.552 CHRONIC PAIN OF BOTH HIPS: Primary | ICD-10-CM

## 2022-12-12 DIAGNOSIS — M25.551 CHRONIC PAIN OF BOTH HIPS: Primary | ICD-10-CM

## 2022-12-12 DIAGNOSIS — Z96.643 S/P HIP REPLACEMENT, BILATERAL: ICD-10-CM

## 2022-12-12 DIAGNOSIS — G89.29 CHRONIC PAIN OF BOTH HIPS: Primary | ICD-10-CM

## 2022-12-12 DIAGNOSIS — M54.16 LUMBAR RADICULOPATHY: ICD-10-CM

## 2022-12-12 PROCEDURE — 1160F PR REVIEW ALL MEDS BY PRESCRIBER/CLIN PHARMACIST DOCUMENTED: ICD-10-PCS | Mod: CPTII,S$GLB,, | Performed by: STUDENT IN AN ORGANIZED HEALTH CARE EDUCATION/TRAINING PROGRAM

## 2022-12-12 PROCEDURE — 99999 PR PBB SHADOW E&M-EST. PATIENT-LVL III: CPT | Mod: PBBFAC,,, | Performed by: STUDENT IN AN ORGANIZED HEALTH CARE EDUCATION/TRAINING PROGRAM

## 2022-12-12 PROCEDURE — 3080F PR MOST RECENT DIASTOLIC BLOOD PRESSURE >= 90 MM HG: ICD-10-PCS | Mod: CPTII,S$GLB,, | Performed by: STUDENT IN AN ORGANIZED HEALTH CARE EDUCATION/TRAINING PROGRAM

## 2022-12-12 PROCEDURE — 3008F PR BODY MASS INDEX (BMI) DOCUMENTED: ICD-10-PCS | Mod: CPTII,S$GLB,, | Performed by: STUDENT IN AN ORGANIZED HEALTH CARE EDUCATION/TRAINING PROGRAM

## 2022-12-12 PROCEDURE — 3080F DIAST BP >= 90 MM HG: CPT | Mod: CPTII,S$GLB,, | Performed by: STUDENT IN AN ORGANIZED HEALTH CARE EDUCATION/TRAINING PROGRAM

## 2022-12-12 PROCEDURE — 99204 PR OFFICE/OUTPT VISIT, NEW, LEVL IV, 45-59 MIN: ICD-10-PCS | Mod: S$GLB,,, | Performed by: STUDENT IN AN ORGANIZED HEALTH CARE EDUCATION/TRAINING PROGRAM

## 2022-12-12 PROCEDURE — 1159F MED LIST DOCD IN RCRD: CPT | Mod: CPTII,S$GLB,, | Performed by: STUDENT IN AN ORGANIZED HEALTH CARE EDUCATION/TRAINING PROGRAM

## 2022-12-12 PROCEDURE — 1160F RVW MEDS BY RX/DR IN RCRD: CPT | Mod: CPTII,S$GLB,, | Performed by: STUDENT IN AN ORGANIZED HEALTH CARE EDUCATION/TRAINING PROGRAM

## 2022-12-12 PROCEDURE — 99204 OFFICE O/P NEW MOD 45 MIN: CPT | Mod: S$GLB,,, | Performed by: STUDENT IN AN ORGANIZED HEALTH CARE EDUCATION/TRAINING PROGRAM

## 2022-12-12 PROCEDURE — 99999 PR PBB SHADOW E&M-EST. PATIENT-LVL III: ICD-10-PCS | Mod: PBBFAC,,, | Performed by: STUDENT IN AN ORGANIZED HEALTH CARE EDUCATION/TRAINING PROGRAM

## 2022-12-12 PROCEDURE — 3075F PR MOST RECENT SYSTOLIC BLOOD PRESS GE 130-139MM HG: ICD-10-PCS | Mod: CPTII,S$GLB,, | Performed by: STUDENT IN AN ORGANIZED HEALTH CARE EDUCATION/TRAINING PROGRAM

## 2022-12-12 PROCEDURE — 3008F BODY MASS INDEX DOCD: CPT | Mod: CPTII,S$GLB,, | Performed by: STUDENT IN AN ORGANIZED HEALTH CARE EDUCATION/TRAINING PROGRAM

## 2022-12-12 PROCEDURE — 1159F PR MEDICATION LIST DOCUMENTED IN MEDICAL RECORD: ICD-10-PCS | Mod: CPTII,S$GLB,, | Performed by: STUDENT IN AN ORGANIZED HEALTH CARE EDUCATION/TRAINING PROGRAM

## 2022-12-12 PROCEDURE — 3075F SYST BP GE 130 - 139MM HG: CPT | Mod: CPTII,S$GLB,, | Performed by: STUDENT IN AN ORGANIZED HEALTH CARE EDUCATION/TRAINING PROGRAM

## 2022-12-12 RX ORDER — BACLOFEN 10 MG/1
10 TABLET ORAL 3 TIMES DAILY
Qty: 90 TABLET | Refills: 5 | Status: SHIPPED | OUTPATIENT
Start: 2022-12-12 | End: 2023-04-04

## 2022-12-12 RX ORDER — GABAPENTIN 300 MG/1
300 CAPSULE ORAL 3 TIMES DAILY
Qty: 90 CAPSULE | Refills: 5 | Status: SHIPPED | OUTPATIENT
Start: 2022-12-12 | End: 2023-04-04

## 2022-12-12 NOTE — PROGRESS NOTES
Chronic Pain - New Consult    Referring Physician: Tip Carrion MD    Date: 12/12/2022     Re: Keon Schneider Jr.  MR#: [id number]  YOB: 1970  Age: 52 y.o.    Chief Complaint:   Chief Complaint   Patient presents with    Hip Pain     B/L     Leg Pain     B/L     **This note is dictated using the M*Modal Fluency Direct word recognition program. There are word recognition mistakes that are occasionally missed on review.**    ASSESSMENT: 52 y.o. year old male with hip pain, consistent with     1. Chronic pain of both hips  baclofen (LIORESAL) 10 MG tablet    gabapentin (NEURONTIN) 300 MG capsule      2. Lumbar radiculopathy  Ambulatory referral/consult to Pain Clinic    baclofen (LIORESAL) 10 MG tablet    gabapentin (NEURONTIN) 300 MG capsule      3. S/P hip replacement, bilateral  baclofen (LIORESAL) 10 MG tablet    gabapentin (NEURONTIN) 300 MG capsule            PLAN:     Bilateral hip pain and metallosis  -most of his pain seems to be coming from the hips.  Provocative hip maneuvers are positive. Discussed possible nerve ablation of the hip as a option, but not very optimistic that it would give significant relief.  -he is seeing ortho on 12/22.  I think his best option is to get hip revision.  -TRIAL baclofen for muscle spasms  -TRIAL gabapentin for pain    Lumbar spondylosis  -some arthritis on MRI.  Hip pain is his bigger issues.  Back pain is not radiating into the legs.  -we could consider MBB/RFA of the low back if it becomes a worse issues, but not indicated at this time    - RTC PRN  - Counseled patient regarding the importance of activity modification.    The above plan and management options were discussed at length with patient. Patient is in agreement with the above and verbalized understanding. It will be communicated with the referring physician via electronic record, fax, or mail.  Lab/study reports reviewed were important and necessary because subsequent medical and treatment  recommendations required review of the above lab/study reports. Images viewed/reviewed above were important and necessary because subsequent medical and treatment recommendations required review of the reviewed image(s).     Electronically signed by:  Servando Olmstead DO  12/12/2022    =========================================================================================================    SUBJECTIVE:    Keon Schneider Sarah is a 52 y.o. male presents to the clinic for the evaluation of b/l hips pain. The pain started 10+ years ago following surgery on hips(replacement)  and symptoms have been worsening.  He has bad pain in the hips and gets radiation down the right leg.  The pain starts in the front of the hip (R>L).  He gets some back pain in the low back which does not really radiate into the legs. States that the pain has taken over his life again.  He had his hips replaced in 2006 for AVN and was doing well until this year (around April 2022) when it flared back up. He can't sleep. Cant work. Can't walk up and down stairs.    He fell 16ft off a scaffold 3 months ago and landed on the right leg.     The patient has to climb 16 stairs to get up to his house and that has been very difficult.      Pain Description:    The pain is located in the b/l hip area and radiates to the b/l legs and up the back .    At BEST  7/10   At WORST  10/10 on the WORST day.    On average pain is rated as 7/10.   Today the pain is rated as 8/10  The pain is continuous.  The pain is described as pulsating, sharp, stabbing, and tight band    Symptoms interfere with daily activity, sleeping, and work.   Exacerbating factors: Sitting and Laying.    Mitigating factors nothing.   He reports 3 hours of sleep per night.    Physical Therapy/Home Exercise: Yes, currently has a home exercise program.  He is pretty active.    Current Pain Medications:    - warren back and body    Failed Pain Medications:    - NSAIDs due to prior GI  bleed.    Pain Treatment Therapies:    Pain procedures:   Epidural, hip injection  Physical Therapy: 2007  Chiropractor: none  Acupuncture: none  TENS unit: none  Spinal decompression: none  Joint replacement: b/l hip replacement    Patient denies urinary incontinence, bowel incontinence, and loss of sensations.  Patient denies any suicidal or homicidal ideations     report:  Reviewed and consistent with medication use as prescribed.    Imaging:   MRI lumbar 09/2022:  Degenerative findings:     T12-L1: No spinal canal stenosis or neural foraminal narrowing.     L1-L2: Facet arthropathy.  No spinal canal stenosis or neural foraminal narrowing.     L2-L3: Mild posterior disc bulge and facet arthropathy.  No spinal canal stenosis or neural foraminal narrowing.     L3-L4: Mild posterior disc bulge and facet arthropathy.  No spinal canal stenosis or neural foraminal narrowing.     L4-L5: Posterior disc bulge with protrusion into the central, paracentral and resulting in mild right neural foraminal narrowing.  No spinal canal stenosis.     L5-S1: Facet arthropathy.  No central canal stenosis.     Paraspinal muscles & soft tissues: Unremarkable.     Impression:     1. Overall stable appearance of degenerative findings in the lumbar spine including mild right neural foraminal narrowing at L4-L5.    Past Medical History:   Diagnosis Date    Anxiety     Benzodiazepine dependence    Arthritis     Asthma     Avascular necrosis     Carpal tunnel syndrome     Chronic pain     Depression     Diverticulitis     Gout     Other injury of other sites of trunk 12/28/2011    Pneumonia     Spondylolisthesis     lumbar; myofascial pain    Staph infection     Ulnar neuropathy     left and rt arm     Past Surgical History:   Procedure Laterality Date    COLONOSCOPY N/A 7/6/2020    Procedure: COLONOSCOPY;  Surgeon: Broderick Moise MD;  Location: [facility];  Service: Endoscopy;  Laterality: N/A;    HIP SURGERY  3/06; 6/06    hip  arthroplasty    HIP SURGERY      bilateral hip replacement (titanium)    JOINT REPLACEMENT      SHOULDER SURGERY  2012    right shoulder    SHOULDER SURGERY       Social History     Socioeconomic History    Marital status: Single   Tobacco Use    Smoking status: Former     Packs/day: 1.00     Years: 10.00     Pack years: 10.00     Types: Cigarettes     Quit date: 10/13/2012     Years since quitting: 10.1    Smokeless tobacco: Never   Substance and Sexual Activity    Alcohol use: Yes     Alcohol/week: 12.0 standard drinks     Types: 12 Cans of beer per week     Comment: 2x/week    Drug use: No    Sexual activity: Yes     Partners: Female     Family History   Problem Relation Age of Onset    Cancer Mother     Cancer Sister        Review of patient's allergies indicates:   Allergen Reactions    Toradol [ketorolac] Hives and Nausea And Vomiting    Ibuprofen Other (See Comments)     States GI BLEED       Current Outpatient Medications   Medication Sig    ALPRAZolam (XANAX) 2 MG Tab Take 2 mg by mouth 2 (two) times a day.    colchicine (MITIGARE) 0.6 mg Cap Take 1 capsule (0.6 mg total) by mouth daily as needed.    cyclobenzaprine (FLEXERIL) 10 MG tablet Take 1 tablet (10 mg total) by mouth 3 (three) times daily as needed for Muscle spasms.    famotidine (PEPCID) 40 MG tablet Take 40 mg by mouth once daily.    oxyCODONE-acetaminophen (PERCOCET) 5-325 mg per tablet Take 1 tablet by mouth every 6 (six) hours as needed for Pain.    predniSONE (DELTASONE) 10 MG tablet Take 4 tablets by mouth once a day x 3 days, then take 2 tabs once daily x 3 days, then take 1 tab once daily x 3 days.    baclofen (LIORESAL) 10 MG tablet Take 1 tablet (10 mg total) by mouth 3 (three) times daily.    gabapentin (NEURONTIN) 300 MG capsule Take 1 capsule (300 mg total) by mouth 3 (three) times daily.     No current facility-administered medications for this visit.       REVIEW OF SYSTEMS:    GENERAL:  No weight loss, malaise or fevers.  HEENT:    No recent changes in vision or hearing  NECK:  Negative for lumps, no difficulty with swallowing.  RESPIRATORY:  Negative for cough, wheezing or shortness of breath, patient denies any recent URI.  CARDIOVASCULAR:  Negative for chest pain, leg swelling or palpitations.  GI:  Negative for abdominal discomfort, blood in stools or black stools or change in bowel habits.  MUSCULOSKELETAL:  See HPI.  SKIN:  Negative for lesions, rash, and itching.  PSYCH:  No mood disorder or recent psychosocial stressors.  Patients sleep is not disturbed secondary to pain.  HEMATOLOGY/LYMPHOLOGY:  Negative for prolonged bleeding, bruising easily or swollen nodes.  Patient is not currently taking any anti-coagulants  NEURO:   No history of headaches, syncope, paralysis, seizures or tremors.  All other reviewed and negative other than HPI.    OBJECTIVE:    BP (!) 135/103   Pulse 94   Resp 18   Ht 6' (1.829 m)   Wt 112.9 kg (249 lb)   BMI 33.77 kg/m²     PHYSICAL EXAMINATION:    GENERAL: Well appearing, in no acute distress, alert and oriented x3.  PSYCH:  Mood and affect appropriate.  SKIN: Skin color, texture, turgor normal, no rashes or lesions.  HEAD/FACE:  Normocephalic, atraumatic. Cranial nerves grossly intact.  CV: RRR with palpation of the radial artery.  PULM: CTAB. No evidence of respiratory difficulty, symmetric chest rise.  GI:  Soft and non-tender.    BACK:   - No obvious deformity or signs of trauma, Normal lumbar lordotic curve  - Negative spinous process tenderness  - Positive paravertebral tenderness  - Positive pain to palpation over the facet joints of the lumbar spine.   - Negative QL / Iliac crest / Glut tenderness  - no pain with extension of the lumbar spine  - no pain with facet loading  - lateral flexion causes anterior hip pain on the ipsilateral side    MUSKULOSKELETAL:    EXTREMITIES:   Hip Exam:  - Log Roll Positive  - FADIR Positive  - Stinchfield Positive  - Hip Scour Positive  - GTB Tenderness  Negative    MUSCULOSKELETAL:  No atrophy or tone abnormalities are noted in the UE or LE.  No deformities, edema, or skin discoloration are noted on visible skin. Good capillary refill.    NEURO: Bilateral upper and lower extremity coordination and muscle stretch reflexes are physiologic and symmetric.      NEUROLOGICAL EXAM:  MENTAL STATUS: A x O x 3, good concentration, speech is fluent and goal directed  MEMORY: recent and remote are intact  CN: CN2-12 grossly intact  MOTOR: 5/5 in all muscle groups  DTRs: 2+ intact symmetric  Sensation:    -no Loss of sensation in a left lower and right lower L-1, L-2, L-3, L-4, and L-5 bilaterally distribution.  Gordon: absent on the bilateral side(s)  Clonus: absent on the bilateral side(s)    GAIT: antalgic.

## 2022-12-14 NOTE — PROGRESS NOTES
"  Subjective:     HPI:   Keon Schneider Jr. is a 52 y.o. 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 [facility] -     Implants: Depuy System    Cup: Size 56 pinnacle  Liner: 36 metal liner  Stem: S-ROM [id number], 11/ taper  Proxicaml Sleeve- 16D large  Head: 36+9 metal head    Right post NAEEM (2006) with Dr. [facility] -     Implants: Depuy System    Cup: Size 56 pinnacle  Liner: 36 metal liner  Stem: S-ROM [id number], 11/3 taper  Proxicaml Sleeve: 16D small  Head: 36+12 metal head    He says he is had a couple years of right hip pain but there was notes from 2016 from Dr. [facility] documenting right hip pain with similar symptoms.  He says he has groin pain with activity.  He also has chronic low back buttock pain bilateral hip pain and in a spinal stenosis type distribution.  Some occasional anterior thigh pain.  He says he gets some radicular pain down his legs when he uses ladders.  However he says walking is my best and sitting is his worst.    In 2016 Dr. [facility] recommended a right arthroscopic iliopsoas release and biopsy for adverse local tissue reaction, he says he did not want to pursue that    Medications: Rx's for flexeril, percocet, elizabeth in the past year, last narcotic Rx . 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     Social support: The patient stated that they live at home with alone with his dog. The patient stated that their "drinking buddies" " and brother in law  would be able to help take care of them if they were to have surgery.     Lives in house 16 ft off the ground    Brother in law, Mr Figueredo, TKA patient     ROS:  The updated medical history is in the chart and has been reviewed. A review of systems is updated and there is no reported vision changes, ear/nose/mouth/throat complaints,  chest pain, shortness of breath, abdominal pain, urological complaints, fevers or chills, psychiatric complaints. Musculoskeletal and neurologcial symptoms are as documented. All other systems are negative.      Objective:   Exam:  There were no vitals filed for this visit.  Body mass index is 32.64 kg/m².    Physical examination included assessment of the patient's general appearance with particular attention to development, nutrition, body habitus, attention to grooming, and any evidence of distress.  Constitutional: The patient is a well-developed, well-nourished patient in no acute distress.   Cardiovascular: Vascular examination included warmth and capillary refill as well inspection for edema and assessment of pedal pulses. Pulses are palpable and regular.  Musculoskeletal: Gait was assessed as to whether it was steady, non-antalgic, and/or required the use of an assist device. The patient was also asked to walk independently and get onto the examination table.  Skin: The skin was examined for any obvious rashes or lesions in the extremity.  Neurologic: Sensation is intact to light touch in the extremity. The patient has good coordination without hyperreflexia and is alert and oriented to person, place and time and has normal mood and affect.     All of the above were examined and found to be within normal limits except for the following pertinent clinical findings:    No limp nonantalgic gait negative Trendelenburg.  He is tender palpation over both greater trochanters.  He has well-healed posterior hip and core decompression incisions at the right lateral hip.   "He is anterior hip discomfort with active straight leg raise.  And resisted hip flexion.  0-90 hip flexion pain at terminal flexion.  Thirty abduction 20 abduction 30 external 20 internal rotation.  He has significant anterior hip pain at 90° of hip flexion with internal-external rotation.  He has no pain with internal-external rotation at 30° flexion and with log roll    1 centimeter leg length discrepancy supine right side short he does not notice a difference      Imaging:    Bilateral total hip replacements with [place] cups and S-ROM stems appear well fixed.  Right cup abduction about 26° left side about 30°, he is got some resorption around the troch at both S-ROM stems proximally which appears physiologic.  No obvious osteolysis    22:   CRP 1.8  ESR 12    AST 17  ALT 28    10/23/12  Co 2.4  Cr 4.4    22  Co 3.9  Cr 4.8    He has bilateral hip MRIs.  Technique is poor.  He has some physiologic fluid in both joints but I do not see a lot of capsular thickening or debris certainly not a huge amount of adverse local tissue reaction    He has a CT abdomen and pelvis recently I do not see anterior cup overhang      Assessment:       ICD-10-CM ICD-9-CM   1. Iliopsoas bursitis of right hip  M70.71 726.5   2. Status post bilateral hip replacements  Z96.643 V43.64      B post NAEEM for AVN, MOM + metal heads on  S-ROM stems with low abduction angles  Co/Cr levels minimally changed over 10 years  MRI not convincing for high level ALTR response  Asymetric hip pain with overlay of spinal stenosis distribution pain    R hip flexor pain ?iliopsoas tendinopathy with increased length and offset     Bipolar documented  Hx B hip AVN  Etoh: 2-3 beer/night, 2 cases on weekends  Smokin pack/3 days "only when I drink"  Documented Hx cocaine, recent UDS negative  CBP, Hx ZEUS, no Sx  Gout  Staph  No social support    Would need Hep C testing pre-op, has Hep C RNA ordered  but not done. But LFTs normal     Plan: " "      I am not convinced that this is classic adverse local tissue reaction although he is certainly at risk for it.      His symptoms more localized to hip flexor iliopsoas tendon.  Dr. [facility] felt this in 2016 as well when he recommended arthroscopic iliopsoas release.      Think he certainly has an overlay of spinal stenosis type pain down both hips.      I would like to start with ultrasound-guided right iliopsoas tendon injection and see him back 2 weeks after injection for repeat exam.      I think following serial metal ion levels would be reasonable.      Unfortunately there are no titanium sleeved ceramic head options for his 11/13 tapers  I think 36 is largest head available and could use ceramic and +4 ply liner (he says "I dont want any of that plastic crap in me" "I want to die with these hips") heads but with cup positioning and iliopsoas concerns would consider cup revision     Options are limited to avoid CoCr ball on trunion:    Could consider non-TS ceramic ball 11/13 options available in 36 up to +12    Could consider conversion to pinnacle dual mobility: 56/49 liner = 28/49 bearing = 43mm OD DM bearing + 28mm ceramic head but only available up to +6 in 28mm tomas 11/13 taper    Merete bioball system has 11/13 MSSR adapter standard with 0, 3.5, 7 neck length with 28mm ceramic heads    Or cement in depuy dual mobility bearing for more offset than pinnacle DM liner    Orders Placed This Encounter   Procedures    Ambulatory referral/consult to Sports Medicine     Standing Status:   Future     Standing Expiration Date:   1/22/2024     Referral Priority:   Routine     Referral Type:   Consultation     Referral Reason:   Specialty Services Required     Requested Specialty:   Sports Medicine     Number of Visits Requested:   1             Past Medical History:   Diagnosis Date    Anxiety     Benzodiazepine dependence    Arthritis     Asthma     Avascular necrosis     Carpal tunnel syndrome     Chronic " pain     Depression     Diverticulitis     Gout     Other injury of other sites of trunk 12/28/2011    Pneumonia     Spondylolisthesis     lumbar; myofascial pain    Staph infection     Ulnar neuropathy     left and rt arm       Past Surgical History:   Procedure Laterality Date    COLONOSCOPY N/A 7/6/2020    Procedure: COLONOSCOPY;  Surgeon: Broderick Moise MD;  Location: [facility] ENDO;  Service: Endoscopy;  Laterality: N/A;    HIP SURGERY  3/06; 6/06    hip arthroplasty    HIP SURGERY      bilateral hip replacement (titanium)    JOINT REPLACEMENT      SHOULDER SURGERY  2012    right shoulder    SHOULDER SURGERY         Family History   Problem Relation Age of Onset    Cancer Mother     Cancer Sister        Social History     Socioeconomic History    Marital status: Single   Tobacco Use    Smoking status: Former     Packs/day: 1.00     Years: 10.00     Pack years: 10.00     Types: Cigarettes     Quit date: 10/13/2012     Years since quitting: 10.1    Smokeless tobacco: Never   Substance and Sexual Activity    Alcohol use: Yes     Alcohol/week: 12.0 standard drinks     Types: 12 Cans of beer per week     Comment: 2x/week    Drug use: No    Sexual activity: Yes     Partners: Female

## 2022-12-22 ENCOUNTER — OFFICE VISIT (OUTPATIENT)
Dept: ORTHOPEDICS | Facility: CLINIC | Age: 52
End: 2022-12-22
Payer: MEDICARE

## 2022-12-22 VITALS — BODY MASS INDEX: 32.59 KG/M2 | HEIGHT: 72 IN | WEIGHT: 240.63 LBS

## 2022-12-22 DIAGNOSIS — M70.71 ILIOPSOAS BURSITIS OF RIGHT HIP: Primary | ICD-10-CM

## 2022-12-22 DIAGNOSIS — Z96.643 STATUS POST BILATERAL HIP REPLACEMENTS: ICD-10-CM

## 2022-12-22 PROCEDURE — 1159F PR MEDICATION LIST DOCUMENTED IN MEDICAL RECORD: ICD-10-PCS | Mod: CPTII,S$GLB,, | Performed by: ORTHOPAEDIC SURGERY

## 2022-12-22 PROCEDURE — 99999 PR PBB SHADOW E&M-EST. PATIENT-LVL III: CPT | Mod: PBBFAC,,, | Performed by: ORTHOPAEDIC SURGERY

## 2022-12-22 PROCEDURE — 3008F BODY MASS INDEX DOCD: CPT | Mod: CPTII,S$GLB,, | Performed by: ORTHOPAEDIC SURGERY

## 2022-12-22 PROCEDURE — 99999 PR PBB SHADOW E&M-EST. PATIENT-LVL III: ICD-10-PCS | Mod: PBBFAC,,, | Performed by: ORTHOPAEDIC SURGERY

## 2022-12-22 PROCEDURE — 99215 OFFICE O/P EST HI 40 MIN: CPT | Mod: S$GLB,,, | Performed by: ORTHOPAEDIC SURGERY

## 2022-12-22 PROCEDURE — 1159F MED LIST DOCD IN RCRD: CPT | Mod: CPTII,S$GLB,, | Performed by: ORTHOPAEDIC SURGERY

## 2022-12-22 PROCEDURE — 99215 PR OFFICE/OUTPT VISIT, EST, LEVL V, 40-54 MIN: ICD-10-PCS | Mod: S$GLB,,, | Performed by: ORTHOPAEDIC SURGERY

## 2022-12-22 PROCEDURE — 3008F PR BODY MASS INDEX (BMI) DOCUMENTED: ICD-10-PCS | Mod: CPTII,S$GLB,, | Performed by: ORTHOPAEDIC SURGERY

## 2023-01-24 ENCOUNTER — TELEPHONE (OUTPATIENT)
Dept: SPORTS MEDICINE | Facility: CLINIC | Age: 53
End: 2023-01-24
Payer: MEDICARE

## 2023-01-24 NOTE — TELEPHONE ENCOUNTER
Left VM regarding missed appointment with Dr. Rivera. Left clinic call back number.    Mai Salazar MS, OTC  Clinical Assistant to Dr. Gilmar Rivera

## 2023-02-05 ENCOUNTER — HOSPITAL ENCOUNTER (EMERGENCY)
Facility: HOSPITAL | Age: 53
Discharge: HOME | End: 2023-02-05
Attending: EMERGENCY MEDICINE
Payer: MEDICARE

## 2023-02-05 VITALS
WEIGHT: 240 LBS | DIASTOLIC BLOOD PRESSURE: 94 MMHG | BODY MASS INDEX: 32.55 KG/M2 | SYSTOLIC BLOOD PRESSURE: 155 MMHG | OXYGEN SATURATION: 95 % | RESPIRATION RATE: 18 BRPM | TEMPERATURE: 98 F | HEART RATE: 82 BPM

## 2023-02-05 DIAGNOSIS — S46.011A TRAUMATIC TEAR OF RIGHT ROTATOR CUFF, UNSPECIFIED TEAR EXTENT, INITIAL ENCOUNTER: Primary | ICD-10-CM

## 2023-02-05 DIAGNOSIS — S49.91XA RIGHT SHOULDER INJURY, INITIAL ENCOUNTER: ICD-10-CM

## 2023-02-05 PROCEDURE — 99284 EMERGENCY DEPT VISIT MOD MDM: CPT

## 2023-02-05 PROCEDURE — 63600175 PHARM REV CODE 636 W HCPCS: Performed by: EMERGENCY MEDICINE

## 2023-02-05 PROCEDURE — 99284 PR EMERGENCY DEPT VISIT,LEVEL IV: ICD-10-PCS | Mod: ,,, | Performed by: EMERGENCY MEDICINE

## 2023-02-05 PROCEDURE — 99284 EMERGENCY DEPT VISIT MOD MDM: CPT | Mod: ,,, | Performed by: EMERGENCY MEDICINE

## 2023-02-05 PROCEDURE — 96372 THER/PROPH/DIAG INJ SC/IM: CPT | Performed by: EMERGENCY MEDICINE

## 2023-02-05 PROCEDURE — 25000003 PHARM REV CODE 250: Performed by: EMERGENCY MEDICINE

## 2023-02-05 RX ORDER — FENTANYL CITRATE 50 UG/ML
100 INJECTION, SOLUTION INTRAMUSCULAR; INTRAVENOUS
Status: COMPLETED | OUTPATIENT
Start: 2023-02-05 | End: 2023-02-05

## 2023-02-05 RX ORDER — OXYCODONE AND ACETAMINOPHEN 5; 325 MG/1; MG/1
2 TABLET ORAL
Status: COMPLETED | OUTPATIENT
Start: 2023-02-05 | End: 2023-02-05

## 2023-02-05 RX ORDER — OXYCODONE AND ACETAMINOPHEN 5; 325 MG/1; MG/1
1 TABLET ORAL EVERY 6 HOURS PRN
Qty: 10 TABLET | Refills: 0 | Status: SHIPPED | OUTPATIENT
Start: 2023-02-05 | End: 2023-02-07

## 2023-02-05 RX ADMIN — OXYCODONE HYDROCHLORIDE AND ACETAMINOPHEN 2 TABLET: 5; 325 TABLET ORAL at 08:02

## 2023-02-05 RX ADMIN — FENTANYL CITRATE 100 MCG: 50 INJECTION, SOLUTION INTRAMUSCULAR; INTRAVENOUS at 06:02

## 2023-02-05 NOTE — ED TRIAGE NOTES
Pt presents to ED with c/o right shoulder pain. Pt with Hx of rotator cuff tear and repair in right shoulder. Pt states he was working on a transmission and felt something tear in his right shoulder. Pt unable to lift arm up or  things, has lateral motion. Pt denies numbness or tingling. Pt rates pain as 10 out of 10.

## 2023-02-05 NOTE — ED NOTES
Discharge instructions/ handed to patient, pt verbalized understanding. Advised pt to return to ED if symptoms worsen. Pt ambulatory.

## 2023-02-05 NOTE — ED PROVIDER NOTES
"Encounter Date: 2/5/2023       History     Chief Complaint   Patient presents with    Shoulder Injury     Pt reports R shoulder pain after trying to pull a transmission out of his truck yesterday. Pt states he felt something "tear." Pt reports hx of rotator cuff sx. No deformity noted in triage.      52M with PMH rotator cuff repair (9 yr ago) presents with 2 days of R shoulder pain and weakness. Pt reports he was working on his car and trying to pull a transmission when he felt something in his R shoulder tear, states it "feels just like last time". Denies numbness or weakness distally.     Review of patient's allergies indicates:   Allergen Reactions    Toradol [ketorolac] Hives and Nausea And Vomiting    Ibuprofen Other (See Comments)     States GI BLEED     Past Medical History:   Diagnosis Date    Anxiety     Benzodiazepine dependence    Arthritis     Asthma     Avascular necrosis     Carpal tunnel syndrome     Chronic pain     Depression     Diverticulitis     Gout     Other injury of other sites of trunk 12/28/2011    Pneumonia     Spondylolisthesis     lumbar; myofascial pain    Staph infection     Ulnar neuropathy     left and rt arm     Past Surgical History:   Procedure Laterality Date    COLONOSCOPY N/A 7/6/2020    Procedure: COLONOSCOPY;  Surgeon: Broderick Moise MD;  Location: [facility] ENDO;  Service: Endoscopy;  Laterality: N/A;    HIP SURGERY  3/06; 6/06    hip arthroplasty    HIP SURGERY      bilateral hip replacement (titanium)    JOINT REPLACEMENT      SHOULDER SURGERY  2012    right shoulder    SHOULDER SURGERY       Family History   Problem Relation Age of Onset    Cancer Mother     Cancer Sister      Social History     Tobacco Use    Smoking status: Former     Packs/day: 1.00     Years: 10.00     Pack years: 10.00     Types: Cigarettes     Quit date: 10/13/2012     Years since quitting: 10.3    Smokeless tobacco: Never   Substance Use Topics    Alcohol use: Yes     Alcohol/week: 12.0 standard " drinks     Types: 12 Cans of beer per week     Comment: 2x/week    Drug use: No     Review of Systems   All other systems reviewed and are negative.    Physical Exam     Initial Vitals [02/05/23 0555]   BP Pulse Resp Temp SpO2   (!) 160/90 76 18 98.1 °F (36.7 °C) 96 %      MAP       --         Physical Exam    Nursing note and vitals reviewed.  Constitutional: He appears well-developed and well-nourished. He is not diaphoretic. No distress.   HENT:   Head: Normocephalic and atraumatic.   Nose: Nose normal.   Eyes: Conjunctivae and EOM are normal. Pupils are equal, round, and reactive to light. No scleral icterus.   Neck:   Normal range of motion.  Cardiovascular:  Normal rate, regular rhythm and intact distal pulses.           Musculoskeletal:         General: Tenderness present. Normal range of motion.      Cervical back: Normal range of motion.      Comments: R shoulder with decrease extension and abduction, TTP over anterior-lateral region, CSM intact distally.      Neurological: He is alert and oriented to person, place, and time. He has normal strength.   Skin: Skin is warm and dry. Capillary refill takes less than 2 seconds. No rash and no abscess noted. No erythema. No pallor.   Psychiatric: He has a normal mood and affect. His behavior is normal. Judgment and thought content normal.       ED Course   Procedures  Labs Reviewed - No data to display       Imaging Results              X-Ray Shoulder Trauma Right (Final result)  Result time 02/05/23 06:50:15      Final result by Jt Castaneda MD (02/05/23 06:50:15)                   Impression:      No evidence of acute fracture.Likely chronic posttraumatic etiology of the distal clavicle with widening of the AC joint.      Electronically signed by: Jt Castaneda MD  Date:    02/05/2023  Time:    06:50               Narrative:    EXAMINATION:  XR SHOULDER TRAUMA 3 VIEW RIGHT    CLINICAL HISTORY:  Unspecified injury of right shoulder and upper arm, initial  encounter    TECHNIQUE:  Three-view RIGHT shoulder    COMPARISON:  None.    FINDINGS:  The alignment is within normal limits.  No displaced fractures identified.  Widening of the AC joint,, likely chronic posttraumatic etiology. Soft tissues are unremarkable.                                       Medications   fentaNYL 50 mcg/mL injection 100 mcg (100 mcg Intramuscular Given 2/5/23 0631)   oxyCODONE-acetaminophen 5-325 mg per tablet 2 tablet (2 tablets Oral Given 2/5/23 0800)     Medical Decision Making:   History:   Old Medical Records: I decided to obtain old medical records.  Old Records Summarized: records from clinic visits, records from previous admission(s) and records from another hospital.       <> Summary of Records: Status post AC and CC ligament reconstruction by Dr. Davis in 2013.  Initial Assessment:   R shoulder with decreased ROM and TTP anteriorly, but no deformity concerning for dislocation, no skin changes concerning for infection, and NV intact  Differential Diagnosis:   Rotator cuff tear, AC separation, shoulder dislocation, less likely fracture  Independently Interpreted Test(s):   I have ordered and independently interpreted X-rays - see summary below.       <> Summary of X-Ray Reading(s): Widened AC joint  Clinical Tests:   Radiological Study: Ordered and Reviewed  ED Management:  This is an acute presentation of an emergent condition.    XR shows widening of the AC joint, but no fx or dislocation. Pt placed in sling/swathe for rotator cuff tear and referred to Ortho for further mgmt. Pt cannot take NSAIDs due to GIB, advised Tylenol and will Rx short course of Percocet for severe acute pain after reviewing . Advised pt to avoid concurrent use of benzos and opiates. Stable for d/c, I discussed outpatient follow up and return precautions with pt and answered all questions.    MDM Points:   Complexity Points:  1 undiagnosed new problem with uncertain prognosis - Moderate     Data  Reviewed Points:  Review of prior external note(s) from each unique source, Review of the result(s) of each one or more unique test(s), Ordering of one or more unique test(s) , Assessment requiring an independent historian(s), and Independent interpretation of test     Risk:  Low Risk                            Clinical Impression:   Final diagnoses:  [S49.91XA] Right shoulder injury, initial encounter  [S46.011A] Traumatic tear of right rotator cuff, unspecified tear extent, initial encounter (Primary)        ED Disposition Condition    Discharge Stable          ED Prescriptions       Medication Sig Dispense Start Date End Date Auth. Provider    oxyCODONE-acetaminophen (PERCOCET) 5-325 mg per tablet Take 1 tablet by mouth every 6 (six) hours as needed for Pain. 10 tablet 2/5/2023 -- Imani Oviedo MD          Follow-up Information       Follow up With Specialties Details Why Contact Info Additional Information    [facility] - Emergency Dept Emergency Medicine Go to  As needed, If symptoms worsen [address]  [facility] [id number] [phone]     [facility] - Orthopedics [facility] Orthopedics Schedule an appointment as soon as possible for a visit in 1 week  [address], [address]  [facility] [id number] [phone] [facility] - Main Building, 5th Floor Please park in [facility] and take Atrium elevator             Imani Oviedo MD  02/05/23 4807

## 2023-02-05 NOTE — ED NOTES
Patient resting in stretcher and is in NAD at this time. Pt is awake alert and oriented x4, VSS. Pt denies pain at this time. Pt updated on POC. Bed low and locked, SR up x2, call bell in patient reach. Pt remains on continuous cardiac monitor, continuous pulse ox, and auto BP cuff. Pt voices no needs at this time.

## 2023-02-05 NOTE — DISCHARGE INSTRUCTIONS
You were seen in the ED for a shoulder injury. Wear the provided sling and follow up with Orthopedics. Take Tylenol as indicated for shoulder pain. For severe pain, you may take Percocet.       USE CAUTION WHILE TAKING YOUR CHRONIC BENZODIAZEPINES. Do not take alprazolam and Percocet together. Do not drink alcohol,drive, or operate machinery while taking Percocet as it can make you drowsy. Do not take Tylenol while taking Percocet as they contain the same ingredient (acetaminophen).

## 2023-02-07 ENCOUNTER — OFFICE VISIT (OUTPATIENT)
Dept: ORTHOPEDICS | Facility: CLINIC | Age: 53
End: 2023-02-07
Payer: MEDICARE

## 2023-02-07 VITALS
HEIGHT: 72 IN | HEART RATE: 84 BPM | SYSTOLIC BLOOD PRESSURE: 139 MMHG | BODY MASS INDEX: 32.43 KG/M2 | DIASTOLIC BLOOD PRESSURE: 93 MMHG | WEIGHT: 239.44 LBS

## 2023-02-07 DIAGNOSIS — S46.011A TRAUMATIC TEAR OF RIGHT ROTATOR CUFF, UNSPECIFIED TEAR EXTENT, INITIAL ENCOUNTER: Primary | ICD-10-CM

## 2023-02-07 DIAGNOSIS — M70.71 ILIOPSOAS BURSITIS OF RIGHT HIP: ICD-10-CM

## 2023-02-07 DIAGNOSIS — Z96.643 STATUS POST BILATERAL HIP REPLACEMENTS: ICD-10-CM

## 2023-02-07 PROCEDURE — 3008F BODY MASS INDEX DOCD: CPT | Mod: CPTII,S$GLB,, | Performed by: PHYSICIAN ASSISTANT

## 2023-02-07 PROCEDURE — 3008F PR BODY MASS INDEX (BMI) DOCUMENTED: ICD-10-PCS | Mod: CPTII,S$GLB,, | Performed by: PHYSICIAN ASSISTANT

## 2023-02-07 PROCEDURE — 1159F PR MEDICATION LIST DOCUMENTED IN MEDICAL RECORD: ICD-10-PCS | Mod: CPTII,S$GLB,, | Performed by: PHYSICIAN ASSISTANT

## 2023-02-07 PROCEDURE — 3080F DIAST BP >= 90 MM HG: CPT | Mod: CPTII,S$GLB,, | Performed by: PHYSICIAN ASSISTANT

## 2023-02-07 PROCEDURE — 99999 PR PBB SHADOW E&M-EST. PATIENT-LVL III: ICD-10-PCS | Mod: PBBFAC,,, | Performed by: PHYSICIAN ASSISTANT

## 2023-02-07 PROCEDURE — 99214 PR OFFICE/OUTPT VISIT, EST, LEVL IV, 30-39 MIN: ICD-10-PCS | Mod: S$GLB,,, | Performed by: PHYSICIAN ASSISTANT

## 2023-02-07 PROCEDURE — 99999 PR PBB SHADOW E&M-EST. PATIENT-LVL III: CPT | Mod: PBBFAC,,, | Performed by: PHYSICIAN ASSISTANT

## 2023-02-07 PROCEDURE — 3075F SYST BP GE 130 - 139MM HG: CPT | Mod: CPTII,S$GLB,, | Performed by: PHYSICIAN ASSISTANT

## 2023-02-07 PROCEDURE — 1159F MED LIST DOCD IN RCRD: CPT | Mod: CPTII,S$GLB,, | Performed by: PHYSICIAN ASSISTANT

## 2023-02-07 PROCEDURE — 3075F PR MOST RECENT SYSTOLIC BLOOD PRESS GE 130-139MM HG: ICD-10-PCS | Mod: CPTII,S$GLB,, | Performed by: PHYSICIAN ASSISTANT

## 2023-02-07 PROCEDURE — 99214 OFFICE O/P EST MOD 30 MIN: CPT | Mod: S$GLB,,, | Performed by: PHYSICIAN ASSISTANT

## 2023-02-07 PROCEDURE — 3080F PR MOST RECENT DIASTOLIC BLOOD PRESSURE >= 90 MM HG: ICD-10-PCS | Mod: CPTII,S$GLB,, | Performed by: PHYSICIAN ASSISTANT

## 2023-02-07 RX ORDER — TRAMADOL HYDROCHLORIDE 50 MG/1
50 TABLET ORAL EVERY 8 HOURS PRN
Qty: 15 TABLET | Refills: 0 | Status: SHIPPED | OUTPATIENT
Start: 2023-02-07 | End: 2023-08-15 | Stop reason: CLARIF

## 2023-02-07 NOTE — PROGRESS NOTES
Subjective:       Patient ID: Keon Schneider Jr. is a 52 y.o. male.    Chief Complaint: Pain and Injury of the Right Shoulder    HPI  51 y/o male presents to clinic for suspected rotator cuff tear and right shoulder injury. He states he had a previous rotator cuff tear and injury to his right shoulder 10 years ago.  Pt stated he was moving a transmission out of his truck 5 days ago when it slipped and while trying to catch it with his right shoulder, he felt a tear and a pop.  He states that pain is mostly anterior of the shoulder and radiates into bicep and upper back. He states pain is a constant 12/10 sharp-shooting and throbbing pain. Pain is worse while moving  and slightly relieved when at rest. He states sleeping at night is difficult due to pain. He states he is only able to fall asleep and get comfortable from taking the Percocet that was prescribed in the ED three days ago.  He states he has been taking Percocet x1-2/day along with Ibuprofen and Tylenol in between. Hot showers help relax his shoulder, he has not tried ice application. He states they gave him a sling to wear at the ED three days ago, but has not worn it due to pain. Denies any radiation into elbow or wrist. Denies numbness or tingling. Denies erythema, warmth, or swelling to the joint.     Review of Systems   Constitutional:  Negative for activity change, appetite change, chills and fatigue.   HENT:  Negative for ear pain.    Eyes:  Negative for pain.   Respiratory:  Negative for chest tightness and shortness of breath.    Cardiovascular:  Negative for chest pain.   Gastrointestinal:  Negative for abdominal pain, diarrhea and vomiting.   Musculoskeletal:  Negative for back pain and leg pain.        Right shoulder pain   Integumentary:  Negative for rash.   Neurological:  Negative for light-headedness and headaches.   Psychiatric/Behavioral:  The patient is not nervous/anxious.        Objective:      Physical Exam  Constitutional:        General: He is not in acute distress.     Appearance: Normal appearance. He is not ill-appearing.   HENT:      Head: Normocephalic and atraumatic.      Nose: Nose normal.   Eyes:      Extraocular Movements: Extraocular movements intact.   Cardiovascular:      Rate and Rhythm: Normal rate.   Pulmonary:      Effort: Pulmonary effort is normal.   Musculoskeletal:      Right shoulder: Tenderness and bony tenderness present. No swelling or effusion. Decreased range of motion. Decreased strength. Normal pulse.      Left shoulder: Normal.      Right upper arm: Tenderness present. No swelling or edema.      Left upper arm: Normal.      Right elbow: Normal.      Left elbow: Normal.      Right forearm: Normal.      Left forearm: Normal.      Right wrist: Normal.      Left wrist: Normal.        Arms:       Cervical back: Normal range of motion.      Comments: AROM Right Shoulder: E:10, F:75, Abd:40, Ex:10, In: unable to perform.   AROM Left Shoulder: E: -30, F: 170, Ab:150, Ex: 60, In: Thoracic  PROM Right Shoulder: E:10, F: 80, Abd:50, Ex: 10, In: unable to complete.  PROM Left Shoulder: E: -30, F:180, Abd:160, Ex:70, In: thoracic.     Graded 3/5 strength Right shoulder: flexion, extension, abduction, adduction, external/internal rotation, elbow flexion/extension.   Graded 5/5 strength Left shoulder: flexion, extension, abduction, adduction, external/internal rotation, elbow flexion/extension.     Unable to perform cross body test, drop arm test, speeds test, and internal lag on right shoulder due to pain.   Negative cross body, drop arm, and speeds test on Left shoulder.   Normal sensation bilaterally.      Skin:     General: Skin is warm.      Capillary Refill: Capillary refill takes less than 2 seconds.   Neurological:      General: No focal deficit present.      Mental Status: He is alert and oriented to person, place, and time.   Psychiatric:         Mood and Affect: Mood normal.         RADIOGRAPHS:   FINDINGS:  X-RAY  shoulder trauma Right (2/5/23): The alignment is within normal limits.  No displaced fractures identified.  Widening of the AC joint,, likely chronic posttraumatic etiology. Soft tissues are unremarkable      Assessment:       Problem List Items Addressed This Visit    None  Visit Diagnoses       Traumatic tear of right rotator cuff, unspecified tear extent, initial encounter    -  Primary    Relevant Orders    MRI Shoulder Without Contrast Right              Plan:       - Scheduled MRI for Right Shoulder. Patient will be called when results come in.   - Take Tramadol 50 mg x2/day as needed for symptom relief.   - Continue to take Oxycodone-acetaminophen and Tylenol as needed for symptom relief. Pt is encouraged to continue heat and ice application. Avoid any high impact activity.   - Patient received new sling today. Patient is to wear sling at all times and can take it off when resting.   - Pt is to call back if symptoms worsen or if any new complaints arise.       BROOK Conklin-S3  [facility]  Physician Assistant Program    I have reviewed the patients notes and discussed the physical exam, documentation, diagnosis, and treatment plan with Ruma DIAZ and I am in agreement with the documentation and treatment plan.

## 2023-02-16 ENCOUNTER — HOSPITAL ENCOUNTER (OUTPATIENT)
Dept: RADIOLOGY | Facility: HOSPITAL | Age: 53
Discharge: HOME | End: 2023-02-16
Attending: PHYSICIAN ASSISTANT
Payer: MEDICARE

## 2023-02-16 DIAGNOSIS — S46.011A TRAUMATIC TEAR OF RIGHT ROTATOR CUFF, UNSPECIFIED TEAR EXTENT, INITIAL ENCOUNTER: ICD-10-CM

## 2023-02-16 PROCEDURE — 73221 MRI JOINT UPR EXTREM W/O DYE: CPT | Mod: 26,RT,GC, | Performed by: INTERNAL MEDICINE

## 2023-02-16 PROCEDURE — 73221 MRI SHOULDER WITHOUT CONTRAST RIGHT: ICD-10-PCS | Mod: 26,RT,GC, | Performed by: INTERNAL MEDICINE

## 2023-02-16 PROCEDURE — 73221 MRI JOINT UPR EXTREM W/O DYE: CPT | Mod: TC,RT

## 2023-03-22 ENCOUNTER — HOSPITAL ENCOUNTER (OUTPATIENT)
Facility: HOSPITAL | Age: 53
Discharge: LEFT BEFORE DISCHARGE | End: 2023-03-22
Attending: EMERGENCY MEDICINE | Admitting: HOSPITALIST
Payer: MEDICARE

## 2023-03-22 VITALS
SYSTOLIC BLOOD PRESSURE: 122 MMHG | OXYGEN SATURATION: 96 % | BODY MASS INDEX: 32.41 KG/M2 | TEMPERATURE: 99 F | DIASTOLIC BLOOD PRESSURE: 80 MMHG | HEART RATE: 75 BPM | RESPIRATION RATE: 15 BRPM | WEIGHT: 239 LBS

## 2023-03-22 DIAGNOSIS — R07.9 CHEST PAIN: Primary | ICD-10-CM

## 2023-03-22 LAB
ALBUMIN SERPL BCP-MCNC: 4.1 G/DL (ref 3.5–5.2)
ALP SERPL-CCNC: 79 U/L (ref 55–135)
ALT SERPL W/O P-5'-P-CCNC: 24 U/L (ref 10–44)
ANION GAP SERPL CALC-SCNC: 10 MMOL/L (ref 8–16)
AST SERPL-CCNC: 17 U/L (ref 10–40)
BASOPHILS # BLD AUTO: 0.05 K/UL (ref 0–0.2)
BASOPHILS NFR BLD: 0.5 % (ref 0–1.9)
BILIRUB SERPL-MCNC: 0.3 MG/DL (ref 0.1–1)
BNP SERPL-MCNC: <10 PG/ML (ref 0–99)
BUN SERPL-MCNC: 13 MG/DL (ref 6–20)
CALCIUM SERPL-MCNC: 9.7 MG/DL (ref 8.7–10.5)
CHLORIDE SERPL-SCNC: 105 MMOL/L (ref 95–110)
CO2 SERPL-SCNC: 22 MMOL/L (ref 23–29)
CREAT SERPL-MCNC: 1.1 MG/DL (ref 0.5–1.4)
D DIMER PPP IA.FEU-MCNC: 1.12 MG/L FEU
DIFFERENTIAL METHOD: ABNORMAL
EOSINOPHIL # BLD AUTO: 0.3 K/UL (ref 0–0.5)
EOSINOPHIL NFR BLD: 3.3 % (ref 0–8)
ERYTHROCYTE [DISTWIDTH] IN BLOOD BY AUTOMATED COUNT: 13 % (ref 11.5–14.5)
EST. GFR  (NO RACE VARIABLE): >60 ML/MIN/1.73 M^2
GLUCOSE SERPL-MCNC: 115 MG/DL (ref 70–110)
HCT VFR BLD AUTO: 40.2 % (ref 40–54)
HGB BLD-MCNC: 13.3 G/DL (ref 14–18)
IMM GRANULOCYTES # BLD AUTO: 0.02 K/UL (ref 0–0.04)
IMM GRANULOCYTES NFR BLD AUTO: 0.2 % (ref 0–0.5)
LYMPHOCYTES # BLD AUTO: 1.8 K/UL (ref 1–4.8)
LYMPHOCYTES NFR BLD: 18.5 % (ref 18–48)
MCH RBC QN AUTO: 30.4 PG (ref 27–31)
MCHC RBC AUTO-ENTMCNC: 33.1 G/DL (ref 32–36)
MCV RBC AUTO: 92 FL (ref 82–98)
MONOCYTES # BLD AUTO: 1.1 K/UL (ref 0.3–1)
MONOCYTES NFR BLD: 11.5 % (ref 4–15)
NEUTROPHILS # BLD AUTO: 6.2 K/UL (ref 1.8–7.7)
NEUTROPHILS NFR BLD: 66 % (ref 38–73)
NRBC BLD-RTO: 0 /100 WBC
PLATELET # BLD AUTO: 226 K/UL (ref 150–450)
PMV BLD AUTO: 9 FL (ref 9.2–12.9)
POC CARDIAC TROPONIN I: 0 NG/ML (ref 0–0.08)
POTASSIUM SERPL-SCNC: 4 MMOL/L (ref 3.5–5.1)
PROT SERPL-MCNC: 7.7 G/DL (ref 6–8.4)
RBC # BLD AUTO: 4.38 M/UL (ref 4.6–6.2)
SAMPLE: NORMAL
SODIUM SERPL-SCNC: 137 MMOL/L (ref 136–145)
TROPONIN I SERPL DL<=0.01 NG/ML-MCNC: <0.006 NG/ML (ref 0–0.03)
TROPONIN I SERPL DL<=0.01 NG/ML-MCNC: <0.006 NG/ML (ref 0–0.03)
WBC # BLD AUTO: 9.44 K/UL (ref 3.9–12.7)

## 2023-03-22 PROCEDURE — 83880 ASSAY OF NATRIURETIC PEPTIDE: CPT | Performed by: EMERGENCY MEDICINE

## 2023-03-22 PROCEDURE — 93010 EKG 12-LEAD: ICD-10-PCS | Mod: ,,, | Performed by: INTERNAL MEDICINE

## 2023-03-22 PROCEDURE — 25500020 PHARM REV CODE 255: Performed by: EMERGENCY MEDICINE

## 2023-03-22 PROCEDURE — 96374 THER/PROPH/DIAG INJ IV PUSH: CPT | Mod: 59

## 2023-03-22 PROCEDURE — 99285 EMERGENCY DEPT VISIT HI MDM: CPT | Mod: 25

## 2023-03-22 PROCEDURE — 25000242 PHARM REV CODE 250 ALT 637 W/ HCPCS: Performed by: EMERGENCY MEDICINE

## 2023-03-22 PROCEDURE — 85025 COMPLETE CBC W/AUTO DIFF WBC: CPT | Performed by: EMERGENCY MEDICINE

## 2023-03-22 PROCEDURE — 93010 ELECTROCARDIOGRAM REPORT: CPT | Mod: ,,, | Performed by: INTERNAL MEDICINE

## 2023-03-22 PROCEDURE — 84484 ASSAY OF TROPONIN QUANT: CPT

## 2023-03-22 PROCEDURE — 96376 TX/PRO/DX INJ SAME DRUG ADON: CPT | Mod: 59

## 2023-03-22 PROCEDURE — G0378 HOSPITAL OBSERVATION PER HR: HCPCS

## 2023-03-22 PROCEDURE — 99285 PR EMERGENCY DEPT VISIT,LEVEL V: ICD-10-PCS | Mod: ,,, | Performed by: EMERGENCY MEDICINE

## 2023-03-22 PROCEDURE — 63600175 PHARM REV CODE 636 W HCPCS: Performed by: EMERGENCY MEDICINE

## 2023-03-22 PROCEDURE — 80053 COMPREHEN METABOLIC PANEL: CPT | Performed by: EMERGENCY MEDICINE

## 2023-03-22 PROCEDURE — 99285 EMERGENCY DEPT VISIT HI MDM: CPT | Mod: ,,, | Performed by: EMERGENCY MEDICINE

## 2023-03-22 PROCEDURE — 93005 ELECTROCARDIOGRAM TRACING: CPT

## 2023-03-22 PROCEDURE — 25000003 PHARM REV CODE 250

## 2023-03-22 PROCEDURE — 85379 FIBRIN DEGRADATION QUANT: CPT | Performed by: EMERGENCY MEDICINE

## 2023-03-22 PROCEDURE — 84484 ASSAY OF TROPONIN QUANT: CPT | Performed by: EMERGENCY MEDICINE

## 2023-03-22 PROCEDURE — 96375 TX/PRO/DX INJ NEW DRUG ADDON: CPT

## 2023-03-22 PROCEDURE — 25000003 PHARM REV CODE 250: Performed by: EMERGENCY MEDICINE

## 2023-03-22 RX ORDER — BISACODYL 10 MG
10 SUPPOSITORY, RECTAL RECTAL DAILY PRN
Status: DISCONTINUED | OUTPATIENT
Start: 2023-03-22 | End: 2023-03-22 | Stop reason: HOSPADM

## 2023-03-22 RX ORDER — SODIUM CHLORIDE 0.9 % (FLUSH) 0.9 %
10 SYRINGE (ML) INJECTION EVERY 12 HOURS PRN
Status: DISCONTINUED | OUTPATIENT
Start: 2023-03-22 | End: 2023-03-22 | Stop reason: HOSPADM

## 2023-03-22 RX ORDER — ONDANSETRON 8 MG/1
8 TABLET, ORALLY DISINTEGRATING ORAL EVERY 8 HOURS PRN
Status: DISCONTINUED | OUTPATIENT
Start: 2023-03-22 | End: 2023-03-22 | Stop reason: HOSPADM

## 2023-03-22 RX ORDER — POLYETHYLENE GLYCOL 3350 17 G/17G
17 POWDER, FOR SOLUTION ORAL DAILY PRN
Status: DISCONTINUED | OUTPATIENT
Start: 2023-03-22 | End: 2023-03-22 | Stop reason: HOSPADM

## 2023-03-22 RX ORDER — TALC
6 POWDER (GRAM) TOPICAL NIGHTLY PRN
Status: DISCONTINUED | OUTPATIENT
Start: 2023-03-22 | End: 2023-03-22 | Stop reason: HOSPADM

## 2023-03-22 RX ORDER — PROMETHAZINE HYDROCHLORIDE 25 MG/1
25 TABLET ORAL EVERY 6 HOURS PRN
Status: DISCONTINUED | OUTPATIENT
Start: 2023-03-22 | End: 2023-03-22 | Stop reason: HOSPADM

## 2023-03-22 RX ORDER — MORPHINE SULFATE 4 MG/ML
4 INJECTION, SOLUTION INTRAMUSCULAR; INTRAVENOUS
Status: COMPLETED | OUTPATIENT
Start: 2023-03-22 | End: 2023-03-22

## 2023-03-22 RX ORDER — ACETAMINOPHEN 325 MG/1
650 TABLET ORAL EVERY 4 HOURS PRN
Status: DISCONTINUED | OUTPATIENT
Start: 2023-03-22 | End: 2023-03-22 | Stop reason: HOSPADM

## 2023-03-22 RX ORDER — LIDOCAINE 50 MG/G
1 PATCH TOPICAL
Status: DISCONTINUED | OUTPATIENT
Start: 2023-03-22 | End: 2023-03-22 | Stop reason: HOSPADM

## 2023-03-22 RX ORDER — FENTANYL CITRATE 50 UG/ML
50 INJECTION, SOLUTION INTRAMUSCULAR; INTRAVENOUS
Status: COMPLETED | OUTPATIENT
Start: 2023-03-22 | End: 2023-03-22

## 2023-03-22 RX ORDER — NITROGLYCERIN 0.4 MG/1
0.4 TABLET SUBLINGUAL EVERY 5 MIN PRN
Status: DISCONTINUED | OUTPATIENT
Start: 2023-03-22 | End: 2023-03-22 | Stop reason: HOSPADM

## 2023-03-22 RX ORDER — DEXTROSE 40 %
30 GEL (GRAM) ORAL
Status: DISCONTINUED | OUTPATIENT
Start: 2023-03-22 | End: 2023-03-22 | Stop reason: HOSPADM

## 2023-03-22 RX ORDER — DEXTROSE 40 %
15 GEL (GRAM) ORAL
Status: DISCONTINUED | OUTPATIENT
Start: 2023-03-22 | End: 2023-03-22 | Stop reason: HOSPADM

## 2023-03-22 RX ORDER — GLUCAGON 1 MG
1 KIT INJECTION
Status: DISCONTINUED | OUTPATIENT
Start: 2023-03-22 | End: 2023-03-22 | Stop reason: HOSPADM

## 2023-03-22 RX ORDER — NALOXONE HCL 0.4 MG/ML
0.02 VIAL (ML) INJECTION
Status: DISCONTINUED | OUTPATIENT
Start: 2023-03-22 | End: 2023-03-22 | Stop reason: HOSPADM

## 2023-03-22 RX ORDER — ENOXAPARIN SODIUM 100 MG/ML
40 INJECTION SUBCUTANEOUS EVERY 24 HOURS
Status: DISCONTINUED | OUTPATIENT
Start: 2023-03-22 | End: 2023-03-22 | Stop reason: HOSPADM

## 2023-03-22 RX ADMIN — NITROGLYCERIN 0.4 MG: 0.4 TABLET, ORALLY DISINTEGRATING SUBLINGUAL at 02:03

## 2023-03-22 RX ADMIN — LIDOCAINE 1 PATCH: 50 PATCH CUTANEOUS at 04:03

## 2023-03-22 RX ADMIN — MORPHINE SULFATE 4 MG: 4 INJECTION INTRAVENOUS at 02:03

## 2023-03-22 RX ADMIN — ACETAMINOPHEN 650 MG: 325 TABLET ORAL at 06:03

## 2023-03-22 RX ADMIN — MORPHINE SULFATE 4 MG: 4 INJECTION INTRAVENOUS at 03:03

## 2023-03-22 RX ADMIN — IOHEXOL 100 ML: 350 INJECTION, SOLUTION INTRAVENOUS at 03:03

## 2023-03-22 RX ADMIN — FENTANYL CITRATE 50 MCG: 50 INJECTION INTRAMUSCULAR; INTRAVENOUS at 04:03

## 2023-03-22 NOTE — Clinical Note
Diagnosis: Chest pain [822640]   Future Attending Provider: LULY BARKER [[id number]]   Is the patient being sent to ED Observation?: No   Admitting Provider:: LULY BARKER [[id number]]   Special Needs:: No Special Needs [1]

## 2023-03-22 NOTE — HPI
Keon Schneider Jr. is a 52 y.o. male with PMHx intermittent smoker, history of pneumonia, diverticulitis, chronic pain, and anxiety who presents to [facility] ED with chief complaint of chest pain x 2 days. Describes chest pain as midsternal with radiation to right side. He states that the pain initially woke him from sleep and has moved to his right side. He describes pain as stabbing and sharp as if someone is stabbing him with a knife. Chest pain is non reproducible. Currently chest pain free on my exam and he states he has only had the one episode of chest pain that woke him from sleep. Since then, he describes his pain as chest tightness. He endorses associated shortness of breath. He also endorses pleuritic back pain, reproducible on exam. He has never had anything like this before, has been taking aspirin at home without improvement. Denies associated HA, fever, chills, cough, abdominal pain, N/V/D, urinary symptoms, numbness or tingling.     In the ED: VSSAF. Normal oxygen sats on 3L NC. CBC without leukocytosis. CMP without emergent electrolyte abnormalities. D-Dimer elevated 1.12. BNP wnl. Trop wnl. EKG with sinus tach, otherwise normal when compared to previous. CXR without acute findings. CT PE study without evidence of pulmonary thromboembolism. Received fentanyl 50mcg injection x 1, lidocaine patch, morphine 4mg injection x 2, and nitro 0.4mg x 3. Patient admitted to Hospital Medicine for further management.

## 2023-03-22 NOTE — ED TRIAGE NOTES
"Keon Schneider Jr., a 52 y.o. male presents to the ED w/ complaint of intermittent left upper back/chest pain x 2 days. +SOB. Pt states "feels like something is sitting on my chest." "Feels like my lung has a hole in it."    Triage note:  Chief Complaint   Patient presents with    Chest Pain     C/o R sided chest pain and R sided rib cage rib cage pain x2 days. Pt states it feels like someone is stabbing him in the R lung everytime he takes a deep breath. Pt also endorses chest pressure.      Review of patient's allergies indicates:   Allergen Reactions    Toradol [ketorolac] Hives and Nausea And Vomiting    Ibuprofen Other (See Comments)     States GI BLEED     Past Medical History:   Diagnosis Date    Anxiety     Benzodiazepine dependence    Arthritis     Asthma     Avascular necrosis     Carpal tunnel syndrome     Chronic pain     Depression     Diverticulitis     Gout     Other injury of other sites of trunk 12/28/2011    Pneumonia     Spondylolisthesis     lumbar; myofascial pain    Staph infection     Ulnar neuropathy     left and rt arm       "

## 2023-03-22 NOTE — CARE UPDATE
Care Update    Patient requesting to leave AMA prior to being able to complete hospital admission and exam. Patient informed of risks of leaving AMA, patient verbalized understanding. AMA paperwork signed with patient and witness.     Fransico Gabriel PA-C  [facility]  Department of Hospital Medicine

## 2023-03-22 NOTE — ED PROVIDER NOTES
Encounter Date: 3/22/2023       History     Chief Complaint   Patient presents with    Chest Pain     C/o R sided chest pain and R sided rib cage rib cage pain x2 days. Pt states it feels like someone is stabbing him in the R lung everytime he takes a deep breath. Pt also endorses chest pressure.      HPI    Pleasant 52-year-old male, intermittent smoker, history of pneumonia diverticulitis chronic pain anxiety presents the ER for evaluation of 2 days of chest pain.  Patient reports woke up midsternal now right-sided pleuritic.  Never had anything like this before, has been taking aspirin with no improvement came the ER for further evaluation.    Review of patient's allergies indicates:   Allergen Reactions    Toradol [ketorolac] Hives and Nausea And Vomiting    Ibuprofen Other (See Comments)     States GI BLEED     Past Medical History:   Diagnosis Date    Anxiety     Benzodiazepine dependence    Arthritis     Asthma     Avascular necrosis     Carpal tunnel syndrome     Chronic pain     Depression     Diverticulitis     Gout     Other injury of other sites of trunk 12/28/2011    Pneumonia     Spondylolisthesis     lumbar; myofascial pain    Staph infection     Ulnar neuropathy     left and rt arm     Past Surgical History:   Procedure Laterality Date    COLONOSCOPY N/A 7/6/2020    Procedure: COLONOSCOPY;  Surgeon: Broderick Moise MD;  Location: [facility];  Service: Endoscopy;  Laterality: N/A;    HIP SURGERY  3/06; 6/06    hip arthroplasty    HIP SURGERY      bilateral hip replacement (titanium)    JOINT REPLACEMENT      SHOULDER SURGERY  2012    right shoulder    SHOULDER SURGERY       Family History   Problem Relation Age of Onset    Cancer Mother     Cancer Sister      Social History     Tobacco Use    Smoking status: Some Days     Packs/day: 1.00     Years: 10.00     Pack years: 10.00     Types: Cigarettes     Last attempt to quit: 10/13/2012     Years since quitting: 10.4    Smokeless tobacco: Never    Substance Use Topics    Alcohol use: Yes     Alcohol/week: 12.0 standard drinks     Types: 12 Cans of beer per week     Comment: 2x/week    Drug use: No     Review of Systems   Constitutional:  Positive for fatigue.   Respiratory:  Positive for shortness of breath.    Cardiovascular:  Positive for chest pain.   All other systems reviewed and are negative.    Physical Exam     Initial Vitals   BP Pulse Resp Temp SpO2   03/22/23 0133 03/22/23 0133 03/22/23 0133 03/22/23 0134 03/22/23 0133   (!) 153/100 (!) 118 16 99.1 °F (37.3 °C) 100 %      MAP       --                Physical Exam    Nursing note and vitals reviewed.  Constitutional: He appears well-developed and well-nourished.   HENT:   Head: Normocephalic and atraumatic.   Eyes: Pupils are equal, round, and reactive to light.   Neck:   Normal range of motion.  Cardiovascular:  Normal rate, regular rhythm and normal heart sounds.           Pulmonary/Chest: No respiratory distress.   Abdominal: Abdomen is soft. He exhibits no distension.   Musculoskeletal:         General: Normal range of motion.      Cervical back: Normal range of motion.     Neurological: He is alert and oriented to person, place, and time. GCS score is 15. GCS eye subscore is 4. GCS verbal subscore is 5. GCS motor subscore is 6.   Skin: Skin is warm and dry. Capillary refill takes less than 2 seconds.   Psychiatric: He has a normal mood and affect. Thought content normal.       ED Course   Procedures  Labs Reviewed   CBC W/ AUTO DIFFERENTIAL - Abnormal; Notable for the following components:       Result Value    RBC 4.38 (*)     Hemoglobin 13.3 (*)     MPV 9.0 (*)     Mono # 1.1 (*)     All other components within normal limits   COMPREHENSIVE METABOLIC PANEL - Abnormal; Notable for the following components:    CO2 22 (*)     Glucose 115 (*)     All other components within normal limits   D DIMER, QUANTITATIVE - Abnormal; Notable for the following components:    D-Dimer 1.12 (*)     All other  components within normal limits   TROPONIN I   TROPONIN I   B-TYPE NATRIURETIC PEPTIDE   TROPONIN ISTAT   URINALYSIS, REFLEX TO URINE CULTURE   POCT GLUCOSE, HAND-HELD DEVICE   POCT TROPONIN   POCT TROPONIN     EKG Readings: (Independently Interpreted)   Sinus tachycardia no STEMI     Imaging Results              CTA Chest Non-Coronary (PE Studies) (Final result)  Result time 03/22/23 03:45:45      Final result by Noel Nguyen MD (03/22/23 03:45:45)                   Impression:      1.  Technically limited study as discussed above. No evidence of central pulmonary embolus or convincing evidence of pulmonary thromboembolism to the proximal segmental levels or pulmonary infarction. Pulmonary embolus distal to the proximal segmental levels, particularly at the lung bases cannot be excluded. Correlation with d-dimer and lower extremity venous Doppler ultrasound as clinically warranted.    2.  Mild scattered coronary artery calcification.      Electronically signed by: Noel Nguyen MD  Date:    03/22/2023  Time:    03:45               Narrative:    EXAMINATION:  CTA CHEST NON CORONARY (PE STUDIES)    CLINICAL HISTORY:  Pulmonary embolism (PE) suspected, positive D-dimer;    TECHNIQUE:  Low dose axial images, sagittal and coronal reformations were obtained from the thoracic inlet to the lung bases following the IV administration of 100 mL of Omnipaque 350.  Contrast timing was optimized to evaluate the pulmonary arteries.  MIP images were performed.    COMPARISON:  CT chest 09/26/2020,    FINDINGS:  The visualized soft tissue structures at the base of the neck appear within normal limits.    The thoracic aorta maintains normal caliber, contour, and course without significant atherosclerotic calcification within its course.  There is no evidence of aneurysmal dilation or dissection. The heart is not enlarged and there is no evidence of pericardial effusion.  There are few scattered coronary artery calcifications  present.The esophagus maintains a normal course and caliber.There is no axillary, mediastinal, or hilar lymph node enlargement.    The trachea is midline and proximal airways are patent. Detailed evaluation of the lung parenchyma is limited by respiratory motion.  There is no pneumothorax.  There is no confluent airspace consolidation present.  There is no significant pleural fluid.    Evaluation of the pulmonary arteries is limited secondary to respiratory motion artifact, particularly at the lung bases.  There is no evidence of large central saddle type pulmonary embolus or definite filling defect to the level of the proximal segmental arteries.  No definite evidence to suggest pulmonary infarct.    Limited images of the upper abdomen obtained during the course of this dedicated thoracic CT demonstrate no acute abnormalities.    The osseous structures demonstrate degenerative changes of the visualized spine with multilevel osteophytosis.  There are degenerative changes of the visualized right shoulder.                                       X-Ray Chest AP Portable (Final result)  Result time 03/22/23 03:39:18      Final result by Josh Corrales MD (03/22/23 03:39:18)                   Impression:      No acute findings.    No significant change from prior study.      Electronically signed by: Josh Corrales MD  Date:    03/22/2023  Time:    03:39               Narrative:    EXAMINATION:  XR CHEST AP PORTABLE    CLINICAL HISTORY:  Chest Pain;    TECHNIQUE:  Single frontal view of the chest was performed.    COMPARISON:  09/26/2020.    FINDINGS:  Lordotic positioning.    Heart and lungs  appear unchanged when allowing for differences in technique and positioning.                                       Medications   nitroGLYCERIN SL tablet 0.4 mg (0.4 mg Sublingual Given 3/22/23 8004)   LIDOcaine 5 % patch 1 patch (1 patch Transdermal Patch Applied 3/22/23 5281)   sodium chloride 0.9% flush 10 mL (has no  administration in time range)   enoxaparin injection 40 mg (has no administration in time range)   melatonin tablet 6 mg (has no administration in time range)   ondansetron disintegrating tablet 8 mg (has no administration in time range)   promethazine tablet 25 mg (has no administration in time range)   polyethylene glycol packet 17 g (has no administration in time range)   bisacodyL suppository 10 mg (has no administration in time range)   acetaminophen tablet 650 mg (650 mg Oral Given 3/22/23 0619)   naloxone 0.4 mg/mL injection 0.02 mg (has no administration in time range)   glucagon (human recombinant) injection 1 mg (has no administration in time range)   dextrose 10% bolus 125 mL 125 mL (has no administration in time range)   dextrose 10% bolus 250 mL 250 mL (has no administration in time range)   dextrose 40 % gel 15,000 mg (has no administration in time range)   dextrose 40 % gel 30,000 mg (has no administration in time range)   morphine injection 4 mg (4 mg Intravenous Given 3/22/23 0215)   morphine injection 4 mg (4 mg Intravenous Given 3/22/23 0301)   iohexoL (OMNIPAQUE 350) injection 100 mL (100 mLs Intravenous Given 3/22/23 0314)   fentaNYL 50 mcg/mL injection 50 mcg (50 mcg Intravenous Given 3/22/23 0417)     Medical Decision Making:   Initial Assessment:   This is a 52-year-old male, current smoker presents the ER for evaluation of 2 days of chest pain.  Initially midsternal now right-sided pleuritic worse with deep inspiration.  Patient endorses the pain has intensified never had anything like this before came the ER for further evaluation.  Patient arrived in the ER he appears uncomfortable secondary to pain.  His EKG was sinus tach without any signs of significant ischemia.  Differential includes NSTEMI, ACS, pleurisy pneumonia PE other cause.  Will plan blood work symptomatic support and reassess likely admit.  ED Management:  Patient's labs reviewed.  Two set troponin within normal limits,  D-dimer elevated, CTA no large PE or pneumonia noted.  CBC reassuring.  Given patient's midsternal chest pain concern for cardiac process.  Will plan admission to Medicine.  Additional MDM:   Heart Score:    History:          Moderately suspicious.  ECG:             Nonspecific repolarisation disturbance  Age:               45-65 years  Risk factors: >= 3 risk factors or history of atherosclerotic disease  Troponin:       Less than or equal to normal limit  Final Score: 5                           Clinical Impression:   Final diagnoses:  [R07.9] Chest pain (Primary)        ED Disposition Condition    WALESKA Peoples MD  03/22/23 0659

## 2023-03-27 ENCOUNTER — HOSPITAL ENCOUNTER (OUTPATIENT)
Facility: HOSPITAL | Age: 53
Discharge: HOME | End: 2023-03-28
Attending: EMERGENCY MEDICINE | Admitting: INTERNAL MEDICINE
Payer: MEDICARE

## 2023-03-27 DIAGNOSIS — R07.9 CHEST PAIN: ICD-10-CM

## 2023-03-27 DIAGNOSIS — R07.9 CHEST PAIN, RULE OUT ACUTE MYOCARDIAL INFARCTION: ICD-10-CM

## 2023-03-27 LAB
ALBUMIN SERPL BCP-MCNC: 3.9 G/DL (ref 3.5–5.2)
ALLENS TEST: ABNORMAL
ALP SERPL-CCNC: 66 U/L (ref 55–135)
ALT SERPL W/O P-5'-P-CCNC: 35 U/L (ref 10–44)
ANION GAP SERPL CALC-SCNC: 10 MMOL/L (ref 8–16)
AST SERPL-CCNC: 27 U/L (ref 10–40)
BASOPHILS # BLD AUTO: 0.05 K/UL (ref 0–0.2)
BASOPHILS NFR BLD: 0.7 % (ref 0–1.9)
BILIRUB SERPL-MCNC: 0.2 MG/DL (ref 0.1–1)
BNP SERPL-MCNC: 13 PG/ML (ref 0–99)
BUN SERPL-MCNC: 15 MG/DL (ref 6–20)
BUN SERPL-MCNC: 15 MG/DL (ref 6–30)
CALCIUM SERPL-MCNC: 9.9 MG/DL (ref 8.7–10.5)
CHLORIDE SERPL-SCNC: 105 MMOL/L (ref 95–110)
CHLORIDE SERPL-SCNC: 106 MMOL/L (ref 95–110)
CO2 SERPL-SCNC: 22 MMOL/L (ref 23–29)
CREAT SERPL-MCNC: 0.9 MG/DL (ref 0.5–1.4)
CREAT SERPL-MCNC: 0.9 MG/DL (ref 0.5–1.4)
DIFFERENTIAL METHOD: ABNORMAL
EOSINOPHIL # BLD AUTO: 0.5 K/UL (ref 0–0.5)
EOSINOPHIL NFR BLD: 7.4 % (ref 0–8)
ERYTHROCYTE [DISTWIDTH] IN BLOOD BY AUTOMATED COUNT: 12.8 % (ref 11.5–14.5)
EST. GFR  (NO RACE VARIABLE): >60 ML/MIN/1.73 M^2
GLUCOSE SERPL-MCNC: 85 MG/DL (ref 70–110)
GLUCOSE SERPL-MCNC: 89 MG/DL (ref 70–110)
HCO3 UR-SCNC: 24.2 MMOL/L (ref 24–28)
HCT VFR BLD AUTO: 37.3 % (ref 40–54)
HCT VFR BLD CALC: 38 %PCV (ref 36–54)
HCV AB SERPL QL IA: NORMAL
HGB BLD-MCNC: 12.6 G/DL (ref 14–18)
HIV 1+2 AB+HIV1 P24 AG SERPL QL IA: NORMAL
IMM GRANULOCYTES # BLD AUTO: 0.02 K/UL (ref 0–0.04)
IMM GRANULOCYTES NFR BLD AUTO: 0.3 % (ref 0–0.5)
LYMPHOCYTES # BLD AUTO: 1.6 K/UL (ref 1–4.8)
LYMPHOCYTES NFR BLD: 21.9 % (ref 18–48)
MCH RBC QN AUTO: 30.5 PG (ref 27–31)
MCHC RBC AUTO-ENTMCNC: 33.8 G/DL (ref 32–36)
MCV RBC AUTO: 90 FL (ref 82–98)
MONOCYTES # BLD AUTO: 0.8 K/UL (ref 0.3–1)
MONOCYTES NFR BLD: 11.2 % (ref 4–15)
NEUTROPHILS # BLD AUTO: 4.2 K/UL (ref 1.8–7.7)
NEUTROPHILS NFR BLD: 58.5 % (ref 38–73)
NRBC BLD-RTO: 0 /100 WBC
PCO2 BLDA: 37 MMHG (ref 35–45)
PH SMN: 7.42 [PH] (ref 7.35–7.45)
PLATELET # BLD AUTO: 263 K/UL (ref 150–450)
PMV BLD AUTO: 8.7 FL (ref 9.2–12.9)
PO2 BLDA: 62 MMHG (ref 40–60)
POC BE: 0 MMOL/L
POC CARDIAC TROPONIN I: 0 NG/ML (ref 0–0.08)
POC IONIZED CALCIUM: 1.21 MMOL/L (ref 1.06–1.42)
POC SATURATED O2: 92 % (ref 95–100)
POC TCO2 (MEASURED): 25 MMOL/L (ref 23–29)
POC TCO2: 25 MMOL/L (ref 24–29)
POTASSIUM BLD-SCNC: 3.9 MMOL/L (ref 3.5–5.1)
POTASSIUM SERPL-SCNC: 4 MMOL/L (ref 3.5–5.1)
PROT SERPL-MCNC: 7.2 G/DL (ref 6–8.4)
RBC # BLD AUTO: 4.13 M/UL (ref 4.6–6.2)
SALICYLATES SERPL-MCNC: <5 MG/DL (ref 15–30)
SAMPLE: ABNORMAL
SAMPLE: NORMAL
SAMPLE: NORMAL
SITE: ABNORMAL
SODIUM BLD-SCNC: 139 MMOL/L (ref 136–145)
SODIUM SERPL-SCNC: 138 MMOL/L (ref 136–145)
TROPONIN I SERPL DL<=0.01 NG/ML-MCNC: <0.006 NG/ML (ref 0–0.03)
TROPONIN I SERPL DL<=0.01 NG/ML-MCNC: <0.006 NG/ML (ref 0–0.03)
WBC # BLD AUTO: 7.21 K/UL (ref 3.9–12.7)

## 2023-03-27 PROCEDURE — G0378 HOSPITAL OBSERVATION PER HR: HCPCS

## 2023-03-27 PROCEDURE — 99900035 HC TECH TIME PER 15 MIN (STAT)

## 2023-03-27 PROCEDURE — 84484 ASSAY OF TROPONIN QUANT: CPT

## 2023-03-27 PROCEDURE — 86803 HEPATITIS C AB TEST: CPT | Performed by: PHYSICIAN ASSISTANT

## 2023-03-27 PROCEDURE — 84484 ASSAY OF TROPONIN QUANT: CPT | Mod: 91 | Performed by: PHYSICIAN ASSISTANT

## 2023-03-27 PROCEDURE — 99285 EMERGENCY DEPT VISIT HI MDM: CPT | Mod: 25

## 2023-03-27 PROCEDURE — 93010 EKG 12-LEAD: ICD-10-PCS | Mod: ,,, | Performed by: INTERNAL MEDICINE

## 2023-03-27 PROCEDURE — 96374 THER/PROPH/DIAG INJ IV PUSH: CPT

## 2023-03-27 PROCEDURE — 87389 HIV-1 AG W/HIV-1&-2 AB AG IA: CPT | Performed by: PHYSICIAN ASSISTANT

## 2023-03-27 PROCEDURE — 99223 1ST HOSP IP/OBS HIGH 75: CPT | Mod: ,,, | Performed by: PHYSICIAN ASSISTANT

## 2023-03-27 PROCEDURE — 93005 ELECTROCARDIOGRAM TRACING: CPT

## 2023-03-27 PROCEDURE — 80047 BASIC METABLC PNL IONIZED CA: CPT | Mod: 59

## 2023-03-27 PROCEDURE — 85025 COMPLETE CBC W/AUTO DIFF WBC: CPT | Performed by: EMERGENCY MEDICINE

## 2023-03-27 PROCEDURE — 63600175 PHARM REV CODE 636 W HCPCS: Performed by: PHYSICIAN ASSISTANT

## 2023-03-27 PROCEDURE — 99223 PR INITIAL HOSPITAL CARE,LEVL III: ICD-10-PCS | Mod: ,,, | Performed by: PHYSICIAN ASSISTANT

## 2023-03-27 PROCEDURE — 80053 COMPREHEN METABOLIC PANEL: CPT | Performed by: EMERGENCY MEDICINE

## 2023-03-27 PROCEDURE — 83880 ASSAY OF NATRIURETIC PEPTIDE: CPT | Performed by: EMERGENCY MEDICINE

## 2023-03-27 PROCEDURE — 99285 PR EMERGENCY DEPT VISIT,LEVEL V: ICD-10-PCS | Mod: ,,, | Performed by: EMERGENCY MEDICINE

## 2023-03-27 PROCEDURE — 84484 ASSAY OF TROPONIN QUANT: CPT | Performed by: EMERGENCY MEDICINE

## 2023-03-27 PROCEDURE — 93010 ELECTROCARDIOGRAM REPORT: CPT | Mod: ,,, | Performed by: INTERNAL MEDICINE

## 2023-03-27 PROCEDURE — 25000242 PHARM REV CODE 250 ALT 637 W/ HCPCS

## 2023-03-27 PROCEDURE — 25000003 PHARM REV CODE 250: Performed by: PHYSICIAN ASSISTANT

## 2023-03-27 PROCEDURE — 82803 BLOOD GASES ANY COMBINATION: CPT

## 2023-03-27 PROCEDURE — 99285 EMERGENCY DEPT VISIT HI MDM: CPT | Mod: ,,, | Performed by: EMERGENCY MEDICINE

## 2023-03-27 PROCEDURE — 96372 THER/PROPH/DIAG INJ SC/IM: CPT | Performed by: PHYSICIAN ASSISTANT

## 2023-03-27 PROCEDURE — 63600175 PHARM REV CODE 636 W HCPCS

## 2023-03-27 PROCEDURE — 80179 DRUG ASSAY SALICYLATE: CPT | Performed by: EMERGENCY MEDICINE

## 2023-03-27 RX ORDER — BISACODYL 10 MG
10 SUPPOSITORY, RECTAL RECTAL DAILY PRN
Status: DISCONTINUED | OUTPATIENT
Start: 2023-03-27 | End: 2023-03-28 | Stop reason: HOSPADM

## 2023-03-27 RX ORDER — DEXTROSE 40 %
30 GEL (GRAM) ORAL
Status: DISCONTINUED | OUTPATIENT
Start: 2023-03-27 | End: 2023-03-28 | Stop reason: HOSPADM

## 2023-03-27 RX ORDER — ACETAMINOPHEN 325 MG/1
650 TABLET ORAL EVERY 4 HOURS PRN
Status: DISCONTINUED | OUTPATIENT
Start: 2023-03-27 | End: 2023-03-28 | Stop reason: HOSPADM

## 2023-03-27 RX ORDER — ATORVASTATIN CALCIUM 40 MG/1
40 TABLET, FILM COATED ORAL DAILY
Status: DISCONTINUED | OUTPATIENT
Start: 2023-03-27 | End: 2023-03-28 | Stop reason: HOSPADM

## 2023-03-27 RX ORDER — PROMETHAZINE HYDROCHLORIDE 25 MG/1
25 TABLET ORAL EVERY 6 HOURS PRN
Status: DISCONTINUED | OUTPATIENT
Start: 2023-03-27 | End: 2023-03-28 | Stop reason: HOSPADM

## 2023-03-27 RX ORDER — ONDANSETRON 8 MG/1
8 TABLET, ORALLY DISINTEGRATING ORAL EVERY 8 HOURS PRN
Status: DISCONTINUED | OUTPATIENT
Start: 2023-03-27 | End: 2023-03-28 | Stop reason: HOSPADM

## 2023-03-27 RX ORDER — MORPHINE SULFATE 4 MG/ML
4 INJECTION, SOLUTION INTRAMUSCULAR; INTRAVENOUS
Status: COMPLETED | OUTPATIENT
Start: 2023-03-27 | End: 2023-03-27

## 2023-03-27 RX ORDER — IBUPROFEN 200 MG
1 TABLET ORAL DAILY PRN
Status: DISCONTINUED | OUTPATIENT
Start: 2023-03-27 | End: 2023-03-28 | Stop reason: HOSPADM

## 2023-03-27 RX ORDER — ASPIRIN 325 MG
325 TABLET ORAL
Status: DISCONTINUED | OUTPATIENT
Start: 2023-03-27 | End: 2023-03-27

## 2023-03-27 RX ORDER — FAMOTIDINE 20 MG/1
40 TABLET, FILM COATED ORAL DAILY
Status: DISCONTINUED | OUTPATIENT
Start: 2023-03-28 | End: 2023-03-28 | Stop reason: HOSPADM

## 2023-03-27 RX ORDER — ALPRAZOLAM 1 MG/1
1 TABLET ORAL DAILY PRN
Status: DISCONTINUED | OUTPATIENT
Start: 2023-03-27 | End: 2023-03-28 | Stop reason: HOSPADM

## 2023-03-27 RX ORDER — DEXTROSE 40 %
15 GEL (GRAM) ORAL
Status: DISCONTINUED | OUTPATIENT
Start: 2023-03-27 | End: 2023-03-28 | Stop reason: HOSPADM

## 2023-03-27 RX ORDER — NITROGLYCERIN 0.4 MG/1
0.4 TABLET SUBLINGUAL EVERY 5 MIN PRN
Status: DISCONTINUED | OUTPATIENT
Start: 2023-03-27 | End: 2023-03-28 | Stop reason: HOSPADM

## 2023-03-27 RX ORDER — ENOXAPARIN SODIUM 100 MG/ML
40 INJECTION SUBCUTANEOUS EVERY 24 HOURS
Status: DISCONTINUED | OUTPATIENT
Start: 2023-03-27 | End: 2023-03-28 | Stop reason: HOSPADM

## 2023-03-27 RX ORDER — GLUCAGON 1 MG
1 KIT INJECTION
Status: DISCONTINUED | OUTPATIENT
Start: 2023-03-27 | End: 2023-03-28 | Stop reason: HOSPADM

## 2023-03-27 RX ORDER — TALC
6 POWDER (GRAM) TOPICAL NIGHTLY PRN
Status: DISCONTINUED | OUTPATIENT
Start: 2023-03-27 | End: 2023-03-28 | Stop reason: HOSPADM

## 2023-03-27 RX ORDER — MORPHINE SULFATE 4 MG/ML
4 INJECTION, SOLUTION INTRAMUSCULAR; INTRAVENOUS EVERY 6 HOURS PRN
Status: DISCONTINUED | OUTPATIENT
Start: 2023-03-27 | End: 2023-03-28 | Stop reason: HOSPADM

## 2023-03-27 RX ORDER — POLYETHYLENE GLYCOL 3350 17 G/17G
17 POWDER, FOR SOLUTION ORAL DAILY PRN
Status: DISCONTINUED | OUTPATIENT
Start: 2023-03-27 | End: 2023-03-28 | Stop reason: HOSPADM

## 2023-03-27 RX ORDER — ALPRAZOLAM 1 MG/1
2 TABLET ORAL NIGHTLY
Status: DISCONTINUED | OUTPATIENT
Start: 2023-03-27 | End: 2023-03-28 | Stop reason: HOSPADM

## 2023-03-27 RX ADMIN — NITROGLYCERIN 0.4 MG: 0.4 TABLET, ORALLY DISINTEGRATING SUBLINGUAL at 05:03

## 2023-03-27 RX ADMIN — ENOXAPARIN SODIUM 40 MG: 40 INJECTION SUBCUTANEOUS at 08:03

## 2023-03-27 RX ADMIN — ALPRAZOLAM 2 MG: 0.5 TABLET ORAL at 09:03

## 2023-03-27 RX ADMIN — MORPHINE SULFATE 4 MG: 4 INJECTION INTRAVENOUS at 07:03

## 2023-03-27 NOTE — Clinical Note
Diagnosis: Chest pain [099573]   Future Attending Provider: GIL FRIAS [[id number]]   Admitting Provider:: GIL FRIAS [[id number]]   Special Needs:: No Special Needs [1]

## 2023-03-27 NOTE — PROVIDER PROGRESS NOTES - EMERGENCY DEPT.
Encounter Date: 3/27/2023    ED Physician Progress Notes         EKG - STEMI Decision  Initial Reading: No STEMI present.  Response: No Action Needed.

## 2023-03-27 NOTE — ED NOTES
"..Patient identifiers for Keon Schneider Jr. 52 y.o. male checked and correct.  Chief Complaint   Patient presents with    Chest Pain     Was admitted and left AMA on friday     Past Medical History:   Diagnosis Date    Anxiety     Benzodiazepine dependence    Arthritis     Asthma     Avascular necrosis     Carpal tunnel syndrome     Chronic pain     Depression     Diverticulitis     Gout     Other injury of other sites of trunk 12/28/2011    Pneumonia     Spondylolisthesis     lumbar; myofascial pain    Staph infection     Ulnar neuropathy     left and rt arm     Allergies reported:   Review of patient's allergies indicates:   Allergen Reactions    Toradol [ketorolac] Hives and Nausea And Vomiting    Ibuprofen Other (See Comments)     States GI BLEED         LOC: Patient is awake, alert, and aware of environment with an appropriate affect. Patient is oriented x 3 and speaking appropriately.  APPEARANCE: Patient resting comfortably and in no acute distress. Patient is clean and well groomed, patient's clothing is properly fastened.  HEENT: **AAO--c/o chest pain " state's" it feels like an elephant on my chest" Left AMA this weekend. Symptoms similar   SKIN: The skin is warm and dry. Patient has normal skin turgor and moist mucus membranes. Skin is intact; no bruising or breakdown noted.  MUSKULOSKELETAL: Patient is moving all extremities well, no obvious deformities noted. Pulses intact.   RESPIRATORY: Airway is open and patent. Respirations are spontaneous and non-labored with normal effort and rate, BBS=clear  CARDIAC: Patient has a normal rate and rhythm. NSR on cardiac monitor,No peripheral edema noted.   ABDOMEN: No distention noted. Bowel sounds active in all 4 quadrants. Soft and non-tender upon palpation.  NEUROLOGICAL: . Facial expression is symmetrical. Hand grasps are equal bilaterally. Normal sensation in all extremities when touched with finger.            "

## 2023-03-27 NOTE — ED PROVIDER NOTES
Encounter Date: 3/27/2023       History     Chief Complaint   Patient presents with    Chest Pain     Was admitted and left AMA on friday     Is a 52-year-old male who presented emergency department for evaluation of chest pain.  Patient reports that he is had significant midsternal chest pressure for approximately 2-1/2 hours.  Patient reports that he was here proximally 3 days ago for chest pain and had a negative workup but was planning to be admitted for possible echo and stress test.  Patient states that he left AMA secondary to social issues as he had no one else to watch his kids.  Patient reports since returning home pain somewhat resolved however today had worsening chest pressure.  He also noted to be clammy diaphoretic and left upper extremity pain.  Patient reports that he took a baby aspirin at home but did not take any other medications.  He also notes shortness of breath and pain with inspiration.  Denies any alleviating or aggravating factors.  He denies any fevers, cough, congestion, nausea vomiting, abdominal pain at this time.      Review of patient's allergies indicates:   Allergen Reactions    Toradol [ketorolac] Hives and Nausea And Vomiting    Ibuprofen Other (See Comments)     States GI BLEED     Past Medical History:   Diagnosis Date    Anxiety     Benzodiazepine dependence    Arthritis     Asthma     Avascular necrosis     Carpal tunnel syndrome     Chronic pain     Depression     Diverticulitis     Gout     Other injury of other sites of trunk 12/28/2011    Pneumonia     Spondylolisthesis     lumbar; myofascial pain    Staph infection     Ulnar neuropathy     left and rt arm     Past Surgical History:   Procedure Laterality Date    COLONOSCOPY N/A 7/6/2020    Procedure: COLONOSCOPY;  Surgeon: Broderick Moise MD;  Location: [facility];  Service: Endoscopy;  Laterality: N/A;    HIP SURGERY  3/06; 6/06    hip arthroplasty    HIP SURGERY      bilateral hip replacement (titanium)    JOINT  REPLACEMENT      SHOULDER SURGERY  2012    right shoulder    SHOULDER SURGERY       Family History   Problem Relation Age of Onset    Cancer Mother     Cancer Sister      Social History     Tobacco Use    Smoking status: Some Days     Packs/day: 1.00     Years: 10.00     Pack years: 10.00     Types: Cigarettes     Last attempt to quit: 10/13/2012     Years since quitting: 10.4    Smokeless tobacco: Never   Substance Use Topics    Alcohol use: Yes     Alcohol/week: 12.0 standard drinks     Types: 12 Cans of beer per week     Comment: 2x/week    Drug use: No         Physical Exam     Initial Vitals [03/27/23 1521]   BP Pulse Resp Temp SpO2   (!) 181/101 82 18 97.8 °F (36.6 °C) 97 %      MAP       --         Physical Exam    Nursing note and vitals reviewed.  Constitutional: He appears well-developed and well-nourished.   HENT:   Head: Normocephalic and atraumatic.   Eyes: EOM are normal. Pupils are equal, round, and reactive to light.   Neck: Neck supple.   Normal range of motion.  Cardiovascular:  Normal rate and regular rhythm.           Pulmonary/Chest: Breath sounds normal.   Abdominal: Abdomen is soft. Bowel sounds are normal.   Musculoskeletal:         General: Normal range of motion.      Cervical back: Normal range of motion and neck supple.     Neurological: He is alert and oriented to person, place, and time. GCS score is 15. GCS eye subscore is 4. GCS verbal subscore is 5. GCS motor subscore is 6.   Skin: Skin is warm and dry. Capillary refill takes less than 2 seconds.       ED Course   Procedures  Labs Reviewed   CBC W/ AUTO DIFFERENTIAL - Abnormal; Notable for the following components:       Result Value    RBC 4.13 (*)     Hemoglobin 12.6 (*)     Hematocrit 37.3 (*)     MPV 8.7 (*)     All other components within normal limits    Narrative:     Release to patient->Immediate   COMPREHENSIVE METABOLIC PANEL - Abnormal; Notable for the following components:    CO2 22 (*)     All other components within  normal limits    Narrative:     Release to patient->Immediate   SALICYLATE LEVEL - Abnormal; Notable for the following components:    Salicylate Lvl <5.0 (*)     All other components within normal limits    Narrative:     Release to patient->Immediate   ISTAT PROCEDURE - Abnormal; Notable for the following components:    POC PO2 62 (*)     POC SATURATED O2 92 (*)     All other components within normal limits   HIV 1 / 2 ANTIBODY    Narrative:     Release to patient->Immediate   HEPATITIS C ANTIBODY    Narrative:     Release to patient->Immediate   TROPONIN I    Narrative:     Release to patient->Immediate   B-TYPE NATRIURETIC PEPTIDE    Narrative:     Release to patient->Immediate   TROPONIN ISTAT   TROPONIN I   ISTAT PROCEDURE   POCT TROPONIN     EKG Readings: (Independently Interpreted)   Initial Reading: No STEMI. Previous EKG: Compared with most recent EKG Rhythm: Normal Sinus Rhythm. Heart Rate: 83.   ECG Results              EKG 12-lead (Final result)  Result time 03/27/23 20:14:18      Final result by Interface, Lab In [facility] (03/27/23 20:14:18)                   Narrative:    Test Reason : R07.9,    Vent. Rate : 083 BPM     Atrial Rate : 083 BPM     P-R Int : 140 ms          QRS Dur : 080 ms      QT Int : 360 ms       P-R-T Axes : 057 033 031 degrees     QTc Int : 423 ms    Normal sinus rhythm  Normal ECG  When compared with ECG of 22-MAR-2023 01:37,  No significant change was found  Confirmed by Nacho GARCIA MD ([id number]) on 3/27/2023 8:14:11 PM    Referred By:             Confirmed By:Nacho GARCIA MD                                  Imaging Results              X-Ray Chest AP Portable (Final result)  Result time 03/27/23 20:06:10      Final result by Josh Corrales MD (03/27/23 20:06:10)                   Impression:      No acute findings.    No significant change from prior study.      Electronically signed by: Josh Corrales MD  Date:    03/27/2023  Time:    20:06               Narrative:     EXAMINATION:  XR CHEST AP PORTABLE    CLINICAL HISTORY:  Chest pain, unspecified    TECHNIQUE:  Single frontal view of the chest was performed.    COMPARISON:  03/22/2023.    FINDINGS:  Lordotic positioning.    Heart and lungs  appear unchanged when allowing for differences in technique and positioning.                                    X-Rays:   Independently Interpreted Readings:   Chest X-Ray: Normal heart size.  No infiltrates.  No acute abnormalities. No pneumothorax or free air   Medications   nitroGLYCERIN SL tablet 0.4 mg (0.4 mg Sublingual Given 3/27/23 1737)   enoxaparin injection 40 mg (40 mg Subcutaneous Given 3/27/23 2059)   melatonin tablet 6 mg (has no administration in time range)   ondansetron disintegrating tablet 8 mg (has no administration in time range)   promethazine tablet 25 mg (has no administration in time range)   polyethylene glycol packet 17 g (has no administration in time range)   bisacodyL suppository 10 mg (has no administration in time range)   acetaminophen tablet 650 mg (has no administration in time range)   glucagon (human recombinant) injection 1 mg (has no administration in time range)   nicotine 14 mg/24 hr 1 patch (has no administration in time range)   dextrose 10% bolus 125 mL 125 mL (has no administration in time range)   dextrose 10% bolus 250 mL 250 mL (has no administration in time range)   dextrose 40 % gel 15,000 mg (has no administration in time range)   dextrose 40 % gel 30,000 mg (has no administration in time range)   atorvastatin tablet 40 mg (40 mg Oral Given 3/28/23 0903)   ALPRAZolam tablet 2 mg (2 mg Oral Given 3/27/23 2125)   famotidine tablet 40 mg (40 mg Oral Given 3/28/23 0903)   morphine injection 4 mg (4 mg Intravenous Given 3/28/23 0903)   ALPRAZolam tablet 1 mg (1 mg Oral Given 3/28/23 0903)   allopurinoL tablet 100 mg (100 mg Oral Given 3/28/23 0903)   aspirin EC tablet 81 mg (81 mg Oral Given 3/28/23 0903)   morphine injection 4 mg (4 mg  Intravenous Given 3/27/23 1913)     Medical Decision Making:   History:   Old Medical Records: I decided to obtain old medical records.  Old Records Summarized: records from previous admission(s).       <> Summary of Records: 3/22/23: This is a 52-year-old male, current smoker presents the ER for evaluation of 2 days of chest pain.  Initially midsternal now right-sided pleuritic worse with deep inspiration.  Patient endorses the pain has intensified never had anything like this before came the ER for further evaluation.  Patient arrived in the ER he appears uncomfortable secondary to pain.  His EKG was sinus tach without any signs of significant ischemia.  Differential includes NSTEMI, ACS, pleurisy pneumonia PE other cause.  Will plan blood work symptomatic support and reassess likely admit.  ED Management:  Patient's labs reviewed.  Two set troponin within normal limits, D-dimer elevated, CTA no large PE or pneumonia noted.  CBC reassuring.  Given patient's midsternal chest pain concern for cardiac process.  Will plan admission to Medicine.  Initial Assessment:    52-year-old male who presented emergency department for chest pain.  At the time assessment patient is alert oriented in no acute distress.  Patient is afebrile vitals within normal limits.  Physical exam findings noted above.  Differential Diagnosis:   ACS  Pneumonia  Musculoskeletal  Independently Interpreted Test(s):   I have ordered and independently interpreted X-rays - see prior notes.  I have ordered and independently interpreted EKG Reading(s) - see prior notes  Clinical Tests:   Lab Tests: Ordered and Reviewed  Radiological Study: Reviewed and Ordered  Medical Tests: Ordered and Reviewed  ED Management:  53-year-old male who presented emergency department for evaluation of chest pain.  Initial EKG displayed no signs of ST elevations, malignant arrhythmias with all normal intervals.  Upon examination patient is complaining of persistent chest  pain.  Repeat EKG obtained which displayed no signs of ST elevations.  Patient complaining of significant pain given sublingual nitro with minimal relief.  Troponin and BNP obtained, both which were negative.  Chest x-ray obtained to evaluate no signs of focal consolidation suggestive of pneumonia, pneumothorax or any acute intrathoracic processes suggestive of shortness of breath/chest pain.  CBC, CMP obtained to evaluate for leukocytosis, anemia, metabolic derangements.  Labs stable from prior with no gross derangements.  Patient is complaining of persistent chest pain given morphine 4 mg IV.  Repeat troponin ordered and pending.  At this time given heart score of 4 and risk factors discussed case with Hospital Medicine who will admit patient for continued workup.  Patient is agreeable with plan.  Other:   I have discussed this case with another health care provider.       <> Summary of the Discussion:   Additional MDM:   Heart Score:    History:          Highly suspicious.  ECG:             Normal  Age:               45-65 years  Risk factors: 1-2 risk factors  Troponin:       Less than or equal to normal limit  Final Score: 4         Attending Attestation:   Physician Attestation Statement for Resident:  As the supervising MD   Physician Attestation Statement: I have personally seen and examined this patient.   I agree with the above history.  -:   As the supervising MD I agree with the above PE.     As the supervising MD I agree with the above treatment, course, plan, and disposition.                        I have reviewed and concur with the resident's history, physical, assessment, and plan.  I have personally interviewed and examined the patient at bedside.   I did supervise any and all procedures and was present for any critical portion, and was always immediately available for help and as a resource.     The above history physical, review of symptoms, HPI and physical exam reflect my independent  interpretation and evaluation.    Complexity: High Risk    Final diagnoses:  [R07.9] Chest pain     Demetrius Mcneil DO, FAAEM  Emergency Staff Physician   Dept of Emergency Medicine   [facility]  Spectralink: [id number]        Disclaimer: This note has been generated using voice-recognition software. There may be typographical errors that have been missed during proof-reading.                 Clinical Impression:   Final diagnoses:  [R07.9] Chest pain        ED Disposition Condition    Observation                 Lauren Murrell MD  Resident  03/27/23 [id number]       Demetrius Mcneil DO  03/28/23 0180

## 2023-03-27 NOTE — FIRST PROVIDER EVALUATION
Medical screening examination initiated.  I have conducted a focused provider triage encounter, findings are as follows:    Brief history of present illness:  52-year-old male presents with chest pressure.  He reports he was admitted for similar symptoms this weekend but left against medical advice.  He reports it feels like there is an elephant sitting on his chest.  He reports he has been taking Masood aspirin regularly to help relieve his symptoms but it was not working.    Vitals:    03/27/23 1521   BP: (!) 181/101   Pulse: 82   Resp: 18   Temp: 97.8 °F (36.6 °C)   TempSrc: Oral   SpO2: 97%   Weight: 111 kg (244 lb 11.4 oz)   Height: 6' (1.829 m)       Pertinent physical exam:  mild distress due to pain    Brief workup plan:  EKG is nonischemic.  Will obtain troponin.  Will not give additional aspirin as patient has been taking aspirin today.    Preliminary workup initiated; this workup will be continued and followed by the physician or advanced practice provider that is assigned to the patient when roomed.

## 2023-03-28 VITALS
DIASTOLIC BLOOD PRESSURE: 83 MMHG | WEIGHT: 244 LBS | BODY MASS INDEX: 33.05 KG/M2 | HEIGHT: 72 IN | TEMPERATURE: 98 F | SYSTOLIC BLOOD PRESSURE: 123 MMHG | OXYGEN SATURATION: 93 % | RESPIRATION RATE: 18 BRPM | HEART RATE: 88 BPM

## 2023-03-28 LAB
ANION GAP SERPL CALC-SCNC: 7 MMOL/L (ref 8–16)
ASCENDING AORTA: 3.35 CM
AV INDEX (PROSTH): 0.74
AV MEAN GRADIENT: 3 MMHG
AV PEAK GRADIENT: 5 MMHG
AV VALVE AREA: 2.68 CM2
AV VELOCITY RATIO: 0.72
BASOPHILS # BLD AUTO: 0.06 K/UL (ref 0–0.2)
BASOPHILS NFR BLD: 0.8 % (ref 0–1.9)
BSA FOR ECHO PROCEDURE: 2.37 M2
BUN SERPL-MCNC: 16 MG/DL (ref 6–20)
CALCIUM SERPL-MCNC: 9.7 MG/DL (ref 8.7–10.5)
CHLORIDE SERPL-SCNC: 104 MMOL/L (ref 95–110)
CHOLEST SERPL-MCNC: 187 MG/DL (ref 120–199)
CHOLEST/HDLC SERPL: 6.7 {RATIO} (ref 2–5)
CO2 SERPL-SCNC: 26 MMOL/L (ref 23–29)
CREAT SERPL-MCNC: 1 MG/DL (ref 0.5–1.4)
CV ECHO LV RWT: 0.51 CM
DIFFERENTIAL METHOD: ABNORMAL
DOP CALC AO PEAK VEL: 1.09 M/S
DOP CALC AO VTI: 20.64 CM
DOP CALC LVOT AREA: 3.6 CM2
DOP CALC LVOT DIAMETER: 2.15 CM
DOP CALC LVOT PEAK VEL: 0.78 M/S
DOP CALC LVOT STROKE VOLUME: 55.26 CM3
DOP CALCLVOT PEAK VEL VTI: 15.23 CM
E WAVE DECELERATION TIME: 160.51 MSEC
E/A RATIO: 1.04
E/E' RATIO: 7 M/S
ECHO LV POSTERIOR WALL: 1.04 CM (ref 0.6–1.1)
EJECTION FRACTION: 60 %
EOSINOPHIL # BLD AUTO: 0.8 K/UL (ref 0–0.5)
EOSINOPHIL NFR BLD: 10.6 % (ref 0–8)
ERYTHROCYTE [DISTWIDTH] IN BLOOD BY AUTOMATED COUNT: 12.8 % (ref 11.5–14.5)
EST. GFR  (NO RACE VARIABLE): >60 ML/MIN/1.73 M^2
ESTIMATED AVG GLUCOSE: 126 MG/DL (ref 68–131)
FRACTIONAL SHORTENING: 32 % (ref 28–44)
GLUCOSE SERPL-MCNC: 91 MG/DL (ref 70–110)
HBA1C MFR BLD: 6 % (ref 4–5.6)
HCT VFR BLD AUTO: 36.5 % (ref 40–54)
HDLC SERPL-MCNC: 28 MG/DL (ref 40–75)
HDLC SERPL: 15 % (ref 20–50)
HGB BLD-MCNC: 12.1 G/DL (ref 14–18)
IMM GRANULOCYTES # BLD AUTO: 0.04 K/UL (ref 0–0.04)
IMM GRANULOCYTES NFR BLD AUTO: 0.5 % (ref 0–0.5)
INTERVENTRICULAR SEPTUM: 0.89 CM (ref 0.6–1.1)
IVRT: 105.61 MSEC
LA MAJOR: 4.87 CM
LA MINOR: 4.8 CM
LA WIDTH: 4.4 CM
LDLC SERPL CALC-MCNC: 117.6 MG/DL (ref 63–159)
LEFT ATRIUM SIZE: 3.23 CM
LEFT ATRIUM VOLUME INDEX MOD: 30.8 ML/M2
LEFT ATRIUM VOLUME INDEX: 25.2 ML/M2
LEFT ATRIUM VOLUME MOD: 71.46 CM3
LEFT ATRIUM VOLUME: 58.4 CM3
LEFT INTERNAL DIMENSION IN SYSTOLE: 2.78 CM (ref 2.1–4)
LEFT VENTRICLE DIASTOLIC VOLUME INDEX: 31.53 ML/M2
LEFT VENTRICLE DIASTOLIC VOLUME: 73.14 ML
LEFT VENTRICLE MASS INDEX: 54 G/M2
LEFT VENTRICLE SYSTOLIC VOLUME INDEX: 12.5 ML/M2
LEFT VENTRICLE SYSTOLIC VOLUME: 28.96 ML
LEFT VENTRICULAR INTERNAL DIMENSION IN DIASTOLE: 4.07 CM (ref 3.5–6)
LEFT VENTRICULAR MASS: 124.22 G
LV LATERAL E/E' RATIO: 6.22 M/S
LV SEPTAL E/E' RATIO: 8 M/S
LYMPHOCYTES # BLD AUTO: 2.1 K/UL (ref 1–4.8)
LYMPHOCYTES NFR BLD: 28.6 % (ref 18–48)
MAGNESIUM SERPL-MCNC: 1.8 MG/DL (ref 1.6–2.6)
MCH RBC QN AUTO: 30.5 PG (ref 27–31)
MCHC RBC AUTO-ENTMCNC: 33.2 G/DL (ref 32–36)
MCV RBC AUTO: 92 FL (ref 82–98)
MONOCYTES # BLD AUTO: 0.8 K/UL (ref 0.3–1)
MONOCYTES NFR BLD: 11 % (ref 4–15)
MV A" WAVE DURATION": 17.13 MSEC
MV PEAK A VEL: 0.54 M/S
MV PEAK E VEL: 0.56 M/S
MV STENOSIS PRESSURE HALF TIME: 46.55 MS
MV VALVE AREA P 1/2 METHOD: 4.73 CM2
NEUTROPHILS # BLD AUTO: 3.6 K/UL (ref 1.8–7.7)
NEUTROPHILS NFR BLD: 48.5 % (ref 38–73)
NONHDLC SERPL-MCNC: 159 MG/DL
NRBC BLD-RTO: 0 /100 WBC
PHOSPHATE SERPL-MCNC: 3.8 MG/DL (ref 2.7–4.5)
PISA TR MAX VEL: 2.32 M/S
PLATELET # BLD AUTO: 251 K/UL (ref 150–450)
PMV BLD AUTO: 9 FL (ref 9.2–12.9)
POTASSIUM SERPL-SCNC: 3.8 MMOL/L (ref 3.5–5.1)
PULM VEIN S/D RATIO: 1.29
PV PEAK D VEL: 0.24 M/S
PV PEAK S VEL: 0.31 M/S
RA MAJOR: 4.74 CM
RA PRESSURE: 3 MMHG
RA WIDTH: 3.36 CM
RBC # BLD AUTO: 3.97 M/UL (ref 4.6–6.2)
RIGHT VENTRICULAR END-DIASTOLIC DIMENSION: 3.59 CM
RV TISSUE DOPPLER FREE WALL SYSTOLIC VELOCITY 1 (APICAL 4 CHAMBER VIEW): 10.55 CM/S
SINUS: 3.67 CM
SODIUM SERPL-SCNC: 137 MMOL/L (ref 136–145)
STJ: 3.05 CM
TDI LATERAL: 0.09 M/S
TDI SEPTAL: 0.07 M/S
TDI: 0.08 M/S
TR MAX PG: 22 MMHG
TRICUSPID ANNULAR PLANE SYSTOLIC EXCURSION: 1.71 CM
TRIGL SERPL-MCNC: 207 MG/DL (ref 30–150)
TROPONIN I SERPL DL<=0.01 NG/ML-MCNC: <0.006 NG/ML (ref 0–0.03)
TSH SERPL DL<=0.005 MIU/L-ACNC: 3.06 UIU/ML (ref 0.4–4)
TV REST PULMONARY ARTERY PRESSURE: 25 MMHG
WBC # BLD AUTO: 7.37 K/UL (ref 3.9–12.7)

## 2023-03-28 PROCEDURE — 84484 ASSAY OF TROPONIN QUANT: CPT | Performed by: PHYSICIAN ASSISTANT

## 2023-03-28 PROCEDURE — 25000003 PHARM REV CODE 250: Performed by: PHYSICIAN ASSISTANT

## 2023-03-28 PROCEDURE — G0378 HOSPITAL OBSERVATION PER HR: HCPCS

## 2023-03-28 PROCEDURE — 84100 ASSAY OF PHOSPHORUS: CPT | Performed by: PHYSICIAN ASSISTANT

## 2023-03-28 PROCEDURE — 99239 HOSP IP/OBS DSCHRG MGMT >30: CPT | Mod: ,,,

## 2023-03-28 PROCEDURE — 80061 LIPID PANEL: CPT | Performed by: PHYSICIAN ASSISTANT

## 2023-03-28 PROCEDURE — 99239 PR HOSPITAL DISCHARGE DAY,>30 MIN: ICD-10-PCS | Mod: ,,,

## 2023-03-28 PROCEDURE — 63600175 PHARM REV CODE 636 W HCPCS: Performed by: PHYSICIAN ASSISTANT

## 2023-03-28 PROCEDURE — 80048 BASIC METABOLIC PNL TOTAL CA: CPT | Performed by: PHYSICIAN ASSISTANT

## 2023-03-28 PROCEDURE — 83735 ASSAY OF MAGNESIUM: CPT | Performed by: PHYSICIAN ASSISTANT

## 2023-03-28 PROCEDURE — 84443 ASSAY THYROID STIM HORMONE: CPT | Performed by: PHYSICIAN ASSISTANT

## 2023-03-28 PROCEDURE — 36415 COLL VENOUS BLD VENIPUNCTURE: CPT | Performed by: PHYSICIAN ASSISTANT

## 2023-03-28 PROCEDURE — 96376 TX/PRO/DX INJ SAME DRUG ADON: CPT | Mod: 59

## 2023-03-28 PROCEDURE — 85025 COMPLETE CBC W/AUTO DIFF WBC: CPT | Performed by: PHYSICIAN ASSISTANT

## 2023-03-28 PROCEDURE — 83036 HEMOGLOBIN GLYCOSYLATED A1C: CPT | Performed by: PHYSICIAN ASSISTANT

## 2023-03-28 RX ORDER — ALLOPURINOL 100 MG/1
100 TABLET ORAL
COMMUNITY
Start: 2023-01-05

## 2023-03-28 RX ORDER — ASPIRIN 81 MG/1
81 TABLET ORAL DAILY
Status: DISCONTINUED | OUTPATIENT
Start: 2023-03-28 | End: 2023-03-28 | Stop reason: HOSPADM

## 2023-03-28 RX ORDER — ALLOPURINOL 100 MG/1
100 TABLET ORAL DAILY
Status: DISCONTINUED | OUTPATIENT
Start: 2023-03-28 | End: 2023-03-28 | Stop reason: HOSPADM

## 2023-03-28 RX ORDER — ATORVASTATIN CALCIUM 40 MG/1
40 TABLET, FILM COATED ORAL DAILY
Qty: 90 TABLET | Refills: 3 | Status: SHIPPED | OUTPATIENT
Start: 2023-03-29 | End: 2023-04-17 | Stop reason: ALTCHOICE

## 2023-03-28 RX ADMIN — MORPHINE SULFATE 4 MG: 4 INJECTION INTRAVENOUS at 03:03

## 2023-03-28 RX ADMIN — ASPIRIN 81 MG: 81 TABLET, COATED ORAL at 09:03

## 2023-03-28 RX ADMIN — ALPRAZOLAM 1 MG: 1 TABLET ORAL at 09:03

## 2023-03-28 RX ADMIN — MORPHINE SULFATE 4 MG: 4 INJECTION INTRAVENOUS at 09:03

## 2023-03-28 RX ADMIN — FAMOTIDINE 40 MG: 20 TABLET, FILM COATED ORAL at 09:03

## 2023-03-28 RX ADMIN — ATORVASTATIN CALCIUM 40 MG: 40 TABLET, FILM COATED ORAL at 09:03

## 2023-03-28 RX ADMIN — ALLOPURINOL 100 MG: 100 TABLET ORAL at 09:03

## 2023-03-28 NOTE — PLAN OF CARE
[facility] - Observation 11H  Discharge Final Note    Primary Care Provider: Primary Doctor No    Expected Discharge Date: 3/28/2023    Final Discharge Note (most recent)       Final Note - 03/28/23 1522          Final Note    Assessment Type Final Discharge Note (P)      Anticipated Discharge Disposition Home or Self Care (P)      Hospital Resources/Appts/Education Provided Appointments scheduled and added to AVS (P)         Post-Acute Status    Discharge Delays None known at this time (P)                      Important Message from Medicare             Contact Info       Nancy Tadeo MD   Specialty: Internal Medicine    [address]  [facility] [address]   Phone: [phone]       Next Steps: Follow up on 4/4/2023    Instructions: Hospital f/u: 4/4/23 at 11 am          Pt will dc today with no needs.    Leslie Torres LCSW   PRN

## 2023-03-28 NOTE — ASSESSMENT & PLAN NOTE
- presentation concerning for ACS, however no objective evidence of ACS at this time  - EKG without acute ST/T wave changes  - CTA negative for PE  - trop < 0.006>> trend   - will hold on stress testing given active chest pain  - check TTE  - lipid panel, A1c, TSH in AM  - continue ASA, start HI statin until ACS ruled out  - PRN nitro  - monitor on tele   - consider IP stress testing pending clinical course

## 2023-03-28 NOTE — PLAN OF CARE
JOHN scheduled the [facility] hospital follow up for April 4 @ 11:00am

## 2023-03-28 NOTE — H&P
"[facility] [address] [place] [address] Medicine  History & Physical    Patient Name: Keon Schneider Jr.  MRN: [id number]  Patient Class: OP- Observation  Admission Date: 3/27/2023  Attending Physician: Dez Self MD   Primary Care Provider: Primary Doctor No         Patient information was obtained from patient, past medical records and ER records.     Subjective:     Principal Problem:Chest pain    Chief Complaint:   Chief Complaint   Patient presents with    Chest Pain     Was admitted and left AMA on friday        HPI: Keon Schneider Jr. Is a 52 y.o. male with a PMHx of anxiety, benzo dependence, HLD who [place] [facility] [place] of [place] pain. Patient is a poor historian and hard to follow as he has very rapid and pressured speech. Patient reports constant midsterchest heaviness described as a "truck sitting on my chest" for the past few days. Pain worsens on exertion and he has associated shortness of breath on exertion. No associated radiation. He occasionally gets "sharp, shooting" pain in the center of his heart. He's been taking daily ASA 81 for symptoms without any relief. He was seen in the ED on Friday for these symptoms with recommendations for admission, however patient decided to leave AMA as he needed to find someone to watch his children. He was moving things on his boat earlier today when he developed severe chest heaviness which prompted him to present to the ED. Currently having mild chest heaviness. Denies fever, chills, LE swelling/pain, HA, vision changes, numbness/tingling, or syncope.     ED: AFVSS. No leukocytosis or electrolyte abnormalities. Trop <0.006, BNP 13. CTA negative for PE or other acute findings. EKG without acute ST/T wave changes.       Past Medical History:   Diagnosis Date    Anxiety     Benzodiazepine dependence    Arthritis     Asthma     Avascular necrosis     Carpal tunnel syndrome     Chronic pain     Depression     Diverticulitis     Gout     " Other injury of other sites of trunk 12/28/2011    Pneumonia     Spondylolisthesis     lumbar; myofascial pain    Staph infection     Ulnar neuropathy     left and rt arm       Past Surgical History:   Procedure Laterality Date    COLONOSCOPY N/A 7/6/2020    Procedure: COLONOSCOPY;  Surgeon: Broderick Moise MD;  Location: [facility];  Service: Endoscopy;  Laterality: N/A;    HIP SURGERY  3/06; 6/06    hip arthroplasty    HIP SURGERY      bilateral hip replacement (titanium)    JOINT REPLACEMENT      SHOULDER SURGERY  2012    right shoulder    SHOULDER SURGERY         Review of patient's allergies indicates:   Allergen Reactions    Toradol [ketorolac] Hives and Nausea And Vomiting    Ibuprofen Other (See Comments)     States GI BLEED       No current facility-administered medications on file prior to encounter.     Current Outpatient Medications on File Prior to Encounter   Medication Sig    ALPRAZolam (XANAX) 2 MG Tab Take 2 mg by mouth 2 (two) times a day.    baclofen (LIORESAL) 10 MG tablet Take 1 tablet (10 mg total) by mouth 3 (three) times daily.    colchicine (MITIGARE) 0.6 mg Cap Take 1 capsule (0.6 mg total) by mouth daily as needed.    cyclobenzaprine (FLEXERIL) 10 MG tablet Take 1 tablet (10 mg total) by mouth 3 (three) times daily as needed for Muscle spasms.    famotidine (PEPCID) 40 MG tablet Take 40 mg by mouth once daily.    gabapentin (NEURONTIN) 300 MG capsule Take 1 capsule (300 mg total) by mouth 3 (three) times daily.    predniSONE (DELTASONE) 10 MG tablet Take 4 tablets by mouth once a day x 3 days, then take 2 tabs once daily x 3 days, then take 1 tab once daily x 3 days.    traMADoL (ULTRAM) 50 mg tablet Take 1 tablet (50 mg total) by mouth every 8 (eight) hours as needed for Pain.     Family History       Problem Relation (Age of Onset)    Cancer Mother, Sister          Tobacco Use    Smoking status: Some Days     Packs/day: 1.00     Years: 10.00     Pack years: 10.00      Types: Cigarettes     Last attempt to quit: 10/13/2012     Years since quitting: 10.4    Smokeless tobacco: Never   Substance and Sexual Activity    Alcohol use: Yes     Alcohol/week: 12.0 standard drinks     Types: 12 Cans of beer per week     Comment: 2x/week    Drug use: No    Sexual activity: Yes     Partners: Female     Review of Systems   Constitutional:  Negative for activity change, chills and fever.   HENT:  Negative for congestion, trouble swallowing and voice change.    Eyes:  Negative for photophobia and visual disturbance.   Respiratory:  Positive for chest tightness and shortness of breath. Negative for wheezing.    Cardiovascular:  Positive for chest pain. Negative for palpitations and leg swelling.   Gastrointestinal:  Negative for abdominal pain, constipation, diarrhea, nausea and vomiting.   Genitourinary:  Negative for dysuria, frequency, hematuria and urgency.   Musculoskeletal:  Negative for arthralgias, back pain and gait problem.   Skin:  Negative for color change and rash.   Neurological:  Negative for dizziness, syncope, weakness, light-headedness, numbness and headaches.   Psychiatric/Behavioral:  Negative for agitation and confusion. The patient is not nervous/anxious.    Objective:     Vital Signs (Most Recent):  Temp: 97.8 °F (36.6 °C) (03/27/23 2301)  Pulse: 73 (03/27/23 2311)  Resp: 18 (03/27/23 2301)  BP: (!) 162/99 (03/27/23 2301)  SpO2: 97 % (03/27/23 2301)   Vital Signs (24h Range):  Temp:  [97.8 °F (36.6 °C)] 97.8 °F (36.6 °C)  Pulse:  [73-88] 73  Resp:  [16-20] 18  SpO2:  [95 %-97 %] 97 %  BP: (121-181)/() 162/99     Weight: 111 kg (244 lb 11.4 oz)  Body mass index is 33.19 kg/m².    Physical Exam  Vitals and nursing note reviewed.   Constitutional:       General: He is not in acute distress.     Appearance: He is well-developed.   HENT:      Head: Normocephalic and atraumatic.      Mouth/Throat:      Pharynx: No oropharyngeal exudate.   Eyes:      Conjunctiva/sclera:  Conjunctivae normal.      Pupils: Pupils are equal, round, and reactive to light.   Cardiovascular:      Rate and Rhythm: Normal rate and regular rhythm.      Heart sounds: Normal heart sounds.   Pulmonary:      Effort: Pulmonary effort is normal. No respiratory distress.      Breath sounds: Normal breath sounds. No wheezing.   Abdominal:      General: Bowel sounds are normal. There is no distension.      Palpations: Abdomen is soft.      Tenderness: There is no abdominal tenderness.   Musculoskeletal:         General: No tenderness. Normal range of motion.      Cervical back: Normal range of motion and neck supple.   Lymphadenopathy:      Cervical: No cervical adenopathy.   Skin:     General: Skin is warm and dry.      Capillary Refill: Capillary refill takes less than 2 seconds.      Findings: No rash.   Neurological:      Mental Status: He is alert and oriented to person, place, and time.      Cranial Nerves: No cranial nerve deficit.      Sensory: No sensory deficit.      Coordination: Coordination normal.   Psychiatric:         Mood and Affect: Mood is elated.         Speech: Speech is rapid and pressured.         Behavior: Behavior normal.         Thought Content: Thought content normal.         Judgment: Judgment normal.         CRANIAL NERVES     CN III, IV, VI   Pupils are equal, round, and reactive to light.     Significant Labs: All pertinent labs within the past 24 hours have been reviewed.  CBC:   Recent Labs   Lab 03/27/23  1721 03/27/23  1733   WBC 7.21  --    HGB 12.6*  --    HCT 37.3* 38     --      CMP:   Recent Labs   Lab 03/27/23  1721      K 4.0      CO2 22*   GLU 85   BUN 15   CREATININE 0.9   CALCIUM 9.9   PROT 7.2   ALBUMIN 3.9   BILITOT 0.2   ALKPHOS 66   AST 27   ALT 35   ANIONGAP 10       Significant Imaging: I have reviewed all pertinent imaging results/findings within the past 24 hours.  X-Ray Chest AP Portable  Narrative: EXAMINATION:  XR CHEST AP PORTABLE    CLINICAL  HISTORY:  Chest pain, unspecified    TECHNIQUE:  Single frontal view of the chest was performed.    COMPARISON:  03/22/2023.    FINDINGS:  Lordotic positioning.    Heart and lungs  appear unchanged when allowing for differences in technique and positioning.  Impression: No acute findings.    No significant change from prior study.    Electronically signed by: Josh Corrales MD  Date:    03/27/2023  Time:    20:06        Assessment/Plan:     * Chest pain  - presentation concerning for ACS, however no objective evidence of ACS at this time  - EKG without acute ST/T wave changes  - CTA negative for PE  - trop < 0.006>> trend   - will hold on stress testing given active chest pain  - check TTE  - lipid panel, A1c, TSH in AM  - continue ASA, start HI statin until ACS ruled out  - PRN nitro  - monitor on tele   - consider IP stress testing pending clinical course    Anxiety  Benzodiazepine dependence  - continue PRN xanax     Other and unspecified alcohol dependence, continuous drinking behavior  - no hx of WD and no signs of WD at this time  - check frequent CIWA scores       VTE Risk Mitigation (From admission, onward)         Ordered     enoxaparin injection 40 mg  Daily         03/27/23 1926     IP VTE HIGH RISK PATIENT  Once         03/27/23 1926                     On 03/28/2023, patient should be placed in hospital observation services under my care in collaboration with Dr. Austyn Frias.      Sindi Gibbs PA-C  Department of Hospital Medicine  [facility]

## 2023-03-28 NOTE — SUBJECTIVE & OBJECTIVE
Past Medical History:   Diagnosis Date    Anxiety     Benzodiazepine dependence    Arthritis     Asthma     Avascular necrosis     Carpal tunnel syndrome     Chronic pain     Depression     Diverticulitis     Gout     Other injury of other sites of trunk 12/28/2011    Pneumonia     Spondylolisthesis     lumbar; myofascial pain    Staph infection     Ulnar neuropathy     left and rt arm       Past Surgical History:   Procedure Laterality Date    COLONOSCOPY N/A 7/6/2020    Procedure: COLONOSCOPY;  Surgeon: Broderick Moise MD;  Location: [facility];  Service: Endoscopy;  Laterality: N/A;    HIP SURGERY  3/06; 6/06    hip arthroplasty    HIP SURGERY      bilateral hip replacement (titanium)    JOINT REPLACEMENT      SHOULDER SURGERY  2012    right shoulder    SHOULDER SURGERY         Review of patient's allergies indicates:   Allergen Reactions    Toradol [ketorolac] Hives and Nausea And Vomiting    Ibuprofen Other (See Comments)     States GI BLEED       No current facility-administered medications on file prior to encounter.     Current Outpatient Medications on File Prior to Encounter   Medication Sig    ALPRAZolam (XANAX) 2 MG Tab Take 2 mg by mouth 2 (two) times a day.    baclofen (LIORESAL) 10 MG tablet Take 1 tablet (10 mg total) by mouth 3 (three) times daily.    colchicine (MITIGARE) 0.6 mg Cap Take 1 capsule (0.6 mg total) by mouth daily as needed.    cyclobenzaprine (FLEXERIL) 10 MG tablet Take 1 tablet (10 mg total) by mouth 3 (three) times daily as needed for Muscle spasms.    famotidine (PEPCID) 40 MG tablet Take 40 mg by mouth once daily.    gabapentin (NEURONTIN) 300 MG capsule Take 1 capsule (300 mg total) by mouth 3 (three) times daily.    predniSONE (DELTASONE) 10 MG tablet Take 4 tablets by mouth once a day x 3 days, then take 2 tabs once daily x 3 days, then take 1 tab once daily x 3 days.    traMADoL (ULTRAM) 50 mg tablet Take 1 tablet (50 mg total) by mouth every 8 (eight) hours as needed for  Pain.     Family History       Problem Relation (Age of Onset)    Cancer Mother, Sister          Tobacco Use    Smoking status: Some Days     Packs/day: 1.00     Years: 10.00     Pack years: 10.00     Types: Cigarettes     Last attempt to quit: 10/13/2012     Years since quitting: 10.4    Smokeless tobacco: Never   Substance and Sexual Activity    Alcohol use: Yes     Alcohol/week: 12.0 standard drinks     Types: 12 Cans of beer per week     Comment: 2x/week    Drug use: No    Sexual activity: Yes     Partners: Female     Review of Systems   Constitutional:  Negative for activity change, chills and fever.   HENT:  Negative for congestion, trouble swallowing and voice change.    Eyes:  Negative for photophobia and visual disturbance.   Respiratory:  Positive for chest tightness and shortness of breath. Negative for wheezing.    Cardiovascular:  Positive for chest pain. Negative for palpitations and leg swelling.   Gastrointestinal:  Negative for abdominal pain, constipation, diarrhea, nausea and vomiting.   Genitourinary:  Negative for dysuria, frequency, hematuria and urgency.   Musculoskeletal:  Negative for arthralgias, back pain and gait problem.   Skin:  Negative for color change and rash.   Neurological:  Negative for dizziness, syncope, weakness, light-headedness, numbness and headaches.   Psychiatric/Behavioral:  Negative for agitation and confusion. The patient is not nervous/anxious.    Objective:     Vital Signs (Most Recent):  Temp: 97.8 °F (36.6 °C) (03/27/23 2301)  Pulse: 73 (03/27/23 2311)  Resp: 18 (03/27/23 2301)  BP: (!) 162/99 (03/27/23 2301)  SpO2: 97 % (03/27/23 2301)   Vital Signs (24h Range):  Temp:  [97.8 °F (36.6 °C)] 97.8 °F (36.6 °C)  Pulse:  [73-88] 73  Resp:  [16-20] 18  SpO2:  [95 %-97 %] 97 %  BP: (121-181)/() 162/99     Weight: 111 kg (244 lb 11.4 oz)  Body mass index is 33.19 kg/m².    Physical Exam  Vitals and nursing note reviewed.   Constitutional:       General: He is not in  acute distress.     Appearance: He is well-developed.   HENT:      Head: Normocephalic and atraumatic.      Mouth/Throat:      Pharynx: No oropharyngeal exudate.   Eyes:      Conjunctiva/sclera: Conjunctivae normal.      Pupils: Pupils are equal, round, and reactive to light.   Cardiovascular:      Rate and Rhythm: Normal rate and regular rhythm.      Heart sounds: Normal heart sounds.   Pulmonary:      Effort: Pulmonary effort is normal. No respiratory distress.      Breath sounds: Normal breath sounds. No wheezing.   Abdominal:      General: Bowel sounds are normal. There is no distension.      Palpations: Abdomen is soft.      Tenderness: There is no abdominal tenderness.   Musculoskeletal:         General: No tenderness. Normal range of motion.      Cervical back: Normal range of motion and neck supple.   Lymphadenopathy:      Cervical: No cervical adenopathy.   Skin:     General: Skin is warm and dry.      Capillary Refill: Capillary refill takes less than 2 seconds.      Findings: No rash.   Neurological:      Mental Status: He is alert and oriented to person, place, and time.      Cranial Nerves: No cranial nerve deficit.      Sensory: No sensory deficit.      Coordination: Coordination normal.   Psychiatric:         Mood and Affect: Mood is elated.         Speech: Speech is rapid and pressured.         Behavior: Behavior normal.         Thought Content: Thought content normal.         Judgment: Judgment normal.         CRANIAL NERVES     CN III, IV, VI   Pupils are equal, round, and reactive to light.     Significant Labs: All pertinent labs within the past 24 hours have been reviewed.  CBC:   Recent Labs   Lab 03/27/23  1721 03/27/23  1733   WBC 7.21  --    HGB 12.6*  --    HCT 37.3* 38     --      CMP:   Recent Labs   Lab 03/27/23  1721      K 4.0      CO2 22*   GLU 85   BUN 15   CREATININE 0.9   CALCIUM 9.9   PROT 7.2   ALBUMIN 3.9   BILITOT 0.2   ALKPHOS 66   AST 27   ALT 35   ANIONGAP  10       Significant Imaging: I have reviewed all pertinent imaging results/findings within the past 24 hours.  X-Ray Chest AP Portable  Narrative: EXAMINATION:  XR CHEST AP PORTABLE    CLINICAL HISTORY:  Chest pain, unspecified    TECHNIQUE:  Single frontal view of the chest was performed.    COMPARISON:  03/22/2023.    FINDINGS:  Lordotic positioning.    Heart and lungs  appear unchanged when allowing for differences in technique and positioning.  Impression: No acute findings.    No significant change from prior study.    Electronically signed by: Josh Corrales MD  Date:    03/27/2023  Time:    20:06

## 2023-03-28 NOTE — HPI
"Keon Schneider Jr. Is a 52 y.o. male with a PMHx of anxiety, benzo dependence, HLD who [place] [facility] [place]. Patient is a poor historian and hard to follow as he has very rapid and pressured speech. Patient reports constant midsterchest heaviness described as a "truck sitting on my chest" for the past few days. Pain worsens on exertion and he has associated shortness of breath on exertion. No associated radiation. He occasionally gets "sharp, shooting" pain in the center of his heart. He's been taking daily ASA 81 for symptoms without any relief. He was seen in the ED on Friday for these symptoms with recommendations for admission, however patient decided to leave AMA as he needed to find someone to watch his children. He was moving things on his boat earlier today when he developed severe chest heaviness which prompted him to present to the ED. Currently having mild chest heaviness. Denies fever, chills, LE swelling/pain, HA, vision changes, numbness/tingling, or syncope.     ED: AFVSS. No leukocytosis or electrolyte abnormalities. Trop <0.006, BNP 13. CTA negative for PE or other acute findings. EKG without acute ST/T wave changes.   "

## 2023-03-28 NOTE — PLAN OF CARE
[facility]  Discharge Assessment    Primary Care Provider: Primary Doctor No     Discharge Assessment (most recent)       BRIEF DISCHARGE ASSESSMENT - 03/28/23 1430          Discharge Planning    Assessment Type Discharge Planning Brief Assessment (P)      Resource/Environmental Concerns none (P)      Support Systems Friends/neighbors (P)      Equipment Currently Used at Home none (P)      Current Living Arrangements home (P)      Patient/Family Anticipates Transition to home (P)      Patient/Family Anticipated Services at Transition none (P)      DME Needed Upon Discharge  none (P)      Discharge Plan A Home (P)      Discharge Plan B Home (P)                           met with patient to discuss SW role and to assess for needs at discharge.  Pt agreed to SW interview.  Verified contact information, address, and insurance from faces"[employer].   Patient reported living alone.  Prior to hospital admission, patient was independent with ADL's and did not use assistive devices for mobility. Pt also stated that he does not attend a coumadin clinic and is not on dialysis.  Transportation home upon discharge from the hospital will be provided by a friend     His old PCP retired and needs a new one. He wants one at [facility] on [address].    Discharge Planning Booklet given to patient and discussed.  All questions addressed.  Case management will continue to follow and assist with discharge needs as needed.    AMANDA Vera   [facility]  03/28/2023

## 2023-03-29 NOTE — HOSPITAL COURSE
Keon Cabrera Wyatt Aquino. is a 51 yo M placed in observation for further evaluation of chest pain. EKG without ischemic changes. Trop neg x3. Echo with EF 60%, normal LV diastolic function, normal RV function, CVP 3 mmHg. Lipid panel with cholesterol 187, HDL 28, triglycerides 207; started atorvastatin 40 mg daily. Discharged home with PCP f/u. Ordered OP stress test for further evaluation.

## 2023-03-29 NOTE — DISCHARGE SUMMARY
"[facility] [address] [place] [address] Medicine  Discharge Summary      Patient Name: Keon Schneider Jr.  MRN: [id number]  APRIL: [id number]  Patient Class: OP- Observation  Admission Date: 3/27/2023  Hospital Length of Stay: 0 days  Discharge Date and Time: 3/28/2023  5:28 PM  Attending Physician: Anjelica att. providers found   Discharging Provider: Malorie Tan PA-C  Primary Care Provider: Primary Doctor Anjelica  [facility] Medicine Team: [facility] Malorie Tan PA-C  Primary Care Team: [facility]    HPI:   Keon Schneider Jr. Is a 52 y.o. male with a PMHx of anxiety, benzo dependence, HLD who [place] [facility] [place] pain. Patient is a poor historian and hard to follow as he has very rapid and pressured speech. Patient reports constant midsterchest heaviness described as a "truck sitting on my chest" for the past few days. Pain worsens on exertion and he has associated shortness of breath on exertion. No associated radiation. He occasionally gets "sharp, shooting" pain in the center of his heart. He's been taking daily ASA 81 for symptoms without any relief. He was seen in the ED on Friday for these symptoms with recommendations for admission, however patient decided to leave AMA as he needed to find someone to watch his children. He was moving things on his boat earlier today when he developed severe chest heaviness which prompted him to present to the ED. Currently having mild chest heaviness. Denies fever, chills, LE swelling/pain, HA, vision changes, numbness/tingling, or syncope.     ED: AFVSS. No leukocytosis or electrolyte abnormalities. Trop <0.006, BNP 13. CTA negative for PE or other acute findings. EKG without acute ST/T wave changes.       * No surgery found *      Hospital Course:   Keon Schneider Jr. is a 53 yo M placed in observation for further evaluation of chest pain. EKG without ischemic changes. Trop neg x3. Echo with EF 60%, normal LV diastolic function, normal RV " function, CVP 3 mmHg. Lipid panel with cholesterol 187, HDL 28, triglycerides 207; started atorvastatin 40 mg daily. Discharged home with PCP f/u. Ordered OP stress test for further evaluation.        Goals of Care Treatment Preferences:  Code Status: Full Code      Consults:     No new Assessment & Plan notes have been filed under this hospital service since the last note was generated.  Service: Hospital Medicine    Final Active Diagnoses:    Diagnosis Date Noted POA    PRINCIPAL PROBLEM:  Chest pain [R07.9] 03/01/2013 Yes    Anxiety [F41.9] 11/24/2013 Yes     Chronic    Other and unspecified alcohol dependence, continuous drinking behavior [F10.20] 12/11/2011 Yes    Benzodiazepine dependence [F13.20] 12/11/2011 Yes      Problems Resolved During this Admission:       Discharged Condition: good    Disposition: Home or Self Care    Follow Up:   Follow-up Information     Nancy Tadeo MD Follow up on 4/4/2023.    Specialty: Internal Medicine  Why: Hospital f/u: 4/4/23 at 11 am  Contact information:  [address] [place] [id number]  [phone]                       Patient Instructions:      Diet Cardiac     Notify your health care provider if you experience any of the following:  severe uncontrolled pain     Notify your health care provider if you experience any of the following:  difficulty breathing or increased cough     Stress Echo Which stress agent will be used? Pharmacological; Color Flow Doppler? No   Standing Status: Future Standing Exp. Date: 03/28/24     Order Specific Question Answer Comments   Which stress agent will be used? Pharmacological    Color Flow Doppler? No    Release to patient Immediate      Activity as tolerated         Pending Diagnostic Studies:     None         Medications:  Reconciled Home Medications:      Medication List      START taking these medications    atorvastatin 40 MG tablet  Commonly known as: LIPITOR  Take 1 tablet (40 mg total) by mouth once daily.         CONTINUE taking these medications    allopurinoL 100 MG tablet  Commonly known as: ZYLOPRIM  Take 100 mg by mouth.     ALPRAZolam 2 MG Tab  Commonly known as: XANAX  Take 2 mg by mouth 2 (two) times a day.     baclofen 10 MG tablet  Commonly known as: LIORESAL  Take 1 tablet (10 mg total) by mouth 3 (three) times daily.     colchicine 0.6 mg Cap  Commonly known as: MITIGARE  Take 1 capsule (0.6 mg total) by mouth daily as needed.     cyclobenzaprine 10 MG tablet  Commonly known as: FLEXERIL  Take 1 tablet (10 mg total) by mouth 3 (three) times daily as needed for Muscle spasms.     famotidine 40 MG tablet  Commonly known as: PEPCID  Take 40 mg by mouth once daily.     gabapentin 300 MG capsule  Commonly known as: NEURONTIN  Take 1 capsule (300 mg total) by mouth 3 (three) times daily.     predniSONE 10 MG tablet  Commonly known as: DELTASONE  Take 4 tablets by mouth once a day x 3 days, then take 2 tabs once daily x 3 days, then take 1 tab once daily x 3 days.     traMADoL 50 mg tablet  Commonly known as: ULTRAM  Take 1 tablet (50 mg total) by mouth every 8 (eight) hours as needed for Pain.            Indwelling Lines/Drains at time of discharge:   Lines/Drains/Airways     None                 Time spent on the discharge of patient: 35 minutes         Malorie Tan PA-C  Department of Hospital Medicine  [facility] 11H

## 2023-04-04 ENCOUNTER — OFFICE VISIT (OUTPATIENT)
Dept: INTERNAL MEDICINE | Facility: CLINIC | Age: 53
End: 2023-04-04
Payer: MEDICARE

## 2023-04-04 VITALS
WEIGHT: 244.94 LBS | BODY MASS INDEX: 35.07 KG/M2 | OXYGEN SATURATION: 96 % | SYSTOLIC BLOOD PRESSURE: 157 MMHG | DIASTOLIC BLOOD PRESSURE: 81 MMHG | HEART RATE: 82 BPM | HEIGHT: 70 IN

## 2023-04-04 DIAGNOSIS — F31.9 BIPOLAR AFFECTIVE DISORDER, REMISSION STATUS UNSPECIFIED: ICD-10-CM

## 2023-04-04 DIAGNOSIS — E66.01 SEVERE OBESITY (BMI 35.0-39.9) WITH COMORBIDITY: ICD-10-CM

## 2023-04-04 DIAGNOSIS — M25.551 BILATERAL HIP PAIN: ICD-10-CM

## 2023-04-04 DIAGNOSIS — M25.552 BILATERAL HIP PAIN: ICD-10-CM

## 2023-04-04 DIAGNOSIS — R03.0 ELEVATED BLOOD PRESSURE READING: ICD-10-CM

## 2023-04-04 DIAGNOSIS — Z09 HOSPITAL DISCHARGE FOLLOW-UP: Primary | ICD-10-CM

## 2023-04-04 DIAGNOSIS — R07.89 OTHER CHEST PAIN: ICD-10-CM

## 2023-04-04 PROCEDURE — 3079F DIAST BP 80-89 MM HG: CPT | Mod: CPTII,S$GLB,, | Performed by: INTERNAL MEDICINE

## 2023-04-04 PROCEDURE — 3044F HG A1C LEVEL LT 7.0%: CPT | Mod: CPTII,S$GLB,, | Performed by: INTERNAL MEDICINE

## 2023-04-04 PROCEDURE — 3008F PR BODY MASS INDEX (BMI) DOCUMENTED: ICD-10-PCS | Mod: CPTII,S$GLB,, | Performed by: INTERNAL MEDICINE

## 2023-04-04 PROCEDURE — 3079F PR MOST RECENT DIASTOLIC BLOOD PRESSURE 80-89 MM HG: ICD-10-PCS | Mod: CPTII,S$GLB,, | Performed by: INTERNAL MEDICINE

## 2023-04-04 PROCEDURE — 3077F PR MOST RECENT SYSTOLIC BLOOD PRESSURE >= 140 MM HG: ICD-10-PCS | Mod: CPTII,S$GLB,, | Performed by: INTERNAL MEDICINE

## 2023-04-04 PROCEDURE — 99213 PR OFFICE/OUTPT VISIT, EST, LEVL III, 20-29 MIN: ICD-10-PCS | Mod: S$GLB,,, | Performed by: INTERNAL MEDICINE

## 2023-04-04 PROCEDURE — 3008F BODY MASS INDEX DOCD: CPT | Mod: CPTII,S$GLB,, | Performed by: INTERNAL MEDICINE

## 2023-04-04 PROCEDURE — 3077F SYST BP >= 140 MM HG: CPT | Mod: CPTII,S$GLB,, | Performed by: INTERNAL MEDICINE

## 2023-04-04 PROCEDURE — 99999 PR PBB SHADOW E&M-EST. PATIENT-LVL III: CPT | Mod: PBBFAC,,, | Performed by: INTERNAL MEDICINE

## 2023-04-04 PROCEDURE — 99213 OFFICE O/P EST LOW 20 MIN: CPT | Mod: S$GLB,,, | Performed by: INTERNAL MEDICINE

## 2023-04-04 PROCEDURE — 3044F PR MOST RECENT HEMOGLOBIN A1C LEVEL <7.0%: ICD-10-PCS | Mod: CPTII,S$GLB,, | Performed by: INTERNAL MEDICINE

## 2023-04-04 PROCEDURE — 99999 PR PBB SHADOW E&M-EST. PATIENT-LVL III: ICD-10-PCS | Mod: PBBFAC,,, | Performed by: INTERNAL MEDICINE

## 2023-04-04 RX ORDER — TIZANIDINE 2 MG/1
2 TABLET ORAL DAILY PRN
Qty: 20 TABLET | Refills: 0 | Status: SHIPPED | OUTPATIENT
Start: 2023-04-04 | End: 2023-04-24

## 2023-04-04 RX ORDER — TIZANIDINE 2 MG/1
2 TABLET ORAL DAILY PRN
Qty: 20 TABLET | Refills: 0 | Status: SHIPPED | OUTPATIENT
Start: 2023-04-04 | End: 2023-04-04

## 2023-04-04 NOTE — PROGRESS NOTES
"Subjective:       Patient ID: Keon Schneider Jr. is a 52 y.o. male.    Chief Complaint: Hospital Follow Up    HPITransitional Care Note      Mr. Herbert is a 53 yo male who presents for hospital discharge follow up.     Admitted to [facility] on 3/27 after presenting with chest pain.     Per hospital discharge summary:    "Keon Schneider Jr. is a 53 yo M placed in observation for further evaluation of chest pain. EKG without ischemic changes. Trop neg x3. Echo with EF 60%, normal LV diastolic function, normal RV function, CVP 3 mmHg. Lipid panel with cholesterol 187, HDL 28, triglycerides 207; started atorvastatin 40 mg daily. Discharged home with PCP f/u. Ordered OP stress test for further evaluation.      He denies chest pain today.   Statin made him feel tired. Advised he take it at night.   Has not yet had stress test.     Bp is elevated today. States because his back pain is acting up. BP was WNL during admission.     Family and/or Caretaker present at visit?  No.  Diagnostic tests reviewed/disposition: will aid in scheduling stress test and cardiology visit today   Disease/illness education: yes   Home health/community services discussion/referrals: Patient does not have home health established from hospital visit.  They do not need home health.  If needed, we will set up home health for the patient.   Establishment or re-establishment of referral orders for community resources: No other necessary community resources.   Discussion with other health care providers: will set up initial visit with cardiology for chest pain        Review of Systems   Constitutional:  Negative for chills, diaphoresis, fatigue and fever.   HENT:  Negative for rhinorrhea, sneezing and sore throat.    Respiratory:  Positive for shortness of breath. Negative for cough, chest tightness and wheezing.    Cardiovascular:  Positive for chest pain. Negative for palpitations and leg swelling.   Gastrointestinal:  Negative for abdominal " pain, blood in stool, diarrhea, nausea and vomiting.   Musculoskeletal:  Negative for arthralgias and back pain.   Neurological:  Negative for dizziness, weakness, light-headedness and headaches.   Psychiatric/Behavioral:  Negative for agitation and behavioral problems.        Objective:      Physical Exam  Constitutional:       Appearance: Normal appearance.   HENT:      Head: Normocephalic and atraumatic.   Cardiovascular:      Rate and Rhythm: Normal rate and regular rhythm.      Heart sounds: Normal heart sounds.   Pulmonary:      Effort: Pulmonary effort is normal.      Breath sounds: Normal breath sounds. No stridor. No wheezing or rales.   Abdominal:      General: Abdomen is flat.      Palpations: Abdomen is soft. There is no mass.      Tenderness: There is no abdominal tenderness.   Skin:     General: Skin is warm and dry.   Neurological:      Mental Status: He is alert and oriented to person, place, and time.   Psychiatric:         Mood and Affect: Mood normal.       Assessment:       Problem List Items Addressed This Visit          Psychiatric    Bipolar disorder, unspecified       Cardiac/Vascular    Chest pain    Relevant Orders    Stress Echo Which stress agent will be used? Treadmill Exercise; Color Flow Doppler? No    Ambulatory referral/consult to Cardiology       Endocrine    Severe obesity (BMI 35.0-39.9) with comorbidity     Other Visit Diagnoses       Hospital discharge follow-up    -  Primary    Bilateral hip pain        Elevated blood pressure reading                Plan:       Keon was seen today for hospital follow up.    Diagnoses and all orders for this visit:    Hospital discharge follow-up  Will set up stress echo and cardiology visit     Other chest pain  -     Stress Echo Which stress agent will be used? Treadmill Exercise; Color Flow Doppler? No; Future  -     Ambulatory referral/consult to Cardiology; Future    Bilateral hip pain  Start tizanadine, referral to pain management      Severe obesity (BMI 35.0-39.9) with comorbidity  Counseled on lifestyle changes. Continue statin     Bipolar affective disorder, remission status unspecified  Continue xanax. Will need to establish with psych     Elevated blood pressure reading  Elevated today but was normal during admission off meds.   Will monitor.

## 2023-04-05 ENCOUNTER — PATIENT OUTREACH (OUTPATIENT)
Dept: ADMINISTRATIVE | Facility: OTHER | Age: 53
End: 2023-04-05
Payer: MEDICARE

## 2023-04-05 ENCOUNTER — TELEPHONE (OUTPATIENT)
Dept: ADMINISTRATIVE | Facility: CLINIC | Age: 53
End: 2023-04-05
Payer: MEDICARE

## 2023-04-16 NOTE — PROGRESS NOTES
A/ Plan:   #Chest pain, possibly cardiac  -upcoming stress echo    #Subclinical coronary atherosclerosis seen on imaging   - stop atorvastatin 40 mg (not true allergy, c/o GI upset), begin crestor 20mg at night, repeat, lipid panel at next visit    #Elevated blood pressure  -Home blood pressure diary      RTC in one month with labwork.     Subjective:     HPI:   Keon Schneider Jr. 52 M who presents as new patient for chest pain with following problem list:    He presents as a new patient to clinic for evaluation of chest pain post hospital discharge. He recently presented to the ED with a chief complaint of chest pain cardiac work up at that time unremarkable including negative troponins, EKG, and echo with preserved ef of 60%. In terms of risk factors, he had a coronary CT scan, which showed mild coronary calcification, and he has noted to have elevated blood pressure readings without formal diagnosis of hypertension, cocaine used and obesity. Denies family history.    Ldl tg 205. He was started on atorv 40. He is pending a treadmill echo.      Cardiac Studies:      ROS:  Constitution: Negative for fever, chills, weight loss or gain.   HENT: Negative for sore throat, rhinorrhea, or headache.  Eyes: Negative for blurred or double vision.   Cardiovascular: - for Exertional chest pain  Pulmonary: - for Dyspnea on exertion  Gastrointestinal: Negative for abdominal pain, nausea, vomiting, or diarrhea. Negative for melena/hematochezia.  : Negative for dysuria.   Neurological: Negative for focal weakness or sensory changes.  Skin: No bleeding or bruising      Past Medical History:   Diagnosis Date    Anxiety     Benzodiazepine dependence    Arthritis     Asthma     Avascular necrosis     Carpal tunnel syndrome     Chronic pain     Depression     Diverticulitis     Gout     Other injury of other sites of trunk 12/28/2011    Pneumonia     Spondylolisthesis     lumbar; myofascial pain    Staph infection     Ulnar  neuropathy     left and rt arm       Past Surgical History:   Procedure Laterality Date    COLONOSCOPY N/A 7/6/2020    Procedure: COLONOSCOPY;  Surgeon: Broderick Moise MD;  Location: [facility];  Service: Endoscopy;  Laterality: N/A;    HIP SURGERY  3/06; 6/06    hip arthroplasty    HIP SURGERY      bilateral hip replacement (titanium)    JOINT REPLACEMENT      SHOULDER SURGERY  2012    right shoulder    SHOULDER SURGERY         Social History     Tobacco Use    Smoking status: Some Days     Packs/day: 1.00     Years: 10.00     Pack years: 10.00     Types: Cigarettes     Last attempt to quit: 10/13/2012     Years since quitting: 10.5    Smokeless tobacco: Never   Substance Use Topics    Alcohol use: Yes     Alcohol/week: 12.0 standard drinks     Types: 12 Cans of beer per week     Comment: 2x/week    Drug use: No       Family History   Problem Relation Age of Onset    Cancer Mother     Cancer Sister        Current Outpatient Medications   Medication Sig    allopurinoL (ZYLOPRIM) 100 MG tablet Take 100 mg by mouth.    ALPRAZolam (XANAX) 2 MG Tab Take 2 mg by mouth 2 (two) times a day.    famotidine (PEPCID) 40 MG tablet Take 40 mg by mouth once daily.    tiZANidine (ZANAFLEX) 2 MG tablet Take 1 tablet (2 mg total) by mouth daily as needed (pain).    traMADoL (ULTRAM) 50 mg tablet Take 1 tablet (50 mg total) by mouth every 8 (eight) hours as needed for Pain.    atorvastatin (LIPITOR) 40 MG tablet Take 1 tablet (40 mg total) by mouth once daily. (Patient not taking: Reported on 4/17/2023)    colchicine (MITIGARE) 0.6 mg Cap Take 1 capsule (0.6 mg total) by mouth daily as needed.     No current facility-administered medications for this visit.       Review of patient's allergies indicates:   Allergen Reactions    Atorvastatin Nausea And Vomiting    Toradol [ketorolac] Hives and Nausea And Vomiting    Ibuprofen Other (See Comments)     States GI BLEED       Objective:   Vitals:  Vitals:    04/17/23 0821   BP: (!) 140/83    Pulse: 70         Physical Exam  Constitutional: No distress, obese, conversant  HEENT: Sclera anicteric, PERRLA, EOMI  Neck: No JVD, no masses, good movement  CV: RRR, S1 and S2 normal, no additional heart sounds or murmurs. Pulses 2+ and equal bilaterally in radial arteries, Durga's normal on right. Distal pulses are 2+ and equal in the femoral, DP and PT areas bilaterally  Pulm: Clear to auscultation bilaterally with symmetrical expansion. Chest wall palpated for reproduction of pain symptoms, and no pain was able to be produced on palpation or resistance exercises  GI: Abdomen soft, non-tender, good bowel sounds  Extremities: Both extremities intact and grossly normal, skin is warm, no edema noted  Skin: No ecchymosis, erythema, or ulcers  Psych: AOx3, appropriate affect  Neuro: CNII-XII intact, no focal deficits        Chemistry        Component Value Date/Time     03/28/2023 0354    K 3.8 03/28/2023 0354     03/28/2023 0354    CO2 26 03/28/2023 0354    BUN 16 03/28/2023 0354    CREATININE 1.0 03/28/2023 0354    GLU 91 03/28/2023 0354        Component Value Date/Time    CALCIUM 9.7 03/28/2023 0354    ALKPHOS 66 03/27/2023 1721    AST 27 03/27/2023 1721    ALT 35 03/27/2023 1721    BILITOT 0.2 03/27/2023 1721    ESTGFRAFRICA >60 06/03/2022 1809    EGFRNONAA >60 06/03/2022 1809            Lab Results   Component Value Date    CHOL 187 03/28/2023    CHOL 174 05/17/2020    CHOL 161 03/25/2018     Lab Results   Component Value Date    HDL 28 (L) 03/28/2023    HDL 39 (L) 05/17/2020    HDL 44 03/25/2018     Lab Results   Component Value Date    LDLCALC 117.6 03/28/2023    LDLCALC 123.4 05/17/2020    LDLCALC 92.4 03/25/2018     Lab Results   Component Value Date    TRIG 207 (H) 03/28/2023    TRIG 58 05/17/2020    TRIG 123 03/25/2018     Lab Results   Component Value Date    CHOLHDL 15.0 (L) 03/28/2023    CHOLHDL 22.4 05/17/2020    CHOLHDL 27.3 03/25/2018         Lab Results   Component Value Date    NA  137 03/28/2023    K 3.8 03/28/2023     03/28/2023    CO2 26 03/28/2023    BUN 16 03/28/2023    CREATININE 1.0 03/28/2023    GLU 91 03/28/2023    HGBA1C 6.0 (H) 03/28/2023    MG 1.8 03/28/2023    AST 27 03/27/2023    ALT 35 03/27/2023    ALBUMIN 3.9 03/27/2023    PROT 7.2 03/27/2023    BILITOT 0.2 03/27/2023    WBC 7.37 03/28/2023    HGB 12.1 (L) 03/28/2023    HCT 36.5 (L) 03/28/2023    HCT 38 03/27/2023    MCV 92 03/28/2023     03/28/2023    INR 0.9 01/18/2016    INR 1.1 03/01/2013    INR 1.4 (H) 09/25/2006    PSA 1.44 10/18/2012    TSH 3.061 03/28/2023    CHOL 187 03/28/2023    HDL 28 (L) 03/28/2023    LDLCALC 117.6 03/28/2023    TRIG 207 (H) 03/28/2023

## 2023-04-17 ENCOUNTER — OFFICE VISIT (OUTPATIENT)
Dept: CARDIOLOGY | Facility: CLINIC | Age: 53
End: 2023-04-17
Payer: MEDICARE

## 2023-04-17 ENCOUNTER — LAB VISIT (OUTPATIENT)
Dept: LAB | Facility: HOSPITAL | Age: 53
End: 2023-04-17
Payer: MEDICARE

## 2023-04-17 VITALS
HEART RATE: 70 BPM | WEIGHT: 244.69 LBS | BODY MASS INDEX: 35.03 KG/M2 | DIASTOLIC BLOOD PRESSURE: 83 MMHG | HEIGHT: 70 IN | OXYGEN SATURATION: 100 % | SYSTOLIC BLOOD PRESSURE: 140 MMHG

## 2023-04-17 DIAGNOSIS — F17.200 SMOKING: ICD-10-CM

## 2023-04-17 DIAGNOSIS — R07.89 OTHER CHEST PAIN: ICD-10-CM

## 2023-04-17 DIAGNOSIS — E66.01 SEVERE OBESITY (BMI 35.0-35.9 WITH COMORBIDITY): ICD-10-CM

## 2023-04-17 DIAGNOSIS — R03.0 ELEVATED BLOOD PRESSURE READING WITHOUT DIAGNOSIS OF HYPERTENSION: ICD-10-CM

## 2023-04-17 DIAGNOSIS — R07.89 OTHER CHEST PAIN: Primary | ICD-10-CM

## 2023-04-17 DIAGNOSIS — I25.118 ATHEROSCLEROSIS OF NATIVE CORONARY ARTERY OF NATIVE HEART WITH OTHER FORM OF ANGINA PECTORIS: ICD-10-CM

## 2023-04-17 DIAGNOSIS — F14.10 COCAINE ABUSE: ICD-10-CM

## 2023-04-17 PROBLEM — I25.10 ATHEROSCLEROSIS OF NATIVE CORONARY ARTERY OF NATIVE HEART: Status: ACTIVE | Noted: 2023-04-17

## 2023-04-17 LAB
CHOLEST SERPL-MCNC: 192 MG/DL (ref 120–199)
CHOLEST/HDLC SERPL: 4.7 {RATIO} (ref 2–5)
HDLC SERPL-MCNC: 41 MG/DL (ref 40–75)
HDLC SERPL: 21.4 % (ref 20–50)
LDLC SERPL CALC-MCNC: 135.2 MG/DL (ref 63–159)
NONHDLC SERPL-MCNC: 151 MG/DL
TRIGL SERPL-MCNC: 79 MG/DL (ref 30–150)

## 2023-04-17 PROCEDURE — 3008F BODY MASS INDEX DOCD: CPT | Mod: CPTII,GC,S$GLB, | Performed by: STUDENT IN AN ORGANIZED HEALTH CARE EDUCATION/TRAINING PROGRAM

## 2023-04-17 PROCEDURE — 3079F DIAST BP 80-89 MM HG: CPT | Mod: CPTII,GC,S$GLB, | Performed by: STUDENT IN AN ORGANIZED HEALTH CARE EDUCATION/TRAINING PROGRAM

## 2023-04-17 PROCEDURE — 36415 COLL VENOUS BLD VENIPUNCTURE: CPT | Performed by: STUDENT IN AN ORGANIZED HEALTH CARE EDUCATION/TRAINING PROGRAM

## 2023-04-17 PROCEDURE — 3044F PR MOST RECENT HEMOGLOBIN A1C LEVEL <7.0%: ICD-10-PCS | Mod: CPTII,GC,S$GLB, | Performed by: STUDENT IN AN ORGANIZED HEALTH CARE EDUCATION/TRAINING PROGRAM

## 2023-04-17 PROCEDURE — 1159F MED LIST DOCD IN RCRD: CPT | Mod: CPTII,GC,S$GLB, | Performed by: STUDENT IN AN ORGANIZED HEALTH CARE EDUCATION/TRAINING PROGRAM

## 2023-04-17 PROCEDURE — 80061 LIPID PANEL: CPT | Performed by: STUDENT IN AN ORGANIZED HEALTH CARE EDUCATION/TRAINING PROGRAM

## 2023-04-17 PROCEDURE — 3077F PR MOST RECENT SYSTOLIC BLOOD PRESSURE >= 140 MM HG: ICD-10-PCS | Mod: CPTII,GC,S$GLB, | Performed by: STUDENT IN AN ORGANIZED HEALTH CARE EDUCATION/TRAINING PROGRAM

## 2023-04-17 PROCEDURE — 3044F HG A1C LEVEL LT 7.0%: CPT | Mod: CPTII,GC,S$GLB, | Performed by: STUDENT IN AN ORGANIZED HEALTH CARE EDUCATION/TRAINING PROGRAM

## 2023-04-17 PROCEDURE — 3077F SYST BP >= 140 MM HG: CPT | Mod: CPTII,GC,S$GLB, | Performed by: STUDENT IN AN ORGANIZED HEALTH CARE EDUCATION/TRAINING PROGRAM

## 2023-04-17 PROCEDURE — 1159F PR MEDICATION LIST DOCUMENTED IN MEDICAL RECORD: ICD-10-PCS | Mod: CPTII,GC,S$GLB, | Performed by: STUDENT IN AN ORGANIZED HEALTH CARE EDUCATION/TRAINING PROGRAM

## 2023-04-17 PROCEDURE — 99204 PR OFFICE/OUTPT VISIT, NEW, LEVL IV, 45-59 MIN: ICD-10-PCS | Mod: GC,S$GLB,, | Performed by: STUDENT IN AN ORGANIZED HEALTH CARE EDUCATION/TRAINING PROGRAM

## 2023-04-17 PROCEDURE — 3079F PR MOST RECENT DIASTOLIC BLOOD PRESSURE 80-89 MM HG: ICD-10-PCS | Mod: CPTII,GC,S$GLB, | Performed by: STUDENT IN AN ORGANIZED HEALTH CARE EDUCATION/TRAINING PROGRAM

## 2023-04-17 PROCEDURE — 99204 OFFICE O/P NEW MOD 45 MIN: CPT | Mod: GC,S$GLB,, | Performed by: STUDENT IN AN ORGANIZED HEALTH CARE EDUCATION/TRAINING PROGRAM

## 2023-04-17 PROCEDURE — 99999 PR PBB SHADOW E&M-EST. PATIENT-LVL IV: CPT | Mod: PBBFAC,GC,, | Performed by: STUDENT IN AN ORGANIZED HEALTH CARE EDUCATION/TRAINING PROGRAM

## 2023-04-17 PROCEDURE — 99999 PR PBB SHADOW E&M-EST. PATIENT-LVL IV: ICD-10-PCS | Mod: PBBFAC,GC,, | Performed by: STUDENT IN AN ORGANIZED HEALTH CARE EDUCATION/TRAINING PROGRAM

## 2023-04-17 PROCEDURE — 3008F PR BODY MASS INDEX (BMI) DOCUMENTED: ICD-10-PCS | Mod: CPTII,GC,S$GLB, | Performed by: STUDENT IN AN ORGANIZED HEALTH CARE EDUCATION/TRAINING PROGRAM

## 2023-04-17 RX ORDER — ASPIRIN 81 MG/1
81 TABLET ORAL DAILY
Qty: 90 TABLET | Refills: 10 | Status: CANCELLED | OUTPATIENT
Start: 2023-04-17 | End: 2024-04-16

## 2023-04-17 RX ORDER — ROSUVASTATIN CALCIUM 20 MG/1
20 TABLET, COATED ORAL DAILY
Qty: 90 TABLET | Refills: 3 | Status: SHIPPED | OUTPATIENT
Start: 2023-04-17 | End: 2023-05-31

## 2023-05-31 ENCOUNTER — OFFICE VISIT (OUTPATIENT)
Dept: CARDIOLOGY | Facility: CLINIC | Age: 53
End: 2023-05-31
Payer: MEDICARE

## 2023-05-31 ENCOUNTER — HOSPITAL ENCOUNTER (OUTPATIENT)
Dept: CARDIOLOGY | Facility: HOSPITAL | Age: 53
Discharge: HOME | End: 2023-05-31
Attending: INTERNAL MEDICINE
Payer: MEDICARE

## 2023-05-31 VITALS
DIASTOLIC BLOOD PRESSURE: 82 MMHG | WEIGHT: 241.88 LBS | HEART RATE: 81 BPM | SYSTOLIC BLOOD PRESSURE: 134 MMHG | BODY MASS INDEX: 33.86 KG/M2 | HEIGHT: 71 IN

## 2023-05-31 VITALS — BODY MASS INDEX: 28 KG/M2 | WEIGHT: 200 LBS | HEIGHT: 71 IN

## 2023-05-31 DIAGNOSIS — I25.118 ATHEROSCLEROSIS OF NATIVE CORONARY ARTERY OF NATIVE HEART WITH OTHER FORM OF ANGINA PECTORIS: Primary | ICD-10-CM

## 2023-05-31 DIAGNOSIS — F41.9 ANXIETY: Chronic | ICD-10-CM

## 2023-05-31 DIAGNOSIS — I25.118 ATHEROSCLEROSIS OF NATIVE CORONARY ARTERY OF NATIVE HEART WITH OTHER FORM OF ANGINA PECTORIS: ICD-10-CM

## 2023-05-31 DIAGNOSIS — F14.10 COCAINE ABUSE: ICD-10-CM

## 2023-05-31 DIAGNOSIS — E78.5 DYSLIPIDEMIA: ICD-10-CM

## 2023-05-31 DIAGNOSIS — R07.89 OTHER CHEST PAIN: ICD-10-CM

## 2023-05-31 LAB
ASCENDING AORTA: 3.14 CM
BSA FOR ECHO PROCEDURE: 2.13 M2
CV ECHO LV RWT: 0.39 CM
CV STRESS BASE HR: 74 BPM
DIASTOLIC BLOOD PRESSURE: 86 MMHG
DOP CALC LVOT AREA: 3.5 CM2
DOP CALC LVOT DIAMETER: 2.1 CM
DOP CALC LVOT PEAK VEL: 1.33 M/S
DOP CALC LVOT STROKE VOLUME: 81.77 CM3
DOP CALCLVOT PEAK VEL VTI: 23.62 CM
E WAVE DECELERATION TIME: 227.61 MSEC
E/A RATIO: 1.08
E/E' RATIO: 9.05 M/S
ECHO LV POSTERIOR WALL: 0.98 CM (ref 0.6–1.1)
EJECTION FRACTION: 55 %
FRACTIONAL SHORTENING: 30 % (ref 28–44)
INTERVENTRICULAR SEPTUM: 0.87 CM (ref 0.6–1.1)
IVRT: 88.49 MSEC
LA MAJOR: 5.91 CM
LA MINOR: 6.04 CM
LA WIDTH: 4.18 CM
LEFT ATRIUM SIZE: 3.4 CM
LEFT ATRIUM VOLUME INDEX: 34.2 ML/M2
LEFT ATRIUM VOLUME: 72.17 CM3
LEFT INTERNAL DIMENSION IN SYSTOLE: 3.5 CM (ref 2.1–4)
LEFT VENTRICLE DIASTOLIC VOLUME INDEX: 49.08 ML/M2
LEFT VENTRICLE DIASTOLIC VOLUME: 103.55 ML
LEFT VENTRICLE MASS INDEX: 78 G/M2
LEFT VENTRICLE SYSTOLIC VOLUME INDEX: 19.4 ML/M2
LEFT VENTRICLE SYSTOLIC VOLUME: 40.99 ML
LEFT VENTRICULAR INTERNAL DIMENSION IN DIASTOLE: 5 CM (ref 3.5–6)
LEFT VENTRICULAR MASS: 164.02 G
LV LATERAL E/E' RATIO: 7.82 M/S
LV SEPTAL E/E' RATIO: 10.75 M/S
MV A" WAVE DURATION": 6.85 MSEC
MV PEAK A VEL: 0.8 M/S
MV PEAK E VEL: 0.86 M/S
MV STENOSIS PRESSURE HALF TIME: 66.01 MS
MV VALVE AREA P 1/2 METHOD: 3.33 CM2
OHS CV CPX 1 MINUTE RECOVERY HEART RATE: 103 BPM
OHS CV CPX 85 PERCENT MAX PREDICTED HEART RATE MALE: 143
OHS CV CPX ESTIMATED METS: 9
OHS CV CPX MAX PREDICTED HEART RATE: 168
OHS CV CPX PATIENT IS FEMALE: 0
OHS CV CPX PATIENT IS MALE: 1
OHS CV CPX PEAK DIASTOLIC BLOOD PRESSURE: 81 MMHG
OHS CV CPX PEAK HEAR RATE: 133 BPM
OHS CV CPX PEAK RATE PRESSURE PRODUCT: NORMAL
OHS CV CPX PEAK SYSTOLIC BLOOD PRESSURE: 139 MMHG
OHS CV CPX PERCENT MAX PREDICTED HEART RATE ACHIEVED: 79
OHS CV CPX RATE PRESSURE PRODUCT PRESENTING: NORMAL
PISA TR MAX VEL: 2.99 M/S
PULM VEIN S/D RATIO: 1.38
PV PEAK D VEL: 0.37 M/S
PV PEAK S VEL: 0.51 M/S
RA MAJOR: 4.6 CM
RA PRESSURE: 3 MMHG
RA WIDTH: 3.53 CM
RIGHT VENTRICULAR END-DIASTOLIC DIMENSION: 3.57 CM
SINUS: 3.73 CM
STJ: 2.92 CM
STRESS ECHO POST EXERCISE DUR MIN: 5 MINUTES
STRESS ECHO POST EXERCISE DUR SEC: 47 SECONDS
SYSTOLIC BLOOD PRESSURE: 138 MMHG
TDI LATERAL: 0.11 M/S
TDI SEPTAL: 0.08 M/S
TDI: 0.1 M/S
TR MAX PG: 36 MMHG
TRICUSPID ANNULAR PLANE SYSTOLIC EXCURSION: 2.24 CM
TV REST PULMONARY ARTERY PRESSURE: 39 MMHG

## 2023-05-31 PROCEDURE — 3075F SYST BP GE 130 - 139MM HG: CPT | Mod: CPTII,GC,S$GLB, | Performed by: INTERNAL MEDICINE

## 2023-05-31 PROCEDURE — 99214 OFFICE O/P EST MOD 30 MIN: CPT | Mod: GC,S$GLB,, | Performed by: INTERNAL MEDICINE

## 2023-05-31 PROCEDURE — 3044F HG A1C LEVEL LT 7.0%: CPT | Mod: CPTII,GC,S$GLB, | Performed by: INTERNAL MEDICINE

## 2023-05-31 PROCEDURE — 3079F DIAST BP 80-89 MM HG: CPT | Mod: CPTII,GC,S$GLB, | Performed by: INTERNAL MEDICINE

## 2023-05-31 PROCEDURE — 99999 PR PBB SHADOW E&M-EST. PATIENT-LVL III: ICD-10-PCS | Mod: PBBFAC,GC,, | Performed by: INTERNAL MEDICINE

## 2023-05-31 PROCEDURE — 3008F BODY MASS INDEX DOCD: CPT | Mod: CPTII,GC,S$GLB, | Performed by: INTERNAL MEDICINE

## 2023-05-31 PROCEDURE — 93351 STRESS TTE COMPLETE: CPT

## 2023-05-31 PROCEDURE — 3075F PR MOST RECENT SYSTOLIC BLOOD PRESS GE 130-139MM HG: ICD-10-PCS | Mod: CPTII,GC,S$GLB, | Performed by: INTERNAL MEDICINE

## 2023-05-31 PROCEDURE — 99999 PR PBB SHADOW E&M-EST. PATIENT-LVL III: CPT | Mod: PBBFAC,GC,, | Performed by: INTERNAL MEDICINE

## 2023-05-31 PROCEDURE — 93351 STRESS ECHO (CUPID ONLY): ICD-10-PCS | Mod: 26,,, | Performed by: INTERNAL MEDICINE

## 2023-05-31 PROCEDURE — 1159F PR MEDICATION LIST DOCUMENTED IN MEDICAL RECORD: ICD-10-PCS | Mod: CPTII,GC,S$GLB, | Performed by: INTERNAL MEDICINE

## 2023-05-31 PROCEDURE — 93351 STRESS TTE COMPLETE: CPT | Mod: 26,,, | Performed by: INTERNAL MEDICINE

## 2023-05-31 PROCEDURE — 3044F PR MOST RECENT HEMOGLOBIN A1C LEVEL <7.0%: ICD-10-PCS | Mod: CPTII,GC,S$GLB, | Performed by: INTERNAL MEDICINE

## 2023-05-31 PROCEDURE — 3008F PR BODY MASS INDEX (BMI) DOCUMENTED: ICD-10-PCS | Mod: CPTII,GC,S$GLB, | Performed by: INTERNAL MEDICINE

## 2023-05-31 PROCEDURE — 3079F PR MOST RECENT DIASTOLIC BLOOD PRESSURE 80-89 MM HG: ICD-10-PCS | Mod: CPTII,GC,S$GLB, | Performed by: INTERNAL MEDICINE

## 2023-05-31 PROCEDURE — 99214 PR OFFICE/OUTPT VISIT, EST, LEVL IV, 30-39 MIN: ICD-10-PCS | Mod: GC,S$GLB,, | Performed by: INTERNAL MEDICINE

## 2023-05-31 PROCEDURE — 1159F MED LIST DOCD IN RCRD: CPT | Mod: CPTII,GC,S$GLB, | Performed by: INTERNAL MEDICINE

## 2023-05-31 RX ORDER — ASPIRIN 81 MG/1
81 TABLET ORAL DAILY
Refills: 0
Start: 2023-05-31 | End: 2024-05-30

## 2023-05-31 RX ORDER — ROSUVASTATIN CALCIUM 40 MG/1
40 TABLET, COATED ORAL NIGHTLY
Qty: 90 TABLET | Refills: 3 | Status: SHIPPED | OUTPATIENT
Start: 2023-05-31 | End: 2024-05-30

## 2023-05-31 NOTE — PROGRESS NOTES
"Nancy Tadeo MD  ECHO, [facility] in [facility] - Echo / Stress Lab on 3/28/2023  Eli Claire MD in [address] on 4/17/2023  EXERCISE, STRESS ECHO in [facility] - Echo / Stress Lab on 5/31/2023  Chase Padgett MD in [address] on 5/31/2023    Subjective:   Patient ID:  Keon Schneider Jr. is a 52 y.o. male who presents for follow-up of chest pain    Keon Schneider Jr. 52 M who presented for Chest pain. He saw Dr. Claire last month:     He presents as a new patient to clinic for evaluation of chest pain post hospital discharge. He recently presented to the ED with a chief complaint of chest pain cardiac work up at that time unremarkable including negative troponins, EKG, and echo with preserved ef of 60%. In terms of risk factors, he had a coronary CT scan, which showed mild coronary calcification, and he has noted to have elevated blood pressure readings without formal diagnosis of hypertension, cocaine used and obesity. Denies family history.     Ldl tg 205. He was started on atorv 40. He is pending a treadmill echo.    Interval history:    I asked him to describe the chest pain he experienced last month. He states he was at his friend's house and it was a stressful environment with a lot of "stupid and negative activity". He thinks his blood pressure was very high, and he had a severe chest discomfort described as someone stepping on it, and "it was an all day deal". He reports 2 episodes of chest discomfort in the last 4 months. As stated workup at ED was unremarkable. Reports he hasn't been around those friends in 2 weeks and his blood pressure has been great and no recurrent episodes of chest pain.     He's had an echo stress and started on crestor. Stress echo limited due to inability to achieve target hr 2/2 (hip pain), but no anginal symptoms and echo findings were normal. Of note, he reports being active with rigorous work at his job without anginal " symptoms. LDL significantly improved and down to 78 from 133 after starting crestor.    Diet/Salt intake: no particular diet. Avoids particular foods for diverticulitis  Exercise: no dedicated. Hangs gutters for work. Denies anginal symptoms during work.   BP monitoring at home: does not check    Tobacco: 1 pack per 2 weeks. Smokes when stressed  Alcohol: drinks on weekend  Illicit Drug use: denies  Medication compliance: skins famotidine but otherwise reports compliance     Medical:   Surgical: Reviewed, as below.  Family: father  of MI in his 80s. Mother had pancreatic cancer. Distant from his sisters  Social: Reviewed, as below.       Review of Systems   Constitutional: Negative for chills, decreased appetite and fever.   HENT:  Negative for congestion and sore throat.    Eyes:  Negative for blurred vision and discharge.   Cardiovascular:  Negative for chest pain, claudication, cyanosis, dyspnea on exertion and leg swelling.   Respiratory:  Negative for cough, hemoptysis and shortness of breath.    Endocrine: Negative for cold intolerance and heat intolerance.   Skin:  Negative for color change.   Musculoskeletal:  Negative for muscle weakness and myalgias.   Gastrointestinal:  Negative for bloating and abdominal pain.   Neurological:  Negative for dizziness, focal weakness and weakness.   Psychiatric/Behavioral:  Negative for altered mental status and depression.    Negative aside from what is mentioned in HPI above.    Past Medical History:   Diagnosis Date    Anxiety     Benzodiazepine dependence    Arthritis     Asthma     Avascular necrosis     Carpal tunnel syndrome     Chronic pain     Depression     Diverticulitis     Gout     Other injury of other sites of trunk 2011    Pneumonia     Spondylolisthesis     lumbar; myofascial pain    Staph infection     Ulnar neuropathy     left and rt arm       Past Surgical History:   Procedure Laterality Date    COLONOSCOPY N/A 2020    Procedure:  "COLONOSCOPY;  Surgeon: Broderick Moise MD;  Location: [facility];  Service: Endoscopy;  Laterality: N/A;    HIP SURGERY  3/06; 6/06    hip arthroplasty    HIP SURGERY      bilateral hip replacement (titanium)    JOINT REPLACEMENT      SHOULDER SURGERY  2012    right shoulder    SHOULDER SURGERY         Family History   Problem Relation Age of Onset    Cancer Mother     Cancer Sister          Current Outpatient Medications:     allopurinoL (ZYLOPRIM) 100 MG tablet, Take 100 mg by mouth., Disp: , Rfl:     ALPRAZolam (XANAX) 2 MG Tab, Take 2 mg by mouth 2 (two) times a day., Disp: , Rfl:     aspirin (ECOTRIN) 81 MG EC tablet, Take 1 tablet (81 mg total) by mouth once daily., Disp: , Rfl: 0    colchicine (MITIGARE) 0.6 mg Cap, Take 1 capsule (0.6 mg total) by mouth daily as needed., Disp: 10 capsule, Rfl: 0    famotidine (PEPCID) 40 MG tablet, Take 40 mg by mouth once daily., Disp: , Rfl:     rosuvastatin (CRESTOR) 40 MG Tab, Take 1 tablet (40 mg total) by mouth every evening., Disp: 90 tablet, Rfl: 3    traMADoL (ULTRAM) 50 mg tablet, Take 1 tablet (50 mg total) by mouth every 8 (eight) hours as needed for Pain. (Patient not taking: Reported on 5/31/2023), Disp: 15 tablet, Rfl: 0  Objective:   /82 (BP Location: Left arm, Patient Position: Sitting)   Pulse 81   Ht 5' 11" (1.803 m)   Wt 109.7 kg (241 lb 13.5 oz)   BMI 33.73 kg/m²      Physical Exam  Constitutional:       Appearance: He is well-developed.   HENT:      Head: Normocephalic and atraumatic.      Right Ear: External ear normal.      Left Ear: External ear normal.   Eyes:      Conjunctiva/sclera: Conjunctivae normal.   Cardiovascular:      Rate and Rhythm: Normal rate and regular rhythm.      Pulses: Intact distal pulses.           Radial pulses are 2+ on the right side and 2+ on the left side.      Heart sounds: Normal heart sounds.   Pulmonary:      Effort: Pulmonary effort is normal. No respiratory distress.      Breath sounds: Normal breath " sounds. No wheezing.   Abdominal:      General: Bowel sounds are normal. There is no distension.      Palpations: Abdomen is soft.      Tenderness: There is no abdominal tenderness.   Musculoskeletal:         General: Normal range of motion.      Cervical back: Normal range of motion and neck supple.   Skin:     General: Skin is warm and dry.   Neurological:      Mental Status: He is alert and oriented to person, place, and time.   Psychiatric:         Mood and Affect: Mood normal.         Behavior: Behavior normal.     Lab Results   Component Value Date     05/31/2023    K 4.5 05/31/2023     05/31/2023    CO2 22 (L) 05/31/2023    BUN 14 05/31/2023    CREATININE 1.0 05/31/2023    ANIONGAP 11 05/31/2023     Lab Results   Component Value Date    HGBA1C 6.0 (H) 03/28/2023     Lab Results   Component Value Date    BNP 13 03/27/2023    BNP <10 03/22/2023    BNP 13 10/28/2019       Lab Results   Component Value Date    WBC 7.37 03/28/2023    HGB 12.1 (L) 03/28/2023    HCT 36.5 (L) 03/28/2023    HCT 38 03/27/2023     03/28/2023    GRAN 3.6 03/28/2023    GRAN 48.5 03/28/2023     Lab Results   Component Value Date    CHOL 131 05/31/2023    HDL 39 (L) 05/31/2023    LDLCALC 78.8 05/31/2023    TRIG 66 05/31/2023        Assessment:     1. Cocaine abuse    2. Anxiety    3. Atherosclerosis of native coronary artery of native heart with other form of angina pectoris    4. Dyslipidemia        Plan:   Cocaine abuse  Denies illicit drug use to me  Instructed to avoid illicit drugs    Anxiety  Encouraged to avoid stressful triggers  Management per pcp    Atherosclerosis of native coronary artery of native heart  Suspect his chest pain was not secondary to ischemia  Risk factors include coronary calcification  Optimize risk factors include BP and cholesterol levels    Patient had a exercise stress echo and was limited due to hip pain with the incline in the treadmill.   Denies any anginal with heavy exertion at  home    Start asa 81 mg daily  Increase crestor dose    Dyslipidemia  With coronary calcification, per staff aim for LDL of <70.  Start on asa 81 mg daily  Will increase crestor to 40 mg daily        Patient seen and examined with attending Cardiologist, Dr. Poole, who agrees with management and plan.    Chase Padgett MD  Cardiovascular Disease Fellow PGY VI  Pager [phone]

## 2023-05-31 NOTE — ASSESSMENT & PLAN NOTE
Suspect his chest pain was not secondary to ischemia  Risk factors include coronary calcification  Optimize risk factors include BP and cholesterol levels    Patient had a exercise stress echo and was limited due to hip pain with the incline in the treadmill.   Denies any anginal with heavy exertion at home    Start asa 81 mg daily  Increase crestor dose

## 2023-05-31 NOTE — ASSESSMENT & PLAN NOTE
With coronary calcification, per staff aim for LDL of <70.  Start on asa 81 mg daily  Will increase crestor to 40 mg daily

## 2023-08-15 ENCOUNTER — HOSPITAL ENCOUNTER (EMERGENCY)
Facility: HOSPITAL | Age: 53
Discharge: HOME | End: 2023-08-15
Attending: EMERGENCY MEDICINE
Payer: MEDICARE

## 2023-08-15 VITALS
TEMPERATURE: 98 F | SYSTOLIC BLOOD PRESSURE: 146 MMHG | RESPIRATION RATE: 18 BRPM | HEART RATE: 92 BPM | WEIGHT: 215 LBS | DIASTOLIC BLOOD PRESSURE: 93 MMHG | BODY MASS INDEX: 29.99 KG/M2 | OXYGEN SATURATION: 100 %

## 2023-08-15 DIAGNOSIS — M10.071 ACUTE IDIOPATHIC GOUT INVOLVING TOE OF RIGHT FOOT: Primary | ICD-10-CM

## 2023-08-15 DIAGNOSIS — M79.674 GREAT TOE PAIN, RIGHT: ICD-10-CM

## 2023-08-15 PROCEDURE — 25000003 PHARM REV CODE 250: Performed by: EMERGENCY MEDICINE

## 2023-08-15 PROCEDURE — 99284 EMERGENCY DEPT VISIT MOD MDM: CPT

## 2023-08-15 PROCEDURE — 63600175 PHARM REV CODE 636 W HCPCS: Performed by: EMERGENCY MEDICINE

## 2023-08-15 RX ORDER — COLCHICINE 0.6 MG/1
0.6 TABLET, FILM COATED ORAL
Status: DISCONTINUED | OUTPATIENT
Start: 2023-08-15 | End: 2023-08-15

## 2023-08-15 RX ORDER — DEXTROMETHORPHAN HYDROBROMIDE, GUAIFENESIN 5; 100 MG/5ML; MG/5ML
650 LIQUID ORAL EVERY 8 HOURS
Qty: 15 TABLET | Refills: 0 | Status: SHIPPED | OUTPATIENT
Start: 2023-08-15 | End: 2023-08-20

## 2023-08-15 RX ORDER — HYDROCODONE BITARTRATE AND ACETAMINOPHEN 5; 325 MG/1; MG/1
1 TABLET ORAL EVERY 6 HOURS PRN
Qty: 12 TABLET | Refills: 0 | Status: SHIPPED | OUTPATIENT
Start: 2023-08-15 | End: 2023-08-18

## 2023-08-15 RX ORDER — COLCHICINE 0.6 MG/1
0.6 TABLET ORAL DAILY
Qty: 3 TABLET | Refills: 0 | Status: SHIPPED | OUTPATIENT
Start: 2023-08-15 | End: 2023-08-18

## 2023-08-15 RX ORDER — PREDNISONE 10 MG/1
TABLET ORAL
Qty: 21 TABLET | Refills: 0 | Status: SHIPPED | OUTPATIENT
Start: 2023-08-15 | End: 2024-01-27

## 2023-08-15 RX ORDER — PREDNISONE 20 MG/1
40 TABLET ORAL
Status: COMPLETED | OUTPATIENT
Start: 2023-08-15 | End: 2023-08-15

## 2023-08-15 RX ORDER — COLCHICINE 0.6 MG/1
1.2 TABLET, FILM COATED ORAL
Status: COMPLETED | OUTPATIENT
Start: 2023-08-15 | End: 2023-08-15

## 2023-08-15 RX ORDER — OXYCODONE AND ACETAMINOPHEN 5; 325 MG/1; MG/1
1 TABLET ORAL
Status: COMPLETED | OUTPATIENT
Start: 2023-08-15 | End: 2023-08-15

## 2023-08-15 RX ADMIN — OXYCODONE HYDROCHLORIDE AND ACETAMINOPHEN 1 TABLET: 5; 325 TABLET ORAL at 07:08

## 2023-08-15 RX ADMIN — COLCHICINE 1.2 MG: 0.6 TABLET, FILM COATED ORAL at 07:08

## 2023-08-15 RX ADMIN — PREDNISONE 40 MG: 20 TABLET ORAL at 07:08

## 2023-08-15 NOTE — ED PROVIDER NOTES
Emergency Department Encounter  Provider Note    Keon Schneider Jr.  [id number]  8/15/2023    Evaluation:    History:     Chief Complaint   Patient presents with    Gout     C/o R big toe and foot pain x3 days. Hx of gout.        History of Present Illness:  Keon Schneider Jr. is a 53 y.o. male who has a past medical history of Anxiety, Arthritis, Asthma, Avascular necrosis, Carpal tunnel syndrome, Chronic pain, Depression, Diverticulitis, Gout, Other injury of other sites of trunk (12/28/2011), Pneumonia, Spondylolisthesis, Staph infection, and Ulnar neuropathy.    The patient presents to the ED due to R great toe pain.   Patient reports symptoms started 3 days ago. He has history of gout with the same pain about a year ago that improved with pain medication and steroids. He has been taking allopurinol regularly since then. He denies any recent injury, trauma, fever, chest pain, shortness of breath, leg swelling, or any other concerns.  He has been taking over-the-counter medication without much improvement.      Past Medical History:   Diagnosis Date    Anxiety     Benzodiazepine dependence    Arthritis     Asthma     Avascular necrosis     Carpal tunnel syndrome     Chronic pain     Depression     Diverticulitis     Gout     Other injury of other sites of trunk 12/28/2011    Pneumonia     Spondylolisthesis     lumbar; myofascial pain    Staph infection     Ulnar neuropathy     left and rt arm     Past Surgical History:   Procedure Laterality Date    COLONOSCOPY N/A 7/6/2020    Procedure: COLONOSCOPY;  Surgeon: Broderick Moise MD;  Location: [facility];  Service: Endoscopy;  Laterality: N/A;    HIP SURGERY  3/06; 6/06    hip arthroplasty    HIP SURGERY      bilateral hip replacement (titanium)    JOINT REPLACEMENT      SHOULDER SURGERY  2012    right shoulder    SHOULDER SURGERY       Family History   Problem Relation Age of Onset    Cancer Mother     Cancer Sister      Social History     Socioeconomic  History    Marital status: Single   Tobacco Use    Smoking status: Some Days     Current packs/day: 0.00     Average packs/day: 1 pack/day for 10.0 years (10.0 ttl pk-yrs)     Types: Cigarettes     Start date: 10/13/2002     Last attempt to quit: 10/13/2012     Years since quitting: 10.8    Smokeless tobacco: Never   Substance and Sexual Activity    Alcohol use: Yes     Alcohol/week: 12.0 standard drinks of alcohol     Types: 12 Cans of beer per week     Comment: 2x/week    Drug use: No    Sexual activity: Yes     Partners: Female     Review of patient's allergies indicates:   Allergen Reactions    Atorvastatin Nausea And Vomiting    Toradol [ketorolac] Hives and Nausea And Vomiting    Ibuprofen Other (See Comments)     States GI BLEED       Review of Systems   Musculoskeletal:  Positive for arthralgias.       Physical Exam:     Initial Vitals [08/15/23 0552]   BP Pulse Resp Temp SpO2   (!) 146/93 92 16 97.7 °F (36.5 °C) 100 %      MAP       --         Physical Exam    Nursing note and vitals reviewed.  Constitutional: He appears well-developed and well-nourished. He is not diaphoretic. No distress.   Well-appearing, in no distress.   HENT:   Head: Normocephalic and atraumatic.   Mouth/Throat: Oropharynx is clear and moist.   Eyes: EOM are normal. Pupils are equal, round, and reactive to light.   Neck: No tracheal deviation present.   Cardiovascular:  Normal rate, regular rhythm, normal heart sounds and intact distal pulses.           Pulmonary/Chest: Breath sounds normal. No stridor. No respiratory distress.   Abdominal: Abdomen is soft. He exhibits no distension and no mass. There is no abdominal tenderness.   Musculoskeletal:         General: No edema. Normal range of motion.        Feet:       Comments: Significant tenderness to palpation of the right great toe and right MTP joint.  Mild swelling.  No significant erythema or edema.  Full range of motion of the ankle and knee.  No proximal foot, ankle, or leg  tenderness or swelling.     Neurological: He is alert and oriented to person, place, and time. No cranial nerve deficit or sensory deficit.   Skin: Skin is warm and dry. Capillary refill takes less than 2 seconds. No rash noted.   Psychiatric: He has a normal mood and affect. His behavior is normal. Thought content normal.         ED Course:   Procedures    Medical Decision Making:    History Acquisition:   Additional historians utilized:  none    Prior medical records were reviewed:   Cardiology visit 5/31 for f/u CAD, anxiety, HLD    The patient's list of active medical problems, social history, medications, and allergies as documented has been reviewed.     Differential Diagnoses:   Based on available information and initial assessment, Differential Diagnosis includes, but is not limited to:  Fracture, dislocation, compartment syndrome, nerve injury/palsy, vascular injury, DVT, rhabdomyolysis, hemarthrosis, septic joint, cellulitis, bursitis, muscle strain, ligament tear/sprain, laceration, foreign body, abrasion, soft tissue contusion, osteoarthritis.        EKG:       Labs:   Labs Reviewed - No data to display  Independent review of the labs ordered include:       Imaging:     Imaging Results    None            Additional Consideration:   Additional testing considered during clinical course: considered labs/imaging but felt unnecessary for uncomplicated gout flare    Social determinants of health considered during development of treatment plan include: poor access to care, tobacco use    Current co-morbidities considered which impacted clinical decision making: CAD, gout, GERD, HLD    Case discussed with additional provider: none    Medications   oxyCODONE-acetaminophen 5-325 mg per tablet 1 tablet (has no administration in time range)   predniSONE tablet 40 mg (has no administration in time range)   colchicine capsule/tablet 1.2 mg (has no administration in time range)             Medical Decision Making:    Initial Assessment:   54 yo M with gout flare of R great toe.  Vitals and exam reassuring.  Will treat symptomatically and DC with supportive care.     On re-evaluation, the patient's status has improved.  After complete ED evaluation, clinical impression is most consistent with gout flare.  PCP follow-up as soon as possible was recommended.    After taking into careful account the patient's history, physical exam findings, as well as empirical and objective data obtained throughout ED workup, I feel no emergent medical condition has been identified. No further evaluation or admission was felt to be required, and the patient is stable for discharge from the ED. The patient and any additional family present were updated with test results, overall clinical impression, and recommended further plan of care, including discharge instructions as provided and outpatient follow-up for continued evaluation and management as needed. All questions were answered. The patient expressed understanding and agreed with current plan for discharge and follow-up plan of care. Strict ED return precautions were provided, including return/worsening of current symptoms, new symptoms, or any other concerns.                 Clinical Impression:       ICD-10-CM ICD-9-CM   1. Acute idiopathic gout involving toe of right foot  M10.071 274.01   2. Great toe pain, right  M79.674 729.5       Discharge Medications:  Current Discharge Medication List        START taking these medications    Details   acetaminophen (TYLENOL) 650 MG TbSR Take 1 tablet (650 mg total) by mouth every 8 (eight) hours. for 5 days  Qty: 15 tablet, Refills: 0      colchicine, gout, (COLCRYS) 0.6 mg tablet Take 1 tablet (0.6 mg total) by mouth once daily. for 3 days  Qty: 3 tablet, Refills: 0      HYDROcodone-acetaminophen (NORCO) 5-325 mg per tablet Take 1 tablet by mouth every 6 (six) hours as needed for Pain.  Qty: 12 tablet, Refills: 0    Comments: Quantity prescribed  more than 7 day supply? No      predniSONE (DELTASONE) 10 MG tablet Take 4 tabs x 3 days, then take 2 tabs x 3 days, then take 1 tab x 3 days.  Qty: 21 tablet, Refills: 0               Follow-up Information       Follow up With Specialties Details Why Contact Info    Nancy Tadeo MD Internal Medicine Schedule an appointment as soon as possible for a visit   [address]  [place] [address]  [phone]               ED Disposition Condition    Discharge Stable               Crispin Chavez MD  08/15/23 0742

## 2023-08-15 NOTE — DISCHARGE INSTRUCTIONS
Thank you for choosing [facility]!     Our goal in the Emergency Department is to always provide outstanding medical care. You may receive a survey by mail or e-mail in the next week regarding your experience today. We would greatly appreciate you completing and returning the survey. Your feedback provides us with a way to recognize our staff who provide very good care, and it helps us learn how to improve when your experience was below our aspiration of excellence.      It is important to remember that some problems are difficult to diagnose and may not be found during your first visit. Be sure to follow up with your primary care doctor and review any labs/imaging that was performed during your visit with them. If you do not have a primary care doctor, you may contact the one listed on your discharge paperwork, or you may also call the [facility] Appointment Desk at [phone] to schedule an appointment.     All medications may potentially have side effects and it is impossible to predict which medications may give you side effects. If you feel that you are having a negative effect of any medication you should immediately stop taking them and seek medical attention.  Do not drive or make any important decisions for 24 hours if you have received any pain medications, sedatives or mood altering drugs during your ER visit.    We appreciate you trusting us with your medical care. We will be happy to take care of you for all of your future medical needs. You may return to the ER at any time for any new/concerning symptoms, worsening condition, or failure to improve. We hope you feel better soon.     Sincerely,    Crispin Chavez Jr., MD  Board-Certified Emergency Medicine Physician  [facility]

## 2023-08-15 NOTE — ED TRIAGE NOTES
Keon Schneider Jr., a 53 y.o. male presents to the ED w/ complaint of     Triage note:  Chief Complaint   Patient presents with    Gout     C/o R big toe and foot pain x3 days. Hx of gout.      Review of patient's allergies indicates:   Allergen Reactions    Atorvastatin Nausea And Vomiting    Toradol [ketorolac] Hives and Nausea And Vomiting    Ibuprofen Other (See Comments)     States GI BLEED     Past Medical History:   Diagnosis Date    Anxiety     Benzodiazepine dependence    Arthritis     Asthma     Avascular necrosis     Carpal tunnel syndrome     Chronic pain     Depression     Diverticulitis     Gout     Other injury of other sites of trunk 12/28/2011    Pneumonia     Spondylolisthesis     lumbar; myofascial pain    Staph infection     Ulnar neuropathy     left and rt arm

## 2023-08-15 NOTE — ED NOTES
Discharge home, states understanding to follow up as directed. Ambulates out of ED without difficulty. ALL INFORMATION PER PROVIDER STAFF

## 2023-09-30 ENCOUNTER — HOSPITAL ENCOUNTER (EMERGENCY)
Facility: HOSPITAL | Age: 53
Discharge: HOME | End: 2023-09-30
Attending: EMERGENCY MEDICINE
Payer: MEDICARE

## 2023-09-30 VITALS
WEIGHT: 215 LBS | RESPIRATION RATE: 18 BRPM | SYSTOLIC BLOOD PRESSURE: 138 MMHG | TEMPERATURE: 98 F | DIASTOLIC BLOOD PRESSURE: 90 MMHG | BODY MASS INDEX: 30.1 KG/M2 | OXYGEN SATURATION: 98 % | HEIGHT: 71 IN | HEART RATE: 86 BPM

## 2023-09-30 DIAGNOSIS — S42.012A CLOSED ANTERIOR DISPLACED FRACTURE OF STERNAL END OF LEFT CLAVICLE, INITIAL ENCOUNTER: ICD-10-CM

## 2023-09-30 DIAGNOSIS — M25.512 LEFT SHOULDER PAIN: Primary | ICD-10-CM

## 2023-09-30 PROCEDURE — 63600175 PHARM REV CODE 636 W HCPCS: Performed by: EMERGENCY MEDICINE

## 2023-09-30 PROCEDURE — 96375 TX/PRO/DX INJ NEW DRUG ADDON: CPT

## 2023-09-30 PROCEDURE — 96374 THER/PROPH/DIAG INJ IV PUSH: CPT

## 2023-09-30 PROCEDURE — 25000003 PHARM REV CODE 250: Performed by: EMERGENCY MEDICINE

## 2023-09-30 PROCEDURE — 99284 EMERGENCY DEPT VISIT MOD MDM: CPT | Mod: 25

## 2023-09-30 PROCEDURE — 96361 HYDRATE IV INFUSION ADD-ON: CPT

## 2023-09-30 RX ORDER — OXYCODONE AND ACETAMINOPHEN 5; 325 MG/1; MG/1
1 TABLET ORAL EVERY 6 HOURS PRN
Qty: 16 TABLET | Refills: 0 | Status: ON HOLD | OUTPATIENT
Start: 2023-09-30 | End: 2023-10-05 | Stop reason: HOSPADM

## 2023-09-30 RX ORDER — OXYCODONE HYDROCHLORIDE 5 MG/1
5 TABLET ORAL
Status: COMPLETED | OUTPATIENT
Start: 2023-09-30 | End: 2023-09-30

## 2023-09-30 RX ORDER — MORPHINE SULFATE 4 MG/ML
4 INJECTION, SOLUTION INTRAMUSCULAR; INTRAVENOUS
Status: COMPLETED | OUTPATIENT
Start: 2023-09-30 | End: 2023-09-30

## 2023-09-30 RX ORDER — FENTANYL CITRATE 50 UG/ML
50 INJECTION, SOLUTION INTRAMUSCULAR; INTRAVENOUS
Status: COMPLETED | OUTPATIENT
Start: 2023-09-30 | End: 2023-09-30

## 2023-09-30 RX ADMIN — MORPHINE SULFATE 4 MG: 4 INJECTION INTRAVENOUS at 08:09

## 2023-09-30 RX ADMIN — SODIUM CHLORIDE, POTASSIUM CHLORIDE, SODIUM LACTATE AND CALCIUM CHLORIDE 1000 ML: 600; 310; 30; 20 INJECTION, SOLUTION INTRAVENOUS at 07:09

## 2023-09-30 RX ADMIN — FENTANYL CITRATE 50 MCG: 50 INJECTION, SOLUTION INTRAMUSCULAR; INTRAVENOUS at 07:09

## 2023-09-30 RX ADMIN — OXYCODONE HYDROCHLORIDE 5 MG: 5 TABLET ORAL at 09:09
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
## 2023-10-02 ENCOUNTER — HOSPITAL ENCOUNTER (EMERGENCY)
Facility: HOSPITAL | Age: 53
Discharge: HOME | End: 2023-10-02
Attending: EMERGENCY MEDICINE
Payer: MEDICARE

## 2023-10-02 VITALS
HEIGHT: 71 IN | OXYGEN SATURATION: 95 % | TEMPERATURE: 98 F | WEIGHT: 240 LBS | DIASTOLIC BLOOD PRESSURE: 86 MMHG | BODY MASS INDEX: 33.6 KG/M2 | RESPIRATION RATE: 20 BRPM | SYSTOLIC BLOOD PRESSURE: 141 MMHG | HEART RATE: 85 BPM

## 2023-10-02 DIAGNOSIS — S42.002D CLOSED NONDISPLACED FRACTURE OF LEFT CLAVICLE WITH ROUTINE HEALING, UNSPECIFIED PART OF CLAVICLE, SUBSEQUENT ENCOUNTER: Primary | ICD-10-CM

## 2023-10-02 DIAGNOSIS — R07.81 RIB PAIN: ICD-10-CM

## 2023-10-02 DIAGNOSIS — M25.559 HIP PAIN: ICD-10-CM

## 2023-10-02 PROCEDURE — 99284 EMERGENCY DEPT VISIT MOD MDM: CPT | Mod: 25

## 2023-10-02 PROCEDURE — 94799 UNLISTED PULMONARY SVC/PX: CPT

## 2023-10-02 PROCEDURE — 25000003 PHARM REV CODE 250

## 2023-10-02 RX ORDER — HYDROCODONE BITARTRATE AND ACETAMINOPHEN 5; 325 MG/1; MG/1
1 TABLET ORAL
Status: COMPLETED | OUTPATIENT
Start: 2023-10-02 | End: 2023-10-02

## 2023-10-02 RX ADMIN — HYDROCODONE BITARTRATE AND ACETAMINOPHEN 1 TABLET: 5; 325 TABLET ORAL at 06:10

## 2023-10-02 NOTE — DISCHARGE INSTRUCTIONS
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
## 2023-10-04 ENCOUNTER — OFFICE VISIT (OUTPATIENT)
Dept: ORTHOPEDICS | Facility: CLINIC | Age: 53
End: 2023-10-04
Payer: MEDICARE

## 2023-10-04 ENCOUNTER — ANESTHESIA EVENT (OUTPATIENT)
Dept: SURGERY | Facility: HOSPITAL | Age: 53
End: 2023-10-04
Payer: MEDICARE

## 2023-10-04 VITALS — BODY MASS INDEX: 33.61 KG/M2 | WEIGHT: 240.06 LBS | HEIGHT: 71 IN

## 2023-10-04 DIAGNOSIS — S42.012A CLOSED ANTERIOR DISPLACED FRACTURE OF STERNAL END OF LEFT CLAVICLE, INITIAL ENCOUNTER: Primary | ICD-10-CM

## 2023-10-04 PROCEDURE — 1160F RVW MEDS BY RX/DR IN RCRD: CPT | Mod: CPTII,S$GLB,, | Performed by: NURSE PRACTITIONER

## 2023-10-04 PROCEDURE — 3044F PR MOST RECENT HEMOGLOBIN A1C LEVEL <7.0%: ICD-10-PCS | Mod: CPTII,S$GLB,, | Performed by: NURSE PRACTITIONER

## 2023-10-04 PROCEDURE — 99999 PR PBB SHADOW E&M-EST. PATIENT-LVL IV: ICD-10-PCS | Mod: PBBFAC,,, | Performed by: NURSE PRACTITIONER

## 2023-10-04 PROCEDURE — 99215 PR OFFICE/OUTPT VISIT, EST, LEVL V, 40-54 MIN: ICD-10-PCS | Mod: 57,S$GLB,ICN, | Performed by: NURSE PRACTITIONER

## 2023-10-04 PROCEDURE — 99999 PR PBB SHADOW E&M-EST. PATIENT-LVL IV: CPT | Mod: PBBFAC,,, | Performed by: NURSE PRACTITIONER

## 2023-10-04 PROCEDURE — 99215 OFFICE O/P EST HI 40 MIN: CPT | Mod: 57,S$GLB,ICN, | Performed by: NURSE PRACTITIONER

## 2023-10-04 PROCEDURE — 3008F BODY MASS INDEX DOCD: CPT | Mod: CPTII,S$GLB,, | Performed by: NURSE PRACTITIONER

## 2023-10-04 PROCEDURE — 1160F PR REVIEW ALL MEDS BY PRESCRIBER/CLIN PHARMACIST DOCUMENTED: ICD-10-PCS | Mod: CPTII,S$GLB,, | Performed by: NURSE PRACTITIONER

## 2023-10-04 PROCEDURE — 3008F PR BODY MASS INDEX (BMI) DOCUMENTED: ICD-10-PCS | Mod: CPTII,S$GLB,, | Performed by: NURSE PRACTITIONER

## 2023-10-04 PROCEDURE — 1159F MED LIST DOCD IN RCRD: CPT | Mod: CPTII,S$GLB,, | Performed by: NURSE PRACTITIONER

## 2023-10-04 PROCEDURE — 1159F PR MEDICATION LIST DOCUMENTED IN MEDICAL RECORD: ICD-10-PCS | Mod: CPTII,S$GLB,, | Performed by: NURSE PRACTITIONER

## 2023-10-04 PROCEDURE — 3044F HG A1C LEVEL LT 7.0%: CPT | Mod: CPTII,S$GLB,, | Performed by: NURSE PRACTITIONER

## 2023-10-04 RX ORDER — MUPIROCIN 20 MG/G
OINTMENT TOPICAL
Status: CANCELLED | OUTPATIENT
Start: 2023-10-04

## 2023-10-04 RX ORDER — CEFAZOLIN SODIUM 2 G/50ML
2 SOLUTION INTRAVENOUS
Status: CANCELLED | OUTPATIENT
Start: 2023-10-04
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
## 2023-10-05 ENCOUNTER — ANESTHESIA (OUTPATIENT)
Dept: SURGERY | Facility: HOSPITAL | Age: 53
End: 2023-10-05
Payer: MEDICARE

## 2023-10-05 ENCOUNTER — HOSPITAL ENCOUNTER (OUTPATIENT)
Facility: HOSPITAL | Age: 53
Discharge: HOME | End: 2023-10-05
Attending: ORTHOPAEDIC SURGERY | Admitting: ORTHOPAEDIC SURGERY
Payer: MEDICARE

## 2023-10-05 VITALS
BODY MASS INDEX: 34.36 KG/M2 | HEIGHT: 70 IN | TEMPERATURE: 99 F | OXYGEN SATURATION: 95 % | WEIGHT: 240 LBS | SYSTOLIC BLOOD PRESSURE: 158 MMHG | RESPIRATION RATE: 20 BRPM | HEART RATE: 86 BPM | DIASTOLIC BLOOD PRESSURE: 100 MMHG

## 2023-10-05 DIAGNOSIS — S42.012A CLOSED ANTERIOR DISPLACED FRACTURE OF STERNAL END OF LEFT CLAVICLE, INITIAL ENCOUNTER: ICD-10-CM

## 2023-10-05 PROCEDURE — 37000008 HC ANESTHESIA 1ST 15 MINUTES: Performed by: ORTHOPAEDIC SURGERY

## 2023-10-05 PROCEDURE — 25000003 PHARM REV CODE 250

## 2023-10-05 PROCEDURE — 25000003 PHARM REV CODE 250: Performed by: NURSE PRACTITIONER

## 2023-10-05 PROCEDURE — 63600175 PHARM REV CODE 636 W HCPCS: Performed by: NURSE ANESTHETIST, CERTIFIED REGISTERED

## 2023-10-05 PROCEDURE — 25000003 PHARM REV CODE 250: Performed by: NURSE ANESTHETIST, CERTIFIED REGISTERED

## 2023-10-05 PROCEDURE — 63600175 PHARM REV CODE 636 W HCPCS: Performed by: SURGERY

## 2023-10-05 PROCEDURE — 71000016 HC POSTOP RECOV ADDL HR: Performed by: ORTHOPAEDIC SURGERY

## 2023-10-05 PROCEDURE — D9220A PRA ANESTHESIA: ICD-10-PCS | Mod: ANES,,, | Performed by: ANESTHESIOLOGY

## 2023-10-05 PROCEDURE — 63600175 PHARM REV CODE 636 W HCPCS: Performed by: ANESTHESIOLOGY

## 2023-10-05 PROCEDURE — 71000044 HC DOSC ROUTINE RECOVERY FIRST HOUR: Performed by: ORTHOPAEDIC SURGERY

## 2023-10-05 PROCEDURE — 71000015 HC POSTOP RECOV 1ST HR: Performed by: ORTHOPAEDIC SURGERY

## 2023-10-05 PROCEDURE — 23515 OPTX CLAVICULAR FX W/INT FIX: CPT | Mod: LT,,, | Performed by: ORTHOPAEDIC SURGERY

## 2023-10-05 PROCEDURE — 25000003 PHARM REV CODE 250: Performed by: ORTHOPAEDIC SURGERY

## 2023-10-05 PROCEDURE — 23515 PR OPEN TREATMENT CLAVICULAR FRACTURE INTERNAL FX: ICD-10-PCS | Mod: LT,,, | Performed by: ORTHOPAEDIC SURGERY

## 2023-10-05 PROCEDURE — 37000009 HC ANESTHESIA EA ADD 15 MINS: Performed by: ORTHOPAEDIC SURGERY

## 2023-10-05 PROCEDURE — 27201423 OPTIME MED/SURG SUP & DEVICES STERILE SUPPLY: Performed by: ORTHOPAEDIC SURGERY

## 2023-10-05 PROCEDURE — D9220A PRA ANESTHESIA: Mod: ANES,,, | Performed by: ANESTHESIOLOGY

## 2023-10-05 PROCEDURE — D9220A PRA ANESTHESIA: Mod: CRNA,,, | Performed by: NURSE ANESTHETIST, CERTIFIED REGISTERED

## 2023-10-05 PROCEDURE — 63600175 PHARM REV CODE 636 W HCPCS: Performed by: ORTHOPAEDIC SURGERY

## 2023-10-05 PROCEDURE — D9220A PRA ANESTHESIA: ICD-10-PCS | Mod: CRNA,,, | Performed by: NURSE ANESTHETIST, CERTIFIED REGISTERED

## 2023-10-05 PROCEDURE — 36000710: Performed by: ORTHOPAEDIC SURGERY

## 2023-10-05 PROCEDURE — C1769 GUIDE WIRE: HCPCS | Performed by: ORTHOPAEDIC SURGERY

## 2023-10-05 PROCEDURE — 63600175 PHARM REV CODE 636 W HCPCS: Performed by: NURSE PRACTITIONER

## 2023-10-05 PROCEDURE — C1713 ANCHOR/SCREW BN/BN,TIS/BN: HCPCS | Performed by: ORTHOPAEDIC SURGERY

## 2023-10-05 PROCEDURE — 36000711: Performed by: ORTHOPAEDIC SURGERY

## 2023-10-05 DEVICE — PLATE VARIAX NAR LOK 20H 2.7MM: Type: IMPLANTABLE DEVICE | Site: CLAVICLE | Status: FUNCTIONAL

## 2023-10-05 DEVICE — SCREW VARIAX BONE T8 FT 2X8MM: Type: IMPLANTABLE DEVICE | Site: CLAVICLE | Status: FUNCTIONAL

## 2023-10-05 DEVICE — IMPLANTABLE DEVICE: Type: IMPLANTABLE DEVICE | Site: CLAVICLE | Status: FUNCTIONAL

## 2023-10-05 DEVICE — SCREW VARIAX2 BONE T8 2.4X12MM: Type: IMPLANTABLE DEVICE | Site: CLAVICLE | Status: FUNCTIONAL

## 2023-10-05 DEVICE — SCREW VARIAX NON LOK 2.4X14MM: Type: IMPLANTABLE DEVICE | Site: CLAVICLE | Status: FUNCTIONAL

## 2023-10-05 DEVICE — SCREW VARIAX NON LOK 2.7X20MM: Type: IMPLANTABLE DEVICE | Site: CLAVICLE | Status: FUNCTIONAL

## 2023-10-05 DEVICE — SCREW VARIAX NON-LOK 2.7X18MM: Type: IMPLANTABLE DEVICE | Site: CLAVICLE | Status: FUNCTIONAL

## 2023-10-05 DEVICE — SCREW BONE NONLOCK T8 2.7X16MM: Type: IMPLANTABLE DEVICE | Site: CLAVICLE | Status: FUNCTIONAL

## 2023-10-05 DEVICE — SCREW VARIAX NON LOK 2.4X16MM: Type: IMPLANTABLE DEVICE | Site: CLAVICLE | Status: FUNCTIONAL

## 2023-10-05 DEVICE — SCREW VARIAX NON LOK 2.4X18MM: Type: IMPLANTABLE DEVICE | Site: CLAVICLE | Status: FUNCTIONAL

## 2023-10-05 RX ORDER — ROCURONIUM BROMIDE 10 MG/ML
INJECTION, SOLUTION INTRAVENOUS
Status: DISCONTINUED | OUTPATIENT
Start: 2023-10-05 | End: 2023-10-10

## 2023-10-05 RX ORDER — DEXMEDETOMIDINE HYDROCHLORIDE 100 UG/ML
INJECTION, SOLUTION INTRAVENOUS
Status: DISCONTINUED | OUTPATIENT
Start: 2023-10-05 | End: 2023-10-10

## 2023-10-05 RX ORDER — DEXTROMETHORPHAN HYDROBROMIDE, GUAIFENESIN 5; 100 MG/5ML; MG/5ML
650 LIQUID ORAL EVERY 8 HOURS
Qty: 90 TABLET | Refills: 0 | Status: SHIPPED | OUTPATIENT
Start: 2023-10-05 | End: 2023-11-04

## 2023-10-05 RX ORDER — PHENYLEPHRINE HYDROCHLORIDE 10 MG/ML
INJECTION INTRAVENOUS
Status: DISCONTINUED | OUTPATIENT
Start: 2023-10-05 | End: 2023-10-10

## 2023-10-05 RX ORDER — EPINEPHRINE 1 MG/ML
INJECTION, SOLUTION, CONCENTRATE INTRAVENOUS
Status: DISCONTINUED
Start: 2023-10-05 | End: 2023-10-05 | Stop reason: HOSPADM

## 2023-10-05 RX ORDER — KETAMINE HCL IN 0.9 % NACL 50 MG/5 ML
SYRINGE (ML) INTRAVENOUS
Status: DISCONTINUED | OUTPATIENT
Start: 2023-10-05 | End: 2023-10-10

## 2023-10-05 RX ORDER — SODIUM CHLORIDE 0.9 % (FLUSH) 0.9 %
10 SYRINGE (ML) INJECTION
Status: DISCONTINUED | OUTPATIENT
Start: 2023-10-05 | End: 2023-10-05 | Stop reason: HOSPADM

## 2023-10-05 RX ORDER — MUPIROCIN 20 MG/G
OINTMENT TOPICAL
Status: DISCONTINUED | OUTPATIENT
Start: 2023-10-05 | End: 2023-10-05 | Stop reason: HOSPADM

## 2023-10-05 RX ORDER — HYDROMORPHONE HYDROCHLORIDE 1 MG/ML
INJECTION, SOLUTION INTRAMUSCULAR; INTRAVENOUS; SUBCUTANEOUS
Status: DISCONTINUED | OUTPATIENT
Start: 2023-10-05 | End: 2023-10-10

## 2023-10-05 RX ORDER — BUPIVACAINE HYDROCHLORIDE 7.5 MG/ML
INJECTION, SOLUTION EPIDURAL; RETROBULBAR
Status: COMPLETED | OUTPATIENT
Start: 2023-10-05 | End: 2023-10-05

## 2023-10-05 RX ORDER — BUPIVACAINE HYDROCHLORIDE 7.5 MG/ML
INJECTION, SOLUTION EPIDURAL; RETROBULBAR
Status: COMPLETED
Start: 2023-10-05 | End: 2023-10-05

## 2023-10-05 RX ORDER — LABETALOL HYDROCHLORIDE 5 MG/ML
10 INJECTION, SOLUTION INTRAVENOUS ONCE
Status: COMPLETED | OUTPATIENT
Start: 2023-10-05 | End: 2023-10-05

## 2023-10-05 RX ORDER — BUPIVACAINE HYDROCHLORIDE 5 MG/ML
INJECTION, SOLUTION EPIDURAL; INTRACAUDAL
Status: DISCONTINUED
Start: 2023-10-05 | End: 2023-10-05 | Stop reason: HOSPADM

## 2023-10-05 RX ORDER — SODIUM CHLORIDE 0.9 % (FLUSH) 0.9 %
3 SYRINGE (ML) INJECTION EVERY 30 MIN PRN
Status: DISCONTINUED | OUTPATIENT
Start: 2023-10-05 | End: 2023-10-05 | Stop reason: HOSPADM

## 2023-10-05 RX ORDER — PROPOFOL 10 MG/ML
VIAL (ML) INTRAVENOUS
Status: DISCONTINUED | OUTPATIENT
Start: 2023-10-05 | End: 2023-10-10

## 2023-10-05 RX ORDER — OXYCODONE HYDROCHLORIDE 5 MG/1
5 TABLET ORAL EVERY 4 HOURS PRN
Status: DISCONTINUED | OUTPATIENT
Start: 2023-10-05 | End: 2023-10-05 | Stop reason: HOSPADM

## 2023-10-05 RX ORDER — LIDOCAINE HYDROCHLORIDE 20 MG/ML
INJECTION INTRAVENOUS
Status: DISCONTINUED | OUTPATIENT
Start: 2023-10-05 | End: 2023-10-10

## 2023-10-05 RX ORDER — CLONIDINE 100 UG/ML
INJECTION, SOLUTION EPIDURAL
Status: DISCONTINUED
Start: 2023-10-05 | End: 2023-10-05 | Stop reason: HOSPADM

## 2023-10-05 RX ORDER — ACETAMINOPHEN 325 MG/1
650 TABLET ORAL EVERY 4 HOURS PRN
Status: DISCONTINUED | OUTPATIENT
Start: 2023-10-05 | End: 2023-10-05 | Stop reason: HOSPADM

## 2023-10-05 RX ORDER — FENTANYL CITRATE 50 UG/ML
25 INJECTION, SOLUTION INTRAMUSCULAR; INTRAVENOUS EVERY 5 MIN PRN
Status: DISCONTINUED | OUTPATIENT
Start: 2023-10-05 | End: 2023-10-05 | Stop reason: HOSPADM

## 2023-10-05 RX ORDER — DEXAMETHASONE SODIUM PHOSPHATE 10 MG/ML
INJECTION INTRAMUSCULAR; INTRAVENOUS
Status: DISCONTINUED
Start: 2023-10-05 | End: 2023-10-05 | Stop reason: HOSPADM

## 2023-10-05 RX ORDER — MIDAZOLAM HYDROCHLORIDE 1 MG/ML
INJECTION, SOLUTION INTRAMUSCULAR; INTRAVENOUS
Status: DISCONTINUED | OUTPATIENT
Start: 2023-10-05 | End: 2023-10-10

## 2023-10-05 RX ORDER — ONDANSETRON 8 MG/1
8 TABLET, ORALLY DISINTEGRATING ORAL EVERY 8 HOURS PRN
Status: DISCONTINUED | OUTPATIENT
Start: 2023-10-05 | End: 2023-10-05 | Stop reason: HOSPADM

## 2023-10-05 RX ORDER — METHOCARBAMOL 500 MG/1
500 TABLET, FILM COATED ORAL 3 TIMES DAILY
Qty: 21 TABLET | Refills: 0 | Status: SHIPPED | OUTPATIENT
Start: 2023-10-05 | End: 2023-10-12

## 2023-10-05 RX ORDER — ONDANSETRON 2 MG/ML
4 INJECTION INTRAMUSCULAR; INTRAVENOUS DAILY PRN
Status: DISCONTINUED | OUTPATIENT
Start: 2023-10-05 | End: 2023-10-05 | Stop reason: HOSPADM

## 2023-10-05 RX ORDER — MUPIROCIN 20 MG/G
OINTMENT TOPICAL 2 TIMES DAILY
Status: DISCONTINUED | OUTPATIENT
Start: 2023-10-05 | End: 2023-10-05 | Stop reason: HOSPADM

## 2023-10-05 RX ORDER — VANCOMYCIN HYDROCHLORIDE 1 G/20ML
INJECTION, POWDER, LYOPHILIZED, FOR SOLUTION INTRAVENOUS
Status: DISCONTINUED | OUTPATIENT
Start: 2023-10-05 | End: 2023-10-05 | Stop reason: HOSPADM

## 2023-10-05 RX ORDER — BUPIVACAINE HYDROCHLORIDE AND EPINEPHRINE 5; 5 MG/ML; UG/ML
INJECTION, SOLUTION EPIDURAL; INTRACAUDAL; PERINEURAL
Status: DISCONTINUED | OUTPATIENT
Start: 2023-10-05 | End: 2023-10-05 | Stop reason: HOSPADM

## 2023-10-05 RX ORDER — TOBRAMYCIN 1.2 G/30ML
INJECTION, POWDER, LYOPHILIZED, FOR SOLUTION INTRAVENOUS
Status: DISCONTINUED | OUTPATIENT
Start: 2023-10-05 | End: 2023-10-05 | Stop reason: HOSPADM

## 2023-10-05 RX ORDER — VASOPRESSIN 20 [USP'U]/ML
INJECTION, SOLUTION INTRAMUSCULAR; SUBCUTANEOUS
Status: DISCONTINUED | OUTPATIENT
Start: 2023-10-05 | End: 2023-10-10

## 2023-10-05 RX ORDER — OXYCODONE HYDROCHLORIDE 10 MG/1
10 TABLET ORAL EVERY 4 HOURS PRN
Status: DISCONTINUED | OUTPATIENT
Start: 2023-10-05 | End: 2023-10-05 | Stop reason: HOSPADM

## 2023-10-05 RX ORDER — SUCCINYLCHOLINE CHLORIDE 20 MG/ML
INJECTION INTRAMUSCULAR; INTRAVENOUS
Status: DISCONTINUED | OUTPATIENT
Start: 2023-10-05 | End: 2023-10-10

## 2023-10-05 RX ORDER — ONDANSETRON 2 MG/ML
INJECTION INTRAMUSCULAR; INTRAVENOUS
Status: DISCONTINUED | OUTPATIENT
Start: 2023-10-05 | End: 2023-10-10

## 2023-10-05 RX ORDER — OXYCODONE HYDROCHLORIDE 5 MG/1
5 TABLET ORAL EVERY 4 HOURS PRN
Qty: 35 TABLET | Refills: 0 | Status: SHIPPED | OUTPATIENT
Start: 2023-10-05 | End: 2023-10-12

## 2023-10-05 RX ORDER — LIDOCAINE HYDROCHLORIDE 10 MG/ML
INJECTION, SOLUTION EPIDURAL; INFILTRATION; INTRACAUDAL; PERINEURAL
Status: DISCONTINUED
Start: 2023-10-05 | End: 2023-10-05 | Stop reason: HOSPADM

## 2023-10-05 RX ORDER — ACETAMINOPHEN 10 MG/ML
INJECTION, SOLUTION INTRAVENOUS
Status: DISCONTINUED | OUTPATIENT
Start: 2023-10-05 | End: 2023-10-10

## 2023-10-05 RX ORDER — FENTANYL CITRATE 50 UG/ML
INJECTION, SOLUTION INTRAMUSCULAR; INTRAVENOUS
Status: DISCONTINUED | OUTPATIENT
Start: 2023-10-05 | End: 2023-10-10

## 2023-10-05 RX ORDER — METOCLOPRAMIDE 10 MG/1
10 TABLET ORAL EVERY 6 HOURS PRN
Status: DISCONTINUED | OUTPATIENT
Start: 2023-10-05 | End: 2023-10-05 | Stop reason: HOSPADM

## 2023-10-05 RX ADMIN — ROCURONIUM BROMIDE 15 MG: 10 INJECTION INTRAVENOUS at 08:10

## 2023-10-05 RX ADMIN — LIDOCAINE HYDROCHLORIDE 60 MG: 20 INJECTION INTRAVENOUS at 07:10

## 2023-10-05 RX ADMIN — SODIUM CHLORIDE, SODIUM GLUCONATE, SODIUM ACETATE, POTASSIUM CHLORIDE, MAGNESIUM CHLORIDE, SODIUM PHOSPHATE, DIBASIC, AND POTASSIUM PHOSPHATE: .53; .5; .37; .037; .03; .012; .00082 INJECTION, SOLUTION INTRAVENOUS at 09:10

## 2023-10-05 RX ADMIN — PROPOFOL 200 MG: 10 INJECTION, EMULSION INTRAVENOUS at 07:10

## 2023-10-05 RX ADMIN — DEXMEDETOMIDINE 8 MCG: 100 INJECTION, SOLUTION, CONCENTRATE INTRAVENOUS at 11:10

## 2023-10-05 RX ADMIN — PHENYLEPHRINE HYDROCHLORIDE 100 MCG: 10 INJECTION INTRAVENOUS at 09:10

## 2023-10-05 RX ADMIN — FENTANYL CITRATE 100 MCG: 50 INJECTION, SOLUTION INTRAMUSCULAR; INTRAVENOUS at 07:10

## 2023-10-05 RX ADMIN — Medication 20 MG: at 07:10

## 2023-10-05 RX ADMIN — SODIUM CHLORIDE: 0.9 INJECTION, SOLUTION INTRAVENOUS at 07:10

## 2023-10-05 RX ADMIN — ACETAMINOPHEN 1000 MG: 10 INJECTION, SOLUTION INTRAVENOUS at 08:10

## 2023-10-05 RX ADMIN — Medication 10 MG: at 07:10

## 2023-10-05 RX ADMIN — CEFAZOLIN 2 G: 2 INJECTION, POWDER, FOR SOLUTION INTRAMUSCULAR; INTRAVENOUS at 07:10

## 2023-10-05 RX ADMIN — OXYCODONE HYDROCHLORIDE 5 MG: 5 TABLET ORAL at 12:10

## 2023-10-05 RX ADMIN — VASOPRESSIN 1 UNITS: 20 INJECTION INTRAVENOUS at 11:10

## 2023-10-05 RX ADMIN — VASOPRESSIN 1 UNITS: 20 INJECTION INTRAVENOUS at 07:10

## 2023-10-05 RX ADMIN — ROCURONIUM BROMIDE 35 MG: 10 INJECTION INTRAVENOUS at 07:10

## 2023-10-05 RX ADMIN — DEXMEDETOMIDINE 12 MCG: 100 INJECTION, SOLUTION, CONCENTRATE INTRAVENOUS at 11:10

## 2023-10-05 RX ADMIN — ONDANSETRON 4 MG: 2 INJECTION INTRAMUSCULAR; INTRAVENOUS at 10:10

## 2023-10-05 RX ADMIN — LABETALOL HYDROCHLORIDE 10 MG: 5 INJECTION INTRAVENOUS at 01:10

## 2023-10-05 RX ADMIN — PHENYLEPHRINE HYDROCHLORIDE 100 MCG: 10 INJECTION INTRAVENOUS at 07:10

## 2023-10-05 RX ADMIN — VASOPRESSIN 1 UNITS: 20 INJECTION INTRAVENOUS at 09:10

## 2023-10-05 RX ADMIN — Medication 10 MG: at 08:10

## 2023-10-05 RX ADMIN — ROCURONIUM BROMIDE 20 MG: 10 INJECTION INTRAVENOUS at 09:10

## 2023-10-05 RX ADMIN — ROCURONIUM BROMIDE 10 MG: 10 INJECTION INTRAVENOUS at 07:10

## 2023-10-05 RX ADMIN — MUPIROCIN: 20 OINTMENT TOPICAL at 06:10

## 2023-10-05 RX ADMIN — MIDAZOLAM HYDROCHLORIDE 2 MG: 1 INJECTION, SOLUTION INTRAMUSCULAR; INTRAVENOUS at 07:10

## 2023-10-05 RX ADMIN — DEXMEDETOMIDINE 12 MCG: 100 INJECTION, SOLUTION, CONCENTRATE INTRAVENOUS at 09:10

## 2023-10-05 RX ADMIN — SUCCINYLCHOLINE CHLORIDE 160 MG: 20 INJECTION, SOLUTION INTRAMUSCULAR; INTRAVENOUS at 07:10

## 2023-10-05 RX ADMIN — HYDROMORPHONE HYDROCHLORIDE 0.5 MG: 1 INJECTION, SOLUTION INTRAMUSCULAR; INTRAVENOUS; SUBCUTANEOUS at 10:10

## 2023-10-05 RX ADMIN — BUPIVACAINE HYDROCHLORIDE 5 ML: 7.5 INJECTION, SOLUTION EPIDURAL; RETROBULBAR at 11:10

## 2023-10-05 RX ADMIN — HYDROMORPHONE HYDROCHLORIDE 0.5 MG: 1 INJECTION, SOLUTION INTRAMUSCULAR; INTRAVENOUS; SUBCUTANEOUS at 11:10

## 2023-10-05 RX ADMIN — CEFAZOLIN 2 G: 2 INJECTION, POWDER, FOR SOLUTION INTRAMUSCULAR; INTRAVENOUS at 11:10

## 2023-10-05 RX ADMIN — Medication 10 MG: at 09:10

## 2023-10-05 RX ADMIN — ROCURONIUM BROMIDE 20 MG: 10 INJECTION INTRAVENOUS at 08:10

## 2023-10-05 RX ADMIN — HYDROMORPHONE HYDROCHLORIDE 1 MG: 1 INJECTION, SOLUTION INTRAMUSCULAR; INTRAVENOUS; SUBCUTANEOUS at 11:10

## 2023-10-05 NOTE — ANESTHESIA PREPROCEDURE EVALUATION
10/05/2023  Keon Schneider Jr. is a 53 y.o., male here for ORIF of left clavicle.    Past Medical History:   Diagnosis Date    Anxiety     Benzodiazepine dependence    Arthritis     Asthma     Avascular necrosis     Carpal tunnel syndrome     Chronic pain     Depression     Diverticulitis     Gout     Other injury of other sites of trunk 12/28/2011    Pneumonia     Spondylolisthesis     lumbar; myofascial pain    Staph infection     Ulnar neuropathy     left and rt arm       Past Surgical History:   Procedure Laterality Date    COLONOSCOPY N/A 7/6/2020    Procedure: COLONOSCOPY;  Surgeon: Broderick Moise MD;  [facility];  Service: Endoscopy;  Laterality: N/A;    HIP SURGERY  3/06; 6/06    hip arthroplasty    HIP SURGERY      bilateral hip replacement (titanium)    JOINT REPLACEMENT      SHOULDER SURGERY  2012    right shoulder    SHOULDER SURGERY             Pre-op Assessment          Review of Systems  Anesthesia Hx:  No problems with previous Anesthesia  History of prior surgery of interest to airway management or planning: Denies Family Hx of Anesthesia complications.   Denies Personal Hx of Anesthesia complications.   Social:  Alcohol Use    Cardiovascular:   Exercise tolerance: good Denies MI.    Denies Angina.  Denies WRIGHT.    Pulmonary:   Asthma (as a child- no symptoms in years) asymptomatic    Neurological:  Neurology Normal        Physical Exam  General: Alert, Oriented and Well nourished    Airway:  Mallampati: II   Mouth Opening: Normal  TM Distance: Normal  Neck ROM: Normal ROM    Dental:    Chest/Lungs:  Normal Respiratory Rate    Heart:  Rate: Normal  Rhythm: Regular Rhythm        Anesthesia Plan  Type of Anesthesia, risks & benefits discussed:    Anesthesia Type: Gen ETT  Intra-op Monitoring Plan: Standard ASA Monitors  Post Op Pain Control Plan: multimodal  analgesia, IV/PO Opioids PRN and peripheral nerve block  Informed Consent: Informed consent signed with the Patient and all parties understand the risks and agree with anesthesia plan.  All questions answered.   ASA Score: 3  Day of Surgery Review of History & Physical: H&P Update referred to the surgeon/provider.    Ready For Surgery From Anesthesia Perspective.     .
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
## 2023-10-05 NOTE — HOSPITAL COURSE
On 10/5/2023, the patient arrived to the [facility] OR for proper pre-operative management.  Upon completion of pre-operative preparation, the patient was taken back to the operative theatre. ORIF of the left clavicle was performed without complication and the patient was transported to the post anesthesia care unit in stable condition.  After appropriate recovery from the anaesthetic agents used during the surgery, the patient was then transported to the hospital inpatient floor.  The interim of the hospital stay from arrival on the floor up to discharge has been uncomplicated. The patient has tolerated regular diet.  The patient's pain has been controlled using a multimodal approach. Currently, the patient's pain is well controlled on an oral regimen.  The patient has been voiding without difficulty.  The patient began participation in physical therapy after surgery and has progressed throughout the entire hospital stay.  Currently, the patient's progress is sufficient to allow the them to be discharged to home safely.  The patient agrees with this assessment and desires a discharge today.  He is to remain non-weightbearing to the left upper extremity; range of motion as tolerated.  Sling provided for comfort support.
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
## 2023-10-05 NOTE — PLAN OF CARE
Pt alert and orientated. No family/friend/spouse at bs.  Waiting for niece to  pt. VSS. No distress noted. Pt denies N/V/D. Pt tolerating PO fluids. Pt states they are ready for discharge. Pt irritated but cooperative, Niece not able to make it to the hospital for pt pickup, Pt called friend Arnold, Arnold's spouse is on the way to  pt to take pt home. Pt able to move left arm without difficulty or discomfort. Pt has prescriptions with him and verbalized understanding of postop instructions.

## 2023-10-05 NOTE — ANESTHESIA PROCEDURE NOTES
Intubation    Date/Time: 10/5/2023 7:21 AM    Performed by: Marleen Presley CRNA  Authorized by: Eva Sumner MD    Intubation:     Induction:  Intravenous    Intubated:  Postinduction    Mask Ventilation:  Easy with oral airway    Attempts:  1    Attempted By:  CRNA    Method of Intubation:  Video laryngoscopy    Blade:  Santiago 3    Laryngeal View Grade: Grade I - full view of cords      Difficult Airway Encountered?: No      Complications:  None    Airway Device:  Oral endotracheal tube    Airway Device Size:  7.5    Style/Cuff Inflation:  Cuffed    Inflation Amount (mL):  8    Tube secured:  21    Secured at:  The lips    Placement Verified By:  Capnometry    Complicating Factors:  None    Findings Post-Intubation:  BS equal bilateral

## 2023-10-05 NOTE — PROGRESS NOTES
Patient was prepared for surgery.    [phone]    Patient needs update to H&P and Anesthesia consent.

## 2023-10-05 NOTE — ANESTHESIA PROCEDURE NOTES
L Superficial Cervical Plexus Block    Patient location during procedure: OR   Block not for primary anesthetic.  Reason for block: at surgeon's request and post-op pain management   Post-op Pain Location: L Shoulder Pain   Start time: 10/5/2023 11:26 AM  Timeout: 10/5/2023 11:25 AM   End time: 10/5/2023 11:35 AM    Staffing  Authorizing Provider: Eyad Rosales MD  Performing Provider: Wyatt Costello MD    Staffing  Performed by: Wyatt Costello MD  Authorized by: Eyad Rosales MD    Preanesthetic Checklist  Completed: patient identified, IV checked, site marked, risks and benefits discussed, surgical consent, monitors and equipment checked, pre-op evaluation and timeout performed  Peripheral Block  Patient position: sitting  Prep: ChloraPrep  Patient monitoring: heart rate, cardiac monitor, continuous pulse ox, continuous capnometry and frequent blood pressure checks  Block type: superficial cervical  Laterality: left  Injection technique: single shot  Needle  Needle type: Stimuplex   Needle gauge: 20 G  Needle length: 4 in  Needle localization: anatomical landmarks and ultrasound guidance   -ultrasound image captured on disc.  Assessment  Injection assessment: negative aspiration, negative parasthesia and local visualized surrounding nerve  Paresthesia pain: none  Heart rate change: no  Slow fractionated injection: yes  Pain Tolerance: comfortable throughout block and no complaints  Medications:    Medications: bupivacaine (pf) (MARCAINE) injection 0.75% - Perineural   5 mL - 10/5/2023 11:31:00 AM    Additional Notes  VSS.  CRNA monitoring vitals throughout procedure.  Patient tolerated procedure well.
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
## 2023-10-06 ENCOUNTER — TELEPHONE (OUTPATIENT)
Dept: ORTHOPEDICS | Facility: CLINIC | Age: 53
End: 2023-10-06
Payer: MEDICARE

## 2023-10-06 NOTE — TELEPHONE ENCOUNTER
Spoke with pt.  He is home and resting.  Pt is having some pain but not as bad as prior to surgery.  Reviewed pt's post op medications and dosages with pt.  Pt confirmed that NSAIDS cause GI bleed and that Tylenol upsets his stomach.  Advised pt to eat when he takes his medications.  Pt given my contact info should any questions or concerns arise prior to his post op visit.  Pt verbalized understanding.

## 2023-10-11 NOTE — ANESTHESIA POSTPROCEDURE EVALUATION
Anesthesia Post Evaluation    Patient: Keon Schneider Jr.    Procedure(s) Performed: Procedure(s) (LRB):  ORIF, FRACTURE, CLAVICLE (Left)    Final Anesthesia Type: general      Patient location during evaluation: PACU  Patient participation: Yes- Able to Participate  Level of consciousness: awake and alert  Post-procedure vital signs: reviewed and stable  Pain management: adequate  Airway patency: patent    PONV status at discharge: No PONV  Anesthetic complications: no      Cardiovascular status: blood pressure returned to baseline  Respiratory status: unassisted, room air and spontaneous ventilation  Hydration status: euvolemic  Follow-up not needed.          Vitals Value Taken Time   /110 10/05/23 1348   Temp 37 °C (98.6 °F) 10/05/23 1345   Pulse 87 10/05/23 1348   Resp 20 10/05/23 1345   SpO2 94 % 10/05/23 1348   Vitals shown include unvalidated device data.      No case tracking events are documented in the log.      Pain/Ilda Score: No data recorded

## 2023-10-25 ENCOUNTER — HOSPITAL ENCOUNTER (OUTPATIENT)
Dept: RADIOLOGY | Facility: HOSPITAL | Age: 53
Discharge: HOME | End: 2023-10-25
Attending: NURSE PRACTITIONER
Payer: MEDICARE

## 2023-10-25 ENCOUNTER — OFFICE VISIT (OUTPATIENT)
Dept: ORTHOPEDICS | Facility: CLINIC | Age: 53
End: 2023-10-25
Payer: MEDICARE

## 2023-10-25 VITALS — HEIGHT: 70 IN | WEIGHT: 240.06 LBS | BODY MASS INDEX: 34.37 KG/M2

## 2023-10-25 DIAGNOSIS — M89.8X1 PAIN OF LEFT CLAVICLE: Primary | ICD-10-CM

## 2023-10-25 DIAGNOSIS — S42.022D CLOSED DISPLACED FRACTURE OF SHAFT OF LEFT CLAVICLE WITH ROUTINE HEALING, SUBSEQUENT ENCOUNTER: Primary | ICD-10-CM

## 2023-10-25 DIAGNOSIS — Z98.890 POST-OPERATIVE STATE: ICD-10-CM

## 2023-10-25 DIAGNOSIS — M89.8X1 PAIN OF LEFT CLAVICLE: ICD-10-CM

## 2023-10-25 PROCEDURE — 1160F RVW MEDS BY RX/DR IN RCRD: CPT | Mod: CPTII,S$GLB,, | Performed by: NURSE PRACTITIONER

## 2023-10-25 PROCEDURE — 73000 XR CLAVICLE LEFT: ICD-10-PCS | Mod: 26,LT,, | Performed by: STUDENT IN AN ORGANIZED HEALTH CARE EDUCATION/TRAINING PROGRAM

## 2023-10-25 PROCEDURE — 1159F MED LIST DOCD IN RCRD: CPT | Mod: CPTII,S$GLB,, | Performed by: NURSE PRACTITIONER

## 2023-10-25 PROCEDURE — 3044F PR MOST RECENT HEMOGLOBIN A1C LEVEL <7.0%: ICD-10-PCS | Mod: CPTII,S$GLB,, | Performed by: NURSE PRACTITIONER

## 2023-10-25 PROCEDURE — 73000 X-RAY EXAM OF COLLAR BONE: CPT | Mod: TC,LT

## 2023-10-25 PROCEDURE — 99024 POSTOP FOLLOW-UP VISIT: CPT | Mod: S$GLB,,, | Performed by: NURSE PRACTITIONER

## 2023-10-25 PROCEDURE — 3044F HG A1C LEVEL LT 7.0%: CPT | Mod: CPTII,S$GLB,, | Performed by: NURSE PRACTITIONER

## 2023-10-25 PROCEDURE — 99024 PR POST-OP FOLLOW-UP VISIT: ICD-10-PCS | Mod: S$GLB,,, | Performed by: NURSE PRACTITIONER

## 2023-10-25 PROCEDURE — 99999 PR PBB SHADOW E&M-EST. PATIENT-LVL III: ICD-10-PCS | Mod: PBBFAC,,, | Performed by: NURSE PRACTITIONER

## 2023-10-25 PROCEDURE — 99999 PR PBB SHADOW E&M-EST. PATIENT-LVL III: CPT | Mod: PBBFAC,,, | Performed by: NURSE PRACTITIONER

## 2023-10-25 PROCEDURE — 1159F PR MEDICATION LIST DOCUMENTED IN MEDICAL RECORD: ICD-10-PCS | Mod: CPTII,S$GLB,, | Performed by: NURSE PRACTITIONER

## 2023-10-25 PROCEDURE — 73000 X-RAY EXAM OF COLLAR BONE: CPT | Mod: 26,LT,, | Performed by: STUDENT IN AN ORGANIZED HEALTH CARE EDUCATION/TRAINING PROGRAM

## 2023-10-25 PROCEDURE — 1160F PR REVIEW ALL MEDS BY PRESCRIBER/CLIN PHARMACIST DOCUMENTED: ICD-10-PCS | Mod: CPTII,S$GLB,, | Performed by: NURSE PRACTITIONER

## 2023-10-25 NOTE — PROGRESS NOTES
Mr. Schneider is here today for a post-operative visit after undergoing an ORIF for his left midshaft clavicle fracture with plates and screws by Dr. Howard on 10/5/2023.    Interval History:  He reports that he is doing well.  Pain is well controlled, admits he drinks Etoh to control his pain.  He is not taking pain medication.  Denies any falls or injuries since his surgery.  No reports of numbness to his lower arm.  He denies fever, chills, and sweats since the time of the surgery.     Physical exam:  Dressing was off upon arrival to clinic.  Incision is clean, dry and intact.   He has tactile stimulation to their lower FA.  Can flex and extend his thumb and abduct/adduct all fingers.  His ROM of his shoulder is 0-180 degrees.      RADS: Left clavicle xray was obtained and personally reviewed by me, findings show plate and screw construct appears to be in good position without evidence of hardware failure.  Clavicle fracture appears stable.    Assessment:  Post-op visit (2 weeks)    Plan:    ICD-10-CM ICD-9-CM   1. Closed displaced fracture of shaft of left clavicle with routine healing, subsequent encounter s/p ORIF 10/5/23  S42.022D V54.11   2. Post-operative state  Z98.890 V45.89     Current care, treatment plan, precautions, activity level/ modifications, limitations, rehabilitation exercises and proposed future treatment were discussed with the patient. We discussed the need to monitor for changes in symptoms and condition and report them to the physician.  Discussed importance of compliance with all appointments and follow up examinations.     WOUND CARE ORDERS  -Cristianna was advised to keep the incision clean and dry for the next 24 hours after which he may wash the area with antibacterial soap in the shower.   -He not submerge until the incision is completely healed  -Patient was advised to monitor wound closely and multiple times daily for any problems. Call clinic immediately or report to ED for immediate  medical attention for any complications including reopening of wound, drainage, purulence, redness, streaking, odor, pain out of proportion, fever, chills, etc.   - If there are signs of infection, please call [facility] [phone] for further instructions and to make appt to be seen.       PHYSICAL THERAPY:   - Patient states he is familiar with rehab process due to prior clavicle fracture on the right and will do therapy on his own.    - Weight bearing NWB to LUE x 8 weeks  - Range of motion as tolerated.      PAIN MEDICATION:   - Pain medication: refill was not needed  - Pain medication refill policy provided to patient for review, yes.    - Patient was informed a multi-modal approach is used to treat their pain.    Recommend smoking cessation and calcium and vit D. Supplements.     DVT PROPHYLAXIS:   - ASA 81 mg bid     FOLLOW UP:   - Patient will follow up in the clinic in 4 weeks.  - X-ray of his left clavicle is needed.  - Future Appointments:   Future Appointments   Date Time Provider Department Center   11/15/2023 10:30 AM Nicolas Maier NP [facility] ORTHO [facility]   11/30/2023  1:00 PM Dre Olvera MD [facility] CARDIO [facility]         If there are any questions prior to scheduled follow up, the patient was instructed to contact the office

## 2024-01-27 ENCOUNTER — HOSPITAL ENCOUNTER (EMERGENCY)
Facility: HOSPITAL | Age: 54
Discharge: HOME | End: 2024-01-27
Attending: EMERGENCY MEDICINE
Payer: MEDICARE

## 2024-01-27 VITALS
WEIGHT: 200 LBS | SYSTOLIC BLOOD PRESSURE: 147 MMHG | BODY MASS INDEX: 28 KG/M2 | OXYGEN SATURATION: 96 % | DIASTOLIC BLOOD PRESSURE: 88 MMHG | TEMPERATURE: 98 F | RESPIRATION RATE: 18 BRPM | HEART RATE: 78 BPM | HEIGHT: 71 IN

## 2024-01-27 DIAGNOSIS — M10.071 ACUTE IDIOPATHIC GOUT INVOLVING TOE OF RIGHT FOOT: Primary | ICD-10-CM

## 2024-01-27 PROCEDURE — 99284 EMERGENCY DEPT VISIT MOD MDM: CPT

## 2024-01-27 PROCEDURE — 25000003 PHARM REV CODE 250: Performed by: EMERGENCY MEDICINE

## 2024-01-27 RX ORDER — HYDROCODONE BITARTRATE AND ACETAMINOPHEN 5; 325 MG/1; MG/1
1 TABLET ORAL EVERY 6 HOURS PRN
Qty: 12 TABLET | Refills: 0 | Status: SHIPPED | OUTPATIENT
Start: 2024-01-27 | End: 2024-01-27

## 2024-01-27 RX ORDER — PREDNISONE 10 MG/1
TABLET ORAL
Qty: 21 TABLET | Refills: 0 | Status: SHIPPED | OUTPATIENT
Start: 2024-01-27 | End: 2024-01-27

## 2024-01-27 RX ORDER — HYDROCODONE BITARTRATE AND ACETAMINOPHEN 5; 325 MG/1; MG/1
1 TABLET ORAL EVERY 6 HOURS PRN
Qty: 12 TABLET | Refills: 0 | Status: SHIPPED | OUTPATIENT
Start: 2024-01-27

## 2024-01-27 RX ORDER — PREDNISONE 10 MG/1
TABLET ORAL
Qty: 21 TABLET | Refills: 0 | Status: SHIPPED | OUTPATIENT
Start: 2024-01-27

## 2024-01-27 RX ORDER — HYDROCODONE BITARTRATE AND ACETAMINOPHEN 5; 325 MG/1; MG/1
1 TABLET ORAL
Status: COMPLETED | OUTPATIENT
Start: 2024-01-27 | End: 2024-01-27

## 2024-01-27 RX ADMIN — HYDROCODONE BITARTRATE AND ACETAMINOPHEN 1 TABLET: 5; 325 TABLET ORAL at 01:01

## 2024-01-27 NOTE — ED PROVIDER NOTES
Encounter Date: 1/27/2024       History     Chief Complaint   Patient presents with    Toe Pain     Pt states having pain to his right big toe for two days. Pt states he has a history of gout and this feels similar but worse than normal.     HPI    Mr. Schneider is our 53 y.o man with history of gout, arthritis, and chronic pain presenting with pain of the right toe. Says pain is similar to his previous gout episodes. Denies any trauma. Reports diet rich in seafood and alcohol. Reports he's on allopurinol 100mg and colchicine. Has not taken any other medications for pain relief. Normally gets flair ups treated with a regimen of oxycodone and prednisone. Denies all other symptoms.     Review of patient's allergies indicates:   Allergen Reactions    Atorvastatin Nausea And Vomiting    Toradol [ketorolac] Hives and Nausea And Vomiting    Ibuprofen Other (See Comments)     States GI BLEED     Past Medical History:   Diagnosis Date    Anxiety     Benzodiazepine dependence    Arthritis     Asthma     Avascular necrosis     Carpal tunnel syndrome     Chronic pain     Depression     Diverticulitis     Gout     Other injury of other sites of trunk 12/28/2011    Pneumonia     Spondylolisthesis     lumbar; myofascial pain    Staph infection     Ulnar neuropathy     left and rt arm     Past Surgical History:   Procedure Laterality Date    COLONOSCOPY N/A 7/6/2020    Procedure: COLONOSCOPY;  Surgeon: Broderick Moise MD;  Location: [facility];  Service: Endoscopy;  Laterality: N/A;    HIP SURGERY  3/06; 6/06    hip arthroplasty    HIP SURGERY      bilateral hip replacement (titanium)    JOINT REPLACEMENT      OPEN REDUCTION AND INTERNAL FIXATION (ORIF) OF FRACTURE OF CLAVICLE Left 10/5/2023    Procedure: ORIF, FRACTURE, CLAVICLE;  Surgeon: Ozzy Howard MD;  Location: [address];  Service: Orthopedics;  Laterality: Left;    SHOULDER SURGERY  2012    right shoulder    SHOULDER SURGERY       Family History   Problem  Relation Age of Onset    Cancer Mother     Cancer Sister      Social History     Tobacco Use    Smoking status: Some Days     Current packs/day: 0.00     Average packs/day: 1 pack/day for 10.0 years (10.0 ttl pk-yrs)     Types: Cigarettes     Start date: 10/13/2002     Last attempt to quit: 10/13/2012     Years since quittin.2    Smokeless tobacco: Never   Substance Use Topics    Alcohol use: Yes     Alcohol/week: 12.0 standard drinks of alcohol     Types: 12 Cans of beer per week     Comment: 2x/week    Drug use: No     Review of Systems   Musculoskeletal:  Positive for arthralgias.   All other systems reviewed and are negative.      Physical Exam     Initial Vitals [24 0009]   BP Pulse Resp Temp SpO2   (!) 147/88 78 18 98.3 °F (36.8 °C) 96 %      MAP       --         Physical Exam    Nursing note and vitals reviewed.  Constitutional: He appears well-developed and well-nourished.   HENT:   Head: Normocephalic and atraumatic.   Neck:   Normal range of motion.  Pulmonary/Chest: No respiratory distress.   Musculoskeletal:      Cervical back: Normal range of motion.     Neurological: He is alert and oriented to person, place, and time.   Skin: Skin is warm and dry. Capillary refill takes less than 2 seconds.   Right foot 1st digit MTP swelling erythema exquisite tenderness to palpation consistent with gout   Psychiatric: He has a normal mood and affect. Thought content normal.         ED Course   Procedures  Labs Reviewed - No data to display       Imaging Results    None          Medications   HYDROcodone-acetaminophen 5-325 mg per tablet 1 tablet (1 tablet Oral Given 24 0122)     Medical Decision Making  53-year-old male history of gout presents the ER for evaluation of gout flare-up.  He reports he consumed alcohol and seafood recently now he is having his persistent gout pain his right vague toe.  Physical exam consistent with gout.  Will plan prednisone and pain medication.  I discussed with  patient need for dietary compliance to avoid further flare-ups.  Patient understands.  Will plan discharge.    Amount and/or Complexity of Data Reviewed  External Data Reviewed: labs and notes.    Risk  Prescription drug management.                                      Clinical Impression:  Final diagnoses:  [M10.071] Acute idiopathic gout involving toe of right foot (Primary)          ED Disposition Condition    Discharge Stable          ED Prescriptions       Medication Sig Dispense Start Date End Date Auth. Provider    HYDROcodone-acetaminophen (NORCO) 5-325 mg per tablet  (Status: Discontinued) Take 1 tablet by mouth every 6 (six) hours as needed for Pain. 12 tablet 1/27/2024 1/27/2024 Renay Peoples MD    predniSONE (DELTASONE) 10 MG tablet  (Status: Discontinued) Take 4 tablets daily for 3 days, then take 2 tablets daily for 3 days, then take 1 tablet daily for 3 days. 21 tablet 1/27/2024 1/27/2024 Renay Peoples MD    HYDROcodone-acetaminophen (NORCO) 5-325 mg per tablet Take 1 tablet by mouth every 6 (six) hours as needed for Pain. 12 tablet 1/27/2024 -- Renay Peoples MD    predniSONE (DELTASONE) 10 MG tablet Take 4 tablets daily for 3 days, then take 2 tablets daily for 3 days, then take 1 tablet daily for 3 days. 21 tablet 1/27/2024 -- Renay Peoples MD          Follow-up Information       Follow up With Specialties Details Why Contact Info    Nancy Tadeo MD Internal Medicine Schedule an appointment as soon as possible for a visit  As needed [address] [facility] [address]  [phone]               Renay Peoples MD  01/27/24 0616

## 2024-06-03 ENCOUNTER — HOSPITAL ENCOUNTER (EMERGENCY)
Facility: HOSPITAL | Age: 54
Discharge: HOME | End: 2024-06-03
Attending: EMERGENCY MEDICINE
Payer: MEDICARE

## 2024-06-03 VITALS
HEART RATE: 57 BPM | TEMPERATURE: 98 F | RESPIRATION RATE: 16 BRPM | OXYGEN SATURATION: 96 % | SYSTOLIC BLOOD PRESSURE: 145 MMHG | WEIGHT: 200 LBS | BODY MASS INDEX: 27.89 KG/M2 | DIASTOLIC BLOOD PRESSURE: 93 MMHG

## 2024-06-03 DIAGNOSIS — K57.92 DIVERTICULITIS: Primary | ICD-10-CM

## 2024-06-03 DIAGNOSIS — R10.9 ABDOMINAL PAIN: ICD-10-CM

## 2024-06-03 LAB
ALBUMIN SERPL BCP-MCNC: 4 G/DL (ref 3.5–5.2)
ALP SERPL-CCNC: 74 U/L (ref 55–135)
ALT SERPL W/O P-5'-P-CCNC: 19 U/L (ref 10–44)
ANION GAP SERPL CALC-SCNC: 9 MMOL/L (ref 8–16)
AST SERPL-CCNC: 18 U/L (ref 10–40)
BASOPHILS # BLD AUTO: 0.04 K/UL (ref 0–0.2)
BASOPHILS NFR BLD: 0.5 % (ref 0–1.9)
BILIRUB SERPL-MCNC: 0.2 MG/DL (ref 0.1–1)
BILIRUB UR QL STRIP: NEGATIVE
BUN SERPL-MCNC: 17 MG/DL (ref 6–20)
BUN SERPL-MCNC: 18 MG/DL (ref 6–30)
CALCIUM SERPL-MCNC: 9.7 MG/DL (ref 8.7–10.5)
CHLORIDE SERPL-SCNC: 105 MMOL/L (ref 95–110)
CHLORIDE SERPL-SCNC: 107 MMOL/L (ref 95–110)
CLARITY UR REFRACT.AUTO: CLEAR
CO2 SERPL-SCNC: 23 MMOL/L (ref 23–29)
COLOR UR AUTO: YELLOW
CREAT SERPL-MCNC: 1 MG/DL (ref 0.5–1.4)
CREAT SERPL-MCNC: 1 MG/DL (ref 0.5–1.4)
DIFFERENTIAL METHOD BLD: ABNORMAL
EOSINOPHIL # BLD AUTO: 0.5 K/UL (ref 0–0.5)
EOSINOPHIL NFR BLD: 6.8 % (ref 0–8)
ERYTHROCYTE [DISTWIDTH] IN BLOOD BY AUTOMATED COUNT: 13 % (ref 11.5–14.5)
EST. GFR  (NO RACE VARIABLE): >60 ML/MIN/1.73 M^2
GLUCOSE SERPL-MCNC: 109 MG/DL (ref 70–110)
GLUCOSE SERPL-MCNC: 111 MG/DL (ref 70–110)
GLUCOSE UR QL STRIP: NEGATIVE
HCT VFR BLD AUTO: 37.7 % (ref 40–54)
HCT VFR BLD CALC: 39 %PCV (ref 36–54)
HGB BLD-MCNC: 12.8 G/DL (ref 14–18)
HGB UR QL STRIP: NEGATIVE
IMM GRANULOCYTES # BLD AUTO: 0.03 K/UL (ref 0–0.04)
IMM GRANULOCYTES NFR BLD AUTO: 0.4 % (ref 0–0.5)
KETONES UR QL STRIP: NEGATIVE
LEUKOCYTE ESTERASE UR QL STRIP: NEGATIVE
LIPASE SERPL-CCNC: 33 U/L (ref 4–60)
LYMPHOCYTES # BLD AUTO: 1.9 K/UL (ref 1–4.8)
LYMPHOCYTES NFR BLD: 25 % (ref 18–48)
MCH RBC QN AUTO: 31.8 PG (ref 27–31)
MCHC RBC AUTO-ENTMCNC: 34 G/DL (ref 32–36)
MCV RBC AUTO: 94 FL (ref 82–98)
MONOCYTES # BLD AUTO: 0.7 K/UL (ref 0.3–1)
MONOCYTES NFR BLD: 8.5 % (ref 4–15)
NEUTROPHILS # BLD AUTO: 4.6 K/UL (ref 1.8–7.7)
NEUTROPHILS NFR BLD: 58.8 % (ref 38–73)
NITRITE UR QL STRIP: NEGATIVE
NRBC BLD-RTO: 0 /100 WBC
PH UR STRIP: 6 [PH] (ref 5–8)
PLATELET # BLD AUTO: 208 K/UL (ref 150–450)
PMV BLD AUTO: 9.7 FL (ref 9.2–12.9)
POC IONIZED CALCIUM: 1.23 MMOL/L (ref 1.06–1.42)
POC TCO2 (MEASURED): 24 MMOL/L (ref 23–29)
POTASSIUM BLD-SCNC: 3.9 MMOL/L (ref 3.5–5.1)
POTASSIUM SERPL-SCNC: 3.9 MMOL/L (ref 3.5–5.1)
PROT SERPL-MCNC: 7.4 G/DL (ref 6–8.4)
PROT UR QL STRIP: NEGATIVE
RBC # BLD AUTO: 4.02 M/UL (ref 4.6–6.2)
SAMPLE: ABNORMAL
SODIUM BLD-SCNC: 139 MMOL/L (ref 136–145)
SODIUM SERPL-SCNC: 139 MMOL/L (ref 136–145)
SP GR UR STRIP: 1.02 (ref 1–1.03)
URN SPEC COLLECT METH UR: NORMAL
WBC # BLD AUTO: 7.76 K/UL (ref 3.9–12.7)

## 2024-06-03 PROCEDURE — 83690 ASSAY OF LIPASE: CPT

## 2024-06-03 PROCEDURE — 99285 EMERGENCY DEPT VISIT HI MDM: CPT | Mod: 25

## 2024-06-03 PROCEDURE — 25500020 PHARM REV CODE 255: Performed by: EMERGENCY MEDICINE

## 2024-06-03 PROCEDURE — 96361 HYDRATE IV INFUSION ADD-ON: CPT

## 2024-06-03 PROCEDURE — 96374 THER/PROPH/DIAG INJ IV PUSH: CPT

## 2024-06-03 PROCEDURE — 85025 COMPLETE CBC W/AUTO DIFF WBC: CPT

## 2024-06-03 PROCEDURE — 96375 TX/PRO/DX INJ NEW DRUG ADDON: CPT

## 2024-06-03 PROCEDURE — 96376 TX/PRO/DX INJ SAME DRUG ADON: CPT

## 2024-06-03 PROCEDURE — 93005 ELECTROCARDIOGRAM TRACING: CPT

## 2024-06-03 PROCEDURE — 80047 BASIC METABLC PNL IONIZED CA: CPT

## 2024-06-03 PROCEDURE — 63600175 PHARM REV CODE 636 W HCPCS

## 2024-06-03 PROCEDURE — 93010 ELECTROCARDIOGRAM REPORT: CPT | Mod: ,,, | Performed by: INTERNAL MEDICINE

## 2024-06-03 PROCEDURE — 80053 COMPREHEN METABOLIC PANEL: CPT

## 2024-06-03 PROCEDURE — 81003 URINALYSIS AUTO W/O SCOPE: CPT

## 2024-06-03 PROCEDURE — 82330 ASSAY OF CALCIUM: CPT

## 2024-06-03 RX ORDER — HYDROCODONE BITARTRATE AND ACETAMINOPHEN 5; 325 MG/1; MG/1
1 TABLET ORAL EVERY 6 HOURS PRN
Qty: 12 TABLET | Refills: 0 | Status: SHIPPED | OUTPATIENT
Start: 2024-06-03

## 2024-06-03 RX ORDER — HYDROMORPHONE HYDROCHLORIDE 1 MG/ML
0.5 INJECTION, SOLUTION INTRAMUSCULAR; INTRAVENOUS; SUBCUTANEOUS
Status: COMPLETED | OUTPATIENT
Start: 2024-06-03 | End: 2024-06-03

## 2024-06-03 RX ORDER — DROPERIDOL 2.5 MG/ML
0.62 INJECTION, SOLUTION INTRAMUSCULAR; INTRAVENOUS
Status: DISCONTINUED | OUTPATIENT
Start: 2024-06-03 | End: 2024-06-03

## 2024-06-03 RX ORDER — ONDANSETRON HYDROCHLORIDE 2 MG/ML
4 INJECTION, SOLUTION INTRAVENOUS
Status: COMPLETED | OUTPATIENT
Start: 2024-06-03 | End: 2024-06-03

## 2024-06-03 RX ORDER — AMOXICILLIN AND CLAVULANATE POTASSIUM 875; 125 MG/1; MG/1
1 TABLET, FILM COATED ORAL 2 TIMES DAILY
Qty: 20 TABLET | Refills: 0 | Status: SHIPPED | OUTPATIENT
Start: 2024-06-03 | End: 2024-06-13

## 2024-06-03 RX ADMIN — SODIUM CHLORIDE, POTASSIUM CHLORIDE, SODIUM LACTATE AND CALCIUM CHLORIDE 1000 ML: 600; 310; 30; 20 INJECTION, SOLUTION INTRAVENOUS at 07:06

## 2024-06-03 RX ADMIN — ONDANSETRON 4 MG: 2 INJECTION INTRAMUSCULAR; INTRAVENOUS at 07:06

## 2024-06-03 RX ADMIN — IOHEXOL 100 ML: 350 INJECTION, SOLUTION INTRAVENOUS at 08:06

## 2024-06-03 RX ADMIN — HYDROMORPHONE HYDROCHLORIDE 0.5 MG: 1 INJECTION, SOLUTION INTRAMUSCULAR; INTRAVENOUS; SUBCUTANEOUS at 07:06

## 2024-06-03 RX ADMIN — HYDROMORPHONE HYDROCHLORIDE 0.5 MG: 1 INJECTION, SOLUTION INTRAMUSCULAR; INTRAVENOUS; SUBCUTANEOUS at 08:06

## 2024-06-03 NOTE — ED NOTES
I-STAT Chem-8+ Results:   Value Reference Range   Sodium 139 136-145 mmol/L   Potassium  3.9 3.5-5.1 mmol/L   Chloride 105  mmol/L   Ionized Calcium 1.23 1.06-1.42 mmol/L   CO2 (measured) 24 23-29 mmol/L   Glucose 111  mg/dL   BUN 18 6-30 mg/dL   Creatinine 1.0 0.5-1.4 mg/dL   Hematocrit 39 36-54%

## 2024-06-03 NOTE — ED TRIAGE NOTES
Cristianmichael Alonsomoises Schneider Jr., an 53 y.o. male presents to the ED with complaints of a diverticulitis flair up. +LLQ abdominal pain x3 days. +N/V. +dizziness.      Chief Complaint   Patient presents with    Abdominal Pain     LLQ abdominal pain, feels like diverticulitis flair up. +nausea/vomiting. +dizziness.      Review of patient's allergies indicates:   Allergen Reactions    Atorvastatin Nausea And Vomiting    Toradol [ketorolac] Hives and Nausea And Vomiting    Ibuprofen Other (See Comments)     States GI BLEED     Past Medical History:   Diagnosis Date    Anxiety     Benzodiazepine dependence    Arthritis     Asthma     Avascular necrosis     Carpal tunnel syndrome     Chronic pain     Depression     Diverticulitis     Gout     Other injury of other sites of trunk 12/28/2011    Pneumonia     Spondylolisthesis     lumbar; myofascial pain    Staph infection     Ulnar neuropathy     left and rt arm

## 2024-06-03 NOTE — ED PROVIDER NOTES
Encounter Date: 6/3/2024     Source of History:   Patient and medical record, without language barrier or      Chief complaint:  Abdominal Pain (LLQ abdominal pain, feels like diverticulitis flair up. +nausea/vomiting. +dizziness. )    HPI:  Keon Schneider Jr. is a 53 y.o. male with history of multiple episodes of diverticulitis presenting with chief complaint of abdominal pain.  Patient states for the past 3 days he has had persistent left lower quadrant abdominal pain.  He has had associated fatigue and lightheadedness with nausea but no vomiting.  Patient was had 8 episodes of diverticulitis in the past and he states this feels like the beginning of 1 of these episodes.  He denies fevers, chills, dysuria.  He was tolerating p.o. the solids and liquids.  He ate at a friend's house and usually has a very strict diet, he thinks eating these foods might have set off his symptoms    This is the extent to the patients complaints today here in the emergency department.    ROS: As per HPI and below:  ROS    Review of patient's allergies indicates:   Allergen Reactions    Atorvastatin Nausea And Vomiting    Toradol [ketorolac] Hives and Nausea And Vomiting    Ibuprofen Other (See Comments)     States GI BLEED     PMH:  As per HPI and below:  Past Medical History:   Diagnosis Date    Anxiety     Benzodiazepine dependence    Arthritis     Asthma     Avascular necrosis     Carpal tunnel syndrome     Chronic pain     Depression     Diverticulitis     Gout     Other injury of other sites of trunk 12/28/2011    Pneumonia     Spondylolisthesis     lumbar; myofascial pain    Staph infection     Ulnar neuropathy     left and rt arm     Past Surgical History:   Procedure Laterality Date    COLONOSCOPY N/A 7/6/2020    Procedure: COLONOSCOPY;  Surgeon: Broderick Moise MD;  Location: [facility];  Service: Endoscopy;  Laterality: N/A;    HIP SURGERY  3/06; 6/06    hip arthroplasty    HIP SURGERY      bilateral hip  replacement (titanium)    JOINT REPLACEMENT      OPEN REDUCTION AND INTERNAL FIXATION (ORIF) OF FRACTURE OF CLAVICLE Left 10/5/2023    Procedure: ORIF, FRACTURE, CLAVICLE;  Surgeon: Ozzy Howard MD;  Location: [facility] OR [address] [place] [address];  Service: Orthopedics;  Laterality: Left;    SHOULDER SURGERY      right shoulder    SHOULDER SURGERY       Social History     Socioeconomic History    Marital status: Single   Tobacco Use    Smoking status: Some Days     Current packs/day: 0.00     Average packs/day: 1 pack/day for 10.0 years (10.0 ttl pk-yrs)     Types: Cigarettes     Start date: 10/13/2002     Last attempt to quit: 10/13/2012     Years since quittin.6    Smokeless tobacco: Never   Substance and Sexual Activity    Alcohol use: Yes     Alcohol/week: 12.0 standard drinks of alcohol     Types: 12 Cans of beer per week     Comment: 2x/week    Drug use: No    Sexual activity: Yes     Partners: Female     Vitals:    BP (!) 145/93   Pulse (!) 57   Temp 97.6 °F (36.4 °C) (Oral)   Resp 16   Wt 90.7 kg (200 lb)   SpO2 96%   BMI 27.89 kg/m²     Physical Exam  Vitals and nursing note reviewed.   Constitutional:       General: He is not in acute distress.     Appearance: He is not toxic-appearing.   HENT:      Head: Normocephalic and atraumatic.      Mouth/Throat:      Mouth: Mucous membranes are moist.   Eyes:      General: No scleral icterus.  Cardiovascular:      Rate and Rhythm: Normal rate and regular rhythm.      Pulses: Normal pulses.   Pulmonary:      Effort: Pulmonary effort is normal. No respiratory distress.      Breath sounds: Normal breath sounds.   Abdominal:      General: Abdomen is flat. There is no distension.      Palpations: Abdomen is soft. There is no mass.      Tenderness: There is abdominal tenderness (LLQ). There is no guarding.   Musculoskeletal:         General: No swelling or deformity.      Cervical back: Normal range of motion and neck supple.   Skin:     General: Skin is warm and  dry.      Capillary Refill: Capillary refill takes less than 2 seconds.      Coloration: Skin is not jaundiced.      Findings: No rash.   Neurological:      Mental Status: He is alert. Mental status is at baseline.       Procedures    Laboratory Studies:  Labs that have been ordered have been independently reviewed and interpreted by myself.  Labs Reviewed   CBC W/ AUTO DIFFERENTIAL - Abnormal; Notable for the following components:       Result Value    RBC 4.02 (*)     Hemoglobin 12.8 (*)     Hematocrit 37.7 (*)     MCH 31.8 (*)     All other components within normal limits   ISTAT PROCEDURE - Abnormal; Notable for the following components:    POC Glucose 111 (*)     All other components within normal limits   COMPREHENSIVE METABOLIC PANEL   LIPASE   URINALYSIS, REFLEX TO URINE CULTURE    Narrative:     Specimen Source->Urine     Imaging Results              CT Abdomen Pelvis With IV Contrast NO Oral Contrast (Final result)  Result time 06/03/24 08:18:39      Final result by Tanya Munoz MD (06/03/24 08:18:39)                   Impression:      Colonic diverticulosis.      Electronically signed by: Tanya Munoz MD  Date:    06/03/2024  Time:    08:18               Narrative:    EXAMINATION:  CT ABDOMEN PELVIS WITH IV CONTRAST    CLINICAL HISTORY:  LLQ abdominal pain;    TECHNIQUE:  Low dose axial images, sagittal and coronal reformations were obtained from the lung bases to the pubic symphysis following the IV administration of 100 mL of Omnipaque 350 .  Oral contrast was not given. Axial and coronal images reformatted.    COMPARISON:  CT abdomen pelvis 09/02/2022    FINDINGS:  The lung bases are clear.  No pleural effusion.  No pericardial effusion.    The liver appears normal.  The biliary ducts are not dilated.  The gallbladder is present, no radiopaque stones seen within.    The distal esophagus and stomach appear normal.    The pancreas, spleen and bilateral adrenal glands appear  normal.    The abdominal aorta tapers normally, there is mild atherosclerotic plaque.  No para-aortic lymphadenopathy.    The bilateral kidneys appear normal.  No hydronephrosis.  The bilateral ureters cannot be followed in their entirety, no definite abnormality seen.  There is significant artifacts created by the bilateral hip hardware obscuring details of the pelvis.    Mild stool retention, no inflammatory changes of the bowel seen, no bowel dilation, several colonic diverticula seen.  The appendix is not identified.  The small bowel is not dilated.  No mesenteric inflammatory changes.  No free air or free fluid.    The abdominal wall is intact, the inguinal regions appear within normal limits.    The osseous structures demonstrate no osseous lesions.  Multilevel prominent anterior and lateral marginal bridging osteophytes.                                    EKG (independently interpreted by me): Rhythm: NSR, rate of  70 BPM, no ST elevations or depressions, QTc 399    I decided to obtain the patient's medical records.  Summary of Medical Records:    Medications   ondansetron injection 4 mg (4 mg Intravenous Given 6/3/24 0735)   HYDROmorphone injection 0.5 mg (0.5 mg Intravenous Given 6/3/24 0737)   lactated ringers bolus 1,000 mL (0 mLs Intravenous Stopped 6/3/24 0855)   iohexoL (OMNIPAQUE 350) injection 100 mL (100 mLs Intravenous Given 6/3/24 0802)   HYDROmorphone injection 0.5 mg (0.5 mg Intravenous Given 6/3/24 0834)     MDM:    53 y.o. male with left lower quadrant pain    Differential Dx:  Differential includes but is not limited to diverticulitis, gastroenteritis, gas pain    ED Management:  Abdominal pain workup started for an acute presentation of an emergent condition with multiple comorbidities.  Zofran, Dilaudid, fluids for symptom control.  Will obtain CT abdomen pelvis to assess for severity of likely diverticulitis.  Labs grossly unremarkable.  CT showing clonic diverticulosis without acute  diverticulitis.  Due to patient's history and symptomatology will treat as diverticulitis.  Patient given prescription for 10 day course of Augmentin as well as Norco for breakthrough pain.  Discussed results including any incidental findings, diagnosis, and treatment plan with patient; advised close follow-up with PCP. Reviewed strict return precautions. Patient confirms understanding and ability to comply. Patient was given the opportunity to ask questions prior to discharge and all questions answered.       Medical Decision Making  Amount and/or Complexity of Data Reviewed  Labs: ordered. Decision-making details documented in ED Course.  Radiology: ordered.  ECG/medicine tests: ordered and independent interpretation performed. Decision-making details documented in ED Course.    Risk  Prescription drug management.            Diagnostic Impression:    Final diagnoses:  [R10.9] Abdominal pain  [K57.92] Diverticulitis (Primary)     ED Disposition Condition    Discharge Stable          ED Prescriptions       Medication Sig Dispense Start Date End Date Auth. Provider    amoxicillin-clavulanate 875-125mg (AUGMENTIN) 875-125 mg per tablet Take 1 tablet by mouth 2 (two) times daily. for 10 days 20 tablet 6/3/2024 6/13/2024 Roberto Cao MD    HYDROcodone-acetaminophen (NORCO) 5-325 mg per tablet Take 1 tablet by mouth every 6 (six) hours as needed for Pain. 12 tablet 6/3/2024 -- Roberto Cao MD          Follow-up Information       Follow up With Specialties Details Why Contact Info    Nancy Tadeo MD Internal Medicine   [address]  [place] [address]  [phone]      [facility] - Emergency Dept Emergency Medicine Go to  As needed, If symptoms worsen [address] [facility] [id number] [phone]              Attending Attestation:   Physician Attestation Statement for Resident:  As the supervising MD   Physician Attestation Statement: I have personally seen and examined  this patient.   I agree with the above history.  -:  Recurrent abdominal pain subjectively similar to previous diverticulitis.  CT today with diverticulosis.  He experienced improvement in pain with analgesics.  Given extensive history, will discharge on empiric antibiotics.  Extensive return precautions.   As the supervising MD I agree with the above PE.     As the supervising MD I agree with the above treatment, course, plan, and disposition.                     Roberto Cao MD  Resident  06/03/24 1208       Genaro Huang MD  06/03/24 4323

## 2024-06-03 NOTE — DISCHARGE INSTRUCTIONS
Thank you for coming to our Emergency Department today!     -take antibiotic Augmentin twice daily for 10 days  -take Tylenol 625 mg every 8 hours for pain control  -add hydrocodone as needed every 6 hours for uncontrolled pain  -return to the emergency department for severe abdominal pain, inability to keep solids or liquids down, passing out  -Follow-up with primary care doctor within 3-7 days to discuss your recent ER visit and any additional concerns that you may have.    Return to the Emergency Department for symptoms including but not limited to: persistence or worsening of symptoms, shortness of breath or chest pain, inability to drink without vomiting, passing out/fainting/ loss of consciousness, or if you have other concerns.

## 2024-06-04 ENCOUNTER — HOSPITAL ENCOUNTER (EMERGENCY)
Facility: HOSPITAL | Age: 54
Discharge: HOME | End: 2024-06-04
Attending: EMERGENCY MEDICINE
Payer: MEDICARE

## 2024-06-04 VITALS
BODY MASS INDEX: 28 KG/M2 | RESPIRATION RATE: 20 BRPM | WEIGHT: 200 LBS | HEIGHT: 71 IN | TEMPERATURE: 99 F | HEART RATE: 67 BPM | SYSTOLIC BLOOD PRESSURE: 151 MMHG | DIASTOLIC BLOOD PRESSURE: 75 MMHG | OXYGEN SATURATION: 95 %

## 2024-06-04 DIAGNOSIS — M54.12 CERVICAL RADICULOPATHY: Primary | ICD-10-CM

## 2024-06-04 LAB
OHS QRS DURATION: 76 MS
OHS QTC CALCULATION: 399 MS

## 2024-06-04 PROCEDURE — 99281 EMR DPT VST MAYX REQ PHY/QHP: CPT

## 2024-06-04 NOTE — ED PROVIDER NOTES
"Encounter Date: 6/4/2024    SCRIBE #1 NOTE: I, Mai Sarahy, am scribing for, and in the presence of,  Juan David Becker MD.       History     Chief Complaint   Patient presents with    Back Pain    Headache     Pt presents to ER with complaints of HA, back pain and pain in his lungs. Pt rates pain 9/10 at this time. Pt states he is not looking for pain meds, he just wants to know what is wrong. Pt states he wants xrays and CT scans. Pt denies any othre issues at this time.   Pt was at [facility] earlier today for Diverticulitis.      52 yo M w/ PMHx of asthma, anxiety, arthritis, depression, presenting to the ED for back and neck pain. He reports for the last 3 months since a 4-taylor injury that fractured his L shoulder, he has been experiencing pain daily to his back and neck, and headaches. He reports bl posterior neck pain radiating to his posterior and bitemporal head, and pain radiating down to his lower back. He also reports dizziness and "feeling cold and clammy" w/ his pain since 1w ago. He additionally reports hx of bl hip replacements and bl shoulder replacements, and sciatica. No tobacco or frequent alcohol use. No other exacerbating or alleviating factors. No other associated symptoms.     The history is provided by the patient.     Review of patient's allergies indicates:   Allergen Reactions    Atorvastatin Nausea And Vomiting    Toradol [ketorolac] Hives and Nausea And Vomiting    Ibuprofen Other (See Comments)     States GI BLEED     Past Medical History:   Diagnosis Date    Anxiety     Benzodiazepine dependence    Anxiety disorder, unspecified     Arthritis     Asthma     Avascular necrosis     Carpal tunnel syndrome     Chronic pain     Depression     Diverticulitis     Gout     Mixed hyperlipidemia     Other injury of other sites of trunk 12/28/2011    Pneumonia     Spondylolisthesis     lumbar; myofascial pain    Staph infection     Ulnar neuropathy     left and rt arm     Past Surgical History: "   Procedure Laterality Date    COLONOSCOPY N/A 2020    Procedure: COLONOSCOPY;  Surgeon: Broderick Moise MD;  Location: [facility];  Service: Endoscopy;  Laterality: N/A;    HIP SURGERY  3/06;     hip arthroplasty    HIP SURGERY      bilateral hip replacement (titanium)    JOINT REPLACEMENT      OPEN REDUCTION AND INTERNAL FIXATION (ORIF) OF FRACTURE OF CLAVICLE Left 10/5/2023    Procedure: ORIF, FRACTURE, CLAVICLE;  Surgeon: Ozzy Howard MD;  Location: [address];  Service: Orthopedics;  Laterality: Left;    SHOULDER SURGERY      right shoulder    SHOULDER SURGERY       Family History   Problem Relation Name Age of Onset    Cancer Mother      Cancer Sister       Social History     Tobacco Use    Smoking status: Former     Current packs/day: 0.00     Average packs/day: 1 pack/day for 10.0 years (10.0 ttl pk-yrs)     Types: Cigarettes     Start date: 10/13/2002     Quit date: 10/13/2012     Years since quittin.6    Smokeless tobacco: Never   Substance Use Topics    Alcohol use: Yes     Alcohol/week: 12.0 standard drinks of alcohol     Types: 12 Cans of beer per week     Comment: 2x/week    Drug use: No     Review of Systems   Constitutional:  Negative for fever.   Musculoskeletal:  Positive for back pain and neck pain.   Neurological:  Positive for dizziness and headaches.       Physical Exam     Initial Vitals [24 0156]   BP Pulse Resp Temp SpO2   (!) 143/84 65 20 98.9 °F (37.2 °C) (!) 94 %      MAP       --         Physical Exam    Nursing note and vitals reviewed.  Constitutional: He appears well-developed. He is not diaphoretic. No distress.   HENT:   Head: Normocephalic.   Eyes: EOM are normal. Pupils are equal, round, and reactive to light.   Cardiovascular:  Normal rate and regular rhythm.           No murmur heard.  Pulmonary/Chest: Effort normal and breath sounds normal. He has no wheezes.   Abdominal: Abdomen is soft. He exhibits no distension. There is no abdominal  tenderness.   Musculoskeletal:         General: Normal range of motion.      Comments: No midline back tenderness      Neurological: He is alert.   Skin: Skin is warm.         ED Course   Procedures  Labs Reviewed - No data to display       Imaging Results    None          Medications - No data to display  Medical Decision Making  53-year-old male presenting with neck pain radiating to the scalp.  Patient reports symptoms have been present for multiple months.  Denies any new trauma.  On exam the patient has no weakness of the upper and lower extremities.  No gait instability.  No cranial nerve deficit noted.  Possible cervical radiculopathy versus neck strain.  Discussed symptomatic treatment for the time being.  Patient is established with Orthopedics.  Discussed further evaluation with Orthopedics physician.      Of note the patient was seen within the last 24 hours and diagnosed with suspected diverticulitis.  Patient was discharged on antibiotics.      Differential diagnosis includes cervical radiculopathy, muscle strain            Scribe Attestation:   Scribe #1: I performed the above scribed service and the documentation accurately describes the services I performed. I attest to the accuracy of the note.                           I, Juan David Becker, personally performed the services described in this documentation. All medical record entries made by the scribe were at my direction and in my presence. I have reviewed the chart and agree that the record reflects my personal performance and is accurate and complete.      Clinical Impression:  Final diagnoses:  [M54.12] Cervical radiculopathy (Primary)          ED Disposition Condition    Discharge Stable          ED Prescriptions    None       Follow-up Information       Follow up With Specialties Details Why Contact Nancy Lr MD Internal Medicine Schedule an appointment as soon as possible for a visit in 3 days Primary care [address]  [facility] [address]  [phone]      Darvin Conley MD Orthopedic Surgery Schedule an appointment as soon as possible for a visit in 1 week Orthopedics [address]  [facility] [address]  [phone]               Juan David Becker MD  06/04/24 0521

## 2024-06-04 NOTE — DISCHARGE INSTRUCTIONS
Please follow up with your primary care provider or orthopedist for possible further imaging and management of symptoms.  Recommend using your previously prescribed pain medications for the chronic neck pain.

## 2024-06-04 NOTE — ED NOTES
Patient reporting back pain that is deep and runs up to is neck and causing pressure, HA in his head. Also having pain on inhalation in his lungs. Pain 10/10. Denies photophobia and phonophobia. Also reporting dizziness.  Patient states he does not want any pain medicine. Patient states his headache has been going on intermittently for 4 months. Patient requesting CT of head and xray of chest and back.

## 2024-06-06 ENCOUNTER — TELEPHONE (OUTPATIENT)
Dept: CARDIOLOGY | Facility: CLINIC | Age: 54
End: 2024-06-06

## 2024-06-10 ENCOUNTER — PATIENT MESSAGE (OUTPATIENT)
Dept: INTERNAL MEDICINE | Facility: CLINIC | Age: 54
End: 2024-06-10
Payer: MEDICARE

## 2024-06-16 ENCOUNTER — HOSPITAL ENCOUNTER (EMERGENCY)
Facility: HOSPITAL | Age: 54
Discharge: HOME | End: 2024-06-16
Attending: EMERGENCY MEDICINE
Payer: MEDICARE

## 2024-06-16 VITALS
TEMPERATURE: 98 F | RESPIRATION RATE: 16 BRPM | OXYGEN SATURATION: 91 % | WEIGHT: 210 LBS | HEART RATE: 81 BPM | DIASTOLIC BLOOD PRESSURE: 67 MMHG | BODY MASS INDEX: 29.29 KG/M2 | SYSTOLIC BLOOD PRESSURE: 111 MMHG

## 2024-06-16 DIAGNOSIS — R07.89 CHEST TIGHTNESS: ICD-10-CM

## 2024-06-16 DIAGNOSIS — R42 DIZZINESS: ICD-10-CM

## 2024-06-16 DIAGNOSIS — F10.10 ETOH ABUSE: Primary | ICD-10-CM

## 2024-06-16 DIAGNOSIS — R07.9 CHEST PAIN: ICD-10-CM

## 2024-06-16 LAB
ALBUMIN SERPL BCP-MCNC: 4.4 G/DL (ref 3.5–5.2)
ALP SERPL-CCNC: 67 U/L (ref 55–135)
ALT SERPL W/O P-5'-P-CCNC: 22 U/L (ref 10–44)
ANION GAP SERPL CALC-SCNC: 13 MMOL/L (ref 8–16)
AST SERPL-CCNC: 25 U/L (ref 10–40)
BASOPHILS # BLD AUTO: 0.06 K/UL (ref 0–0.2)
BASOPHILS NFR BLD: 0.8 % (ref 0–1.9)
BILIRUB SERPL-MCNC: 0.5 MG/DL (ref 0.1–1)
BNP SERPL-MCNC: 29 PG/ML (ref 0–99)
BUN SERPL-MCNC: 12 MG/DL (ref 6–20)
CALCIUM SERPL-MCNC: 9.2 MG/DL (ref 8.7–10.5)
CHLORIDE SERPL-SCNC: 105 MMOL/L (ref 95–110)
CO2 SERPL-SCNC: 19 MMOL/L (ref 23–29)
CREAT SERPL-MCNC: 1.1 MG/DL (ref 0.5–1.4)
DIFFERENTIAL METHOD BLD: ABNORMAL
EOSINOPHIL # BLD AUTO: 0.3 K/UL (ref 0–0.5)
EOSINOPHIL NFR BLD: 4 % (ref 0–8)
ERYTHROCYTE [DISTWIDTH] IN BLOOD BY AUTOMATED COUNT: 12.9 % (ref 11.5–14.5)
EST. GFR  (NO RACE VARIABLE): >60 ML/MIN/1.73 M^2
ETHANOL SERPL-MCNC: 293 MG/DL (ref 0–10)
GLUCOSE SERPL-MCNC: 102 MG/DL (ref 70–110)
HCT VFR BLD AUTO: 38 % (ref 40–54)
HGB BLD-MCNC: 13 G/DL (ref 14–18)
IMM GRANULOCYTES # BLD AUTO: 0.02 K/UL (ref 0–0.04)
IMM GRANULOCYTES NFR BLD AUTO: 0.3 % (ref 0–0.5)
LYMPHOCYTES # BLD AUTO: 2.9 K/UL (ref 1–4.8)
LYMPHOCYTES NFR BLD: 37 % (ref 18–48)
MCH RBC QN AUTO: 31 PG (ref 27–31)
MCHC RBC AUTO-ENTMCNC: 34.2 G/DL (ref 32–36)
MCV RBC AUTO: 91 FL (ref 82–98)
MONOCYTES # BLD AUTO: 0.6 K/UL (ref 0.3–1)
MONOCYTES NFR BLD: 7.5 % (ref 4–15)
NEUTROPHILS # BLD AUTO: 4 K/UL (ref 1.8–7.7)
NEUTROPHILS NFR BLD: 50.4 % (ref 38–73)
NRBC BLD-RTO: 0 /100 WBC
PLATELET # BLD AUTO: 223 K/UL (ref 150–450)
PMV BLD AUTO: 8.7 FL (ref 9.2–12.9)
POTASSIUM SERPL-SCNC: 4.3 MMOL/L (ref 3.5–5.1)
PROT SERPL-MCNC: 7.7 G/DL (ref 6–8.4)
RBC # BLD AUTO: 4.2 M/UL (ref 4.6–6.2)
SODIUM SERPL-SCNC: 137 MMOL/L (ref 136–145)
TROPONIN I SERPL DL<=0.01 NG/ML-MCNC: <0.006 NG/ML (ref 0–0.03)
WBC # BLD AUTO: 7.82 K/UL (ref 3.9–12.7)

## 2024-06-16 PROCEDURE — 80053 COMPREHEN METABOLIC PANEL: CPT | Performed by: EMERGENCY MEDICINE

## 2024-06-16 PROCEDURE — 93005 ELECTROCARDIOGRAM TRACING: CPT

## 2024-06-16 PROCEDURE — 99285 EMERGENCY DEPT VISIT HI MDM: CPT | Mod: 25

## 2024-06-16 PROCEDURE — 85025 COMPLETE CBC W/AUTO DIFF WBC: CPT | Performed by: EMERGENCY MEDICINE

## 2024-06-16 PROCEDURE — 84484 ASSAY OF TROPONIN QUANT: CPT | Performed by: EMERGENCY MEDICINE

## 2024-06-16 PROCEDURE — 71045 X-RAY EXAM CHEST 1 VIEW: CPT | Mod: TC

## 2024-06-16 PROCEDURE — 82077 ASSAY SPEC XCP UR&BREATH IA: CPT | Performed by: EMERGENCY MEDICINE

## 2024-06-16 PROCEDURE — 83880 ASSAY OF NATRIURETIC PEPTIDE: CPT | Performed by: EMERGENCY MEDICINE

## 2024-06-16 PROCEDURE — 93010 ELECTROCARDIOGRAM REPORT: CPT | Mod: ,,, | Performed by: INTERNAL MEDICINE

## 2024-06-16 PROCEDURE — 71045 X-RAY EXAM CHEST 1 VIEW: CPT | Mod: 26,,, | Performed by: RADIOLOGY

## 2024-06-16 NOTE — ED NOTES
Pt ambulated to bathroom before DC. Pt left without reviewing DC paperwork. Per [place] local PD notified of pt walking out hospital without full DC review and making sure pt had a ride home.

## 2024-06-16 NOTE — DISCHARGE INSTRUCTIONS
Return emergency with any worsening chest pain shortness of breath fevers or chills.  Avoid drinking alcohol.
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
## 2024-06-17 LAB
OHS QRS DURATION: 76 MS
OHS QTC CALCULATION: 415 MS

## 2024-07-17 ENCOUNTER — TELEPHONE (OUTPATIENT)
Dept: INTERNAL MEDICINE | Facility: CLINIC | Age: 54
End: 2024-07-17
Payer: MEDICARE

## 2024-07-17 ENCOUNTER — TELEPHONE (OUTPATIENT)
Dept: ORTHOPEDICS | Facility: CLINIC | Age: 54
End: 2024-07-17
Payer: MEDICARE

## 2024-07-17 NOTE — TELEPHONE ENCOUNTER
----- Message from Berkley Guzman sent at 7/17/2024  1:26 PM CDT -----  Regarding: Schedule  Patient would like to know if can re-establish with you?  Please advise.  ThanksAnu

## 2024-07-18 ENCOUNTER — OFFICE VISIT (OUTPATIENT)
Dept: ORTHOPEDICS | Facility: CLINIC | Age: 54
End: 2024-07-18
Payer: MEDICARE

## 2024-07-18 ENCOUNTER — HOSPITAL ENCOUNTER (OUTPATIENT)
Dept: RADIOLOGY | Facility: HOSPITAL | Age: 54
Discharge: HOME | End: 2024-07-18
Attending: PHYSICIAN ASSISTANT
Payer: MEDICARE

## 2024-07-18 ENCOUNTER — TELEPHONE (OUTPATIENT)
Dept: ORTHOPEDICS | Facility: CLINIC | Age: 54
End: 2024-07-18
Payer: MEDICARE

## 2024-07-18 VITALS — HEIGHT: 71 IN | WEIGHT: 210.13 LBS | BODY MASS INDEX: 29.42 KG/M2

## 2024-07-18 DIAGNOSIS — S42.022D CLOSED DISPLACED FRACTURE OF SHAFT OF LEFT CLAVICLE WITH ROUTINE HEALING, SUBSEQUENT ENCOUNTER: ICD-10-CM

## 2024-07-18 DIAGNOSIS — M54.2 NECK PAIN: ICD-10-CM

## 2024-07-18 DIAGNOSIS — S42.022D CLOSED DISPLACED FRACTURE OF SHAFT OF LEFT CLAVICLE WITH ROUTINE HEALING, SUBSEQUENT ENCOUNTER: Primary | ICD-10-CM

## 2024-07-18 PROCEDURE — 73000 X-RAY EXAM OF COLLAR BONE: CPT | Mod: TC,LT

## 2024-07-18 PROCEDURE — 1159F MED LIST DOCD IN RCRD: CPT | Mod: CPTII,S$GLB,, | Performed by: PHYSICIAN ASSISTANT

## 2024-07-18 PROCEDURE — 73000 X-RAY EXAM OF COLLAR BONE: CPT | Mod: 26,LT,, | Performed by: RADIOLOGY

## 2024-07-18 PROCEDURE — 99999 PR PBB SHADOW E&M-EST. PATIENT-LVL III: CPT | Mod: PBBFAC,,, | Performed by: PHYSICIAN ASSISTANT

## 2024-07-18 PROCEDURE — 99213 OFFICE O/P EST LOW 20 MIN: CPT | Mod: S$GLB,,, | Performed by: PHYSICIAN ASSISTANT

## 2024-07-18 PROCEDURE — 3008F BODY MASS INDEX DOCD: CPT | Mod: CPTII,S$GLB,, | Performed by: PHYSICIAN ASSISTANT
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
## 2024-07-29 ENCOUNTER — OFFICE VISIT (OUTPATIENT)
Dept: FAMILY MEDICINE | Facility: CLINIC | Age: 54
End: 2024-07-29
Payer: MEDICARE

## 2024-07-29 VITALS
HEART RATE: 71 BPM | OXYGEN SATURATION: 97 % | SYSTOLIC BLOOD PRESSURE: 120 MMHG | HEIGHT: 71 IN | BODY MASS INDEX: 33.08 KG/M2 | DIASTOLIC BLOOD PRESSURE: 90 MMHG | WEIGHT: 236.31 LBS

## 2024-07-29 DIAGNOSIS — I25.118 ATHEROSCLEROSIS OF NATIVE CORONARY ARTERY OF NATIVE HEART WITH OTHER FORM OF ANGINA PECTORIS: ICD-10-CM

## 2024-07-29 DIAGNOSIS — G89.4 CHRONIC PAIN SYNDROME: ICD-10-CM

## 2024-07-29 DIAGNOSIS — E78.5 DYSLIPIDEMIA: ICD-10-CM

## 2024-07-29 DIAGNOSIS — F13.20 BENZODIAZEPINE DEPENDENCE: ICD-10-CM

## 2024-07-29 DIAGNOSIS — E66.09 CLASS 1 OBESITY DUE TO EXCESS CALORIES WITH SERIOUS COMORBIDITY AND BODY MASS INDEX (BMI) OF 32.0 TO 32.9 IN ADULT: ICD-10-CM

## 2024-07-29 DIAGNOSIS — M54.40 CHRONIC MIDLINE LOW BACK PAIN WITH SCIATICA, SCIATICA LATERALITY UNSPECIFIED: Chronic | ICD-10-CM

## 2024-07-29 DIAGNOSIS — F31.9 BIPOLAR AFFECTIVE DISORDER, REMISSION STATUS UNSPECIFIED: ICD-10-CM

## 2024-07-29 DIAGNOSIS — J45.20 MILD INTERMITTENT ASTHMA WITHOUT COMPLICATION: ICD-10-CM

## 2024-07-29 DIAGNOSIS — M54.14 THORACIC RADICULOPATHY: Primary | ICD-10-CM

## 2024-07-29 DIAGNOSIS — L98.9 SKIN LESION: ICD-10-CM

## 2024-07-29 DIAGNOSIS — G89.29 CHRONIC MIDLINE LOW BACK PAIN WITH SCIATICA, SCIATICA LATERALITY UNSPECIFIED: Chronic | ICD-10-CM

## 2024-07-29 DIAGNOSIS — Z91.89 AT RISK ALCOHOL CONSUMPTION: ICD-10-CM

## 2024-07-29 PROBLEM — E66.01 SEVERE OBESITY (BMI 35.0-39.9) WITH COMORBIDITY: Status: RESOLVED | Noted: 2023-04-04 | Resolved: 2024-07-29

## 2024-07-29 PROBLEM — E66.01 SEVERE OBESITY (BMI 35.0-35.9 WITH COMORBIDITY): Status: RESOLVED | Noted: 2023-04-17 | Resolved: 2024-07-29

## 2024-07-29 PROBLEM — Z88.8 ALLERGY TO STATIN MEDICATION: Status: RESOLVED | Noted: 2024-07-29 | Resolved: 2024-07-29

## 2024-07-29 PROBLEM — F17.200 SMOKING: Status: RESOLVED | Noted: 2023-04-17 | Resolved: 2024-07-29

## 2024-07-29 PROBLEM — E66.811 CLASS 1 OBESITY DUE TO EXCESS CALORIES WITH SERIOUS COMORBIDITY AND BODY MASS INDEX (BMI) OF 32.0 TO 32.9 IN ADULT: Status: ACTIVE | Noted: 2024-07-29

## 2024-07-29 PROBLEM — Z88.8 ALLERGY TO STATIN MEDICATION: Status: ACTIVE | Noted: 2024-07-29

## 2024-07-29 PROCEDURE — 1159F MED LIST DOCD IN RCRD: CPT | Mod: CPTII,S$GLB,, | Performed by: STUDENT IN AN ORGANIZED HEALTH CARE EDUCATION/TRAINING PROGRAM

## 2024-07-29 PROCEDURE — 1160F RVW MEDS BY RX/DR IN RCRD: CPT | Mod: CPTII,S$GLB,, | Performed by: STUDENT IN AN ORGANIZED HEALTH CARE EDUCATION/TRAINING PROGRAM

## 2024-07-29 PROCEDURE — 3080F DIAST BP >= 90 MM HG: CPT | Mod: CPTII,S$GLB,, | Performed by: STUDENT IN AN ORGANIZED HEALTH CARE EDUCATION/TRAINING PROGRAM

## 2024-07-29 PROCEDURE — 99214 OFFICE O/P EST MOD 30 MIN: CPT | Mod: S$GLB,,, | Performed by: STUDENT IN AN ORGANIZED HEALTH CARE EDUCATION/TRAINING PROGRAM

## 2024-07-29 PROCEDURE — 3008F BODY MASS INDEX DOCD: CPT | Mod: CPTII,S$GLB,, | Performed by: STUDENT IN AN ORGANIZED HEALTH CARE EDUCATION/TRAINING PROGRAM

## 2024-07-29 PROCEDURE — 99999 PR PBB SHADOW E&M-EST. PATIENT-LVL V: CPT | Mod: PBBFAC,,, | Performed by: STUDENT IN AN ORGANIZED HEALTH CARE EDUCATION/TRAINING PROGRAM

## 2024-07-29 PROCEDURE — 3074F SYST BP LT 130 MM HG: CPT | Mod: CPTII,S$GLB,, | Performed by: STUDENT IN AN ORGANIZED HEALTH CARE EDUCATION/TRAINING PROGRAM

## 2024-07-29 NOTE — ASSESSMENT & PLAN NOTE
Referral to local cardiology  Lifestyle Modifications: Emphasize the importance of a heart-healthy diet rich in fruits, vegetables, whole grains, and lean proteins. Encourage regular physical activity, aiming for at least 150 minutes of moderate-intensity exercise per week. Advise smoking cessation if applicable.  Blood Pressure Control: Target blood pressure below 130/80 mmHg, utilizing lifestyle interventions and pharmacotherapy with ACE inhibitors, angiotensin II receptor blockers (ARBs), beta-blockers, or calcium channel blockers as indicated.  Lipid Management: Aim for low-density lipoprotein (LDL) cholesterol levels below 70  Glucose Control: Optimize glycemic control in patients with diabetes mellitus through lifestyle modifications, oral antidiabetic agents, and/or insulin therapy as appropriate.

## 2024-07-29 NOTE — ASSESSMENT & PLAN NOTE
Managed with Crestor 40 mg daily  Plan: .  Lifestyle Modifications: Counseled patient on adopting a heart-healthy lifestyle, including dietary changes (eg Mediterranean diet, low saturated fat and cholesterol), regular physical activity, smoking cessation, and weight management.  Medication: C/w crestor to achieve and maintain target lipid levels.  Monitoring: Scheduled regular follow-up visits for lipid profile monitoring and medication adjustments as needed. Emphasized importance of adherence to medication therapy and lifestyle modifications.  Risk factor management: Addressed and managed other cardiovascular risk factors, including hypertension, diabetes mellitus, and lifestyle habits, to reduce overall cardiovascular risk.  Patient Education: Provided patient with education on the significance of hyperlipidemia in cardiovascular health, medication regimen, potential side effects, and adherence to lifestyle modification.

## 2024-07-29 NOTE — PROGRESS NOTES
"DATE: 7/30/2024  PATIENT: Keon Schneider Jr.    Attending Physician: Tip Carrion M.D.    HISTORY:  Keon Schneider Jr. is a 54 y.o. male who returns to me today for follow up.  He was last seen by Dr. Carrion on 10/25/22.  Today he is doing well but notes left low back pain that spreads up to his mid back. He states his pain is constant. He takes bear back and body several times a day for his pain.  The Patient denies myelopathic symptoms such as handwriting changes or difficulty with buttons/coins/keys. Denies perineal paresthesias, bowel/bladder dysfunction.    EXAM:  Ht 5' 11" (1.803 m)   Wt 107.2 kg (236 lb 5.3 oz)   BMI 32.96 kg/m²     My physical examination was notable for the following findings:     Normal station and gait, no difficulty with toe or heel walk.   Dorsal lumbar skin negative for rashes, lesions, hairy patches and surgical scars. There is mild lumbar tenderness to palpation.  Lumbar range of motion is acceptable.  Global saggital and coronal spinal balance acceptable, not significant for scoliosis and kyphosis.  No pain with the range of motion of the bilateral hips. No trochanteric tenderness to palpation.  Bilateral lower extremities warm and well perfused, dorsalis pedis pulses 2+ bilaterally.  Normal strength and tone in all major motor groups in the bilateral lower extremities. Normal sensation to light touch in the L2-S1 dermatomes bilaterally.  Deep tendon reflexes symmetric 2+ in the bilateral lower extremities.  Negative Babinski bilaterally. Straight leg raise negative bilaterally.      IMAGING:    Today I personally re- reviewed AP, Lat and Flex/Ex  upright L-spine that demonstrate spondylosis and DDD without fractures or listhesis.       Lumbar MRI showed mild R L4-S1 foraminal stenosis. No significant central stenosis.    Body mass index is 32.96 kg/m².    Hemoglobin A1C   Date Value Ref Range Status   03/28/2023 6.0 (H) 4.0 - 5.6 % Final     Comment:     ADA Screening " Guidelines:  5.7-6.4%  Consistent with prediabetes  >or=6.5%  Consistent with diabetes    High levels of fetal hemoglobin interfere with the HbA1C  assay. Heterozygous hemoglobin variants (HbS, HgC, etc)do  not significantly interfere with this assay.   However, presence of multiple variants may affect accuracy.     04/09/2018 5.5 4.0 - 5.6 % Final     Comment:     According to ADA guidelines, hemoglobin A1c <7.0% represents  optimal control in non-pregnant diabetic patients. Different  metrics may apply to specific patient populations.   Standards of Medical Care in Diabetes-2016.  For the purpose of screening for the presence of diabetes:  <5.7%     Consistent with the absence of diabetes  5.7-6.4%  Consistent with increasing risk for diabetes   (prediabetes)  >or=6.5%  Consistent with diabetes  Currently, no consensus exists for use of hemoglobin A1c  for diagnosis of diabetes for children.  This Hemoglobin A1c assay has significant interference with fetal   hemoglobin   (HbF). The results are invalid for patients with abnormal amounts of   HbF,   including those with known Hereditary Persistence   of Fetal Hemoglobin. Heterozygous hemoglobin variants (HbAS, HbAC,   HbAD, HbAE, HbA2) do not significantly interfere with this assay;   however, presence of multiple variants in a sample may impact the %   interference.     03/25/2018 5.4 4.0 - 5.6 % Final     Comment:     According to ADA guidelines, hemoglobin A1c <7.0% represents  optimal control in non-pregnant diabetic patients. Different  metrics may apply to specific patient populations.   Standards of Medical Care in Diabetes-2016.  For the purpose of screening for the presence of diabetes:  <5.7%     Consistent with the absence of diabetes  5.7-6.4%  Consistent with increasing risk for diabetes   (prediabetes)  >or=6.5%  Consistent with diabetes  Currently, no consensus exists for use of hemoglobin A1c  for diagnosis of diabetes for children.  This Hemoglobin  A1c assay has significant interference with fetal   hemoglobin   (HbF). The results are invalid for patients with abnormal amounts of   HbF,   including those with known Hereditary Persistence   of Fetal Hemoglobin. Heterozygous hemoglobin variants (HbAS, HbAC,   HbAD, HbAE, HbA2) do not significantly interfere with this assay;   however, presence of multiple variants in a sample may impact the %   interference.           ASSESSMENT/PLAN:    Keon was seen today for low-back pain and back pain.    Diagnoses and all orders for this visit:    DDD (degenerative disc disease), lumbar  -     methocarbamoL (ROBAXIN) 750 MG Tab; Take 1-2 tablets (750-1,500 mg total) by mouth 3 (three) times daily as needed (muscle tension).  -     gabapentin (NEURONTIN) 300 MG capsule; Take 1 capsule (300 mg total) by mouth 3 (three) times daily.  -     Ambulatory referral/consult to Physical/Occupational Therapy; Future  -     X-Ray Lumbar Spine Ap Lateral w/Flex Ext; Future    Thoracic radiculopathy  -     Ambulatory referral/consult to Spine Surgery  -     methocarbamoL (ROBAXIN) 750 MG Tab; Take 1-2 tablets (750-1,500 mg total) by mouth 3 (three) times daily as needed (muscle tension).  -     gabapentin (NEURONTIN) 300 MG capsule; Take 1 capsule (300 mg total) by mouth 3 (three) times daily.  -     Ambulatory referral/consult to Physical/Occupational Therapy; Future    Dorsalgia, unspecified  -     MRI Lumbar Spine Without Contrast; Future    Pain in thoracic spine  -     MRI Thoracic Spine Without Contrast; Future      Today we discussed at length all of the different treatment options including anti-inflammatories, acetaminophen, rest, ice, heat, physical therapy including strengthening and stretching exercises, home exercises, ROM, aerobic conditioning, aqua therapy, other modalities including ultrasound, massage, and dry needling, epidural steroid injections and finally surgical intervention.  MRIs ordered, I will call with  results.  I provided the patient with a home exercise program. It is the [employer] spine conditioning program. Exercises include head rolls, kneeling back extension, sitting rotation stretch, modified seated side straddle, knee to chest, bird dog, plank, modified seated plank, hip bridges, abdominal bracing, and abdominal crunch. Pt will complete each exercise 5 times daily for 6-8 weeks.

## 2024-07-29 NOTE — PROGRESS NOTES
Subjective     Patient ID: Keon Schneider Jr. is a 54 y.o. male.    Chief Complaint: Establish Care, Hypertension, and Low-back Pain    54 year old man with pmhx of CAD, ?bipolar disorder, at risk alcohol use, hx of cocaine use disorder in remission, gout, chronic pain former smoker presenting to establish care. They also take Pepto for stomach issues, allopurinol for gout, and colchicine during gout flares. The patient avoids aspirin due to bleeding issues. The patient has a history of asthma but has not needed an inhaler recently. The patient has a history of multiple surgeries, including double hip replacements and shoulder reconstruction, and suffers from chronic back pain due to arthritis, bulging discs, and pinched nerves. They have been bedridden for the past two and a half weeks due to severe back pain. The patient experiences headaches and pressure in their head since their collarbone injury. They have not yet seen a pain clinic or spine specialist, despite multiple recommendations. The patient is currently taking Xanax for anxiety and has difficulty sleeping, sometimes only getting two hours of sleep per night. The patient quit smoking in 2012 and occasionally consumes alcohol, depending on work stress. They have a history of cocaine use in their early 20s but no longer use it. The patient reports a recurring rash that appears every summer, possibly due to sun exposure and sweating. They have seen dermatologists in the past who have noted their extensive sun exposure due to working outdoors. The patient drinks large amounts of water daily and takes vitamins B12, D, and C. They enjoy fishing and occasionally drink beer. The patient prefers to avoid crowds and has difficulty tolerating being around people for extended periods. They have a family history of cancer, with multiple relatives having passed away from various forms of the disease. The patient is up to date with their colonoscopy and is eligible  for shingles and pneumonia vaccines.     Hypertension  Pertinent negatives include no chest pain, palpitations or shortness of breath.   Low-back Pain  Associated symptoms include arthralgias. Pertinent negatives include no abdominal pain, chest pain, chills, fever, myalgias, nausea, numbness, vomiting or weakness.     Review of Systems   Constitutional:  Negative for appetite change, chills, fever and unexpected weight change.   Respiratory:  Negative for shortness of breath and wheezing.    Cardiovascular:  Negative for chest pain, palpitations and leg swelling.   Gastrointestinal:  Negative for abdominal distention, abdominal pain, anal bleeding, blood in stool, constipation, nausea and vomiting.   Endocrine: Negative for cold intolerance, heat intolerance, polydipsia, polyphagia and polyuria.   Musculoskeletal:  Positive for arthralgias and back pain. Negative for myalgias.   Neurological:  Negative for seizures, syncope, speech difficulty, weakness and numbness.   Psychiatric/Behavioral:  Negative for self-injury and suicidal ideas.           Objective     Physical Exam  Constitutional:       General: He is not in acute distress.     Appearance: He is normal weight.   HENT:      Head: Normocephalic and atraumatic.      Nose: Nose normal.      Mouth/Throat:      Mouth: Mucous membranes are moist.   Eyes:      Extraocular Movements: Extraocular movements intact.      Conjunctiva/sclera: Conjunctivae normal.      Pupils: Pupils are equal, round, and reactive to light.   Cardiovascular:      Rate and Rhythm: Normal rate and regular rhythm.      Heart sounds: No murmur heard.     No friction rub. No gallop.   Pulmonary:      Effort: Pulmonary effort is normal.      Breath sounds: Normal breath sounds. No stridor. No wheezing, rhonchi or rales.   Abdominal:      General: Abdomen is flat. There is no distension.      Palpations: Abdomen is soft. There is no mass.      Tenderness: There is no abdominal tenderness.  There is no guarding.   Musculoskeletal:         General: Normal range of motion.      Cervical back: Normal range of motion.      Right lower leg: No edema.      Left lower leg: No edema.   Skin:     General: Skin is warm.      Findings: Lesion (multiple on scalp and neck) present.   Neurological:      General: No focal deficit present.      Mental Status: He is alert. Mental status is at baseline.   Psychiatric:         Mood and Affect: Mood normal.         Behavior: Behavior normal.            Assessment and Plan     1. Thoracic radiculopathy  Assessment & Plan:  Spine surgery referral placed    Orders:  -     Ambulatory referral/consult to Internal Medicine  -     Ambulatory referral/consult to Spine Surgery; Future; Expected date: 08/05/2024    2. Benzodiazepine dependence  Assessment & Plan:  Follow up with psychiatry      3. Bipolar affective disorder, remission status unspecified  Assessment & Plan:  Old diagnosis, follow up with psychiatry    Orders:  -     Ambulatory referral/consult to Psychiatry; Future; Expected date: 08/05/2024    4. Atherosclerosis of native coronary artery of native heart with other form of angina pectoris  Assessment & Plan:  Referral to local cardiology  Lifestyle Modifications: Emphasize the importance of a heart-healthy diet rich in fruits, vegetables, whole grains, and lean proteins. Encourage regular physical activity, aiming for at least 150 minutes of moderate-intensity exercise per week. Advise smoking cessation if applicable.  Blood Pressure Control: Target blood pressure below 130/80 mmHg, utilizing lifestyle interventions and pharmacotherapy with ACE inhibitors, angiotensin II receptor blockers (ARBs), beta-blockers, or calcium channel blockers as indicated.  Lipid Management: Aim for low-density lipoprotein (LDL) cholesterol levels below 70  Glucose Control: Optimize glycemic control in patients with diabetes mellitus through lifestyle modifications, oral antidiabetic  agents, and/or insulin therapy as appropriate.     Orders:  -     Lipid Panel; Future; Expected date: 07/29/2024  -     Comprehensive Metabolic Panel; Future; Expected date: 07/29/2024  -     Ambulatory referral/consult to Cardiology; Future; Expected date: 08/05/2024    5. Chronic pain syndrome  Assessment & Plan:  Pain management consult    Orders:  -     Ambulatory referral/consult to Pain Clinic; Future; Expected date: 08/05/2024    6. Class 1 obesity due to excess calories with serious comorbidity and body mass index (BMI) of 32.0 to 32.9 in adult  Assessment & Plan:  Encourage healthy lifestyle, portion control, reduce alcohol intake    Orders:  -     Lipid Panel; Future; Expected date: 07/29/2024  -     Comprehensive Metabolic Panel; Future; Expected date: 07/29/2024    7. Chronic midline low back pain with sciatica, sciatica laterality unspecified  Assessment & Plan:  Chronic, follow up with spine center and pain management      8. Dyslipidemia  Assessment & Plan:  Managed with Crestor 40 mg daily  Plan: .  Lifestyle Modifications: Counseled patient on adopting a heart-healthy lifestyle, including dietary changes (eg Mediterranean diet, low saturated fat and cholesterol), regular physical activity, smoking cessation, and weight management.  Medication: C/w crestor to achieve and maintain target lipid levels.  Monitoring: Scheduled regular follow-up visits for lipid profile monitoring and medication adjustments as needed. Emphasized importance of adherence to medication therapy and lifestyle modifications.  Risk factor management: Addressed and managed other cardiovascular risk factors, including hypertension, diabetes mellitus, and lifestyle habits, to reduce overall cardiovascular risk.  Patient Education: Provided patient with education on the significance of hyperlipidemia in cardiovascular health, medication regimen, potential side effects, and adherence to lifestyle modification.         9. Mild  intermittent asthma without complication  Assessment & Plan:  Well controlled, monitor      10. Skin lesion  Assessment & Plan:  Per patient, self-resolving (due to sun)  Seen by derm  Encourage use of sunscreen          Encouraged to get PCV and shingles vaccines at pharmacy.         No follow-ups on file.

## 2024-07-29 NOTE — ASSESSMENT & PLAN NOTE
Daily beer and whiskey  Encouraged to cut back, avoid combining xanax and alcohol  Psychiatry consult placed

## 2024-07-30 ENCOUNTER — CLINICAL SUPPORT (OUTPATIENT)
Dept: REHABILITATION | Facility: HOSPITAL | Age: 54
End: 2024-07-30
Payer: MEDICARE

## 2024-07-30 ENCOUNTER — OFFICE VISIT (OUTPATIENT)
Dept: ORTHOPEDICS | Facility: CLINIC | Age: 54
End: 2024-07-30
Payer: MEDICARE

## 2024-07-30 ENCOUNTER — LAB VISIT (OUTPATIENT)
Dept: LAB | Facility: HOSPITAL | Age: 54
End: 2024-07-30
Attending: STUDENT IN AN ORGANIZED HEALTH CARE EDUCATION/TRAINING PROGRAM
Payer: MEDICARE

## 2024-07-30 VITALS — BODY MASS INDEX: 33.08 KG/M2 | HEIGHT: 71 IN | WEIGHT: 236.31 LBS

## 2024-07-30 DIAGNOSIS — R20.2 NUMBNESS AND TINGLING OF LEFT UPPER EXTREMITY: Primary | ICD-10-CM

## 2024-07-30 DIAGNOSIS — M54.2 NECK PAIN: ICD-10-CM

## 2024-07-30 DIAGNOSIS — S42.022D CLOSED DISPLACED FRACTURE OF SHAFT OF LEFT CLAVICLE WITH ROUTINE HEALING, SUBSEQUENT ENCOUNTER: ICD-10-CM

## 2024-07-30 DIAGNOSIS — R20.0 NUMBNESS AND TINGLING OF LEFT UPPER EXTREMITY: Primary | ICD-10-CM

## 2024-07-30 DIAGNOSIS — I25.118 ATHEROSCLEROSIS OF NATIVE CORONARY ARTERY OF NATIVE HEART WITH OTHER FORM OF ANGINA PECTORIS: ICD-10-CM

## 2024-07-30 DIAGNOSIS — M54.14 THORACIC RADICULOPATHY: ICD-10-CM

## 2024-07-30 DIAGNOSIS — M54.9 DORSALGIA, UNSPECIFIED: ICD-10-CM

## 2024-07-30 DIAGNOSIS — M51.36 DDD (DEGENERATIVE DISC DISEASE), LUMBAR: Primary | ICD-10-CM

## 2024-07-30 DIAGNOSIS — M54.6 PAIN IN THORACIC SPINE: ICD-10-CM

## 2024-07-30 DIAGNOSIS — E66.09 CLASS 1 OBESITY DUE TO EXCESS CALORIES WITH SERIOUS COMORBIDITY AND BODY MASS INDEX (BMI) OF 32.0 TO 32.9 IN ADULT: ICD-10-CM

## 2024-07-30 LAB
ALBUMIN SERPL BCP-MCNC: 4.2 G/DL (ref 3.5–5.2)
ALP SERPL-CCNC: 69 U/L (ref 55–135)
ALT SERPL W/O P-5'-P-CCNC: 31 U/L (ref 10–44)
ANION GAP SERPL CALC-SCNC: 7 MMOL/L (ref 8–16)
AST SERPL-CCNC: 22 U/L (ref 10–40)
BILIRUB SERPL-MCNC: 0.5 MG/DL (ref 0.1–1)
BUN SERPL-MCNC: 13 MG/DL (ref 6–20)
CALCIUM SERPL-MCNC: 9.7 MG/DL (ref 8.7–10.5)
CHLORIDE SERPL-SCNC: 106 MMOL/L (ref 95–110)
CHOLEST SERPL-MCNC: 138 MG/DL (ref 120–199)
CHOLEST/HDLC SERPL: 3.4 {RATIO} (ref 2–5)
CO2 SERPL-SCNC: 25 MMOL/L (ref 23–29)
CREAT SERPL-MCNC: 1 MG/DL (ref 0.5–1.4)
EST. GFR  (NO RACE VARIABLE): >60 ML/MIN/1.73 M^2
GLUCOSE SERPL-MCNC: 91 MG/DL (ref 70–110)
HDLC SERPL-MCNC: 41 MG/DL (ref 40–75)
HDLC SERPL: 29.7 % (ref 20–50)
LDLC SERPL CALC-MCNC: 70.6 MG/DL (ref 63–159)
NONHDLC SERPL-MCNC: 97 MG/DL
POTASSIUM SERPL-SCNC: 4.7 MMOL/L (ref 3.5–5.1)
PROT SERPL-MCNC: 7.5 G/DL (ref 6–8.4)
SODIUM SERPL-SCNC: 138 MMOL/L (ref 136–145)
TRIGL SERPL-MCNC: 132 MG/DL (ref 30–150)

## 2024-07-30 PROCEDURE — 36415 COLL VENOUS BLD VENIPUNCTURE: CPT | Performed by: STUDENT IN AN ORGANIZED HEALTH CARE EDUCATION/TRAINING PROGRAM

## 2024-07-30 PROCEDURE — 97161 PT EVAL LOW COMPLEX 20 MIN: CPT

## 2024-07-30 PROCEDURE — 3008F BODY MASS INDEX DOCD: CPT | Mod: CPTII,S$GLB,, | Performed by: REGISTERED NURSE

## 2024-07-30 PROCEDURE — 97112 NEUROMUSCULAR REEDUCATION: CPT

## 2024-07-30 PROCEDURE — 99999 PR PBB SHADOW E&M-EST. PATIENT-LVL IV: CPT | Mod: PBBFAC,,, | Performed by: REGISTERED NURSE

## 2024-07-30 PROCEDURE — 80053 COMPREHEN METABOLIC PANEL: CPT | Performed by: STUDENT IN AN ORGANIZED HEALTH CARE EDUCATION/TRAINING PROGRAM

## 2024-07-30 PROCEDURE — 80061 LIPID PANEL: CPT | Performed by: STUDENT IN AN ORGANIZED HEALTH CARE EDUCATION/TRAINING PROGRAM

## 2024-07-30 PROCEDURE — 99214 OFFICE O/P EST MOD 30 MIN: CPT | Mod: S$GLB,,, | Performed by: REGISTERED NURSE

## 2024-07-30 PROCEDURE — 1159F MED LIST DOCD IN RCRD: CPT | Mod: CPTII,S$GLB,, | Performed by: REGISTERED NURSE

## 2024-07-30 RX ORDER — METHOCARBAMOL 750 MG/1
750-1500 TABLET, FILM COATED ORAL 3 TIMES DAILY PRN
Qty: 60 TABLET | Refills: 0 | Status: SHIPPED | OUTPATIENT
Start: 2024-07-30 | End: 2024-08-09

## 2024-07-30 RX ORDER — ROSUVASTATIN CALCIUM 40 MG/1
40 TABLET, COATED ORAL NIGHTLY
Qty: 90 TABLET | Refills: 3 | Status: CANCELLED | OUTPATIENT
Start: 2024-07-30 | End: 2025-07-30

## 2024-07-30 RX ORDER — GABAPENTIN 300 MG/1
300 CAPSULE ORAL 3 TIMES DAILY
Qty: 90 CAPSULE | Refills: 1 | Status: SHIPPED | OUTPATIENT
Start: 2024-07-30 | End: 2024-09-28

## 2024-07-31 PROBLEM — M54.2 NECK PAIN: Status: ACTIVE | Noted: 2024-07-31
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
## 2024-08-07 NOTE — PROGRESS NOTES
Established Patient - Audio Only Telehealth Visit     The patient location is: [place]  The chief complaint leading to consultation is: MRI results  Visit type: Virtual visit with audio only (telephone)  Total time spent with patient: 15min     The reason for the audio only service rather than synchronous audio and video virtual visit was related to technical difficulties or patient preference/necessity.     Each patient to whom I provide medical services by telemedicine is:  (1) informed of the relationship between the physician and patient and the respective role of any other health care provider with respect to management of the patient; and (2) notified that they may decline to receive medical services by telemedicine and may withdraw from such care at any time. Patient verbally consented to receive this service via voice-only telephone call.    DATE: 8/23/2024  PATIENT: Keon Schneider Jr.    Attending Physician: Tip Carrion M.D.    HISTORY:  Keon Schneider Jr. is a 54 y.o. male who returns to me today for MRI results.  He was last seen by me 7/30/2024.  Today he is doing well but notes  left low back pain that spreads up to his mid back.   The Patient denies myelopathic symptoms such as handwriting changes or difficulty with buttons/coins/keys. Denies perineal paresthesias, bowel/bladder dysfunction.    EXAM:  There were no vitals taken for this visit.  Stable.     IMAGING:  Today I personally re- reviewed AP, Lat and Flex/Ex  upright L-spine that demonstrate spondylosis and DDD without fractures or listhesis.       Lumbar MRI shows mild foraminal narrowing from L3-S1.     Thoracic MRI shows Indeterminate marrow signal abnormality within the T5 vertebral body as well as multiple additional foci within the lower thoracic spine. Given association with prominent anterior osteophytes, possibly degenerative. Marrow replacing process such as metastatic disease is possible but felt less likely.     There is no  height or weight on file to calculate BMI.    Hemoglobin A1C   Date Value Ref Range Status   03/28/2023 6.0 (H) 4.0 - 5.6 % Final     Comment:     ADA Screening Guidelines:  5.7-6.4%  Consistent with prediabetes  >or=6.5%  Consistent with diabetes    High levels of fetal hemoglobin interfere with the HbA1C  assay. Heterozygous hemoglobin variants (HbS, HgC, etc)do  not significantly interfere with this assay.   However, presence of multiple variants may affect accuracy.     04/09/2018 5.5 4.0 - 5.6 % Final     Comment:     According to ADA guidelines, hemoglobin A1c <7.0% represents  optimal control in non-pregnant diabetic patients. Different  metrics may apply to specific patient populations.   Standards of Medical Care in Diabetes-2016.  For the purpose of screening for the presence of diabetes:  <5.7%     Consistent with the absence of diabetes  5.7-6.4%  Consistent with increasing risk for diabetes   (prediabetes)  >or=6.5%  Consistent with diabetes  Currently, no consensus exists for use of hemoglobin A1c  for diagnosis of diabetes for children.  This Hemoglobin A1c assay has significant interference with fetal   hemoglobin   (HbF). The results are invalid for patients with abnormal amounts of   HbF,   including those with known Hereditary Persistence   of Fetal Hemoglobin. Heterozygous hemoglobin variants (HbAS, HbAC,   HbAD, HbAE, HbA2) do not significantly interfere with this assay;   however, presence of multiple variants in a sample may impact the %   interference.     03/25/2018 5.4 4.0 - 5.6 % Final     Comment:     According to ADA guidelines, hemoglobin A1c <7.0% represents  optimal control in non-pregnant diabetic patients. Different  metrics may apply to specific patient populations.   Standards of Medical Care in Diabetes-2016.  For the purpose of screening for the presence of diabetes:  <5.7%     Consistent with the absence of diabetes  5.7-6.4%  Consistent with increasing risk for diabetes    (prediabetes)  >or=6.5%  Consistent with diabetes  Currently, no consensus exists for use of hemoglobin A1c  for diagnosis of diabetes for children.  This Hemoglobin A1c assay has significant interference with fetal   hemoglobin   (HbF). The results are invalid for patients with abnormal amounts of   HbF,   including those with known Hereditary Persistence   of Fetal Hemoglobin. Heterozygous hemoglobin variants (HbAS, HbAC,   HbAD, HbAE, HbA2) do not significantly interfere with this assay;   however, presence of multiple variants in a sample may impact the %   interference.           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 Follow up as needed.     This service was not originating from a related E/M service provided within the previous 7 days nor will  to an E/M service or procedure within the next 24 hours or my soonest available appointment.  Prevailing standard of care was able to be met in this audio-only visit.

## 2024-08-09 RX ORDER — ROSUVASTATIN CALCIUM 40 MG/1
40 TABLET, COATED ORAL NIGHTLY
Qty: 90 TABLET | Refills: 3 | OUTPATIENT
Start: 2024-08-09 | End: 2025-08-09

## 2024-08-14 ENCOUNTER — OFFICE VISIT (OUTPATIENT)
Dept: PAIN MEDICINE | Facility: CLINIC | Age: 54
End: 2024-08-14
Payer: MEDICARE

## 2024-08-14 DIAGNOSIS — G89.4 CHRONIC PAIN SYNDROME: ICD-10-CM

## 2024-08-14 PROCEDURE — 99999 PR PBB SHADOW E&M-EST. PATIENT-LVL II: CPT | Mod: PBBFAC,,, | Performed by: ANESTHESIOLOGY

## 2024-08-14 PROCEDURE — 99499 UNLISTED E&M SERVICE: CPT | Mod: S$GLB,,, | Performed by: ANESTHESIOLOGY

## 2024-08-18 ENCOUNTER — HOSPITAL ENCOUNTER (EMERGENCY)
Facility: HOSPITAL | Age: 54
Discharge: HOME | End: 2024-08-18
Attending: EMERGENCY MEDICINE
Payer: MEDICARE

## 2024-08-18 VITALS
RESPIRATION RATE: 16 BRPM | HEART RATE: 89 BPM | DIASTOLIC BLOOD PRESSURE: 82 MMHG | SYSTOLIC BLOOD PRESSURE: 148 MMHG | TEMPERATURE: 99 F | OXYGEN SATURATION: 98 %

## 2024-08-18 DIAGNOSIS — R42 LIGHTHEADED: ICD-10-CM

## 2024-08-18 DIAGNOSIS — M54.9 BACK PAIN, UNSPECIFIED BACK LOCATION, UNSPECIFIED BACK PAIN LATERALITY, UNSPECIFIED CHRONICITY: Primary | ICD-10-CM

## 2024-08-18 LAB
ALBUMIN SERPL BCP-MCNC: 4.2 G/DL (ref 3.5–5.2)
ALP SERPL-CCNC: 74 U/L (ref 55–135)
ALT SERPL W/O P-5'-P-CCNC: 32 U/L (ref 10–44)
AMPHET+METHAMPHET UR QL: NEGATIVE
ANION GAP SERPL CALC-SCNC: 9 MMOL/L (ref 8–16)
AST SERPL-CCNC: 24 U/L (ref 10–40)
BARBITURATES UR QL SCN>200 NG/ML: NEGATIVE
BASOPHILS # BLD AUTO: 0.05 K/UL (ref 0–0.2)
BASOPHILS NFR BLD: 0.7 % (ref 0–1.9)
BENZODIAZ UR QL SCN>200 NG/ML: NEGATIVE
BILIRUB SERPL-MCNC: 0.2 MG/DL (ref 0.1–1)
BILIRUB UR QL STRIP: NEGATIVE
BUN SERPL-MCNC: 20 MG/DL (ref 6–20)
BUN SERPL-MCNC: 21 MG/DL (ref 6–30)
BZE UR QL SCN: NEGATIVE
CALCIUM SERPL-MCNC: 9.6 MG/DL (ref 8.7–10.5)
CANNABINOIDS UR QL SCN: NEGATIVE
CHLORIDE SERPL-SCNC: 104 MMOL/L (ref 95–110)
CHLORIDE SERPL-SCNC: 104 MMOL/L (ref 95–110)
CLARITY UR REFRACT.AUTO: CLEAR
CO2 SERPL-SCNC: 22 MMOL/L (ref 23–29)
COLOR UR AUTO: COLORLESS
CREAT SERPL-MCNC: 0.9 MG/DL (ref 0.5–1.4)
CREAT SERPL-MCNC: 1 MG/DL (ref 0.5–1.4)
CREAT UR-MCNC: 58 MG/DL (ref 23–375)
DIFFERENTIAL METHOD BLD: ABNORMAL
EOSINOPHIL # BLD AUTO: 0.4 K/UL (ref 0–0.5)
EOSINOPHIL NFR BLD: 5.4 % (ref 0–8)
ERYTHROCYTE [DISTWIDTH] IN BLOOD BY AUTOMATED COUNT: 13.2 % (ref 11.5–14.5)
EST. GFR  (NO RACE VARIABLE): >60 ML/MIN/1.73 M^2
ETHANOL SERPL-MCNC: <10 MG/DL
ETHANOL UR-MCNC: <10 MG/DL
GLUCOSE SERPL-MCNC: 127 MG/DL (ref 70–110)
GLUCOSE SERPL-MCNC: 130 MG/DL (ref 70–110)
GLUCOSE UR QL STRIP: NEGATIVE
HCT VFR BLD AUTO: 40.5 % (ref 40–54)
HCT VFR BLD CALC: 42 %PCV (ref 36–54)
HGB BLD-MCNC: 13.8 G/DL (ref 14–18)
HGB UR QL STRIP: NEGATIVE
IMM GRANULOCYTES # BLD AUTO: 0.03 K/UL (ref 0–0.04)
IMM GRANULOCYTES NFR BLD AUTO: 0.4 % (ref 0–0.5)
KETONES UR QL STRIP: NEGATIVE
LEUKOCYTE ESTERASE UR QL STRIP: NEGATIVE
LIPASE SERPL-CCNC: 28 U/L (ref 4–60)
LYMPHOCYTES # BLD AUTO: 1.6 K/UL (ref 1–4.8)
LYMPHOCYTES NFR BLD: 24.3 % (ref 18–48)
MAGNESIUM SERPL-MCNC: 2 MG/DL (ref 1.6–2.6)
MCH RBC QN AUTO: 31.2 PG (ref 27–31)
MCHC RBC AUTO-ENTMCNC: 34.1 G/DL (ref 32–36)
MCV RBC AUTO: 92 FL (ref 82–98)
METHADONE UR QL SCN>300 NG/ML: NEGATIVE
MONOCYTES # BLD AUTO: 0.9 K/UL (ref 0.3–1)
MONOCYTES NFR BLD: 13.5 % (ref 4–15)
NEUTROPHILS # BLD AUTO: 3.7 K/UL (ref 1.8–7.7)
NEUTROPHILS NFR BLD: 55.7 % (ref 38–73)
NITRITE UR QL STRIP: NEGATIVE
NRBC BLD-RTO: 0 /100 WBC
OHS QRS DURATION: 66 MS
OHS QTC CALCULATION: 399 MS
OPIATES UR QL SCN: NEGATIVE
PCP UR QL SCN>25 NG/ML: NEGATIVE
PH UR STRIP: 6 [PH] (ref 5–8)
PLATELET # BLD AUTO: 268 K/UL (ref 150–450)
PMV BLD AUTO: 9 FL (ref 9.2–12.9)
POC IONIZED CALCIUM: 1.23 MMOL/L (ref 1.06–1.42)
POC TCO2 (MEASURED): 22 MMOL/L (ref 23–29)
POTASSIUM BLD-SCNC: 4.4 MMOL/L (ref 3.5–5.1)
POTASSIUM SERPL-SCNC: 4.4 MMOL/L (ref 3.5–5.1)
PROT SERPL-MCNC: 7.5 G/DL (ref 6–8.4)
PROT UR QL STRIP: NEGATIVE
RBC # BLD AUTO: 4.42 M/UL (ref 4.6–6.2)
SAMPLE: ABNORMAL
SODIUM BLD-SCNC: 137 MMOL/L (ref 136–145)
SODIUM SERPL-SCNC: 135 MMOL/L (ref 136–145)
SP GR UR STRIP: 1.01 (ref 1–1.03)
TOXICOLOGY INFORMATION: NORMAL
TROPONIN I SERPL DL<=0.01 NG/ML-MCNC: <0.006 NG/ML (ref 0–0.03)
TSH SERPL DL<=0.005 MIU/L-ACNC: 1.73 UIU/ML (ref 0.4–4)
URN SPEC COLLECT METH UR: ABNORMAL
WBC # BLD AUTO: 6.72 K/UL (ref 3.9–12.7)

## 2024-08-18 PROCEDURE — 82077 ASSAY SPEC XCP UR&BREATH IA: CPT | Performed by: EMERGENCY MEDICINE

## 2024-08-18 PROCEDURE — 84443 ASSAY THYROID STIM HORMONE: CPT | Performed by: EMERGENCY MEDICINE

## 2024-08-18 PROCEDURE — 80307 DRUG TEST PRSMV CHEM ANLYZR: CPT | Performed by: EMERGENCY MEDICINE

## 2024-08-18 PROCEDURE — 80047 BASIC METABLC PNL IONIZED CA: CPT

## 2024-08-18 PROCEDURE — 93005 ELECTROCARDIOGRAM TRACING: CPT

## 2024-08-18 PROCEDURE — 93010 ELECTROCARDIOGRAM REPORT: CPT | Mod: ,,, | Performed by: INTERNAL MEDICINE

## 2024-08-18 PROCEDURE — 83735 ASSAY OF MAGNESIUM: CPT | Performed by: EMERGENCY MEDICINE

## 2024-08-18 PROCEDURE — 83690 ASSAY OF LIPASE: CPT | Performed by: EMERGENCY MEDICINE

## 2024-08-18 PROCEDURE — 80053 COMPREHEN METABOLIC PANEL: CPT | Performed by: EMERGENCY MEDICINE

## 2024-08-18 PROCEDURE — 63600175 PHARM REV CODE 636 W HCPCS: Performed by: EMERGENCY MEDICINE

## 2024-08-18 PROCEDURE — 96361 HYDRATE IV INFUSION ADD-ON: CPT

## 2024-08-18 PROCEDURE — 96374 THER/PROPH/DIAG INJ IV PUSH: CPT

## 2024-08-18 PROCEDURE — 99285 EMERGENCY DEPT VISIT HI MDM: CPT | Mod: 25

## 2024-08-18 PROCEDURE — 85025 COMPLETE CBC W/AUTO DIFF WBC: CPT | Performed by: EMERGENCY MEDICINE

## 2024-08-18 PROCEDURE — 82308 ASSAY OF CALCITONIN: CPT

## 2024-08-18 PROCEDURE — 25000003 PHARM REV CODE 250

## 2024-08-18 PROCEDURE — 25000003 PHARM REV CODE 250: Performed by: EMERGENCY MEDICINE

## 2024-08-18 PROCEDURE — 81003 URINALYSIS AUTO W/O SCOPE: CPT | Mod: 59 | Performed by: EMERGENCY MEDICINE

## 2024-08-18 PROCEDURE — 84484 ASSAY OF TROPONIN QUANT: CPT | Performed by: EMERGENCY MEDICINE

## 2024-08-18 RX ORDER — METHOCARBAMOL 500 MG/1
500 TABLET, FILM COATED ORAL
Status: COMPLETED | OUTPATIENT
Start: 2024-08-18 | End: 2024-08-18

## 2024-08-18 RX ORDER — LORAZEPAM 2 MG/ML
1 INJECTION INTRAMUSCULAR
Status: COMPLETED | OUTPATIENT
Start: 2024-08-18 | End: 2024-08-18

## 2024-08-18 RX ORDER — ACETAMINOPHEN 500 MG
1000 TABLET ORAL
Status: COMPLETED | OUTPATIENT
Start: 2024-08-18 | End: 2024-08-18

## 2024-08-18 RX ORDER — LIDOCAINE 50 MG/G
1 PATCH TOPICAL
Status: DISCONTINUED | OUTPATIENT
Start: 2024-08-18 | End: 2024-08-18 | Stop reason: HOSPADM

## 2024-08-18 RX ADMIN — METHOCARBAMOL 500 MG: 500 TABLET ORAL at 05:08

## 2024-08-18 RX ADMIN — ACETAMINOPHEN 1000 MG: 500 TABLET ORAL at 04:08

## 2024-08-18 RX ADMIN — SODIUM CHLORIDE, POTASSIUM CHLORIDE, SODIUM LACTATE AND CALCIUM CHLORIDE 500 ML: 600; 310; 30; 20 INJECTION, SOLUTION INTRAVENOUS at 04:08

## 2024-08-18 RX ADMIN — LORAZEPAM 1 MG: 2 INJECTION INTRAMUSCULAR; INTRAVENOUS at 04:08

## 2024-08-18 RX ADMIN — LIDOCAINE 5% 1 PATCH: 700 PATCH TOPICAL at 05:08

## 2024-08-18 NOTE — DISCHARGE INSTRUCTIONS
You are being discharged from the emergency department at this time.  Please keep your appointment with cardiology and neurology to follow-up on your chronic health conditions.  Take Tylenol for back pain this time.

## 2024-08-18 NOTE — ED PROVIDER NOTES
Encounter Date: 8/18/2024       History     Chief Complaint   Patient presents with    Multiple Complaints      Dizziness + intermittent blurry vision, unsteadiness, headache that radiates down to neck tx 4 hours.  No droop or drift noted, no unilateral weakness      Patient is a 54-year-old male today presents with neck pain.  Past medical history of anxiety, chronic pain, hyperlipidemia, and drug usage.  Reports today he had a presyncopal episode in his bathroom where his knees buckled underneath him, did not pass out, did not hit his head.  Has this ongoing neck pain that travels up his head, becomes pressure around his head, gets dizzy, sweaty, and lightheaded and feels like he is going to pass out.  Also endorses chronic pain in his lower back, and right flank.  Endorses these symptoms have been going on for around 3 weeks now, with similar presentations.  Claims he is going to be following up with multiple specialties including both Neurology and Cardiology.        Review of patient's allergies indicates:   Allergen Reactions    Atorvastatin Nausea And Vomiting    Toradol [ketorolac] Hives and Nausea And Vomiting    Ibuprofen Other (See Comments)     States GI BLEED     Past Medical History:   Diagnosis Date    Anxiety     Benzodiazepine dependence    Anxiety disorder, unspecified     Arthritis     Asthma     Avascular necrosis     Carpal tunnel syndrome     Chronic pain     Depression     Diverticulitis     Gout     Mixed hyperlipidemia     Other injury of other sites of trunk 12/28/2011    Pneumonia     Spondylolisthesis     lumbar; myofascial pain    Staph infection     Ulnar neuropathy     left and rt arm     Past Surgical History:   Procedure Laterality Date    COLONOSCOPY N/A 7/6/2020    Procedure: COLONOSCOPY;  Surgeon: Broderick Moise MD;  Location: [facility];  Service: Endoscopy;  Laterality: N/A;    HIP SURGERY  3/06; 6/06    hip arthroplasty    HIP SURGERY      bilateral hip replacement (titanium)     JOINT REPLACEMENT      OPEN REDUCTION AND INTERNAL FIXATION (ORIF) OF FRACTURE OF CLAVICLE Left 10/5/2023    Procedure: ORIF, FRACTURE, CLAVICLE;  Surgeon: Ozzy Howard MD;  Location: [facility] OR [address] [place] [address];  Service: Orthopedics;  Laterality: Left;    SHOULDER SURGERY  2012    right shoulder    SHOULDER SURGERY       Family History   Problem Relation Name Age of Onset    Cancer Mother      Cancer Sister       Social History     Tobacco Use    Smoking status: Former     Current packs/day: 0.00     Average packs/day: 1 pack/day for 10.0 years (10.0 ttl pk-yrs)     Types: Cigarettes     Start date: 10/13/2002     Quit date: 10/13/2012     Years since quittin.8     Passive exposure: Past    Smokeless tobacco: Never   Substance Use Topics    Alcohol use: Yes     Alcohol/week: 12.0 standard drinks of alcohol     Types: 12 Cans of beer per week     Comment: 2x/week    Drug use: No     Review of Systems    Physical Exam     Initial Vitals [24 0225]   BP Pulse Resp Temp SpO2   (!) 152/98 93 20 98.9 °F (37.2 °C) 97 %      MAP       --         Physical Exam    Nursing note and vitals reviewed.  Constitutional: He appears well-developed and well-nourished.   HENT:   Head: Normocephalic and atraumatic.   Eyes: EOM are normal. Pupils are equal, round, and reactive to light.   Neck: Neck supple.   Normal range of motion.  Cardiovascular:  Normal rate, regular rhythm and normal heart sounds.     Exam reveals no friction rub.       No murmur heard.  Pulmonary/Chest: Breath sounds normal. No respiratory distress. He has no wheezes. He has no rales.   Abdominal: Abdomen is soft. Bowel sounds are normal. He exhibits no distension. There is no abdominal tenderness. There is no rebound.   Musculoskeletal:      Cervical back: Normal range of motion and neck supple.      Comments: +C-spine tenderness     Neurological: He is alert. He has normal strength. No cranial nerve deficit or sensory deficit.   Skin: Skin is warm  and dry. No rash noted. No erythema.   Psychiatric: He has a normal mood and affect.         ED Course   Procedures  Labs Reviewed   CBC W/ AUTO DIFFERENTIAL - Abnormal       Result Value    WBC 6.72      RBC 4.42 (*)     Hemoglobin 13.8 (*)     Hematocrit 40.5      MCV 92      MCH 31.2 (*)     MCHC 34.1      RDW 13.2      Platelets 268      MPV 9.0 (*)     Immature Granulocytes 0.4      Gran # (ANC) 3.7      Immature Grans (Abs) 0.03      Lymph # 1.6      Mono # 0.9      Eos # 0.4      Baso # 0.05      nRBC 0      Gran % 55.7      Lymph % 24.3      Mono % 13.5      Eosinophil % 5.4      Basophil % 0.7      Differential Method Automated     COMPREHENSIVE METABOLIC PANEL - Abnormal    Sodium 135 (*)     Potassium 4.4      Chloride 104      CO2 22 (*)     Glucose 127 (*)     BUN 20      Creatinine 0.9      Calcium 9.6      Total Protein 7.5      Albumin 4.2      Total Bilirubin 0.2      Alkaline Phosphatase 74      AST 24      ALT 32      eGFR >60.0      Anion Gap 9     URINALYSIS, REFLEX TO URINE CULTURE - Abnormal    Specimen UA Urine, Clean Catch      Color, UA Colorless (*)     Appearance, UA Clear      pH, UA 6.0      Specific Gravity, UA 1.015      Protein, UA Negative      Glucose, UA Negative      Ketones, UA Negative      Bilirubin (UA) Negative      Occult Blood UA Negative      Nitrite, UA Negative      Leukocytes, UA Negative      Narrative:     Specimen Source->Urine   ISTAT PROCEDURE - Abnormal    POC Glucose 130 (*)     POC BUN 21      POC Creatinine 1.0      POC Sodium 137      POC Potassium 4.4      POC Chloride 104      POC TCO2 (MEASURED) 22 (*)     POC Ionized Calcium 1.23      POC Hematocrit 42      Sample CHRISTINA     LIPASE    Lipase 28     TROPONIN I    Troponin I <0.006     MAGNESIUM    Magnesium 2.0     ALCOHOL,MEDICAL (ETHANOL)    Alcohol, Serum <10     TOXICOLOGY SCREEN, URINE, RANDOM (COMPLIANCE)    Alcohol, Urine <10      Benzodiazepines Negative      Methadone metabolites Negative       Cocaine (Metab.) Negative      Opiate Scrn, Ur Negative      Barbiturate Screen, Ur Negative      Amphetamine Screen, Ur Negative      THC Negative      Phencyclidine Negative      Creatinine, Urine 58.0      Toxicology Information SEE COMMENT      Narrative:     Specimen Source->Urine   TSH    TSH 1.726     ISTAT CHEM8          Imaging Results              X-Ray Chest AP Portable (Final result)  Result time 08/18/24 04:39:20      Final result by Josué Musa MD (08/18/24 04:39:20)                   Impression:      No acute cardiopulmonary findings.    Electronically signed by resident: Nicolás Poole  Date:    08/18/2024  Time:    04:18    Electronically signed by: Josué Musa MD  Date:    08/18/2024  Time:    04:39               Narrative:    EXAMINATION:  XR CHEST AP PORTABLE    CLINICAL HISTORY:  Dizziness and giddiness.    TECHNIQUE:  Single frontal view of the chest was performed.    COMPARISON:  Radiographs 07/18/2024 and 06/16/2024.    FINDINGS:  Mediastinal structures are midline. Cardiomediastinal silhouette is unchanged.    Lung volumes are symmetric. Questionable subcentimeter nodule overlying the lateral left lung base, likely artifactual as no nodule was identified on prior CT abdomen dated 06/03/2024 or prior chest radiograph dated 07/18/2024. No focal consolidation.  No pleural effusion. No pneumothorax.    Soft tissues are within normal limits.  Postoperative change about the left clavicle.                                    X-Rays:   Independently Interpreted Readings:   Chest X-Ray: No cardiopulmonary findings     Medications   LIDOcaine 5 % patch 1 patch (1 patch Transdermal Patch Applied 8/18/24 0540)   lactated ringers bolus 500 mL (0 mLs Intravenous Stopped 8/18/24 0522)   LORazepam injection 1 mg (1 mg Intravenous Given 8/18/24 7149)   acetaminophen tablet 1,000 mg (1,000 mg Oral Given 8/18/24 0452)   methocarbamoL tablet 500 mg (500 mg Oral Given 8/18/24 0540)     Medical Decision  Making  Patient is a 54-year-old male presents today with neck pain, dizziness, and intermittent blurry vision.  He has had multiple workups in the ED similar to this before. Is getting an outpatient workup by both Cardiology and Neurology neurology.  We will get a basic workup and an alcohol level at this time.  High suspicion for possible drug use at this time.  Patient is sweaty and has fast speech at this time.  We will give 500 cc and lorazepam at this time.  And see how patient responds, etiology could be due to anxiety. I reassessed the patient and he is much calmer, still complaining of pain in the middle of his back.  Labs normal at this time, if all results normal patient can be discharged.    Amount and/or Complexity of Data Reviewed  Labs: ordered. Decision-making details documented in ED Course.  Radiology: ordered.    Risk  OTC drugs.  Prescription drug management.               ED Course as of 08/18/24 0554   Sun Aug 18, 2024   0553 Toxicology screen, urine [SS]      ED Course User Index  [SS] Guevara Lake MD                           Clinical Impression:  Final diagnoses:  [R42] Lightheaded                 Guevara Lake MD  Resident  08/18/24 0544       Guevara Lake MD  Resident  08/18/24 0545       Guevara Lake MD  Resident  08/18/24 0601

## 2024-08-18 NOTE — ED NOTES
I-STAT Chem-8+ Results:   Value Reference Range   Sodium 137 136-145 mmol/L   Potassium  4.4 3.5-5.1 mmol/L   Chloride 104  mmol/L   Ionized Calcium 1.23 1.06-1.42 mmol/L   CO2 (measured) 22 23-29 mmol/L   Glucose 130  mg/dL   BUN 21 6-30 mg/dL   Creatinine 1.0 0.5-1.4 mg/dL   Hematocrit 42 36-54%

## 2024-08-22 ENCOUNTER — HOSPITAL ENCOUNTER (OUTPATIENT)
Dept: RADIOLOGY | Facility: HOSPITAL | Age: 54
Discharge: HOME | End: 2024-08-22
Attending: REGISTERED NURSE
Payer: MEDICARE

## 2024-08-22 DIAGNOSIS — M54.9 DORSALGIA, UNSPECIFIED: ICD-10-CM

## 2024-08-22 DIAGNOSIS — M51.36 DDD (DEGENERATIVE DISC DISEASE), LUMBAR: ICD-10-CM

## 2024-08-22 DIAGNOSIS — M54.6 PAIN IN THORACIC SPINE: ICD-10-CM

## 2024-08-22 PROCEDURE — 72146 MRI CHEST SPINE W/O DYE: CPT | Mod: 26,,, | Performed by: RADIOLOGY

## 2024-08-22 PROCEDURE — 72146 MRI CHEST SPINE W/O DYE: CPT | Mod: TC

## 2024-08-22 PROCEDURE — 72148 MRI LUMBAR SPINE W/O DYE: CPT | Mod: 26,,, | Performed by: RADIOLOGY

## 2024-08-22 PROCEDURE — 72110 X-RAY EXAM L-2 SPINE 4/>VWS: CPT | Mod: TC

## 2024-08-22 PROCEDURE — 72110 X-RAY EXAM L-2 SPINE 4/>VWS: CPT | Mod: 26,,, | Performed by: RADIOLOGY

## 2024-08-22 PROCEDURE — 72148 MRI LUMBAR SPINE W/O DYE: CPT | Mod: TC

## 2024-08-23 ENCOUNTER — OFFICE VISIT (OUTPATIENT)
Dept: ORTHOPEDICS | Facility: CLINIC | Age: 54
End: 2024-08-23
Payer: MEDICARE

## 2024-08-23 ENCOUNTER — OFFICE VISIT (OUTPATIENT)
Dept: CARDIOLOGY | Facility: CLINIC | Age: 54
End: 2024-08-23
Payer: MEDICARE

## 2024-08-23 VITALS
WEIGHT: 243.19 LBS | DIASTOLIC BLOOD PRESSURE: 87 MMHG | BODY MASS INDEX: 34.05 KG/M2 | SYSTOLIC BLOOD PRESSURE: 137 MMHG | HEIGHT: 71 IN | HEART RATE: 75 BPM | OXYGEN SATURATION: 95 %

## 2024-08-23 DIAGNOSIS — I10 PRIMARY HYPERTENSION: ICD-10-CM

## 2024-08-23 DIAGNOSIS — S24.112A COMPLETE LESION AT T5 LEVEL OF THORACIC SPINAL CORD: Primary | ICD-10-CM

## 2024-08-23 DIAGNOSIS — R42 DIZZINESS: ICD-10-CM

## 2024-08-23 DIAGNOSIS — I25.118 ATHEROSCLEROSIS OF NATIVE CORONARY ARTERY OF NATIVE HEART WITH OTHER FORM OF ANGINA PECTORIS: Primary | ICD-10-CM

## 2024-08-23 DIAGNOSIS — E78.5 DYSLIPIDEMIA: ICD-10-CM

## 2024-08-23 DIAGNOSIS — E66.09 CLASS 1 OBESITY DUE TO EXCESS CALORIES WITH SERIOUS COMORBIDITY AND BODY MASS INDEX (BMI) OF 32.0 TO 32.9 IN ADULT: ICD-10-CM

## 2024-08-23 PROCEDURE — 99999 PR PBB SHADOW E&M-EST. PATIENT-LVL IV: CPT | Mod: PBBFAC,,, | Performed by: INTERNAL MEDICINE

## 2024-08-23 RX ORDER — AMLODIPINE BESYLATE 5 MG/1
5 TABLET ORAL DAILY
Qty: 90 TABLET | Refills: 3 | Status: SHIPPED | OUTPATIENT
Start: 2024-08-23 | End: 2025-08-23

## 2024-08-23 RX ORDER — EZETIMIBE 10 MG/1
10 TABLET ORAL DAILY
Qty: 90 TABLET | Refills: 3 | Status: SHIPPED | OUTPATIENT
Start: 2024-08-23 | End: 2025-08-23

## 2024-08-23 RX ORDER — ASPIRIN 81 MG/1
81 TABLET ORAL DAILY
Start: 2024-08-23

## 2024-08-23 RX ORDER — NITROGLYCERIN 0.4 MG/1
0.4 TABLET SUBLINGUAL EVERY 5 MIN PRN
Qty: 30 TABLET | Refills: 1 | Status: SHIPPED | OUTPATIENT
Start: 2024-08-23 | End: 2025-08-23

## 2024-08-23 NOTE — ASSESSMENT & PLAN NOTE
Readings have been high today's blood pressure 137/87.  I advise a trial of amlodipine.  Side effects of leg swelling discussed.

## 2024-08-23 NOTE — PROGRESS NOTES
[facility]     Subjective:    Patient ID:  Keon Schneider Jr. is a 54 y.o. male who presents for evaluation of Coronary Artery Disease, Chest Pain, Hyperlipidemia, Hypertension, and Dizziness    Review of patient's allergies indicates:   Allergen Reactions    Atorvastatin Nausea And Vomiting    Toradol [ketorolac] Hives and Nausea And Vomiting    Ibuprofen Other (See Comments)     States GI BLEED     A workup was carried out 1 year ago with stress echo.  He obtain 79% peak heart rate for age.  There were no wall motion abnormalities, anginal symptoms or Electrocardiogram changes.  He is on Crestor 40, pretreatment .  Most recent LDL 70.6.  He no longer smokes cigarettes.  He is retired.  He is active with fishing and household chores.  He states he gets angina once a week.  He had an episode in June after his car broke down in [place].  He had to walk on the highway.  He is out of nitroglycerin currently.    He states he does not check his blood pressures at home.  He is currently on no medication.  He does take aspirin a day, history of allergy to Motrin.  His family doctor may be retiring soon and he thinks he will be establishing locally at this facility.    His Electrocardiogram August 14, 2024 was normal.  He went to the ER for back pain.  He has chronic orthopedic issues.    He states he gets dizzy when he bends over and stands up.  He has never had syncope.  It is an intermittent problem.  He is on Xanax 2 times daily.  He states he drinks enough fluids throughout the day.  He is in the sun a lot.  He states that the symptoms are just as bad in the winter months as in the summer months.  He states that he drinks a lot of beer.  There is reported history of illicit drug usage previously.         Review of Systems   Constitutional: Negative for chills, decreased appetite, diaphoresis, fever, malaise/fatigue, night  sweats, weight gain and weight loss.   HENT:  Negative for congestion, ear discharge, ear pain, hearing loss, hoarse voice, nosebleeds, odynophagia, sore throat, stridor and tinnitus.    Eyes:  Negative for blurred vision, discharge, double vision, pain, photophobia, redness, vision loss in left eye, vision loss in right eye, visual disturbance and visual halos.   Cardiovascular:  Positive for chest pain. Negative for claudication, cyanosis, dyspnea on exertion, irregular heartbeat, leg swelling, near-syncope, orthopnea, palpitations, paroxysmal nocturnal dyspnea and syncope.   Respiratory:  Negative for cough, hemoptysis, shortness of breath, sleep disturbances due to breathing, snoring, sputum production and wheezing.    Endocrine: Negative for cold intolerance, heat intolerance, polydipsia, polyphagia and polyuria.   Hematologic/Lymphatic: Negative for adenopathy and bleeding problem. Does not bruise/bleed easily.   Skin:  Negative for color change, dry skin, flushing, itching, nail changes, poor wound healing, rash, skin cancer, suspicious lesions and unusual hair distribution.   Musculoskeletal:  Negative for arthritis, back pain, falls, gout, joint pain, joint swelling, muscle cramps, muscle weakness, myalgias, neck pain and stiffness.   Gastrointestinal:  Negative for bloating, abdominal pain, anorexia, change in bowel habit, bowel incontinence, constipation, diarrhea, dysphagia, excessive appetite, flatus, heartburn, hematemesis, hematochezia, hemorrhoids, jaundice, melena, nausea and vomiting.   Genitourinary:  Negative for bladder incontinence, decreased libido, dysuria, flank pain, frequency, genital sores, hematuria, hesitancy, incomplete emptying, nocturia and urgency.   Neurological:  Positive for dizziness. Negative for aphonia, brief paralysis, difficulty with concentration, disturbances in coordination, excessive daytime sleepiness, focal weakness, headaches, light-headedness, loss of balance,  "numbness, paresthesias, seizures, sensory change, tremors, vertigo and weakness.   Psychiatric/Behavioral:  Negative for altered mental status, depression, hallucinations, memory loss, substance abuse, suicidal ideas and thoughts of violence. The patient is nervous/anxious. The patient does not have insomnia.    Allergic/Immunologic: Negative for hives and persistent infections.        Objective:       Vitals:    08/23/24 0845   BP: 137/87   Pulse: 75   SpO2: 95%   Weight: 110.3 kg (243 lb 2.7 oz)   Height: 5' 11" (1.803 m)    Physical Exam  Constitutional:       General: He is not in acute distress.     Appearance: He is well-developed. He is not diaphoretic.   HENT:      Head: Normocephalic and atraumatic.      Nose: Nose normal.   Eyes:      General: No scleral icterus.        Right eye: No discharge.      Conjunctiva/sclera: Conjunctivae normal.      Pupils: Pupils are equal, round, and reactive to light.   Neck:      Thyroid: No thyromegaly.      Vascular: No JVD.      Trachea: No tracheal deviation.   Cardiovascular:      Rate and Rhythm: Normal rate and regular rhythm.      Pulses:           Carotid pulses are 2+ on the right side and 2+ on the left side.       Radial pulses are 2+ on the right side and 2+ on the left side.        Dorsalis pedis pulses are 2+ on the right side and 2+ on the left side.        Posterior tibial pulses are 2+ on the right side and 2+ on the left side.      Heart sounds: Normal heart sounds. No murmur heard.     No friction rub. No gallop.   Pulmonary:      Effort: Pulmonary effort is normal. No respiratory distress.      Breath sounds: Normal breath sounds. No stridor. No wheezing or rales.   Chest:      Chest wall: No tenderness.   Abdominal:      General: Bowel sounds are normal. There is no distension.      Palpations: Abdomen is soft. There is no mass.      Tenderness: There is no abdominal tenderness. There is no guarding or rebound.   Musculoskeletal:         General: No " tenderness. Normal range of motion.      Cervical back: Normal range of motion and neck supple.   Lymphadenopathy:      Cervical: No cervical adenopathy.   Skin:     General: Skin is warm and dry.      Coloration: Skin is not pale.      Findings: No erythema or rash.   Neurological:      Mental Status: He is alert and oriented to person, place, and time.      Cranial Nerves: No cranial nerve deficit.      Coordination: Coordination normal.   Psychiatric:         Behavior: Behavior normal.         Thought Content: Thought content normal.         Judgment: Judgment normal.           Assessment:       1. Atherosclerosis of native coronary artery of native heart with other form of angina pectoris    2. Dyslipidemia    3. Primary hypertension    4. Class 1 obesity due to excess calories with serious comorbidity and body mass index (BMI) of 32.0 to 32.9 in adult    5. Dizziness      Results for orders placed or performed during the hospital encounter of 08/18/24   CBC W/ AUTO DIFFERENTIAL   Result Value Ref Range    WBC 6.72 3.90 - 12.70 K/uL    RBC 4.42 (L) 4.60 - 6.20 M/uL    Hemoglobin 13.8 (L) 14.0 - 18.0 g/dL    Hematocrit 40.5 40.0 - 54.0 %    MCV 92 82 - 98 fL    MCH 31.2 (H) 27.0 - 31.0 pg    MCHC 34.1 32.0 - 36.0 g/dL    RDW 13.2 11.5 - 14.5 %    Platelets 268 150 - 450 K/uL    MPV 9.0 (L) 9.2 - 12.9 fL    Immature Granulocytes 0.4 0.0 - 0.5 %    Gran # (ANC) 3.7 1.8 - 7.7 K/uL    Immature Grans (Abs) 0.03 0.00 - 0.04 K/uL    Lymph # 1.6 1.0 - 4.8 K/uL    Mono # 0.9 0.3 - 1.0 K/uL    Eos # 0.4 0.0 - 0.5 K/uL    Baso # 0.05 0.00 - 0.20 K/uL    nRBC 0 0 /100 WBC    Gran % 55.7 38.0 - 73.0 %    Lymph % 24.3 18.0 - 48.0 %    Mono % 13.5 4.0 - 15.0 %    Eosinophil % 5.4 0.0 - 8.0 %    Basophil % 0.7 0.0 - 1.9 %    Differential Method Automated    Comp. Metabolic Panel   Result Value Ref Range    Sodium 135 (L) 136 - 145 mmol/L    Potassium 4.4 3.5 - 5.1 mmol/L    Chloride 104 95 - 110 mmol/L    CO2 22 (L) 23 - 29  mmol/L    Glucose 127 (H) 70 - 110 mg/dL    BUN 20 6 - 20 mg/dL    Creatinine 0.9 0.5 - 1.4 mg/dL    Calcium 9.6 8.7 - 10.5 mg/dL    Total Protein 7.5 6.0 - 8.4 g/dL    Albumin 4.2 3.5 - 5.2 g/dL    Total Bilirubin 0.2 0.1 - 1.0 mg/dL    Alkaline Phosphatase 74 55 - 135 U/L    AST 24 10 - 40 U/L    ALT 32 10 - 44 U/L    eGFR >60.0 >60 mL/min/1.73 m^2    Anion Gap 9 8 - 16 mmol/L   Lipase   Result Value Ref Range    Lipase 28 4 - 60 U/L   Urinalysis, Reflex to Urine Culture Urine, Clean Catch    Specimen: Urine   Result Value Ref Range    Specimen UA Urine, Clean Catch     Color, UA Colorless (A) Yellow, Straw, Sharron    Appearance, UA Clear Clear    pH, UA 6.0 5.0 - 8.0    Specific Gravity, UA 1.015 1.005 - 1.030    Protein, UA Negative Negative    Glucose, UA Negative Negative    Ketones, UA Negative Negative    Bilirubin (UA) Negative Negative    Occult Blood UA Negative Negative    Nitrite, UA Negative Negative    Leukocytes, UA Negative Negative   Troponin I   Result Value Ref Range    Troponin I <0.006 0.000 - 0.026 ng/mL   Magnesium   Result Value Ref Range    Magnesium 2.0 1.6 - 2.6 mg/dL   Ethanol   Result Value Ref Range    Alcohol, Serum <10 <10 mg/dL   Toxicology screen, urine   Result Value Ref Range    Alcohol, Urine <10 <10 mg/dL    Benzodiazepines Negative Negative    Methadone metabolites Negative Negative    Cocaine (Metab.) Negative Negative    Opiate Scrn, Ur Negative Negative    Barbiturate Screen, Ur Negative Negative    Amphetamine Screen, Ur Negative Negative    THC Negative Negative    Phencyclidine Negative Negative    Creatinine, Urine 58.0 23.0 - 375.0 mg/dL    Toxicology Information SEE COMMENT    TSH   Result Value Ref Range    TSH 1.726 0.400 - 4.000 uIU/mL   EKG 12-lead   Result Value Ref Range    QRS Duration 66 ms    [facility]HS QTC Calculation 399 ms   ISTAT PROCEDURE   Result Value Ref Range    POC Glucose 130 (H) 70 - 110 mg/dL    POC BUN 21 6 - 30 mg/dL    POC Creatinine 1.0 0.5 - 1.4  mg/dL    POC Sodium 137 136 - 145 mmol/L    POC Potassium 4.4 3.5 - 5.1 mmol/L    POC Chloride 104 95 - 110 mmol/L    POC TCO2 (MEASURED) 22 (L) 23 - 29 mmol/L    POC Ionized Calcium 1.23 1.06 - 1.42 mmol/L    POC Hematocrit 42 36 - 54 %PCV    Sample CHRISTINA          Current Outpatient Medications:     allopurinoL (ZYLOPRIM) 100 MG tablet, Take 100 mg by mouth., Disp: , Rfl:     ALPRAZolam (XANAX) 2 MG Tab, Take 2 mg by mouth 2 (two) times a day., Disp: , Rfl:     amLODIPine (NORVASC) 5 MG tablet, Take 1 tablet (5 mg total) by mouth once daily., Disp: 90 tablet, Rfl: 3    aspirin (ECOTRIN) 81 MG EC tablet, Take 1 tablet (81 mg total) by mouth once daily., Disp: , Rfl:     ezetimibe (ZETIA) 10 mg tablet, Take 1 tablet (10 mg total) by mouth once daily., Disp: 90 tablet, Rfl: 3    famotidine (PEPCID) 40 MG tablet, Take 40 mg by mouth once daily., Disp: , Rfl:     nitroGLYCERIN (NITROSTAT) 0.4 MG SL tablet, Place 1 tablet (0.4 mg total) under the tongue every 5 (five) minutes as needed for Chest pain., Disp: 30 tablet, Rfl: 1    rosuvastatin (CRESTOR) 40 MG Tab, Take 1 tablet (40 mg total) by mouth every evening., Disp: 90 tablet, Rfl: 3     Lab Results   Component Value Date    WBC 6.72 08/18/2024    RBC 4.42 (L) 08/18/2024    HGB 13.8 (L) 08/18/2024    HCT 42 08/18/2024    MCV 92 08/18/2024    MCH 31.2 (H) 08/18/2024    MCHC 34.1 08/18/2024    RDW 13.2 08/18/2024     08/18/2024    MPV 9.0 (L) 08/18/2024    GRAN 3.7 08/18/2024    GRAN 55.7 08/18/2024    LYMPH 1.6 08/18/2024    LYMPH 24.3 08/18/2024    MONO 0.9 08/18/2024    MONO 13.5 08/18/2024    EOS 0.4 08/18/2024    BASO 0.05 08/18/2024    EOSINOPHIL 5.4 08/18/2024    BASOPHIL 0.7 08/18/2024    MG 2.0 08/18/2024        CMP  Lab Results   Component Value Date     (L) 08/18/2024    K 4.4 08/18/2024     08/18/2024    CO2 22 (L) 08/18/2024     (H) 08/18/2024    BUN 20 08/18/2024    CREATININE 0.9 08/18/2024    CALCIUM 9.6 08/18/2024    PROT 7.5  08/18/2024    ALBUMIN 4.2 08/18/2024    BILITOT 0.2 08/18/2024    ALKPHOS 74 08/18/2024    AST 24 08/18/2024    ALT 32 08/18/2024    ANIONGAP 9 08/18/2024    ESTGFRAFRICA >60 06/03/2022    EGFRNONAA >60 06/03/2022        Lab Results   Component Value Date    LABBLOO No growth after 5 days. 03/24/2018    LABURIN 500 ORGANISMS/ML -STAPHYLOCOCCUS SPECIES 05/03/2005            Results for orders placed or performed during the hospital encounter of 08/18/24   EKG 12-lead    Collection Time: 08/18/24  3:16 AM   Result Value Ref Range    QRS Duration 66 ms    OHS QTC Calculation 399 ms    Narrative    Test Reason : R42,    Vent. Rate : 086 BPM     Atrial Rate : 086 BPM     P-R Int : 144 ms          QRS Dur : 066 ms      QT Int : 334 ms       P-R-T Axes : 066 036 040 degrees     QTc Int : 399 ms    Normal sinus rhythm  Normal ECG  When compared with ECG of 16-JUN-2024 02:34,  No significant change was found  Confirmed by DEE ALY MD ([id number]) on 8/18/2024 9:51:25 AM    Referred By: [email or web address]   SELF           Confirmed By:DEE ALY MD        X-Ray Lumbar Spine Ap Lateral w/Flex Ext 08/22/2024 [id number] Final   MRI Lumbar Spine Without Contrast 08/22/2024 [id number] Final   MRI Thoracic Spine Without Contrast 08/22/2024 1970 Final            Plan:       Problem List Items Addressed This Visit          Cardiac/Vascular    Atherosclerosis of native coronary artery of native heart with other form of angina pectoris - Primary     Coronary artery disease confirmed confirmed with anginal symptoms reported once per week which resolves with rest.    Most recent Electrocardiogram August 14, 2024 normal.  It was not ordered for chest pain.    Stress test May 20, 2023-stress echo confirmed normal ejection fraction at rest without stress-induced ischemia or symptoms of chest pain.           Relevant Medications    ezetimibe (ZETIA) 10 mg tablet    nitroGLYCERIN (NITROSTAT) 0.4 MG SL tablet    Dyslipidemia     Current LDL 70.6.   Zetia added to Crestor 40 mg.  His pretreatment LDL was 205 mg% in the past.  Benefits of Zetia discussed.  Told him it was lifelong treatment.         Relevant Medications    ezetimibe (ZETIA) 10 mg tablet    Primary hypertension     Readings have been high today's blood pressure 137/87.  I advise a trial of amlodipine.  Side effects of leg swelling discussed.           Relevant Medications    amLODIPine (NORVASC) 5 MG tablet       Endocrine    Class 1 obesity due to excess calories with serious comorbidity and body mass index (BMI) of 32.0 to 32.9 in adult     Weight loss encouraged.              Other    Dizziness     Low suspicion for arrhythmias.  It generally happens in the heat.  It is related to orthostasis.  He is advised to stay hydrated.  I am hopeful amlodipine will be tolerated.               Amlodipine recommended for blood pressure control and angina control.  He is taking aspirin.  His LDL 70 mg% on Crestor for but I would like to see it less than 55, Zetia 10 mg added.    I asked him to call me if dizziness worsens after starting amlodipine.  Leg swelling discussed.  Nitroglycerin refilled.    I considered a repeat stress test but given his excellent exercise tolerance with mild angina I do not think a stress test would lead to angiography or intervention at this time based on his exercise tolerance.    Thank you for allowing me to participate in your patient's care.              Sonido Adler MD  08/23/2024   9:10 AM

## 2024-08-23 NOTE — ASSESSMENT & PLAN NOTE
Coronary artery disease confirmed confirmed with anginal symptoms reported once per week which resolves with rest.    Most recent Electrocardiogram August 14, 2024 normal.  It was not ordered for chest pain.    Stress test May 20, 2023-stress echo confirmed normal ejection fraction at rest without stress-induced ischemia or symptoms of chest pain.

## 2024-08-23 NOTE — ASSESSMENT & PLAN NOTE
Low suspicion for arrhythmias.  It generally happens in the heat.  It is related to orthostasis.  He is advised to stay hydrated.  I am hopeful amlodipine will be tolerated.

## 2024-08-23 NOTE — ASSESSMENT & PLAN NOTE
Current LDL 70.6.  Zetia added to Crestor 40 mg.  His pretreatment LDL was 205 mg% in the past.  Benefits of Zetia discussed.  Told him it was lifelong treatment.

## 2024-08-26 ENCOUNTER — TELEPHONE (OUTPATIENT)
Dept: HEMATOLOGY/ONCOLOGY | Facility: CLINIC | Age: 54
End: 2024-08-26
Payer: MEDICARE

## 2024-08-26 NOTE — TELEPHONE ENCOUNTER
Patient notified of the scheduled appointment with Dr. Grewal for 0905/24.       ----- Message from Corinne E Coniglio, RN sent at 8/23/2024 12:18 PM CDT -----    ----- Message -----  From: BELEN Escoto NP  Sent: 8/23/2024  12:17 PM CDT  To: Kathryn Suarez RN; [facility]    Hey Team, can you please get this patient scheduled with your providers for a T5 lesion work up?    Thanks,  Shila

## 2024-08-28 ENCOUNTER — TELEPHONE (OUTPATIENT)
Dept: NEUROLOGY | Facility: CLINIC | Age: 54
End: 2024-08-28
Payer: MEDICARE

## 2024-08-28 ENCOUNTER — TELEPHONE (OUTPATIENT)
Dept: HEMATOLOGY/ONCOLOGY | Facility: CLINIC | Age: 54
End: 2024-08-28
Payer: MEDICARE

## 2024-08-28 NOTE — TELEPHONE ENCOUNTER
Spoke to Pt about his appt on September 11. The appt notes originally was for a closed fracture of the clavicle. I informed the Pt that Judi is a headache provider. Pt stated he does suffer from headaches along with dizziness. Pt stated the clavicle issue was from last year.

## 2024-08-28 NOTE — TELEPHONE ENCOUNTER
This patient stopped by clinic and asked for me to come to the front. When I went out, he complained of terrible back pain which sometimes makes his legs numb/paralyzed. I explained to him that since Dr. Grewal hasn't seen him yet, we can't prescribe anything for him. Advised him to go to the ED. He said they don't do anything for him except tell him to go to the orthopedist. I apologized and told him I would see if we could get him in sooner.

## 2024-08-29 DIAGNOSIS — M54.14 THORACIC RADICULOPATHY: Primary | ICD-10-CM

## 2024-08-29 NOTE — TELEPHONE ENCOUNTER
Called patient but no answer so lvm to call back.  I will go ahead and schedule appt with Mj per Dr Carrasco for back pain and if it doesn't work for him reschedule when he calls back.

## 2024-09-03 ENCOUNTER — PATIENT MESSAGE (OUTPATIENT)
Dept: PAIN MEDICINE | Facility: CLINIC | Age: 54
End: 2024-09-03
Payer: MEDICARE

## 2024-09-04 ENCOUNTER — OFFICE VISIT (OUTPATIENT)
Dept: PAIN MEDICINE | Facility: CLINIC | Age: 54
End: 2024-09-04
Payer: MEDICARE

## 2024-09-04 VITALS — BODY MASS INDEX: 34.01 KG/M2 | WEIGHT: 243.81 LBS

## 2024-09-04 DIAGNOSIS — G89.29 CHRONIC BILATERAL LOW BACK PAIN WITHOUT SCIATICA: ICD-10-CM

## 2024-09-04 DIAGNOSIS — G89.29 CHRONIC BILATERAL THORACIC BACK PAIN: ICD-10-CM

## 2024-09-04 DIAGNOSIS — M54.50 CHRONIC BILATERAL LOW BACK PAIN WITHOUT SCIATICA: ICD-10-CM

## 2024-09-04 DIAGNOSIS — M54.6 CHRONIC BILATERAL THORACIC BACK PAIN: ICD-10-CM

## 2024-09-04 DIAGNOSIS — G89.4 CHRONIC PAIN SYNDROME: Primary | ICD-10-CM

## 2024-09-04 PROCEDURE — 1160F RVW MEDS BY RX/DR IN RCRD: CPT | Mod: CPTII,S$GLB,, | Performed by: ANESTHESIOLOGY

## 2024-09-04 PROCEDURE — 99999 PR PBB SHADOW E&M-EST. PATIENT-LVL III: CPT | Mod: PBBFAC,,, | Performed by: ANESTHESIOLOGY

## 2024-09-04 PROCEDURE — 1159F MED LIST DOCD IN RCRD: CPT | Mod: CPTII,S$GLB,, | Performed by: ANESTHESIOLOGY

## 2024-09-04 PROCEDURE — 99214 OFFICE O/P EST MOD 30 MIN: CPT | Mod: S$GLB,,, | Performed by: ANESTHESIOLOGY

## 2024-09-04 PROCEDURE — 3008F BODY MASS INDEX DOCD: CPT | Mod: CPTII,S$GLB,, | Performed by: ANESTHESIOLOGY

## 2024-09-04 RX ORDER — OXYCODONE AND ACETAMINOPHEN 5; 325 MG/1; MG/1
1 TABLET ORAL EVERY 6 HOURS PRN
COMMUNITY
Start: 2024-08-29 | End: 2024-09-06 | Stop reason: ALTCHOICE

## 2024-09-04 NOTE — PROGRESS NOTES
New patient evaluation  [facility]    Keon Schneider Jr.  : 1970  Date: 2024     CHIEF COMPLAINT:  No chief complaint on file.    Referring Physician: No ref. provider found  Primary Care Physician: Floridalma Carrasco MD    HPI:  This is a 54 y.o. male with a chief complaint of No chief complaint on file.  . The patient has Past medical history/Past surgical history of left mid shaft clavicle open reduction internal fixation , bilateral hip OA status post total hip arthroplasty, chronic back pain, bipolar, history of cocaine abuse disorder, hypertension, hyperlipidemia    Interval History 2024:  Patient was evaluated and referred by PCP  He was previously seen by orthopedic provider for back pain, he had  recommendation to start Robaxin, gabapentin in addition to obtaining MRI lumbar and thoracic spine.  MRI thoracic spine showed hyperintense lesion within the T5 vertebral body in addition to multiple heterogenicity spots from T6 to T12, he has a recommendation to follow up with heme Onc.  He was previously seen by our pain management Department  for hip pain and lumbar spondylosis    Current pain score: ***/10    Diabetic: {GAYes/No/NA:32591}    {Anticogualtion drugs:50819}    Allergy To Iodine: {GAYes/No/NA:54521}    Currently on Antibiotic: {GAYes/No/NA:72748}    Current Description of Pain Symptoms:    History of Recent Fall or Trauma: {GAYes/No/NA:28316}   Onset: Chronic, started ***  Pain Location: ***  Radiates/associated symptoms: ***.   Pain is Getting worse over the last *** months    The pain is described as {Desc; pain character:54592}.   Exacerbating factors: {Causes; Pain:12521}.   Mitigating factors ***.   Symptoms interfere with daily activity, sleeping, and ***.   The patient feels like symptoms have been {IUW:46796}.   Patient {Denies / Reports:41821} {RED FLAGS:42481}.    Pain score:   Current: {PAIN 0-10:47904}/10  Best: {PAIN  0-10:72016}/10  Worst: {PAIN 0-10:49695}/10    Current pain medications:    Current Narcotics/Opioid /benzo Medications:  Opioids- {GAopioid:36086}  Benzodiazepines: Yes    UDS:  NA    PDMP:  Reviewed and consistent with medication use as prescribed.     Previous Chronic Pain Treatment History:  Physical Therapy/HEP/Physician Lead Exercise Program:  Over the past 12 months, Patient has done  *** sessions.  PT response: {PT response:02616} Helpful.   Dates of the PT sessions: ***, ***.  Is patient participating in home exercise program (HEP)/ physician led exercise program: {GAYes/No/NA:43462}.    Non-interventional Pain Therapy:  []Chiropractor.   []Acupuncture/Dry needle.  []TENS unit.  []Heat/ICE.  []Back Brace.    Medications previously tried:  NSAIDs: GI bleeding   Topical Agent: {GAYes/No/NA:14040}  TCA/SSRI/SNRI: {GATCA/SSRI/SNRI:12577}  Anti-convulsants: {GAAnticonvulsants:22470}  Muscle Relaxants: {GAmuscle Relaxant:43333}  Opioids- {GAopioid:90454}.    Interventional Pain Procedures:  ***    Previous spine/Relevant joint surgery:  Surgical History:   has a past surgical history that includes Shoulder surgery (2012); Shoulder surgery; Joint replacement; Hip surgery (3/06; 6/06); Hip surgery; Colonoscopy (N/A, 7/6/2020); and Open reduction and internal fixation (ORIF) of fracture of clavicle (Left, 10/5/2023).  Medical History:   has a past medical history of Anxiety, Anxiety disorder, unspecified, Arthritis, Asthma, Avascular necrosis, Carpal tunnel syndrome, Chronic pain, Depression, Diverticulitis, Gout, Mixed hyperlipidemia, Other injury of other sites of trunk (12/28/2011), Pneumonia, Primary hypertension (8/23/2024), Spondylolisthesis, Staph infection, and Ulnar neuropathy.  Family History:  family history includes Cancer in his mother and sister.  Allergies:  Atorvastatin, Toradol [ketorolac], and Ibuprofen   Social History/SUBSTANCE ABUSE HISTORY:  Personal history of substance abuse: No   reports  that he quit smoking about 11 years ago. His smoking use included cigarettes. He started smoking about 21 years ago. He has a 10 pack-year smoking history. He has been exposed to tobacco smoke. He has never used smokeless tobacco. He reports current alcohol use of about 12.0 standard drinks of alcohol per week. He reports that he does not use drugs.  LABS:  CBC  Lab Results   Component Value Date    WBC 6.72 08/18/2024    HGB 13.8 (L) 08/18/2024    HCT 42 08/18/2024     Coagulation Profile   Lab Results   Component Value Date     08/18/2024       Lab Results   Component Value Date    INR 0.9 01/18/2016     CMP:  BMP  Lab Results   Component Value Date     (L) 08/18/2024    K 4.4 08/18/2024     08/18/2024    CO2 22 (L) 08/18/2024    BUN 20 08/18/2024    CREATININE 0.9 08/18/2024    CALCIUM 9.6 08/18/2024    ANIONGAP 9 08/18/2024    EGFRNORACEVR >60.0 08/18/2024     Lab Results   Component Value Date    ALT 32 08/18/2024    AST 24 08/18/2024    ALKPHOS 74 08/18/2024    BILITOT 0.2 08/18/2024     HGBA1C:  Lab Results   Component Value Date    HGBA1C 6.0 (H) 03/28/2023       ROS:    Review of Systems   GENERAL:  No weight loss, malaise or fevers.  HEENT:   No recent changes in vision or hearing  NECK:  Negative for lumps, no difficulty with swallowing.  RESPIRATORY:  Negative for cough, wheezing or shortness of breath, patient denies any recent URI.  CARDIOVASCULAR:  Negative for chest pain or palpitations.  GI:  Negative for abdominal discomfort, blood in stools or black stools or change in bowel habits.  MUSCULOSKELETAL:  See HPI.  SKIN:  Negative for lesions, rash, and itching.  PSYCH:  No mood disorder or recent psychosocial stressors.   HEMATOLOGY/LYMPHOLOGY:  See the blood thinner sectioned in HPI.  NEURO:  See HPI  All other reviewed and negative other than HPI.    PHYSICAL EXAM:  VITALS: There were no vitals taken for this visit.  There is no height or weight on file to calculate  BMI.  GENERAL: Well appearing, in no acute distress, alert and oriented x3, answers questions appropriately.   PSYCH: Flat affect.  SKIN: Skin color, texture, turgor normal, no rashes or lesions.  HEAD/FACE:  Normocephalic, atraumatic. Cranial nerves grossly intact.  CV: Regular rate  PULM: No evidence of respiratory difficulty, symmetric chest rise.  GI:  Soft and non-Distended.  NECK: (***) pain to palpation over the cervical paraspinous muscles. Spurling: ***. (***) pain with neck flexion, extension, or lateral flexion, Muscle strength in RT UE ***/5 and Left UE ***/5, Hand  5/5 bilaterally   BACK/SIJ/HIP:  Lumbar Spine Exam:       Inspection: No erythema, bruising.       Palpation: (***) TTP of lumbar paraspinals bilaterally      ROM:  Limited in flexion, extension, lateral bending.       (***) Facet loading bilaterally      (***) Straight Leg Raise bilaterally      (***) NIXON bilaterally, Tenderness over the PSIS, Yeoman test  Hip Exam:      Inspection: No gross deformity or apparent leg length discrepancy      Palpation:  No TTP to bilateral greater trochanteric bursas.       ROM:  No limitation or pain in internal rotation, external rotation b/l  Neurologic Exam:     Alert. Speech is fluent and appropriate.     Strength: ***/5 in *** hip flexion and knee extension     Sensation:  Grossly intact to light touch in bilateral lower extremities     Tone: No abnormality appreciated in bilateral lower extremities  Knee exam:  No gross deformity or apparent leg length discrepancy, positive tenderness over the anteromedial aspect of the knee cap, positive limitation due to pain in flexion and extension, sensation caudal grossly intact to light touch in bilateral lower extremity, no atrophy or tone abnormalities    GAIT: ***    DIAGNOSTIC STUDIES AND MEDICAL RECORDS REVIEW:  I have personally reviewed and interpreted relevant radiology reports and reviewed relevant records from other services in the EMR.   MRI  lumbar spine and thoracic spine August, 2024  Alignment: Thoracic and lumbar alignment are maintained.     Vertebrae: There is a T1 hypointense, T2/STIR hyperintense lesion within the T5 vertebral body anteriorly.  No corresponding abnormalities seen on prior CTs or on prior MRI dated 09/09/2014.  Additional areas of marrow signal heterogeneity noted within the T6, T7, T10, T11, and T12 vertebral bodies anteriorly.  Of note, endplate degeneration and prominent anterior osteophyte production seen at all of these levels.  No fracture.     Discs: Multilevel mild disc height loss.  No evidence for discitis.     Cord: Normal morphology and signal.  Conus terminates at L1 and appears unremarkable.  Cauda equina is unremarkable.     Degenerative findings:     T1-T12: Several small disc bulges noted in the upper thoracic spine.  No spinal canal stenosis or neural foraminal narrowing.     T12-L1: No spinal canal stenosis or neural foraminal narrowing.     L1-L2: Mild facet arthropathy.  No spinal canal stenosis or neural foraminal narrowing.     L2-L3: Circumferential disc bulge and mild facet arthropathy.  No spinal canal stenosis or neural foraminal narrowing.     L3-L4: Circumferential disc bulge and mild facet arthropathy result in mild left neural foraminal narrowing.     L4-L5: Circumferential disc bulge and mild facet arthropathy result in mild bilateral neural foraminal narrowing.     L5-S1: Circumferential disc bulge and mild facet arthropathy result in mild bilateral neural foraminal narrowing.     Paraspinal muscles & soft tissues: Unremarkable.     Impression:     1. Indeterminate marrow signal abnormality within the T5 vertebral body as well as multiple additional foci within the lower thoracic spine.  Given association with prominent anterior osteophytes, possibly degenerative.  Marrow replacing process such as metastatic disease is possible but felt less likely.  Recommend repeat examination with intravenous  "contrast in 3 months.  2. Multilevel degenerative changes detailed above.  Mild neural foraminal narrowing noted at L3-S1.    Clinical Impression:  This is a pleasant 54 y.o. male patient with PMH/PSH of ***, presenting with***.     We discussed the underlying diagnoses and multiple treatment options including non-opioid medications, interventional procedures, physical therapy, home exercise, core muscle enhancement, and weight loss.  The risks and benefits of each treatment option were discussed and all questions were answered.      Encounter Diagnosis:  Keon Schneider Jr. is a 54 y.o. male with the following diagnoses based on history, exam, and imaging:  There are no diagnoses linked to this encounter.   ---------------------------------------------------------------------      Treatment Plan:    Diagnostics/Referrals: {gaimage:29163}    Medications:    NSAIDs: {GANSIAD:36959}  Topical Agent: {GAYes/No/NA:60442}  TCA/SSRI/SNRI: {GATCA/SSRI/SNRI:88174}  Anti-convulsants: {GAAnticonvulsants:98641}  Muscle Relaxants: {GAmuscle Relaxant:99438}  Opioids: {GAopioid:22089::"None"}  Patient was educated about the risk and benefit of chronic opioid therapy including dependency, addiction, diversion, and opioid hyperalgesia.  Interventional Therapy: {GAProcedure:20870}.  Sedation: {GAsedation:18924}.   {Anticogualtion drugs:60579}-Clearance to stop Blood thinner:***     Regarding the above interventions, the patient has been educated regarding the risks (including bleeding, infection, increased pain, nerve damage, or allergic reaction), benefits, and alternatives. The patient states he understands and is eager to proceed.    Physical Rehabilitation: {GAPT:08226}    Patient Education: Counseled patient regarding the importance of {:15311}, I have stressed the importance of physical activity and a home exercise plan to help with pain and improve health.    Follow-up: RTC ***.    May consider:       Yanna Schmitt, " MD  Anesthesiologist  Interventional Pain Medicine  09/04/2024    Disclaimer:  This note was prepared using voice recognition system and is likely to have sound alike errors that may have been overlooked even after proof reading.  Please call me with any questions.

## 2024-09-04 NOTE — PROGRESS NOTES
[facility] - NEW PATIENT VISIT    Reason for visit: Lesions of vertebral bodies    Workup:    6/3/24: CT C/A/P (abdominal pain)  Colonic diverticulosis      7/18/24: Orthopedic Surgery, follow up for closed displaced fracture of shaft of left clavicle, referral to physical therapy for neck pain      7/18/24: XR T Spine  Degenerative change throughout visualized spine, no acute fracture, dislocation, or bony erosion      8/22/24: MRI T/L Spine  Indeterminate marrow signal at T5 vertebral body  Areas of marrow signal heterogeneity in T6, T7, T10, T11, T12  Multilevel degenerative changes      8/23/24: Orthopedic surgery, MRI w/ lesion at T5, scheduled w/ heme/onc.      9/4/24: Pain Medicine Visit, may consider Lyrica, continue Percocet, may consider trigger point injection versus lumbar medial branch block, recommend physical rehabilitation.      Multiple ED Visits:   - 6/3/24:  Abdominal pain, nausea, vomiting, dizziness, concern for diverticulosis.  Given Augmentin and Norco  - 6/4/24:  Back pain and headache.  Symptoms of multiple months.  Plan for orthopedic evaluation.  - 6/16/24:  Dizziness and chest pressure.  Very elevated blood alcohol level.  Troponins negative, EKG unremarkable.  Patient left without informing anybody.  - 7/18/24: Chest pain, right-sided back pain radiating to lateral chest.  Appears to be radiculopathy, worse position and tilt.  Steroid, Tylenol, and muscle relaxer as patient cannot take NSAIDs.  - 8/13/24:  Back pain, not improving with Tylenol.  Plan for Decadron injection, short course of Flexeril, pain management scheduled for next day.  - 8/18/24:  Lightheadedness, weakness after standing.  Chronic neck and low back pain, for which outpatient MRIs scheduled.  Discharged with plan for follow up with the pain management, neurologist, and cardiologist.  - 8/24/24:  Back pain and foot pain.  Swelling at right great toe.  Plan  "for prednisone taper.    HPI:     Keon Schneider Jr. is a 54 y.o. male with pmh significant for bioplar disorder, history of polysubstance use, HTN, HLD, BPH, h/o diverticulitis, GERD, hepatic steatosis, chronic back pain, b/l hip replacements, and multiple orthopedic surgeries who presents for evaluation of vertebral body lesions.     Pt describes developing a new kind of back pain (as opposed to his chronic back pain) around 3 months ago. It started in the middle of his back and radiated around his R side. It is described as "stabbing, sharp" and he says "when I stand up, it feels like my spine is being compressed". He describes a sensation of dizziness when twisting in a certain direction. He says that he has been spending an increasing amount of time in the bed because of the pain. He says that the only time he has been leaving his house for the past 3 weeks is to come to doctors' offices. He has 21 steps to get into his house which is difficult, and his truck is lifted and he has a hard time getting into this as well.     He describes "dizzy spells" throughout the past 3 months, and says that he has felt close to passing out.     He says that his appetite is good some days, and other days it is not. His weight is relatively stable.     He says he has been having night sweats for about 4 months. He says that he has thrown 2 pillows in the past week, and says that he is drenching his bed sheets. He denies fevers or chills.     He has a hard time determining if his energy levels feel lower, given his back pain.     He describes lesions over his scalp, which started ~6 months ago. He says that he was originally to have seen a dermatologist, however did not ultimately. He says that the lesions build up until they pop, with clear fluid leaking afterwards.     Living Situation: He lives in [place] by himself. He has 21 steps to get into his house.     Tobacco Use: He smoked from age 18 until age 40, " "intermittently. He smoked 1 pack a day at his heaviest. He denies other substance use at this time.     EtOH Use: He says that he has not been drinking since his symptoms starting, saying that he doesn't have the taste for it.     Employment / Exposure History: He is currently on disability. Prior to that, he worked in oil fields as a . He says "you won't believe the chemicals we touched". He used to clean oil tankers.     Family Cancer History: His mother had pancreatic cancer, his sister had "something in the spine", another sister had lung cancer (non-smoker), his father had prostate cancer, his paternal grand father had lung cancer (smoker), and he says all 13 maternal aunts had cancer ("all different ones").     History has been obtained by chart review and discussion with the patient.    ROS:   As per HPI.       Physical Exam:       There were no vitals taken for this visit.               Physical Exam      Labs:   No results found for this or any previous visit (from the past 48 hour(s)).     Imaging:    See oncologic history above.     Path:  See oncologic history above.      Assessment and Plan:     Keon Schneider Jr. is a 54 y.o. male with pmh significant for bioplar disorder, history of polysubstance use, HTN, HLD, BPH, h/o diverticulitis, GERD, hepatic steatosis, chronic back pain, b/l hip replacements, and multiple orthopedic surgeries who presents for evaluation of vertebral body lesions.     Thoracic Vertebral Body Lesions  Patient presents today to establish care.  Workup as above, noting in particular areas of marrow signal heterogeneity in T5, T6, T7, T10, T11, and T12 vertebral bodies of unclear etiology.  Based on distribution, concern for possible multiple myeloma versus metastatic cancer vs other (infection less likely though does report night sweats).  Will workup for myeloma as below, if lab results are consistent with this diagnosis will proceed with remainder of myeloma workup " including bone marrow biopsy.  If results are inconsistent with the diagnosis, plan for PET-CT for further evaluation of osseous lesions as well as to surveil for disease elsewhere and likely biopsy pending those results.    PLAN:   - Myeloma workup:    - CRAB (CBC, CMP)  - SPEP w/ immunofixation   - Serum free light chains (preserve UPEP)   - Quantitative immunoglobulins   - B2 Microglobuin   - LDH  - Further workup pending the above, if consistent w/ MM then proceed w/ BMBx, otherwise likely PET CT followed by targeted biopsy pending results      Scalp Lesions  See HPI for description and media tab her images.  Unclear if related to above process.  -- Dermatology referral in place      Back Pain  Acute on chronic back pain, patient noting that recent back pain is of different character than his chronic issues.  Patient evaluated by pain management, recommended possible injections and physical rehabilitation which patient declines.  Recommended continuing with Percocet.  Of note, patient has the charted history of polysubstance use.   -- Message sent to Dr. Carrasco (PCP) and Dr. Schmitt (pain management), as pt does not have confirmed diagnosis of cancer at this time      The above information has been reviewed with the patient and all questions have been answered to their apparent satisfaction.  They understand that they can call the clinic with any questions.    Lincoln Grewal MD  Hematology/Oncology  [facility]      Route Chart for Scheduling    Med Onc Chart Routing      Follow up with physician . Pending test results   Follow up with JAYLON    Infusion scheduling note    Injection scheduling note    Labs    Imaging    Pharmacy appointment    Other referrals                      Disclaimer: This note was prepared using voice recognition system and is likely to have sound alike errors.  Please call me with any questions.

## 2024-09-04 NOTE — PROGRESS NOTES
EST patient evaluation  [facility]    Keon Schneider Jr.  : 1970  Date: 2024     CHIEF COMPLAINT:  Back Pain    Referring Physician: No ref. provider found  Primary Care Physician: Floridalma Carrasco MD    HPI:  This is a 54 y.o. male with a chief complaint of Back Pain  . The patient has Past medical history/Past surgical history of left mid shaft clavicle open reduction internal fixation , bilateral hip OA status post total hip arthroplasty, chronic back pain, bipolar, Previous history of cocaine abuse disorder, hypertension, hyperlipidemia    Interval History 2024:  Patient was evaluated and referred by PCP  He was previously seen by orthopedic provider for back pain, he had  recommendation to start Robaxin, gabapentin in addition to obtaining MRI lumbar and thoracic spine.  MRI thoracic spine showed hyperintense lesion within the T5 vertebral body in addition to multiple heterogenicity spots from T6 to T12, he has a recommendation to follow up with heme Onc.  He was previously seen by our pain management Department  for hip pain and lumbar spondylosis  Current pain score: 10/10    Diabetic: No    Anticogualtion drugs: None    Allergy To Iodine: No    Currently on Antibiotic: No    Current Description of Pain Symptoms:    History of Recent Fall or Trauma: No   Onset: Chronic, started 2 years    Pain Location: upper back   Radiates/associated symptoms: wraps around his chest, no LEs symptoms  Pain is Getting worse over the last 6 months    The pain is described as crushing, sharp, and constant .   Exacerbating factors: constant .   Mitigating factors nothing .   Symptoms interfere with daily activity, sleeping.   The patient feels like symptoms have been worsening.   Patient denies night fever/night sweats, urinary incontinence, bowel incontinence, significant weight loss, significant motor weakness, and loss of sensations.    Pain score:   Current:  10/10  Best: 10/10  Worst: 10/10    Current pain medications:  Percocet  Current Narcotics/Opioid /benzo Medications:  Opioids- Oxycodone with Acetaminophen (Percocet) 5-325  Benzodiazepines: Yes    UDS:  NA    PDMP:  Reviewed and consistent with medication use as prescribed.     Previous Chronic Pain Treatment History:  Physical Therapy/HEP/Physician Lead Exercise Program:  Over the past 12 months, Patient has done 0 sessions.  PT response: NA  Dates of the PT sessions: NA  Is patient participating in home exercise program (HEP)/ physician led exercise program: Yes.    Non-interventional Pain Therapy:  []Chiropractor.   []Acupuncture/Dry needle.  []TENS unit.  [x]Heat/ICE.  [x]Back Brace.    Medications previously tried:  NSAIDs: GI bleeding   Topical Agent: Yes  TCA/SSRI/SNRI: None  Anti-convulsants: Gabapentin   Muscle Relaxants: Flexeril (Cyclobenzaprine)  Opioids- Oxycodone with Acetaminophen (Percocet), Hydrocodone with Acetaminophen (Norco), and Tramadol (Ultram).    Interventional Pain Procedures:  Prior injections - Patient reported they did not help him 2000s    Previous spine/Relevant joint surgery:  Bilateral hip replacmenets   Rotor Cuff RT Side   Collar bone LT Side  Surgical History:   has a past surgical history that includes Shoulder surgery (2012); Shoulder surgery; Joint replacement; Hip surgery (3/06; 6/06); Hip surgery; Colonoscopy (N/A, 7/6/2020); and Open reduction and internal fixation (ORIF) of fracture of clavicle (Left, 10/5/2023).  Medical History:   has a past medical history of Anxiety, Anxiety disorder, unspecified, Arthritis, Asthma, Avascular necrosis, Carpal tunnel syndrome, Chronic pain, Depression, Diverticulitis, Gout, Mixed hyperlipidemia, Other injury of other sites of trunk (12/28/2011), Pneumonia, Primary hypertension (8/23/2024), Spondylolisthesis, Staph infection, and Ulnar neuropathy.  Family History:  family history includes Cancer in his mother and  sister.  Allergies:  Atorvastatin, Toradol [ketorolac], and Ibuprofen   Social History/SUBSTANCE ABUSE HISTORY:  Personal history of substance abuse: No   reports that he quit smoking about 11 years ago. His smoking use included cigarettes. He started smoking about 21 years ago. He has a 10 pack-year smoking history. He has been exposed to tobacco smoke. He has never used smokeless tobacco. He reports current alcohol use of about 12.0 standard drinks of alcohol per week. He reports that he does not use drugs.  LABS:  CBC  Lab Results   Component Value Date    WBC 6.72 08/18/2024    HGB 13.8 (L) 08/18/2024    HCT 42 08/18/2024     Coagulation Profile   Lab Results   Component Value Date     08/18/2024       Lab Results   Component Value Date    INR 0.9 01/18/2016     CMP:  BMP  Lab Results   Component Value Date     (L) 08/18/2024    K 4.4 08/18/2024     08/18/2024    CO2 22 (L) 08/18/2024    BUN 20 08/18/2024    CREATININE 0.9 08/18/2024    CALCIUM 9.6 08/18/2024    ANIONGAP 9 08/18/2024    EGFRNORACEVR >60.0 08/18/2024     Lab Results   Component Value Date    ALT 32 08/18/2024    AST 24 08/18/2024    ALKPHOS 74 08/18/2024    BILITOT 0.2 08/18/2024     HGBA1C:  Lab Results   Component Value Date    HGBA1C 6.0 (H) 03/28/2023       ROS:    Review of Systems   GENERAL:  No weight loss, malaise or fevers.  HEENT:   No recent changes in vision or hearing  NECK:  Negative for lumps, no difficulty with swallowing.  RESPIRATORY:  Negative for cough, wheezing or shortness of breath, patient denies any recent URI.  CARDIOVASCULAR:  Negative for chest pain or palpitations.  GI:  Negative for abdominal discomfort, blood in stools or black stools or change in bowel habits.  MUSCULOSKELETAL:  See HPI.  SKIN:  Negative for lesions, rash, and itching.  PSYCH:  No mood disorder or recent psychosocial stressors.   HEMATOLOGY/LYMPHOLOGY:  See the blood thinner sectioned in HPI.  NEURO:  See HPI  All other reviewed  and negative other than HPI.    PHYSICAL EXAM:  VITALS: Wt 110.6 kg (243 lb 13.3 oz)   BMI 34.01 kg/m²   Body mass index is 34.01 kg/m².  GENERAL: Well appearing, in no acute distress, alert and oriented x3, answers questions appropriately.   PSYCH: Flat affect.  SKIN: Skin color, texture, turgor normal, no rashes or lesions.  HEAD/FACE:  Normocephalic, atraumatic. Cranial nerves grossly intact.  CV: Regular rate  PULM: No evidence of respiratory difficulty, symmetric chest rise.  GI:  Soft and non-Distended.    BACK/SIJ/HIP:  Lumbar Spine Exam:       Inspection: No erythema, bruising.       Palpation: (+) TTP of lumbar paraspinals bilaterally      ROM:  Limited in flexion, extension, lateral bending.       (+) Facet loading bilaterally      (na) Straight Leg Raise bilaterally      (na) NIXON bilaterally, Tenderness over the PSIS, Yeoman test  Hip Exam:      Inspection: No gross deformity or apparent leg length discrepancy      Palpation:  No TTP to bilateral greater trochanteric bursas.       ROM:  No limitation or pain in internal rotation, external rotation b/l  Neurologic Exam:     Alert. Speech is fluent and appropriate.     Strength: 4/5 in BL hip flexion and knee extension     Sensation:  Grossly intact to light touch in bilateral lower extremities     Tone: No abnormality appreciated in bilateral lower extremities    GAIT:  Antalgic    DIAGNOSTIC STUDIES AND MEDICAL RECORDS REVIEW:  I have personally reviewed and interpreted relevant radiology reports and reviewed relevant records from other services in the EMR.   MRI lumbar spine and thoracic spine August, 2024  Alignment: Thoracic and lumbar alignment are maintained.     Vertebrae: There is a T1 hypointense, T2/STIR hyperintense lesion within the T5 vertebral body anteriorly.  No corresponding abnormalities seen on prior CTs or on prior MRI dated 09/09/2014.  Additional areas of marrow signal heterogeneity noted within the T6, T7, T10, T11, and T12  vertebral bodies anteriorly.  Of note, endplate degeneration and prominent anterior osteophyte production seen at all of these levels.  No fracture.     Discs: Multilevel mild disc height loss.  No evidence for discitis.     Cord: Normal morphology and signal.  Conus terminates at L1 and appears unremarkable.  Cauda equina is unremarkable.     Degenerative findings:     T1-T12: Several small disc bulges noted in the upper thoracic spine.  No spinal canal stenosis or neural foraminal narrowing.     T12-L1: No spinal canal stenosis or neural foraminal narrowing.     L1-L2: Mild facet arthropathy.  No spinal canal stenosis or neural foraminal narrowing.     L2-L3: Circumferential disc bulge and mild facet arthropathy.  No spinal canal stenosis or neural foraminal narrowing.     L3-L4: Circumferential disc bulge and mild facet arthropathy result in mild left neural foraminal narrowing.     L4-L5: Circumferential disc bulge and mild facet arthropathy result in mild bilateral neural foraminal narrowing.     L5-S1: Circumferential disc bulge and mild facet arthropathy result in mild bilateral neural foraminal narrowing.     Paraspinal muscles & soft tissues: Unremarkable.     Impression:     1. Indeterminate marrow signal abnormality within the T5 vertebral body as well as multiple additional foci within the lower thoracic spine.  Given association with prominent anterior osteophytes, possibly degenerative.  Marrow replacing process such as metastatic disease is possible but felt less likely.  Recommend repeat examination with intravenous contrast in 3 months.  2. Multilevel degenerative changes detailed above.  Mild neural foraminal narrowing noted at L3-S1.    Clinical Impression:  This is a pleasant 54 y.o. male patient with PMH/PSH of left mid shaft clavicle open reduction internal fixation 2023, bilateral hip OA status post total hip arthroplasty, chronic back pain, bipolar, Previous history of cocaine abuse disorder,  hypertension, hyperlipidemia, presenting with upper back pain associated with tenderness over the lumbar paraspinal muscle, MRI thoracic spine showed bone marrow signal at T5 in addition to multiple foci from T6 to T12, he was referred by Neurosurgery to heme Onc for further investigation,    Encounter Diagnosis:  Keon Schneider Jr. is a 54 y.o. male with the following diagnoses based on history, exam, and imagin. Chronic pain syndrome    2. Chronic bilateral thoracic back pain   --------------------------------------------------------  Treatment Plan:    Diagnostics/Referrals:  Continue with hemo/onc and NS    Medications:    NSAIDs: OTC  Topical Agent: No  TCA/SSRI/SNRI: None  Anti-convulsants:  Failed gabapentin before, may consider Lyrica  Muscle Relaxants: None  Opioids:  Continue Percocet  Interventional Therapy:  May consider trigger point injection versus lumbar medial branch block    Regarding the above interventions, the patient has been educated regarding the risks (including bleeding, infection, increased pain, nerve damage, or allergic reaction), benefits, and alternatives. The patient states he understands and is eager to proceed.    Physical Rehabilitation: Declined    Patient Education: Counseled patient regarding the importance of activity modification, I have stressed the importance of physical activity and a home exercise plan to help with pain and improve health.    Follow-up: RTC as needed.    May consider: TPI versus lumbar medial branch block    Yanna Schmitt MD  Anesthesiologist  Interventional Pain Medicine  2024    Disclaimer:  This note was prepared using voice recognition system and is likely to have sound alike errors that may have been overlooked even after proof reading.  Please call me with any questions.

## 2024-09-05 ENCOUNTER — OFFICE VISIT (OUTPATIENT)
Dept: HEMATOLOGY/ONCOLOGY | Facility: CLINIC | Age: 54
End: 2024-09-05
Payer: MEDICARE

## 2024-09-05 ENCOUNTER — HOSPITAL ENCOUNTER (EMERGENCY)
Facility: HOSPITAL | Age: 54
Discharge: HOME | End: 2024-09-06
Attending: STUDENT IN AN ORGANIZED HEALTH CARE EDUCATION/TRAINING PROGRAM
Payer: MEDICARE

## 2024-09-05 VITALS
RESPIRATION RATE: 16 BRPM | HEIGHT: 71 IN | OXYGEN SATURATION: 99 % | TEMPERATURE: 98 F | HEART RATE: 83 BPM | WEIGHT: 247.56 LBS | DIASTOLIC BLOOD PRESSURE: 91 MMHG | BODY MASS INDEX: 34.66 KG/M2 | SYSTOLIC BLOOD PRESSURE: 146 MMHG

## 2024-09-05 DIAGNOSIS — R10.31 RIGHT LOWER QUADRANT ABDOMINAL PAIN: Primary | ICD-10-CM

## 2024-09-05 DIAGNOSIS — G89.29 OTHER CHRONIC BACK PAIN: Chronic | ICD-10-CM

## 2024-09-05 DIAGNOSIS — R10.9 ABDOMINAL PAIN: ICD-10-CM

## 2024-09-05 DIAGNOSIS — M89.9 LESION OF BONE OF THORACIC SPINE: Primary | ICD-10-CM

## 2024-09-05 DIAGNOSIS — M54.9 OTHER CHRONIC BACK PAIN: Chronic | ICD-10-CM

## 2024-09-05 DIAGNOSIS — L98.9 SCALP LESION: ICD-10-CM

## 2024-09-05 LAB
BUN SERPL-MCNC: 17 MG/DL (ref 6–30)
CHLORIDE SERPL-SCNC: 105 MMOL/L (ref 95–110)
CREAT SERPL-MCNC: 0.9 MG/DL (ref 0.5–1.4)
GLUCOSE SERPL-MCNC: 124 MG/DL (ref 70–110)
HCT VFR BLD CALC: 38 %PCV (ref 36–54)
POC IONIZED CALCIUM: 1.24 MMOL/L (ref 1.06–1.42)
POC TCO2 (MEASURED): 21 MMOL/L (ref 23–29)
POTASSIUM BLD-SCNC: 4.2 MMOL/L (ref 3.5–5.1)
SAMPLE: ABNORMAL
SODIUM BLD-SCNC: 139 MMOL/L (ref 136–145)

## 2024-09-05 PROCEDURE — 99285 EMERGENCY DEPT VISIT HI MDM: CPT | Mod: 25

## 2024-09-05 PROCEDURE — 81001 URINALYSIS AUTO W/SCOPE: CPT

## 2024-09-05 PROCEDURE — 80053 COMPREHEN METABOLIC PANEL: CPT | Mod: 91

## 2024-09-05 PROCEDURE — 3080F DIAST BP >= 90 MM HG: CPT | Mod: CPTII,S$GLB,, | Performed by: HOSPITALIST

## 2024-09-05 PROCEDURE — 86803 HEPATITIS C AB TEST: CPT | Performed by: PHYSICIAN ASSISTANT

## 2024-09-05 PROCEDURE — 85025 COMPLETE CBC W/AUTO DIFF WBC: CPT | Mod: 91

## 2024-09-05 PROCEDURE — 3008F BODY MASS INDEX DOCD: CPT | Mod: CPTII,S$GLB,, | Performed by: HOSPITALIST

## 2024-09-05 PROCEDURE — 93010 ELECTROCARDIOGRAM REPORT: CPT | Mod: ,,, | Performed by: INTERNAL MEDICINE

## 2024-09-05 PROCEDURE — 87389 HIV-1 AG W/HIV-1&-2 AB AG IA: CPT | Performed by: PHYSICIAN ASSISTANT

## 2024-09-05 PROCEDURE — 3077F SYST BP >= 140 MM HG: CPT | Mod: CPTII,S$GLB,, | Performed by: HOSPITALIST

## 2024-09-05 PROCEDURE — 1159F MED LIST DOCD IN RCRD: CPT | Mod: CPTII,S$GLB,, | Performed by: HOSPITALIST

## 2024-09-05 PROCEDURE — 83690 ASSAY OF LIPASE: CPT

## 2024-09-05 PROCEDURE — 99205 OFFICE O/P NEW HI 60 MIN: CPT | Mod: S$GLB,,, | Performed by: HOSPITALIST

## 2024-09-05 PROCEDURE — 99999 PR PBB SHADOW E&M-EST. PATIENT-LVL IV: CPT | Mod: PBBFAC,,, | Performed by: HOSPITALIST

## 2024-09-05 PROCEDURE — 93005 ELECTROCARDIOGRAM TRACING: CPT

## 2024-09-05 PROCEDURE — 80047 BASIC METABLC PNL IONIZED CA: CPT

## 2024-09-05 RX ORDER — ONDANSETRON HYDROCHLORIDE 2 MG/ML
4 INJECTION, SOLUTION INTRAVENOUS
Status: COMPLETED | OUTPATIENT
Start: 2024-09-06 | End: 2024-09-06

## 2024-09-05 RX ORDER — MORPHINE SULFATE 4 MG/ML
4 INJECTION, SOLUTION INTRAMUSCULAR; INTRAVENOUS
Status: COMPLETED | OUTPATIENT
Start: 2024-09-06 | End: 2024-09-06

## 2024-09-05 NOTE — Clinical Note
Hi all,   I saw this patient in the diagnosis clinic today.  Working up the lesions in his T-spine, unclear etiology but will evaluate for myeloma versus malignancy versus less likely infectious process.  He notes that his pain is poorly controlled. Dr. Schmitt,  you saw him yesterday and he is declining physical therapy and local injections.  As he does not have a confirmed diagnosis of cancer, I am not comfortable writing in pain medications, especially given his chronic pain issues. Dr. Carrasco, is this something you are able to help manage?

## 2024-09-06 VITALS
HEART RATE: 87 BPM | SYSTOLIC BLOOD PRESSURE: 131 MMHG | RESPIRATION RATE: 16 BRPM | DIASTOLIC BLOOD PRESSURE: 71 MMHG | OXYGEN SATURATION: 96 % | TEMPERATURE: 98 F

## 2024-09-06 LAB
ALBUMIN SERPL BCP-MCNC: 4.3 G/DL (ref 3.5–5.2)
ALP SERPL-CCNC: 68 U/L (ref 55–135)
ALT SERPL W/O P-5'-P-CCNC: 25 U/L (ref 10–44)
ANION GAP SERPL CALC-SCNC: 10 MMOL/L (ref 8–16)
AST SERPL-CCNC: 18 U/L (ref 10–40)
BACTERIA #/AREA URNS AUTO: NORMAL /HPF
BASOPHILS # BLD AUTO: 0.05 K/UL (ref 0–0.2)
BASOPHILS NFR BLD: 0.6 % (ref 0–1.9)
BILIRUB SERPL-MCNC: 0.2 MG/DL (ref 0.1–1)
BILIRUB UR QL STRIP: NEGATIVE
BUN SERPL-MCNC: 16 MG/DL (ref 6–20)
CALCIUM SERPL-MCNC: 9.8 MG/DL (ref 8.7–10.5)
CHLORIDE SERPL-SCNC: 107 MMOL/L (ref 95–110)
CLARITY UR REFRACT.AUTO: CLEAR
CO2 SERPL-SCNC: 20 MMOL/L (ref 23–29)
COLOR UR AUTO: YELLOW
CREAT SERPL-MCNC: 0.9 MG/DL (ref 0.5–1.4)
DIFFERENTIAL METHOD BLD: ABNORMAL
EOSINOPHIL # BLD AUTO: 0.3 K/UL (ref 0–0.5)
EOSINOPHIL NFR BLD: 3.4 % (ref 0–8)
ERYTHROCYTE [DISTWIDTH] IN BLOOD BY AUTOMATED COUNT: 13.3 % (ref 11.5–14.5)
EST. GFR  (NO RACE VARIABLE): >60 ML/MIN/1.73 M^2
GLUCOSE SERPL-MCNC: 117 MG/DL (ref 70–110)
GLUCOSE UR QL STRIP: ABNORMAL
HCT VFR BLD AUTO: 38.3 % (ref 40–54)
HCV AB SERPL QL IA: NORMAL
HGB BLD-MCNC: 12.6 G/DL (ref 14–18)
HGB UR QL STRIP: NEGATIVE
HIV 1+2 AB+HIV1 P24 AG SERPL QL IA: NORMAL
IMM GRANULOCYTES # BLD AUTO: 0.06 K/UL (ref 0–0.04)
IMM GRANULOCYTES NFR BLD AUTO: 0.7 % (ref 0–0.5)
KETONES UR QL STRIP: NEGATIVE
LEUKOCYTE ESTERASE UR QL STRIP: NEGATIVE
LIPASE SERPL-CCNC: 34 U/L (ref 4–60)
LYMPHOCYTES # BLD AUTO: 2.1 K/UL (ref 1–4.8)
LYMPHOCYTES NFR BLD: 26.2 % (ref 18–48)
MCH RBC QN AUTO: 31 PG (ref 27–31)
MCHC RBC AUTO-ENTMCNC: 32.9 G/DL (ref 32–36)
MCV RBC AUTO: 94 FL (ref 82–98)
MICROSCOPIC COMMENT: NORMAL
MONOCYTES # BLD AUTO: 1.1 K/UL (ref 0.3–1)
MONOCYTES NFR BLD: 14.2 % (ref 4–15)
NEUTROPHILS # BLD AUTO: 4.4 K/UL (ref 1.8–7.7)
NEUTROPHILS NFR BLD: 54.9 % (ref 38–73)
NITRITE UR QL STRIP: NEGATIVE
NRBC BLD-RTO: 0 /100 WBC
OHS QRS DURATION: 70 MS
OHS QTC CALCULATION: 397 MS
PH UR STRIP: 6 [PH] (ref 5–8)
PLATELET # BLD AUTO: 203 K/UL (ref 150–450)
PMV BLD AUTO: 9.2 FL (ref 9.2–12.9)
POTASSIUM SERPL-SCNC: 4.2 MMOL/L (ref 3.5–5.1)
PROT SERPL-MCNC: 7.8 G/DL (ref 6–8.4)
PROT UR QL STRIP: NEGATIVE
RBC # BLD AUTO: 4.06 M/UL (ref 4.6–6.2)
RBC #/AREA URNS AUTO: 1 /HPF (ref 0–4)
SODIUM SERPL-SCNC: 137 MMOL/L (ref 136–145)
SP GR UR STRIP: 1.02 (ref 1–1.03)
URN SPEC COLLECT METH UR: ABNORMAL
WBC # BLD AUTO: 8.01 K/UL (ref 3.9–12.7)
WBC #/AREA URNS AUTO: 0 /HPF (ref 0–5)
YEAST UR QL AUTO: NORMAL

## 2024-09-06 PROCEDURE — 25000003 PHARM REV CODE 250: Performed by: EMERGENCY MEDICINE

## 2024-09-06 PROCEDURE — 96375 TX/PRO/DX INJ NEW DRUG ADDON: CPT

## 2024-09-06 PROCEDURE — 63600175 PHARM REV CODE 636 W HCPCS

## 2024-09-06 PROCEDURE — 96374 THER/PROPH/DIAG INJ IV PUSH: CPT

## 2024-09-06 PROCEDURE — 25500020 PHARM REV CODE 255: Performed by: STUDENT IN AN ORGANIZED HEALTH CARE EDUCATION/TRAINING PROGRAM

## 2024-09-06 RX ORDER — HYDROCODONE BITARTRATE AND ACETAMINOPHEN 5; 325 MG/1; MG/1
1 TABLET ORAL EVERY 4 HOURS PRN
Qty: 18 TABLET | Refills: 0 | Status: SHIPPED | OUTPATIENT
Start: 2024-09-06 | End: 2024-09-06

## 2024-09-06 RX ORDER — HYDROCODONE BITARTRATE AND ACETAMINOPHEN 5; 325 MG/1; MG/1
1 TABLET ORAL
Status: COMPLETED | OUTPATIENT
Start: 2024-09-06 | End: 2024-09-06

## 2024-09-06 RX ORDER — HYDROCODONE BITARTRATE AND ACETAMINOPHEN 5; 325 MG/1; MG/1
1 TABLET ORAL EVERY 4 HOURS PRN
Qty: 18 TABLET | Refills: 0 | Status: SHIPPED | OUTPATIENT
Start: 2024-09-06

## 2024-09-06 RX ADMIN — ONDANSETRON 4 MG: 2 INJECTION INTRAMUSCULAR; INTRAVENOUS at 12:09

## 2024-09-06 RX ADMIN — IOHEXOL 100 ML: 350 INJECTION, SOLUTION INTRAVENOUS at 12:09

## 2024-09-06 RX ADMIN — MORPHINE SULFATE 4 MG: 4 INJECTION INTRAVENOUS at 12:09

## 2024-09-06 RX ADMIN — HYDROCODONE BITARTRATE AND ACETAMINOPHEN 1 TABLET: 5; 325 TABLET ORAL at 02:09

## 2024-09-06 NOTE — DISCHARGE INSTRUCTIONS
Take percocet as prescribed.  Follow up with your oncology and your primary care doctor as previously scheduled  Return to ED for fevers, worsening pain or any other concerns

## 2024-09-06 NOTE — ED NOTES
I-STAT Chem-8+ Results:   Value Reference Range   Sodium 139 136-145 mmol/L   Potassium  4.2 3.5-5.1 mmol/L   Chloride 105  mmol/L   Ionized Calcium 1.24 1.06-1.42 mmol/L   CO2 (measured) 21 23-29 mmol/L   Glucose 124  mg/dL   BUN 17 6-30 mg/dL   Creatinine 0.9 0.5-1.4 mg/dL   Hematocrit 38 36-54%

## 2024-09-06 NOTE — ED TRIAGE NOTES
Keon Schneider Jr., a 54 y.o. male presents to the ED w/ complaint of RLQ pain. Stated it feels like a burning feeling and his intestines are being torn apart. +nausea     Triage note:  Chief Complaint   Patient presents with    Abdominal Pain     RLQ abdominal pain. States that he has been having this pain for a while but it has gotten much worse in the past 45 minutes after eating. Endorses burning pain, nausea.      Review of patient's allergies indicates:   Allergen Reactions    Atorvastatin Nausea And Vomiting    Toradol [ketorolac] Hives and Nausea And Vomiting    Ibuprofen Other (See Comments)     States GI BLEED     Past Medical History:   Diagnosis Date    Anxiety     Benzodiazepine dependence    Anxiety disorder, unspecified     Arthritis     Asthma     Avascular necrosis     Carpal tunnel syndrome     Chronic pain     Depression     Diverticulitis     Gout     Mixed hyperlipidemia     Other injury of other sites of trunk 12/28/2011    Pneumonia     Primary hypertension 8/23/2024    Spondylolisthesis     lumbar; myofascial pain    Staph infection     Ulnar neuropathy     left and rt arm       LOC: The patient is awake, alert, aware of environment with an appropriate affect. Oriented x4, speaking appropriately  APPEARANCE: Pt resting comfortably, in no acute distress, pt is clean and well groomed, clothing properly fastened  SKIN:The skin is warm and dry, color consistent with ethnicity, patient has normal skin turgor and moist mucus membranes, no bruising noted   RESPIRATORY:Airway is open and patent, respirations are spontaneous, patient has a normal effort and rate, no accessory muscle use noted.  CARDIAC: Normal rate and rhythm, no peripheral edema noted, capillary refill < 3 seconds, bilateral radial pulses 2+.  ABDOMEN: Soft, non tender, non distended. Bowel sounds present x 4 quadrants. RLQ pain with nausea   NEUROLOGIC: PERRLA, facial expression is symmetrical, patient moving all extremities  spontaneously, normal sensation in all extremities when touched with a finger.  Follows all commands appropriately  MUSCULOSKELETAL: Patient moving all extremities spontaneously, no obvious swelling or deformities noted.

## 2024-09-06 NOTE — ED PROVIDER NOTES
Encounter Date: 9/5/2024       History     Chief Complaint   Patient presents with    Abdominal Pain     RLQ abdominal pain. States that he has been having this pain for a while but it has gotten much worse in the past 45 minutes after eating. Endorses burning pain, nausea.      54-year-old male with history of hypertension, hyperlipidemia, gout, depression, anxiety, and chronic back pain presents to the ED regarding right lower quadrant abdominal pain onset 7:00 p.m. this evening.  He denies vomiting but admits to nausea.  No changes in bowels.  Denies fever, body aches, or chills.  Denies dysuria, chest pain, or other complaints at this time.  He has been having intermittent dizziness and shortness of breath but this has been persistent for several months now.  No changes.  He is currently being followed by Hematology for recent thoracic vertebra lesions found on imaging.  He has not had any biopsies yet.  Is unclear if this is multiple myeloma versus metastasis.  Not on any chemotherapy.    The history is provided by the patient and medical records.     Review of patient's allergies indicates:   Allergen Reactions    Atorvastatin Nausea And Vomiting    Toradol [ketorolac] Hives and Nausea And Vomiting    Ibuprofen Other (See Comments)     States GI BLEED     Past Medical History:   Diagnosis Date    Anxiety     Benzodiazepine dependence    Anxiety disorder, unspecified     Arthritis     Asthma     Avascular necrosis     Carpal tunnel syndrome     Chronic pain     Depression     Diverticulitis     Gout     Mixed hyperlipidemia     Other injury of other sites of trunk 12/28/2011    Pneumonia     Primary hypertension 8/23/2024    Spondylolisthesis     lumbar; myofascial pain    Staph infection     Ulnar neuropathy     left and rt arm     Past Surgical History:   Procedure Laterality Date    COLONOSCOPY N/A 7/6/2020    Procedure: COLONOSCOPY;  Surgeon: Broderick Moise MD;  Location: [facility];  Service: Endoscopy;   Laterality: N/A;    HIP SURGERY  3/06;     hip arthroplasty    HIP SURGERY      bilateral hip replacement (titanium)    JOINT REPLACEMENT      OPEN REDUCTION AND INTERNAL FIXATION (ORIF) OF FRACTURE OF CLAVICLE Left 10/5/2023    Procedure: ORIF, FRACTURE, CLAVICLE;  Surgeon: Ozzy Howard MD;  Location: [facility] OR [address] [place] [address];  Service: Orthopedics;  Laterality: Left;    SHOULDER SURGERY      right shoulder    SHOULDER SURGERY       Family History   Problem Relation Name Age of Onset    Cancer Mother      Cancer Sister       Social History     Tobacco Use    Smoking status: Former     Current packs/day: 0.00     Average packs/day: 1 pack/day for 10.0 years (10.0 ttl pk-yrs)     Types: Cigarettes     Start date: 10/13/2002     Quit date: 10/13/2012     Years since quittin.9     Passive exposure: Past    Smokeless tobacco: Never   Substance Use Topics    Alcohol use: Yes     Alcohol/week: 12.0 standard drinks of alcohol     Types: 12 Cans of beer per week     Comment: 2x/week    Drug use: No     Review of Systems  See HPI  Physical Exam     Initial Vitals [24]   BP Pulse Resp Temp SpO2   (!) 169/101 89 20 98.7 °F (37.1 °C) 96 %      MAP       --         Physical Exam    Vitals reviewed.  Constitutional: He appears well-developed and well-nourished. He is not diaphoretic. No distress.   Unable to lie flat due to pain.  Appears uncomfortable due to pain but no significant distress   HENT:   Head: Normocephalic and atraumatic.   Neck: Neck supple.   Cardiovascular:  Normal rate, regular rhythm and normal heart sounds.     Exam reveals no gallop and no friction rub.       No murmur heard.  Pulmonary/Chest: Breath sounds normal. No respiratory distress. He has no wheezes. He has no rhonchi. He has no rales.   Abdominal: Abdomen is soft. There is abdominal tenderness in the right lower quadrant. There is tenderness at McBurney's point. There is no rebound and no guarding.   Musculoskeletal:       Cervical back: Neck supple.      Thoracic back: Tenderness present. No bony tenderness. Normal range of motion.      Lumbar back: Tenderness present. No bony tenderness. Normal range of motion.      Comments: Right thoracic and lumbar paraspinal muscle tenderness.  No midline tenderness or step-offs     Neurological: He is alert and oriented to person, place, and time.   Psychiatric: He has a normal mood and affect.         ED Course   Procedures  Labs Reviewed   CBC W/ AUTO DIFFERENTIAL - Abnormal       Result Value    WBC 8.01      RBC 4.06 (*)     Hemoglobin 12.6 (*)     Hematocrit 38.3 (*)     MCV 94      MCH 31.0      MCHC 32.9      RDW 13.3      Platelets 203      MPV 9.2      Immature Granulocytes 0.7 (*)     Gran # (ANC) 4.4      Immature Grans (Abs) 0.06 (*)     Lymph # 2.1      Mono # 1.1 (*)     Eos # 0.3      Baso # 0.05      nRBC 0      Gran % 54.9      Lymph % 26.2      Mono % 14.2      Eosinophil % 3.4      Basophil % 0.6      Differential Method Automated     COMPREHENSIVE METABOLIC PANEL - Abnormal    Sodium 137      Potassium 4.2      Chloride 107      CO2 20 (*)     Glucose 117 (*)     BUN 16      Creatinine 0.9      Calcium 9.8      Total Protein 7.8      Albumin 4.3      Total Bilirubin 0.2      Alkaline Phosphatase 68      AST 18      ALT 25      eGFR >60.0      Anion Gap 10     URINALYSIS, REFLEX TO URINE CULTURE - Abnormal    Specimen UA Urine, Clean Catch      Color, UA Yellow      Appearance, UA Clear      pH, UA 6.0      Specific Gravity, UA 1.020      Protein, UA Negative      Glucose, UA 3+ (*)     Ketones, UA Negative      Bilirubin (UA) Negative      Occult Blood UA Negative      Nitrite, UA Negative      Leukocytes, UA Negative      Narrative:     Specimen Source->Urine   ISTAT PROCEDURE - Abnormal    POC Glucose 124 (*)     POC BUN 17      POC Creatinine 0.9      POC Sodium 139      POC Potassium 4.2      POC Chloride 105      POC TCO2 (MEASURED) 21 (*)     POC Ionized Calcium 1.24       POC Hematocrit 38      Sample CHRISTINA     HIV 1 / 2 ANTIBODY    HIV 1/2 Ag/Ab Non-reactive      Narrative:     Release to patient->Immediate   HEPATITIS C ANTIBODY    Hepatitis C Ab Non-reactive      Narrative:     Release to patient->Immediate   LIPASE    Lipase 34     URINALYSIS MICROSCOPIC    RBC, UA 1      WBC, UA 0      Bacteria Rare      Yeast, UA None      Microscopic Comment SEE COMMENT      Narrative:     Specimen Source->Urine     EKG Readings: (Independently Interpreted)   Initial Reading: No STEMI. Rhythm: Normal Sinus Rhythm. Heart Rate: 85. Ectopy: No Ectopy. Conduction: Normal. ST Segments: Normal ST Segments. T Waves: Normal.     ECG Results              EKG 12-lead (Final result)        Collection Time Result Time QRS Duration OHS QTC Calculation    09/05/24 21:45:57 09/06/24 10:39:26 70 397                     Final result by Interface, Lab In [facility] (09/06/24 10:39:37)                   Narrative:    Test Reason : R10.9,    Vent. Rate : 085 BPM     Atrial Rate : 085 BPM     P-R Int : 142 ms          QRS Dur : 070 ms      QT Int : 334 ms       P-R-T Axes : 067 040 011 degrees     QTc Int : 397 ms    Normal sinus rhythm  Normal ECG  When compared with ECG of 18-AUG-2024 03:16,  T wave amplitude has decreased in Anterior leads  Confirmed by Nacho GARCIA MD ([id number]) on 9/6/2024 10:39:24 AM    Referred By: [email or web address]   SELF           Confirmed By:Nacho GARCIA MD                                  Imaging Results              CT Abdomen Pelvis With IV Contrast NO Oral Contrast (Final result)  Result time 09/06/24 01:15:31      Final result by Jerzy Zuleta DO (09/06/24 01:15:31)                   Impression:      1. No acute abnormality of the abdomen or pelvis.  2. Colonic diverticulosis without evidence of acute diverticulitis.  3. Hepatomegaly and hepatic steatosis.      Electronically signed by: Jerzy Zuleta  Date:    09/06/2024  Time:    01:15               Narrative:    EXAMINATION:  CT ABDOMEN  PELVIS WITH IV CONTRAST    CLINICAL HISTORY:  RLQ abdominal pain (Age >= 14y);    TECHNIQUE:  Axial CT images with sagittal and coronal reformats were obtained of the abdomen and pelvis from the hemidiaphragms through the symphysis pubis after the administration of 100mL Omnipaque 350.    COMPARISON:  CT of the abdomen and pelvis from 06/03/2024.    FINDINGS:  Lung Bases: Clear.    Heart: Heart size is normal.  No pericardial effusion.    Liver: The liver is enlarged and demonstrates diffuse hypoattenuation, which can be seen with hepatic steatosis.  There are no focal hepatic lesions.  The portal vasculature is patent.    Biliary tract: No intrahepatic or extrahepatic biliary ductal dilatation.    Gallbladder: No radiodense gallstone. No wall thickening or pericholecystic fluid.    Pancreas: Normal. No pancreatic ductal dilatation.    Spleen: Normal size without focal lesion.    Adrenals: Unremarkable.    Kidneys and urinary collecting systems: Normal.  No hydronephrosis or urolithiasis.    Lymph nodes: None enlarged.    Stomach and bowel: The stomach is normal.  Loops of small and large bowel are normal in caliber without evidence for inflammation or obstruction.  The appendix is normal.  There is colonic diverticulosis without evidence of acute diverticulitis.  Please note that the rectum and distal sigmoid colon are not well evaluated due to streak artifact.    Peritoneum and mesentery: No ascites or free intraperitoneal air.  No abdominal fluid collection.    Vasculature: No aneurysm or significant atherosclerosis.    Urinary bladder: Limited evaluation of the urinary bladder due to streak artifact.    Reproductive organs: Limited evaluation of the prostate due to streak artifact.    Body wall: No abnormality.    Musculoskeletal: No aggressive osseous lesion.  There are mild multilevel degenerative changes of the spine.  There are postoperative changes of bilateral total hip arthroplasty, resulting in streak  artifact, limiting evaluation of the pelvis.                                       Medications   morphine injection 4 mg (4 mg Intravenous Given 9/6/24 0017)   ondansetron injection 4 mg (4 mg Intravenous Given 9/6/24 0017)   iohexoL (OMNIPAQUE 350) injection 100 mL (100 mLs Intravenous Given 9/6/24 0008)   HYDROcodone-acetaminophen 5-325 mg per tablet 1 tablet (1 tablet Oral Given 9/6/24 0209)     Medical Decision Making  Amount and/or Complexity of Data Reviewed  Labs: ordered.  Radiology: ordered.    Risk  Prescription drug management.         APC / Resident Notes:   Emergent evaluation a 54-year-old male presenting with RLQ abdominal pain associated with nausea onset this evening.  Hypertensive with other vitals stable.  See physical exam findings above. Will obtain labs and imaging to assess for acute abnormality.  Will provide analgesia and reassess.    My differential diagnoses include but are not limited to:  Appendicitis, diverticulitis, ureterolithiasis, UTI, pyelonephritis, mass, malignancy, electrolyte abnormality, AJ  See ED course.        ED Course as of 09/06/24 1258   Fri Sep 06, 2024   0021 Pending workup signed out to Dr. Mercado [KB]      ED Course User Index  [KB] Berkley Shaw PA-C                           Clinical Impression:  Final diagnoses:  [R10.31] Right lower quadrant abdominal pain (Primary)          ED Disposition Condition    Discharge Stable          ED Prescriptions       Medication Sig Dispense Start Date End Date Auth. Provider    HYDROcodone-acetaminophen (NORCO) 5-325 mg per tablet  (Status: Discontinued) Take 1 tablet by mouth every 4 (four) hours as needed for Pain. 18 tablet 9/6/2024 9/6/2024 Nathalie Mercado MD    HYDROcodone-acetaminophen (NORCO) 5-325 mg per tablet Take 1 tablet by mouth every 4 (four) hours as needed for Pain. 18 tablet 9/6/2024 -- Nathalie Mercado MD          Follow-up Information       Follow up With Specialties Details Why Contact Info     Floridalma Carrasco MD Internal Medicine Schedule an appointment as soon as possible for a visit   [address] [facility] [address]  [phone]               Berkley Shaw PA-C  09/06/24 0348

## 2024-09-06 NOTE — PROVIDER PROGRESS NOTES - EMERGENCY DEPT.
Encounter Date: 9/5/2024    ED Physician Progress Notes        Physician Note:   I have assumed care for this patient from AHMET Hill and LASHAWN Acuna.  I have discussed care with the previous provider.   Briefly 55 y/o M with medical history of HTN, HLD, gout, depression and chronic back pain- recent imaging that showed thoracic vertebral lesions- pt currently in process of work up for possible malignancy. Heme-onc scheduled PET scan possible BMP. Pt presents with right lower abdominal pain.  Blood work unremarkable. UA +ve glucose otherwise negative.   At time of sign out CT abd pending. If unremarkable discharge home- continued work up for malignancy as outpt.    2:10 AM  CT abd no acute abnormality. Diverticulosis without diverticulitis. Pt informed of results. Discharged to home 3 day supply of norco provided for pain. Routine return precautions provided. Follow up PCP and heme onc.

## 2024-09-09 ENCOUNTER — PATIENT MESSAGE (OUTPATIENT)
Dept: FAMILY MEDICINE | Facility: CLINIC | Age: 54
End: 2024-09-09
Payer: MEDICARE

## 2024-09-09 DIAGNOSIS — G89.29 OTHER CHRONIC PAIN: Primary | ICD-10-CM

## 2024-09-10 ENCOUNTER — TELEPHONE (OUTPATIENT)
Dept: NEUROLOGY | Facility: CLINIC | Age: 54
End: 2024-09-10
Payer: MEDICARE

## 2024-09-10 NOTE — TELEPHONE ENCOUNTER
Spoke to Pt about rescheduling his appt for tomorrow due to the weather. Pt will be rescheduled for October 7.

## 2024-09-12 DIAGNOSIS — M89.9 LESION OF BONE OF THORACIC SPINE: Primary | ICD-10-CM

## 2024-09-12 NOTE — PROGRESS NOTES
Lab workup from 9/5/24 largely unrevealing, will obtain PET CT likely followed by targeted biopsy pending results.

## 2024-09-13 ENCOUNTER — OFFICE VISIT (OUTPATIENT)
Dept: PSYCHIATRY | Facility: CLINIC | Age: 54
End: 2024-09-13
Payer: MEDICARE

## 2024-09-13 DIAGNOSIS — F13.20 BENZODIAZEPINE DEPENDENCE: ICD-10-CM

## 2024-09-13 DIAGNOSIS — F41.9 ANXIETY: Primary | ICD-10-CM

## 2024-09-13 DIAGNOSIS — F10.20 SEVERE ALCOHOL USE DISORDER: ICD-10-CM

## 2024-09-13 DIAGNOSIS — F32.A DEPRESSION, UNSPECIFIED DEPRESSION TYPE: ICD-10-CM

## 2024-09-13 PROCEDURE — 99999 PR PBB SHADOW E&M-EST. PATIENT-LVL I: CPT | Mod: PBBFAC,,, | Performed by: NURSE PRACTITIONER

## 2024-09-13 RX ORDER — ALPRAZOLAM 1 MG/1
TABLET ORAL
Qty: 91 TABLET | Refills: 0 | Status: SHIPPED | OUTPATIENT
Start: 2024-10-05 | End: 2024-11-02

## 2024-09-13 RX ORDER — SERTRALINE HYDROCHLORIDE 50 MG/1
50 TABLET, FILM COATED ORAL DAILY
Qty: 30 TABLET | Refills: 5 | Status: SHIPPED | OUTPATIENT
Start: 2024-09-13

## 2024-09-13 RX ORDER — ALPRAZOLAM 1 MG/1
TABLET ORAL
Qty: 63 TABLET | Refills: 0 | Status: SHIPPED | OUTPATIENT
Start: 2024-11-02 | End: 2024-11-30
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
## 2024-09-16 ENCOUNTER — HOSPITAL ENCOUNTER (EMERGENCY)
Facility: HOSPITAL | Age: 54
Discharge: HOME | End: 2024-09-16
Attending: EMERGENCY MEDICINE
Payer: MEDICARE

## 2024-09-16 VITALS
OXYGEN SATURATION: 98 % | WEIGHT: 200 LBS | HEIGHT: 71 IN | RESPIRATION RATE: 17 BRPM | TEMPERATURE: 98 F | SYSTOLIC BLOOD PRESSURE: 170 MMHG | BODY MASS INDEX: 28 KG/M2 | DIASTOLIC BLOOD PRESSURE: 98 MMHG | HEART RATE: 80 BPM

## 2024-09-16 DIAGNOSIS — M54.9 BACK PAIN, UNSPECIFIED BACK LOCATION, UNSPECIFIED BACK PAIN LATERALITY, UNSPECIFIED CHRONICITY: Primary | ICD-10-CM

## 2024-09-16 PROCEDURE — 99284 EMERGENCY DEPT VISIT MOD MDM: CPT

## 2024-09-16 RX ORDER — METHYLPREDNISOLONE 4 MG/1
TABLET ORAL
Qty: 21 EACH | Refills: 0 | Status: SHIPPED | OUTPATIENT
Start: 2024-09-16 | End: 2024-10-07

## 2024-09-16 RX ORDER — METHOCARBAMOL 500 MG/1
1000 TABLET, FILM COATED ORAL 3 TIMES DAILY
Qty: 30 TABLET | Refills: 0 | OUTPATIENT
Start: 2024-09-16 | End: 2024-09-21

## 2024-09-16 RX ORDER — PREGABALIN 25 MG/1
25 CAPSULE ORAL 2 TIMES DAILY
Qty: 60 CAPSULE | Refills: 0 | Status: SHIPPED | OUTPATIENT
Start: 2024-09-16 | End: 2024-10-16

## 2024-09-16 RX ORDER — OXYCODONE AND ACETAMINOPHEN 5; 325 MG/1; MG/1
1 TABLET ORAL EVERY 6 HOURS PRN
Qty: 12 TABLET | Refills: 0 | OUTPATIENT
Start: 2024-09-16 | End: 2024-09-21

## 2024-09-16 NOTE — DISCHARGE INSTRUCTIONS
Please call your oncologist in the morning to schedule your PET scan.  Take the prescribed medications as directed.    You have been prescribed steroid medicine.   Steroids can make your blood sugar high. If you have diabetes, please monitor your blood sugars closely. If your blood sugars are increasing (for example, over 250) you should inform your doctor or return to the ER.  Steroids can cause mood changes. Talk to your doctor about stopping this medicine if you have concerning thoughts or behaviors, become violent or increasingly aggressive with others.   Steroids can make it difficult to sleep at night. If you are prescribed steroids once daily, it is best take them in the morning so that you can sleep well at night.   Return to the Emergency Department or contact your doctor if you develop the above side effects or your symptoms are worsening.    If your pain is not controlled with ibuprofen, you may also take Oxycodone.  - Take this medication only when needed for breakthrough pain.   - Do not take more than the prescribed amount to control your pain.  - Do not operate machinery, drive, exercise, perform caregiving, make important decisions or perform important tasks while taking Norco. It can make you drowsy or forgetful.  - Oxycodone is addictive in as little as 3 days, so take only as needed.  - Oxycodone can dangerously slow or stop your breathing if you take too much, or if you combine it with alcohol or sedating medicines (including Xanex, Ativan) or illegal drugs.   - Oxycodone can make you constipated (hard to poop), so take fiber supplements and a stool softener while taking this medicine.     Follow-Up Plan:  - Follow-up with your oncologist within 3 - 5 days  - Additional testing and/or evaluation as directed by your doctor    Return to the Emergency Department for symptoms including but not limited to: worsening symptoms, shortness of breath or chest pain, vomiting with inability to hold down  fluids, fevers greater than 100.4°F, passing out/fainting/unconsciousness, or other concerning symptoms.

## 2024-09-16 NOTE — ED PROVIDER NOTES
"Source of History:  Patient  Chart    Chief complaint:  Back Pain (Pt complaining of pain to his back for the last several months. Pt states having CT scans and MRI that found lesions on his spine and pt has been unable to see his primary care doctor.)      HPI:  Keon Schneider Jr. is a 54 y.o. male with history of abnormal MRI currently being worked up for neoplasm presenting to emergency department with complaint of back pain.      Patient states he has been dealing with back pain for several months.  Had an MRI thoracic and lumbar spine in July of 2024 which showed Indeterminate marrow signal abnormality within the T5 vertebral body as well as multiple additional foci within the lower thoracic spine. Given association with prominent anterior osteophytes, possibly degenerative. Marrow replacing process such as metastatic disease is possible but felt less likely. Recommend repeat examination with intravenous contrast in 3 months."    He followed up with oncology in clinic on 09/05/2024.  Per that note, Workup as above, noting in particular areas of marrow signal heterogeneity in T5, T6, T7, T10, T11, and T12 vertebral bodies of unclear etiology.  Based on distribution, concern for possible multiple myeloma versus metastatic cancer vs other (infection less likely though does report night sweats).  Will workup for myeloma as below, if lab results are consistent with this diagnosis will proceed with remainder of myeloma workup including bone marrow biopsy.  If results are inconsistent with the diagnosis, plan for PET-CT for further evaluation of osseous lesions as well as to surveil for disease elsewhere and likely biopsy pending those results.     PLAN:   - Myeloma workup:               - CRAB (CBC, CMP)  - SPEP w/ immunofixation              - Serum free light chains (preserve UPEP)              - Quantitative immunoglobulins              - B2 Microglobuin              - LDH  - Further workup pending the " "above, if consistent w/ MM then proceed w/ BMBx, otherwise likely PET CT followed by targeted biopsy pending results"    There is a note from Dr. Rui gil from Oncology on 09/12/2024 stating Lab workup from 9/5/24 largely unrevealing, will obtain PET CT likely followed by targeted biopsy pending results." Patient was not aware of this plan.    Patient states that he has been dealing with worsening back pain, he was not currently taking Percocet, does have a history of polysubstance use.  States he was only been taking Tylenol and ibuprofen.    No new symptoms, but pain is just so severe that he can not get relief.  No urinary or stool incontinence.  No saddle anesthesia.  No fevers.        Review of patient's allergies indicates:   Allergen Reactions    Atorvastatin Nausea And Vomiting    Toradol [ketorolac] Hives and Nausea And Vomiting    Ibuprofen Other (See Comments)     States GI BLEED       No current facility-administered medications on file prior to encounter.     Current Outpatient Medications on File Prior to Encounter   Medication Sig Dispense Refill    allopurinoL (ZYLOPRIM) 100 MG tablet Take 100 mg by mouth.      [START ON 10/5/2024] ALPRAZolam (XANAX) 1 MG tablet Take 1.5 tablets daily and 2 tablets nightly for 2 weeks, THEN take 1.5 tablets two times daily for 2 weeks 91 tablet 0    [START ON 11/2/2024] ALPRAZolam (XANAX) 1 MG tablet Take 1 tablet daily and 1.5 tablets nightly for 2 weeks, THEN take 1 tablet two times daily 63 tablet 0    amLODIPine (NORVASC) 5 MG tablet Take 1 tablet (5 mg total) by mouth once daily. 90 tablet 3    aspirin (ECOTRIN) 81 MG EC tablet Take 1 tablet (81 mg total) by mouth once daily.      ezetimibe (ZETIA) 10 mg tablet Take 1 tablet (10 mg total) by mouth once daily. 90 tablet 3    famotidine (PEPCID) 40 MG tablet Take 40 mg by mouth once daily.      HYDROcodone-acetaminophen (NORCO) 5-325 mg per tablet Take 1 tablet by mouth every 4 (four) hours as needed for Pain. " 18 tablet 0    nitroGLYCERIN (NITROSTAT) 0.4 MG SL tablet Place 1 tablet (0.4 mg total) under the tongue every 5 (five) minutes as needed for Chest pain. 30 tablet 1    rosuvastatin (CRESTOR) 40 MG Tab Take 1 tablet (40 mg total) by mouth every evening. 90 tablet 3    sertraline (ZOLOFT) 50 MG tablet Take 1 tablet (50 mg total) by mouth once daily. 30 tablet 5       PMH:  As per HPI and below:  Past Medical History:   Diagnosis Date    Anxiety     Benzodiazepine dependence    Anxiety disorder, unspecified     Arthritis     Asthma     Avascular necrosis     Carpal tunnel syndrome     Chronic pain     Depression     Diverticulitis     Gout     Mixed hyperlipidemia     Other injury of other sites of trunk 2011    Pneumonia     Primary hypertension 2024    Spondylolisthesis     lumbar; myofascial pain    Staph infection     Ulnar neuropathy     left and rt arm     Past Surgical History:   Procedure Laterality Date    COLONOSCOPY N/A 2020    Procedure: COLONOSCOPY;  Surgeon: Broderick Moise MD;  Location: [facility];  Service: Endoscopy;  Laterality: N/A;    HIP SURGERY  3/06;     hip arthroplasty    HIP SURGERY      bilateral hip replacement (titanium)    JOINT REPLACEMENT      OPEN REDUCTION AND INTERNAL FIXATION (ORIF) OF FRACTURE OF CLAVICLE Left 10/5/2023    Procedure: ORIF, FRACTURE, CLAVICLE;  Surgeon: Ozzy Howard MD;  Location: [address];  Service: Orthopedics;  Laterality: Left;    SHOULDER SURGERY      right shoulder    SHOULDER SURGERY         Social History     Socioeconomic History    Marital status: Single   Tobacco Use    Smoking status: Former     Current packs/day: 0.00     Average packs/day: 1 pack/day for 10.0 years (10.0 ttl pk-yrs)     Types: Cigarettes     Start date: 10/13/2002     Quit date: 10/13/2012     Years since quittin.9     Passive exposure: Past    Smokeless tobacco: Never   Substance and Sexual Activity    Alcohol use: Yes     Alcohol/week:  12.0 standard drinks of alcohol     Types: 12 Cans of beer per week     Comment: 2x/week    Drug use: No    Sexual activity: Yes     Partners: Female     Social Determinants of Health     Financial Resource Strain: Low Risk  (9/1/2024)    Overall Financial Resource Strain (CARDIA)     Difficulty of Paying Living Expenses: Not hard at all   Food Insecurity: Food Insecurity Present (9/1/2024)    Hunger Vital Sign     Worried About Running Out of Food in the Last Year: Sometimes true     Ran Out of Food in the Last Year: Sometimes true   Physical Activity: Unknown (9/1/2024)    Exercise Vital Sign     Days of Exercise per Week: 7 days   Stress: Stress Concern Present (9/1/2024)    [employer] - Occupational Stress Questionnaire     Feeling of Stress : Very much   Housing Stability: Unknown (9/1/2024)    Housing Stability Vital Sign     Unable to Pay for Housing in the Last Year: No       Family History   Problem Relation Name Age of Onset    Cancer Mother      Cancer Sister         Physical Exam:      Vitals:    09/16/24 0126   BP: (!) 170/98   Pulse: 80   Resp: 17   Temp:      Gen:  Uncomfortable, pacing the room.  Pressured speech.  Mental Status:  Alert and oriented .  Appropriate, conversant.  Skin: Warm, dry. No rashes seen.  Eyes: No conjunctival injection.  Pulm: CTAB. No increased work of breathing.  No significant tachypnea.  No audible stridor or wheezing.  No conversational dyspnea.    CV: Regular rate. Regular rhythm.   Abd: Soft.  Not distended.  Nontender.   MSK: Good range of motion all joints.  No deformities.    Neuro: Awake. Speech normal. No focal neuro deficit observed.  No leg numbness or weakness    Laboratory Studies:  Labs Reviewed - No data to display    Chart reviewed.     Imaging Results    None         Medications Given:  Medications - No data to display    MDM:    54 y.o. male currently undergoing workup for thoracic vertebral body lesions with oncology.  He was  dealing with ongoing back pain, with no signs or symptoms of cauda equina syndrome.  He was only taking OTC medications for the pain at this time.      Prescriptions sent to pharmacy.  Discharged home in stable condition.  Recommend call Oncology to schedule follow up PET scan        Diagnostic Impression:    1. Back pain, unspecified back location, unspecified back pain laterality, unspecified chronicity         ED Disposition Condition    Discharge           ED Prescriptions       Medication Sig Dispense Start Date End Date Auth. Provider    methocarbamoL (ROBAXIN) 500 MG Tab Take 2 tablets (1,000 mg total) by mouth 3 (three) times daily. for 5 days 30 tablet 9/16/2024 9/21/2024 Nidia Horner MD    pregabalin (LYRICA) 25 MG capsule Take 1 capsule (25 mg total) by mouth 2 (two) times daily. 60 capsule 9/16/2024 10/16/2024 Nidia Horner MD    methylPREDNISolone (MEDROL DOSEPACK) 4 mg tablet use as directed 21 each 9/16/2024 10/7/2024 Nidia Horner MD    oxyCODONE-acetaminophen (PERCOCET) 5-325 mg per tablet Take 1 tablet by mouth every 6 (six) hours as needed for Pain. 12 tablet 9/16/2024 -- Nidia Horner MD          Follow-up Information       Follow up With Specialties Details Why Contact Info    Floridalma Carrasco MD Internal Medicine Schedule an appointment as soon as possible for a visit   [address] [place] [address]  [phone]              Patient understands the plan and is in agreement, verbalized understanding, questions answered    Nidia Horner MD  Emergency Medicine         Nidia Horner MD  09/18/24 0012

## 2024-09-17 ENCOUNTER — TELEPHONE (OUTPATIENT)
Dept: HEMATOLOGY/ONCOLOGY | Facility: CLINIC | Age: 54
End: 2024-09-17
Payer: MEDICARE

## 2024-09-17 NOTE — TELEPHONE ENCOUNTER
----- Message from Carlos Eduardo Garcia sent at 9/17/2024 12:50 PM CDT -----  Regarding: Callback  Pt would like to request a callback in regards to some concerns he has about scheduling his appointment .     Contact number  [phone]

## 2024-09-17 NOTE — TELEPHONE ENCOUNTER
Spoke with the patient regarding scheduling upcoming appointments. Informed the pt that, per Dr. Grewal's notes, a follow up visit date has not yet been determined as he is waiting for test results to come back with more information on his dx. Dr. Grewal ordered a PET CT, so I was able to schedule that for 9/19. Patient verbalized understanding and thanked me for calling.

## 2024-09-19 ENCOUNTER — HOSPITAL ENCOUNTER (OUTPATIENT)
Dept: RADIOLOGY | Facility: HOSPITAL | Age: 54
Discharge: HOME | End: 2024-09-19
Attending: HOSPITALIST
Payer: MEDICARE

## 2024-09-19 DIAGNOSIS — M89.9 LESION OF BONE OF THORACIC SPINE: ICD-10-CM

## 2024-09-19 LAB — POCT GLUCOSE: 94 MG/DL (ref 70–110)

## 2024-09-19 PROCEDURE — A9552 F18 FDG: HCPCS | Performed by: HOSPITALIST

## 2024-09-19 PROCEDURE — 78815 PET IMAGE W/CT SKULL-THIGH: CPT | Mod: 26,PI,, | Performed by: NUCLEAR MEDICINE

## 2024-09-19 PROCEDURE — 78815 PET IMAGE W/CT SKULL-THIGH: CPT | Mod: TC

## 2024-09-19 RX ORDER — FLUDEOXYGLUCOSE F18 500 MCI/ML
12 INJECTION INTRAVENOUS
Status: COMPLETED | OUTPATIENT
Start: 2024-09-19 | End: 2024-09-19

## 2024-09-19 RX ADMIN — FLUDEOXYGLUCOSE F-18 13.07 MILLICURIE: 500 INJECTION INTRAVENOUS at 11:09

## 2024-09-21 ENCOUNTER — HOSPITAL ENCOUNTER (EMERGENCY)
Facility: HOSPITAL | Age: 54
Discharge: HOME | End: 2024-09-21
Attending: EMERGENCY MEDICINE
Payer: MEDICARE

## 2024-09-21 VITALS
HEIGHT: 71 IN | DIASTOLIC BLOOD PRESSURE: 85 MMHG | RESPIRATION RATE: 22 BRPM | BODY MASS INDEX: 28 KG/M2 | WEIGHT: 200 LBS | OXYGEN SATURATION: 97 % | SYSTOLIC BLOOD PRESSURE: 136 MMHG | HEART RATE: 69 BPM | TEMPERATURE: 98 F

## 2024-09-21 DIAGNOSIS — M54.41 ACUTE BACK PAIN WITH SCIATICA, RIGHT: Primary | ICD-10-CM

## 2024-09-21 DIAGNOSIS — G89.29 CHRONIC BILATERAL LOW BACK PAIN, UNSPECIFIED WHETHER SCIATICA PRESENT: ICD-10-CM

## 2024-09-21 DIAGNOSIS — M54.50 CHRONIC BILATERAL LOW BACK PAIN, UNSPECIFIED WHETHER SCIATICA PRESENT: ICD-10-CM

## 2024-09-21 DIAGNOSIS — M54.16 LUMBAR RADICULOPATHY, ACUTE: ICD-10-CM

## 2024-09-21 LAB
ALBUMIN SERPL BCP-MCNC: 4.4 G/DL (ref 3.5–5.2)
ALP SERPL-CCNC: 73 U/L (ref 55–135)
ALT SERPL W/O P-5'-P-CCNC: 24 U/L (ref 10–44)
ANION GAP SERPL CALC-SCNC: 8 MMOL/L (ref 8–16)
AST SERPL-CCNC: 14 U/L (ref 10–40)
BASOPHILS # BLD AUTO: 0.07 K/UL (ref 0–0.2)
BASOPHILS NFR BLD: 0.7 % (ref 0–1.9)
BILIRUB SERPL-MCNC: 0.2 MG/DL (ref 0.1–1)
BUN SERPL-MCNC: 20 MG/DL (ref 6–20)
CALCIUM SERPL-MCNC: 9.5 MG/DL (ref 8.7–10.5)
CHLORIDE SERPL-SCNC: 107 MMOL/L (ref 95–110)
CO2 SERPL-SCNC: 20 MMOL/L (ref 23–29)
CREAT SERPL-MCNC: 0.9 MG/DL (ref 0.5–1.4)
CRP SERPL-MCNC: 1.1 MG/L (ref 0–8.2)
DIFFERENTIAL METHOD BLD: ABNORMAL
EOSINOPHIL # BLD AUTO: 0.1 K/UL (ref 0–0.5)
EOSINOPHIL NFR BLD: 0.9 % (ref 0–8)
ERYTHROCYTE [DISTWIDTH] IN BLOOD BY AUTOMATED COUNT: 13.7 % (ref 11.5–14.5)
ERYTHROCYTE [SEDIMENTATION RATE] IN BLOOD BY PHOTOMETRIC METHOD: 10 MM/HR (ref 0–23)
EST. GFR  (NO RACE VARIABLE): >60 ML/MIN/1.73 M^2
GLUCOSE SERPL-MCNC: 105 MG/DL (ref 70–110)
HCT VFR BLD AUTO: 40.1 % (ref 40–54)
HGB BLD-MCNC: 12.9 G/DL (ref 14–18)
IMM GRANULOCYTES # BLD AUTO: 0.06 K/UL (ref 0–0.04)
IMM GRANULOCYTES NFR BLD AUTO: 0.6 % (ref 0–0.5)
LACTATE SERPL-SCNC: 1.2 MMOL/L (ref 0.5–2.2)
LYMPHOCYTES # BLD AUTO: 2 K/UL (ref 1–4.8)
LYMPHOCYTES NFR BLD: 20.2 % (ref 18–48)
MCH RBC QN AUTO: 30.8 PG (ref 27–31)
MCHC RBC AUTO-ENTMCNC: 32.2 G/DL (ref 32–36)
MCV RBC AUTO: 96 FL (ref 82–98)
MONOCYTES # BLD AUTO: 1.2 K/UL (ref 0.3–1)
MONOCYTES NFR BLD: 12 % (ref 4–15)
NEUTROPHILS # BLD AUTO: 6.5 K/UL (ref 1.8–7.7)
NEUTROPHILS NFR BLD: 65.6 % (ref 38–73)
NRBC BLD-RTO: 0 /100 WBC
PLATELET # BLD AUTO: 285 K/UL (ref 150–450)
PMV BLD AUTO: 8.7 FL (ref 9.2–12.9)
POTASSIUM SERPL-SCNC: 4.6 MMOL/L (ref 3.5–5.1)
PROT SERPL-MCNC: 7.7 G/DL (ref 6–8.4)
RBC # BLD AUTO: 4.19 M/UL (ref 4.6–6.2)
SODIUM SERPL-SCNC: 135 MMOL/L (ref 136–145)
WBC # BLD AUTO: 9.97 K/UL (ref 3.9–12.7)

## 2024-09-21 PROCEDURE — 85652 RBC SED RATE AUTOMATED: CPT | Performed by: EMERGENCY MEDICINE

## 2024-09-21 PROCEDURE — 63600175 PHARM REV CODE 636 W HCPCS: Performed by: EMERGENCY MEDICINE

## 2024-09-21 PROCEDURE — 96375 TX/PRO/DX INJ NEW DRUG ADDON: CPT

## 2024-09-21 PROCEDURE — 86140 C-REACTIVE PROTEIN: CPT | Performed by: EMERGENCY MEDICINE

## 2024-09-21 PROCEDURE — 25000003 PHARM REV CODE 250: Performed by: EMERGENCY MEDICINE

## 2024-09-21 PROCEDURE — 99284 EMERGENCY DEPT VISIT MOD MDM: CPT | Mod: 25

## 2024-09-21 PROCEDURE — 96374 THER/PROPH/DIAG INJ IV PUSH: CPT

## 2024-09-21 PROCEDURE — 96361 HYDRATE IV INFUSION ADD-ON: CPT

## 2024-09-21 PROCEDURE — 83605 ASSAY OF LACTIC ACID: CPT | Performed by: EMERGENCY MEDICINE

## 2024-09-21 PROCEDURE — 80053 COMPREHEN METABOLIC PANEL: CPT | Performed by: EMERGENCY MEDICINE

## 2024-09-21 PROCEDURE — 85025 COMPLETE CBC W/AUTO DIFF WBC: CPT | Performed by: EMERGENCY MEDICINE

## 2024-09-21 RX ORDER — HYDROMORPHONE HYDROCHLORIDE 1 MG/ML
1 INJECTION, SOLUTION INTRAMUSCULAR; INTRAVENOUS; SUBCUTANEOUS
Status: COMPLETED | OUTPATIENT
Start: 2024-09-21 | End: 2024-09-21

## 2024-09-21 RX ORDER — OXYCODONE AND ACETAMINOPHEN 5; 325 MG/1; MG/1
1 TABLET ORAL EVERY 6 HOURS PRN
Qty: 24 TABLET | Refills: 0 | Status: SHIPPED | OUTPATIENT
Start: 2024-09-21

## 2024-09-21 RX ORDER — LIDOCAINE 50 MG/G
1 PATCH TOPICAL DAILY
Qty: 7 PATCH | Refills: 0 | Status: SHIPPED | OUTPATIENT
Start: 2024-09-21

## 2024-09-21 RX ORDER — OXYCODONE HYDROCHLORIDE 5 MG/1
5 TABLET ORAL
Status: COMPLETED | OUTPATIENT
Start: 2024-09-21 | End: 2024-09-21

## 2024-09-21 RX ORDER — METHOCARBAMOL 500 MG/1
1000 TABLET, FILM COATED ORAL 3 TIMES DAILY
Qty: 42 TABLET | Refills: 0 | Status: SHIPPED | OUTPATIENT
Start: 2024-09-21 | End: 2024-09-28

## 2024-09-21 RX ORDER — ORPHENADRINE CITRATE 30 MG/ML
60 INJECTION INTRAMUSCULAR; INTRAVENOUS
Status: COMPLETED | OUTPATIENT
Start: 2024-09-21 | End: 2024-09-21

## 2024-09-21 RX ORDER — LIDOCAINE 50 MG/G
1 PATCH TOPICAL
Status: DISCONTINUED | OUTPATIENT
Start: 2024-09-21 | End: 2024-09-21 | Stop reason: HOSPADM

## 2024-09-21 RX ADMIN — OXYCODONE HYDROCHLORIDE 5 MG: 5 TABLET ORAL at 05:09

## 2024-09-21 RX ADMIN — ORPHENADRINE CITRATE 60 MG: 60 INJECTION INTRAMUSCULAR; INTRAVENOUS at 03:09

## 2024-09-21 RX ADMIN — HYDROMORPHONE HYDROCHLORIDE 1 MG: 1 INJECTION, SOLUTION INTRAMUSCULAR; INTRAVENOUS; SUBCUTANEOUS at 03:09

## 2024-09-21 RX ADMIN — SODIUM CHLORIDE, POTASSIUM CHLORIDE, SODIUM LACTATE AND CALCIUM CHLORIDE 1000 ML: 600; 310; 30; 20 INJECTION, SOLUTION INTRAVENOUS at 03:09

## 2024-09-21 RX ADMIN — LIDOCAINE 5% 1 PATCH: 700 PATCH TOPICAL at 05:09

## 2024-09-21 NOTE — ED TRIAGE NOTES
Keon Cabrera Wyatt Herrera, a 54 y.o. 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 Pt states he is in a lot of pain.      Review of patient's allergies indicates:   Allergen Reactions    Atorvastatin Nausea And Vomiting    Toradol [ketorolac] Hives and Nausea And Vomiting    Ibuprofen Other (See Comments)     States GI BLEED     Past Medical History:   Diagnosis Date    Anxiety     Benzodiazepine dependence    Anxiety disorder, unspecified     Arthritis     Asthma     Avascular necrosis     Carpal tunnel syndrome     Chronic pain     Depression     Diverticulitis     Gout     Mixed hyperlipidemia     Other injury of other sites of trunk 12/28/2011    Pneumonia     Primary hypertension 8/23/2024    Spondylolisthesis     lumbar; myofascial pain    Staph infection     Ulnar neuropathy     left and rt arm

## 2024-09-21 NOTE — ED PROVIDER NOTES
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 Pt states he is in a lot of pain.      54-year-old man with comorbidities of chronic pain, anxiety, arthritis, as well as recent evaluation for abnormal spinal imaging presents to the ED for evaluation of ongoing back pain and spasm without associated fever, fall, increased numbness, cough, chest pain, shortness of breath, diarrhea, or recent trauma.  Patient reports he has been compliant with his outpatient pain regimen as outlined from recent ED evaluation.  However, he reports that supply of opiate pain medications and muscle relaxants has been exhausted for the past 2 days.  Patient reports pain intermittently radiates to right lower extremity without associated rash or numbness.    The history is provided by the patient and medical records. No  was used.     Review of patient's allergies indicates:   Allergen Reactions    Atorvastatin Nausea And Vomiting    Toradol [ketorolac] Hives and Nausea And Vomiting    Ibuprofen Other (See Comments)     States GI BLEED     Past Medical History:   Diagnosis Date    Anxiety     Benzodiazepine dependence    Anxiety disorder, unspecified     Arthritis     Asthma     Avascular necrosis     Carpal tunnel syndrome     Chronic pain     Depression     Diverticulitis     Gout     Mixed hyperlipidemia     Other injury of other sites of trunk 12/28/2011    Pneumonia     Primary hypertension 8/23/2024    Spondylolisthesis     lumbar; myofascial pain    Staph infection     Ulnar neuropathy     left and rt arm     Past Surgical History:   Procedure Laterality Date    COLONOSCOPY N/A 7/6/2020    Procedure: COLONOSCOPY;  Surgeon: Broderick Moise MD;  Location: [facility];  Service: Endoscopy;  Laterality: N/A;    HIP SURGERY  3/06; 6/06    hip arthroplasty    HIP SURGERY      bilateral hip replacement (titanium)    JOINT  REPLACEMENT      OPEN REDUCTION AND INTERNAL FIXATION (ORIF) OF FRACTURE OF CLAVICLE Left 10/5/2023    Procedure: ORIF, FRACTURE, CLAVICLE;  Surgeon: Ozzy Howard MD;  Location: [facility] OR [address] [place] [address];  Service: Orthopedics;  Laterality: Left;    SHOULDER SURGERY  2012    right shoulder    SHOULDER SURGERY       Family History   Problem Relation Name Age of Onset    Cancer Mother      Cancer Sister       Social History     Tobacco Use    Smoking status: Former     Current packs/day: 0.00     Average packs/day: 1 pack/day for 10.0 years (10.0 ttl pk-yrs)     Types: Cigarettes     Start date: 10/13/2002     Quit date: 10/13/2012     Years since quittin.9     Passive exposure: Past    Smokeless tobacco: Never   Substance Use Topics    Alcohol use: Yes     Alcohol/week: 12.0 standard drinks of alcohol     Types: 12 Cans of beer per week     Comment: 2x/week    Drug use: No     Review of Systems    Physical Exam     Initial Vitals [24 1350]   BP Pulse Resp Temp SpO2   (!) 175/115 79 20 98.6 °F (37 °C) 96 %      MAP       --         Physical Exam    Vitals reviewed.  Constitutional:   54-year-old  man in moderate discomfort   HENT:   Head: Normocephalic and atraumatic.   Mouth/Throat: Oropharynx is clear and moist.   Eyes: EOM are normal.   Neck: No tracheal deviation present.   Cardiovascular:  Normal rate, regular rhythm and intact distal pulses.           Pulmonary/Chest: No stridor. No respiratory distress.   Abdominal: Abdomen is soft. There is no abdominal tenderness.   Musculoskeletal:         General: No edema.      Comments: No external evidence of trauma or deformity noted.  Patient exhibits moderate palpation tenderness to lower thoracic and lumbar paraspinous musculature without  midline tenderness to palpation, right greater than left with intermittent spasm     Neurological: He is alert and oriented to person, place, and time. He has normal strength.   Patient is ambulatory  unassisted   Psychiatric: Judgment normal.   Patient is mildly anxious, uncomfortable         ED Course   Procedures  Labs Reviewed   CBC W/ AUTO DIFFERENTIAL - Abnormal       Result Value    WBC 9.97      RBC 4.19 (*)     Hemoglobin 12.9 (*)     Hematocrit 40.1      MCV 96      MCH 30.8      MCHC 32.2      RDW 13.7      Platelets 285      MPV 8.7 (*)     Immature Granulocytes 0.6 (*)     Gran # (ANC) 6.5      Immature Grans (Abs) 0.06 (*)     Lymph # 2.0      Mono # 1.2 (*)     Eos # 0.1      Baso # 0.07      nRBC 0      Gran % 65.6      Lymph % 20.2      Mono % 12.0      Eosinophil % 0.9      Basophil % 0.7      Differential Method Automated     COMPREHENSIVE METABOLIC PANEL - Abnormal    Sodium 135 (*)     Potassium 4.6      Chloride 107      CO2 20 (*)     Glucose 105      BUN 20      Creatinine 0.9      Calcium 9.5      Total Protein 7.7      Albumin 4.4      Total Bilirubin 0.2      Alkaline Phosphatase 73      AST 14      ALT 24      eGFR >60.0      Anion Gap 8     LACTIC ACID, PLASMA    Lactate (Lactic Acid) 1.2     C-REACTIVE PROTEIN    CRP 1.1     SEDIMENTATION RATE    Sed Rate 10            Imaging Results    None          Medications   lactated ringers bolus 1,000 mL (0 mLs Intravenous Stopped 9/21/24 1628)   HYDROmorphone injection 1 mg (1 mg Intravenous Given 9/21/24 1523)   orphenadrine injection 60 mg (60 mg Intravenous Given 9/21/24 1526)   oxyCODONE immediate release tablet 5 mg (5 mg Oral Given 9/21/24 1738)     Medical Decision Making  Differential diagnosis:  Acute musculoskeletal pain, AJ, electrolyte derangement, lumbar radiculopathy, diskitis    Amount and/or Complexity of Data Reviewed  Labs: ordered.    Risk  Prescription drug management.              Attending Attestation:             Attending ED Notes:   Recent imaging reviewed including MRI of spine noting chronic arthritic changes and change of marrow signal.  However, emergency department evaluation today does not reveal evidence of  significant hematologic derangement.  Inflammatory markers including CRP, lactic acid, and ESR of notably within normal limits.  Metabolic panel does not reveal evidence of electrolyte derangement nor acute solid organ dysfunction.  The patient exhibits significant improvement of his presenting pain and intermittent spasm symptoms with therapy.  Emergency department findings have been discussed and all questions have been answered to patient's satisfaction.  He will be discharged home in improved condition with prescriptions for Percocet 20. Tablets, methocarbamol x7 days, as well as transdermal lidocaine.  Patient will be discharged to home with instructions to return to the ED as needed for emergent concerns, and follow up with his managing clinical team next available for further management of his chronic medical conditions.                             Clinical Impression:  Final diagnoses:  [M54.41] Acute back pain with sciatica, right (Primary)  [M54.16] Lumbar radiculopathy, acute  [M54.50, G89.29] Chronic bilateral low back pain, unspecified whether sciatica present          ED Disposition Condition    Discharge Stable          ED Prescriptions       Medication Sig Dispense Start Date End Date Auth. Provider    oxyCODONE-acetaminophen (PERCOCET) 5-325 mg per tablet Take 1 tablet by mouth every 6 (six) hours as needed for Pain. 24 tablet 9/21/2024 -- Ozzy Brink MD    LIDOcaine (LIDODERM) 5 % Place 1 patch onto the skin once daily. Remove & Discard patch within 12 hours or as directed by MD 7 patch 9/21/2024 -- Ozzy Brink MD    methocarbamoL (ROBAXIN) 500 MG Tab Take 2 tablets (1,000 mg total) by mouth 3 (three) times daily. for 7 days 42 tablet 9/21/2024 9/28/2024 Ozzy Brink MD          Follow-up Information       Follow up With Specialties Details Why Contact Info    [facility] - Emergency Dept Emergency Medicine  As needed, If symptoms worsen [address]  [place]  [place] [address]  [phone]    Floridalma Carrasco MD Internal Medicine Schedule an appointment as soon as possible for a visit in 1 week  [address] [facility] [address]  [phone]               Ozzy Brink MD  09/24/24 1016

## 2024-09-21 NOTE — ED NOTES
Patient identifiers for Keon Schneider Jr. 54 y.o. male checked and correct.  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 Pt states he is in a lot of pain.      Past Medical History:   Diagnosis Date    Anxiety     Benzodiazepine dependence    Anxiety disorder, unspecified     Arthritis     Asthma     Avascular necrosis     Carpal tunnel syndrome     Chronic pain     Depression     Diverticulitis     Gout     Mixed hyperlipidemia     Other injury of other sites of trunk 12/28/2011    Pneumonia     Primary hypertension 8/23/2024    Spondylolisthesis     lumbar; myofascial pain    Staph infection     Ulnar neuropathy     left and rt arm     Allergies reported:   Review of patient's allergies indicates:   Allergen Reactions    Atorvastatin Nausea And Vomiting    Toradol [ketorolac] Hives and Nausea And Vomiting    Ibuprofen Other (See Comments)     States GI BLEED       Appearance: Pt awake, alert & oriented to person, place & time. Pt in no acute distress at present time. Pt is clean and well groomed with clothes appropriately fastened.   Skin: Skin warm, dry & intact. Color consistent with ethnicity. Mucous membranes moist. No breakdown or brusing noted.   Musculoskeletal: Patient moving all extremities well, no obvious swelling or deformities noted. Lower back pain  Respiratory: Respirations spontaneous, even, and non-labored. Visible chest rise noted. Airway is open and patent. No accessory muscle use noted.   Neurologic: Sensation is intact. Speech is clear and appropriate. Eyes open spontaneously, behavior appropriate to situation, follows commands, facial expression symmetrical, bilateral hand grasp equal and even, purposeful motor response noted.  Cardiac: All peripheral pulses present. No Bilateral lower extremity edema. Cap refill is <3 seconds.  Abdomen: Abdomen soft, non distended, non tender to palpation.   : Pt voids independently, denies  dysuria, hematuria, frequency.

## 2024-09-22 ENCOUNTER — PATIENT MESSAGE (OUTPATIENT)
Dept: HEMATOLOGY/ONCOLOGY | Facility: CLINIC | Age: 54
End: 2024-09-22
Payer: MEDICARE

## 2024-09-22 ENCOUNTER — DOCUMENTATION ONLY (OUTPATIENT)
Dept: HEMATOLOGY/ONCOLOGY | Facility: CLINIC | Age: 54
End: 2024-09-22
Payer: MEDICARE

## 2024-09-22 NOTE — PROGRESS NOTES
PET CT most consistent with likely osteophytes (uptake in osteophytes, and not elsewhere within the skeleton), which is consistent with the MRI findings. Per MRI report, recommendation of repeat imaging 3 months from last. This recommendation has been shared with the patient's primary care physician, and a message sent to the patient. He may return to the oncology diagnosis clinic PRN.

## 2024-09-22 NOTE — Clinical Note
Good morning,   I believe workup thus far is most consistent with degenerative changes rather than an oncologic process. I would recommend an MRI of the spine 3 months from the last, as recommended by the radiologist. Are you able to order and follow this up? Let me know if you prefer it occur in the diagnosis clinic.   Andrade Gregory

## 2024-09-23 ENCOUNTER — TELEPHONE (OUTPATIENT)
Dept: PSYCHIATRY | Facility: CLINIC | Age: 54
End: 2024-09-23
Payer: MEDICARE

## 2024-09-24 ENCOUNTER — TELEPHONE (OUTPATIENT)
Dept: HEMATOLOGY/ONCOLOGY | Facility: CLINIC | Age: 54
End: 2024-09-24
Payer: MEDICARE

## 2024-09-24 NOTE — TELEPHONE ENCOUNTER
Spoke with pt regarding PET Scan results. Pt unable to login to [facility] to view PET interpretation message sent by Dr. Grewal. I read the message to the pt, pt verbalized understanding. Informed the pt that they should reach out to his PCP for follow up MRI and pain medication needs. Pt verbalized understanding.

## 2024-09-29 ENCOUNTER — HOSPITAL ENCOUNTER (INPATIENT)
Facility: HOSPITAL | Age: 54
LOS: 2 days | Discharge: HOME | DRG: 392 | End: 2024-10-01
Attending: EMERGENCY MEDICINE | Admitting: COLON & RECTAL SURGERY
Payer: MEDICARE

## 2024-09-29 DIAGNOSIS — M54.40 CHRONIC MIDLINE LOW BACK PAIN WITH SCIATICA, SCIATICA LATERALITY UNSPECIFIED: Chronic | ICD-10-CM

## 2024-09-29 DIAGNOSIS — G89.29 CHRONIC MIDLINE LOW BACK PAIN WITH SCIATICA, SCIATICA LATERALITY UNSPECIFIED: Chronic | ICD-10-CM

## 2024-09-29 DIAGNOSIS — K57.92 ACUTE DIVERTICULITIS: Primary | ICD-10-CM

## 2024-09-29 LAB
ALBUMIN SERPL BCP-MCNC: 4.5 G/DL (ref 3.5–5.2)
ALP SERPL-CCNC: 89 U/L (ref 55–135)
ALT SERPL W/O P-5'-P-CCNC: 29 U/L (ref 10–44)
ANION GAP SERPL CALC-SCNC: 11 MMOL/L (ref 8–16)
AST SERPL-CCNC: 21 U/L (ref 10–40)
BACTERIA #/AREA URNS AUTO: NORMAL /HPF
BASOPHILS # BLD AUTO: 0.04 K/UL (ref 0–0.2)
BASOPHILS NFR BLD: 0.3 % (ref 0–1.9)
BILIRUB SERPL-MCNC: 0.9 MG/DL (ref 0.1–1)
BILIRUB UR QL STRIP: NEGATIVE
BUN SERPL-MCNC: 11 MG/DL (ref 6–20)
BUN SERPL-MCNC: 11 MG/DL (ref 6–30)
CALCIUM SERPL-MCNC: 9.8 MG/DL (ref 8.7–10.5)
CHLORIDE SERPL-SCNC: 101 MMOL/L (ref 95–110)
CHLORIDE SERPL-SCNC: 103 MMOL/L (ref 95–110)
CLARITY UR REFRACT.AUTO: CLEAR
CO2 SERPL-SCNC: 24 MMOL/L (ref 23–29)
COLOR UR AUTO: YELLOW
CREAT SERPL-MCNC: 1 MG/DL (ref 0.5–1.4)
CREAT SERPL-MCNC: 1.1 MG/DL (ref 0.5–1.4)
DIFFERENTIAL METHOD BLD: ABNORMAL
EOSINOPHIL # BLD AUTO: 0.2 K/UL (ref 0–0.5)
EOSINOPHIL NFR BLD: 1.5 % (ref 0–8)
ERYTHROCYTE [DISTWIDTH] IN BLOOD BY AUTOMATED COUNT: 13.5 % (ref 11.5–14.5)
EST. GFR  (NO RACE VARIABLE): >60 ML/MIN/1.73 M^2
GLUCOSE SERPL-MCNC: 104 MG/DL (ref 70–110)
GLUCOSE SERPL-MCNC: 109 MG/DL (ref 70–110)
GLUCOSE UR QL STRIP: NEGATIVE
HCT VFR BLD AUTO: 40.5 % (ref 40–54)
HCT VFR BLD CALC: 42 %PCV (ref 36–54)
HGB BLD-MCNC: 13.6 G/DL (ref 14–18)
HGB UR QL STRIP: NEGATIVE
HYALINE CASTS UR QL AUTO: 0 /LPF
IMM GRANULOCYTES # BLD AUTO: 0.04 K/UL (ref 0–0.04)
IMM GRANULOCYTES NFR BLD AUTO: 0.3 % (ref 0–0.5)
KETONES UR QL STRIP: NEGATIVE
LEUKOCYTE ESTERASE UR QL STRIP: NEGATIVE
LIPASE SERPL-CCNC: 15 U/L (ref 4–60)
LYMPHOCYTES # BLD AUTO: 1.9 K/UL (ref 1–4.8)
LYMPHOCYTES NFR BLD: 13.9 % (ref 18–48)
MCH RBC QN AUTO: 31.2 PG (ref 27–31)
MCHC RBC AUTO-ENTMCNC: 33.6 G/DL (ref 32–36)
MCV RBC AUTO: 93 FL (ref 82–98)
MICROSCOPIC COMMENT: NORMAL
MONOCYTES # BLD AUTO: 1.4 K/UL (ref 0.3–1)
MONOCYTES NFR BLD: 10.7 % (ref 4–15)
NEUTROPHILS # BLD AUTO: 9.8 K/UL (ref 1.8–7.7)
NEUTROPHILS NFR BLD: 73.3 % (ref 38–73)
NITRITE UR QL STRIP: NEGATIVE
NRBC BLD-RTO: 0 /100 WBC
PH UR STRIP: 7 [PH] (ref 5–8)
PLATELET # BLD AUTO: 229 K/UL (ref 150–450)
PMV BLD AUTO: 9.3 FL (ref 9.2–12.9)
POC IONIZED CALCIUM: 1.15 MMOL/L (ref 1.06–1.42)
POC TCO2 (MEASURED): 23 MMOL/L (ref 23–29)
POTASSIUM BLD-SCNC: 4.3 MMOL/L (ref 3.5–5.1)
POTASSIUM SERPL-SCNC: 4.3 MMOL/L (ref 3.5–5.1)
PROT SERPL-MCNC: 8 G/DL (ref 6–8.4)
PROT UR QL STRIP: ABNORMAL
RBC # BLD AUTO: 4.36 M/UL (ref 4.6–6.2)
RBC #/AREA URNS AUTO: 1 /HPF (ref 0–4)
SAMPLE: NORMAL
SODIUM BLD-SCNC: 136 MMOL/L (ref 136–145)
SODIUM SERPL-SCNC: 138 MMOL/L (ref 136–145)
SP GR UR STRIP: 1.02 (ref 1–1.03)
URN SPEC COLLECT METH UR: ABNORMAL
WBC # BLD AUTO: 13.31 K/UL (ref 3.9–12.7)
WBC #/AREA URNS AUTO: 0 /HPF (ref 0–5)

## 2024-09-29 PROCEDURE — 81001 URINALYSIS AUTO W/SCOPE: CPT

## 2024-09-29 PROCEDURE — 20600001 HC STEP DOWN PRIVATE ROOM

## 2024-09-29 PROCEDURE — 25000003 PHARM REV CODE 250: Performed by: STUDENT IN AN ORGANIZED HEALTH CARE EDUCATION/TRAINING PROGRAM

## 2024-09-29 PROCEDURE — 96375 TX/PRO/DX INJ NEW DRUG ADDON: CPT

## 2024-09-29 PROCEDURE — 96361 HYDRATE IV INFUSION ADD-ON: CPT

## 2024-09-29 PROCEDURE — 96376 TX/PRO/DX INJ SAME DRUG ADON: CPT

## 2024-09-29 PROCEDURE — 80053 COMPREHEN METABOLIC PANEL: CPT

## 2024-09-29 PROCEDURE — 99223 1ST HOSP IP/OBS HIGH 75: CPT | Mod: AI,,, | Performed by: COLON & RECTAL SURGERY

## 2024-09-29 PROCEDURE — 25500020 PHARM REV CODE 255: Performed by: EMERGENCY MEDICINE

## 2024-09-29 PROCEDURE — 85025 COMPLETE CBC W/AUTO DIFF WBC: CPT

## 2024-09-29 PROCEDURE — 63600175 PHARM REV CODE 636 W HCPCS: Performed by: STUDENT IN AN ORGANIZED HEALTH CARE EDUCATION/TRAINING PROGRAM

## 2024-09-29 PROCEDURE — 99285 EMERGENCY DEPT VISIT HI MDM: CPT | Mod: 25

## 2024-09-29 PROCEDURE — 96374 THER/PROPH/DIAG INJ IV PUSH: CPT

## 2024-09-29 PROCEDURE — 63600175 PHARM REV CODE 636 W HCPCS

## 2024-09-29 PROCEDURE — 25000003 PHARM REV CODE 250

## 2024-09-29 PROCEDURE — 83690 ASSAY OF LIPASE: CPT

## 2024-09-29 RX ORDER — ALPRAZOLAM 0.5 MG/1
1 TABLET ORAL 3 TIMES DAILY PRN
Status: DISCONTINUED | OUTPATIENT
Start: 2024-09-29 | End: 2024-10-01 | Stop reason: HOSPADM

## 2024-09-29 RX ORDER — SODIUM CHLORIDE, SODIUM LACTATE, POTASSIUM CHLORIDE, CALCIUM CHLORIDE 600; 310; 30; 20 MG/100ML; MG/100ML; MG/100ML; MG/100ML
INJECTION, SOLUTION INTRAVENOUS CONTINUOUS
Status: DISCONTINUED | OUTPATIENT
Start: 2024-09-29 | End: 2024-09-30

## 2024-09-29 RX ORDER — NITROGLYCERIN 0.4 MG/1
0.4 TABLET SUBLINGUAL EVERY 5 MIN PRN
Status: DISCONTINUED | OUTPATIENT
Start: 2024-09-29 | End: 2024-10-01 | Stop reason: HOSPADM

## 2024-09-29 RX ORDER — MORPHINE SULFATE 2 MG/ML
6 INJECTION, SOLUTION INTRAMUSCULAR; INTRAVENOUS
Status: COMPLETED | OUTPATIENT
Start: 2024-09-29 | End: 2024-09-29

## 2024-09-29 RX ORDER — FAMOTIDINE 10 MG/ML
20 INJECTION INTRAVENOUS
Status: COMPLETED | OUTPATIENT
Start: 2024-09-29 | End: 2024-09-29

## 2024-09-29 RX ORDER — ONDANSETRON HYDROCHLORIDE 2 MG/ML
4 INJECTION, SOLUTION INTRAVENOUS EVERY 8 HOURS PRN
Status: DISCONTINUED | OUTPATIENT
Start: 2024-09-29 | End: 2024-10-01 | Stop reason: HOSPADM

## 2024-09-29 RX ORDER — ALLOPURINOL 100 MG/1
100 TABLET ORAL DAILY
Status: DISCONTINUED | OUTPATIENT
Start: 2024-09-29 | End: 2024-10-01 | Stop reason: HOSPADM

## 2024-09-29 RX ORDER — SERTRALINE HYDROCHLORIDE 50 MG/1
50 TABLET, FILM COATED ORAL DAILY
Status: DISCONTINUED | OUTPATIENT
Start: 2024-09-29 | End: 2024-09-29

## 2024-09-29 RX ORDER — ATORVASTATIN CALCIUM 40 MG/1
80 TABLET, FILM COATED ORAL DAILY
Status: DISCONTINUED | OUTPATIENT
Start: 2024-09-29 | End: 2024-10-01 | Stop reason: HOSPADM

## 2024-09-29 RX ORDER — ENOXAPARIN SODIUM 100 MG/ML
40 INJECTION SUBCUTANEOUS EVERY 24 HOURS
Status: DISCONTINUED | OUTPATIENT
Start: 2024-09-29 | End: 2024-10-01 | Stop reason: HOSPADM

## 2024-09-29 RX ORDER — OXYCODONE HYDROCHLORIDE 5 MG/1
5 TABLET ORAL EVERY 4 HOURS PRN
Status: DISCONTINUED | OUTPATIENT
Start: 2024-09-29 | End: 2024-09-30

## 2024-09-29 RX ORDER — FAMOTIDINE 20 MG/1
40 TABLET, FILM COATED ORAL DAILY
Status: DISCONTINUED | OUTPATIENT
Start: 2024-09-29 | End: 2024-10-01 | Stop reason: HOSPADM

## 2024-09-29 RX ORDER — OXYCODONE HYDROCHLORIDE 10 MG/1
10 TABLET ORAL EVERY 4 HOURS PRN
Status: DISCONTINUED | OUTPATIENT
Start: 2024-09-29 | End: 2024-09-30

## 2024-09-29 RX ORDER — ACETAMINOPHEN 500 MG
1000 TABLET ORAL EVERY 6 HOURS
Status: DISCONTINUED | OUTPATIENT
Start: 2024-09-29 | End: 2024-10-01 | Stop reason: HOSPADM

## 2024-09-29 RX ORDER — ASPIRIN 81 MG/1
81 TABLET ORAL DAILY
Status: DISCONTINUED | OUTPATIENT
Start: 2024-09-29 | End: 2024-10-01 | Stop reason: HOSPADM

## 2024-09-29 RX ORDER — NALOXONE HCL 0.4 MG/ML
0.02 VIAL (ML) INJECTION
Status: DISCONTINUED | OUTPATIENT
Start: 2024-09-29 | End: 2024-10-01 | Stop reason: HOSPADM

## 2024-09-29 RX ORDER — HYDROMORPHONE HYDROCHLORIDE 1 MG/ML
1 INJECTION, SOLUTION INTRAMUSCULAR; INTRAVENOUS; SUBCUTANEOUS
Status: COMPLETED | OUTPATIENT
Start: 2024-09-29 | End: 2024-09-29

## 2024-09-29 RX ORDER — PREGABALIN 25 MG/1
25 CAPSULE ORAL 2 TIMES DAILY
Status: DISCONTINUED | OUTPATIENT
Start: 2024-09-29 | End: 2024-10-01 | Stop reason: HOSPADM

## 2024-09-29 RX ORDER — AMLODIPINE BESYLATE 5 MG/1
5 TABLET ORAL DAILY
Status: DISCONTINUED | OUTPATIENT
Start: 2024-09-29 | End: 2024-10-01 | Stop reason: HOSPADM

## 2024-09-29 RX ORDER — EZETIMIBE 10 MG/1
10 TABLET ORAL DAILY
Status: DISCONTINUED | OUTPATIENT
Start: 2024-09-29 | End: 2024-10-01 | Stop reason: HOSPADM

## 2024-09-29 RX ORDER — SODIUM CHLORIDE 0.9 % (FLUSH) 0.9 %
10 SYRINGE (ML) INJECTION
Status: DISCONTINUED | OUTPATIENT
Start: 2024-09-29 | End: 2024-10-01 | Stop reason: HOSPADM

## 2024-09-29 RX ORDER — HYDROMORPHONE HYDROCHLORIDE 1 MG/ML
2 INJECTION, SOLUTION INTRAMUSCULAR; INTRAVENOUS; SUBCUTANEOUS
Status: COMPLETED | OUTPATIENT
Start: 2024-09-29 | End: 2024-09-29

## 2024-09-29 RX ORDER — HYDROMORPHONE HYDROCHLORIDE 1 MG/ML
0.5 INJECTION, SOLUTION INTRAMUSCULAR; INTRAVENOUS; SUBCUTANEOUS
Status: DISCONTINUED | OUTPATIENT
Start: 2024-09-29 | End: 2024-09-30

## 2024-09-29 RX ORDER — METHOCARBAMOL 500 MG/1
1000 TABLET, FILM COATED ORAL 3 TIMES DAILY
Status: DISCONTINUED | OUTPATIENT
Start: 2024-09-29 | End: 2024-10-01 | Stop reason: HOSPADM

## 2024-09-29 RX ORDER — ONDANSETRON HYDROCHLORIDE 2 MG/ML
4 INJECTION, SOLUTION INTRAVENOUS
Status: COMPLETED | OUTPATIENT
Start: 2024-09-29 | End: 2024-09-29

## 2024-09-29 RX ADMIN — EZETIMIBE 10 MG: 10 TABLET ORAL at 03:09

## 2024-09-29 RX ADMIN — ONDANSETRON 4 MG: 2 INJECTION INTRAMUSCULAR; INTRAVENOUS at 10:09

## 2024-09-29 RX ADMIN — HYDROMORPHONE HYDROCHLORIDE 0.5 MG: 1 INJECTION, SOLUTION INTRAMUSCULAR; INTRAVENOUS; SUBCUTANEOUS at 08:09

## 2024-09-29 RX ADMIN — SODIUM CHLORIDE, POTASSIUM CHLORIDE, SODIUM LACTATE AND CALCIUM CHLORIDE: 600; 310; 30; 20 INJECTION, SOLUTION INTRAVENOUS at 11:09

## 2024-09-29 RX ADMIN — FAMOTIDINE 20 MG: 10 INJECTION, SOLUTION INTRAVENOUS at 11:09

## 2024-09-29 RX ADMIN — PIPERACILLIN SODIUM AND TAZOBACTAM SODIUM 4.5 G: 4; .5 INJECTION, POWDER, FOR SOLUTION INTRAVENOUS at 01:09

## 2024-09-29 RX ADMIN — METHOCARBAMOL 1000 MG: 500 TABLET ORAL at 08:09

## 2024-09-29 RX ADMIN — OXYCODONE HYDROCHLORIDE 10 MG: 10 TABLET ORAL at 11:09

## 2024-09-29 RX ADMIN — OXYCODONE HYDROCHLORIDE 10 MG: 10 TABLET ORAL at 05:09

## 2024-09-29 RX ADMIN — HYDROMORPHONE HYDROCHLORIDE 2 MG: 1 INJECTION, SOLUTION INTRAMUSCULAR; INTRAVENOUS; SUBCUTANEOUS at 01:09

## 2024-09-29 RX ADMIN — SODIUM CHLORIDE 1000 ML: 9 INJECTION, SOLUTION INTRAVENOUS at 11:09

## 2024-09-29 RX ADMIN — PREGABALIN 25 MG: 25 CAPSULE ORAL at 08:09

## 2024-09-29 RX ADMIN — ACETAMINOPHEN 1000 MG: 500 TABLET ORAL at 05:09

## 2024-09-29 RX ADMIN — ALLOPURINOL 100 MG: 100 TABLET ORAL at 03:09

## 2024-09-29 RX ADMIN — FAMOTIDINE 40 MG: 20 TABLET ORAL at 01:09

## 2024-09-29 RX ADMIN — HYDROMORPHONE HYDROCHLORIDE 1 MG: 1 INJECTION, SOLUTION INTRAMUSCULAR; INTRAVENOUS; SUBCUTANEOUS at 11:09

## 2024-09-29 RX ADMIN — HYDROMORPHONE HYDROCHLORIDE 0.5 MG: 1 INJECTION, SOLUTION INTRAMUSCULAR; INTRAVENOUS; SUBCUTANEOUS at 03:09

## 2024-09-29 RX ADMIN — ACETAMINOPHEN 1000 MG: 500 TABLET ORAL at 11:09

## 2024-09-29 RX ADMIN — SODIUM CHLORIDE, POTASSIUM CHLORIDE, SODIUM LACTATE AND CALCIUM CHLORIDE: 600; 310; 30; 20 INJECTION, SOLUTION INTRAVENOUS at 03:09

## 2024-09-29 RX ADMIN — METHOCARBAMOL 1000 MG: 500 TABLET ORAL at 03:09

## 2024-09-29 RX ADMIN — ALPRAZOLAM 1 MG: 0.5 TABLET ORAL at 05:09

## 2024-09-29 RX ADMIN — PIPERACILLIN SODIUM AND TAZOBACTAM SODIUM 4.5 G: 4; .5 INJECTION, POWDER, FOR SOLUTION INTRAVENOUS at 09:09

## 2024-09-29 RX ADMIN — IOHEXOL 100 ML: 350 INJECTION, SOLUTION INTRAVENOUS at 11:09

## 2024-09-29 RX ADMIN — ATORVASTATIN CALCIUM 80 MG: 40 TABLET, FILM COATED ORAL at 01:09

## 2024-09-29 RX ADMIN — MORPHINE SULFATE 6 MG: 2 INJECTION, SOLUTION INTRAMUSCULAR; INTRAVENOUS at 10:09

## 2024-09-29 RX ADMIN — ASPIRIN 81 MG: 81 TABLET, COATED ORAL at 01:09

## 2024-09-29 NOTE — ED TRIAGE NOTES
Keon Cardsina Aquino., a 54 y.o. male presents to the ED w/ complaint of abdominal pain. Pt presents to the ED via private vehicle with complaints of abdominal pain. Pt states he feels he is having a diverticulitis flare-up. He has been having left lower quadrant abdominal pain for the past 6 days. Pt endorses lack of appetite. Pt endorses nausea and vomiting but denies diarrhea.     Triage note:  Chief Complaint   Patient presents with    Abdominal Pain     LLQ hx divertic     Review of patient's allergies indicates:   Allergen Reactions    Atorvastatin Nausea And Vomiting    Toradol [ketorolac] Hives and Nausea And Vomiting    Ibuprofen Other (See Comments)     States GI BLEED     Past Medical History:   Diagnosis Date    Anxiety     Benzodiazepine dependence    Anxiety disorder, unspecified     Arthritis     Asthma     Avascular necrosis     Carpal tunnel syndrome     Chronic pain     Depression     Diverticulitis     Gout     Mixed hyperlipidemia     Other injury of other sites of trunk 12/28/2011    Pneumonia     Primary hypertension 8/23/2024    Spondylolisthesis     lumbar; myofascial pain    Staph infection     Ulnar neuropathy     left and rt arm

## 2024-09-29 NOTE — ED PROVIDER NOTES
Encounter Date: 9/29/2024       History     Chief Complaint   Patient presents with    Abdominal Pain     LLQ hx divertic     54-year-old male with history of  hypertension, hyperlipidemia, gout, depression, anxiety, chronic back pain, multiple episodes of diverticulitis presenting with chief complaint of LLQ abdominal pain onset 4 days.  States pain is burning and consistent with prior diverticulitis.  He has also been having nausea and vomiting for the past 3 days and unable to keep anything down.  States he had a subjective fever last night where he felt as though burning up and diaphoretic.  He denies bloody stools, diarrhea, constipation, chest pain, shortness of breath, or other complaints at this time.    The history is provided by the patient and medical records.     Review of patient's allergies indicates:   Allergen Reactions    Atorvastatin Nausea And Vomiting    Toradol [ketorolac] Hives and Nausea And Vomiting    Ibuprofen Other (See Comments)     States GI BLEED     Past Medical History:   Diagnosis Date    Anxiety     Benzodiazepine dependence    Anxiety disorder, unspecified     Arthritis     Asthma     Avascular necrosis     Carpal tunnel syndrome     Chronic pain     Depression     Diverticulitis     Gout     Mixed hyperlipidemia     Other injury of other sites of trunk 12/28/2011    Pneumonia     Primary hypertension 8/23/2024    Spondylolisthesis     lumbar; myofascial pain    Staph infection     Ulnar neuropathy     left and rt arm     Past Surgical History:   Procedure Laterality Date    COLONOSCOPY N/A 7/6/2020    Procedure: COLONOSCOPY;  Surgeon: Broderick Moise MD;  Location: [facility];  Service: Endoscopy;  Laterality: N/A;    HIP SURGERY  3/06; 6/06    hip arthroplasty    HIP SURGERY      bilateral hip replacement (titanium)    JOINT REPLACEMENT      OPEN REDUCTION AND INTERNAL FIXATION (ORIF) OF FRACTURE OF CLAVICLE Left 10/5/2023    Procedure: ORIF, FRACTURE, CLAVICLE;  Surgeon:  Ozzy Howard MD;  Location: [address];  Service: Orthopedics;  Laterality: Left;    SHOULDER SURGERY      right shoulder    SHOULDER SURGERY       Family History   Problem Relation Name Age of Onset    Cancer Mother      Cancer Sister       Social History     Tobacco Use    Smoking status: Former     Current packs/day: 0.00     Average packs/day: 1 pack/day for 10.0 years (10.0 ttl pk-yrs)     Types: Cigarettes     Start date: 10/13/2002     Quit date: 10/13/2012     Years since quittin.9     Passive exposure: Past    Smokeless tobacco: Never   Substance Use Topics    Alcohol use: Yes     Alcohol/week: 12.0 standard drinks of alcohol     Types: 12 Cans of beer per week     Comment: 2x/week    Drug use: No     Review of Systems  See HPI  Physical Exam     Initial Vitals [24 0941]   BP Pulse Resp Temp SpO2   (!) 140/84 100 18 98.5 °F (36.9 °C) 98 %      MAP       --         Physical Exam    Vitals reviewed.  Constitutional: He appears well-developed and well-nourished. He is not diaphoretic. No distress.   Appears uncomfortable secondary to pain, worse with lying flat   HENT:   Head: Normocephalic and atraumatic.   Neck: Neck supple.   Cardiovascular:  Normal rate, regular rhythm and normal heart sounds.     Exam reveals no gallop and no friction rub.       No murmur heard.  Pulmonary/Chest: Breath sounds normal. No respiratory distress. He has no wheezes. He has no rhonchi. He has no rales.   Abdominal: Abdomen is soft. He exhibits no distension. There is abdominal tenderness in the right lower quadrant and left lower quadrant.   LLQ>RLQ There is guarding. There is no rebound.   Musculoskeletal:      Cervical back: Neck supple.     Neurological: He is alert and oriented to person, place, and time.   Psychiatric: He has a normal mood and affect.         ED Course   Procedures  Labs Reviewed   CBC W/ AUTO DIFFERENTIAL - Abnormal       Result Value    WBC 13.31 (*)     RBC 4.36 (*)      Hemoglobin 13.6 (*)     Hematocrit 40.5      MCV 93      MCH 31.2 (*)     MCHC 33.6      RDW 13.5      Platelets 229      MPV 9.3      Immature Granulocytes 0.3      Gran # (ANC) 9.8 (*)     Immature Grans (Abs) 0.04      Lymph # 1.9      Mono # 1.4 (*)     Eos # 0.2      Baso # 0.04      nRBC 0      Gran % 73.3 (*)     Lymph % 13.9 (*)     Mono % 10.7      Eosinophil % 1.5      Basophil % 0.3      Differential Method Automated     URINALYSIS, REFLEX TO URINE CULTURE - Abnormal    Specimen UA Urine, Clean Catch      Color, UA Yellow      Appearance, UA Clear      pH, UA 7.0      Specific Gravity, UA 1.020      Protein, UA 1+ (*)     Glucose, UA Negative      Ketones, UA Negative      Bilirubin (UA) Negative      Occult Blood UA Negative      Nitrite, UA Negative      Leukocytes, UA Negative      Narrative:     Specimen Source->Urine   COMPREHENSIVE METABOLIC PANEL    Sodium 138      Potassium 4.3      Chloride 103      CO2 24      Glucose 104      BUN 11      Creatinine 1.0      Calcium 9.8      Total Protein 8.0      Albumin 4.5      Total Bilirubin 0.9      Alkaline Phosphatase 89      AST 21      ALT 29      eGFR >60.0      Anion Gap 11     LIPASE    Lipase 15     URINALYSIS MICROSCOPIC    RBC, UA 1      WBC, UA 0      Bacteria Rare      Hyaline Casts, UA 0      Microscopic Comment SEE COMMENT      Narrative:     Specimen Source->Urine   ISTAT PROCEDURE    POC Glucose 109      POC BUN 11      POC Creatinine 1.1      POC Sodium 136      POC Potassium 4.3      POC Chloride 101      POC TCO2 (MEASURED) 23      POC Ionized Calcium 1.15      POC Hematocrit 42      Sample CHRISTINA     ISTAT CHEM8          Imaging Results               CT Abdomen Pelvis With IV Contrast NO Oral Contrast (Final result)  Result time 09/29/24 12:05:28      Final result by Juan Dexter MD (09/29/24 12:05:28)                   Impression:      This report was flagged in Epic as abnormal.    1. Findings most consistent with acute  diverticulitis involving the mid to distal sigmoid colon.  Punctate foci of air in the region may reflect air within adjacent diverticula however micro perforation cannot be excluded.  No abscess.  Correlation is advised.  Follow-up colonoscopy recommended to exclude underlying lesion.  2. Findings suggest hepatic steatosis, correlation with LFTs recommended.  3. Please see above for several additional findings.      Electronically signed by: Juan Dexter MD  Date:    09/29/2024  Time:    12:05               Narrative:    EXAMINATION:  CT ABDOMEN PELVIS WITH IV CONTRAST    CLINICAL HISTORY:  LLQ abdominal pain;    TECHNIQUE:  Low dose axial images, sagittal and coronal reformations were obtained from the lung bases to the pubic symphysis following the IV administration of 100 mL of Omnipaque 350 .  Oral contrast was not given.    COMPARISON:  CT 09/06/2024, PET-CT 09/19/2024    FINDINGS:  Images of the lower thorax are unremarkable.    The liver is hypoattenuating, suggesting steatosis, correlation with LFTs recommended.  The spleen, pancreas, gallbladder and adrenal glands are grossly unremarkable.  There is no biliary dilation or ascites.  The stomach is decompressed without wall thickening.  There are a few scattered mildly prominent mason hepatic lymph nodes.  The portal vein, splenic vein, SMV, celiac axis and SMA all are patent.  No significant abdominal lymphadenopathy.    The kidneys enhance symmetrically without hydronephrosis or nephrolithiasis.  The bilateral ureters are unable to be followed in their entirety to the urinary bladder, no definite calculi seen or secondary findings to suggest obstructive uropathy.  The urinary bladder is decompressed noting evaluation of the pelvis is significantly limited secondary to extensive artifact from bilateral hip prostheses.  The prostate is unable to be effectively assessed although does not appear to be significantly enlarged.    There is strand-like fluid in  the deep pelvis.  There are several colonic diverticula noting inflammation involving several diverticula at the level of the mid to distal sigmoid colon.  There are a few punctate foci of air in the region, either reflecting air within diverticula or micro perforation.  No findings to suggest abscess.  Diverticula are noted along the remaining aspects of the large bowel without additional inflammation.  The terminal ileum and appendix are unremarkable.  The small bowel is grossly unremarkable noting a few fluid-filled nondistended loops.  There are a few scattered shotty periaortic, pericaval, and mesenteric lymph nodes.  There is atherosclerotic calcification of the aorta and its branches.    There are bilateral hip prostheses, no dislocation.  There are degenerative changes of the spine.  No significant inguinal lymphadenopathy.                                       Medications   allopurinoL tablet 100 mg (has no administration in time range)   ALPRAZolam tablet 1 mg (has no administration in time range)   amLODIPine tablet 5 mg (0 mg Oral Hold 9/29/24 1339)   aspirin EC tablet 81 mg (81 mg Oral Given 9/29/24 1338)   ezetimibe tablet 10 mg (has no administration in time range)   famotidine tablet 40 mg (40 mg Oral Given 9/29/24 1338)   methocarbamoL tablet 1,000 mg (has no administration in time range)   nitroGLYCERIN SL tablet 0.4 mg (has no administration in time range)   pregabalin capsule 25 mg (has no administration in time range)   atorvastatin tablet 80 mg (80 mg Oral Given 9/29/24 1338)   sodium chloride 0.9% flush 10 mL (has no administration in time range)   naloxone 0.4 mg/mL injection 0.02 mg (has no administration in time range)   lactated ringers infusion (has no administration in time range)   enoxaparin injection 40 mg (has no administration in time range)   piperacillin-tazobactam (ZOSYN) 4.5 g in D5W 100 mL IVPB (MB+) (0 g Intravenous Hold 9/29/24 1415)   acetaminophen tablet 1,000 mg (has no  administration in time range)   oxyCODONE immediate release tablet 5 mg (has no administration in time range)   oxyCODONE immediate release tablet Tab 10 mg (has no administration in time range)   HYDROmorphone injection 0.5 mg (has no administration in time range)   ondansetron injection 4 mg (has no administration in time range)   morphine injection 6 mg (6 mg Intravenous Given 9/29/24 1038)   ondansetron injection 4 mg (4 mg Intravenous Given 9/29/24 1037)   iohexoL (OMNIPAQUE 350) injection 100 mL (100 mLs Intravenous Given 9/29/24 1100)   HYDROmorphone injection 1 mg (1 mg Intravenous Given 9/29/24 1132)   sodium chloride 0.9% bolus 1,000 mL 1,000 mL (0 mLs Intravenous Stopped 9/29/24 1300)   famotidine (PF) injection 20 mg (20 mg Intravenous Given 9/29/24 1154)   piperacillin-tazobactam (ZOSYN) 4.5 g in D5W 100 mL IVPB (MB+) (0 g Intravenous Stopped 9/29/24 1336)   HYDROmorphone injection 2 mg (2 mg Intravenous Given 9/29/24 1301)     Medical Decision Making       APC / Resident Notes:   Emergent evaluation of 54-year-old male presenting with LLQ abdominal pain associated with N/V. + subjective fever.  Vitals stable.  Nontoxic appearing. See physical exam findings above. Will obtain labs and CT abdomen/pelvis for acute abnormalities and provide analgesia    My differential diagnoses include but are not limited to:  Acute diverticulitis, perforation, dehydration, AJ, electrolyte abnormality, UTI, ureterolithiasis, gastroenteritis  See ED course.        ED Course as of 09/29/24 1506   Sun Sep 29, 2024   1125 WBC(!): 13.31 [KB]   1125 Hemoglobin(!): 13.6  Stable [KB]   1129 Comp. Metabolic Panel  WNL [KB]   1130 Lipase: 15 [KB]   1205 Per radiologist, Findings most consistent with acute diverticulitis involving the mid to distal sigmoid colon.  Punctate foci of air in the region may reflect air within adjacent diverticula however micro perforation cannot be excluded.  No abscess.  Correlation is advised.   "Follow-up colonoscopy recommended to exclude underlying lesion."    Will consult CRS [KB]   1214 Discussed with colorectal who will come evaluate patient [KB]   1239 Pain increasing and patient appears slightly in distress secondary to the pain, will give more Dilaudid.  [KB]   1304 Admitted to CRS services for further work up and treatment [KB]      ED Course User Index  [KB] Berkley Shaw PA-C                           Clinical Impression:  Final diagnoses:  [K57.92] Acute diverticulitis (Primary)          ED Disposition Condition    Admit                 Berkley Shaw PA-C  09/29/24 1508    "

## 2024-09-29 NOTE — NURSING
Nurses Note -- 4 Eyes      9/29/2024   6:15 PM      Skin assessed during: Admit      [x] No Altered Skin Integrity Present    []Prevention Measures Documented      [] Yes- Altered Skin Integrity Present or Discovered   [] LDA Added if Not in Epic (Describe Wound)   [] New Altered Skin Integrity was Present on Admit and Documented in LDA   [] Wound Image Taken    Wound Care Consulted? No    Attending Nurse:  maría celaya    Second RN/Staff Member:   maría navas

## 2024-09-29 NOTE — ED NOTES
HEENT: Denies vision changes. Denies ear drainage or hearing loss. No c/o nasal drainage. Denies dysphagia or voice changes.   Appearance: Pt awake, alert & oriented to person, place & time. Pt in no acute distress at present time. Pt is clean and well groomed with clothes appropriately fastened.   Skin: Skin warm, dry & intact. Color consistent with ethnicity. Mucous membranes moist. No breakdown or brusing noted.   Musculoskeletal: Patient moving all extremities well, no obvious swelling or deformities noted.   Respiratory: Respirations spontaneous, even, and non-labored. Visible chest rise noted. Airway is open and patent. No accessory muscle use noted.   Neurologic: Sensation is intact. Speech is clear and appropriate. Eyes open spontaneously, behavior appropriate to situation, follows commands, facial expression symmetrical, bilateral hand grasp equal and even, purposeful motor response noted.  Cardiac: All peripheral pulses present. No Bilateral lower extremity edema. Cap refill is <3 seconds.  Abdomen: Abdomen soft, non distended. Pt's bilateral lower quadrants are tender to touch with guarding. Pt endorses pain along with nausea and vomiting.   : Pt voids independently, denies dysuria, hematuria, frequency.

## 2024-09-29 NOTE — ED NOTES
I-STAT Chem-8+ Results:   Value Reference Range   Sodium 136 136-145 mmol/L   Potassium  4.3 3.5-5.1 mmol/L   Chloride 101  mmol/L   Ionized Calcium 1.15 1.06-1.42 mmol/L   CO2 (measured) 23 23-29 mmol/L   Glucose 109  mg/dL   BUN 11 6-30 mg/dL   Creatinine 1.1 0.5-1.4 mg/dL   Hematocrit 42 36-54%

## 2024-09-29 NOTE — CONSULTS
[facility]  Colorectal Surgery  Consult Note    Patient Name: Keon Schneider Jr.  MRN: [id number]  Admission Date: 9/29/2024  Hospital Length of Stay: 0 days  Attending Physician: Gilmar Broussard MD  Primary Care Provider: Floridalma Carrasco MD    Inpatient consult to Colorectal Surgery  Consult performed by: Roberto Jacome MD  Consult ordered by: Berkley Shaw PA-C        Subjective:     Chief Complaint/Reason for Admission: DIverticulitis    History of Present Illness: 54 M hx diverticulosis and 5 previous episodes of diverticulitis with multiple recent ED visits for back pain (currently in the midst of a malignancy workup with Heme/onc) who presents with 6 days of LLQ pain, malaise, nausea, vomiting and anorexia without fevers or blood per rectum. Patient states he has been having formed stool. In the ED, workup revealed a mild leukocytosis to 13 and a CT with evidence of acute diverticulitis without abscess and questionable pericolonic foci of air vs. Diverticuli filled with air.  For this, CRS was consulted for further management.    Currently, the patient is comfortable and without nausea.     Last colonoscopy 2020 - 1 hyperplastic polyp, diverticulosis     Current Facility-Administered Medications   Medication    HYDROmorphone injection 2 mg    piperacillin-tazobactam (ZOSYN) 4.5 g in D5W 100 mL IVPB (MB+)     Current Outpatient Medications   Medication Sig    allopurinoL (ZYLOPRIM) 100 MG tablet Take 100 mg by mouth.    [START ON 10/5/2024] ALPRAZolam (XANAX) 1 MG tablet Take 1.5 tablets daily and 2 tablets nightly for 2 weeks, THEN take 1.5 tablets two times daily for 2 weeks    [START ON 11/2/2024] ALPRAZolam (XANAX) 1 MG tablet Take 1 tablet daily and 1.5 tablets nightly for 2 weeks, THEN take 1 tablet two times daily    amLODIPine (NORVASC) 5 MG tablet Take 1 tablet (5 mg total) by mouth once daily.    aspirin (ECOTRIN) 81 MG EC tablet Take 1 tablet (81 mg total) by mouth  once daily.    ezetimibe (ZETIA) 10 mg tablet Take 1 tablet (10 mg total) by mouth once daily.    famotidine (PEPCID) 40 MG tablet Take 40 mg by mouth once daily.    HYDROcodone-acetaminophen (NORCO) 5-325 mg per tablet Take 1 tablet by mouth every 4 (four) hours as needed for Pain.    LIDOcaine (LIDODERM) 5 % Place 1 patch onto the skin once daily. Remove & Discard patch within 12 hours or as directed by MD    methylPREDNISolone (MEDROL DOSEPACK) 4 mg tablet use as directed    nitroGLYCERIN (NITROSTAT) 0.4 MG SL tablet Place 1 tablet (0.4 mg total) under the tongue every 5 (five) minutes as needed for Chest pain.    oxyCODONE-acetaminophen (PERCOCET) 5-325 mg per tablet Take 1 tablet by mouth every 6 (six) hours as needed for Pain.    pregabalin (LYRICA) 25 MG capsule Take 1 capsule (25 mg total) by mouth 2 (two) times daily.    rosuvastatin (CRESTOR) 40 MG Tab Take 1 tablet (40 mg total) by mouth every evening.    sertraline (ZOLOFT) 50 MG tablet Take 1 tablet (50 mg total) by mouth once daily.       Review of patient's allergies indicates:   Allergen Reactions    Atorvastatin Nausea And Vomiting    Toradol [ketorolac] Hives and Nausea And Vomiting    Ibuprofen Other (See Comments)     States GI BLEED       Past Medical History:   Diagnosis Date    Anxiety     Benzodiazepine dependence    Anxiety disorder, unspecified     Arthritis     Asthma     Avascular necrosis     Carpal tunnel syndrome     Chronic pain     Depression     Diverticulitis     Gout     Mixed hyperlipidemia     Other injury of other sites of trunk 12/28/2011    Pneumonia     Primary hypertension 8/23/2024    Spondylolisthesis     lumbar; myofascial pain    Staph infection     Ulnar neuropathy     left and rt arm     Past Surgical History:   Procedure Laterality Date    COLONOSCOPY N/A 7/6/2020    Procedure: COLONOSCOPY;  Surgeon: Broderick Moise MD;  Location: [facility];  Service: Endoscopy;  Laterality: N/A;    HIP SURGERY  3/06; 6/06    hip  arthroplasty    HIP SURGERY      bilateral hip replacement (titanium)    JOINT REPLACEMENT      OPEN REDUCTION AND INTERNAL FIXATION (ORIF) OF FRACTURE OF CLAVICLE Left 10/5/2023    Procedure: ORIF, FRACTURE, CLAVICLE;  Surgeon: Ozzy Howard MD;  Location: [facility] OR [address] [place] [address];  Service: Orthopedics;  Laterality: Left;    SHOULDER SURGERY      right shoulder    SHOULDER SURGERY       Family History       Problem Relation (Age of Onset)    Cancer Mother, Sister          Tobacco Use    Smoking status: Former     Current packs/day: 0.00     Average packs/day: 1 pack/day for 10.0 years (10.0 ttl pk-yrs)     Types: Cigarettes     Start date: 10/13/2002     Quit date: 10/13/2012     Years since quittin.9     Passive exposure: Past    Smokeless tobacco: Never   Substance and Sexual Activity    Alcohol use: Yes     Alcohol/week: 12.0 standard drinks of alcohol     Types: 12 Cans of beer per week     Comment: 2x/week    Drug use: No    Sexual activity: Yes     Partners: Female     Constitutional: No Weight Change, No Fever, No Chills, No Night Sweats, No Fatigue, No Malaise    ENT/Mouth: No Hearing Changes, No Ear Pain, No Nasal Congestion, No Sinus Pain, No Hoarseness, No sore throat, No Rhinorrhea, No Swallowing Difficulty    Eyes: No Eye Pain, No Swelling, No Redness, No Foreign Body, No Discharge, No Vision Changes    Cardiovascular: No Chest Pain, No SOB, No PND, No Dyspnea on Exertion, No Orthopnea, No Claudication, No Edema, No Palpitations    Respiratory: No Cough, No Sputum, No Wheezing, No Smoke Exposure, No Dyspnea    : No Dysuria, No Urinary Frequency, No Hematuria, No Urinary Incontinence, No Urgency, No Flank Pain, No Urinary Flow Changes, No Hesitancy    Skin: No Skin Lesions, No Pruritis, No Hair Changes, No Breast/Skin Changes, No Nipple Discharge    Neuro: No Weakness, No Numbness, No Paresthesias, No Loss of Consciousness, No Syncope, No Dizziness, No Headache, No Coordination Changes,  "No Recent Falls    Psych: No Anxiety/Panic, No Depression, No Insomnia, No Personality Changes, No Delusions, No Rumination, No SI/HI/AH/VH, No Social Issues, No Memory Changes, No Violence/Abuse Hx.,   Heme/Lymph: No Bruising, No Bleeding, No Transfusions History, No Lymphadenopathy    Endocrine: No Polyuria, No Polydipsia, No Temperature Intolerance       Objective:     Vital Signs (Most Recent):  Temp: 98.5 °F (36.9 °C) (09/29/24 0941)  Pulse: 100 (09/29/24 0941)  Resp: 18 (09/29/24 1132)  BP: (!) 140/84 (09/29/24 0941)  SpO2: 98 % (09/29/24 0941) Vital Signs (24h Range):  Temp:  [98.5 °F (36.9 °C)] 98.5 °F (36.9 °C)  Pulse:  [100] 100  Resp:  [16-18] 18  SpO2:  [98 %] 98 %  BP: (140)/(84) 140/84     Weight: 90.7 kg (200 lb)  Body mass index is 27.89 kg/m².    Gen: WD/WN in NAD  HEENT: MMM, Sclera anicteric   Chest: Normocardic, normotensive, bilateral chest rise  Abd: Soft, TTP LLQ - mod, nondistended, no rebound or guarding, no signs of generalized peritonitis  Ext: No pitting edema noted     Significant Labs:  BMP (Last 3 Results):   Recent Labs   Lab 09/29/24  1031         K 4.3      CO2 24   BUN 11   CREATININE 1.0   CALCIUM 9.8     CBC (Last 3 Results):   Recent Labs   Lab 09/29/24  1031 09/29/24  1038   WBC 13.31*  --    RBC 4.36*  --    HGB 13.6*  --    HCT 40.5 42     --    MCV 93  --    MCH 31.2*  --    MCHC 33.6  --      CRP (Last 3 Results): No results for input(s): "CRP" in the last 168 hours.    Significant Diagnostics:  Results for orders placed or performed during the hospital encounter of 09/29/24 (from the past 2160 hours)   CT Abdomen Pelvis With IV Contrast NO Oral Contrast    Narrative    EXAMINATION:  CT ABDOMEN PELVIS WITH IV CONTRAST    CLINICAL HISTORY:  LLQ abdominal pain;    TECHNIQUE:  Low dose axial images, sagittal and coronal reformations were obtained from the lung bases to the pubic symphysis following the IV administration of 100 mL of Omnipaque 350 .  " Oral contrast was not given.    COMPARISON:  CT 09/06/2024, PET-CT 09/19/2024    FINDINGS:  Images of the lower thorax are unremarkable.    The liver is hypoattenuating, suggesting steatosis, correlation with LFTs recommended.  The spleen, pancreas, gallbladder and adrenal glands are grossly unremarkable.  There is no biliary dilation or ascites.  The stomach is decompressed without wall thickening.  There are a few scattered mildly prominent mason hepatic lymph nodes.  The portal vein, splenic vein, SMV, celiac axis and SMA all are patent.  No significant abdominal lymphadenopathy.    The kidneys enhance symmetrically without hydronephrosis or nephrolithiasis.  The bilateral ureters are unable to be followed in their entirety to the urinary bladder, no definite calculi seen or secondary findings to suggest obstructive uropathy.  The urinary bladder is decompressed noting evaluation of the pelvis is significantly limited secondary to extensive artifact from bilateral hip prostheses.  The prostate is unable to be effectively assessed although does not appear to be significantly enlarged.    There is strand-like fluid in the deep pelvis.  There are several colonic diverticula noting inflammation involving several diverticula at the level of the mid to distal sigmoid colon.  There are a few punctate foci of air in the region, either reflecting air within diverticula or micro perforation.  No findings to suggest abscess.  Diverticula are noted along the remaining aspects of the large bowel without additional inflammation.  The terminal ileum and appendix are unremarkable.  The small bowel is grossly unremarkable noting a few fluid-filled nondistended loops.  There are a few scattered shotty periaortic, pericaval, and mesenteric lymph nodes.  There is atherosclerotic calcification of the aorta and its branches.    There are bilateral hip prostheses, no dislocation.  There are degenerative changes of the spine.  No  significant inguinal lymphadenopathy.      Impression    This report was flagged in Epic as abnormal.    1. Findings most consistent with acute diverticulitis involving the mid to distal sigmoid colon.  Punctate foci of air in the region may reflect air within adjacent diverticula however micro perforation cannot be excluded.  No abscess.  Correlation is advised.  Follow-up colonoscopy recommended to exclude underlying lesion.  2. Findings suggest hepatic steatosis, correlation with LFTs recommended.  3. Please see above for several additional findings.      Electronically signed by: Juan Dexter MD  Date:    09/29/2024  Time:    12:05         Assessment/Plan:     54 M with acute diverticulitis without abscess, questionable microperforation vs. Air within diverticula.     -Admit to CRS  -NPO/IVF  -AM CRP  -Zosyn  -OOBAT  -DVT ppx  -Discussed with attending, Dr. Cates      Thank you for your consult. I will sign off. Please contact us if you have any additional questions.    Roberto aJcome MD  Colorectal Surgery  [facility] Dept

## 2024-09-30 DIAGNOSIS — I25.118 ATHEROSCLEROSIS OF NATIVE CORONARY ARTERY OF NATIVE HEART WITH OTHER FORM OF ANGINA PECTORIS: Primary | ICD-10-CM

## 2024-09-30 DIAGNOSIS — M54.14 THORACIC RADICULOPATHY: ICD-10-CM

## 2024-09-30 LAB
ANION GAP SERPL CALC-SCNC: 8 MMOL/L (ref 8–16)
BASOPHILS # BLD AUTO: 0.03 K/UL (ref 0–0.2)
BASOPHILS NFR BLD: 0.4 % (ref 0–1.9)
BUN SERPL-MCNC: 9 MG/DL (ref 6–20)
CALCIUM SERPL-MCNC: 9.3 MG/DL (ref 8.7–10.5)
CHLORIDE SERPL-SCNC: 107 MMOL/L (ref 95–110)
CO2 SERPL-SCNC: 23 MMOL/L (ref 23–29)
CREAT SERPL-MCNC: 0.9 MG/DL (ref 0.5–1.4)
CRP SERPL-MCNC: 109.3 MG/L (ref 0–8.2)
DIFFERENTIAL METHOD BLD: ABNORMAL
EOSINOPHIL # BLD AUTO: 0.2 K/UL (ref 0–0.5)
EOSINOPHIL NFR BLD: 2.5 % (ref 0–8)
ERYTHROCYTE [DISTWIDTH] IN BLOOD BY AUTOMATED COUNT: 13.5 % (ref 11.5–14.5)
EST. GFR  (NO RACE VARIABLE): >60 ML/MIN/1.73 M^2
GLUCOSE SERPL-MCNC: 71 MG/DL (ref 70–110)
HCT VFR BLD AUTO: 35.4 % (ref 40–54)
HGB BLD-MCNC: 11.6 G/DL (ref 14–18)
IMM GRANULOCYTES # BLD AUTO: 0.01 K/UL (ref 0–0.04)
IMM GRANULOCYTES NFR BLD AUTO: 0.1 % (ref 0–0.5)
LYMPHOCYTES # BLD AUTO: 1.5 K/UL (ref 1–4.8)
LYMPHOCYTES NFR BLD: 18.6 % (ref 18–48)
MAGNESIUM SERPL-MCNC: 1.9 MG/DL (ref 1.6–2.6)
MCH RBC QN AUTO: 32 PG (ref 27–31)
MCHC RBC AUTO-ENTMCNC: 32.8 G/DL (ref 32–36)
MCV RBC AUTO: 98 FL (ref 82–98)
MONOCYTES # BLD AUTO: 0.8 K/UL (ref 0.3–1)
MONOCYTES NFR BLD: 10.2 % (ref 4–15)
NEUTROPHILS # BLD AUTO: 5.5 K/UL (ref 1.8–7.7)
NEUTROPHILS NFR BLD: 68.2 % (ref 38–73)
NRBC BLD-RTO: 0 /100 WBC
PLATELET # BLD AUTO: 172 K/UL (ref 150–450)
PMV BLD AUTO: 9.4 FL (ref 9.2–12.9)
POTASSIUM SERPL-SCNC: 4 MMOL/L (ref 3.5–5.1)
RBC # BLD AUTO: 3.63 M/UL (ref 4.6–6.2)
SODIUM SERPL-SCNC: 138 MMOL/L (ref 136–145)
WBC # BLD AUTO: 8.02 K/UL (ref 3.9–12.7)

## 2024-09-30 PROCEDURE — 36415 COLL VENOUS BLD VENIPUNCTURE: CPT | Performed by: STUDENT IN AN ORGANIZED HEALTH CARE EDUCATION/TRAINING PROGRAM

## 2024-09-30 PROCEDURE — 63600175 PHARM REV CODE 636 W HCPCS: Performed by: STUDENT IN AN ORGANIZED HEALTH CARE EDUCATION/TRAINING PROGRAM

## 2024-09-30 PROCEDURE — 20600001 HC STEP DOWN PRIVATE ROOM

## 2024-09-30 PROCEDURE — 25000003 PHARM REV CODE 250

## 2024-09-30 PROCEDURE — 85025 COMPLETE CBC W/AUTO DIFF WBC: CPT | Performed by: STUDENT IN AN ORGANIZED HEALTH CARE EDUCATION/TRAINING PROGRAM

## 2024-09-30 PROCEDURE — 80048 BASIC METABOLIC PNL TOTAL CA: CPT | Performed by: STUDENT IN AN ORGANIZED HEALTH CARE EDUCATION/TRAINING PROGRAM

## 2024-09-30 PROCEDURE — 25000003 PHARM REV CODE 250: Performed by: STUDENT IN AN ORGANIZED HEALTH CARE EDUCATION/TRAINING PROGRAM

## 2024-09-30 PROCEDURE — 86140 C-REACTIVE PROTEIN: CPT | Performed by: STUDENT IN AN ORGANIZED HEALTH CARE EDUCATION/TRAINING PROGRAM

## 2024-09-30 PROCEDURE — 83735 ASSAY OF MAGNESIUM: CPT | Performed by: STUDENT IN AN ORGANIZED HEALTH CARE EDUCATION/TRAINING PROGRAM

## 2024-09-30 RX ORDER — DIAZEPAM 5 MG/1
5 TABLET ORAL EVERY 6 HOURS PRN
Status: DISCONTINUED | OUTPATIENT
Start: 2024-09-30 | End: 2024-09-30

## 2024-09-30 RX ORDER — HYDROXYZINE HYDROCHLORIDE 25 MG/1
25 TABLET, FILM COATED ORAL ONCE
Status: COMPLETED | OUTPATIENT
Start: 2024-09-30 | End: 2024-09-30

## 2024-09-30 RX ORDER — OXYCODONE HYDROCHLORIDE 10 MG/1
10 TABLET ORAL EVERY 4 HOURS PRN
Status: DISCONTINUED | OUTPATIENT
Start: 2024-09-30 | End: 2024-10-01 | Stop reason: HOSPADM

## 2024-09-30 RX ORDER — HYDROMORPHONE HYDROCHLORIDE 1 MG/ML
0.5 INJECTION, SOLUTION INTRAMUSCULAR; INTRAVENOUS; SUBCUTANEOUS EVERY 4 HOURS PRN
Status: DISCONTINUED | OUTPATIENT
Start: 2024-09-30 | End: 2024-10-01

## 2024-09-30 RX ADMIN — ACETAMINOPHEN 1000 MG: 500 TABLET ORAL at 05:09

## 2024-09-30 RX ADMIN — HYDROMORPHONE HYDROCHLORIDE 0.5 MG: 1 INJECTION, SOLUTION INTRAMUSCULAR; INTRAVENOUS; SUBCUTANEOUS at 03:09

## 2024-09-30 RX ADMIN — HYDROMORPHONE HYDROCHLORIDE 0.5 MG: 1 INJECTION, SOLUTION INTRAMUSCULAR; INTRAVENOUS; SUBCUTANEOUS at 11:09

## 2024-09-30 RX ADMIN — HYDROXYZINE HYDROCHLORIDE 25 MG: 25 TABLET, FILM COATED ORAL at 10:09

## 2024-09-30 RX ADMIN — OXYCODONE HYDROCHLORIDE 15 MG: 10 TABLET ORAL at 05:09

## 2024-09-30 RX ADMIN — METHOCARBAMOL 1000 MG: 500 TABLET ORAL at 01:09

## 2024-09-30 RX ADMIN — METHOCARBAMOL 1000 MG: 500 TABLET ORAL at 08:09

## 2024-09-30 RX ADMIN — OXYCODONE HYDROCHLORIDE 15 MG: 10 TABLET ORAL at 10:09

## 2024-09-30 RX ADMIN — OXYCODONE HYDROCHLORIDE 10 MG: 10 TABLET ORAL at 09:09

## 2024-09-30 RX ADMIN — ALLOPURINOL 100 MG: 100 TABLET ORAL at 08:09

## 2024-09-30 RX ADMIN — HYDROMORPHONE HYDROCHLORIDE 0.5 MG: 1 INJECTION, SOLUTION INTRAMUSCULAR; INTRAVENOUS; SUBCUTANEOUS at 08:09

## 2024-09-30 RX ADMIN — PIPERACILLIN SODIUM AND TAZOBACTAM SODIUM 4.5 G: 4; .5 INJECTION, POWDER, FOR SOLUTION INTRAVENOUS at 05:09

## 2024-09-30 RX ADMIN — METHOCARBAMOL 1000 MG: 500 TABLET ORAL at 09:09

## 2024-09-30 RX ADMIN — ATORVASTATIN CALCIUM 80 MG: 40 TABLET, FILM COATED ORAL at 08:09

## 2024-09-30 RX ADMIN — EZETIMIBE 10 MG: 10 TABLET ORAL at 08:09

## 2024-09-30 RX ADMIN — HYDROMORPHONE HYDROCHLORIDE 0.5 MG: 1 INJECTION, SOLUTION INTRAMUSCULAR; INTRAVENOUS; SUBCUTANEOUS at 07:09

## 2024-09-30 RX ADMIN — OXYCODONE HYDROCHLORIDE 10 MG: 10 TABLET ORAL at 01:09

## 2024-09-30 RX ADMIN — HYDROMORPHONE HYDROCHLORIDE 0.5 MG: 1 INJECTION, SOLUTION INTRAMUSCULAR; INTRAVENOUS; SUBCUTANEOUS at 06:09

## 2024-09-30 RX ADMIN — PIPERACILLIN SODIUM AND TAZOBACTAM SODIUM 4.5 G: 4; .5 INJECTION, POWDER, FOR SOLUTION INTRAVENOUS at 09:09

## 2024-09-30 RX ADMIN — ALPRAZOLAM 1 MG: 0.5 TABLET ORAL at 05:09

## 2024-09-30 RX ADMIN — PIPERACILLIN SODIUM AND TAZOBACTAM SODIUM 4.5 G: 4; .5 INJECTION, POWDER, FOR SOLUTION INTRAVENOUS at 02:09

## 2024-09-30 RX ADMIN — FAMOTIDINE 40 MG: 20 TABLET ORAL at 08:09

## 2024-09-30 RX ADMIN — ACETAMINOPHEN 1000 MG: 500 TABLET ORAL at 11:09

## 2024-09-30 RX ADMIN — ASPIRIN 81 MG: 81 TABLET, COATED ORAL at 08:09

## 2024-09-30 RX ADMIN — OXYCODONE HYDROCHLORIDE 10 MG: 10 TABLET ORAL at 05:09

## 2024-09-30 RX ADMIN — PREGABALIN 25 MG: 25 CAPSULE ORAL at 09:09

## 2024-09-30 NOTE — PLAN OF CARE
[facility] Plan of Care Note  Dx Final diagnoses:  [K57.92] Acute diverticulitis (Primary)     Shift Events Patient advanced to clear liquid diet. Patient ambulated in the hallway numerous times. Pain regimen followed diligently.  Increased oxy from 10 to 15 mg.    Neuro: AAOX4    Vital Signs:   Vitals:    09/30/24 1543   BP: 135/88   Pulse: 77   Resp: 18   Temp: 97.7 °F (36.5 °C)        Respiratory: RA    Diet: Clear liquid    Urine Output/Bowel Movement: See flowsheet           Problem: Adult Inpatient Plan of Care  Goal: Plan of Care Review  9/30/2024 1752 by Mihaela Dotson RN  Outcome: Progressing  9/30/2024 1751 by Mihaela Dotson RN  Outcome: Progressing  Goal: Patient-Specific Goal (Individualized)  9/30/2024 1752 by Mihaela Dotson RN  Outcome: Progressing  9/30/2024 1751 by Mihaela Dotson RN  Outcome: Progressing  Goal: Absence of Hospital-Acquired Illness or Injury  9/30/2024 1752 by Mihaela Dotson RN  Outcome: Progressing  9/30/2024 1751 by Mihaela Dotson RN  Outcome: Progressing  Goal: Optimal Comfort and Wellbeing  9/30/2024 1752 by Mihaela Dotson RN  Outcome: Progressing  9/30/2024 1751 by Mihaela Dotson RN  Outcome: Progressing  Goal: Readiness for Transition of Care  9/30/2024 1752 by Mihaela Dotson RN  Outcome: Progressing  9/30/2024 1751 by Mihaela Dotson RN  Outcome: Progressing     Problem: Pain Acute  Goal: Optimal Pain Control and Function  Outcome: Progressing

## 2024-09-30 NOTE — PLAN OF CARE
[facility]  Initial Discharge Assessment       Primary Care Provider: Floridalma Carrasco MD    Admission Diagnosis: Acute diverticulitis [K57.92]    Admission Date: 9/29/2024  Expected Discharge Date: 10/4/2024    Transition of Care Barriers: None    Payor: HU[employer] MANAGED MEDICARE / Plan: [employer] SNP HMO PPO SPECIAL NEEDS / Product Type: Medicare Advantage /     Extended Emergency Contact Information  Primary Emergency Contact: OLGA TOVAR  Mobile Phone: [phone]  Relation: Brother    Discharge Plan A: Home  Discharge Plan B: Home      [facility]  [address]  [facility] [address]  Phone: [phone] Fax: [phone]      Initial Assessment (most recent)       Adult Discharge Assessment - 09/30/24 1158          Discharge Assessment    Assessment Type Discharge Planning Assessment     Confirmed/corrected address, phone number and insurance Yes     Confirmed Demographics Correct on Facesheet     Source of Information health record     Does patient/caregiver understand observation status Yes     Communicated VIVIANE with patient/caregiver Yes     People in Home alone     Do you expect to return to your current living situation? Yes     Do you have help at home or someone to help you manage your care at home? No     Prior to hospitilization cognitive status: Alert/Oriented     Current cognitive status: Alert/Oriented     Walking or Climbing Stairs Difficulty no     Dressing/Bathing Difficulty no     Home Accessibility stairs to enter home     Home Layout Able to live on 1st floor     Equipment Currently Used at Home none     Readmission within 30 days? No     Patient currently being followed by outpatient case management? No     Do you currently have service(s) that help you manage your care at home? No     Do you take prescription medications? Yes     Do you have prescription coverage? Yes     Do you have any problems affording any of your prescribed medications? No     Is the patient  taking medications as prescribed? yes     How do you get to doctors appointments? car, drives self     Are you on dialysis? No     Do you take coumadin? No     Discharge Plan A Home     Discharge Plan B Home     DME Needed Upon Discharge  none     Discharge Plan discussed with: Patient     Transition of Care Barriers None        Physical Activity    On average, how many minutes do you engage in exercise at this level? 30 min        Social Isolation    How often do you feel lonely or isolated from those around you?  Rarely        Health Literacy    How often do you need to have someone help you when you read instructions, pamphlets, or other written material from your doctor or pharmacy? Rarely                         There have been no hospitalizations within the last 30 days. Verified pt PCP and preferred pharmacy. Pt chart stated not on Coumadin and is not receiving dialysis.     Discharge Plan A and Plan B have been determined by review of patient's clinical status, future medical and therapeutic needs, and coverage/benefits for post-acute care in coordination with multidisciplinary team members.     Kesha Sanz LCSW  Case Management/[facility]  [phone]

## 2024-09-30 NOTE — SUBJECTIVE & OBJECTIVE
Subjective:     Interval History: no acute events overnite, no nausea, abd , pasing flatus     Post-Op Info:  * No surgery found *          Medications:  Continuous Infusions:  Scheduled Meds:   acetaminophen  1,000 mg Oral Q6H    allopurinoL  100 mg Oral Daily    amLODIPine  5 mg Oral Daily    aspirin  81 mg Oral Daily    atorvastatin  80 mg Oral Daily    enoxparin  40 mg Subcutaneous Daily    ezetimibe  10 mg Oral Daily    famotidine  40 mg Oral Daily    methocarbamoL  1,000 mg Oral TID    piperacillin-tazobactam (Zosyn) IV (PEDS and ADULTS) (extended infusion is not appropriate)  4.5 g Intravenous Q8H    pregabalin  25 mg Oral BID     PRN Meds:   acetaminophen tablet 1,000 mg    allopurinoL tablet 100 mg    amLODIPine tablet 5 mg    aspirin EC tablet 81 mg    atorvastatin tablet 80 mg    enoxaparin injection 40 mg    ezetimibe tablet 10 mg    famotidine tablet 40 mg    methocarbamoL tablet 1,000 mg    piperacillin-tazobactam (ZOSYN) 4.5 g in D5W 100 mL IVPB (MB+)    pregabalin capsule 25 mg        Objective:     Vital Signs (Most Recent):  Temp: 97.8 °F (36.6 °C) (09/30/24 1144)  Pulse: 72 (09/30/24 1144)  Resp: 20 (09/30/24 1144)  BP: 113/78 (09/30/24 1144)  SpO2: 95 % (09/30/24 1144) Vital Signs (24h Range):  Temp:  [97.4 °F (36.3 °C)-99.6 °F (37.6 °C)] 97.8 °F (36.6 °C)  Pulse:  [54-78] 72  Resp:  [12-20] 20  SpO2:  [94 %-97 %] 95 %  BP: (113-124)/(73-80) 113/78     Intake/Output - Last 3 Shifts         09/28 0700 09/29 0659 09/29 0700 09/30 0659 09/30 0700  10/01 0659    I.V. (mL/kg)  1944.9 (21.4)     IV Piggyback  1213.1     Total Intake(mL/kg)  3158 (34.8)     Net  +3158            Stool Occurrence  0 x              Physical Exam  Vitals and nursing note reviewed.   Constitutional:       Appearance: He is well-developed.   Cardiovascular:      Rate and Rhythm: Normal rate and regular rhythm.      Heart sounds: Normal heart sounds.   Pulmonary:      Effort: Pulmonary effort is normal. No  respiratory distress.      Breath sounds: Normal breath sounds. No wheezing or rales.   Abdominal:      General: There is no distension.      Palpations: Abdomen is soft. There is no mass.      Tenderness: There is abdominal tenderness. There is no guarding.   Musculoskeletal:         General: Normal range of motion.   Skin:     General: Skin is warm and dry.   Neurological:      Mental Status: He is alert and oriented to person, place, and time.   Psychiatric:         Behavior: Behavior normal.         Thought Content: Thought content normal.         Judgment: Judgment normal.                  Significant Labs:  BMP (Last 3 Results):   Recent Labs   Lab 09/29/24  1031 09/30/24  0455    71    138   K 4.3 4.0    107   CO2 24 23   BUN 11 9   CREATININE 1.0 0.9   CALCIUM 9.8 9.3   MG  --  1.9     CBC (Last 3 Results):   Recent Labs   Lab 09/29/24  1031 09/29/24  1038 09/30/24  0455   WBC 13.31*  --  8.02   RBC 4.36*  --  3.63*   HGB 13.6*  --  11.6*   HCT 40.5 42 35.4*     --  172   MCV 93  --  98   MCH 31.2*  --  32.0*   MCHC 33.6  --  32.8       Significant Diagnostics:  None

## 2024-09-30 NOTE — PROGRESS NOTES
[facility]  Colorectal Surgery  Progress Note    Patient Name: Keon Schneider Jr.  MRN: [id number]  Admission Date: 9/29/2024  Hospital Length of Stay: 1 days  Attending Physician: Mai Cates MD    Subjective:     Interval History: no acute events overnite, no nausea, abd , pasing flatus     Post-Op Info:  * No surgery found *          Medications:  Continuous Infusions:  Scheduled Meds:   acetaminophen  1,000 mg Oral Q6H    allopurinoL  100 mg Oral Daily    amLODIPine  5 mg Oral Daily    aspirin  81 mg Oral Daily    atorvastatin  80 mg Oral Daily    enoxparin  40 mg Subcutaneous Daily    ezetimibe  10 mg Oral Daily    famotidine  40 mg Oral Daily    methocarbamoL  1,000 mg Oral TID    piperacillin-tazobactam (Zosyn) IV (PEDS and ADULTS) (extended infusion is not appropriate)  4.5 g Intravenous Q8H    pregabalin  25 mg Oral BID     PRN Meds:   acetaminophen tablet 1,000 mg    allopurinoL tablet 100 mg    amLODIPine tablet 5 mg    aspirin EC tablet 81 mg    atorvastatin tablet 80 mg    enoxaparin injection 40 mg    ezetimibe tablet 10 mg    famotidine tablet 40 mg    methocarbamoL tablet 1,000 mg    piperacillin-tazobactam (ZOSYN) 4.5 g in D5W 100 mL IVPB (MB+)    pregabalin capsule 25 mg        Objective:     Vital Signs (Most Recent):  Temp: 97.8 °F (36.6 °C) (09/30/24 1144)  Pulse: 72 (09/30/24 1144)  Resp: 20 (09/30/24 1144)  BP: 113/78 (09/30/24 1144)  SpO2: 95 % (09/30/24 1144) Vital Signs (24h Range):  Temp:  [97.4 °F (36.3 °C)-99.6 °F (37.6 °C)] 97.8 °F (36.6 °C)  Pulse:  [54-78] 72  Resp:  [12-20] 20  SpO2:  [94 %-97 %] 95 %  BP: (113-124)/(73-80) 113/78     Intake/Output - Last 3 Shifts         09/28 0700  09/29 0659 09/29 0700 09/30 0659 09/30 0700  10/01 0659    I.V. (mL/kg)  1944.9 (21.4)     IV Piggyback  1213.1     Total Intake(mL/kg)  3158 (34.8)     Net  +3158            Stool Occurrence  0 x              Physical Exam  Vitals and nursing note reviewed.   Constitutional:        Appearance: He is well-developed.   Cardiovascular:      Rate and Rhythm: Normal rate and regular rhythm.      Heart sounds: Normal heart sounds.   Pulmonary:      Effort: Pulmonary effort is normal. No respiratory distress.      Breath sounds: Normal breath sounds. No wheezing or rales.   Abdominal:      General: There is no distension.      Palpations: Abdomen is soft. There is no mass.      Tenderness: There is abdominal tenderness. There is no guarding.   Musculoskeletal:         General: Normal range of motion.   Skin:     General: Skin is warm and dry.   Neurological:      Mental Status: He is alert and oriented to person, place, and time.   Psychiatric:         Behavior: Behavior normal.         Thought Content: Thought content normal.         Judgment: Judgment normal.                  Significant Labs:  BMP (Last 3 Results):   Recent Labs   Lab 09/29/24  1031 09/30/24  0455    71    138   K 4.3 4.0    107   CO2 24 23   BUN 11 9   CREATININE 1.0 0.9   CALCIUM 9.8 9.3   MG  --  1.9     CBC (Last 3 Results):   Recent Labs   Lab 09/29/24  1031 09/29/24  1038 09/30/24  0455   WBC 13.31*  --  8.02   RBC 4.36*  --  3.63*   HGB 13.6*  --  11.6*   HCT 40.5 42 35.4*     --  172   MCV 93  --  98   MCH 31.2*  --  32.0*   MCHC 33.6  --  32.8       Significant Diagnostics:  None  Assessment/Plan:     * Acute diverticulitis  54 M with acute diverticulitis without abscess, questionable microperforation vs. Air within diverticula. Admit from ER 9/29    -serial abd exams  -monitor vs  -monitor labs  -cont IV zosyn  -cld    Anxiety  Cont home meds          Tanisha Mejia NP  Colorectal Surgery  [facility]

## 2024-10-01 VITALS
HEART RATE: 72 BPM | RESPIRATION RATE: 20 BRPM | SYSTOLIC BLOOD PRESSURE: 133 MMHG | DIASTOLIC BLOOD PRESSURE: 88 MMHG | TEMPERATURE: 98 F | BODY MASS INDEX: 27.99 KG/M2 | OXYGEN SATURATION: 96 % | HEIGHT: 71 IN | WEIGHT: 199.94 LBS

## 2024-10-01 LAB
ANION GAP SERPL CALC-SCNC: 10 MMOL/L (ref 8–16)
BASOPHILS # BLD AUTO: 0.04 K/UL (ref 0–0.2)
BASOPHILS NFR BLD: 0.5 % (ref 0–1.9)
BUN SERPL-MCNC: 7 MG/DL (ref 6–20)
CALCIUM SERPL-MCNC: 9.8 MG/DL (ref 8.7–10.5)
CHLORIDE SERPL-SCNC: 105 MMOL/L (ref 95–110)
CO2 SERPL-SCNC: 26 MMOL/L (ref 23–29)
CREAT SERPL-MCNC: 1.1 MG/DL (ref 0.5–1.4)
CRP SERPL-MCNC: 73.9 MG/L (ref 0–8.2)
DIFFERENTIAL METHOD BLD: ABNORMAL
EOSINOPHIL # BLD AUTO: 0.2 K/UL (ref 0–0.5)
EOSINOPHIL NFR BLD: 2.4 % (ref 0–8)
ERYTHROCYTE [DISTWIDTH] IN BLOOD BY AUTOMATED COUNT: 13.3 % (ref 11.5–14.5)
EST. GFR  (NO RACE VARIABLE): >60 ML/MIN/1.73 M^2
GLUCOSE SERPL-MCNC: 70 MG/DL (ref 70–110)
HCT VFR BLD AUTO: 34.8 % (ref 40–54)
HGB BLD-MCNC: 11.6 G/DL (ref 14–18)
IMM GRANULOCYTES # BLD AUTO: 0.03 K/UL (ref 0–0.04)
IMM GRANULOCYTES NFR BLD AUTO: 0.4 % (ref 0–0.5)
LYMPHOCYTES # BLD AUTO: 1.6 K/UL (ref 1–4.8)
LYMPHOCYTES NFR BLD: 19 % (ref 18–48)
MAGNESIUM SERPL-MCNC: 2 MG/DL (ref 1.6–2.6)
MCH RBC QN AUTO: 31.8 PG (ref 27–31)
MCHC RBC AUTO-ENTMCNC: 33.3 G/DL (ref 32–36)
MCV RBC AUTO: 95 FL (ref 82–98)
MONOCYTES # BLD AUTO: 0.8 K/UL (ref 0.3–1)
MONOCYTES NFR BLD: 9.7 % (ref 4–15)
NEUTROPHILS # BLD AUTO: 5.7 K/UL (ref 1.8–7.7)
NEUTROPHILS NFR BLD: 68 % (ref 38–73)
NRBC BLD-RTO: 0 /100 WBC
PHOSPHATE SERPL-MCNC: 3.2 MG/DL (ref 2.7–4.5)
PLATELET # BLD AUTO: 194 K/UL (ref 150–450)
PMV BLD AUTO: 9.6 FL (ref 9.2–12.9)
POTASSIUM SERPL-SCNC: 4.2 MMOL/L (ref 3.5–5.1)
RBC # BLD AUTO: 3.65 M/UL (ref 4.6–6.2)
SODIUM SERPL-SCNC: 141 MMOL/L (ref 136–145)
WBC # BLD AUTO: 8.32 K/UL (ref 3.9–12.7)

## 2024-10-01 PROCEDURE — 63600175 PHARM REV CODE 636 W HCPCS: Performed by: STUDENT IN AN ORGANIZED HEALTH CARE EDUCATION/TRAINING PROGRAM

## 2024-10-01 PROCEDURE — 83735 ASSAY OF MAGNESIUM: CPT | Performed by: STUDENT IN AN ORGANIZED HEALTH CARE EDUCATION/TRAINING PROGRAM

## 2024-10-01 PROCEDURE — 36415 COLL VENOUS BLD VENIPUNCTURE: CPT | Performed by: STUDENT IN AN ORGANIZED HEALTH CARE EDUCATION/TRAINING PROGRAM

## 2024-10-01 PROCEDURE — 84100 ASSAY OF PHOSPHORUS: CPT | Performed by: STUDENT IN AN ORGANIZED HEALTH CARE EDUCATION/TRAINING PROGRAM

## 2024-10-01 PROCEDURE — 85025 COMPLETE CBC W/AUTO DIFF WBC: CPT | Performed by: STUDENT IN AN ORGANIZED HEALTH CARE EDUCATION/TRAINING PROGRAM

## 2024-10-01 PROCEDURE — 25000003 PHARM REV CODE 250: Performed by: STUDENT IN AN ORGANIZED HEALTH CARE EDUCATION/TRAINING PROGRAM

## 2024-10-01 PROCEDURE — 80048 BASIC METABOLIC PNL TOTAL CA: CPT | Performed by: STUDENT IN AN ORGANIZED HEALTH CARE EDUCATION/TRAINING PROGRAM

## 2024-10-01 PROCEDURE — 86140 C-REACTIVE PROTEIN: CPT | Performed by: STUDENT IN AN ORGANIZED HEALTH CARE EDUCATION/TRAINING PROGRAM

## 2024-10-01 RX ORDER — AMOXICILLIN AND CLAVULANATE POTASSIUM 875; 125 MG/1; MG/1
1 TABLET, FILM COATED ORAL EVERY 12 HOURS
Status: DISCONTINUED | OUTPATIENT
Start: 2024-10-01 | End: 2024-10-01 | Stop reason: HOSPADM

## 2024-10-01 RX ORDER — HYDROMORPHONE HYDROCHLORIDE 1 MG/ML
0.5 INJECTION, SOLUTION INTRAMUSCULAR; INTRAVENOUS; SUBCUTANEOUS EVERY 6 HOURS PRN
Status: DISCONTINUED | OUTPATIENT
Start: 2024-10-01 | End: 2024-10-01 | Stop reason: HOSPADM

## 2024-10-01 RX ORDER — OXYCODONE HYDROCHLORIDE 15 MG/1
15 TABLET ORAL EVERY 4 HOURS PRN
Qty: 20 TABLET | Refills: 0 | Status: SHIPPED | OUTPATIENT
Start: 2024-10-01

## 2024-10-01 RX ORDER — AMOXICILLIN AND CLAVULANATE POTASSIUM 875; 125 MG/1; MG/1
1 TABLET, FILM COATED ORAL EVERY 12 HOURS
Qty: 20 TABLET | Refills: 0 | Status: SHIPPED | OUTPATIENT
Start: 2024-10-01 | End: 2024-10-11

## 2024-10-01 RX ORDER — METHOCARBAMOL 500 MG/1
1000 TABLET, FILM COATED ORAL 3 TIMES DAILY
Qty: 42 TABLET | Refills: 0 | Status: SHIPPED | OUTPATIENT
Start: 2024-10-01 | End: 2024-10-08

## 2024-10-01 RX ADMIN — AMLODIPINE BESYLATE 5 MG: 5 TABLET ORAL at 08:10

## 2024-10-01 RX ADMIN — METHOCARBAMOL 1000 MG: 500 TABLET ORAL at 08:10

## 2024-10-01 RX ADMIN — ALPRAZOLAM 1 MG: 0.5 TABLET ORAL at 03:10

## 2024-10-01 RX ADMIN — ALLOPURINOL 100 MG: 100 TABLET ORAL at 08:10

## 2024-10-01 RX ADMIN — FAMOTIDINE 40 MG: 20 TABLET ORAL at 08:10

## 2024-10-01 RX ADMIN — AMOXICILLIN AND CLAVULANATE POTASSIUM 1 TABLET: 875; 125 TABLET, FILM COATED ORAL at 08:10

## 2024-10-01 RX ADMIN — PIPERACILLIN SODIUM AND TAZOBACTAM SODIUM 4.5 G: 4; .5 INJECTION, POWDER, FOR SOLUTION INTRAVENOUS at 05:10

## 2024-10-01 RX ADMIN — ACETAMINOPHEN 1000 MG: 500 TABLET ORAL at 11:10

## 2024-10-01 RX ADMIN — HYDROMORPHONE HYDROCHLORIDE 0.5 MG: 1 INJECTION, SOLUTION INTRAMUSCULAR; INTRAVENOUS; SUBCUTANEOUS at 05:10

## 2024-10-01 RX ADMIN — EZETIMIBE 10 MG: 10 TABLET ORAL at 08:10

## 2024-10-01 RX ADMIN — OXYCODONE HYDROCHLORIDE 15 MG: 10 TABLET ORAL at 08:10

## 2024-10-01 RX ADMIN — ASPIRIN 81 MG: 81 TABLET, COATED ORAL at 08:10

## 2024-10-01 RX ADMIN — HYDROMORPHONE HYDROCHLORIDE 0.5 MG: 1 INJECTION, SOLUTION INTRAMUSCULAR; INTRAVENOUS; SUBCUTANEOUS at 11:10

## 2024-10-01 RX ADMIN — OXYCODONE HYDROCHLORIDE 15 MG: 10 TABLET ORAL at 03:10

## 2024-10-01 RX ADMIN — METHOCARBAMOL 1000 MG: 500 TABLET ORAL at 03:10

## 2024-10-01 RX ADMIN — ACETAMINOPHEN 1000 MG: 500 TABLET ORAL at 05:10

## 2024-10-01 NOTE — PLAN OF CARE
Problem: Adult Inpatient Plan of Care  Goal: Plan of Care Review  Outcome: Progressing  Goal: Patient-Specific Goal (Individualized)  Outcome: Progressing  Goal: Absence of Hospital-Acquired Illness or Injury  Outcome: Progressing  Goal: Optimal Comfort and Wellbeing  Outcome: Progressing  Goal: Readiness for Transition of Care  Outcome: Progressing     Problem: Pain Acute  Goal: Optimal Pain Control and Function  Outcome: Progressing     Problem: Anxiety  Goal: Anxiety Reduction or Resolution  Outcome: Progressing

## 2024-10-01 NOTE — HOSPITAL COURSE
54 M with acute diverticulitis without abscess, questionable microperforation vs. Air within diverticula. Admit from ER 9/29.  Once abd pain improved diet was advanced, motly complained of back pain whhile in hospital.  On day of discharge pt is fide regular diet, abd pain improved, positive for bm with flatus, ambulating in marks without difficulty, adequate pain control with oral medication, VS stable and afebrile.   FU 2 weeks and fu with chronic pain md for further treatement for back pain

## 2024-10-01 NOTE — DISCHARGE SUMMARY
[facility]  Colorectal Surgery  Discharge Summary      Patient Name: Keon Schneider Jr.  MRN: [id number]  Admission Date: 9/29/2024  Hospital Length of Stay: 2 days  Discharge Date and Time: No discharge date for patient encounter.  Attending Physician: Mai Cates MD   Discharging Provider: Tanisha Mejia NP  Primary Care Provider: Floridalma Carrasco MD     HPI:  No notes on file    * No surgery found *     Hospital Course:  54 M with acute diverticulitis without abscess, questionable microperforation vs. Air within diverticula. Admit from ER 9/29.  Once abd pain improved diet was advanced, motly complained of back pain whhile in hospital.  On day of discharge pt is fide regular diet, abd pain improved, positive for bm with flatus, ambulating in marks without difficulty, adequate pain control with oral medication, VS stable and afebrile.   FU 2 weeks and fu with chronic pain md for further treatement for back pain       Goals of Care Treatment Preferences:  Code Status: Full Code      Consults (From admission, onward)          Status Ordering Provider     Inpatient consult to Colorectal Surgery  Once        Provider:  (Not yet assigned)    Completed TORY SANTANA            Significant Diagnostic Studies: Labs: BMP:   Recent Labs   Lab 09/30/24  0455 10/01/24  0356   GLU 71 70    141   K 4.0 4.2    105   CO2 23 26   BUN 9 7   CREATININE 0.9 1.1   CALCIUM 9.3 9.8   MG 1.9 2.0    and CBC   Recent Labs   Lab 09/30/24  0455 10/01/24  0356   WBC 8.02 8.32   HGB 11.6* 11.6*   HCT 35.4* 34.8*    194       Pending Diagnostic Studies:       None          Final Active Diagnoses:    Diagnosis Date Noted POA    PRINCIPAL PROBLEM:  Acute diverticulitis [K57.92] 05/17/2020 Yes    Anxiety [F41.9] 11/24/2013 Yes     Chronic      Problems Resolved During this Admission:      Discharged Condition: good    Disposition: Home or Self Care    Follow Up:   Follow-up Information       Darryn  Mai ZABALA MD Follow up in 2 week(s).    Specialty: Colon and Rectal Surgery  Contact information:  [address]  [facility] [id number] [phone]               Mai Cates MD Follow up in 2 week(s).    Specialty: Colon and Rectal Surgery  Contact information:  [address]  [facility] [id number] [phone]                           Patient Instructions:      Diet Adult Regular   Order Comments: Low fiber, no fresh fruit or fresh vegetables, no nuts, grapes, popcorn or raisins     Lifting restrictions   Order Comments: No lifting anything greater than 5 pounds     No dressing needed   Order Comments: May shower, no tub bath     Notify your health care provider if you experience any of the following:  temperature >100.4     Notify your health care provider if you experience any of the following:  persistent nausea and vomiting or diarrhea     Notify your health care provider if you experience any of the following:  severe uncontrolled pain     Notify your health care provider if you experience any of the following:  redness, tenderness, or signs of infection (pain, swelling, redness, odor or green/yellow discharge around incision site)     Notify your health care provider if you experience any of the following:  difficulty breathing or increased cough     Notify your health care provider if you experience any of the following:  severe persistent headache     Notify your health care provider if you experience any of the following:  worsening rash     Notify your health care provider if you experience any of the following:  persistent dizziness, light-headedness, or visual disturbances     Notify your health care provider if you experience any of the following:  increased confusion or weakness     Medications:  Reconciled Home Medications:      Medication List        START taking these medications      amoxicillin-clavulanate 875-125mg 875-125 mg per tablet  Commonly known as: AUGMENTIN  Take  1 tablet by mouth every 12 (twelve) hours. for 10 days     oxyCODONE 15 MG Tab  Commonly known as: ROXICODONE  Take 1 tablet (15 mg total) by mouth every 4 (four) hours as needed for Pain.            CONTINUE taking these medications      allopurinoL 100 MG tablet  Commonly known as: ZYLOPRIM  Take 100 mg by mouth.     * ALPRAZolam 1 MG tablet  Commonly known as: XANAX  Take 1.5 tablets daily and 2 tablets nightly for 2 weeks, THEN take 1.5 tablets two times daily for 2 weeks  Start taking on: October 5, 2024     * ALPRAZolam 1 MG tablet  Commonly known as: XANAX  Take 1 tablet daily and 1.5 tablets nightly for 2 weeks, THEN take 1 tablet two times daily  Start taking on: November 2, 2024     amLODIPine 5 MG tablet  Commonly known as: NORVASC  Take 1 tablet (5 mg total) by mouth once daily.     aspirin 81 MG EC tablet  Commonly known as: ECOTRIN  Take 1 tablet (81 mg total) by mouth once daily.     ezetimibe 10 mg tablet  Commonly known as: ZETIA  Take 1 tablet (10 mg total) by mouth once daily.     famotidine 40 MG tablet  Commonly known as: PEPCID  Take 40 mg by mouth once daily.     HYDROcodone-acetaminophen 5-325 mg per tablet  Commonly known as: NORCO  Take 1 tablet by mouth every 4 (four) hours as needed for Pain.     LIDOcaine 5 %  Commonly known as: LIDODERM  Place 1 patch onto the skin once daily. Remove & Discard patch within 12 hours or as directed by MD     methocarbamoL 500 MG Tab  Commonly known as: ROBAXIN  Take 2 tablets (1,000 mg total) by mouth 3 (three) times daily. for 7 days     nitroGLYCERIN 0.4 MG SL tablet  Commonly known as: NITROSTAT  Place 1 tablet (0.4 mg total) under the tongue every 5 (five) minutes as needed for Chest pain.     oxyCODONE-acetaminophen 5-325 mg per tablet  Commonly known as: PERCOCET  Take 1 tablet by mouth every 6 (six) hours as needed for Pain.     pregabalin 25 MG capsule  Commonly known as: LYRICA  Take 1 capsule (25 mg total) by mouth 2 (two) times daily.      rosuvastatin 40 MG Tab  Commonly known as: CRESTOR  Take 1 tablet (40 mg total) by mouth every evening.     sertraline 50 MG tablet  Commonly known as: ZOLOFT  Take 1 tablet (50 mg total) by mouth once daily.           * This list has 2 medication(s) that are the same as other medications prescribed for you. Read the directions carefully, and ask your doctor or other care provider to review them with you.                  Tanisha Mejia NP  Colorectal Surgery  [facility]

## 2024-10-01 NOTE — PROGRESS NOTES
[facility]  Colorectal Surgery  Progress Note    Patient Name: Keon Schneider Jr.  MRN: [id number]  Admission Date: 9/29/2024  Hospital Length of Stay: 2 days  Attending Physician: Mai Cates MD    Subjective:     Interval History: no acute events overnite, c/o mostly back pain, abd pain improved, passing flatus    Post-Op Info:  * No surgery found *          Medications:  Continuous Infusions:  Scheduled Meds:   acetaminophen  1,000 mg Oral Q6H    allopurinoL  100 mg Oral Daily    amLODIPine  5 mg Oral Daily    amoxicillin-clavulanate 875-125mg  1 tablet Oral Q12H    aspirin  81 mg Oral Daily    atorvastatin  80 mg Oral Daily    enoxparin  40 mg Subcutaneous Daily    ezetimibe  10 mg Oral Daily    famotidine  40 mg Oral Daily    methocarbamoL  1,000 mg Oral TID    pregabalin  25 mg Oral BID     PRN Meds:   acetaminophen tablet 1,000 mg    allopurinoL tablet 100 mg    amLODIPine tablet 5 mg    amoxicillin-clavulanate 875-125mg per tablet 1 tablet    aspirin EC tablet 81 mg    atorvastatin tablet 80 mg    enoxaparin injection 40 mg    ezetimibe tablet 10 mg    famotidine tablet 40 mg    methocarbamoL tablet 1,000 mg    pregabalin capsule 25 mg        Objective:     Vital Signs (Most Recent):  Temp: 98.2 °F (36.8 °C) (10/01/24 1156)  Pulse: 84 (10/01/24 1156)  Resp: 20 (10/01/24 1156)  BP: 128/78 (10/01/24 1156)  SpO2: (!) 94 % (10/01/24 1156) Vital Signs (24h Range):  Temp:  [97.4 °F (36.3 °C)-98.4 °F (36.9 °C)] 98.2 °F (36.8 °C)  Pulse:  [63-92] 84  Resp:  [14-20] 20  SpO2:  [92 %-96 %] 94 %  BP: (125-141)/(73-88) 128/78     Intake/Output - Last 3 Shifts         09/29 0700  09/30 0659 09/30 0700  10/01 0659 10/01 0700  10/02 0659    P.O.  1920     I.V. (mL/kg) 1944.9 (21.4)      IV Piggyback 1213.1      Total Intake(mL/kg) 3158 (34.8) 1920 (21.2)     Net +3158 +1920            Urine Occurrence  5 x     Stool Occurrence 0 x               Physical Exam  Vitals and nursing note reviewed.    Constitutional:       Appearance: He is well-developed.   Cardiovascular:      Rate and Rhythm: Normal rate and regular rhythm.      Heart sounds: Normal heart sounds.   Pulmonary:      Effort: Pulmonary effort is normal. No respiratory distress.      Breath sounds: Normal breath sounds. No wheezing or rales.   Abdominal:      General: There is no distension.      Palpations: Abdomen is soft. There is no mass.      Tenderness: There is no abdominal tenderness. There is no guarding.   Musculoskeletal:         General: Normal range of motion.   Skin:     General: Skin is warm and dry.   Neurological:      Mental Status: He is alert and oriented to person, place, and time.   Psychiatric:         Behavior: Behavior normal.         Thought Content: Thought content normal.         Judgment: Judgment normal.                Significant Labs:  BMP (Last 3 Results):   Recent Labs   Lab 09/29/24  1031 09/30/24  0455 10/01/24  0356    71 70    138 141   K 4.3 4.0 4.2    107 105   CO2 24 23 26   BUN 11 9 7   CREATININE 1.0 0.9 1.1   CALCIUM 9.8 9.3 9.8   MG  --  1.9 2.0     CBC (Last 3 Results):   Recent Labs   Lab 09/29/24  1031 09/29/24  1038 09/30/24  0455 10/01/24  0356   WBC 13.31*  --  8.02 8.32   RBC 4.36*  --  3.63* 3.65*   HGB 13.6*  --  11.6* 11.6*   HCT 40.5 42 35.4* 34.8*     --  172 194   MCV 93  --  98 95   MCH 31.2*  --  32.0* 31.8*   MCHC 33.6  --  32.8 33.3       Significant Diagnostics:  None  Assessment/Plan:     * Acute diverticulitis  54 M with acute diverticulitis without abscess, questionable microperforation vs. Air within diverticula. Admit from ER 9/29    -serial abd exams  -monitor vs  -monitor labs  -cont IV zosyn, change to augmentin  -lfd    Anxiety  Cont home meds          Tanisha Mejia NP  Colorectal Surgery  [facility]

## 2024-10-01 NOTE — ASSESSMENT & PLAN NOTE
54 M with acute diverticulitis without abscess, questionable microperforation vs. Air within diverticula. Admit from ER 9/29    -serial abd exams  -monitor vs  -monitor labs  -cont IV zosyn  -cld  
54 M with acute diverticulitis without abscess, questionable microperforation vs. Air within diverticula. Admit from ER 9/29    -serial abd exams  -monitor vs  -monitor labs  -cont IV zosyn, change to augmentin  -lfd  
Cont home meds  
Cont home meds  
independent

## 2024-10-01 NOTE — SUBJECTIVE & OBJECTIVE
Subjective:     Interval History: no acute events overnite, c/o mostly back pain, abd pain improved, passing flatus    Post-Op Info:  * No surgery found *          Medications:  Continuous Infusions:  Scheduled Meds:   acetaminophen  1,000 mg Oral Q6H    allopurinoL  100 mg Oral Daily    amLODIPine  5 mg Oral Daily    amoxicillin-clavulanate 875-125mg  1 tablet Oral Q12H    aspirin  81 mg Oral Daily    atorvastatin  80 mg Oral Daily    enoxparin  40 mg Subcutaneous Daily    ezetimibe  10 mg Oral Daily    famotidine  40 mg Oral Daily    methocarbamoL  1,000 mg Oral TID    pregabalin  25 mg Oral BID     PRN Meds:   acetaminophen tablet 1,000 mg    allopurinoL tablet 100 mg    amLODIPine tablet 5 mg    amoxicillin-clavulanate 875-125mg per tablet 1 tablet    aspirin EC tablet 81 mg    atorvastatin tablet 80 mg    enoxaparin injection 40 mg    ezetimibe tablet 10 mg    famotidine tablet 40 mg    methocarbamoL tablet 1,000 mg    pregabalin capsule 25 mg        Objective:     Vital Signs (Most Recent):  Temp: 98.2 °F (36.8 °C) (10/01/24 1156)  Pulse: 84 (10/01/24 1156)  Resp: 20 (10/01/24 1156)  BP: 128/78 (10/01/24 1156)  SpO2: (!) 94 % (10/01/24 1156) Vital Signs (24h Range):  Temp:  [97.4 °F (36.3 °C)-98.4 °F (36.9 °C)] 98.2 °F (36.8 °C)  Pulse:  [63-92] 84  Resp:  [14-20] 20  SpO2:  [92 %-96 %] 94 %  BP: (125-141)/(73-88) 128/78     Intake/Output - Last 3 Shifts         09/29 0700  09/30 0659 09/30 0700  10/01 0659 10/01 0700  10/02 0659    P.O.  1920     I.V. (mL/kg) 1944.9 (21.4)      IV Piggyback 1213.1      Total Intake(mL/kg) 3158 (34.8) 1920 (21.2)     Net +3158 +1920            Urine Occurrence  5 x     Stool Occurrence 0 x               Physical Exam  Vitals and nursing note reviewed.   Constitutional:       Appearance: He is well-developed.   Cardiovascular:      Rate and Rhythm: Normal rate and regular rhythm.      Heart sounds: Normal heart sounds.   Pulmonary:      Effort: Pulmonary effort is normal. No  respiratory distress.      Breath sounds: Normal breath sounds. No wheezing or rales.   Abdominal:      General: There is no distension.      Palpations: Abdomen is soft. There is no mass.      Tenderness: There is no abdominal tenderness. There is no guarding.   Musculoskeletal:         General: Normal range of motion.   Skin:     General: Skin is warm and dry.   Neurological:      Mental Status: He is alert and oriented to person, place, and time.   Psychiatric:         Behavior: Behavior normal.         Thought Content: Thought content normal.         Judgment: Judgment normal.                Significant Labs:  BMP (Last 3 Results):   Recent Labs   Lab 09/29/24  1031 09/30/24  0455 10/01/24  0356    71 70    138 141   K 4.3 4.0 4.2    107 105   CO2 24 23 26   BUN 11 9 7   CREATININE 1.0 0.9 1.1   CALCIUM 9.8 9.3 9.8   MG  --  1.9 2.0     CBC (Last 3 Results):   Recent Labs   Lab 09/29/24  1031 09/29/24  1038 09/30/24  0455 10/01/24  0356   WBC 13.31*  --  8.02 8.32   RBC 4.36*  --  3.63* 3.65*   HGB 13.6*  --  11.6* 11.6*   HCT 40.5 42 35.4* 34.8*     --  172 194   MCV 93  --  98 95   MCH 31.2*  --  32.0* 31.8*   MCHC 33.6  --  32.8 33.3       Significant Diagnostics:  None

## 2024-10-01 NOTE — NURSING
AVS previewed with pt. Pt verbalized understanding and denied any question at this time. Pt stated he's going to go to the pharmacy to  the prescription meds

## 2024-10-04 DIAGNOSIS — M54.14 THORACIC RADICULOPATHY: Primary | ICD-10-CM

## 2024-10-07 ENCOUNTER — OFFICE VISIT (OUTPATIENT)
Dept: FAMILY MEDICINE | Facility: CLINIC | Age: 54
End: 2024-10-07
Payer: MEDICARE

## 2024-10-07 VITALS
OXYGEN SATURATION: 98 % | SYSTOLIC BLOOD PRESSURE: 134 MMHG | DIASTOLIC BLOOD PRESSURE: 76 MMHG | BODY MASS INDEX: 34.01 KG/M2 | HEART RATE: 78 BPM | HEIGHT: 71 IN | WEIGHT: 242.94 LBS

## 2024-10-07 DIAGNOSIS — Z23 NEED FOR VACCINATION: Primary | ICD-10-CM

## 2024-10-07 DIAGNOSIS — Z87.19 HISTORY OF DIVERTICULITIS: ICD-10-CM

## 2024-10-07 DIAGNOSIS — M54.40 CHRONIC MIDLINE LOW BACK PAIN WITH SCIATICA, SCIATICA LATERALITY UNSPECIFIED: ICD-10-CM

## 2024-10-07 DIAGNOSIS — G89.29 CHRONIC MIDLINE LOW BACK PAIN WITH SCIATICA, SCIATICA LATERALITY UNSPECIFIED: ICD-10-CM

## 2024-10-07 PROCEDURE — 1111F DSCHRG MED/CURRENT MED MERGE: CPT | Mod: CPTII,S$GLB,, | Performed by: STUDENT IN AN ORGANIZED HEALTH CARE EDUCATION/TRAINING PROGRAM

## 2024-10-07 PROCEDURE — G0009 ADMIN PNEUMOCOCCAL VACCINE: HCPCS | Mod: S$GLB,,, | Performed by: STUDENT IN AN ORGANIZED HEALTH CARE EDUCATION/TRAINING PROGRAM

## 2024-10-07 PROCEDURE — 3075F SYST BP GE 130 - 139MM HG: CPT | Mod: CPTII,S$GLB,, | Performed by: STUDENT IN AN ORGANIZED HEALTH CARE EDUCATION/TRAINING PROGRAM

## 2024-10-07 PROCEDURE — 90677 PCV20 VACCINE IM: CPT | Mod: S$GLB,,, | Performed by: STUDENT IN AN ORGANIZED HEALTH CARE EDUCATION/TRAINING PROGRAM

## 2024-10-07 PROCEDURE — 3008F BODY MASS INDEX DOCD: CPT | Mod: CPTII,S$GLB,, | Performed by: STUDENT IN AN ORGANIZED HEALTH CARE EDUCATION/TRAINING PROGRAM

## 2024-10-07 PROCEDURE — 99999 PR PBB SHADOW E&M-EST. PATIENT-LVL V: CPT | Mod: PBBFAC,,, | Performed by: STUDENT IN AN ORGANIZED HEALTH CARE EDUCATION/TRAINING PROGRAM

## 2024-10-07 PROCEDURE — 3078F DIAST BP <80 MM HG: CPT | Mod: CPTII,S$GLB,, | Performed by: STUDENT IN AN ORGANIZED HEALTH CARE EDUCATION/TRAINING PROGRAM

## 2024-10-07 PROCEDURE — 1160F RVW MEDS BY RX/DR IN RCRD: CPT | Mod: CPTII,S$GLB,, | Performed by: STUDENT IN AN ORGANIZED HEALTH CARE EDUCATION/TRAINING PROGRAM

## 2024-10-07 PROCEDURE — 99214 OFFICE O/P EST MOD 30 MIN: CPT | Mod: S$GLB,,, | Performed by: STUDENT IN AN ORGANIZED HEALTH CARE EDUCATION/TRAINING PROGRAM

## 2024-10-07 PROCEDURE — 1159F MED LIST DOCD IN RCRD: CPT | Mod: CPTII,S$GLB,, | Performed by: STUDENT IN AN ORGANIZED HEALTH CARE EDUCATION/TRAINING PROGRAM

## 2024-10-07 RX ORDER — OXYCODONE AND ACETAMINOPHEN 10; 325 MG/1; MG/1
1 TABLET ORAL EVERY 8 HOURS PRN
Qty: 21 TABLET | Refills: 0 | Status: SHIPPED | OUTPATIENT
Start: 2024-10-07 | End: 2024-10-23

## 2024-10-08 ENCOUNTER — OFFICE VISIT (OUTPATIENT)
Dept: NEUROLOGY | Facility: CLINIC | Age: 54
End: 2024-10-08
Payer: MEDICARE

## 2024-10-08 VITALS — SYSTOLIC BLOOD PRESSURE: 134 MMHG | DIASTOLIC BLOOD PRESSURE: 87 MMHG | HEART RATE: 71 BPM

## 2024-10-08 DIAGNOSIS — H51.11 CONVERGENCE INSUFFICIENCY: ICD-10-CM

## 2024-10-08 DIAGNOSIS — M54.81 OCCIPITAL NEURITIS: ICD-10-CM

## 2024-10-08 DIAGNOSIS — R26.89 IMBALANCE: ICD-10-CM

## 2024-10-08 DIAGNOSIS — M54.2 CERVICALGIA OF OCCIPITO-ATLANTO-AXIAL REGION: ICD-10-CM

## 2024-10-08 DIAGNOSIS — S06.0X9S CONCUSSION WITH LOSS OF CONSCIOUSNESS, SEQUELA: Primary | ICD-10-CM

## 2024-10-08 DIAGNOSIS — S13.4XXA WHIPLASH INJURY TO NECK, INITIAL ENCOUNTER: ICD-10-CM

## 2024-10-08 DIAGNOSIS — G44.86 CERVICOGENIC HEADACHE: ICD-10-CM

## 2024-10-08 DIAGNOSIS — S09.90XA HEAD TRAUMA, INITIAL ENCOUNTER: ICD-10-CM

## 2024-10-08 DIAGNOSIS — H55.81 SACCADIC EYE MOVEMENT DEFICIENCY: ICD-10-CM

## 2024-10-08 DIAGNOSIS — R41.89 COGNITIVE CHANGE: ICD-10-CM

## 2024-10-08 PROBLEM — S06.0X9A CONCUSSION WITH LOSS OF CONSCIOUSNESS: Status: ACTIVE | Noted: 2024-10-08

## 2024-10-08 PROCEDURE — 3079F DIAST BP 80-89 MM HG: CPT | Mod: CPTII,S$GLB,, | Performed by: PSYCHIATRY & NEUROLOGY

## 2024-10-08 PROCEDURE — 1160F RVW MEDS BY RX/DR IN RCRD: CPT | Mod: CPTII,S$GLB,, | Performed by: PSYCHIATRY & NEUROLOGY

## 2024-10-08 PROCEDURE — 1159F MED LIST DOCD IN RCRD: CPT | Mod: CPTII,S$GLB,, | Performed by: PSYCHIATRY & NEUROLOGY

## 2024-10-08 PROCEDURE — 99999 PR PBB SHADOW E&M-EST. PATIENT-LVL IV: CPT | Mod: PBBFAC,,, | Performed by: PSYCHIATRY & NEUROLOGY

## 2024-10-08 PROCEDURE — G2211 COMPLEX E/M VISIT ADD ON: HCPCS | Mod: S$GLB,,, | Performed by: PSYCHIATRY & NEUROLOGY

## 2024-10-08 PROCEDURE — 1111F DSCHRG MED/CURRENT MED MERGE: CPT | Mod: CPTII,S$GLB,, | Performed by: PSYCHIATRY & NEUROLOGY

## 2024-10-08 PROCEDURE — 99205 OFFICE O/P NEW HI 60 MIN: CPT | Mod: S$GLB,,, | Performed by: PSYCHIATRY & NEUROLOGY

## 2024-10-08 PROCEDURE — 3075F SYST BP GE 130 - 139MM HG: CPT | Mod: CPTII,S$GLB,, | Performed by: PSYCHIATRY & NEUROLOGY

## 2024-10-08 RX ORDER — PREDNISONE 10 MG/1
TABLET ORAL
Qty: 40 TABLET | Refills: 0 | Status: SHIPPED | OUTPATIENT
Start: 2024-10-08 | End: 2024-10-14

## 2024-10-08 NOTE — PROGRESS NOTES
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  on gestational age, weight and in agreement with clinical  observations.    Premature Infant recommended reference ranges:  Up to 24 hours.............<8.0 mg/dL  Up to 48 hours............<12.0 mg/dL  3-5 days..................<15.0 mg/dL  6-29 days.................<15.0 mg/dL      Alkaline Phosphatase 09/05/2024 68  55 - 135 U/L Final    AST 09/05/2024 18  10 - 40 U/L Final    ALT 09/05/2024 25  10 - 44 U/L Final    eGFR 09/05/2024 >60.0  >60 mL/min/1.73 m^2 Final    Anion Gap 09/05/2024 10  8 - 16 mmol/L Final    Lipase 09/05/2024 34  4 - 60 U/L Final    Specimen UA 09/05/2024 Urine, Clean Catch   Final    Color, UA 09/05/2024 Yellow  Yellow, Straw, Sharron Final    Appearance, UA 09/05/2024 Clear  Clear Final    pH, UA 09/05/2024 6.0  5.0 - 8.0 Final    Specific Gravity, UA 09/05/2024 1.020  1.005 - 1.030 Final    Protein, UA 09/05/2024 Negative  Negative Final    Comment: Recommend a 24 hour urine protein or a urine   protein/creatinine ratio if globulin induced proteinuria is  clinically suspected.      Glucose, UA 09/05/2024 3+ (A)  Negative Final    Ketones, UA 09/05/2024 Negative  Negative Final    Bilirubin (UA) 09/05/2024 Negative  Negative Final    Occult Blood UA 09/05/2024 Negative  Negative Final    Nitrite, UA 09/05/2024 Negative  Negative Final    Leukocytes, UA 09/05/2024 Negative   Negative Final    POC Glucose 09/05/2024 124 (H)  70 - 110 mg/dL Final    POC BUN 09/05/2024 17  6 - 30 mg/dL Final    POC Creatinine 09/05/2024 0.9  0.5 - 1.4 mg/dL Final    POC Sodium 09/05/2024 139  136 - 145 mmol/L Final    POC Potassium 09/05/2024 4.2  3.5 - 5.1 mmol/L Final    POC Chloride 09/05/2024 105  95 - 110 mmol/L Final    POC TCO2 (MEASURED) 09/05/2024 21 (L)  23 - 29 mmol/L Final    POC Ionized Calcium 09/05/2024 1.24  1.06 - 1.42 mmol/L Final    POC Hematocrit 09/05/2024 38  36 - 54 %PCV Final    Sample 09/05/2024 CHRISTINA   Final    RBC, UA 09/05/2024 1  0 - 4 /hpf Final    WBC, UA 09/05/2024 0  0 - 5 /hpf Final    Bacteria 09/05/2024 Rare  None-Occ /hpf Final    Yeast, UA 09/05/2024 None  None Final    Microscopic Comment 09/05/2024 SEE COMMENT   Final    Comment: Other formed elements not mentioned in the report are not   present in the microscopic examination.      Lab Visit on 09/05/2024   Component Date Value Ref Range Status    WBC 09/05/2024 7.91  3.90 - 12.70 K/uL Final    RBC 09/05/2024 4.15 (L)  4.60 - 6.20 M/uL Final    Hemoglobin 09/05/2024 12.9 (L)  14.0 - 18.0 g/dL Final    Hematocrit 09/05/2024 38.9 (L)  40.0 - 54.0 % Final    MCV 09/05/2024 94  82 - 98 fL Final    MCH 09/05/2024 31.1 (H)  27.0 - 31.0 pg Final    MCHC 09/05/2024 33.2  32.0 - 36.0 g/dL Final    RDW 09/05/2024 13.2  11.5 - 14.5 % Final    Platelets 09/05/2024 199  150 - 450 K/uL Final    MPV 09/05/2024 9.0 (L)  9.2 - 12.9 fL Final    Immature Granulocytes 09/05/2024 0.5  0.0 - 0.5 % Final    Gran # (ANC) 09/05/2024 4.5  1.8 - 7.7 K/uL Final    Immature Grans (Abs) 09/05/2024 0.04  0.00 - 0.04 K/uL Final    Comment: Mild elevation in immature granulocytes is non specific and   can be seen in a variety of conditions including stress response,   acute inflammation, trauma and pregnancy. Correlation with other   laboratory and clinical findings is essential.      Lymph # 09/05/2024 2.0  1.0 - 4.8 K/uL Final    Mono # 09/05/2024  1.1 (H)  0.3 - 1.0 K/uL Final    Eos # 09/05/2024 0.3  0.0 - 0.5 K/uL Final    Baso # 09/05/2024 0.05  0.00 - 0.20 K/uL Final    nRBC 09/05/2024 0  0 /100 WBC Final    Gran % 09/05/2024 56.4  38.0 - 73.0 % Final    Lymph % 09/05/2024 24.9  18.0 - 48.0 % Final    Mono % 09/05/2024 13.7  4.0 - 15.0 % Final    Eosinophil % 09/05/2024 3.9  0.0 - 8.0 % Final    Basophil % 09/05/2024 0.6  0.0 - 1.9 % Final    Differential Method 09/05/2024 Automated   Final    Sodium 09/05/2024 138  136 - 145 mmol/L Final    Potassium 09/05/2024 4.6  3.5 - 5.1 mmol/L Final    Chloride 09/05/2024 108  95 - 110 mmol/L Final    CO2 09/05/2024 23  23 - 29 mmol/L Final    Glucose 09/05/2024 103  70 - 110 mg/dL Final    BUN 09/05/2024 13  6 - 20 mg/dL Final    Creatinine 09/05/2024 0.9  0.5 - 1.4 mg/dL Final    Calcium 09/05/2024 9.9  8.7 - 10.5 mg/dL Final    Total Protein 09/05/2024 7.7  6.0 - 8.4 g/dL Final    Albumin 09/05/2024 4.3  3.5 - 5.2 g/dL Final    Total Bilirubin 09/05/2024 0.3  0.1 - 1.0 mg/dL Final    Comment: For infants and newborns, interpretation of results should be based  on gestational age, weight and in agreement with clinical  observations.    Premature Infant recommended reference ranges:  Up to 24 hours.............<8.0 mg/dL  Up to 48 hours............<12.0 mg/dL  3-5 days..................<15.0 mg/dL  6-29 days.................<15.0 mg/dL      Alkaline Phosphatase 09/05/2024 69  55 - 135 U/L Final    AST 09/05/2024 17  10 - 40 U/L Final    ALT 09/05/2024 27  10 - 44 U/L Final    eGFR 09/05/2024 >60.0  >60 mL/min/1.73 m^2 Final    Anion Gap 09/05/2024 7 (L)  8 - 16 mmol/L Final    Protein, Serum 09/05/2024 7.4  6.0 - 8.4 g/dL Final    Comment: Serum protein electrophoresis and immunofixation results should be   interpreted in clinical context in that some therapeutic agents can   result   in false positive results (example, daratumumab). Correlation with   the   patient s therapeutic regimen is required.      Albumin  09/05/2024 4.57  3.35 - 5.55 g/dL Final    Alpha-1 09/05/2024 0.25  0.17 - 0.41 g/dL Final    Alpha-2 09/05/2024 0.62  0.43 - 0.99 g/dL Final    Beta 09/05/2024 1.04  0.50 - 1.10 g/dL Final    Gamma 09/05/2024 0.93  0.67 - 1.58 g/dL Final    Immunofix Interp. 09/05/2024 SEE COMMENT   Final    Comment: Serum protein electrophoresis and immunofixation results should be   interpreted in clinical context in that some therapeutic agents can   result   in false positive results (example, daratumumab). Correlation with   the   patient s therapeutic regimen is required.  See pathologist's interpretation.      [place] Free Light Chains 09/05/2024 1.64  0.33 - 1.94 mg/dL Final    Lambda Free Light Chains 09/05/2024 1.74  0.57 - 2.63 mg/dL Final    Kappa/Lambda FLC Ratio 09/05/2024 0.94  0.26 - 1.65 Final    Comment: Undetected antigen excess is a rare event but cannot   be excluded. If these free light chain results do not   agree with other clinical or laboratory findings or   if the sample is from a patient that has previously   demonstrated antigen excess, discuss with the testing   laboratory.   Results should always be interpreted in conjunction   with other laboratory tests and clinical evidence.      IgG 09/05/2024 1011  650 - 1600 mg/dL Final    IgG Cord Blood Reference Range: 650-1600 mg/dL.    IgA 09/05/2024 383 (H)  40 - 350 mg/dL Final    IgA Cord Blood Reference Range: <5 mg/dL.    IgM 09/05/2024 65  50 - 300 mg/dL Final    IgM Cord Blood Reference Range: <25 mg/dL.    Beta-2 Microglobulin 09/05/2024 1.4  0.0 - 2.5 ug/mL Final    LD 09/05/2024 184  110 - 260 U/L Final    Results are increased in hemolyzed samples.    Pathologist Interpretation [facility] 09/05/2024 REVIEWED   Final    Comment:   Electronically reviewed and signed by:  Atif Ye MD  Signed on 09/09/24 at 15:52  No monoclonal peaks identified.      Pathologist Interpretation SPE 09/05/2024 REVIEWED   Final    Comment:   Electronically reviewed and  signed by:  Atif Ye MD  Signed on 09/09/24 at 15:52  Normal total protein, normal pattern.     Admission on 08/18/2024, Discharged on 08/18/2024   Component Date Value Ref Range Status    WBC 08/18/2024 6.72  3.90 - 12.70 K/uL Final    RBC 08/18/2024 4.42 (L)  4.60 - 6.20 M/uL Final    Hemoglobin 08/18/2024 13.8 (L)  14.0 - 18.0 g/dL Final    Hematocrit 08/18/2024 40.5  40.0 - 54.0 % Final    MCV 08/18/2024 92  82 - 98 fL Final    MCH 08/18/2024 31.2 (H)  27.0 - 31.0 pg Final    MCHC 08/18/2024 34.1  32.0 - 36.0 g/dL Final    RDW 08/18/2024 13.2  11.5 - 14.5 % Final    Platelets 08/18/2024 268  150 - 450 K/uL Final    MPV 08/18/2024 9.0 (L)  9.2 - 12.9 fL Final    Immature Granulocytes 08/18/2024 0.4  0.0 - 0.5 % Final    Gran # (ANC) 08/18/2024 3.7  1.8 - 7.7 K/uL Final    Immature Grans (Abs) 08/18/2024 0.03  0.00 - 0.04 K/uL Final    Comment: Mild elevation in immature granulocytes is non specific and   can be seen in a variety of conditions including stress response,   acute inflammation, trauma and pregnancy. Correlation with other   laboratory and clinical findings is essential.      Lymph # 08/18/2024 1.6  1.0 - 4.8 K/uL Final    Mono # 08/18/2024 0.9  0.3 - 1.0 K/uL Final    Eos # 08/18/2024 0.4  0.0 - 0.5 K/uL Final    Baso # 08/18/2024 0.05  0.00 - 0.20 K/uL Final    nRBC 08/18/2024 0  0 /100 WBC Final    Gran % 08/18/2024 55.7  38.0 - 73.0 % Final    Lymph % 08/18/2024 24.3  18.0 - 48.0 % Final    Mono % 08/18/2024 13.5  4.0 - 15.0 % Final    Eosinophil % 08/18/2024 5.4  0.0 - 8.0 % Final    Basophil % 08/18/2024 0.7  0.0 - 1.9 % Final    Differential Method 08/18/2024 Automated   Final    Sodium 08/18/2024 135 (L)  136 - 145 mmol/L Final    Potassium 08/18/2024 4.4  3.5 - 5.1 mmol/L Final    Chloride 08/18/2024 104  95 - 110 mmol/L Final    CO2 08/18/2024 22 (L)  23 - 29 mmol/L Final    Glucose 08/18/2024 127 (H)  70 - 110 mg/dL Final    BUN 08/18/2024 20  6 - 20 mg/dL Final    Creatinine  08/18/2024 0.9  0.5 - 1.4 mg/dL Final    Calcium 08/18/2024 9.6  8.7 - 10.5 mg/dL Final    Total Protein 08/18/2024 7.5  6.0 - 8.4 g/dL Final    Albumin 08/18/2024 4.2  3.5 - 5.2 g/dL Final    Total Bilirubin 08/18/2024 0.2  0.1 - 1.0 mg/dL Final    Comment: For infants and newborns, interpretation of results should be based  on gestational age, weight and in agreement with clinical  observations.    Premature Infant recommended reference ranges:  Up to 24 hours.............<8.0 mg/dL  Up to 48 hours............<12.0 mg/dL  3-5 days..................<15.0 mg/dL  6-29 days.................<15.0 mg/dL      Alkaline Phosphatase 08/18/2024 74  55 - 135 U/L Final    AST 08/18/2024 24  10 - 40 U/L Final    ALT 08/18/2024 32  10 - 44 U/L Final    eGFR 08/18/2024 >60.0  >60 mL/min/1.73 m^2 Final    Anion Gap 08/18/2024 9  8 - 16 mmol/L Final    Lipase 08/18/2024 28  4 - 60 U/L Final    Specimen UA 08/18/2024 Urine, Clean Catch   Final    Color, UA 08/18/2024 Colorless (A)  Yellow, Straw, Sharron Final    Appearance, UA 08/18/2024 Clear  Clear Final    pH, UA 08/18/2024 6.0  5.0 - 8.0 Final    Specific Gravity, UA 08/18/2024 1.015  1.005 - 1.030 Final    Protein, UA 08/18/2024 Negative  Negative Final    Comment: Recommend a 24 hour urine protein or a urine   protein/creatinine ratio if globulin induced proteinuria is  clinically suspected.      Glucose, UA 08/18/2024 Negative  Negative Final    Ketones, UA 08/18/2024 Negative  Negative Final    Bilirubin (UA) 08/18/2024 Negative  Negative Final    Occult Blood UA 08/18/2024 Negative  Negative Final    Nitrite, UA 08/18/2024 Negative  Negative Final    Leukocytes, UA 08/18/2024 Negative  Negative Final    Troponin I 08/18/2024 <0.006  0.000 - 0.026 ng/mL Final    Comment: The reference interval for Troponin I represents the 99th percentile   cutoff   for our facility and is consistent with 3rd generation assay   performance.      Magnesium 08/18/2024 2.0  1.6 - 2.6 mg/dL Final     QRS Duration 08/18/2024 66  ms Final    [facility] QTC Calculation 08/18/2024 399  ms Final    Alcohol, Serum 08/18/2024 <10  <10 mg/dL Final    Alcohol, Urine 08/18/2024 <10  <10 mg/dL Final    Benzodiazepines 08/18/2024 Negative  Negative Final    Methadone metabolites 08/18/2024 Negative  Negative Final    Cocaine (Metab.) 08/18/2024 Negative  Negative Final    Opiate Scrn, Ur 08/18/2024 Negative  Negative Final    Barbiturate Screen, Ur 08/18/2024 Negative  Negative Final    Amphetamine Screen, Ur 08/18/2024 Negative  Negative Final    THC 08/18/2024 Negative  Negative Final    Phencyclidine 08/18/2024 Negative  Negative Final    Creatinine, Urine 08/18/2024 58.0  23.0 - 375.0 mg/dL Final    Toxicology Information 08/18/2024 SEE COMMENT   Final    Comment: This screen includes the following classes of drugs at the listed   cut-off:    Benzodiazepines 200 ng/ml  Methadone 300 ng/ml  Cocaine metabolite 300 ng/ml  Opiates 300 ng/ml  Barbiturates 200 ng/ml  Amphetamines 1000 ng/ml  Marijuana metabs (THC) 50 ng/ml  Phencyclidine (PCP) 25 ng/ml    This is a screening test. If results do not correlate with clinical   presentation, then a confirmatory send out test is advised.     This report is intended for use in clinical monitoring and management   of   patients. It is not intended for use in employment related drug   testing.      TSH 08/18/2024 1.726  0.400 - 4.000 uIU/mL Final    POC Glucose 08/18/2024 130 (H)  70 - 110 mg/dL Final    POC BUN 08/18/2024 21  6 - 30 mg/dL Final    POC Creatinine 08/18/2024 1.0  0.5 - 1.4 mg/dL Final    POC Sodium 08/18/2024 137  136 - 145 mmol/L Final    POC Potassium 08/18/2024 4.4  3.5 - 5.1 mmol/L Final    POC Chloride 08/18/2024 104  95 - 110 mmol/L Final    POC TCO2 (MEASURED) 08/18/2024 22 (L)  23 - 29 mmol/L Final    POC Ionized Calcium 08/18/2024 1.23  1.06 - 1.42 mmol/L Final    POC Hematocrit 08/18/2024 42  36 - 54 %PCV Final    Sample 08/18/2024 CHRISTINA   Final   Lab Visit on  07/30/2024   Component Date Value Ref Range Status    Cholesterol 07/30/2024 138  120 - 199 mg/dL Final    Comment: The National Cholesterol Education Program (NCEP) has set the  following guidelines (reference ranges) for Cholesterol:  Optimal.....................<200 mg/dL  Borderline High.............200-239 mg/dL  High........................> or = 240 mg/dL      Triglycerides 07/30/2024 132  30 - 150 mg/dL Final    Comment: The National Cholesterol Education Program (NCEP) has set the  following guidelines (reference values) for triglycerides:  Normal......................<150 mg/dL  Borderline High.............150-199 mg/dL  High........................200-499 mg/dL      HDL 07/30/2024 41  40 - 75 mg/dL Final    Comment: The National Cholesterol Education Program (NCEP) has set the  following guidelines (reference values) for HDL Cholesterol:  Low...............<40 mg/dL  Optimal...........>60 mg/dL      LDL Cholesterol 07/30/2024 70.6  63.0 - 159.0 mg/dL Final    Comment: The National Cholesterol Education Program (NCEP) has set the  following guidelines (reference values) for LDL Cholesterol:  Optimal.......................<130 mg/dL  Borderline High...............130-159 mg/dL  High..........................160-189 mg/dL  Very High.....................>190 mg/dL      HDL/Cholesterol Ratio 07/30/2024 29.7  20.0 - 50.0 % Final    Total Cholesterol/HDL Ratio 07/30/2024 3.4  2.0 - 5.0 Final    Non-HDL Cholesterol 07/30/2024 97  mg/dL Final    Comment: Risk category and Non-HDL cholesterol goals:  Coronary heart disease (CHD)or equivalent (10-year risk of CHD >20%):  Non-HDL cholesterol goal     <130 mg/dL  Two or more CHD risk factors and 10-year risk of CHD <= 20%:  Non-HDL cholesterol goal     <160 mg/dL  0 to 1 CHD risk factor:  Non-HDL cholesterol goal     <190 mg/dL      Sodium 07/30/2024 138  136 - 145 mmol/L Final    Potassium 07/30/2024 4.7  3.5 - 5.1 mmol/L Final    Chloride 07/30/2024 106  95 - 110  mmol/L Final    CO2 07/30/2024 25  23 - 29 mmol/L Final    Glucose 07/30/2024 91  70 - 110 mg/dL Final    BUN 07/30/2024 13  6 - 20 mg/dL Final    Creatinine 07/30/2024 1.0  0.5 - 1.4 mg/dL Final    Calcium 07/30/2024 9.7  8.7 - 10.5 mg/dL Final    Total Protein 07/30/2024 7.5  6.0 - 8.4 g/dL Final    Albumin 07/30/2024 4.2  3.5 - 5.2 g/dL Final    Total Bilirubin 07/30/2024 0.5  0.1 - 1.0 mg/dL Final    Comment: For infants and newborns, interpretation of results should be based  on gestational age, weight and in agreement with clinical  observations.    Premature Infant recommended reference ranges:  Up to 24 hours.............<8.0 mg/dL  Up to 48 hours............<12.0 mg/dL  3-5 days..................<15.0 mg/dL  6-29 days.................<15.0 mg/dL      Alkaline Phosphatase 07/30/2024 69  55 - 135 U/L Final    AST 07/30/2024 22  10 - 40 U/L Final    ALT 07/30/2024 31  10 - 44 U/L Final    eGFR 07/30/2024 >60.0  >60 mL/min/1.73 m^2 Final    Anion Gap 07/30/2024 7 (L)  8 - 16 mmol/L Final   Admission on 06/16/2024, Discharged on 06/16/2024   Component Date Value Ref Range Status    QRS Duration 06/16/2024 76  ms Final    [facility] QTC Calculation 06/16/2024 415  ms Final    WBC 06/16/2024 7.82  3.90 - 12.70 K/uL Final    RBC 06/16/2024 4.20 (L)  4.60 - 6.20 M/uL Final    Hemoglobin 06/16/2024 13.0 (L)  14.0 - 18.0 g/dL Final    Hematocrit 06/16/2024 38.0 (L)  40.0 - 54.0 % Final    MCV 06/16/2024 91  82 - 98 fL Final    MCH 06/16/2024 31.0  27.0 - 31.0 pg Final    MCHC 06/16/2024 34.2  32.0 - 36.0 g/dL Final    RDW 06/16/2024 12.9  11.5 - 14.5 % Final    Platelets 06/16/2024 223  150 - 450 K/uL Final    MPV 06/16/2024 8.7 (L)  9.2 - 12.9 fL Final    Immature Granulocytes 06/16/2024 0.3  0.0 - 0.5 % Final    Gran # (ANC) 06/16/2024 4.0  1.8 - 7.7 K/uL Final    Immature Grans (Abs) 06/16/2024 0.02  0.00 - 0.04 K/uL Final    Comment: Mild elevation in immature granulocytes is non specific and   can be seen in a variety  of conditions including stress response,   acute inflammation, trauma and pregnancy. Correlation with other   laboratory and clinical findings is essential.      Lymph # 06/16/2024 2.9  1.0 - 4.8 K/uL Final    Mono # 06/16/2024 0.6  0.3 - 1.0 K/uL Final    Eos # 06/16/2024 0.3  0.0 - 0.5 K/uL Final    Baso # 06/16/2024 0.06  0.00 - 0.20 K/uL Final    nRBC 06/16/2024 0  0 /100 WBC Final    Gran % 06/16/2024 50.4  38.0 - 73.0 % Final    Lymph % 06/16/2024 37.0  18.0 - 48.0 % Final    Mono % 06/16/2024 7.5  4.0 - 15.0 % Final    Eosinophil % 06/16/2024 4.0  0.0 - 8.0 % Final    Basophil % 06/16/2024 0.8  0.0 - 1.9 % Final    Differential Method 06/16/2024 Automated   Final    Sodium 06/16/2024 137  136 - 145 mmol/L Final    Potassium 06/16/2024 4.3  3.5 - 5.1 mmol/L Final    Chloride 06/16/2024 105  95 - 110 mmol/L Final    CO2 06/16/2024 19 (L)  23 - 29 mmol/L Final    Glucose 06/16/2024 102  70 - 110 mg/dL Final    BUN 06/16/2024 12  6 - 20 mg/dL Final    Creatinine 06/16/2024 1.1  0.5 - 1.4 mg/dL Final    Calcium 06/16/2024 9.2  8.7 - 10.5 mg/dL Final    Total Protein 06/16/2024 7.7  6.0 - 8.4 g/dL Final    Albumin 06/16/2024 4.4  3.5 - 5.2 g/dL Final    Total Bilirubin 06/16/2024 0.5  0.1 - 1.0 mg/dL Final    Comment: For infants and newborns, interpretation of results should be based  on gestational age, weight and in agreement with clinical  observations.    Premature Infant recommended reference ranges:  Up to 24 hours.............<8.0 mg/dL  Up to 48 hours............<12.0 mg/dL  3-5 days..................<15.0 mg/dL  6-29 days.................<15.0 mg/dL      Alkaline Phosphatase 06/16/2024 67  55 - 135 U/L Final    AST 06/16/2024 25  10 - 40 U/L Final    ALT 06/16/2024 22  10 - 44 U/L Final    eGFR 06/16/2024 >60.0  >60 mL/min/1.73 m^2 Final    Anion Gap 06/16/2024 13  8 - 16 mmol/L Final    Troponin I 06/16/2024 <0.006  0.000 - 0.026 ng/mL Final    Comment: The reference interval for Troponin I represents  the 99th percentile   cutoff   for our facility and is consistent with 3rd generation assay   performance.      BNP 06/16/2024 29  0 - 99 pg/mL Final    Values of less than 100 pg/ml are consistent with non-CHF populations.    Alcohol, Serum 06/16/2024 293 (H)  0 - 10 mg/dL Final   Admission on 06/03/2024, Discharged on 06/03/2024   Component Date Value Ref Range Status    QRS Duration 06/03/2024 76  ms Final    [facility] QTC Calculation 06/03/2024 399  ms Final    WBC 06/03/2024 7.76  3.90 - 12.70 K/uL Final    RBC 06/03/2024 4.02 (L)  4.60 - 6.20 M/uL Final    Hemoglobin 06/03/2024 12.8 (L)  14.0 - 18.0 g/dL Final    Hematocrit 06/03/2024 37.7 (L)  40.0 - 54.0 % Final    MCV 06/03/2024 94  82 - 98 fL Final    MCH 06/03/2024 31.8 (H)  27.0 - 31.0 pg Final    MCHC 06/03/2024 34.0  32.0 - 36.0 g/dL Final    RDW 06/03/2024 13.0  11.5 - 14.5 % Final    Platelets 06/03/2024 208  150 - 450 K/uL Final    MPV 06/03/2024 9.7  9.2 - 12.9 fL Final    Immature Granulocytes 06/03/2024 0.4  0.0 - 0.5 % Final    Gran # (ANC) 06/03/2024 4.6  1.8 - 7.7 K/uL Final    Immature Grans (Abs) 06/03/2024 0.03  0.00 - 0.04 K/uL Final    Comment: Mild elevation in immature granulocytes is non specific and   can be seen in a variety of conditions including stress response,   acute inflammation, trauma and pregnancy. Correlation with other   laboratory and clinical findings is essential.      Lymph # 06/03/2024 1.9  1.0 - 4.8 K/uL Final    Mono # 06/03/2024 0.7  0.3 - 1.0 K/uL Final    Eos # 06/03/2024 0.5  0.0 - 0.5 K/uL Final    Baso # 06/03/2024 0.04  0.00 - 0.20 K/uL Final    nRBC 06/03/2024 0  0 /100 WBC Final    Gran % 06/03/2024 58.8  38.0 - 73.0 % Final    Lymph % 06/03/2024 25.0  18.0 - 48.0 % Final    Mono % 06/03/2024 8.5  4.0 - 15.0 % Final    Eosinophil % 06/03/2024 6.8  0.0 - 8.0 % Final    Basophil % 06/03/2024 0.5  0.0 - 1.9 % Final    Differential Method 06/03/2024 Automated   Final    Sodium 06/03/2024 139  136 - 145 mmol/L Final     Potassium 06/03/2024 3.9  3.5 - 5.1 mmol/L Final    Chloride 06/03/2024 107  95 - 110 mmol/L Final    CO2 06/03/2024 23  23 - 29 mmol/L Final    Glucose 06/03/2024 109  70 - 110 mg/dL Final    BUN 06/03/2024 17  6 - 20 mg/dL Final    Creatinine 06/03/2024 1.0  0.5 - 1.4 mg/dL Final    Calcium 06/03/2024 9.7  8.7 - 10.5 mg/dL Final    Total Protein 06/03/2024 7.4  6.0 - 8.4 g/dL Final    Albumin 06/03/2024 4.0  3.5 - 5.2 g/dL Final    Total Bilirubin 06/03/2024 0.2  0.1 - 1.0 mg/dL Final    Comment: For infants and newborns, interpretation of results should be based  on gestational age, weight and in agreement with clinical  observations.    Premature Infant recommended reference ranges:  Up to 24 hours.............<8.0 mg/dL  Up to 48 hours............<12.0 mg/dL  3-5 days..................<15.0 mg/dL  6-29 days.................<15.0 mg/dL      Alkaline Phosphatase 06/03/2024 74  55 - 135 U/L Final    AST 06/03/2024 18  10 - 40 U/L Final    ALT 06/03/2024 19  10 - 44 U/L Final    eGFR 06/03/2024 >60.0  >60 mL/min/1.73 m^2 Final    Anion Gap 06/03/2024 9  8 - 16 mmol/L Final    Lipase 06/03/2024 33  4 - 60 U/L Final    Specimen UA 06/03/2024 Urine, Clean Catch   Final    Color, UA 06/03/2024 Yellow  Yellow, Straw, Sharron Final    Appearance, UA 06/03/2024 Clear  Clear Final    pH, UA 06/03/2024 6.0  5.0 - 8.0 Final    Specific Gravity, UA 06/03/2024 1.020  1.005 - 1.030 Final    Protein, UA 06/03/2024 Negative  Negative Final    Comment: Recommend a 24 hour urine protein or a urine   protein/creatinine ratio if globulin induced proteinuria is  clinically suspected.      Glucose, UA 06/03/2024 Negative  Negative Final    Ketones, UA 06/03/2024 Negative  Negative Final    Bilirubin (UA) 06/03/2024 Negative  Negative Final    Occult Blood UA 06/03/2024 Negative  Negative Final    Nitrite, UA 06/03/2024 Negative  Negative Final    Leukocytes, UA 06/03/2024 Negative  Negative Final    POC Glucose 06/03/2024 111 (H)  70  - 110 mg/dL Final    POC BUN 06/03/2024 18  6 - 30 mg/dL Final    POC Creatinine 06/03/2024 1.0  0.5 - 1.4 mg/dL Final    POC Sodium 06/03/2024 139  136 - 145 mmol/L Final    POC Potassium 06/03/2024 3.9  3.5 - 5.1 mmol/L Final    POC Chloride 06/03/2024 105  95 - 110 mmol/L Final    POC TCO2 (MEASURED) 06/03/2024 24  23 - 29 mmol/L Final    POC Ionized Calcium 06/03/2024 1.23  1.06 - 1.42 mmol/L Final    POC Hematocrit 06/03/2024 39  36 - 54 %PCV Final    Sample 06/03/2024 CHRISTINA   Final      I personally reviewed all current labs noted in today's chart.    Imaging:    No head/neck imaging to review    Assessment:       ICD-10-CM ICD-9-CM    1. Concussion with loss of consciousness, sequela  S06.0X9S 907.0 Ambulatory referral/consult to Neurology      2. Whiplash injury to neck, initial encounter  S13.4XXA 847.0       3. Cervicalgia of olvhegft-qosswxh-wwgdu region  M54.2 723.1       4. Cervicogenic headache  G44.86 784.0       5. Occipital neuritis  M54.81 723.8       6. Cognitive change  R41.89 799.59       7. Imbalance  R26.89 781.2       8. Convergence insufficiency  H51.11 378.83       9. Saccadic eye movement deficiency  H55.81 379.57       54 y.o. male with h/o HTN, anxiety, carpal tunnel syndrome bilaterally, depression, HLD who presents for concussion evaluation. On exam, he has occipital point tenderness over the bilateral greater and lesser occipital nerve with induction of headaches with jump sign and twitch response and referred pain to bilateral lana horn, imbalance, convergence insufficiency, saccadic intrusions, saccadic dysmetria, impaired VOR cancellation. We discussed that there is currently no universally accepted definition of concussion. We discussed that concussion is a traumatic brain injury. We discussed that concussion can occur as a result of an impact to the head or to the body. We discussed that our clinic considers concussion definitive if there is transient disruption of brain activity  such as with loss of consciousness, amnesia as it relates to the day of the injury, or reports of neurological dysfunction on the day of injury. We discussed typical course, signs & symptoms, diagnostic findings, and treatment for concussion. We discussed that whiplash injury is a neck injury that can occur at a lower impact speed or force than concussion. We discussed that whiplash symptoms can be the same as concussion symptoms. We discussed typical course, signs & symptoms, diagnostic findings, and treatment for whiplash. Patient's exam and symptoms are the result of a kinetic force injury with resultant cranio cervical trauma and occipital neuritis. We discussed at length about the anatomy, symptomology, etiologies, and exam findings of occipital neuritis. We discussed the risks vs benefits of waiting vs acute therapy for occipital neuritis in that there is a risk of waiting for treatment in that permanent nerve damage could result as the nerve is chronically scarred into the muscle but there is no way to predict whether damage has already occurred until we start the treatment plan as described below. We discussed that head and/or neck injury can result in pain as well as cognitive, mood, and sleep disruption. We discussed that pain disturbances can disrupt sleep, cognition, and mood. We discussed that sleep disturbances can disrupt cognition, mood, and worsen pain. We discussed that mood disturbances can disrupt sleep, cognition, and worsen pain. Counseled the patient that steroids suppress the immune response, which means that they are at increased risk for cleve bacterial or viral infections including COVID19 and if they were to become infected, they may have a more severe disease course. We discussed that any form of steroids including oral or injectable should not be taken within 2 weeks of COVID19 vaccination/booster. The patient has elected to take steroids. The patient's last COVID19  vaccination/booster was never. Will get MRI Brain with contrast given headache red flags for increased intracranial pressure and concerns for spinal cancer. Will try a higher dose steroid and if helps, will repeat it and if does not, will do injections as below.    This patient's diagnoses of concussion and whiplash are chronic complicated injuries with multiple resultant symptoms as noted in the HPI as well as multiple subsequent diagnoses as a result of these injuries. I will be the primary provider who will both be providing and guiding ongoing medical care for these complex conditions.    Plan:     5 day prednisone taper prescribed: 100 mg (10 tabs), 90 mg (9 tabs), 80 mg (8 tabs), 70 mg (7 tabs), 60 mg (6 tabs). Split up doses with meals. Day 1: Take 4 tabs with breakfast, 3 tabs with lunch, 3 tabs with dinner. Day 2: Take 3 tabs with each meal. Day 3: Take 3 tabs with breakfast, 3 tabs with lunch, 2 tabs with dinner. Day 4: Take 3 tabs with breakfast, 2 tabs with lunch and dinner. Day 5: Take 2 tabs with each meal  The patient was instructed to ice the occipital region for no more than 20 minutes at least once a day but may repeat this as many times as they would like.  Discussed ergonomic accommodations for occipital neuritis/neuralgia. Mainly perform all work at eye level to minimize continued neck flexion which will aggravate the nerve.  Patient was encouraged to do daily light cardio exercise but instructed to limit physical activities to walking, walking in water up to the waist only or riding a stationary bike, recumbent preferred. No weight lifting in upper body, no neck massage, no acupuncture of neck, and no dry needling of upper neck. No neck PT unless otherwise stated. If neck PT is recommended, the therapist may do joint manipulation at this time but no suboccipital soft tissue therapy. Any of your therapists may also do passive neck range of motion activities. No chiropractor work on neck. Lower  body strengthening with resistant bands, leg machines, and strapping weights to legs okay. No core body workouts. No running or use of cardio equipment other than stationary bike. No swimming or body surfing. No amusement park rides. No lifting more than 5-10 lbs and bend at the knees, not the waist.  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 To be referred for consultation for occipital nerve release procedure if initially clinically responsive to injections but always with a return of symptoms.  Referral for vestibular PT for balance and eye movements  MRI Brain WOW    60 minutes were spent on the date of this patient encounter, which includes: preparing to see the patient, reviewing previous history, obtaining new patient history, performing the physical exam, counseling and educating the patient and/or family/caregiver, ordering necessary medications or tests or referrals, documenting in the electronic medical record, coordinating care.    Amos Arias MD  Sports Neurology

## 2024-10-08 NOTE — PATIENT INSTRUCTIONS
5 day prednisone taper prescribed: 100 mg (10 tabs), 90 mg (9 tabs), 80 mg (8 tabs), 70 mg (7 tabs), 60 mg (6 tabs). Split up doses with meals. Day 1: Take 4 tabs with breakfast, 3 tabs with lunch, 3 tabs with dinner. Day 2: Take 3 tabs with each meal. Day 3: Take 3 tabs with breakfast, 3 tabs with lunch, 2 tabs with dinner. Day 4: Take 3 tabs with breakfast, 2 tabs with lunch and dinner. Day 5: Take 2 tabs with each meal  The patient was instructed to ice the occipital region for no more than 20 minutes at least once a day but may repeat this as many times as they would like.  Discussed ergonomic accommodations for occipital neuritis/neuralgia. Mainly perform all work at eye level to minimize continued neck flexion which will aggravate the nerve.  Patient was encouraged to do daily light cardio exercise but instructed to limit physical activities to walking, walking in water up to the waist only or riding a stationary bike, recumbent preferred. No weight lifting in upper body, no neck massage, no acupuncture of neck, and no dry needling of upper neck. No neck PT unless otherwise stated. If neck PT is recommended, the therapist may do joint manipulation at this time but no suboccipital soft tissue therapy. Any of your therapists may also do passive neck range of motion activities. No chiropractor work on neck. Lower body strengthening with resistant bands, leg machines, and strapping weights to legs okay. No core body workouts. No running or use of cardio equipment other than stationary bike. No swimming or body surfing. No amusement park rides. No lifting more than 5-10 lbs and bend at the knees, not the waist.  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 To be referred for consultation for occipital nerve release procedure if initially clinically responsive to injections  but always with a return of symptoms.  Referral for vestibular PT for balance and eye movements  MRI Brain WOW

## 2024-10-10 NOTE — PROGRESS NOTES
"DATE: 10/23/2024  PATIENT: Keon Schneider Jr.    Attending Physician: Tip Carrion M.D.    HISTORY:  Keon Schneider Jr. is a 54 y.o. male who returns to me today for follow up.  He was last seen by me 8/23/2024.  Today he is doing well but notes continued mid back pain. He is miserable. He reports goingt o the ED every 3 days for percocet prescription and IV Dilauded; this is the only thing that helps with his pain. He is upset with his care with hem/onc. He has been treating his pain with Percocet, beer, and Advil.  The Patient denies myelopathic symptoms such as handwriting changes or difficulty with buttons/coins/keys. Denies perineal paresthesias, bowel/bladder dysfunction.    EXAM:  Ht 5' 11" (1.803 m)   Wt 112 kg (247 lb)   BMI 34.45 kg/m²   stable    IMAGING:    Today I personally re- reviewed AP, Lat and Flex/Ex  upright L-spine that demonstrate spondylosis and DDD without fractures or listhesis.       Lumbar MRI shows mild foraminal narrowing from L3-S1.     Thoracic MRI shows Indeterminate marrow signal abnormality within the T5 vertebral body as well as multiple additional foci within the lower thoracic spine. Given association with prominent anterior osteophytes, possibly degenerative. Marrow replacing process such as metastatic disease is possible but felt less likely.     Body mass index is 34.45 kg/m².    Hemoglobin A1C   Date Value Ref Range Status   03/28/2023 6.0 (H) 4.0 - 5.6 % Final     Comment:     ADA Screening Guidelines:  5.7-6.4%  Consistent with prediabetes  >or=6.5%  Consistent with diabetes    High levels of fetal hemoglobin interfere with the HbA1C  assay. Heterozygous hemoglobin variants (HbS, HgC, etc)do  not significantly interfere with this assay.   However, presence of multiple variants may affect accuracy.     04/09/2018 5.5 4.0 - 5.6 % Final     Comment:     According to ADA guidelines, hemoglobin A1c <7.0% represents  optimal control in non-pregnant diabetic patients. " Different  metrics may apply to specific patient populations.   Standards of Medical Care in Diabetes-2016.  For the purpose of screening for the presence of diabetes:  <5.7%     Consistent with the absence of diabetes  5.7-6.4%  Consistent with increasing risk for diabetes   (prediabetes)  >or=6.5%  Consistent with diabetes  Currently, no consensus exists for use of hemoglobin A1c  for diagnosis of diabetes for children.  This Hemoglobin A1c assay has significant interference with fetal   hemoglobin   (HbF). The results are invalid for patients with abnormal amounts of   HbF,   including those with known Hereditary Persistence   of Fetal Hemoglobin. Heterozygous hemoglobin variants (HbAS, HbAC,   HbAD, HbAE, HbA2) do not significantly interfere with this assay;   however, presence of multiple variants in a sample may impact the %   interference.     03/25/2018 5.4 4.0 - 5.6 % Final     Comment:     According to ADA guidelines, hemoglobin A1c <7.0% represents  optimal control in non-pregnant diabetic patients. Different  metrics may apply to specific patient populations.   Standards of Medical Care in Diabetes-2016.  For the purpose of screening for the presence of diabetes:  <5.7%     Consistent with the absence of diabetes  5.7-6.4%  Consistent with increasing risk for diabetes   (prediabetes)  >or=6.5%  Consistent with diabetes  Currently, no consensus exists for use of hemoglobin A1c  for diagnosis of diabetes for children.  This Hemoglobin A1c assay has significant interference with fetal   hemoglobin   (HbF). The results are invalid for patients with abnormal amounts of   HbF,   including those with known Hereditary Persistence   of Fetal Hemoglobin. Heterozygous hemoglobin variants (HbAS, HbAC,   HbAD, HbAE, HbA2) do not significantly interfere with this assay;   however, presence of multiple variants in a sample may impact the %   interference.           ASSESSMENT/PLAN:    Keon was seen today for low-back  pain and back pain.    Diagnoses and all orders for this visit:    Chronic bilateral thoracic back pain  -     Ambulatory referral/consult to Rheumatology; Future  -     cyclobenzaprine (FLEXERIL) 10 MG tablet; Take 1 tablet (10 mg total) by mouth 3 (three) times daily as needed for Muscle spasms.    I have scheduled him in pain mgmt for mid back pain.  I have messaged hem/onc to follow up with him for his PET scan results and possible T5 biopsy.  Referral placed to rheumatology for possible ankylosing spondylosis work up.  No spine surgery warranted at this time.   Follow up as needed.

## 2024-10-16 ENCOUNTER — HOSPITAL ENCOUNTER (OUTPATIENT)
Dept: RADIOLOGY | Facility: HOSPITAL | Age: 54
Discharge: HOME | End: 2024-10-16
Attending: PSYCHIATRY & NEUROLOGY
Payer: MEDICARE

## 2024-10-16 DIAGNOSIS — S09.90XA HEAD TRAUMA, INITIAL ENCOUNTER: ICD-10-CM

## 2024-10-16 PROCEDURE — A9585 GADOBUTROL INJECTION: HCPCS | Performed by: PSYCHIATRY & NEUROLOGY

## 2024-10-16 PROCEDURE — 70553 MRI BRAIN STEM W/O & W/DYE: CPT | Mod: TC

## 2024-10-16 PROCEDURE — 25500020 PHARM REV CODE 255: Performed by: PSYCHIATRY & NEUROLOGY

## 2024-10-16 PROCEDURE — 70553 MRI BRAIN STEM W/O & W/DYE: CPT | Mod: 26,,, | Performed by: RADIOLOGY

## 2024-10-16 RX ORDER — GADOBUTROL 604.72 MG/ML
10 INJECTION INTRAVENOUS
Status: COMPLETED | OUTPATIENT
Start: 2024-10-16 | End: 2024-10-16

## 2024-10-16 RX ADMIN — GADOBUTROL 10 ML: 604.72 INJECTION INTRAVENOUS at 06:10

## 2024-10-21 ENCOUNTER — HOSPITAL ENCOUNTER (EMERGENCY)
Facility: HOSPITAL | Age: 54
Discharge: HOME | End: 2024-10-22
Attending: EMERGENCY MEDICINE
Payer: MEDICARE

## 2024-10-21 DIAGNOSIS — M54.9 BACK PAIN: ICD-10-CM

## 2024-10-21 PROCEDURE — 25000003 PHARM REV CODE 250: Performed by: PHYSICIAN ASSISTANT

## 2024-10-21 PROCEDURE — 80048 BASIC METABOLIC PNL TOTAL CA: CPT | Performed by: PHYSICIAN ASSISTANT

## 2024-10-21 PROCEDURE — 85025 COMPLETE CBC W/AUTO DIFF WBC: CPT | Performed by: PHYSICIAN ASSISTANT

## 2024-10-21 PROCEDURE — 99284 EMERGENCY DEPT VISIT MOD MDM: CPT | Mod: 25

## 2024-10-21 RX ORDER — ONDANSETRON 4 MG/1
4 TABLET, ORALLY DISINTEGRATING ORAL
Status: COMPLETED | OUTPATIENT
Start: 2024-10-21 | End: 2024-10-21

## 2024-10-21 RX ORDER — LIDOCAINE 50 MG/G
1 PATCH TOPICAL
Status: DISCONTINUED | OUTPATIENT
Start: 2024-10-21 | End: 2024-10-22 | Stop reason: HOSPADM

## 2024-10-21 RX ORDER — OXYCODONE AND ACETAMINOPHEN 5; 325 MG/1; MG/1
1 TABLET ORAL
Status: COMPLETED | OUTPATIENT
Start: 2024-10-21 | End: 2024-10-21

## 2024-10-21 RX ADMIN — LIDOCAINE 1 PATCH: 50 PATCH CUTANEOUS at 11:10

## 2024-10-21 RX ADMIN — OXYCODONE HYDROCHLORIDE AND ACETAMINOPHEN 1 TABLET: 5; 325 TABLET ORAL at 11:10

## 2024-10-21 RX ADMIN — ONDANSETRON 4 MG: 4 TABLET, ORALLY DISINTEGRATING ORAL at 11:10

## 2024-10-21 NOTE — PROGRESS NOTES
CRS Office Visit History and Physical    Referring MD:   Mai Cates Md  [address] [place] [address]    SUBJECTIVE:     Chief Complaint: Diverticulitis    History of Present Illness:  The patient is known patient to this practice.   Course is as follows:  Patient is a 54 y.o. male presents in followup from his hospital stay for acute nonperforated diverticulitis. Patient was managed with parenteral abx and transitioned to oral at discharged.   Since discharge, he has been doing well. Pain resolved and is feeling healthy and well. No n/v, no fevers, tolerating a normal diet, having soft bowel function without blood.   Seeing Spine Surgery Clinic later today to discuss surgery for his back pain.    Last Colonoscopy: July 2020 (fair prep)  Family History of Colon Cancer / IBD: Denies.   Prior abdominal surgery: No  Prior pelvic or anorectal surgery: No  Family history of colon/rectal cancer: No  Family history of IBD: No  Steroid or other immunosuppression: No  Blood thinners: No  Current stool softeners or fiber supplement: Yes   Active smoking: No    [place] (FI):  Total: 0      Review of patient's allergies indicates:   Allergen Reactions    Atorvastatin Nausea And Vomiting    Toradol [ketorolac] Hives and Nausea And Vomiting       Past Medical History:   Diagnosis Date    Anxiety     Benzodiazepine dependence    Anxiety disorder, unspecified     Arthritis     Asthma     Avascular necrosis     Carpal tunnel syndrome     Chronic pain     Depression     Diverticulitis     Gout     Mixed hyperlipidemia     Other injury of other sites of trunk 12/28/2011    Pneumonia     Primary hypertension 8/23/2024    Spondylolisthesis     lumbar; myofascial pain    Staph infection     Ulnar neuropathy     left and rt arm     Past Surgical History:   Procedure Laterality Date    COLONOSCOPY N/A 7/6/2020    Procedure: COLONOSCOPY;  Surgeon: Broderick Moise MD;  Location: [facility];  Service: Endoscopy;   "Laterality: N/A;    HIP SURGERY  3/06;     hip arthroplasty    HIP SURGERY      bilateral hip replacement (titanium)    JOINT REPLACEMENT      OPEN REDUCTION AND INTERNAL FIXATION (ORIF) OF FRACTURE OF CLAVICLE Left 10/5/2023    Procedure: ORIF, FRACTURE, CLAVICLE;  Surgeon: Ozzy Howard MD;  Location: [facility] OR [address] [place] [address];  Service: Orthopedics;  Laterality: Left;    SHOULDER SURGERY      right shoulder    SHOULDER SURGERY       Family History   Problem Relation Name Age of Onset    Cancer Mother      Stroke Father      Cancer Sister      Alzheimer's disease Paternal Grandmother       Social History     Tobacco Use    Smoking status: Former     Current packs/day: 0.00     Average packs/day: 1 pack/day for 10.0 years (10.0 ttl pk-yrs)     Types: Cigarettes     Start date: 10/13/2002     Quit date: 10/13/2012     Years since quittin.0     Passive exposure: Past    Smokeless tobacco: Never   Substance Use Topics    Alcohol use: Yes     Alcohol/week: 12.0 standard drinks of alcohol     Types: 12 Cans of beer per week     Comment: 2x/week    Drug use: No        Review of Systems:  ROS    OBJECTIVE:     Vital Signs (Most Recent)  BP (!) 175/98 (BP Location: Left forearm, Patient Position: Sitting)   Pulse 65   Ht 5' 11" (1.803 m)   Wt 112.2 kg (247 lb 5.7 oz)   BMI 34.50 kg/m²     Physical Exam:  General: White male in no distress   Neuro: Alert and oriented x 4.  Moves all extremities.     HEENT: No icterus.  Trachea midline  Respiratory: Respirations are even and unlabored  Cardiac: Regular rate  Abdomen: Soft, non-distended  Extremities: Warm dry and intact  Skin: No rashes    Imaging:   CT abd/pel (2024)  1. Findings most consistent with acute diverticulitis involving the mid to distal sigmoid colon.  Punctate foci of air in the region may reflect air within adjacent diverticula however micro perforation cannot be excluded.  No abscess.  Correlation is advised.  Follow-up colonoscopy " recommended to exclude underlying lesion.  2. Findings suggest hepatic steatosis, correlation with LFTs recommended.    CT abd/pel (6/3/2022)  1. Acute uncomplicated diverticulitis involving the proximal sigmoid colon.  No evidence of abscess or perforation.  2. Otherwise no significant change from recent CT exam 05/30/2022.    CT abd/pel (6/8/2020)  Several diverticula again noted in the sigmoid colon with associated wall thickening, adjacent fat stranding, and prominence of vascular recta.  Findings are slightly improved when compared with prior studies and suggest resolving diverticulitis or colitis.  Given the persistence of findings, sigmoidoscopy follow up to exclude colon carcinoma should also be considered.  No complication such as perforation or abscess.  Hepatomegaly.  Stable 3 mm nodule left lung base posteriorly.  See prior report May 16, 2020 for recommendations.    CT abd/pel (10/18/2019)  Circumferential wall thickening and inflammatory change involving the distal descending colon and sigmoid colon thought likely representative of acute diverticulitis as discussed above.     CT abd/pel (4/8/2018)  1. Interval development of subtle inflammation about a prominent diverticulum within the mid descending colon, correlation with any focal tenderness in this location is recommended (series 2, image 52).  2. Continued improvement of inflammation about the mid sigmoid colon in this patient with diverticulitis in this region on previous CT.  No new large focal collection or free air.  3. Suspected hepatic steatosis, correlation with LFTs advised.    ASSESSMENT/PLAN:     55yo M w/ recurrent, uncomplicated descending/sigmoid diverticulitis    We reviewed the anatomy and development of diverticulitis.  We reviewed the patient's imaging studies. We discussed risk of recurrence after >3 episodes is >50%.  We reviewed that after uncomplicated diverticulitis, the risk of a severe episode requiring colostomy or  emergent surgery is <6%. We reviewed that, after surgery, the risk for developing recurrent diverticulitis in the remaining colon is ~6%.     We reviewed the sigmoid colectomy procedure using a visual aid.  We discussed risks including: anastomotic leak, abscess, wound infection, bleeding, hernia, return to the operating room, damage to surrounding structures including the ureter, changes in bowel function, blood clots, and death. We discussed anticipated recovery time after surgery.     Will schedule for colonoscopy (met w/ schedulers today).  He is interested in surgery, will discuss timing after Spine eval/planning.    Mai Cates MD  Staff Surgeon, Colon and Rectal Surgery  [facility]

## 2024-10-22 VITALS
DIASTOLIC BLOOD PRESSURE: 97 MMHG | SYSTOLIC BLOOD PRESSURE: 152 MMHG | BODY MASS INDEX: 33.75 KG/M2 | WEIGHT: 242 LBS | OXYGEN SATURATION: 98 % | RESPIRATION RATE: 18 BRPM | TEMPERATURE: 99 F | HEART RATE: 80 BPM

## 2024-10-22 LAB
ANION GAP SERPL CALC-SCNC: 9 MMOL/L (ref 8–16)
BASOPHILS # BLD AUTO: 0.05 K/UL (ref 0–0.2)
BASOPHILS NFR BLD: 0.5 % (ref 0–1.9)
BUN SERPL-MCNC: 16 MG/DL (ref 6–20)
CALCIUM SERPL-MCNC: 9.1 MG/DL (ref 8.7–10.5)
CHLORIDE SERPL-SCNC: 109 MMOL/L (ref 95–110)
CO2 SERPL-SCNC: 21 MMOL/L (ref 23–29)
CREAT SERPL-MCNC: 1.2 MG/DL (ref 0.5–1.4)
DIFFERENTIAL METHOD BLD: ABNORMAL
EOSINOPHIL # BLD AUTO: 0.2 K/UL (ref 0–0.5)
EOSINOPHIL NFR BLD: 1.6 % (ref 0–8)
ERYTHROCYTE [DISTWIDTH] IN BLOOD BY AUTOMATED COUNT: 13.1 % (ref 11.5–14.5)
EST. GFR  (NO RACE VARIABLE): >60 ML/MIN/1.73 M^2
GLUCOSE SERPL-MCNC: 176 MG/DL (ref 70–110)
HCT VFR BLD AUTO: 36 % (ref 40–54)
HGB BLD-MCNC: 11.7 G/DL (ref 14–18)
IMM GRANULOCYTES # BLD AUTO: 0.05 K/UL (ref 0–0.04)
IMM GRANULOCYTES NFR BLD AUTO: 0.5 % (ref 0–0.5)
LYMPHOCYTES # BLD AUTO: 1.4 K/UL (ref 1–4.8)
LYMPHOCYTES NFR BLD: 14.9 % (ref 18–48)
MCH RBC QN AUTO: 30.4 PG (ref 27–31)
MCHC RBC AUTO-ENTMCNC: 32.5 G/DL (ref 32–36)
MCV RBC AUTO: 94 FL (ref 82–98)
MONOCYTES # BLD AUTO: 0.7 K/UL (ref 0.3–1)
MONOCYTES NFR BLD: 7.8 % (ref 4–15)
NEUTROPHILS # BLD AUTO: 6.9 K/UL (ref 1.8–7.7)
NEUTROPHILS NFR BLD: 74.7 % (ref 38–73)
NRBC BLD-RTO: 0 /100 WBC
PLATELET # BLD AUTO: 249 K/UL (ref 150–450)
PMV BLD AUTO: 9.4 FL (ref 9.2–12.9)
POTASSIUM SERPL-SCNC: 4.2 MMOL/L (ref 3.5–5.1)
RBC # BLD AUTO: 3.85 M/UL (ref 4.6–6.2)
SODIUM SERPL-SCNC: 139 MMOL/L (ref 136–145)
WBC # BLD AUTO: 9.25 K/UL (ref 3.9–12.7)

## 2024-10-22 PROCEDURE — 63600175 PHARM REV CODE 636 W HCPCS: Performed by: PHYSICIAN ASSISTANT

## 2024-10-22 PROCEDURE — 96375 TX/PRO/DX INJ NEW DRUG ADDON: CPT

## 2024-10-22 PROCEDURE — 96374 THER/PROPH/DIAG INJ IV PUSH: CPT

## 2024-10-22 PROCEDURE — 25000003 PHARM REV CODE 250: Performed by: EMERGENCY MEDICINE

## 2024-10-22 RX ORDER — OXYCODONE AND ACETAMINOPHEN 5; 325 MG/1; MG/1
1 TABLET ORAL EVERY 6 HOURS PRN
Qty: 7 TABLET | Refills: 0 | Status: SHIPPED | OUTPATIENT
Start: 2024-10-22 | End: 2024-10-24

## 2024-10-22 RX ORDER — HYDROMORPHONE HYDROCHLORIDE 1 MG/ML
0.5 INJECTION, SOLUTION INTRAMUSCULAR; INTRAVENOUS; SUBCUTANEOUS
Status: COMPLETED | OUTPATIENT
Start: 2024-10-22 | End: 2024-10-22

## 2024-10-22 RX ORDER — OXYCODONE HYDROCHLORIDE 5 MG/1
5 TABLET ORAL
Status: COMPLETED | OUTPATIENT
Start: 2024-10-22 | End: 2024-10-22

## 2024-10-22 RX ORDER — ORPHENADRINE CITRATE 30 MG/ML
30 INJECTION INTRAMUSCULAR; INTRAVENOUS
Status: COMPLETED | OUTPATIENT
Start: 2024-10-22 | End: 2024-10-22

## 2024-10-22 RX ADMIN — HYDROMORPHONE HYDROCHLORIDE 0.5 MG: 1 INJECTION, SOLUTION INTRAMUSCULAR; INTRAVENOUS; SUBCUTANEOUS at 12:10

## 2024-10-22 RX ADMIN — ORPHENADRINE CITRATE 30 MG: 30 INJECTION, SOLUTION INTRAMUSCULAR; INTRAVENOUS at 01:10

## 2024-10-22 RX ADMIN — OXYCODONE 5 MG: 5 TABLET ORAL at 02:10

## 2024-10-22 NOTE — ED NOTES
Patient identifiers verified and correct for Mr Schneider  C/C: Back paiN SEE NN  APPEARANCE: awake and alert in NAD. PAIN  10/10  SKIN: warm, dry and intact. No breakdown or bruising.  MUSCULOSKELETAL: Patient moving all extremities spontaneously, no obvious swelling or deformities noted. Ambulates independently.  RESPIRATORY: Denies shortness of breath.Respirations unlabored.   CARDIAC: Denies CP, 2+ distal pulses; no peripheral edema  ABDOMEN: S/ND/NT, Denies nausea  : voids spontaneously, denies difficulty  Neurologic: AAO x 4; follows commands equal strength in all extremities; denies numbness/tingling. Denies dizziness  denies new weakness, reports mid back pain

## 2024-10-22 NOTE — ED NOTES
Patient states back pain , states he has been seen mult times, has appointment x2 on Wednesday, has had PET scan and MRI, states he ran out of [place], waiting for pain management, Ortho states they can not give him meds

## 2024-10-22 NOTE — PROVIDER PROGRESS NOTES - EMERGENCY DEPT.
Encounter Date: 10/21/2024    ED Physician Progress Notes        Physician Note:   2:00 AM  Patient turned over pending thoracic and lumbar x-rays and likely discharge.  Briefly, 54-year-old male here with chronic back pain, has a appointment with spine in 2 days.  No recent trauma.    X-rays negative for acute traumatic injury.  Degenerative joint disease noted.    Patient treated with oxy, short course pain meds prescribed.  Follow up at scheduled appointment    SAROJ Maxwell MD  Staff ED Physician  10/22/2024 3:23 AM

## 2024-10-22 NOTE — DISCHARGE INSTRUCTIONS
Follow-up with your spine appointment in 2 days for further evaluation.     Return to the emergency room for new, worsening, or concerning symptoms.     Future Appointments   Date Time Provider Department Center   10/23/2024 10:00 AM Mai Cates MD [facility] COLON [facility]   10/23/2024  2:30 PM BELEN Escoto NP [facility] SPINE [facility]   11/4/2024  6:00 AM [facility]MRI[facility]   11/4/2024  7:00 AM [facility]   11/12/2024 11:15 AM Ruma Daniel, DENZEL [facility] OP [facility]   11/13/2024  8:00 AM Atif Fabian NP [facility] PSYCH [facility]   12/11/2024  9:30 AM Amos Arias MD [facility] NEURO [facility]   1/7/2025  8:20 AM PRE-ADMIT NURSE 2, ENDO -[facility] ENDOPP4 [facility]   3/6/2025  8:00 AM Noel Mcdonald MD [facility] IM [facility]           negative Alert & oriented; no sensory, motor or coordination deficits, normal reflexes

## 2024-10-22 NOTE — ED PROVIDER NOTES
Encounter Date: 10/21/2024       History     Chief Complaint   Patient presents with    Back Pain     Seen by ortho but the pain has persisted. Hx of chronic back pain.     The history is provided by the patient and medical records. No  was used.     Keon Schneider Jr. is a 54 y.o. male with medical history of diverticulitis, T5 vertebral lesion, HTN, HLD, Gout presenting to the ED with the chief complaint of back pain.     Reports acute on chronic middle back pain beginning a few days ago. Reports having intermittent back pain over the last months. Corresponds onset with turning around in his seat in his truck. Denies radiation to his abdomen or down his legs. No numbness, paresthesias, extremity weakness. No fever. He had previous MRIs that showed degenerative changes in his thoracic vertebra. He had a NM PET scan on 9/12 showed tracer uptake in several mid to lower thoracic osteophytes. Hem/onc suspected NM findings were degenerative in nature and recommended repeat MRIs that is scheduled for next month. He previously was taking percocet for pain which he has run out of. No recent falls or trauma. He is scheduled to be seen in spine clinic in 2 days.       Review of patient's allergies indicates:   Allergen Reactions    Atorvastatin Nausea And Vomiting    Toradol [ketorolac] Hives and Nausea And Vomiting     Past Medical History:   Diagnosis Date    Anxiety     Benzodiazepine dependence    Anxiety disorder, unspecified     Arthritis     Asthma     Avascular necrosis     Carpal tunnel syndrome     Chronic pain     Depression     Diverticulitis     Gout     Mixed hyperlipidemia     Other injury of other sites of trunk 12/28/2011    Pneumonia     Primary hypertension 8/23/2024    Spondylolisthesis     lumbar; myofascial pain    Staph infection     Ulnar neuropathy     left and rt arm     Past Surgical History:   Procedure Laterality Date    COLONOSCOPY N/A 7/6/2020    Procedure:  COLONOSCOPY;  Surgeon: Broderick Moise MD;  Location: [facility];  Service: Endoscopy;  Laterality: N/A;    HIP SURGERY  3/06;     hip arthroplasty    HIP SURGERY      bilateral hip replacement (titanium)    JOINT REPLACEMENT      OPEN REDUCTION AND INTERNAL FIXATION (ORIF) OF FRACTURE OF CLAVICLE Left 10/5/2023    Procedure: ORIF, FRACTURE, CLAVICLE;  Surgeon: Ozzy Howard MD;  Location: [address];  Service: Orthopedics;  Laterality: Left;    SHOULDER SURGERY      right shoulder    SHOULDER SURGERY       Family History   Problem Relation Name Age of Onset    Cancer Mother      Stroke Father      Cancer Sister      Alzheimer's disease Paternal Grandmother       Social History     Tobacco Use    Smoking status: Former     Current packs/day: 0.00     Average packs/day: 1 pack/day for 10.0 years (10.0 ttl pk-yrs)     Types: Cigarettes     Start date: 10/13/2002     Quit date: 10/13/2012     Years since quittin.0     Passive exposure: Past    Smokeless tobacco: Never   Substance Use Topics    Alcohol use: Yes     Alcohol/week: 12.0 standard drinks of alcohol     Types: 12 Cans of beer per week     Comment: 2x/week    Drug use: No     Review of Systems   Musculoskeletal:  Positive for back pain.       Physical Exam     Initial Vitals [10/21/24 2223]   BP Pulse Resp Temp SpO2   (!) 169/98 62 18 98.2 °F (36.8 °C) 99 %      MAP       --         Physical Exam    Constitutional: He appears well-developed and well-nourished. He is not diaphoretic. He is easily aroused.   HENT:   Head: Normocephalic and atraumatic. Mouth/Throat: Oropharynx is clear and moist. No oropharyngeal exudate.   Eyes: EOM and lids are normal. Pupils are equal, round, and reactive to light. No scleral icterus.   Neck: Phonation normal. Neck supple. No stridor present.   Normal range of motion.  Cardiovascular:  Normal rate and regular rhythm.           +2 DP pulse   Pulmonary/Chest: Breath sounds normal. No stridor. No  respiratory distress. He has no wheezes. He has no rales.   Abdominal: Abdomen is soft. He exhibits no distension. There is no abdominal tenderness. There is no rebound.   Musculoskeletal:         General: No tenderness or edema. Normal range of motion.      Cervical back: Normal range of motion and neck supple.      Comments: No midline spinal tenderness     Neurological: He is alert, oriented to person, place, and time and easily aroused. He has normal strength. No sensory deficit.   No focal weakness or sensory deficit in extremities   Skin: Skin is warm and dry. No rash noted. No erythema.   Psychiatric: He has a normal mood and affect. His speech is normal.         ED Course   Procedures  Labs Reviewed   CBC W/ AUTO DIFFERENTIAL - Abnormal       Result Value    WBC 9.25      RBC 3.85 (*)     Hemoglobin 11.7 (*)     Hematocrit 36.0 (*)     MCV 94      MCH 30.4      MCHC 32.5      RDW 13.1      Platelets 249      MPV 9.4      Immature Granulocytes 0.5      Gran # (ANC) 6.9      Immature Grans (Abs) 0.05 (*)     Lymph # 1.4      Mono # 0.7      Eos # 0.2      Baso # 0.05      nRBC 0      Gran % 74.7 (*)     Lymph % 14.9 (*)     Mono % 7.8      Eosinophil % 1.6      Basophil % 0.5      Differential Method Automated     BASIC METABOLIC PANEL - Abnormal    Sodium 139      Potassium 4.2      Chloride 109      CO2 21 (*)     Glucose 176 (*)     BUN 16      Creatinine 1.2      Calcium 9.1      Anion Gap 9      eGFR >60.0            Imaging Results              X-Ray Lumbar Spine Ap And Lateral (In process)                      X-Ray Thoracic Spine AP Lateral (In process)  Result time 10/22/24 01:57:09                     Medications   LIDOcaine 5 % patch 1 patch (1 patch Transdermal Patch Applied 10/21/24 2325)   oxyCODONE-acetaminophen 5-325 mg per tablet 1 tablet (1 tablet Oral Given 10/21/24 2324)   ondansetron disintegrating tablet 4 mg (4 mg Oral Given 10/21/24 2324)   HYDROmorphone injection 0.5 mg (0.5 mg  Intravenous Given 10/22/24 0020)   orphenadrine injection 30 mg (30 mg Intravenous Given 10/22/24 0157)     Medical Decision Making  54 y.o. male with medical history of diverticulitis, T5 vertebral lesion, HTN, HLD, Gout presenting to the ED c/o acute on chronic middle and lower back pain for a few days. Previously had NM PET scan and MRIs that showed suspected degenerative changes. Scheduled to be seen in spine clinic in 2 days.    DDx includes but not limited to DDD, lumbar radiculopathy, vertebral fracture, malignancy. I have considered but feel less likely epidural abscess, aortic dissection, AAA.     Amount and/or Complexity of Data Reviewed  External Data Reviewed: labs and notes.  Labs: ordered. Decision-making details documented in ED Course.  Radiology: ordered and independent interpretation performed.    Risk  Prescription drug management.                                      Clinical Impression:  Final diagnoses:  [M54.9] Back pain                 Austyn Ordoñez PA-C  10/22/24 0159

## 2024-10-23 ENCOUNTER — TELEPHONE (OUTPATIENT)
Dept: ENDOSCOPY | Facility: HOSPITAL | Age: 54
End: 2024-10-23
Payer: MEDICARE

## 2024-10-23 ENCOUNTER — OFFICE VISIT (OUTPATIENT)
Dept: ORTHOPEDICS | Facility: CLINIC | Age: 54
End: 2024-10-23
Payer: MEDICARE

## 2024-10-23 ENCOUNTER — TELEPHONE (OUTPATIENT)
Dept: HEMATOLOGY/ONCOLOGY | Facility: CLINIC | Age: 54
End: 2024-10-23
Payer: MEDICARE

## 2024-10-23 ENCOUNTER — OFFICE VISIT (OUTPATIENT)
Dept: SURGERY | Facility: CLINIC | Age: 54
End: 2024-10-23
Attending: COLON & RECTAL SURGERY
Payer: MEDICARE

## 2024-10-23 VITALS
HEART RATE: 65 BPM | SYSTOLIC BLOOD PRESSURE: 175 MMHG | HEIGHT: 71 IN | WEIGHT: 247.38 LBS | BODY MASS INDEX: 34.63 KG/M2 | DIASTOLIC BLOOD PRESSURE: 98 MMHG

## 2024-10-23 VITALS — HEIGHT: 71 IN | BODY MASS INDEX: 34.58 KG/M2 | WEIGHT: 247 LBS

## 2024-10-23 DIAGNOSIS — G89.29 CHRONIC BILATERAL THORACIC BACK PAIN: Primary | ICD-10-CM

## 2024-10-23 DIAGNOSIS — M54.6 CHRONIC BILATERAL THORACIC BACK PAIN: Primary | ICD-10-CM

## 2024-10-23 DIAGNOSIS — K57.32 DIVERTICULITIS OF SIGMOID COLON: Primary | ICD-10-CM

## 2024-10-23 DIAGNOSIS — Z12.11 SPECIAL SCREENING FOR MALIGNANT NEOPLASMS, COLON: Primary | ICD-10-CM

## 2024-10-23 DIAGNOSIS — R93.3 ABNORMAL FINDING ON GI TRACT IMAGING: ICD-10-CM

## 2024-10-23 PROCEDURE — 1160F RVW MEDS BY RX/DR IN RCRD: CPT | Mod: CPTII,S$GLB,, | Performed by: COLON & RECTAL SURGERY

## 2024-10-23 PROCEDURE — 99214 OFFICE O/P EST MOD 30 MIN: CPT | Mod: S$GLB,,, | Performed by: COLON & RECTAL SURGERY

## 2024-10-23 PROCEDURE — 1159F MED LIST DOCD IN RCRD: CPT | Mod: CPTII,S$GLB,, | Performed by: REGISTERED NURSE

## 2024-10-23 PROCEDURE — 1160F RVW MEDS BY RX/DR IN RCRD: CPT | Mod: CPTII,S$GLB,, | Performed by: REGISTERED NURSE

## 2024-10-23 PROCEDURE — 3077F SYST BP >= 140 MM HG: CPT | Mod: CPTII,S$GLB,, | Performed by: COLON & RECTAL SURGERY

## 2024-10-23 PROCEDURE — 99213 OFFICE O/P EST LOW 20 MIN: CPT | Mod: S$GLB,,, | Performed by: REGISTERED NURSE

## 2024-10-23 PROCEDURE — 1111F DSCHRG MED/CURRENT MED MERGE: CPT | Mod: CPTII,S$GLB,, | Performed by: REGISTERED NURSE

## 2024-10-23 PROCEDURE — 3008F BODY MASS INDEX DOCD: CPT | Mod: CPTII,S$GLB,, | Performed by: COLON & RECTAL SURGERY

## 2024-10-23 PROCEDURE — 99999 PR PBB SHADOW E&M-EST. PATIENT-LVL III: CPT | Mod: PBBFAC,,, | Performed by: REGISTERED NURSE

## 2024-10-23 PROCEDURE — 99999 PR PBB SHADOW E&M-EST. PATIENT-LVL III: CPT | Mod: PBBFAC,,, | Performed by: COLON & RECTAL SURGERY

## 2024-10-23 PROCEDURE — 3008F BODY MASS INDEX DOCD: CPT | Mod: CPTII,S$GLB,, | Performed by: REGISTERED NURSE

## 2024-10-23 PROCEDURE — 1159F MED LIST DOCD IN RCRD: CPT | Mod: CPTII,S$GLB,, | Performed by: COLON & RECTAL SURGERY

## 2024-10-23 PROCEDURE — 1111F DSCHRG MED/CURRENT MED MERGE: CPT | Mod: CPTII,S$GLB,, | Performed by: COLON & RECTAL SURGERY

## 2024-10-23 PROCEDURE — 3080F DIAST BP >= 90 MM HG: CPT | Mod: CPTII,S$GLB,, | Performed by: COLON & RECTAL SURGERY

## 2024-10-23 RX ORDER — ROSUVASTATIN CALCIUM 20 MG/1
20 TABLET, COATED ORAL NIGHTLY
COMMUNITY
Start: 2024-10-03

## 2024-10-23 RX ORDER — ALPRAZOLAM 2 MG/1
2 TABLET ORAL 2 TIMES DAILY
COMMUNITY
Start: 2024-10-05

## 2024-10-23 RX ORDER — CYCLOBENZAPRINE HCL 10 MG
10 TABLET ORAL 3 TIMES DAILY PRN
Qty: 90 TABLET | Refills: 3 | Status: SHIPPED | OUTPATIENT
Start: 2024-10-23 | End: 2024-10-24

## 2024-10-23 NOTE — TELEPHONE ENCOUNTER
Contacted pt by telephone to discuss PET CT and relay recommendations, as sent to pt in a message on 9/22/24 (per system, pt has not read the message).

## 2024-10-23 NOTE — TELEPHONE ENCOUNTER
Comprehensive Nutrition Assessment    Type and Reason for Visit:  Reassess    Nutrition Recommendations/Plan: Modify diet to include 2 gm sodium     Nutrition Assessment:  Continued moderate malnutrition aeb muscle losses and Pt reported decreased PO prior to admission. Glucose 141, range 119-280. Renal indices: BUN 86/Cr 3.10. GFR 19.  PO has been 51-75% of meals and % of LNS. Continues with healing needs for coccyx wound. d/c Plan is for [facility] 12/29. Malnutrition Assessment:  Malnutrition Status: Moderate malnutrition    Context:  Acute Illness     Findings of the 6 clinical characteristics of malnutrition:  Energy Intake:  1 - 75% or less of estimated energy requirements for 7 or more days (per interview)  Weight Loss:  No significant weight loss     Body Fat Loss:  No significant body fat loss     Muscle Mass Loss:  1 - Mild muscle mass loss Temples (temporalis)  Fluid Accumulation:  7 - Moderate to Severe Extremities   Strength:  Not Performed    Estimated Daily Nutrient Needs:  Energy (kcal):  1556-9629 (18-22); Weight Used for Energy Requirements:  Current     Protein (g):  71-83 (1.2-1.4); Weight Used for Protein Requirements:  Ideal        Fluid (ml/day):  1700; Method Used for Fluid Requirements:  1 ml/kcal      Nutrition Related Findings:  + 2 pitting BLE, trace, non pitting to BL UE      Wounds:  Stage II (coccyx last admission)       Current Nutrition Therapies:    ADULT DIET; Regular; 3 carb choices (45 gm/meal)  ADULT ORAL NUTRITION SUPPLEMENT; Breakfast, Lunch, Dinner; Diabetic Oral Supplement    Anthropometric Measures:  · Height: 5' 4\" (162.6 cm)  · Current Body Weight: 176 lb 14.4 oz (80.2 kg)   · Admission Body Weight: 169 lb (76.7 kg)    · Usual Body Weight: 170 lb (77.1 kg)     · Ideal Body Weight: 130 lbs; % Ideal Body Weight 130 %   · BMI: 30.3  · BMI Categories: Overweight (BMI 25.0-29. 9)       Nutrition Diagnosis:   · Moderate malnutrition related to impaired Referral for procedure from Telephone call - direct access patient      Spoke to patient to schedule procedure(s) Colonoscopy       Physician to perform procedure(s) Dr. KALIA Cates  Date of Procedure (s) 12/12/24  Arrival Time 9:15 AM  Time of Procedure(s) 10:15 AM   Location of Procedure(s) [facility] [address] [facility]  Type of Rx Prep sent to patient: PEG  Instructions provided to patient via Copy in hand    Patient was informed on the following information and verbalized understanding. Screening questionnaire reviewed with patient and complete. If procedure requires anesthesia, a responsible adult needs to be present to accompany the patient home, patient cannot drive after receiving anesthesia. Appointment details are tentative, especially check-in time. Patient will receive a prep-op call 7 days prior to confirm check-in time for procedure. If applicable the patient should contact their pharmacy to verify Rx for procedure prep is ready for pick-up. Patient was advised to call the scheduling department at [phone] if pharmacy states no Rx is available. Patient was advised to call the endoscopy scheduling department if any questions or concerns arise.      [facility] Scheduling Department       respiratory function as evidenced by poor intake prior to admission,mild muscle loss      Nutrition Interventions:   Food and/or Nutrient Delivery:  Continue Oral Nutrition Supplement,Modify Current Diet (add 2 gm sodium to diet)  Nutrition Education/Counseling:  Education initiated   Coordination of Nutrition Care:  Continue to monitor while inpatient    Goals:  PO >75% meals and supplements       Recent Labs     12/26/21  0535 12/26/21  0535 12/27/21  0518 12/28/21  0506     --  137 136   K 5.3  --  5.1 4.8     --  99 93*   CO2 26  --  27 27   BUN 89*  --  91* 86*   CREATININE 3.14*  --  3.05* 3.10*   GLUCOSE 154*  --  133* 141*   GFR              < >                          < > = values in this interval not displayed.       Lab Results   Component Value Date    LABALBU 2.5 12/24/2021    LABALBU 4.2 05/17/2012      Nutrition Monitoring and Evaluation:   Behavioral-Environmental Outcomes:  None Identified   Food/Nutrient Intake Outcomes:  Food and Nutrient Intake,Supplement Intake  Physical Signs/Symptoms Outcomes:  Biochemical Data,Weight,Fluid Status or Edema     Discharge Planning:    Continue Oral Nutrition Supplement     Electronically signed by Dawood Allen RD, LD on 12/28/21 at 11:36 AM EST    Contact: [phone]

## 2024-10-23 NOTE — TELEPHONE ENCOUNTER
"Mai Cates MD  P [facility] Schedulers  Procedure: Colonoscopy (with me)    Diagnosis: Abnormal finding on GI tract imaging    Procedure Timin-12 weeks (Dec 2024)    *If within 4 weeks selected, please corrine as high priority*    *If greater than 12 weeks, please select "5-12 weeks" and delay sending until 3 months prior to requested date*    Location: Any Site    Additional Scheduling Information: No scheduling concerns    Prep Specifications:Standard prep    Is the patient taking a GLP-1 Agonist:no    Have you attached a patient to this message: yes  "

## 2024-10-24 ENCOUNTER — OFFICE VISIT (OUTPATIENT)
Dept: PAIN MEDICINE | Facility: CLINIC | Age: 54
End: 2024-10-24
Payer: MEDICARE

## 2024-10-24 ENCOUNTER — HOSPITAL ENCOUNTER (OUTPATIENT)
Dept: RADIOLOGY | Facility: HOSPITAL | Age: 54
Discharge: HOME | End: 2024-10-24
Attending: STUDENT IN AN ORGANIZED HEALTH CARE EDUCATION/TRAINING PROGRAM
Payer: MEDICARE

## 2024-10-24 VITALS
DIASTOLIC BLOOD PRESSURE: 101 MMHG | SYSTOLIC BLOOD PRESSURE: 154 MMHG | HEART RATE: 74 BPM | WEIGHT: 246.06 LBS | BODY MASS INDEX: 34.45 KG/M2 | HEIGHT: 71 IN

## 2024-10-24 DIAGNOSIS — S42.002S CLOSED NONDISPLACED FRACTURE OF LEFT CLAVICLE, UNSPECIFIED PART OF CLAVICLE, SEQUELA: Primary | ICD-10-CM

## 2024-10-24 DIAGNOSIS — G89.4 CHRONIC PAIN SYNDROME: ICD-10-CM

## 2024-10-24 DIAGNOSIS — S42.002S CLOSED NONDISPLACED FRACTURE OF LEFT CLAVICLE, UNSPECIFIED PART OF CLAVICLE, SEQUELA: ICD-10-CM

## 2024-10-24 PROCEDURE — 73000 X-RAY EXAM OF COLLAR BONE: CPT | Mod: TC,LT

## 2024-10-24 PROCEDURE — 99999 PR PBB SHADOW E&M-EST. PATIENT-LVL III: CPT | Mod: PBBFAC,,, | Performed by: STUDENT IN AN ORGANIZED HEALTH CARE EDUCATION/TRAINING PROGRAM

## 2024-10-24 RX ORDER — BACLOFEN 10 MG/1
10-20 TABLET ORAL 3 TIMES DAILY PRN
Qty: 180 TABLET | Refills: 5 | Status: SHIPPED | OUTPATIENT
Start: 2024-10-24 | End: 2025-04-22

## 2024-10-24 RX ORDER — OXYCODONE AND ACETAMINOPHEN 7.5; 325 MG/1; MG/1
1 TABLET ORAL 2 TIMES DAILY PRN
Qty: 60 TABLET | Refills: 0 | Status: SHIPPED | OUTPATIENT
Start: 2024-10-24 | End: 2024-11-23

## 2024-10-24 NOTE — PROGRESS NOTES
Chronic Pain - f/u    Referring Physician: No ref. provider found    Date: 10/24/2024     Re: Keon Schneider Jr.  MR#: [id number]  YOB: 1970  Age: 54 y.o.    Chief Complaint:   Chief Complaint   Patient presents with    Low-back Pain    Mid-back Pain    Hip Pain     **This note is dictated using the M*Modal Fluency Direct word recognition program. There are word recognition mistakes that are occasionally missed on review.**    ASSESSMENT: 54 y.o. year old male with hip pain, consistent with     1. Closed nondisplaced fracture of left clavicle, unspecified part of clavicle, sequela  X-Ray Clavicle Left      2. Chronic pain syndrome  oxyCODONE-acetaminophen (PERCOCET) 7.5-325 mg per tablet    baclofen (LIORESAL) 10 MG tablet        PLAN:     Left clavicle fracture hx with new mass in the left neck and collapse of the clavicle  -very unusual  -XR to evaluate  -needs to see ortho    Mid/Low back pain - very confusing  -unclear etiology  -lots of recent ER visits  -seen spine surgery, hematology, neurology.  Noone seems to know what is going on  -hard to process this since he has a lot to say and there is a lot that has happened in the last 2 years since I saw him last.  Previously seeing Dr. Schmitt who he does not want to see anymore because he was told that he can only see him for injections  -will provide #60 Oxycodone 7.5mg to help keep him out of the ER  -baclofen 10-20mg TID PRN for his back pain  -He has a bright STIR image at T5 which is likely more degenerative related but did not respond to high dose steroids.  -discussed that I will not provide long term pain medications to him, but I will try to keep him out of the emergency room for now while this is being figure out  -could consider b/l Thoracic MBB/RFA but would need to try and figure out what levels to target  -Epidural also an option but with no response to high dose oral steroids it seems less likely to help    Bilateral hip pain and  metallosis  -this was manageable before the recent back issue    Lumbar spondylosis  -some arthritis on MRI.  Hip pain is his bigger issues.  Back pain is not radiating into the legs.  -we could consider MBB/RFA of the low back if it becomes a worse issues, but not indicated at this time    - RTC 1 month  - Counseled patient regarding the importance of activity modification.    The above plan and management options were discussed at length with patient. Patient is in agreement with the above and verbalized understanding. It will be communicated with the referring physician via electronic record, fax, or mail.  Lab/study reports reviewed were important and necessary because subsequent medical and treatment recommendations required review of the above lab/study reports. Images viewed/reviewed above were important and necessary because subsequent medical and treatment recommendations required review of the reviewed image(s).     Electronically signed by:  Servando Olmstead DO  10/24/2024    Patient was seen in clinic today.  The total amount of time spent on this patient was exactly 45 minutes.  Due to medical complexity, time spent with the patient, and multiple issues discussed/addressed I am billing for a level 5 visit. This includes face to face time and non-face to face time preparing to see the patient by reviewing previous labs/imaging, obtaining and/or reviewing separately obtained history, documenting clinical information in the electronic or other health record, independently reviewing results and communicating results to the patient/family/caregiver/additional providers.  The available imaging was reviewed in person with the patient, pathology and treatment options were explained in detail with the patient.  The patient was given multiple opportunities to ask questions, and all questions were  answered.    =========================================================================================================    SUBJECTIVE:    Interval History 10/24/2024:   Keon Schneider Jr. is a 54 y.o. male presents to the clinic for follow up.  Since last visit the pain has has worsened.  The patient has not been seen by me since 2022.  He was seeing Dr. Schmitt but was not getting pain medications.  He states that about 4.5 months he fell down his stairs and his back started flaring up more.      He has had a lot going on during this time.      The left shoulder is collapsed with swelling and sinking around the clavicle.    The pain is located in the mid and low back area and radiates to the bilateral hip and lower extremities .  The pain is described as burning and stabbing    At BEST  8/10   At WORST  10/10 on the WORST day.    On average pain is rated as 10/10.   Today the pain is rated as 10/10  Symptoms interfere with daily activity and sleeping.   Exacerbating factors: Sitting, Standing, Laying, and Walking.    Mitigating factors medications.     Current pain medications: warren back and body, flexeril, alprazolam 2mg BID, oxycodone from the ER  Failed Pain Medications: high dose steroids    Initial hx:  Keon Schneider Jr. is a 54 y.o. male presents to the clinic for the evaluation of b/l hips pain. The pain started 10+ years ago following surgery on hips(replacement)  and symptoms have been worsening.  He has bad pain in the hips and gets radiation down the right leg.  The pain starts in the front of the hip (R>L).  He gets some back pain in the low back which does not really radiate into the legs. States that the pain has taken over his life again.  He had his hips replaced in 2006 for AVN and was doing well until this year (around April 2022) when it flared back up. He can't sleep. Cant work. Can't walk up and down stairs.    He fell 16ft off a scaffold 3 months ago and landed on the right leg.     The  patient has to climb 16 stairs to get up to his house and that has been very difficult.      Pain Description:    The pain is located in the b/l hip area and radiates to the b/l legs and up the back .    At BEST  7/10   At WORST  10/10 on the WORST day.    On average pain is rated as 7/10.   Today the pain is rated as 8/10  The pain is continuous.  The pain is described as pulsating, sharp, stabbing, and tight band    Symptoms interfere with daily activity, sleeping, and work.   Exacerbating factors: Sitting and Laying.    Mitigating factors nothing.   He reports 3 hours of sleep per night.    Physical Therapy/Home Exercise: Yes, currently has a home exercise program.  He is pretty active.    Current Pain Medications:    - warren back and body    Failed Pain Medications:    - NSAIDs due to prior GI bleed.    Pain Treatment Therapies:    Pain procedures:   Epidural, hip injection  Physical Therapy: 2007  Chiropractor: none  Acupuncture: none  TENS unit: none  Spinal decompression: none  Joint replacement: b/l hip replacement    Patient denies urinary incontinence, bowel incontinence, and loss of sensations.  Patient denies any suicidal or homicidal ideations     report:  Reviewed and consistent with medication use as prescribed.    Imaging:   MRI lumbar 09/2022:  Degenerative findings:     T12-L1: No spinal canal stenosis or neural foraminal narrowing.     L1-L2: Facet arthropathy.  No spinal canal stenosis or neural foraminal narrowing.     L2-L3: Mild posterior disc bulge and facet arthropathy.  No spinal canal stenosis or neural foraminal narrowing.     L3-L4: Mild posterior disc bulge and facet arthropathy.  No spinal canal stenosis or neural foraminal narrowing.     L4-L5: Posterior disc bulge with protrusion into the central, paracentral and resulting in mild right neural foraminal narrowing.  No spinal canal stenosis.     L5-S1: Facet arthropathy.  No central canal stenosis.     Paraspinal muscles & soft  tissues: Unremarkable.     Impression:     1. Overall stable appearance of degenerative findings in the lumbar spine including mild right neural foraminal narrowing at L4-L5.    Past Medical History:   Diagnosis Date    Anxiety     Benzodiazepine dependence    Anxiety disorder, unspecified     Arthritis     Asthma     Avascular necrosis     Carpal tunnel syndrome     Chronic pain     Depression     Diverticulitis     Gout     Mixed hyperlipidemia     Other injury of other sites of trunk 2011    Pneumonia     Primary hypertension 2024    Spondylolisthesis     lumbar; myofascial pain    Staph infection     Ulnar neuropathy     left and rt arm     Past Surgical History:   Procedure Laterality Date    COLONOSCOPY N/A 2020    Procedure: COLONOSCOPY;  Surgeon: Broderick Moise MD;  Location: [facility] ENDO;  Service: Endoscopy;  Laterality: N/A;    HIP SURGERY  3/06;     hip arthroplasty    HIP SURGERY      bilateral hip replacement (titanium)    JOINT REPLACEMENT      OPEN REDUCTION AND INTERNAL FIXATION (ORIF) OF FRACTURE OF CLAVICLE Left 10/5/2023    Procedure: ORIF, FRACTURE, CLAVICLE;  Surgeon: Ozzy Howard MD;  Location: [address];  Service: Orthopedics;  Laterality: Left;    SHOULDER SURGERY      right shoulder    SHOULDER SURGERY       Social History     Socioeconomic History    Marital status: Single   Tobacco Use    Smoking status: Former     Current packs/day: 0.00     Average packs/day: 1 pack/day for 10.0 years (10.0 ttl pk-yrs)     Types: Cigarettes     Start date: 10/13/2002     Quit date: 10/13/2012     Years since quittin.0     Passive exposure: Past    Smokeless tobacco: Never   Substance and Sexual Activity    Alcohol use: Yes     Alcohol/week: 12.0 standard drinks of alcohol     Types: 12 Cans of beer per week     Comment: 2x/week    Drug use: No    Sexual activity: Yes     Partners: Female     Social Drivers of Health     Financial Resource Strain: Low Risk   (9/30/2024)    Overall Financial Resource Strain (CARDIA)     Difficulty of Paying Living Expenses: Not hard at all   Food Insecurity: No Food Insecurity (9/30/2024)    Hunger Vital Sign     Worried About Running Out of Food in the Last Year: Never true     Ran Out of Food in the Last Year: Never true   Recent Concern: Food Insecurity - Food Insecurity Present (9/1/2024)    Hunger Vital Sign     Worried About Running Out of Food in the Last Year: Sometimes true     Ran Out of Food in the Last Year: Sometimes true   Transportation Needs: No Transportation Needs (9/30/2024)    TRANSPORTATION NEEDS     Transportation : No   Physical Activity: Sufficiently Active (9/30/2024)    Exercise Vital Sign     Days of Exercise per Week: 7 days     Minutes of Exercise per Session: 30 min   Stress: No Stress Concern Present (9/30/2024)    [employer] - Occupational Stress Questionnaire     Feeling of Stress : Only a little   Recent Concern: Stress - Stress Concern Present (9/1/2024)    [employer] - Occupational Stress Questionnaire     Feeling of Stress : Very much   Housing Stability: Low Risk  (9/30/2024)    Housing Stability Vital Sign     Unable to Pay for Housing in the Last Year: No     Homeless in the Last Year: No     Family History   Problem Relation Name Age of Onset    Cancer Mother      Stroke Father      Cancer Sister      Alzheimer's disease Paternal Grandmother         Review of patient's allergies indicates:   Allergen Reactions    Atorvastatin Nausea And Vomiting    Toradol [ketorolac] Hives and Nausea And Vomiting       Current Outpatient Medications   Medication Sig    allopurinoL (ZYLOPRIM) 100 MG tablet Take 100 mg by mouth.    ALPRAZolam (XANAX) 2 MG Tab Take 2 mg by mouth 2 (two) times daily.    aspirin (ECOTRIN) 81 MG EC tablet Take 1 tablet (81 mg total) by mouth once daily.    baclofen (LIORESAL) 10 MG tablet Take 1-2 tablets (10-20 mg total) by  "mouth 3 (three) times daily as needed (muscle pain).    LIDOcaine (LIDODERM) 5 % Place 1 patch onto the skin once daily. Remove & Discard patch within 12 hours or as directed by MD    nitroGLYCERIN (NITROSTAT) 0.4 MG SL tablet Place 1 tablet (0.4 mg total) under the tongue every 5 (five) minutes as needed for Chest pain.    oxyCODONE-acetaminophen (PERCOCET) 7.5-325 mg per tablet Take 1 tablet by mouth 2 (two) times daily as needed for Pain.    polyethylene glycol (COLYTE) 240-22.72-6.72 -5.84 gram SolR Take 4,000 mLs by mouth once. for 1 dose    rosuvastatin (CRESTOR) 20 MG tablet Take 20 mg by mouth every evening.     No current facility-administered medications for this visit.       REVIEW OF SYSTEMS:    GENERAL:  No weight loss, malaise or fevers.  HEENT:   No recent changes in vision or hearing  NECK:  Negative for lumps, no difficulty with swallowing.  RESPIRATORY:  Negative for cough, wheezing or shortness of breath, patient denies any recent URI.  CARDIOVASCULAR:  Negative for chest pain, leg swelling or palpitations.  GI:  Negative for abdominal discomfort, blood in stools or black stools or change in bowel habits.  MUSCULOSKELETAL:  See HPI.  SKIN:  Negative for lesions, rash, and itching.  PSYCH:  No mood disorder or recent psychosocial stressors.  Patients sleep is not disturbed secondary to pain.  HEMATOLOGY/LYMPHOLOGY:  Negative for prolonged bleeding, bruising easily or swollen nodes.  Patient is not currently taking any anti-coagulants  NEURO:   No history of headaches, syncope, paralysis, seizures or tremors.  All other reviewed and negative other than HPI.    OBJECTIVE:    BP (!) 154/101 (BP Location: Left arm, Patient Position: Sitting)   Pulse 74   Ht 5' 11" (1.803 m)   Wt 111.6 kg (246 lb 0.5 oz)   BMI 34.31 kg/m²     PHYSICAL EXAMINATION:    GENERAL: Well appearing, in no acute distress, alert and oriented x3.  PSYCH:  Mood and affect appropriate.  SKIN: Skin color, texture, turgor normal, " no rashes or lesions.  HEAD/FACE:  Normocephalic, atraumatic. Cranial nerves grossly intact.  CV: RRR with palpation of the radial artery.  PULM: CTAB. No evidence of respiratory difficulty, symmetric chest rise.  GI:  Soft and non-tender.    Left Shoulder  -TTP  -left clavicle seems to have collapsed?  -soft tissue mass in the base of the left neck    BACK:   - No obvious deformity or signs of trauma, Normal lumbar lordotic curve  - Positive spinous process tenderness  - Positive paravertebral tenderness  - Positive pain to palpation over the facet joints of the lumbar spine.   - Negative QL / Iliac crest / Glut tenderness  - (+) pain with flexion and extension of the back  - no pain with facet loading  - lateral flexion causes anterior hip pain on the ipsilateral side    MUSKULOSKELETAL:    EXTREMITIES:   Hip Exam:  - Log Roll Positive  - FADIR Positive  - Stinchfield Positive  - Hip Scour Positive  - GTB Tenderness Negative    MUSCULOSKELETAL:  No atrophy or tone abnormalities are noted in the UE or LE.  No deformities, edema, or skin discoloration are noted on visible skin. Good capillary refill.    NEURO: Bilateral upper and lower extremity coordination and muscle stretch reflexes are physiologic and symmetric.      NEUROLOGICAL EXAM:  MENTAL STATUS: A x O x 3, good concentration, speech is fluent and goal directed  MEMORY: recent and remote are intact  CN: CN2-12 grossly intact  MOTOR: 5/5 in all muscle groups  DTRs: 2+ intact symmetric  Sensation:    -no Loss of sensation in a left lower and right lower L-1, L-2, L-3, L-4, and L-5 bilaterally distribution.  Gordon: absent on the bilateral side(s)  Clonus: absent on the bilateral side(s)    GAIT: antalgic.

## 2024-10-25 ENCOUNTER — TELEPHONE (OUTPATIENT)
Dept: NEUROLOGY | Facility: CLINIC | Age: 54
End: 2024-10-25
Payer: MEDICARE

## 2024-10-25 NOTE — TELEPHONE ENCOUNTER
Called Pt regarding his MRI results. I was unable to speak with him but left a vm.     Dr. Arias - The MRI Brain looks stable compared to prior images with no signs of increased pressure. Please let me know if you have any questions or concerns.

## 2024-10-27 PROBLEM — Z23 NEED FOR VACCINATION: Status: ACTIVE | Noted: 2024-10-27

## 2024-10-27 PROBLEM — Z87.19 HISTORY OF DIVERTICULITIS: Status: ACTIVE | Noted: 2024-10-27

## 2024-10-30 ENCOUNTER — TELEPHONE (OUTPATIENT)
Dept: NEUROLOGY | Facility: CLINIC | Age: 54
End: 2024-10-30
Payer: MEDICARE

## 2024-10-31 ENCOUNTER — TELEPHONE (OUTPATIENT)
Dept: ORTHOPEDICS | Facility: CLINIC | Age: 54
End: 2024-10-31
Payer: MEDICARE

## 2024-11-04 ENCOUNTER — HOSPITAL ENCOUNTER (OUTPATIENT)
Dept: RADIOLOGY | Facility: HOSPITAL | Age: 54
Discharge: HOME | End: 2024-11-04
Attending: STUDENT IN AN ORGANIZED HEALTH CARE EDUCATION/TRAINING PROGRAM
Payer: MEDICARE

## 2024-11-04 DIAGNOSIS — M54.14 THORACIC RADICULOPATHY: ICD-10-CM

## 2024-11-04 PROCEDURE — 72148 MRI LUMBAR SPINE W/O DYE: CPT | Mod: 26,,, | Performed by: RADIOLOGY

## 2024-11-04 PROCEDURE — 72148 MRI LUMBAR SPINE W/O DYE: CPT | Mod: TC

## 2024-11-04 PROCEDURE — 72146 MRI CHEST SPINE W/O DYE: CPT | Mod: 26,,, | Performed by: RADIOLOGY

## 2024-11-04 PROCEDURE — 72146 MRI CHEST SPINE W/O DYE: CPT | Mod: TC

## 2024-11-05 DIAGNOSIS — M79.641 BILATERAL HAND PAIN: Primary | ICD-10-CM

## 2024-11-05 DIAGNOSIS — M79.642 BILATERAL HAND PAIN: Primary | ICD-10-CM

## 2024-11-07 ENCOUNTER — OFFICE VISIT (OUTPATIENT)
Dept: ORTHOPEDICS | Facility: CLINIC | Age: 54
End: 2024-11-07
Payer: MEDICARE

## 2024-11-07 DIAGNOSIS — S42.022D CLOSED DISPLACED FRACTURE OF SHAFT OF LEFT CLAVICLE WITH ROUTINE HEALING, SUBSEQUENT ENCOUNTER: Primary | ICD-10-CM

## 2024-11-07 PROCEDURE — 99213 OFFICE O/P EST LOW 20 MIN: CPT | Mod: S$GLB,,, | Performed by: ORTHOPAEDIC SURGERY

## 2024-11-15 ENCOUNTER — TELEPHONE (OUTPATIENT)
Dept: ORTHOPEDICS | Facility: CLINIC | Age: 54
End: 2024-11-15
Payer: MEDICARE

## 2024-11-15 NOTE — TELEPHONE ENCOUNTER
Patient communication     Notified patient to stop at [facility] Location - 1st Floor [address] [facility], Please park in [place] [address] and use [place] elevators 30 minutes prior to your appointment time for X-ray. After your X-ray please proceed to 9th Floor [address] for Appointment on 11/20/24 with Dr. Jara for x-rays.     Made them aware that this is not a scheduled xray appointment and they might be running behind as they are considered a walk-in xray.    Verbalized the Following:  *Please arrive at your informed time above, if you are more than 15 Minutes late to your appointment with Dr. Jara we will have to reschedule your appointment. This will allow you to be seen in a timely manor and be conscious to other patients being seen that same day*

## 2024-11-20 ENCOUNTER — HOSPITAL ENCOUNTER (OUTPATIENT)
Dept: RADIOLOGY | Facility: OTHER | Age: 54
Discharge: HOME | End: 2024-11-20
Attending: ORTHOPAEDIC SURGERY
Payer: MEDICARE

## 2024-11-20 ENCOUNTER — OFFICE VISIT (OUTPATIENT)
Dept: ORTHOPEDICS | Facility: CLINIC | Age: 54
End: 2024-11-20
Payer: MEDICARE

## 2024-11-20 DIAGNOSIS — M18.0 PRIMARY OSTEOARTHRITIS OF BOTH FIRST CARPOMETACARPAL JOINTS: ICD-10-CM

## 2024-11-20 DIAGNOSIS — M79.641 BILATERAL HAND PAIN: ICD-10-CM

## 2024-11-20 DIAGNOSIS — M25.562 PAIN IN BOTH KNEES, UNSPECIFIED CHRONICITY: Primary | ICD-10-CM

## 2024-11-20 DIAGNOSIS — M79.641 CHRONIC PAIN OF RIGHT HAND: ICD-10-CM

## 2024-11-20 DIAGNOSIS — M79.642 BILATERAL HAND PAIN: ICD-10-CM

## 2024-11-20 DIAGNOSIS — G89.29 CHRONIC PAIN OF RIGHT HAND: ICD-10-CM

## 2024-11-20 DIAGNOSIS — M54.12 CERVICAL RADICULOPATHY: ICD-10-CM

## 2024-11-20 DIAGNOSIS — G56.03 CARPAL TUNNEL SYNDROME, BILATERAL: Primary | ICD-10-CM

## 2024-11-20 DIAGNOSIS — M25.561 PAIN IN BOTH KNEES, UNSPECIFIED CHRONICITY: Primary | ICD-10-CM

## 2024-11-20 PROCEDURE — 73130 X-RAY EXAM OF HAND: CPT | Mod: TC,50,FY

## 2024-11-20 PROCEDURE — 73130 X-RAY EXAM OF HAND: CPT | Mod: 26,50,, | Performed by: RADIOLOGY

## 2024-11-20 PROCEDURE — 99999 PR PBB SHADOW E&M-EST. PATIENT-LVL III: CPT | Mod: PBBFAC,,, | Performed by: ORTHOPAEDIC SURGERY

## 2024-11-20 PROCEDURE — 1160F RVW MEDS BY RX/DR IN RCRD: CPT | Mod: CPTII,S$GLB,, | Performed by: ORTHOPAEDIC SURGERY

## 2024-11-20 PROCEDURE — 1159F MED LIST DOCD IN RCRD: CPT | Mod: CPTII,S$GLB,, | Performed by: ORTHOPAEDIC SURGERY

## 2024-11-20 PROCEDURE — 99204 OFFICE O/P NEW MOD 45 MIN: CPT | Mod: S$GLB,,, | Performed by: ORTHOPAEDIC SURGERY

## 2024-11-20 NOTE — PATIENT INSTRUCTIONS
Today, you saw Dr. Jara and were seen for Carpal tunnel syndrome    We decided the next step(s) in figuring this out is/are  EMG/NCS at [facility]  Follow up at [facility] for results review once test is completed    Thank you for allowing me to participate in your care.  We will see you back after EMG/NCS study.

## 2024-11-20 NOTE — PROGRESS NOTES
[facility]  History & Physical  Orthopedics    SUBJECTIVE:      Chief Complaint:   Chief Complaint   Patient presents with    Right Hand - Pain, Numbness    Left Hand - Pain, Numbness       Referring Provider: Self, Aaareferral     History of Present Illness    Patient is a 54 y.o. right hand dominant male who presents today with complaints of bilateral hand numbness and tingling.  He states that this has been going on for a long time and that it is affecting his daily activities.  He has not tried any intervention.  He also reports radiating neck pain.  He had open reduction internal fixation of a left clavicle fracture with Dr. Howard.        Vitals:    11/20/24 0903   PainSc:   8   PainLoc: Hand     The patient is a/an works on [employer].    The patient denies any fevers, chills, N/V, D/C and presents for evaluation.    Past Medical History:   Diagnosis Date    Anxiety     Benzodiazepine dependence    Anxiety disorder, unspecified     Arthritis     Asthma     Avascular necrosis     Carpal tunnel syndrome     Chronic pain     Depression     Diverticulitis     Gout     Mixed hyperlipidemia     Other injury of other sites of trunk 12/28/2011    Pneumonia     Primary hypertension 8/23/2024    Spondylolisthesis     lumbar; myofascial pain    Staph infection     Ulnar neuropathy     left and rt arm     Past Surgical History:   Procedure Laterality Date    COLONOSCOPY N/A 7/6/2020    Procedure: COLONOSCOPY;  Surgeon: Broderick Moise MD;  Location: [facility] ENDO;  Service: Endoscopy;  Laterality: N/A;    HIP SURGERY  3/06; 6/06    hip arthroplasty    HIP SURGERY      bilateral hip replacement (titanium)    INJECTION OF ANESTHETIC AGENT AROUND MEDIAL BRANCH NERVES INNERVATING LUMBAR FACET JOINT Bilateral 12/9/2024    Procedure: b/l L3,4,5 MBB#1;  Surgeon: Servando Olmstead DO;  Location: [facility] PAIN [facility]NAGEMENT;  Service: Pain Management;  Laterality: Bilateral;  no ac    JOINT REPLACEMENT      OPEN  REDUCTION AND INTERNAL FIXATION (ORIF) OF FRACTURE OF CLAVICLE Left 10/5/2023    Procedure: ORIF, FRACTURE, CLAVICLE;  Surgeon: Ozzy Howard MD;  Location: [facility] OR [address] [place] [address];  Service: Orthopedics;  Laterality: Left;    SHOULDER SURGERY  2012    right shoulder    SHOULDER SURGERY       Review of patient's allergies indicates:   Allergen Reactions    Atorvastatin Nausea And Vomiting    Toradol [ketorolac] Hives and Nausea And Vomiting     Social History     Social History Narrative    Not on file     Family History   Problem Relation Name Age of Onset    Cancer Mother      Stroke Father      Cancer Sister      Alzheimer's disease Paternal Grandmother           Current Outpatient Medications:     allopurinoL (ZYLOPRIM) 100 MG tablet, Take 100 mg by mouth., Disp: , Rfl:     ALPRAZolam (XANAX) 2 MG Tab, Take 2 mg by mouth 2 (two) times daily. (Patient not taking: Reported on 12/16/2024), Disp: , Rfl:     aspirin (ECOTRIN) 81 MG EC tablet, Take 1 tablet (81 mg total) by mouth once daily., Disp: , Rfl:     baclofen (LIORESAL) 10 MG tablet, Take 1-2 tablets (10-20 mg total) by mouth 3 (three) times daily as needed (muscle pain)., Disp: 180 tablet, Rfl: 5    LIDOcaine (LIDODERM) 5 %, Place 1 patch onto the skin once daily. Remove & Discard patch within 12 hours or as directed by MD (Patient not taking: Reported on 12/16/2024), Disp: 7 patch, Rfl: 0    nitroGLYCERIN (NITROSTAT) 0.4 MG SL tablet, Place 1 tablet (0.4 mg total) under the tongue every 5 (five) minutes as needed for Chest pain., Disp: 30 tablet, Rfl: 1    [START ON 12/23/2024] oxyCODONE-acetaminophen (PERCOCET) 7.5-325 mg per tablet, Take 1 tablet by mouth 2 (two) times daily as needed for Pain., Disp: 60 tablet, Rfl: 0    [START ON 1/22/2025] oxyCODONE-acetaminophen (PERCOCET) 7.5-325 mg per tablet, Take 1 tablet by mouth 2 (two) times daily as needed for Pain., Disp: 60 tablet, Rfl: 0    rosuvastatin (CRESTOR) 20 MG tablet, Take 20 mg by mouth every  evening., Disp: , Rfl:     ROS    Review of Systems:  Constitutional: no fever or chills  Eyes: no visual changes  ENT: no nasal congestion or sore throat  Respiratory: no cough or shortness of breath  Cardiovascular: no chest pain  Gastrointestinal: no nausea or vomiting, tolerating diet  Musculoskeletal: pain, soreness, and numbness/tingling    OBJECTIVE:      Vital Signs (Most Recent):  There were no vitals filed for this visit.  There is no height or weight on file to calculate BMI.    Physical Exam    Physical Exam:  Constitutional: The patient appears well-developed and well-nourished. No distress.   Head: Normocephalic and atraumatic.   Nose: Nose normal.   Eyes: Conjunctivae and EOM are normal.   Neck: No tracheal deviation present.   Cardiovascular: Normal rate and intact distal pulses.    Pulmonary/Chest: Effort normal. No respiratory distress.   Abdominal: There is no guarding.   Lymphatic: Negative for adenopathy   Neurological: The patient is alert.   Psychiatric: The patient has a normal mood and affect.     Bilateral Hand/Wrist Examination:  Observation/Inspection:  Swelling  none    Deformity  none  Discoloration  none     Scars   none    Atrophy  none    HAND/WRIST EXAMINATION:  Positive compression Tinel's and Phalen's bilaterally.  ROM hand/wrist/elbow full    Physical Exam               Neurovascular Exam:  Digits WWP, brisk CR < 3s throughout  NVI motor/LTS to M/R/U nerves, radial pulse 2+  2+ biceps and brachioradialis reflexes    Diagnostic studies and other clinical records review:  X-rays AP, lateral and oblique bilateral hand taken today are independently reviewed by me and shows bilateral Eaton stage II basilar thumb arthritis.     ASSESSMENT/PLAN:    54 y.o. yo male with   Encounter Diagnoses   Name Primary?    Carpal tunnel syndrome, bilateral Yes    Cervical radiculopathy     Primary osteoarthritis of both first carpometacarpal joints     Chronic pain of right hand       The patient and  I had a thorough discussion today. We discussed the working diagnosis as well as several other potential alternative diagnoses. Treatment options were discussed, both conservative and surgical. Conservative treatment options would include things such as activity modifications, workplace modifications, a period of rest, oral vs topical OTC and prescription anti-inflammatory medications, occupational therapy, splinting/bracing, immobilization, corticosteroid injections, and others. Surgical options were discussed as well. I have recommended a bilateral carpal brace. I have ordered EMG/NCS for surgical planning.    Follow up after EMG/NCS for results review.  Call with any questions/concerns in the interim    The patient's pathophysiology was explained in detail with reference to x-rays, models, other visual aids as appropriate.  Treatment options were discussed in detail.  Questions were invited and answered to the patient's satisfaction. I reviewed Primary care , and other specialty's notes to better coordinate patient's care.          Eva Jara MD    Please be aware that this note has been generated with the assistance of [employer] voice-to-text.  Please excuse any spelling or grammatical errors.  This note was generated with the assistance of ambient listening technology. Verbal consent was obtained by the patient and accompanying visitor(s) for the recording of patient appointment to facilitate this note. I attest to having reviewed and edited the generated note for accuracy, though some syntax or spelling errors may persist. Please contact the author of this note for any clarification.

## 2024-11-21 ENCOUNTER — OFFICE VISIT (OUTPATIENT)
Dept: PAIN MEDICINE | Facility: CLINIC | Age: 54
End: 2024-11-21
Payer: MEDICARE

## 2024-11-21 ENCOUNTER — TELEPHONE (OUTPATIENT)
Dept: PAIN MEDICINE | Facility: CLINIC | Age: 54
End: 2024-11-21

## 2024-11-21 VITALS
SYSTOLIC BLOOD PRESSURE: 151 MMHG | WEIGHT: 246.06 LBS | HEART RATE: 75 BPM | HEIGHT: 71 IN | BODY MASS INDEX: 34.45 KG/M2 | DIASTOLIC BLOOD PRESSURE: 101 MMHG

## 2024-11-21 DIAGNOSIS — M47.816 LUMBAR SPONDYLOSIS: Primary | ICD-10-CM

## 2024-11-21 DIAGNOSIS — G89.4 CHRONIC PAIN SYNDROME: ICD-10-CM

## 2024-11-21 PROCEDURE — 3008F BODY MASS INDEX DOCD: CPT | Mod: CPTII,S$GLB,, | Performed by: STUDENT IN AN ORGANIZED HEALTH CARE EDUCATION/TRAINING PROGRAM

## 2024-11-21 PROCEDURE — 1159F MED LIST DOCD IN RCRD: CPT | Mod: CPTII,S$GLB,, | Performed by: STUDENT IN AN ORGANIZED HEALTH CARE EDUCATION/TRAINING PROGRAM

## 2024-11-21 PROCEDURE — 99999 PR PBB SHADOW E&M-EST. PATIENT-LVL III: CPT | Mod: PBBFAC,,, | Performed by: STUDENT IN AN ORGANIZED HEALTH CARE EDUCATION/TRAINING PROGRAM

## 2024-11-21 PROCEDURE — 3077F SYST BP >= 140 MM HG: CPT | Mod: CPTII,S$GLB,, | Performed by: STUDENT IN AN ORGANIZED HEALTH CARE EDUCATION/TRAINING PROGRAM

## 2024-11-21 PROCEDURE — 99214 OFFICE O/P EST MOD 30 MIN: CPT | Mod: S$GLB,,, | Performed by: STUDENT IN AN ORGANIZED HEALTH CARE EDUCATION/TRAINING PROGRAM

## 2024-11-21 PROCEDURE — 3080F DIAST BP >= 90 MM HG: CPT | Mod: CPTII,S$GLB,, | Performed by: STUDENT IN AN ORGANIZED HEALTH CARE EDUCATION/TRAINING PROGRAM

## 2024-11-21 RX ORDER — OXYCODONE AND ACETAMINOPHEN 7.5; 325 MG/1; MG/1
1 TABLET ORAL 2 TIMES DAILY PRN
Qty: 60 TABLET | Refills: 0 | Status: SHIPPED | OUTPATIENT
Start: 2024-12-23 | End: 2025-01-22

## 2024-11-21 RX ORDER — OXYCODONE AND ACETAMINOPHEN 7.5; 325 MG/1; MG/1
1 TABLET ORAL 2 TIMES DAILY PRN
Qty: 60 TABLET | Refills: 0 | Status: SHIPPED | OUTPATIENT
Start: 2024-11-23 | End: 2024-12-23

## 2024-11-21 NOTE — H&P (VIEW-ONLY)
Chronic Pain - f/u    Referring Physician: No ref. provider found    Date: 11/21/2024     Re: Keon Schneider Jr.  MR#: [id number]  YOB: 1970  Age: 54 y.o.    Chief Complaint:   Chief Complaint   Patient presents with    Back Pain     **This note is dictated using the M*Modal Fluency Direct word recognition program. There are word recognition mistakes that are occasionally missed on review.**    ASSESSMENT: 54 y.o. year old male with hip pain, consistent with     1. Lumbar spondylosis  Procedure Request Order for Pain Management      2. Chronic pain syndrome  oxyCODONE-acetaminophen (PERCOCET) 7.5-325 mg per tablet    oxyCODONE-acetaminophen (PERCOCET) 7.5-325 mg per tablet          PLAN:     Left clavicle fracture hx with new mass in the left neck and collapse of the clavicle with soft tissue atrophy  -very unusual  -XR to evaluate. Clavicle looks ok.  -needs to see ortho    Mid/Low back pain - very confusing  -unclear etiology  -lots of recent ER visits  -seen spine surgery, hematology, neurology.  Noone seems to know what is going on  -hard to process this since he has a lot to say and there is a lot that has happened in the last 2 years since I saw him last.  Previously seeing Dr. Schmitt who he does not want to see anymore because he was told that he can only see him for injections  -will provide #60 Oxycodone 7.5mg to help keep him out of the ER  -baclofen 10-20mg TID PRN for his back pain  -He has a bright STIR image at T5 which is likely more degenerative related but did not respond to high dose steroids.  -discussed that I will not provide long term pain medications to him, but I will try to keep him out of the emergency room for now while this is being figure out  -could consider b/l Thoracic MBB/RFA but would need to try and figure out what levels to target  -Epidural also an option but with no response to high dose oral steroids it seems less likely to help    Bilateral hip pain and  metallosis  -this was manageable before the recent back issue    Lumbar spondylosis  -some arthritis on MRI.  Hip pain is his bigger issues.  Back pain is not radiating into the legs.  -patient has more facet pain in the low back today.  Discussed RFA.  He would like to try this.  12/9/24 - b/l L3,4,5 MBB#1 - no sed - no AC  12/23/24 - b/l L3,4,5 MBB#2 - no sed - no AC  1/14/24 - b/l L3,4,5 RFA - RN sed - no AC    Chronic use of opioids  -another talk about opioids and that I will prescribe long term\  -will provide 2 more months while trying to see if he gets improvement from the injections  -discussed risks of benzos and opioids    - RTC mid February  - Counseled patient regarding the importance of activity modification.    The above plan and management options were discussed at length with patient. Patient is in agreement with the above and verbalized understanding. It will be communicated with the referring physician via electronic record, fax, or mail.  Lab/study reports reviewed were important and necessary because subsequent medical and treatment recommendations required review of the above lab/study reports. Images viewed/reviewed above were important and necessary because subsequent medical and treatment recommendations required review of the reviewed image(s).     Electronically signed by:  Servando Olmstead DO  11/21/2024    =========================================================================================================    SUBJECTIVE:    Interval History 11/21/2024:   Keon Cabrera Wyatt Herrera is a 54 y.o. male presents to the clinic for follow up.  Since last visit the pain has has worsened slightly.  The patient has multiple issues going on.  He is seeing the hip doctor for his metalosis.  Right now the back and the knees are bothering him the most.      The pain is located in the back area and radiates to the hip  .  The pain is described as aching, pulsating, shooting, stabbing, and  throbbing    At BEST  7/10   At WORST  10/10 on the WORST day.    On average pain is rated as 10/10.   Today the pain is rated as 10/10  Symptoms interfere with daily activity.   Exacerbating factors: Walking, Night Time, and Morning.    Mitigating factors medications.     Current pain medications: warren back and body, flexeril, alprazolam 2mg BID, oxycodone 7.5mg BID  Failed Pain Medications: high dose steroids    Pain procedures:   Epidural, hip injection    Interval History 10/24/2024:   Keon Schneider Jr. is a 54 y.o. male presents to the clinic for follow up.  Since last visit the pain has has worsened.  The patient has not been seen by me since 2022.  He was seeing Dr. Schmitt but was not getting pain medications.  He states that about 4.5 months he fell down his stairs and his back started flaring up more.      He has had a lot going on during this time.      The left shoulder is collapsed with swelling and sinking around the clavicle.    The pain is located in the mid and low back area and radiates to the bilateral hip and lower extremities .  The pain is described as burning and stabbing    At BEST  8/10   At WORST  10/10 on the WORST day.    On average pain is rated as 10/10.   Today the pain is rated as 10/10  Symptoms interfere with daily activity and sleeping.   Exacerbating factors: Sitting, Standing, Laying, and Walking.    Mitigating factors medications.     Initial hx:  Keon Schneider Jr. is a 54 y.o. male presents to the clinic for the evaluation of b/l hips pain. The pain started 10+ years ago following surgery on hips(replacement)  and symptoms have been worsening.  He has bad pain in the hips and gets radiation down the right leg.  The pain starts in the front of the hip (R>L).  He gets some back pain in the low back which does not really radiate into the legs. States that the pain has taken over his life again.  He had his hips replaced in 2006 for AVN and was doing well until this year  (around April 2022) when it flared back up. He can't sleep. Cant work. Can't walk up and down stairs.    He fell 16ft off a scaffold 3 months ago and landed on the right leg.     The patient has to climb 16 stairs to get up to his house and that has been very difficult.      Pain Description:    The pain is located in the b/l hip area and radiates to the b/l legs and up the back .    At BEST  7/10   At WORST  10/10 on the WORST day.    On average pain is rated as 7/10.   Today the pain is rated as 8/10  The pain is continuous.  The pain is described as pulsating, sharp, stabbing, and tight band    Symptoms interfere with daily activity, sleeping, and work.   Exacerbating factors: Sitting and Laying.    Mitigating factors nothing.   He reports 3 hours of sleep per night.    Physical Therapy/Home Exercise: Yes, currently has a home exercise program.  He is pretty active.    Current Pain Medications:    - warren back and body    Failed Pain Medications:    - NSAIDs due to prior GI bleed.    Pain Treatment Therapies:    Pain procedures:   Epidural, hip injection  Physical Therapy: 2007  Chiropractor: none  Acupuncture: none  TENS unit: none  Spinal decompression: none  Joint replacement: b/l hip replacement    Patient denies urinary incontinence, bowel incontinence, and loss of sensations.  Patient denies any suicidal or homicidal ideations     report:  Reviewed and consistent with medication use as prescribed.    Imaging:   MRI lumbar 09/2022:  Degenerative findings:     T12-L1: No spinal canal stenosis or neural foraminal narrowing.     L1-L2: Facet arthropathy.  No spinal canal stenosis or neural foraminal narrowing.     L2-L3: Mild posterior disc bulge and facet arthropathy.  No spinal canal stenosis or neural foraminal narrowing.     L3-L4: Mild posterior disc bulge and facet arthropathy.  No spinal canal stenosis or neural foraminal narrowing.     L4-L5: Posterior disc bulge with protrusion into the central,  paracentral and resulting in mild right neural foraminal narrowing.  No spinal canal stenosis.     L5-S1: Facet arthropathy.  No central canal stenosis.     Paraspinal muscles & soft tissues: Unremarkable.     Impression:     1. Overall stable appearance of degenerative findings in the lumbar spine including mild right neural foraminal narrowing at L4-L5.    Past Medical History:   Diagnosis Date    Anxiety     Benzodiazepine dependence    Anxiety disorder, unspecified     Arthritis     Asthma     Avascular necrosis     Carpal tunnel syndrome     Chronic pain     Depression     Diverticulitis     Gout     Mixed hyperlipidemia     Other injury of other sites of trunk 2011    Pneumonia     Primary hypertension 2024    Spondylolisthesis     lumbar; myofascial pain    Staph infection     Ulnar neuropathy     left and rt arm     Past Surgical History:   Procedure Laterality Date    COLONOSCOPY N/A 2020    Procedure: COLONOSCOPY;  Surgeon: Broderick Moise MD;  Location: [facility];  Service: Endoscopy;  Laterality: N/A;    HIP SURGERY  3/06;     hip arthroplasty    HIP SURGERY      bilateral hip replacement (titanium)    JOINT REPLACEMENT      OPEN REDUCTION AND INTERNAL FIXATION (ORIF) OF FRACTURE OF CLAVICLE Left 10/5/2023    Procedure: ORIF, FRACTURE, CLAVICLE;  Surgeon: Ozzy Howard MD;  Location: [address];  Service: Orthopedics;  Laterality: Left;    SHOULDER SURGERY      right shoulder    SHOULDER SURGERY       Social History     Socioeconomic History    Marital status: Single   Tobacco Use    Smoking status: Former     Current packs/day: 0.00     Average packs/day: 1 pack/day for 10.0 years (10.0 ttl pk-yrs)     Types: Cigarettes     Start date: 10/13/2002     Quit date: 10/13/2012     Years since quittin.1     Passive exposure: Past    Smokeless tobacco: Never   Substance and Sexual Activity    Alcohol use: Yes     Alcohol/week: 12.0 standard drinks of alcohol      Types: 12 Cans of beer per week     Comment: 2x/week    Drug use: No    Sexual activity: Yes     Partners: Female     Social Drivers of Health     Financial Resource Strain: Low Risk  (9/30/2024)    Overall Financial Resource Strain (CARDIA)     Difficulty of Paying Living Expenses: Not hard at all   Food Insecurity: No Food Insecurity (9/30/2024)    Hunger Vital Sign     Worried About Running Out of Food in the Last Year: Never true     Ran Out of Food in the Last Year: Never true   Recent Concern: Food Insecurity - Food Insecurity Present (9/1/2024)    Hunger Vital Sign     Worried About Running Out of Food in the Last Year: Sometimes true     Ran Out of Food in the Last Year: Sometimes true   Transportation Needs: No Transportation Needs (9/30/2024)    TRANSPORTATION NEEDS     Transportation : No   Physical Activity: Sufficiently Active (9/30/2024)    Exercise Vital Sign     Days of Exercise per Week: 7 days     Minutes of Exercise per Session: 30 min   Stress: No Stress Concern Present (9/30/2024)    [employer] - Occupational Stress Questionnaire     Feeling of Stress : Only a little   Recent Concern: Stress - Stress Concern Present (9/1/2024)    [employer] - Occupational Stress Questionnaire     Feeling of Stress : Very much   Housing Stability: Low Risk  (9/30/2024)    Housing Stability Vital Sign     Unable to Pay for Housing in the Last Year: No     Homeless in the Last Year: No     Family History   Problem Relation Name Age of Onset    Cancer Mother      Stroke Father      Cancer Sister      Alzheimer's disease Paternal Grandmother         Review of patient's allergies indicates:   Allergen Reactions    Atorvastatin Nausea And Vomiting    Toradol [ketorolac] Hives and Nausea And Vomiting       Current Outpatient Medications   Medication Sig    allopurinoL (ZYLOPRIM) 100 MG tablet Take 100 mg by mouth.    ALPRAZolam (XANAX) 2 MG Tab Take 2 mg by  "mouth 2 (two) times daily.    aspirin (ECOTRIN) 81 MG EC tablet Take 1 tablet (81 mg total) by mouth once daily.    baclofen (LIORESAL) 10 MG tablet Take 1-2 tablets (10-20 mg total) by mouth 3 (three) times daily as needed (muscle pain).    LIDOcaine (LIDODERM) 5 % Place 1 patch onto the skin once daily. Remove & Discard patch within 12 hours or as directed by MD    nitroGLYCERIN (NITROSTAT) 0.4 MG SL tablet Place 1 tablet (0.4 mg total) under the tongue every 5 (five) minutes as needed for Chest pain.    rosuvastatin (CRESTOR) 20 MG tablet Take 20 mg by mouth every evening.    [START ON 11/23/2024] oxyCODONE-acetaminophen (PERCOCET) 7.5-325 mg per tablet Take 1 tablet by mouth 2 (two) times daily as needed for Pain.    [START ON 12/23/2024] oxyCODONE-acetaminophen (PERCOCET) 7.5-325 mg per tablet Take 1 tablet by mouth 2 (two) times daily as needed for Pain.     No current facility-administered medications for this visit.       REVIEW OF SYSTEMS:    GENERAL:  No weight loss, malaise or fevers.  HEENT:   No recent changes in vision or hearing  NECK:  Negative for lumps, no difficulty with swallowing.  RESPIRATORY:  Negative for cough, wheezing or shortness of breath, patient denies any recent URI.  CARDIOVASCULAR:  Negative for chest pain, leg swelling or palpitations.  GI:  Negative for abdominal discomfort, blood in stools or black stools or change in bowel habits.  MUSCULOSKELETAL:  See HPI.  SKIN:  Negative for lesions, rash, and itching.  PSYCH:  No mood disorder or recent psychosocial stressors.  Patients sleep is not disturbed secondary to pain.  HEMATOLOGY/LYMPHOLOGY:  Negative for prolonged bleeding, bruising easily or swollen nodes.  Patient is not currently taking any anti-coagulants  NEURO:   No history of headaches, syncope, paralysis, seizures or tremors.  All other reviewed and negative other than HPI.    OBJECTIVE:    BP (!) 151/101 (BP Location: Left arm)   Pulse 75   Ht 5' 11" (1.803 m)   Wt " 111.6 kg (246 lb 0.5 oz)   BMI 34.31 kg/m²     PHYSICAL EXAMINATION:    GENERAL: Well appearing, in no acute distress, alert and oriented x3.  PSYCH:  Mood and affect appropriate.  SKIN: Skin color, texture, turgor normal, no rashes or lesions.  HEAD/FACE:  Normocephalic, atraumatic. Cranial nerves grossly intact.  CV: RRR with palpation of the radial artery.  PULM: CTAB. No evidence of respiratory difficulty, symmetric chest rise.  GI:  Soft and non-tender.    Left Shoulder  -TTP  -left clavicle seems to have collapsed?  -soft tissue mass in the base of the left neck    BACK:   - No obvious deformity or signs of trauma, Normal lumbar lordotic curve  - Positive spinous process tenderness  - Positive paravertebral tenderness  - Positive pain to palpation over the facet joints of the lumbar spine.   - Negative QL / Iliac crest / Glut tenderness  - (+) pain with flexion and extension of the back  (+) facet loading b/l  - no pain with facet loading  - lateral flexion causes anterior hip pain on the ipsilateral side    MUSKULOSKELETAL:    EXTREMITIES:   Hip Exam:  - Log Roll Positive  - FADIR Positive  - Stinchfield Positive  - Hip Scour Positive  - GTB Tenderness Negative    MUSCULOSKELETAL:  No atrophy or tone abnormalities are noted in the UE or LE.  No deformities, edema, or skin discoloration are noted on visible skin. Good capillary refill.    NEURO: Bilateral upper and lower extremity coordination and muscle stretch reflexes are physiologic and symmetric.      NEUROLOGICAL EXAM:  MENTAL STATUS: A x O x 3, good concentration, speech is fluent and goal directed  MEMORY: recent and remote are intact  CN: CN2-12 grossly intact  MOTOR: 5/5 in all muscle groups  DTRs: 2+ intact symmetric  Sensation:    -no Loss of sensation in a left lower and right lower L-1, L-2, L-3, L-4, and L-5 bilaterally distribution.  Gordon: absent on the bilateral side(s)  Clonus: absent on the bilateral side(s)    GAIT: antalgic.

## 2024-11-21 NOTE — PROGRESS NOTES
Chronic Pain - f/u    Referring Physician: No ref. provider found    Date: 11/21/2024     Re: Keon Schneider Jr.  MR#: [id number]  YOB: 1970  Age: 54 y.o.    Chief Complaint:   Chief Complaint   Patient presents with    Back Pain     **This note is dictated using the M*Modal Fluency Direct word recognition program. There are word recognition mistakes that are occasionally missed on review.**    ASSESSMENT: 54 y.o. year old male with hip pain, consistent with     1. Lumbar spondylosis  Procedure Request Order for Pain Management      2. Chronic pain syndrome  oxyCODONE-acetaminophen (PERCOCET) 7.5-325 mg per tablet    oxyCODONE-acetaminophen (PERCOCET) 7.5-325 mg per tablet          PLAN:     Left clavicle fracture hx with new mass in the left neck and collapse of the clavicle with soft tissue atrophy  -very unusual  -XR to evaluate. Clavicle looks ok.  -needs to see ortho    Mid/Low back pain - very confusing  -unclear etiology  -lots of recent ER visits  -seen spine surgery, hematology, neurology.  Noone seems to know what is going on  -hard to process this since he has a lot to say and there is a lot that has happened in the last 2 years since I saw him last.  Previously seeing Dr. Schmitt who he does not want to see anymore because he was told that he can only see him for injections  -will provide #60 Oxycodone 7.5mg to help keep him out of the ER  -baclofen 10-20mg TID PRN for his back pain  -He has a bright STIR image at T5 which is likely more degenerative related but did not respond to high dose steroids.  -discussed that I will not provide long term pain medications to him, but I will try to keep him out of the emergency room for now while this is being figure out  -could consider b/l Thoracic MBB/RFA but would need to try and figure out what levels to target  -Epidural also an option but with no response to high dose oral steroids it seems less likely to help    Bilateral hip pain and  metallosis  -this was manageable before the recent back issue    Lumbar spondylosis  -some arthritis on MRI.  Hip pain is his bigger issues.  Back pain is not radiating into the legs.  -patient has more facet pain in the low back today.  Discussed RFA.  He would like to try this.  12/9/24 - b/l L3,4,5 MBB#1 - no sed - no AC  12/23/24 - b/l L3,4,5 MBB#2 - no sed - no AC  1/14/24 - b/l L3,4,5 RFA - RN sed - no AC    Chronic use of opioids  -another talk about opioids and that I will prescribe long term\  -will provide 2 more months while trying to see if he gets improvement from the injections  -discussed risks of benzos and opioids    - RTC mid February  - Counseled patient regarding the importance of activity modification.    The above plan and management options were discussed at length with patient. Patient is in agreement with the above and verbalized understanding. It will be communicated with the referring physician via electronic record, fax, or mail.  Lab/study reports reviewed were important and necessary because subsequent medical and treatment recommendations required review of the above lab/study reports. Images viewed/reviewed above were important and necessary because subsequent medical and treatment recommendations required review of the reviewed image(s).     Electronically signed by:  Servando Olmstead DO  11/21/2024    =========================================================================================================    SUBJECTIVE:    Interval History 11/21/2024:   Keon Cabrera Wyatt Herrera is a 54 y.o. male presents to the clinic for follow up.  Since last visit the pain has has worsened slightly.  The patient has multiple issues going on.  He is seeing the hip doctor for his metalosis.  Right now the back and the knees are bothering him the most.      The pain is located in the back area and radiates to the hip  .  The pain is described as aching, pulsating, shooting, stabbing, and  throbbing    At BEST  7/10   At WORST  10/10 on the WORST day.    On average pain is rated as 10/10.   Today the pain is rated as 10/10  Symptoms interfere with daily activity.   Exacerbating factors: Walking, Night Time, and Morning.    Mitigating factors medications.     Current pain medications: warren back and body, flexeril, alprazolam 2mg BID, oxycodone 7.5mg BID  Failed Pain Medications: high dose steroids    Pain procedures:   Epidural, hip injection    Interval History 10/24/2024:   Keon Schneider Jr. is a 54 y.o. male presents to the clinic for follow up.  Since last visit the pain has has worsened.  The patient has not been seen by me since 2022.  He was seeing Dr. Schmitt but was not getting pain medications.  He states that about 4.5 months he fell down his stairs and his back started flaring up more.      He has had a lot going on during this time.      The left shoulder is collapsed with swelling and sinking around the clavicle.    The pain is located in the mid and low back area and radiates to the bilateral hip and lower extremities .  The pain is described as burning and stabbing    At BEST  8/10   At WORST  10/10 on the WORST day.    On average pain is rated as 10/10.   Today the pain is rated as 10/10  Symptoms interfere with daily activity and sleeping.   Exacerbating factors: Sitting, Standing, Laying, and Walking.    Mitigating factors medications.     Initial hx:  Keon Schneider Jr. is a 54 y.o. male presents to the clinic for the evaluation of b/l hips pain. The pain started 10+ years ago following surgery on hips(replacement)  and symptoms have been worsening.  He has bad pain in the hips and gets radiation down the right leg.  The pain starts in the front of the hip (R>L).  He gets some back pain in the low back which does not really radiate into the legs. States that the pain has taken over his life again.  He had his hips replaced in 2006 for AVN and was doing well until this year  (around April 2022) when it flared back up. He can't sleep. Cant work. Can't walk up and down stairs.    He fell 16ft off a scaffold 3 months ago and landed on the right leg.     The patient has to climb 16 stairs to get up to his house and that has been very difficult.      Pain Description:    The pain is located in the b/l hip area and radiates to the b/l legs and up the back .    At BEST  7/10   At WORST  10/10 on the WORST day.    On average pain is rated as 7/10.   Today the pain is rated as 8/10  The pain is continuous.  The pain is described as pulsating, sharp, stabbing, and tight band    Symptoms interfere with daily activity, sleeping, and work.   Exacerbating factors: Sitting and Laying.    Mitigating factors nothing.   He reports 3 hours of sleep per night.    Physical Therapy/Home Exercise: Yes, currently has a home exercise program.  He is pretty active.    Current Pain Medications:    - warren back and body    Failed Pain Medications:    - NSAIDs due to prior GI bleed.    Pain Treatment Therapies:    Pain procedures:   Epidural, hip injection  Physical Therapy: 2007  Chiropractor: none  Acupuncture: none  TENS unit: none  Spinal decompression: none  Joint replacement: b/l hip replacement    Patient denies urinary incontinence, bowel incontinence, and loss of sensations.  Patient denies any suicidal or homicidal ideations     report:  Reviewed and consistent with medication use as prescribed.    Imaging:   MRI lumbar 09/2022:  Degenerative findings:     T12-L1: No spinal canal stenosis or neural foraminal narrowing.     L1-L2: Facet arthropathy.  No spinal canal stenosis or neural foraminal narrowing.     L2-L3: Mild posterior disc bulge and facet arthropathy.  No spinal canal stenosis or neural foraminal narrowing.     L3-L4: Mild posterior disc bulge and facet arthropathy.  No spinal canal stenosis or neural foraminal narrowing.     L4-L5: Posterior disc bulge with protrusion into the central,  paracentral and resulting in mild right neural foraminal narrowing.  No spinal canal stenosis.     L5-S1: Facet arthropathy.  No central canal stenosis.     Paraspinal muscles & soft tissues: Unremarkable.     Impression:     1. Overall stable appearance of degenerative findings in the lumbar spine including mild right neural foraminal narrowing at L4-L5.    Past Medical History:   Diagnosis Date    Anxiety     Benzodiazepine dependence    Anxiety disorder, unspecified     Arthritis     Asthma     Avascular necrosis     Carpal tunnel syndrome     Chronic pain     Depression     Diverticulitis     Gout     Mixed hyperlipidemia     Other injury of other sites of trunk 2011    Pneumonia     Primary hypertension 2024    Spondylolisthesis     lumbar; myofascial pain    Staph infection     Ulnar neuropathy     left and rt arm     Past Surgical History:   Procedure Laterality Date    COLONOSCOPY N/A 2020    Procedure: COLONOSCOPY;  Surgeon: Broderick Moise MD;  Location: [facility];  Service: Endoscopy;  Laterality: N/A;    HIP SURGERY  3/06;     hip arthroplasty    HIP SURGERY      bilateral hip replacement (titanium)    JOINT REPLACEMENT      OPEN REDUCTION AND INTERNAL FIXATION (ORIF) OF FRACTURE OF CLAVICLE Left 10/5/2023    Procedure: ORIF, FRACTURE, CLAVICLE;  Surgeon: Ozzy Howard MD;  Location: [address];  Service: Orthopedics;  Laterality: Left;    SHOULDER SURGERY      right shoulder    SHOULDER SURGERY       Social History     Socioeconomic History    Marital status: Single   Tobacco Use    Smoking status: Former     Current packs/day: 0.00     Average packs/day: 1 pack/day for 10.0 years (10.0 ttl pk-yrs)     Types: Cigarettes     Start date: 10/13/2002     Quit date: 10/13/2012     Years since quittin.1     Passive exposure: Past    Smokeless tobacco: Never   Substance and Sexual Activity    Alcohol use: Yes     Alcohol/week: 12.0 standard drinks of alcohol      Types: 12 Cans of beer per week     Comment: 2x/week    Drug use: No    Sexual activity: Yes     Partners: Female     Social Drivers of Health     Financial Resource Strain: Low Risk  (9/30/2024)    Overall Financial Resource Strain (CARDIA)     Difficulty of Paying Living Expenses: Not hard at all   Food Insecurity: No Food Insecurity (9/30/2024)    Hunger Vital Sign     Worried About Running Out of Food in the Last Year: Never true     Ran Out of Food in the Last Year: Never true   Recent Concern: Food Insecurity - Food Insecurity Present (9/1/2024)    Hunger Vital Sign     Worried About Running Out of Food in the Last Year: Sometimes true     Ran Out of Food in the Last Year: Sometimes true   Transportation Needs: No Transportation Needs (9/30/2024)    TRANSPORTATION NEEDS     Transportation : No   Physical Activity: Sufficiently Active (9/30/2024)    Exercise Vital Sign     Days of Exercise per Week: 7 days     Minutes of Exercise per Session: 30 min   Stress: No Stress Concern Present (9/30/2024)    [employer] - Occupational Stress Questionnaire     Feeling of Stress : Only a little   Recent Concern: Stress - Stress Concern Present (9/1/2024)    [employer] - Occupational Stress Questionnaire     Feeling of Stress : Very much   Housing Stability: Low Risk  (9/30/2024)    Housing Stability Vital Sign     Unable to Pay for Housing in the Last Year: No     Homeless in the Last Year: No     Family History   Problem Relation Name Age of Onset    Cancer Mother      Stroke Father      Cancer Sister      Alzheimer's disease Paternal Grandmother         Review of patient's allergies indicates:   Allergen Reactions    Atorvastatin Nausea And Vomiting    Toradol [ketorolac] Hives and Nausea And Vomiting       Current Outpatient Medications   Medication Sig    allopurinoL (ZYLOPRIM) 100 MG tablet Take 100 mg by mouth.    ALPRAZolam (XANAX) 2 MG Tab Take 2 mg by  "mouth 2 (two) times daily.    aspirin (ECOTRIN) 81 MG EC tablet Take 1 tablet (81 mg total) by mouth once daily.    baclofen (LIORESAL) 10 MG tablet Take 1-2 tablets (10-20 mg total) by mouth 3 (three) times daily as needed (muscle pain).    LIDOcaine (LIDODERM) 5 % Place 1 patch onto the skin once daily. Remove & Discard patch within 12 hours or as directed by MD    nitroGLYCERIN (NITROSTAT) 0.4 MG SL tablet Place 1 tablet (0.4 mg total) under the tongue every 5 (five) minutes as needed for Chest pain.    rosuvastatin (CRESTOR) 20 MG tablet Take 20 mg by mouth every evening.    [START ON 11/23/2024] oxyCODONE-acetaminophen (PERCOCET) 7.5-325 mg per tablet Take 1 tablet by mouth 2 (two) times daily as needed for Pain.    [START ON 12/23/2024] oxyCODONE-acetaminophen (PERCOCET) 7.5-325 mg per tablet Take 1 tablet by mouth 2 (two) times daily as needed for Pain.     No current facility-administered medications for this visit.       REVIEW OF SYSTEMS:    GENERAL:  No weight loss, malaise or fevers.  HEENT:   No recent changes in vision or hearing  NECK:  Negative for lumps, no difficulty with swallowing.  RESPIRATORY:  Negative for cough, wheezing or shortness of breath, patient denies any recent URI.  CARDIOVASCULAR:  Negative for chest pain, leg swelling or palpitations.  GI:  Negative for abdominal discomfort, blood in stools or black stools or change in bowel habits.  MUSCULOSKELETAL:  See HPI.  SKIN:  Negative for lesions, rash, and itching.  PSYCH:  No mood disorder or recent psychosocial stressors.  Patients sleep is not disturbed secondary to pain.  HEMATOLOGY/LYMPHOLOGY:  Negative for prolonged bleeding, bruising easily or swollen nodes.  Patient is not currently taking any anti-coagulants  NEURO:   No history of headaches, syncope, paralysis, seizures or tremors.  All other reviewed and negative other than HPI.    OBJECTIVE:    BP (!) 151/101 (BP Location: Left arm)   Pulse 75   Ht 5' 11" (1.803 m)   Wt " 111.6 kg (246 lb 0.5 oz)   BMI 34.31 kg/m²     PHYSICAL EXAMINATION:    GENERAL: Well appearing, in no acute distress, alert and oriented x3.  PSYCH:  Mood and affect appropriate.  SKIN: Skin color, texture, turgor normal, no rashes or lesions.  HEAD/FACE:  Normocephalic, atraumatic. Cranial nerves grossly intact.  CV: RRR with palpation of the radial artery.  PULM: CTAB. No evidence of respiratory difficulty, symmetric chest rise.  GI:  Soft and non-tender.    Left Shoulder  -TTP  -left clavicle seems to have collapsed?  -soft tissue mass in the base of the left neck    BACK:   - No obvious deformity or signs of trauma, Normal lumbar lordotic curve  - Positive spinous process tenderness  - Positive paravertebral tenderness  - Positive pain to palpation over the facet joints of the lumbar spine.   - Negative QL / Iliac crest / Glut tenderness  - (+) pain with flexion and extension of the back  (+) facet loading b/l  - no pain with facet loading  - lateral flexion causes anterior hip pain on the ipsilateral side    MUSKULOSKELETAL:    EXTREMITIES:   Hip Exam:  - Log Roll Positive  - FADIR Positive  - Stinchfield Positive  - Hip Scour Positive  - GTB Tenderness Negative    MUSCULOSKELETAL:  No atrophy or tone abnormalities are noted in the UE or LE.  No deformities, edema, or skin discoloration are noted on visible skin. Good capillary refill.    NEURO: Bilateral upper and lower extremity coordination and muscle stretch reflexes are physiologic and symmetric.      NEUROLOGICAL EXAM:  MENTAL STATUS: A x O x 3, good concentration, speech is fluent and goal directed  MEMORY: recent and remote are intact  CN: CN2-12 grossly intact  MOTOR: 5/5 in all muscle groups  DTRs: 2+ intact symmetric  Sensation:    -no Loss of sensation in a left lower and right lower L-1, L-2, L-3, L-4, and L-5 bilaterally distribution.  Gordon: absent on the bilateral side(s)  Clonus: absent on the bilateral side(s)    GAIT: antalgic.

## 2024-11-22 ENCOUNTER — TELEPHONE (OUTPATIENT)
Dept: PAIN MEDICINE | Facility: CLINIC | Age: 54
End: 2024-11-22
Payer: MEDICARE

## 2024-11-22 DIAGNOSIS — M47.816 LUMBAR SPONDYLOSIS: Primary | ICD-10-CM

## 2024-11-22 DIAGNOSIS — M47.816 LUMBAR SPONDYLOSIS: ICD-10-CM

## 2024-11-22 DIAGNOSIS — M47.816 SPONDYLOSIS WITHOUT MYELOPATHY OR RADICULOPATHY, LUMBAR REGION: Primary | ICD-10-CM

## 2024-11-24 ENCOUNTER — HOSPITAL ENCOUNTER (EMERGENCY)
Facility: HOSPITAL | Age: 54
Discharge: HOME | End: 2024-11-24
Attending: EMERGENCY MEDICINE
Payer: MEDICARE

## 2024-11-24 VITALS
RESPIRATION RATE: 18 BRPM | WEIGHT: 246 LBS | SYSTOLIC BLOOD PRESSURE: 146 MMHG | TEMPERATURE: 99 F | HEART RATE: 80 BPM | HEIGHT: 71 IN | BODY MASS INDEX: 34.44 KG/M2 | DIASTOLIC BLOOD PRESSURE: 82 MMHG

## 2024-11-24 DIAGNOSIS — M25.561 RIGHT KNEE PAIN, UNSPECIFIED CHRONICITY: Primary | ICD-10-CM

## 2024-11-24 PROCEDURE — 99281 EMR DPT VST MAYX REQ PHY/QHP: CPT

## 2024-11-25 NOTE — ED TRIAGE NOTES
Keon Cabrera Wyatt Herrera, a 54 y.o. male presents to the ED w/ complaint of right knee pain 10/10.Patient denies any SOB,chest pain ,chill,fever at this time.    Triage note:  Chief Complaint   Patient presents with    Knee Pain     Right knee pain x 4 days     Review of patient's allergies indicates:   Allergen Reactions    Atorvastatin Nausea And Vomiting    Toradol [ketorolac] Hives and Nausea And Vomiting     Past Medical History:   Diagnosis Date    Anxiety     Benzodiazepine dependence    Anxiety disorder, unspecified     Arthritis     Asthma     Avascular necrosis     Carpal tunnel syndrome     Chronic pain     Depression     Diverticulitis     Gout     Mixed hyperlipidemia     Other injury of other sites of trunk 12/28/2011    Pneumonia     Primary hypertension 8/23/2024    Spondylolisthesis     lumbar; myofascial pain    Staph infection     Ulnar neuropathy     left and rt arm

## 2024-11-25 NOTE — ED NOTES
Patient comes into the emergency department by private vehicle with complaints of right knee pain 10/10.Patient denies any SOB,chest pain ,chill,fever at this time.     LOC: The patient is awake, alert and aware of environment with an appropriate affect, the patient is oriented x 3 and speaking appropriately.   APPEARANCE: Patient appears comfortable and in no acute distress, patient is clean and well groomed.  SKIN: The skin is warm and dry, color consistent with ethnicity, patient has normal skin turgor and moist mucus membranes, skin intact, no breakdown or bruising noted.   MUSCULOSKELETAL: Patient moving all extremities spontaneously, no swelling noted,reported right knee pain.  RESPIRATORY: Airway is open and patent, respirations are spontaneous, patient has a normal effort and rate, no accessory muscle.  CARDIAC: Pt placed on cardiac monitor. Patient has a normal rate and regular rhythm, no edema noted, capillary refill < 3 seconds.   : Pt denies any pain or frequency with urination.  NEURO: Pt opens eyes spontaneously, behavior appropriate to situation, follows commands, facial expression symmetrical, bilateral hand grasp equal and even, purposeful motor response noted, normal sensation in all extremities when touched with a finger.

## 2024-11-25 NOTE — ED PROVIDER NOTES
Encounter Date: 11/24/2024       History     Chief Complaint   Patient presents with    Knee Pain     Right knee pain x 4 days     54-year-old male, history chronic pain, gout, hypertension, followed by pain management Clinic, presenting with right knee pain.  Patient reports the pain has been present for days though on review of the EMR looks like it has been present for weeks to months.  He has had no acute trauma, no recent injuries.  Pain is worse with movement.  He has had no fevers, chills, systemic symptoms.  He has had no swelling to the leg.  He has an appointment this week with orthopedics but was hoping he became her he would get an injection in the knee.    The history is provided by the patient.     Review of patient's allergies indicates:   Allergen Reactions    Atorvastatin Nausea And Vomiting    Toradol [ketorolac] Hives and Nausea And Vomiting     Past Medical History:   Diagnosis Date    Anxiety     Benzodiazepine dependence    Anxiety disorder, unspecified     Arthritis     Asthma     Avascular necrosis     Carpal tunnel syndrome     Chronic pain     Depression     Diverticulitis     Gout     Mixed hyperlipidemia     Other injury of other sites of trunk 12/28/2011    Pneumonia     Primary hypertension 8/23/2024    Spondylolisthesis     lumbar; myofascial pain    Staph infection     Ulnar neuropathy     left and rt arm     Past Surgical History:   Procedure Laterality Date    COLONOSCOPY N/A 7/6/2020    Procedure: COLONOSCOPY;  Surgeon: Broderick Moise MD;  Location: [facility];  Service: Endoscopy;  Laterality: N/A;    HIP SURGERY  3/06; 6/06    hip arthroplasty    HIP SURGERY      bilateral hip replacement (titanium)    JOINT REPLACEMENT      OPEN REDUCTION AND INTERNAL FIXATION (ORIF) OF FRACTURE OF CLAVICLE Left 10/5/2023    Procedure: ORIF, FRACTURE, CLAVICLE;  Surgeon: Ozzy Howard MD;  Location: [address];  Service: Orthopedics;  Laterality: Left;    SHOULDER SURGERY  2012     right shoulder    SHOULDER SURGERY       Family History   Problem Relation Name Age of Onset    Cancer Mother      Stroke Father      Cancer Sister      Alzheimer's disease Paternal Grandmother       Social History     Tobacco Use    Smoking status: Former     Current packs/day: 0.00     Average packs/day: 1 pack/day for 10.0 years (10.0 ttl pk-yrs)     Types: Cigarettes     Start date: 10/13/2002     Quit date: 10/13/2012     Years since quittin.1     Passive exposure: Past    Smokeless tobacco: Never   Substance Use Topics    Alcohol use: Yes     Alcohol/week: 12.0 standard drinks of alcohol     Types: 12 Cans of beer per week     Comment: 2x/week    Drug use: No     Review of Systems    Physical Exam     Initial Vitals [24]   BP Pulse Resp Temp SpO2   (!) 163/87 83 18 100 °F (37.8 °C) --      MAP       --         Physical Exam    Nursing note and vitals reviewed.  Constitutional: He appears well-developed and well-nourished. He is not diaphoretic. No distress.   Eyes: Conjunctivae are normal.   Neck: Neck supple.   Cardiovascular:  Normal rate.           Pulmonary/Chest: No respiratory distress.   Musculoskeletal:      Cervical back: Neck supple.      Comments: R knee:  No erythema, warmth.  No significant joint effusion identified.  Patient with no pain at all with passive ROM.     Skin: No rash noted.         ED Course   Procedures  Labs Reviewed - No data to display       Imaging Results    None          Medications - No data to display  Medical Decision Making  Acute on chronic atraumatic R knee pain    No signs a septic joint, no systemic symptoms.  Low suspicion for inflammatory arthritis at this point in time as well.    Recommended continued over-the-counter pain medication.  Patient also gets a large supply of opioid pain medication from his pain management doctor.  ED return precautions to be provided.                                      Clinical Impression:  Final  diagnoses:  [M25.561] Right knee pain, unspecified chronicity (Primary)          ED Disposition Condition    Discharge Stable          ED Prescriptions    None       Follow-up Information       Follow up With Specialties Details Why Contact Info    [facility] - Emergency Dept Emergency Medicine  If symptoms worsen [id number]  [facility] [id number]  [phone]             Nidia Rosenbaum MD  11/24/24 2192

## 2024-11-27 ENCOUNTER — OFFICE VISIT (OUTPATIENT)
Dept: ORTHOPEDICS | Facility: CLINIC | Age: 54
End: 2024-11-27
Payer: MEDICARE

## 2024-11-27 ENCOUNTER — HOSPITAL ENCOUNTER (OUTPATIENT)
Dept: RADIOLOGY | Facility: HOSPITAL | Age: 54
Discharge: HOME | End: 2024-11-27
Attending: ORTHOPAEDIC SURGERY
Payer: MEDICARE

## 2024-11-27 VITALS — WEIGHT: 244.25 LBS | HEIGHT: 70 IN | BODY MASS INDEX: 34.97 KG/M2

## 2024-11-27 DIAGNOSIS — M25.562 PAIN IN BOTH KNEES, UNSPECIFIED CHRONICITY: ICD-10-CM

## 2024-11-27 DIAGNOSIS — M25.561 PAIN IN BOTH KNEES, UNSPECIFIED CHRONICITY: ICD-10-CM

## 2024-11-27 DIAGNOSIS — M17.11 PRIMARY OSTEOARTHRITIS OF RIGHT KNEE: Primary | ICD-10-CM

## 2024-11-27 PROCEDURE — 20610 DRAIN/INJ JOINT/BURSA W/O US: CPT | Mod: RT,S$GLB,, | Performed by: ORTHOPAEDIC SURGERY

## 2024-11-27 PROCEDURE — 1159F MED LIST DOCD IN RCRD: CPT | Mod: CPTII,S$GLB,, | Performed by: ORTHOPAEDIC SURGERY

## 2024-11-27 PROCEDURE — 73564 X-RAY EXAM KNEE 4 OR MORE: CPT | Mod: TC,50

## 2024-11-27 PROCEDURE — 3008F BODY MASS INDEX DOCD: CPT | Mod: CPTII,S$GLB,, | Performed by: ORTHOPAEDIC SURGERY

## 2024-11-27 PROCEDURE — 99214 OFFICE O/P EST MOD 30 MIN: CPT | Mod: 25,S$GLB,, | Performed by: ORTHOPAEDIC SURGERY

## 2024-11-27 PROCEDURE — 1160F RVW MEDS BY RX/DR IN RCRD: CPT | Mod: CPTII,S$GLB,, | Performed by: ORTHOPAEDIC SURGERY

## 2024-11-27 PROCEDURE — 73564 X-RAY EXAM KNEE 4 OR MORE: CPT | Mod: 26,50,, | Performed by: RADIOLOGY

## 2024-11-27 PROCEDURE — 99999 PR PBB SHADOW E&M-EST. PATIENT-LVL III: CPT | Mod: PBBFAC,,, | Performed by: ORTHOPAEDIC SURGERY

## 2024-11-27 RX ADMIN — TRIAMCINOLONE ACETONIDE 40 MG: 40 INJECTION, SUSPENSION INTRA-ARTICULAR; INTRAMUSCULAR at 10:11

## 2024-12-02 ENCOUNTER — TELEPHONE (OUTPATIENT)
Dept: PAIN MEDICINE | Facility: CLINIC | Age: 54
End: 2024-12-02
Payer: MEDICARE

## 2024-12-02 NOTE — PROGRESS NOTES
"Subjective:       Patient ID: Keon Schneider Jr. is a 54 y.o. male.    Chief Complaint:   Pain of the Right Knee and Pain of the Left Knee    History of Present Illness    CHIEF COMPLAINT:  Patient presents today for evaluation of bilateral knee pain, with right knee being more severely affected.    HPI:  Patient presents with pain in both knees, with one knee being more severely affected. He believes he has "bone-on-bone" contact in the affected knee. The pain in the worse knee has intensified over the past week, describing it as feeling like it "wants to explode." He reports significant difficulty with mobility, stating it takes him 20 minutes to descend the stairs in his home.  Pain is 8/10 with a popping sound occasionally.    He demonstrates limited range of motion in the affected knee, expressing inability to bend it properly. He describes having to manually move the leg. Patient also mentions pain radiating down to his back and leg.    He reports difficulty getting into his vehicle, requiring manual assistance to lift and position his leg. Patient describes his gait as unstable, expressing concern about potential falls. He expresses a desire for intervention to improve mobility.    Patient denies pain in the groin/hip area.  No distal numbness or tingling         Past Medical History:   Diagnosis Date    Anxiety     Benzodiazepine dependence    Anxiety disorder, unspecified     Arthritis     Asthma     Avascular necrosis     Carpal tunnel syndrome     Chronic pain     Depression     Diverticulitis     Gout     Mixed hyperlipidemia     Other injury of other sites of trunk 12/28/2011    Pneumonia     Primary hypertension 8/23/2024    Spondylolisthesis     lumbar; myofascial pain    Staph infection     Ulnar neuropathy     left and rt arm     Past Surgical History:   Procedure Laterality Date    COLONOSCOPY N/A 7/6/2020    Procedure: COLONOSCOPY;  Surgeon: Broderick Moise MD;  Location: [facility];  Service: " Endoscopy;  Laterality: N/A;    HIP SURGERY  3/06;     hip arthroplasty    HIP SURGERY      bilateral hip replacement (titanium)    JOINT REPLACEMENT      OPEN REDUCTION AND INTERNAL FIXATION (ORIF) OF FRACTURE OF CLAVICLE Left 10/5/2023    Procedure: ORIF, FRACTURE, CLAVICLE;  Surgeon: Ozzy Howard MD;  Location: [facility] OR [address] [place] [address];  Service: Orthopedics;  Laterality: Left;    SHOULDER SURGERY      right shoulder    SHOULDER SURGERY       Family History   Problem Relation Name Age of Onset    Cancer Mother      Stroke Father      Cancer Sister      Alzheimer's disease Paternal Grandmother       Social History     Socioeconomic History    Marital status: Single   Tobacco Use    Smoking status: Former     Current packs/day: 0.00     Average packs/day: 1 pack/day for 10.0 years (10.0 ttl pk-yrs)     Types: Cigarettes     Start date: 10/13/2002     Quit date: 10/13/2012     Years since quittin.1     Passive exposure: Past    Smokeless tobacco: Never   Substance and Sexual Activity    Alcohol use: Yes     Alcohol/week: 12.0 standard drinks of alcohol     Types: 12 Cans of beer per week     Comment: 2x/week    Drug use: No    Sexual activity: Yes     Partners: Female     Social Drivers of Health     Financial Resource Strain: Low Risk  (2024)    Overall Financial Resource Strain (CARDIA)     Difficulty of Paying Living Expenses: Not hard at all   Food Insecurity: No Food Insecurity (2024)    Hunger Vital Sign     Worried About Running Out of Food in the Last Year: Never true     Ran Out of Food in the Last Year: Never true   Recent Concern: Food Insecurity - Food Insecurity Present (2024)    Hunger Vital Sign     Worried About Running Out of Food in the Last Year: Sometimes true     Ran Out of Food in the Last Year: Sometimes true   Transportation Needs: No Transportation Needs (2024)    TRANSPORTATION NEEDS     Transportation : No   Physical Activity: Sufficiently Active  (9/30/2024)    Exercise Vital Sign     Days of Exercise per Week: 7 days     Minutes of Exercise per Session: 30 min   Stress: No Stress Concern Present (9/30/2024)    [employer] - Occupational Stress Questionnaire     Feeling of Stress : Only a little   Recent Concern: Stress - Stress Concern Present (9/1/2024)    [employer] - Occupational Stress Questionnaire     Feeling of Stress : Very much   Housing Stability: Low Risk  (9/30/2024)    Housing Stability Vital Sign     Unable to Pay for Housing in the Last Year: No     Homeless in the Last Year: No       Current Outpatient Medications   Medication Sig Dispense Refill    allopurinoL (ZYLOPRIM) 100 MG tablet Take 100 mg by mouth.      ALPRAZolam (XANAX) 2 MG Tab Take 2 mg by mouth 2 (two) times daily.      aspirin (ECOTRIN) 81 MG EC tablet Take 1 tablet (81 mg total) by mouth once daily.      baclofen (LIORESAL) 10 MG tablet Take 1-2 tablets (10-20 mg total) by mouth 3 (three) times daily as needed (muscle pain). 180 tablet 5    nitroGLYCERIN (NITROSTAT) 0.4 MG SL tablet Place 1 tablet (0.4 mg total) under the tongue every 5 (five) minutes as needed for Chest pain. 30 tablet 1    [START ON 12/23/2024] oxyCODONE-acetaminophen (PERCOCET) 7.5-325 mg per tablet Take 1 tablet by mouth 2 (two) times daily as needed for Pain. 60 tablet 0    rosuvastatin (CRESTOR) 20 MG tablet Take 20 mg by mouth every evening.      LIDOcaine (LIDODERM) 5 % Place 1 patch onto the skin once daily. Remove & Discard patch within 12 hours or as directed by MD Keith patch 0    oxyCODONE-acetaminophen (PERCOCET) 7.5-325 mg per tablet Take 1 tablet by mouth 2 (two) times daily as needed for Pain. 60 tablet 0     No current facility-administered medications for this visit.     Review of patient's allergies indicates:   Allergen Reactions    Atorvastatin Nausea And Vomiting    Toradol [ketorolac] Hives and Nausea And Vomiting  "        Objective:      Vitals:    11/27/24 1058   Weight: 110.8 kg (244 lb 4.3 oz)   Height: 5' 10.32" (1.786 m)     Physical Exam  There is no significant coronal plane deformity, and a somewhat antalgic gait.  Active range of motion is 5-115 degrees.  Anterior crepitus with active extension.  Patellar mobility is limited.  Boggy synovitis without effusion.  Medial joint line tenderness predominates.  No instability to varus/valgus/Lachman's stressing.  No pain in the groin with flexion and internal rotation of the hip which is not limited.  Skin intact.  Distal neurovascular intact.  Flip test negative.      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  We did give him a cortisone injection today for his synovitis.  Will follow-up with his pain management provider for his multiple pain generators.  He will let me know if he needs further knee evaluation.    The patient's pathophysiology was explained in detail with reference to x-rays, models, other visual aids as appropriate.  Treatment options were discussed in detail.  Questions were invited and answered to the patient's satisfaction.    This note was generated with the assistance of ambient listening technology. Verbal consent was obtained by the patient and accompanying visitor(s) for the recording of patient appointment to facilitate this note. I attest to having reviewed and edited the generated note for accuracy, though some syntax or spelling errors may persist. Please contact the author of this note for any clarification.     Eyad Gannon MD  Orthopaedic Surgery    "
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
## 2024-12-03 RX ORDER — TRIAMCINOLONE ACETONIDE 40 MG/ML
40 INJECTION, SUSPENSION INTRA-ARTICULAR; INTRAMUSCULAR
Status: DISCONTINUED | OUTPATIENT
Start: 2024-11-27 | End: 2024-12-03 | Stop reason: HOSPADM

## 2024-12-03 NOTE — PROCEDURES
Large Joint Aspiration/Injection: R knee    Date/Time: 11/27/2024 10:45 AM    Performed by: Eyad Gannon MD  Authorized by: Eyad Gannon MD    Consent Done?:  Yes (Verbal)  Indications:  Pain and arthritis  Site marked: the procedure site was marked    Timeout: prior to procedure the correct patient, procedure, and site was verified    Prep: patient was prepped and draped in usual sterile fashion      Local anesthesia used?: Yes    Local anesthetic:  Lidocaine 1% without epinephrine  Anesthetic total (ml):  5      Details:  Needle Size:  25 G  Ultrasonic Guidance for needle placement?: No    Approach:  Anterolateral  Location:  Knee  Site:  R knee  Medications:  40 mg triamcinolone acetonide 40 mg/mL  Patient tolerance:  Patient tolerated the procedure well with no immediate complications

## 2024-12-05 NOTE — PLAN OF CARE
Problem: Adult Inpatient Plan of Care  Goal: Plan of Care Review  Outcome: Progressing  Goal: Patient-Specific Goal (Individualized)  Outcome: Progressing  Goal: Absence of Hospital-Acquired Illness or Injury  Outcome: Progressing  Goal: Optimal Comfort and Wellbeing  Outcome: Progressing  Goal: Readiness for Transition of Care  Outcome: Progressing     Problem: Pain Acute  Goal: Optimal Pain Control and Function  Outcome: Progressing     Problem: Anxiety  Goal: Anxiety Reduction or Resolution  Outcome: Progressing      Additional Notes: Recommend OTC Silicone scar gel. Discussed ILK in the future, pt will RTC if she decides to go ahead with treatment. Render Risk Assessment In Note?: no Detail Level: Simple

## 2024-12-05 NOTE — PRE-PROCEDURE INSTRUCTIONS
Patient reviewed on 12/5/2024.  Okay to proceed at [facility]. The following pre-procedure instructions and arrival time have been reviewed with patient via phone and sent to patient portal for review.  Patient verbalized an understanding.      Dear Keon,     Please read over the following pre-procedure instructions in it's entirety as there is helpful information here to get you well prepared for your upcoming procedure.     You are scheduled for a procedure with Dr. Olmstead on 12/9/2024.     [facility] at the corner of [facility]. It is in the [place] [facility] [place] next to [employer]. The address is: [address] [place] [address]. Take the elevator to the 2nd floor.       Registration check in time: 2:20 pm  Procedure scheduled for time: 3:20 pm     If you are receiving sedation, you CANNOT drive yourself and must have a responsible friend or family member (no rideshare) to drive you home.        You should take any medications that you routinely take for blood pressure, heart medications, thyroid, cholesterol, etc.      The fasting restrictions are dependent on whether or not you are receiving sedation. Sedation is not available for all procedures.      Your fasting instructions/Sedation type are as follow:  No sedation.  You do not need to fast before this procedure.  You can eat and drink like normal.  You can drive yourself (with stipulations).  There are some rare cases where you may need to call an uber if you are unable to drive after the procedure due to weakness/dizziness.         If you are on blood thinners, you need to follow the anticoagulation instructions that had been discussed previously. You should only stop the blood thinners if it was approved by your primary care physician or your cardiologist. In the event that you are not able to stop your blood thinners, a blood thinner was not listed on your medication list, or we were not able to get clearance from  your cardiologist, then the procedure may have to be postponed/canceled.      IF you were told to stop your blood thinners, this is how long you should generally hold some of the more common ones. Remember that stopping blood thinners is only necessary for certain procedures. If you are unsure of your instructions, please call us.   Aspirin - 5 days  Plavix/Clopidogrel - 7 days  Warfarin / Coumadin - 5 days  Eliquis - 3 days  Pradaxa/Dabigatran - 4 days  Xarelto/Rivaroxaban - 3 days     *If you take Ozempic, Trulicity, Mounjaro, Rybelsus, Zepbound Or other weight loss, non-insulin injections you must hold this for one week (7 days) prior if you are having IV sedation.      If you are a diabetic, do not take your medication if you will be fasting, but bring it with you. Please plan on being here for roughly 2-3 hours.      Please call us if any of the following have occurred:  *running fever or having any flu-like symptoms  *have been taking antibiotics in the past 2 weeks  *have had any or plan on having any immunizations in the 2 weeks before or after your injection(Flu/Shingles/Covid Booster/Pneumonia, etc.)  *had any out patient procedures other than with us in the past 2 weeks (Colonoscopy, Endoscopy, Biopsy, OBGYN, Dental, etc.) Or received a steroid injection from another provider within the last 2 weeks.  *have any wounds or rashes  *awaiting ANY test results that could result in you having to take antibiotics (Urine Culture, Flu/Covid/Strept Swab, etc)     If you have been COVID positive, you will need to hold off on your procedure until you are symptom free for 10 days. If you did not have any symptoms, you can have your procedure 10 days from your positive test result.      On the morning of your procedure:  *HOLD ALL VITAMINS, MINERALS, HERBS (INCLUDING HERBAL TEAS) AND SUPPLEMENTS  *SHOWER WITH ANTIBACTERIAL SOAP (example: DIAL over the counter) NIGHT BEFORE AND MORNING OF PROCEDURE  *DO NOT APPLY ANY  LOTIONS, OILS, POWDERS, PERFUME/COLOGNE, OINTMENTS, GELS, CREAMS, MAKEUP OR DEODORANT TO YOUR SKIN MORNING OF PROCEDURE  *LEAVE JEWELRY AND ANY VALUABLES AT HOME  *WEAR LOOSE COMFORTABLE CLOTHING      If you have any questions please call [phone].    Please reply to this portal message as receipt of delivery.     Thank you,  [facility]  [place], LPN [facility]  Pre-Admit

## 2024-12-06 ENCOUNTER — TELEPHONE (OUTPATIENT)
Dept: ENDOSCOPY | Facility: HOSPITAL | Age: 54
End: 2024-12-06
Payer: MEDICARE

## 2024-12-06 NOTE — TELEPHONE ENCOUNTER
Spoke to patient for pre-call to confirm scheduled Colonoscopy and patient verbalized understanding of the following:       Date of Procedure (s)  verified 12/12/24  Arrival Time 09:15 AM verified.  Location of Procedure(s) [facility] Floor verified.  NPO status reinforced. Ok to continue clear liquids up until 2 hours prior to the Endoscopy procedure.     Pt confirmed receipt of prep instructions and Rx prep (if applicable).  Instructions provided to patient via [facility]  Pt confirmed ride home after procedure if procedure requires anesthesia.   Pre-call screening questionnaire reviewed and completed with patient.   Appointment details are tentative, including check-in time.  If the patient begins taking any blood thinning medications, injectable weight loss/diabetes medications (other than insulin), or Adipex (phentermine) patient was instructed to contact the endoscopy scheduling department as soon as possible.  Patient was advised to call the endoscopy scheduling department if any questions or concerns arise.        Endoscopy Scheduling Department    Instructions resent via portal per patient's request.

## 2024-12-09 ENCOUNTER — HOSPITAL ENCOUNTER (OUTPATIENT)
Facility: HOSPITAL | Age: 54
Discharge: HOME | End: 2024-12-09
Attending: STUDENT IN AN ORGANIZED HEALTH CARE EDUCATION/TRAINING PROGRAM | Admitting: STUDENT IN AN ORGANIZED HEALTH CARE EDUCATION/TRAINING PROGRAM
Payer: MEDICARE

## 2024-12-09 VITALS
DIASTOLIC BLOOD PRESSURE: 88 MMHG | TEMPERATURE: 98 F | OXYGEN SATURATION: 99 % | HEART RATE: 71 BPM | RESPIRATION RATE: 14 BRPM | SYSTOLIC BLOOD PRESSURE: 160 MMHG | WEIGHT: 200 LBS | BODY MASS INDEX: 28 KG/M2 | HEIGHT: 71 IN

## 2024-12-09 DIAGNOSIS — M47.816 LUMBAR SPONDYLOSIS: Primary | ICD-10-CM

## 2024-12-09 PROCEDURE — 64494 INJ PARAVERT F JNT L/S 2 LEV: CPT | Mod: 50 | Performed by: STUDENT IN AN ORGANIZED HEALTH CARE EDUCATION/TRAINING PROGRAM

## 2024-12-09 PROCEDURE — 64493 INJ PARAVERT F JNT L/S 1 LEV: CPT | Mod: 50,,, | Performed by: STUDENT IN AN ORGANIZED HEALTH CARE EDUCATION/TRAINING PROGRAM

## 2024-12-09 PROCEDURE — 64494 INJ PARAVERT F JNT L/S 2 LEV: CPT | Mod: 50,,, | Performed by: STUDENT IN AN ORGANIZED HEALTH CARE EDUCATION/TRAINING PROGRAM

## 2024-12-09 PROCEDURE — 64493 INJ PARAVERT F JNT L/S 1 LEV: CPT | Mod: 50 | Performed by: STUDENT IN AN ORGANIZED HEALTH CARE EDUCATION/TRAINING PROGRAM

## 2024-12-09 PROCEDURE — 63600175 PHARM REV CODE 636 W HCPCS: Performed by: STUDENT IN AN ORGANIZED HEALTH CARE EDUCATION/TRAINING PROGRAM

## 2024-12-09 RX ORDER — BUPIVACAINE HYDROCHLORIDE 2.5 MG/ML
INJECTION, SOLUTION EPIDURAL; INFILTRATION; INTRACAUDAL
Status: DISCONTINUED | OUTPATIENT
Start: 2024-12-09 | End: 2024-12-09 | Stop reason: HOSPADM

## 2024-12-09 RX ORDER — LIDOCAINE HYDROCHLORIDE 20 MG/ML
INJECTION, SOLUTION EPIDURAL; INFILTRATION; INTRACAUDAL; PERINEURAL
Status: DISCONTINUED | OUTPATIENT
Start: 2024-12-09 | End: 2024-12-09 | Stop reason: HOSPADM

## 2024-12-09 NOTE — DISCHARGE INSTRUCTIONS
[facility] [place]  Messaging service # [phone]  On-call pager for emergency# [phone]    MEDIAL BRANCH BLOCK POST-PROCEDURE INSTRUCTIONS:    What you need to do:  Keep a record of your response to the injection you had today.  It is very important that you accurately record how you responded to today's injection.  Please try to separate any soreness from the needle from your usual pain.  We want to know how this affects your usual pain.  Also REMEMBER that today's injection is a DIAGNOSTIC block, the pain relief will only last for a few hours to a few days.  Insurance requires 2 of these blocks before progressing to the ablation.  The sole purpose of this injection is to give us information, and not to treat your pain for an extended period.    Think about the amount of pain that you would have doing the most painful activities (I.e. bending, twisting, walking, standing straight, cleaning, etc...).  Now do those same activities as soon as you get home from the hospital.  Think about how much better you feel doing those activities now that the injection is done.  Does it feel 50%, 80%, 100% better than it would have otherwise?  This is the number you need to send me.    Send me a message through [facility] or leave a message at the phone number above telling me what % of improvement you got from the injection.    If you have difficulty putting a percentage on your pain relief fill this out:  (Rate each question below from 0-10 with 0 being no pain and 10 being the worst imaginable pain)    How bad would your pain get during activities like walking/going up stairs BEFORE the injection? _______    For the first 8 hours AFTER the injection, when you did those same painful activities, what was you best pain score? ____________    Send me those two numbers if you aren't able to give a  percentage.  ---------------------------------------------------------------------------------------------------------------------------------------------------------------------------------------------  What to watch out for:    If you experience any of the following symptoms after your procedure, please notify the messaging service immediately (see above for contact information):   fever (increased oral temperature)   bleeding or swelling at the injection site,    drainage, rash or redness at the injection site    possible signs of infection    increased pain at the injection site   worsening of your usual pain   severe headache   new or worsening numbness    new arm and/or leg weakness, or    changes in bowel and/or bladder function: urinating or defecating on yourself and not knowing that you did it.    PLEASE FOLLOW ALL INSTRUCTIONS CAREFULLY     Do not take a bath, swim or use Jacuzzi for 24 hours after procedure. (A shower is fine).   Remove any Band-Aids when you get home.    Use cold/ice, as needed for comfort.  We recommend the use of cold therapy alternating on for 20 minutes, off for 20 minutes.    Do not apply direct heat (heating pad or heat packs) to the injection site for 24 hours.     Resume your usual medications, unless instructed otherwise by your Pain Physician.     If you are on warfarin (Coumadin) or other blood thinner, resume this medication as instructed by your prescribing Physician.    IF AT ANY POINT YOU ARE VERY CONCERNED ABOUT YOUR SYMPTOMS, PLEASE GO TO THE EMERGENCY ROOM.    If you develop worsening pain, weakness, numbness, lose bowel or bladder control (i.e., having an accident where you did not even know you had to go to the bathroom and suddenly noticed you soiled yourself), saddle anesthesia (a loss of sensation restricted to the area of the buttocks, anus and between the legs -- i.e., those parts of your body that would touch a saddle if you were sitting on one) you need to go  immediately to the emergency department for evaluation and treatment.    ----------------------------------------------------------------------------------------------------------------------------------------------------------------  If you received Sedation please read the following instructions:  POST SEDATION INSTRUCTIONS    Today you received intravenous medication (also known as sedation) that was used to help you relax and/or decrease discomfort during your procedure. This medication will be acting in your body for the next 24 hours, so you might feel a little tired or sleepy. This feeling will slowly wear off.   Common side effects associated with these medications include: drowsiness, dizziness, sleepiness, confusion, feeling excited, difficulty remembering things, lack of steadiness with walking or balance, loss of fine muscle control, slowed reflexes, difficulty focusing, and blurred vision.  Some over-the-counter and prescription medications (e.g., muscle relaxants, opioids, mood-altering medications, sedatives/hypnotics, antihistamines) can interact with the intravenous medication you received and cause an increased risk of the side effects listed above in addition to other potentially life threatening side effects. Use extreme caution if you are taking such medications, and consult with your Pain Physician or prescribing physician if you have any questions.  For the next 12-24 hours:    DO NOT--Drive a car, operate machinery or power tools   DO NOT--Drink any alcoholic beverages (not even beer), they may dangerously increase the risk of side effects.    DO NOT--Make any important legal or business decisions or sign important documents.  We advise you to have someone to assist you at home. Move slowly and carefully. Do not make sudden changes in position. Be aware of dizziness or light-headedness and move accordingly.   If you seek medical treatment within 24 hours, let the nurse or doctor caring for  you know that you have received the above medications. If you have any questions or concerns related to your sedation or treatment today please contact us.

## 2024-12-09 NOTE — PLAN OF CARE
Pt in preop bay 24, VSS,  Pt denies any open wounds on body or the use of any immunizations or antibiotics in the past 2 weeks.

## 2024-12-09 NOTE — DISCHARGE SUMMARY
[facility]  Discharge Note  Short Stay    Procedure(s) (LRB):  b/l L3,4,5 MBB#1 (Bilateral)      OUTCOME: Patient tolerated treatment/procedure well without complication and is now ready for discharge.    DISPOSITION: Home or Self Care    FINAL DIAGNOSIS:  <principal problem not specified>    FOLLOWUP: In clinic    DISCHARGE INSTRUCTIONS:  No discharge procedures on file.     TIME SPENT ON DISCHARGE: 10 minutes

## 2024-12-09 NOTE — OP NOTE
"PROCEDURE: bilateral Lumbar L3, L4, and L5 Medial Branch Nerve Block    Patient Name: Keon Schneider Jr.  MRN: [id number]    PROCEDURE DATE: 12/9/2024    BLOCK # 1    DIAGNOSIS: Lumbar spondylosis without Myelopathy  CPT CODE: 21083 (lumbar 1st level), 81838 (2nd level), 98870 (3rd and any additional levels)    POSTPROCEDURE DIAGNOSIS Same    PHYSICIAN: Servando Olmstead DO  NEEDLE TYPE: - 25G 3.5" Spinal Needle  MEDICATIONS INJECTED: 0.25% bupivicaine, 1ml at each level  LOCAL ANESTHETIC USED: Lidocaine 1%, 2ml at each level    Sedation Medications - None    Estimated Blood Loss - <2ml  Drains: None  Specimens Removed: None  Urine Output - Not Measured  Complications: None  Outcome: Good    PRE-PROCEDURE PAIN SCORE: 6/10    POST-PROCEDURE PAIN SCORE: 1/10    Informed Consent:  The patient's condition and proposed procedures, risks, and alternatives were discussed with the patient or responsible party.  The patient's / responsible party's questions were answered.   The patient / responsible party appeared to understand and chose to proceed.  Informed consent was obtained.    Procedure in Detail:  The patient was taken back to the OR fluoroscopy suite and placed in a prone position. The skin overlying the injection site was prepped and draped in an aseptic fashion. The target injection site (see above) was identified with fluoroscopy.     Procedural Pause:  A procedural pause verifying correct patient, medical record number, allergies, medications to be administered, current vital signs, and surgical site was performed immediately prior to beginning the procedure.    The skin and subcutaneous tissue overlying the target site of injection was anesthetized using 6 ml of 1% lidocaine MPF with a 25-gauge, 1½ -inch needle.   The above noted needle type was advanced to each target under fluoroscopic guidance parallel to the beam of the fluoroscope utilizing a bullseye approach.      Lumbar Medial Branch Blocks  The " needle was then advanced to the dorsal, superior, and medial aspect of the transverse process(es) adjacent the superior articular process at the levels and on the side(s) specified above.  An oblique view confirmed correct needle placement at the junction of the transverse process and base of the superior articular process.  A lateral image was obtained to confirm needle depth.  At each site the needle(s) rested on periosteum.    L5 Dorsal Ramus Nerve Block at Sacral Ala   The L5 dorsal ramus nerve block on the side(s) specified above was performed using a slightly oblique approach under fluoroscopic guidance, placing the needle within the groove between the sacral ala and the superior articular process of S1.  A 10-20° oblique view confirmed proper needle placement.  A lateral image was obtained to confirm needle depth.  The needle rested on periosteum.      After negative aspiration for heme or CSF, the above noted contrast was injected and persisted at each site under fluoroscopy, demonstrating absence of vascular uptake.   After negative aspiration for heme or CSF, the above noted solution was slowly injected at each site to avoid forcing the solution away from the target point(s).  The needle(s) were then removed.   The same procedural technique outlined above was repeated on the OPPOSITE side.    The heart rate, pulse oximetry, and blood pressure were continuously monitored throughout the procedure.  The procedure was well tolerated. He was carefully escorted to the recovery room in stable condition. Patient was monitored by RN for recovery period.  The patient will be contacted in the next few days to determine extent of relief.  Patient was given post procedure and discharge instructions to follow at home.  The patient was discharged in a stable condition.    Note Electronically Signed by:  Servando Mccartney  12/09/2024

## 2024-12-10 ENCOUNTER — TELEPHONE (OUTPATIENT)
Dept: PAIN MEDICINE | Facility: CLINIC | Age: 54
End: 2024-12-10
Payer: MEDICARE

## 2024-12-10 NOTE — TELEPHONE ENCOUNTER
----- Message from Servando Olmstead DO sent at 12/10/2024 11:42 AM CST -----  Regarding: RE: Scheduling Request  Contact: [phone]  Please cancel MBB#2 and RFA.  We can get him in for an earlier follow up. Thank you  ----- Message -----  From: Monika Hughes MA  Sent: 12/10/2024   9:36 AM CST  To: Servando Olmstead DO  Subject: FW: Scheduling Request                           Good morning Dr. Johns.    Mr. Schneider's upcoming follow up after MBB #2 and RFA is scheduled for 02/13/25. Would you like to move up his appointment or create an E-visit for him?    Please advise.     Thank you.  ----- Message -----  From: Leigh Morgan  Sent: 12/10/2024   8:27 AM CST  To: Andreas Al Staff  Subject: Scheduling Request                               Scheduling Request    Appt Type:  Ep    Date/Time Preference: Next available    Caller Name:Pt    Contact Preference:[phone]     Comment:Pt would like to discuss further plan of care due to shots not working    Please Call to Advise,Thank you.

## 2024-12-11 ENCOUNTER — OFFICE VISIT (OUTPATIENT)
Dept: NEUROLOGY | Facility: CLINIC | Age: 54
End: 2024-12-11
Payer: MEDICARE

## 2024-12-11 VITALS — HEART RATE: 70 BPM | SYSTOLIC BLOOD PRESSURE: 153 MMHG | DIASTOLIC BLOOD PRESSURE: 104 MMHG

## 2024-12-11 DIAGNOSIS — M54.2 CERVICALGIA OF OCCIPITO-ATLANTO-AXIAL REGION: ICD-10-CM

## 2024-12-11 DIAGNOSIS — S06.0X9S CONCUSSION WITH LOSS OF CONSCIOUSNESS, SEQUELA: Primary | ICD-10-CM

## 2024-12-11 DIAGNOSIS — S13.4XXD WHIPLASH INJURY TO NECK, SUBSEQUENT ENCOUNTER: ICD-10-CM

## 2024-12-11 PROCEDURE — 1160F RVW MEDS BY RX/DR IN RCRD: CPT | Mod: CPTII,S$GLB,, | Performed by: PSYCHIATRY & NEUROLOGY

## 2024-12-11 PROCEDURE — 3080F DIAST BP >= 90 MM HG: CPT | Mod: CPTII,S$GLB,, | Performed by: PSYCHIATRY & NEUROLOGY

## 2024-12-11 PROCEDURE — 1159F MED LIST DOCD IN RCRD: CPT | Mod: CPTII,S$GLB,, | Performed by: PSYCHIATRY & NEUROLOGY

## 2024-12-11 PROCEDURE — 3077F SYST BP >= 140 MM HG: CPT | Mod: CPTII,S$GLB,, | Performed by: PSYCHIATRY & NEUROLOGY

## 2024-12-11 PROCEDURE — 99214 OFFICE O/P EST MOD 30 MIN: CPT | Mod: S$GLB,,, | Performed by: PSYCHIATRY & NEUROLOGY

## 2024-12-11 PROCEDURE — 99999 PR PBB SHADOW E&M-EST. PATIENT-LVL III: CPT | Mod: PBBFAC,,, | Performed by: PSYCHIATRY & NEUROLOGY
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
## 2024-12-13 ENCOUNTER — TELEPHONE (OUTPATIENT)
Dept: ENDOSCOPY | Facility: HOSPITAL | Age: 54
End: 2024-12-13
Payer: MEDICARE

## 2024-12-13 NOTE — TELEPHONE ENCOUNTER
Received message from [facility] pt needs to reschedule colonoscopy.  Telephoned pt with no answer.  Unable to leave voicemail message as pt's voicemail box not set up.  Portal message sent.

## 2024-12-16 ENCOUNTER — OFFICE VISIT (OUTPATIENT)
Dept: PAIN MEDICINE | Facility: CLINIC | Age: 54
End: 2024-12-16
Payer: MEDICARE

## 2024-12-16 ENCOUNTER — LAB VISIT (OUTPATIENT)
Dept: LAB | Facility: HOSPITAL | Age: 54
End: 2024-12-16
Attending: STUDENT IN AN ORGANIZED HEALTH CARE EDUCATION/TRAINING PROGRAM
Payer: MEDICARE

## 2024-12-16 ENCOUNTER — TELEPHONE (OUTPATIENT)
Dept: PAIN MEDICINE | Facility: CLINIC | Age: 54
End: 2024-12-16

## 2024-12-16 VITALS
DIASTOLIC BLOOD PRESSURE: 89 MMHG | WEIGHT: 199.94 LBS | SYSTOLIC BLOOD PRESSURE: 135 MMHG | HEART RATE: 73 BPM | BODY MASS INDEX: 27.99 KG/M2 | HEIGHT: 71 IN

## 2024-12-16 DIAGNOSIS — M45.6 ANKYLOSING SPONDYLITIS OF LUMBAR REGION: ICD-10-CM

## 2024-12-16 DIAGNOSIS — T56.91XA METALLOSIS, ACCIDENTAL OR UNINTENTIONAL, INITIAL ENCOUNTER: ICD-10-CM

## 2024-12-16 DIAGNOSIS — M45.6 ANKYLOSING SPONDYLITIS OF LUMBAR REGION: Primary | ICD-10-CM

## 2024-12-16 DIAGNOSIS — M54.16 LUMBAR RADICULOPATHY: Primary | ICD-10-CM

## 2024-12-16 DIAGNOSIS — G89.4 CHRONIC PAIN SYNDROME: ICD-10-CM

## 2024-12-16 LAB
CCP AB SER IA-ACNC: 3 U/ML
CRP SERPL-MCNC: 2.2 MG/L (ref 0–8.2)
ERYTHROCYTE [SEDIMENTATION RATE] IN BLOOD BY PHOTOMETRIC METHOD: 11 MM/HR (ref 0–23)
RHEUMATOID FACT SERPL-ACNC: <13 IU/ML (ref 0–15)

## 2024-12-16 PROCEDURE — 86140 C-REACTIVE PROTEIN: CPT | Performed by: STUDENT IN AN ORGANIZED HEALTH CARE EDUCATION/TRAINING PROGRAM

## 2024-12-16 PROCEDURE — 82495 ASSAY OF CHROMIUM: CPT | Performed by: STUDENT IN AN ORGANIZED HEALTH CARE EDUCATION/TRAINING PROGRAM

## 2024-12-16 PROCEDURE — 36415 COLL VENOUS BLD VENIPUNCTURE: CPT | Performed by: STUDENT IN AN ORGANIZED HEALTH CARE EDUCATION/TRAINING PROGRAM

## 2024-12-16 PROCEDURE — 86200 CCP ANTIBODY: CPT | Performed by: STUDENT IN AN ORGANIZED HEALTH CARE EDUCATION/TRAINING PROGRAM

## 2024-12-16 PROCEDURE — 99215 OFFICE O/P EST HI 40 MIN: CPT | Mod: S$GLB,,, | Performed by: STUDENT IN AN ORGANIZED HEALTH CARE EDUCATION/TRAINING PROGRAM

## 2024-12-16 PROCEDURE — 85652 RBC SED RATE AUTOMATED: CPT | Performed by: STUDENT IN AN ORGANIZED HEALTH CARE EDUCATION/TRAINING PROGRAM

## 2024-12-16 PROCEDURE — 3008F BODY MASS INDEX DOCD: CPT | Mod: CPTII,S$GLB,, | Performed by: STUDENT IN AN ORGANIZED HEALTH CARE EDUCATION/TRAINING PROGRAM

## 2024-12-16 PROCEDURE — 99999 PR PBB SHADOW E&M-EST. PATIENT-LVL III: CPT | Mod: PBBFAC,,, | Performed by: STUDENT IN AN ORGANIZED HEALTH CARE EDUCATION/TRAINING PROGRAM

## 2024-12-16 PROCEDURE — 3079F DIAST BP 80-89 MM HG: CPT | Mod: CPTII,S$GLB,, | Performed by: STUDENT IN AN ORGANIZED HEALTH CARE EDUCATION/TRAINING PROGRAM

## 2024-12-16 PROCEDURE — 86431 RHEUMATOID FACTOR QUANT: CPT | Performed by: STUDENT IN AN ORGANIZED HEALTH CARE EDUCATION/TRAINING PROGRAM

## 2024-12-16 PROCEDURE — 86038 ANTINUCLEAR ANTIBODIES: CPT | Performed by: STUDENT IN AN ORGANIZED HEALTH CARE EDUCATION/TRAINING PROGRAM

## 2024-12-16 PROCEDURE — 83018 HEAVY METAL QUAN EACH NES: CPT | Performed by: STUDENT IN AN ORGANIZED HEALTH CARE EDUCATION/TRAINING PROGRAM

## 2024-12-16 PROCEDURE — 3075F SYST BP GE 130 - 139MM HG: CPT | Mod: CPTII,S$GLB,, | Performed by: STUDENT IN AN ORGANIZED HEALTH CARE EDUCATION/TRAINING PROGRAM

## 2024-12-16 RX ORDER — OXYCODONE AND ACETAMINOPHEN 7.5; 325 MG/1; MG/1
1 TABLET ORAL 2 TIMES DAILY PRN
Qty: 60 TABLET | Refills: 0 | Status: SHIPPED | OUTPATIENT
Start: 2025-01-22 | End: 2025-02-21

## 2024-12-16 NOTE — PROGRESS NOTES
Chronic Pain - f/u    Referring Physician: No ref. provider found    Date: 12/16/2024     Re: Keon Schneider Jr.  MR#: [id number]  YOB: 1970  Age: 54 y.o.    Chief Complaint:   Chief Complaint   Patient presents with    Back Pain     **This note is dictated using the M*Modal Fluency Direct word recognition program. There are word recognition mistakes that are occasionally missed on review.**    ASSESSMENT: 54 y.o. year old male with hip pain, consistent with     1. Ankylosing spondylitis of lumbar region  C-REACTIVE PROTEIN    Sedimentation rate    RHEUMATOID FACTOR    CYCLIC CITRUL PEPTIDE ANTIBODY, IGG    HLA B27 ANTIGEN    DEMETRIUS    Procedure Order to Pain Management    oxyCODONE-acetaminophen (PERCOCET) 7.5-325 mg per tablet      2. Metallosis, accidental or unintentional, initial encounter  [place], Serum    CHROMIUM LEVEL    Procedure Order to Pain Management      3. Chronic pain syndrome  oxyCODONE-acetaminophen (PERCOCET) 7.5-325 mg per tablet            PLAN:     Left clavicle fracture hx with new mass in the left neck and collapse of the clavicle with soft tissue atrophy  -very unusual  -XR to evaluate. Clavicle looks ok.  -needs to see ortho    Lumbar radiculopathy  1/13/25 - L4-5 ZEUS - no sed - no AC    Thoracic radiculopathy  1/21/25 - T12-L1 ZEUS - no sed - no AC    Mid/Low back pain - very confusing  -unclear etiology  -lots of recent ER visits  -seen spine surgery, hematology, neurology.  Noone seems to know what is going on  -hard to process this since he has a lot to say and there is a lot that has happened in the last 2 years since I saw him last.  Previously seeing Dr. Schmitt who he does not want to see anymore because he was told that he can only see him for injections  -will provide #60 Oxycodone 7.5mg to help keep him out of the ER.  This will not be long term.  -baclofen 10-20mg TID PRN for his back pain  -He has a bright STIR image at T5 which is likely more degenerative related but  did not respond to high dose steroids.  -discussed that I will not provide long term pain medications to him, but I will try to keep him out of the emergency room for now while this is being figure out  -could consider b/l Thoracic MBB/RFA but would need to try and figure out what levels to target  -Epidural also an option but with no response to high dose oral steroids it seems less likely to help  -Rheumatology labs ordered    Bilateral hip pain and metallosis  -this was manageable before the recent back issue    Lumbar spondylosis  -some arthritis on MRI.  Hip pain is his bigger issues.  Back pain is not radiating into the legs.  12/9/24 - b/l L3,4,5 MBB#1 - no sed - no AC - no improvement  12/23/24 - b/l L3,4,5 MBB#2 - no sed - no AC - CANCEL    Chronic use of opioids  -another talk about opioids and that I will  not prescribe long term  -will provide 1 more months while trying to see if he gets improvement from the injections  -discussed risks of benzos and opioids    - RTC mid February  - Counseled patient regarding the importance of activity modification.    The above plan and management options were discussed at length with patient. Patient is in agreement with the above and verbalized understanding. It will be communicated with the referring physician via electronic record, fax, or mail.  Lab/study reports reviewed were important and necessary because subsequent medical and treatment recommendations required review of the above lab/study reports. Images viewed/reviewed above were important and necessary because subsequent medical and treatment recommendations required review of the reviewed image(s).     Electronically signed by:  Servando Olmstead DO  12/16/2024    Patient was seen in clinic today.  The total amount of time spent on this patient was exactly 45 minutes.  Due to medical complexity, time spent with the patient, and multiple issues discussed/addressed I am billing for a level 5 visit.  This includes face to face time and non-face to face time preparing to see the patient by reviewing previous labs/imaging, obtaining and/or reviewing separately obtained history, documenting clinical information in the electronic or other health record, independently reviewing results and communicating results to the patient/family/caregiver/additional providers.  The available imaging was reviewed in person with the patient, pathology and treatment options were explained in detail with the patient.  The patient was given multiple opportunities to ask questions, and all questions were answered.  =========================================================================================================    SUBJECTIVE:    Interval History 12/16/2024:   Keon Schneider Jr. is a 54 y.o. male presents to the clinic for follow up.  Since last visit the pain has has significantly worsened. The patient had a b/l L3,4,5 MBB which was not helpful so the following injections were cancelled.  He states that he was doing okay with the percocet recently, but then the last week it has gotten a lot worse again.      The pain is located in the back area and does not radiate.  The pain is described as sharp, stabbing, and pressure     At BEST  10/10   At WORST  10/10 on the WORST day.    On average pain is rated as 10/10.   Today the pain is rated as 10/10  Symptoms interfere with daily activity.   Exacerbating factors: Sitting, Standing, Laying, and Walking.    Mitigating factors nothing.     Current pain medications: Xanax, Percocet 7.5mg BID, Baclofen TID, ASA81,   Failed Pain Medications: high dose steroids    Pain procedures:   Epidural, hip injection    Interval History 11/21/2024:   Keon Schneider Jr. is a 54 y.o. male presents to the clinic for follow up.  Since last visit the pain has has worsened slightly.  The patient has multiple issues going on.  He is seeing the hip doctor for his metalosis.  Right now the back and  the knees are bothering him the most.      The pain is located in the back area and radiates to the hip  .  The pain is described as aching, pulsating, shooting, stabbing, and throbbing    At BEST  7/10   At WORST  10/10 on the WORST day.    On average pain is rated as 10/10.   Today the pain is rated as 10/10  Symptoms interfere with daily activity.   Exacerbating factors: Walking, Night Time, and Morning.    Mitigating factors medications.     Current pain medications: warren back and body, flexeril, alprazolam 2mg BID, oxycodone 7.5mg BID  Failed Pain Medications: high dose steroids    Pain procedures:   Epidural, hip injection    Interval History 10/24/2024:   Keon Schneider Jr. is a 54 y.o. male presents to the clinic for follow up.  Since last visit the pain has has worsened.  The patient has not been seen by me since 2022.  He was seeing Dr. Schmitt but was not getting pain medications.  He states that about 4.5 months he fell down his stairs and his back started flaring up more.      He has had a lot going on during this time.      The left shoulder is collapsed with swelling and sinking around the clavicle.    The pain is located in the mid and low back area and radiates to the bilateral hip and lower extremities .  The pain is described as burning and stabbing    At BEST  8/10   At WORST  10/10 on the WORST day.    On average pain is rated as 10/10.   Today the pain is rated as 10/10  Symptoms interfere with daily activity and sleeping.   Exacerbating factors: Sitting, Standing, Laying, and Walking.    Mitigating factors medications.     Initial hx:  Keon Schneider Jr. is a 54 y.o. male presents to the clinic for the evaluation of b/l hips pain. The pain started 10+ years ago following surgery on hips(replacement)  and symptoms have been worsening.  He has bad pain in the hips and gets radiation down the right leg.  The pain starts in the front of the hip (R>L).  He gets some back pain in the low back  which does not really radiate into the legs. States that the pain has taken over his life again.  He had his hips replaced in 2006 for AVN and was doing well until this year (around April 2022) when it flared back up. He can't sleep. Cant work. Can't walk up and down stairs. Around June of 2024 he just suddenly started having this severe back pain.  He has low back pain and upper low back pain.    He fell 16ft off a scaffold 3 months ago and landed on the right leg.     The patient has to climb 16 stairs to get up to his house and that has been very difficult.      Pain Description:    The pain is located in the b/l hip area and radiates to the b/l legs and up the back .    At BEST  7/10   At WORST  10/10 on the WORST day.    On average pain is rated as 7/10.   Today the pain is rated as 8/10  The pain is continuous.  The pain is described as pulsating, sharp, stabbing, and tight band    Symptoms interfere with daily activity, sleeping, and work.   Exacerbating factors: Sitting and Laying.    Mitigating factors nothing.   He reports 3 hours of sleep per night.    Physical Therapy/Home Exercise: Yes, currently has a home exercise program.  He is pretty active.    Current Pain Medications:    - warren back and body    Failed Pain Medications:    - NSAIDs due to prior GI bleed.    Pain Treatment Therapies:    Pain procedures:   Epidural, hip injection  Physical Therapy: 2007  Chiropractor: none  Acupuncture: none  TENS unit: none  Spinal decompression: none  Joint replacement: b/l hip replacement    Patient denies urinary incontinence, bowel incontinence, and loss of sensations.  Patient denies any suicidal or homicidal ideations     report:  Reviewed and consistent with medication use as prescribed.    Imaging:   MRI lumbar 09/2022:  Degenerative findings:     T12-L1: No spinal canal stenosis or neural foraminal narrowing.     L1-L2: Facet arthropathy.  No spinal canal stenosis or neural foraminal narrowing.      L2-L3: Mild posterior disc bulge and facet arthropathy.  No spinal canal stenosis or neural foraminal narrowing.     L3-L4: Mild posterior disc bulge and facet arthropathy.  No spinal canal stenosis or neural foraminal narrowing.     L4-L5: Posterior disc bulge with protrusion into the central, paracentral and resulting in mild right neural foraminal narrowing.  No spinal canal stenosis.     L5-S1: Facet arthropathy.  No central canal stenosis.     Paraspinal muscles & soft tissues: Unremarkable.     Impression:     1. Overall stable appearance of degenerative findings in the lumbar spine including mild right neural foraminal narrowing at L4-L5.    Past Medical History:   Diagnosis Date    Anxiety     Benzodiazepine dependence    Anxiety disorder, unspecified     Arthritis     Asthma     Avascular necrosis     Carpal tunnel syndrome     Chronic pain     Depression     Diverticulitis     Gout     Mixed hyperlipidemia     Other injury of other sites of trunk 12/28/2011    Pneumonia     Primary hypertension 8/23/2024    Spondylolisthesis     lumbar; myofascial pain    Staph infection     Ulnar neuropathy     left and rt arm     Past Surgical History:   Procedure Laterality Date    COLONOSCOPY N/A 7/6/2020    Procedure: COLONOSCOPY;  Surgeon: Broderick Moise MD;  Location: [facility];  Service: Endoscopy;  Laterality: N/A;    HIP SURGERY  3/06; 6/06    hip arthroplasty    HIP SURGERY      bilateral hip replacement (titanium)    INJECTION OF ANESTHETIC AGENT AROUND MEDIAL BRANCH NERVES INNERVATING LUMBAR FACET JOINT Bilateral 12/9/2024    Procedure: b/l L3,4,5 MBB#1;  Surgeon: Servando Olmstead DO;  Location: [facility];  Service: Pain Management;  Laterality: Bilateral;  no ac    JOINT REPLACEMENT      OPEN REDUCTION AND INTERNAL FIXATION (ORIF) OF FRACTURE OF CLAVICLE Left 10/5/2023    Procedure: ORIF, FRACTURE, CLAVICLE;  Surgeon: Ozzy Howard MD;  Location: [facility] OR [address] [place] [address];  Service:  Orthopedics;  Laterality: Left;    SHOULDER SURGERY  2012    right shoulder    SHOULDER SURGERY       Social History     Socioeconomic History    Marital status: Single   Tobacco Use    Smoking status: Former     Current packs/day: 0.00     Average packs/day: 1 pack/day for 10.0 years (10.0 ttl pk-yrs)     Types: Cigarettes     Start date: 10/13/2002     Quit date: 10/13/2012     Years since quittin.1     Passive exposure: Past    Smokeless tobacco: Never   Substance and Sexual Activity    Alcohol use: Yes     Alcohol/week: 12.0 standard drinks of alcohol     Types: 12 Cans of beer per week     Comment: 2x/week    Drug use: No    Sexual activity: Yes     Partners: Female     Social Drivers of Health     Financial Resource Strain: Low Risk  (2024)    Overall Financial Resource Strain (CARDIA)     Difficulty of Paying Living Expenses: Not hard at all   Food Insecurity: No Food Insecurity (2024)    Hunger Vital Sign     Worried About Running Out of Food in the Last Year: Never true     Ran Out of Food in the Last Year: Never true   Recent Concern: Food Insecurity - Food Insecurity Present (2024)    Hunger Vital Sign     Worried About Running Out of Food in the Last Year: Sometimes true     Ran Out of Food in the Last Year: Sometimes true   Transportation Needs: No Transportation Needs (2024)    TRANSPORTATION NEEDS     Transportation : No   Physical Activity: Sufficiently Active (2024)    Exercise Vital Sign     Days of Exercise per Week: 7 days     Minutes of Exercise per Session: 30 min   Stress: No Stress Concern Present (2024)    [employer] - Occupational Stress Questionnaire     Feeling of Stress : Only a little   Recent Concern: Stress - Stress Concern Present (2024)    [employer] - Occupational Stress Questionnaire     Feeling of Stress : Very much   Housing Stability: Low Risk  (2024)    Housing Stability  Vital Sign     Unable to Pay for Housing in the Last Year: No     Homeless in the Last Year: No     Family History   Problem Relation Name Age of Onset    Cancer Mother      Stroke Father      Cancer Sister      Alzheimer's disease Paternal Grandmother         Review of patient's allergies indicates:   Allergen Reactions    Atorvastatin Nausea And Vomiting    Toradol [ketorolac] Hives and Nausea And Vomiting       Current Outpatient Medications   Medication Sig    allopurinoL (ZYLOPRIM) 100 MG tablet Take 100 mg by mouth.    aspirin (ECOTRIN) 81 MG EC tablet Take 1 tablet (81 mg total) by mouth once daily.    baclofen (LIORESAL) 10 MG tablet Take 1-2 tablets (10-20 mg total) by mouth 3 (three) times daily as needed (muscle pain).    nitroGLYCERIN (NITROSTAT) 0.4 MG SL tablet Place 1 tablet (0.4 mg total) under the tongue every 5 (five) minutes as needed for Chest pain.    [START ON 12/23/2024] oxyCODONE-acetaminophen (PERCOCET) 7.5-325 mg per tablet Take 1 tablet by mouth 2 (two) times daily as needed for Pain.    rosuvastatin (CRESTOR) 20 MG tablet Take 20 mg by mouth every evening.    ALPRAZolam (XANAX) 2 MG Tab Take 2 mg by mouth 2 (two) times daily. (Patient not taking: Reported on 12/16/2024)    LIDOcaine (LIDODERM) 5 % Place 1 patch onto the skin once daily. Remove & Discard patch within 12 hours or as directed by MD (Patient not taking: Reported on 12/16/2024)    [START ON 1/22/2025] oxyCODONE-acetaminophen (PERCOCET) 7.5-325 mg per tablet Take 1 tablet by mouth 2 (two) times daily as needed for Pain.     No current facility-administered medications for this visit.       REVIEW OF SYSTEMS:    GENERAL:  No weight loss, malaise or fevers.  HEENT:   No recent changes in vision or hearing  NECK:  Negative for lumps, no difficulty with swallowing.  RESPIRATORY:  Negative for cough, wheezing or shortness of breath, patient denies any recent URI.  CARDIOVASCULAR:  Negative for chest pain, leg swelling or  "palpitations.  GI:  Negative for abdominal discomfort, blood in stools or black stools or change in bowel habits.  MUSCULOSKELETAL:  See HPI.  SKIN:  Negative for lesions, rash, and itching.  PSYCH:  No mood disorder or recent psychosocial stressors.  Patients sleep is not disturbed secondary to pain.  HEMATOLOGY/LYMPHOLOGY:  Negative for prolonged bleeding, bruising easily or swollen nodes.  Patient is not currently taking any anti-coagulants  NEURO:   No history of headaches, syncope, paralysis, seizures or tremors.  All other reviewed and negative other than HPI.    OBJECTIVE:    /89 (BP Location: Left arm, Patient Position: Sitting)   Pulse 73   Ht 5' 11" (1.803 m)   Wt 90.7 kg (199 lb 15.3 oz)   BMI 27.89 kg/m²     PHYSICAL EXAMINATION:    GENERAL: Well appearing, in no acute distress, alert and oriented x3.  PSYCH:  Mood and affect appropriate.  SKIN: Skin color, texture, turgor normal, no rashes or lesions.  HEAD/FACE:  Normocephalic, atraumatic. Cranial nerves grossly intact.  CV: RRR with palpation of the radial artery.  PULM: CTAB. No evidence of respiratory difficulty, symmetric chest rise.  GI:  Soft and non-tender.    Left Shoulder  -TTP  -left clavicle seems to have collapsed?  -soft tissue mass in the base of the left neck    BACK:   - No obvious deformity or signs of trauma, Normal lumbar lordotic curve  - Positive spinous process tenderness  - Positive paravertebral tenderness  - Positive pain to palpation over the facet joints of the lumbar spine.   - Negative QL / Iliac crest / Glut tenderness  - (+) pain with flexion and extension of the back  (+) facet loading b/l  - no pain with facet loading  - lateral flexion causes anterior hip pain on the ipsilateral side    MUSKULOSKELETAL:    EXTREMITIES:   Hip Exam:  - Log Roll Positive  - FADIR Positive  - [place] Positive  - Hip Scour Positive  - GTB Tenderness Negative    MUSCULOSKELETAL:  No atrophy or tone abnormalities are noted in " the UE or LE.  No deformities, edema, or skin discoloration are noted on visible skin. Good capillary refill.    NEURO: Bilateral upper and lower extremity coordination and muscle stretch reflexes are physiologic and symmetric.      NEUROLOGICAL EXAM:  MENTAL STATUS: A x O x 3, good concentration, speech is fluent and goal directed  MEMORY: recent and remote are intact  CN: CN2-12 grossly intact  MOTOR: 5/5 in all muscle groups  DTRs: 2+ intact symmetric  Sensation:    -no Loss of sensation in a left lower and right lower L-1, L-2, L-3, L-4, and L-5 bilaterally distribution.  Gordon: absent on the bilateral side(s)  Clonus: absent on the bilateral side(s)    GAIT: antalgic.

## 2024-12-16 NOTE — PROGRESS NOTES
Chronic Pain - f/u    Referring Physician: No ref. provider found    Date: 12/16/2024     Re: Keon Schneider Jr.  MR#: [id number]  YOB: 1970  Age: 54 y.o.    Chief Complaint:   No chief complaint on file.    **This note is dictated using the M*Modal Fluency Direct word recognition program. There are word recognition mistakes that are occasionally missed on review.**    ASSESSMENT: 54 y.o. year old male with hip pain, consistent with     No diagnosis found.      PLAN:     Left clavicle fracture hx with new mass in the left neck and collapse of the clavicle with soft tissue atrophy  -very unusual  -XR to evaluate. Clavicle looks ok.  -needs to see ortho    Mid/Low back pain - very confusing  -unclear etiology  -lots of recent ER visits  -seen spine surgery, hematology, neurology.  Noone seems to know what is going on  -hard to process this since he has a lot to say and there is a lot that has happened in the last 2 years since I saw him last.  Previously seeing Dr. Schmitt who he does not want to see anymore because he was told that he can only see him for injections  -will provide #60 Oxycodone 7.5mg to help keep him out of the ER  -baclofen 10-20mg TID PRN for his back pain  -He has a bright STIR image at T5 which is likely more degenerative related but did not respond to high dose steroids.  -discussed that I will not provide long term pain medications to him, but I will try to keep him out of the emergency room for now while this is being figure out  -could consider b/l Thoracic MBB/RFA but would need to try and figure out what levels to target  -Epidural also an option but with no response to high dose oral steroids it seems less likely to help    Bilateral hip pain and metallosis  -this was manageable before the recent back issue    Lumbar spondylosis  -some arthritis on MRI.  Hip pain is his bigger issues.  Back pain is not radiating into the legs.  -patient has more facet pain in the low back  today.  Discussed RFA.  He would like to try this.  24 - b/l L3,4,5 MBB#1 - no sed - no AC  24 - b/l L3,4,5 MBB#2 - no sed - no AC  24 - b/l L3,4,5 RFA - RN sed - no AC    Chronic use of opioids  -another talk about opioids and that I will  not prescribe long term  -will provide 2 more months while trying to see if he gets improvement from the injections  -discussed risks of benzos and opioids    - RTC mid February  - Counseled patient regarding the importance of activity modification.    The above plan and management options were discussed at length with patient. Patient is in agreement with the above and verbalized understanding. It will be communicated with the referring physician via electronic record, fax, or mail.  Lab/study reports reviewed were important and necessary because subsequent medical and treatment recommendations required review of the above lab/study reports. Images viewed/reviewed above were important and necessary because subsequent medical and treatment recommendations required review of the reviewed image(s).     Electronically signed by:  Servando Olmstead DO  2024    =========================================================================================================    SUBJECTIVE:    Interval History 2024:     Keon Schneider  is a 54 y.o. male presents to the clinic for follow up.  Since last visit the pain has {BETTER/WORSE:71148}.    The pain is located in the *** area and {radiates/does not:90203}.  The pain is described as {Desc; pain character:26737}    At BEST  {GEN PAIN SCALE HI:08173}   At WORST  {GEN PAIN SCALE HI:64913} on the WORST day.    On average pain is rated as {GEN PAIN SCALE HI:60596}.   Today the pain is rated as {GEN PAIN SCALE HI:94667}  Symptoms interfere with {INTERFERE:45296}.   Exacerbating factors: {Causes; Pain:92116}.    Mitigating factors {MITIGATIN}.     Current pain medications:  Failed Pain  Medications:    Interval History 11/21/2024:   Keon Schneider Jr. is a 54 y.o. male presents to the clinic for follow up.  Since last visit the pain has has worsened slightly.  The patient has multiple issues going on.  He is seeing the hip doctor for his metalosis.  Right now the back and the knees are bothering him the most.      The pain is located in the back area and radiates to the hip  .  The pain is described as aching, pulsating, shooting, stabbing, and throbbing    At BEST  7/10   At WORST  10/10 on the WORST day.    On average pain is rated as 10/10.   Today the pain is rated as 10/10  Symptoms interfere with daily activity.   Exacerbating factors: Walking, Night Time, and Morning.    Mitigating factors medications.     Current pain medications: warren back and body, flexeril, alprazolam 2mg BID, oxycodone 7.5mg BID  Failed Pain Medications: high dose steroids    Pain procedures:   Epidural, hip injection    Interval History 10/24/2024:   Keon Schneider Jr. is a 54 y.o. male presents to the clinic for follow up.  Since last visit the pain has has worsened.  The patient has not been seen by me since 2022.  He was seeing Dr. Schmitt but was not getting pain medications.  He states that about 4.5 months he fell down his stairs and his back started flaring up more.      He has had a lot going on during this time.      The left shoulder is collapsed with swelling and sinking around the clavicle.    The pain is located in the mid and low back area and radiates to the bilateral hip and lower extremities .  The pain is described as burning and stabbing    At BEST  8/10   At WORST  10/10 on the WORST day.    On average pain is rated as 10/10.   Today the pain is rated as 10/10  Symptoms interfere with daily activity and sleeping.   Exacerbating factors: Sitting, Standing, Laying, and Walking.    Mitigating factors medications.     Initial hx:  Keon Schneider Jr. is a 54 y.o. male presents to the clinic for  the evaluation of b/l hips pain. The pain started 10+ years ago following surgery on hips(replacement)  and symptoms have been worsening.  He has bad pain in the hips and gets radiation down the right leg.  The pain starts in the front of the hip (R>L).  He gets some back pain in the low back which does not really radiate into the legs. States that the pain has taken over his life again.  He had his hips replaced in 2006 for AVN and was doing well until this year (around April 2022) when it flared back up. He can't sleep. Cant work. Can't walk up and down stairs.    He fell 16ft off a scaffold 3 months ago and landed on the right leg.     The patient has to climb 16 stairs to get up to his house and that has been very difficult.      Pain Description:    The pain is located in the b/l hip area and radiates to the b/l legs and up the back .    At BEST  7/10   At WORST  10/10 on the WORST day.    On average pain is rated as 7/10.   Today the pain is rated as 8/10  The pain is continuous.  The pain is described as pulsating, sharp, stabbing, and tight band    Symptoms interfere with daily activity, sleeping, and work.   Exacerbating factors: Sitting and Laying.    Mitigating factors nothing.   He reports 3 hours of sleep per night.    Physical Therapy/Home Exercise: Yes, currently has a home exercise program.  He is pretty active.    Current Pain Medications:    - warren back and body    Failed Pain Medications:    - NSAIDs due to prior GI bleed.    Pain Treatment Therapies:    Pain procedures:   Epidural, hip injection  Physical Therapy: 2007  Chiropractor: none  Acupuncture: none  TENS unit: none  Spinal decompression: none  Joint replacement: b/l hip replacement    Patient denies urinary incontinence, bowel incontinence, and loss of sensations.  Patient denies any suicidal or homicidal ideations     report:  Reviewed and consistent with medication use as prescribed.    Imaging:   MRI lumbar 09/2022:  Degenerative  findings:     T12-L1: No spinal canal stenosis or neural foraminal narrowing.     L1-L2: Facet arthropathy.  No spinal canal stenosis or neural foraminal narrowing.     L2-L3: Mild posterior disc bulge and facet arthropathy.  No spinal canal stenosis or neural foraminal narrowing.     L3-L4: Mild posterior disc bulge and facet arthropathy.  No spinal canal stenosis or neural foraminal narrowing.     L4-L5: Posterior disc bulge with protrusion into the central, paracentral and resulting in mild right neural foraminal narrowing.  No spinal canal stenosis.     L5-S1: Facet arthropathy.  No central canal stenosis.     Paraspinal muscles & soft tissues: Unremarkable.     Impression:     1. Overall stable appearance of degenerative findings in the lumbar spine including mild right neural foraminal narrowing at L4-L5.    Past Medical History:   Diagnosis Date    Anxiety     Benzodiazepine dependence    Anxiety disorder, unspecified     Arthritis     Asthma     Avascular necrosis     Carpal tunnel syndrome     Chronic pain     Depression     Diverticulitis     Gout     Mixed hyperlipidemia     Other injury of other sites of trunk 12/28/2011    Pneumonia     Primary hypertension 8/23/2024    Spondylolisthesis     lumbar; myofascial pain    Staph infection     Ulnar neuropathy     left and rt arm     Past Surgical History:   Procedure Laterality Date    COLONOSCOPY N/A 7/6/2020    Procedure: COLONOSCOPY;  Surgeon: Broderick Moise MD;  Location: [facility] ENDO;  Service: Endoscopy;  Laterality: N/A;    HIP SURGERY  3/06; 6/06    hip arthroplasty    HIP SURGERY      bilateral hip replacement (titanium)    INJECTION OF ANESTHETIC AGENT AROUND MEDIAL BRANCH NERVES INNERVATING LUMBAR FACET JOINT Bilateral 12/9/2024    Procedure: b/l L3,4,5 MBB#1;  Surgeon: Servando Olmstead DO;  Location: [facility] PAIN MANAGEMENT;  Service: Pain Management;  Laterality: Bilateral;  no ac    JOINT REPLACEMENT      OPEN REDUCTION AND INTERNAL FIXATION  (ORIF) OF FRACTURE OF CLAVICLE Left 10/5/2023    Procedure: ORIF, FRACTURE, CLAVICLE;  Surgeon: Ozzy Howard MD;  Location: [facility] OR [address] [place] [address];  Service: Orthopedics;  Laterality: Left;    SHOULDER SURGERY  2012    right shoulder    SHOULDER SURGERY       Social History     Socioeconomic History    Marital status: Single   Tobacco Use    Smoking status: Former     Current packs/day: 0.00     Average packs/day: 1 pack/day for 10.0 years (10.0 ttl pk-yrs)     Types: Cigarettes     Start date: 10/13/2002     Quit date: 10/13/2012     Years since quittin.1     Passive exposure: Past    Smokeless tobacco: Never   Substance and Sexual Activity    Alcohol use: Yes     Alcohol/week: 12.0 standard drinks of alcohol     Types: 12 Cans of beer per week     Comment: 2x/week    Drug use: No    Sexual activity: Yes     Partners: Female     Social Drivers of Health     Financial Resource Strain: Low Risk  (2024)    Overall Financial Resource Strain (CARDIA)     Difficulty of Paying Living Expenses: Not hard at all   Food Insecurity: No Food Insecurity (2024)    Hunger Vital Sign     Worried About Running Out of Food in the Last Year: Never true     Ran Out of Food in the Last Year: Never true   Recent Concern: Food Insecurity - Food Insecurity Present (2024)    Hunger Vital Sign     Worried About Running Out of Food in the Last Year: Sometimes true     Ran Out of Food in the Last Year: Sometimes true   Transportation Needs: No Transportation Needs (2024)    TRANSPORTATION NEEDS     Transportation : No   Physical Activity: Sufficiently Active (2024)    Exercise Vital Sign     Days of Exercise per Week: 7 days     Minutes of Exercise per Session: 30 min   Stress: No Stress Concern Present (2024)    [employer] [place] [employer] - Occupational Stress Questionnaire     Feeling of Stress : Only a little   Recent Concern: Stress - Stress Concern Present (2024)    [place] [employer] - Occupational Stress Questionnaire     Feeling of Stress : Very much   Housing Stability: Low Risk  (9/30/2024)    Housing Stability Vital Sign     Unable to Pay for Housing in the Last Year: No     Homeless in the Last Year: No     Family History   Problem Relation Name Age of Onset    Cancer Mother      Stroke Father      Cancer Sister      Alzheimer's disease Paternal Grandmother         Review of patient's allergies indicates:   Allergen Reactions    Atorvastatin Nausea And Vomiting    Toradol [ketorolac] Hives and Nausea And Vomiting       Current Outpatient Medications   Medication Sig    allopurinoL (ZYLOPRIM) 100 MG tablet Take 100 mg by mouth.    ALPRAZolam (XANAX) 2 MG Tab Take 2 mg by mouth 2 (two) times daily. (Patient not taking: Reported on 12/11/2024)    aspirin (ECOTRIN) 81 MG EC tablet Take 1 tablet (81 mg total) by mouth once daily.    baclofen (LIORESAL) 10 MG tablet Take 1-2 tablets (10-20 mg total) by mouth 3 (three) times daily as needed (muscle pain).    LIDOcaine (LIDODERM) 5 % Place 1 patch onto the skin once daily. Remove & Discard patch within 12 hours or as directed by MD (Patient not taking: Reported on 12/11/2024)    nitroGLYCERIN (NITROSTAT) 0.4 MG SL tablet Place 1 tablet (0.4 mg total) under the tongue every 5 (five) minutes as needed for Chest pain.    oxyCODONE-acetaminophen (PERCOCET) 7.5-325 mg per tablet Take 1 tablet by mouth 2 (two) times daily as needed for Pain.    [START ON 12/23/2024] oxyCODONE-acetaminophen (PERCOCET) 7.5-325 mg per tablet Take 1 tablet by mouth 2 (two) times daily as needed for Pain.    rosuvastatin (CRESTOR) 20 MG tablet Take 20 mg by mouth every evening.     No current facility-administered medications for this visit.       REVIEW OF SYSTEMS:    GENERAL:  No weight loss, malaise or fevers.  HEENT:   No recent changes in vision or hearing  NECK:  Negative for lumps, no difficulty with swallowing.  RESPIRATORY:  Negative  for cough, wheezing or shortness of breath, patient denies any recent URI.  CARDIOVASCULAR:  Negative for chest pain, leg swelling or palpitations.  GI:  Negative for abdominal discomfort, blood in stools or black stools or change in bowel habits.  MUSCULOSKELETAL:  See HPI.  SKIN:  Negative for lesions, rash, and itching.  PSYCH:  No mood disorder or recent psychosocial stressors.  Patients sleep is not disturbed secondary to pain.  HEMATOLOGY/LYMPHOLOGY:  Negative for prolonged bleeding, bruising easily or swollen nodes.  Patient is not currently taking any anti-coagulants  NEURO:   No history of headaches, syncope, paralysis, seizures or tremors.  All other reviewed and negative other than HPI.    OBJECTIVE:    There were no vitals taken for this visit.    PHYSICAL EXAMINATION:    GENERAL: Well appearing, in no acute distress, alert and oriented x3.  PSYCH:  Mood and affect appropriate.  SKIN: Skin color, texture, turgor normal, no rashes or lesions.  HEAD/FACE:  Normocephalic, atraumatic. Cranial nerves grossly intact.  CV: RRR with palpation of the radial artery.  PULM: CTAB. No evidence of respiratory difficulty, symmetric chest rise.  GI:  Soft and non-tender.    Left Shoulder  -TTP  -left clavicle seems to have collapsed?  -soft tissue mass in the base of the left neck    BACK:   - No obvious deformity or signs of trauma, Normal lumbar lordotic curve  - Positive spinous process tenderness  - Positive paravertebral tenderness  - Positive pain to palpation over the facet joints of the lumbar spine.   - Negative QL / Iliac crest / Glut tenderness  - (+) pain with flexion and extension of the back  (+) facet loading b/l  - no pain with facet loading  - lateral flexion causes anterior hip pain on the ipsilateral side    MUSKULOSKELETAL:    EXTREMITIES:   Hip Exam:  - Log Roll Positive  - FADIR Positive  - [place]inchfield Positive  - Hip Scour Positive  - GTB Tenderness Negative    MUSCULOSKELETAL:  No atrophy or tone  abnormalities are noted in the UE or LE.  No deformities, edema, or skin discoloration are noted on visible skin. Good capillary refill.    NEURO: Bilateral upper and lower extremity coordination and muscle stretch reflexes are physiologic and symmetric.      NEUROLOGICAL EXAM:  MENTAL STATUS: A x O x 3, good concentration, speech is fluent and goal directed  MEMORY: recent and remote are intact  CN: CN2-12 grossly intact  MOTOR: 5/5 in all muscle groups  DTRs: 2+ intact symmetric  Sensation:    -no Loss of sensation in a left lower and right lower L-1, L-2, L-3, L-4, and L-5 bilaterally distribution.  Gordon: absent on the bilateral side(s)  Clonus: absent on the bilateral side(s)    GAIT: antalgic.

## 2024-12-16 NOTE — H&P (VIEW-ONLY)
Chronic Pain - f/u    Referring Physician: No ref. provider found    Date: 12/16/2024     Re: Keon Schneider Jr.  MR#: [id number]  YOB: 1970  Age: 54 y.o.    Chief Complaint:   Chief Complaint   Patient presents with    Back Pain     **This note is dictated using the M*Modal Fluency Direct word recognition program. There are word recognition mistakes that are occasionally missed on review.**    ASSESSMENT: 54 y.o. year old male with hip pain, consistent with     1. Ankylosing spondylitis of lumbar region  C-REACTIVE PROTEIN    Sedimentation rate    RHEUMATOID FACTOR    CYCLIC CITRUL PEPTIDE ANTIBODY, IGG    HLA B27 ANTIGEN    DEMETRIUS    Procedure Order to Pain Management    oxyCODONE-acetaminophen (PERCOCET) 7.5-325 mg per tablet      2. Metallosis, accidental or unintentional, initial encounter  Jackson, Serum    CHROMIUM LEVEL    Procedure Order to Pain Management      3. Chronic pain syndrome  oxyCODONE-acetaminophen (PERCOCET) 7.5-325 mg per tablet            PLAN:     Left clavicle fracture hx with new mass in the left neck and collapse of the clavicle with soft tissue atrophy  -very unusual  -XR to evaluate. Clavicle looks ok.  -needs to see ortho    Lumbar radiculopathy  1/13/25 - L4-5 ZEUS - no sed - no AC    Thoracic radiculopathy  1/21/25 - T12-L1 ZEUS - no sed - no AC    Mid/Low back pain - very confusing  -unclear etiology  -lots of recent ER visits  -seen spine surgery, hematology, neurology.  Noone seems to know what is going on  -hard to process this since he has a lot to say and there is a lot that has happened in the last 2 years since I saw him last.  Previously seeing Dr. Schmitt who he does not want to see anymore because he was told that he can only see him for injections  -will provide #60 Oxycodone 7.5mg to help keep him out of the ER.  This will not be long term.  -baclofen 10-20mg TID PRN for his back pain  -He has a bright STIR image at T5 which is likely more degenerative related but  did not respond to high dose steroids.  -discussed that I will not provide long term pain medications to him, but I will try to keep him out of the emergency room for now while this is being figure out  -could consider b/l Thoracic MBB/RFA but would need to try and figure out what levels to target  -Epidural also an option but with no response to high dose oral steroids it seems less likely to help  -Rheumatology labs ordered    Bilateral hip pain and metallosis  -this was manageable before the recent back issue    Lumbar spondylosis  -some arthritis on MRI.  Hip pain is his bigger issues.  Back pain is not radiating into the legs.  12/9/24 - b/l L3,4,5 MBB#1 - no sed - no AC - no improvement  12/23/24 - b/l L3,4,5 MBB#2 - no sed - no AC - CANCEL    Chronic use of opioids  -another talk about opioids and that I will  not prescribe long term  -will provide 1 more months while trying to see if he gets improvement from the injections  -discussed risks of benzos and opioids    - RTC mid February  - Counseled patient regarding the importance of activity modification.    The above plan and management options were discussed at length with patient. Patient is in agreement with the above and verbalized understanding. It will be communicated with the referring physician via electronic record, fax, or mail.  Lab/study reports reviewed were important and necessary because subsequent medical and treatment recommendations required review of the above lab/study reports. Images viewed/reviewed above were important and necessary because subsequent medical and treatment recommendations required review of the reviewed image(s).     Electronically signed by:  Servando Olmstead DO  12/16/2024    Patient was seen in clinic today.  The total amount of time spent on this patient was exactly 45 minutes.  Due to medical complexity, time spent with the patient, and multiple issues discussed/addressed I am billing for a level 5 visit.  This includes face to face time and non-face to face time preparing to see the patient by reviewing previous labs/imaging, obtaining and/or reviewing separately obtained history, documenting clinical information in the electronic or other health record, independently reviewing results and communicating results to the patient/family/caregiver/additional providers.  The available imaging was reviewed in person with the patient, pathology and treatment options were explained in detail with the patient.  The patient was given multiple opportunities to ask questions, and all questions were answered.  =========================================================================================================    SUBJECTIVE:    Interval History 12/16/2024:   Keon Schneider Jr. is a 54 y.o. male presents to the clinic for follow up.  Since last visit the pain has has significantly worsened. The patient had a b/l L3,4,5 MBB which was not helpful so the following injections were cancelled.  He states that he was doing okay with the percocet recently, but then the last week it has gotten a lot worse again.      The pain is located in the back area and does not radiate.  The pain is described as sharp, stabbing, and pressure     At BEST  10/10   At WORST  10/10 on the WORST day.    On average pain is rated as 10/10.   Today the pain is rated as 10/10  Symptoms interfere with daily activity.   Exacerbating factors: Sitting, Standing, Laying, and Walking.    Mitigating factors nothing.     Current pain medications: Xanax, Percocet 7.5mg BID, Baclofen TID, ASA81,   Failed Pain Medications: high dose steroids    Pain procedures:   Epidural, hip injection    Interval History 11/21/2024:   Keon Schneider Jr. is a 54 y.o. male presents to the clinic for follow up.  Since last visit the pain has has worsened slightly.  The patient has multiple issues going on.  He is seeing the hip doctor for his metalosis.  Right now the back and  the knees are bothering him the most.      The pain is located in the back area and radiates to the hip  .  The pain is described as aching, pulsating, shooting, stabbing, and throbbing    At BEST  7/10   At WORST  10/10 on the WORST day.    On average pain is rated as 10/10.   Today the pain is rated as 10/10  Symptoms interfere with daily activity.   Exacerbating factors: Walking, Night Time, and Morning.    Mitigating factors medications.     Current pain medications: warren back and body, flexeril, alprazolam 2mg BID, oxycodone 7.5mg BID  Failed Pain Medications: high dose steroids    Pain procedures:   Epidural, hip injection    Interval History 10/24/2024:   Keon Schneider Jr. is a 54 y.o. male presents to the clinic for follow up.  Since last visit the pain has has worsened.  The patient has not been seen by me since 2022.  He was seeing Dr. Schmitt but was not getting pain medications.  He states that about 4.5 months he fell down his stairs and his back started flaring up more.      He has had a lot going on during this time.      The left shoulder is collapsed with swelling and sinking around the clavicle.    The pain is located in the mid and low back area and radiates to the bilateral hip and lower extremities .  The pain is described as burning and stabbing    At BEST  8/10   At WORST  10/10 on the WORST day.    On average pain is rated as 10/10.   Today the pain is rated as 10/10  Symptoms interfere with daily activity and sleeping.   Exacerbating factors: Sitting, Standing, Laying, and Walking.    Mitigating factors medications.     Initial hx:  Keon Schneider Jr. is a 54 y.o. male presents to the clinic for the evaluation of b/l hips pain. The pain started 10+ years ago following surgery on hips(replacement)  and symptoms have been worsening.  He has bad pain in the hips and gets radiation down the right leg.  The pain starts in the front of the hip (R>L).  He gets some back pain in the low back  which does not really radiate into the legs. States that the pain has taken over his life again.  He had his hips replaced in 2006 for AVN and was doing well until this year (around April 2022) when it flared back up. He can't sleep. Cant work. Can't walk up and down stairs. Around June of 2024 he just suddenly started having this severe back pain.  He has low back pain and upper low back pain.    He fell 16ft off a scaffold 3 months ago and landed on the right leg.     The patient has to climb 16 stairs to get up to his house and that has been very difficult.      Pain Description:    The pain is located in the b/l hip area and radiates to the b/l legs and up the back .    At BEST  7/10   At WORST  10/10 on the WORST day.    On average pain is rated as 7/10.   Today the pain is rated as 8/10  The pain is continuous.  The pain is described as pulsating, sharp, stabbing, and tight band    Symptoms interfere with daily activity, sleeping, and work.   Exacerbating factors: Sitting and Laying.    Mitigating factors nothing.   He reports 3 hours of sleep per night.    Physical Therapy/Home Exercise: Yes, currently has a home exercise program.  He is pretty active.    Current Pain Medications:    - warren back and body    Failed Pain Medications:    - NSAIDs due to prior GI bleed.    Pain Treatment Therapies:    Pain procedures:   Epidural, hip injection  Physical Therapy: 2007  Chiropractor: none  Acupuncture: none  TENS unit: none  Spinal decompression: none  Joint replacement: b/l hip replacement    Patient denies urinary incontinence, bowel incontinence, and loss of sensations.  Patient denies any suicidal or homicidal ideations     report:  Reviewed and consistent with medication use as prescribed.    Imaging:   MRI lumbar 09/2022:  Degenerative findings:     T12-L1: No spinal canal stenosis or neural foraminal narrowing.     L1-L2: Facet arthropathy.  No spinal canal stenosis or neural foraminal narrowing.      L2-L3: Mild posterior disc bulge and facet arthropathy.  No spinal canal stenosis or neural foraminal narrowing.     L3-L4: Mild posterior disc bulge and facet arthropathy.  No spinal canal stenosis or neural foraminal narrowing.     L4-L5: Posterior disc bulge with protrusion into the central, paracentral and resulting in mild right neural foraminal narrowing.  No spinal canal stenosis.     L5-S1: Facet arthropathy.  No central canal stenosis.     Paraspinal muscles & soft tissues: Unremarkable.     Impression:     1. Overall stable appearance of degenerative findings in the lumbar spine including mild right neural foraminal narrowing at L4-L5.    Past Medical History:   Diagnosis Date    Anxiety     Benzodiazepine dependence    Anxiety disorder, unspecified     Arthritis     Asthma     Avascular necrosis     Carpal tunnel syndrome     Chronic pain     Depression     Diverticulitis     Gout     Mixed hyperlipidemia     Other injury of other sites of trunk 12/28/2011    Pneumonia     Primary hypertension 8/23/2024    Spondylolisthesis     lumbar; myofascial pain    Staph infection     Ulnar neuropathy     left and rt arm     Past Surgical History:   Procedure Laterality Date    COLONOSCOPY N/A 7/6/2020    Procedure: COLONOSCOPY;  Surgeon: Broderick Moise MD;  Location: [facility];  Service: Endoscopy;  Laterality: N/A;    HIP SURGERY  3/06; 6/06    hip arthroplasty    HIP SURGERY      bilateral hip replacement (titanium)    INJECTION OF ANESTHETIC AGENT AROUND MEDIAL BRANCH NERVES INNERVATING LUMBAR FACET JOINT Bilateral 12/9/2024    Procedure: b/l L3,4,5 MBB#1;  Surgeon: Servando Olmstead DO;  Location: [facility];  Service: Pain Management;  Laterality: Bilateral;  no ac    JOINT REPLACEMENT      OPEN REDUCTION AND INTERNAL FIXATION (ORIF) OF FRACTURE OF CLAVICLE Left 10/5/2023    Procedure: ORIF, FRACTURE, CLAVICLE;  Surgeon: Ozzy Howard MD;  Location: [facility] OR [address] [place] [address];  Service:  Orthopedics;  Laterality: Left;    SHOULDER SURGERY  2012    right shoulder    SHOULDER SURGERY       Social History     Socioeconomic History    Marital status: Single   Tobacco Use    Smoking status: Former     Current packs/day: 0.00     Average packs/day: 1 pack/day for 10.0 years (10.0 ttl pk-yrs)     Types: Cigarettes     Start date: 10/13/2002     Quit date: 10/13/2012     Years since quittin.1     Passive exposure: Past    Smokeless tobacco: Never   Substance and Sexual Activity    Alcohol use: Yes     Alcohol/week: 12.0 standard drinks of alcohol     Types: 12 Cans of beer per week     Comment: 2x/week    Drug use: No    Sexual activity: Yes     Partners: Female     Social Drivers of Health     Financial Resource Strain: Low Risk  (2024)    Overall Financial Resource Strain (CARDIA)     Difficulty of Paying Living Expenses: Not hard at all   Food Insecurity: No Food Insecurity (2024)    Hunger Vital Sign     Worried About Running Out of Food in the Last Year: Never true     Ran Out of Food in the Last Year: Never true   Recent Concern: Food Insecurity - Food Insecurity Present (2024)    Hunger Vital Sign     Worried About Running Out of Food in the Last Year: Sometimes true     Ran Out of Food in the Last Year: Sometimes true   Transportation Needs: No Transportation Needs (2024)    TRANSPORTATION NEEDS     Transportation : No   Physical Activity: Sufficiently Active (2024)    Exercise Vital Sign     Days of Exercise per Week: 7 days     Minutes of Exercise per Session: 30 min   Stress: No Stress Concern Present (2024)    [employer] - Occupational Stress Questionnaire     Feeling of Stress : Only a little   Recent Concern: Stress - Stress Concern Present (2024)    [employer] - Occupational Stress Questionnaire     Feeling of Stress : Very much   Housing Stability: Low Risk  (2024)    Housing Stability  Vital Sign     Unable to Pay for Housing in the Last Year: No     Homeless in the Last Year: No     Family History   Problem Relation Name Age of Onset    Cancer Mother      Stroke Father      Cancer Sister      Alzheimer's disease Paternal Grandmother         Review of patient's allergies indicates:   Allergen Reactions    Atorvastatin Nausea And Vomiting    Toradol [ketorolac] Hives and Nausea And Vomiting       Current Outpatient Medications   Medication Sig    allopurinoL (ZYLOPRIM) 100 MG tablet Take 100 mg by mouth.    aspirin (ECOTRIN) 81 MG EC tablet Take 1 tablet (81 mg total) by mouth once daily.    baclofen (LIORESAL) 10 MG tablet Take 1-2 tablets (10-20 mg total) by mouth 3 (three) times daily as needed (muscle pain).    nitroGLYCERIN (NITROSTAT) 0.4 MG SL tablet Place 1 tablet (0.4 mg total) under the tongue every 5 (five) minutes as needed for Chest pain.    [START ON 12/23/2024] oxyCODONE-acetaminophen (PERCOCET) 7.5-325 mg per tablet Take 1 tablet by mouth 2 (two) times daily as needed for Pain.    rosuvastatin (CRESTOR) 20 MG tablet Take 20 mg by mouth every evening.    ALPRAZolam (XANAX) 2 MG Tab Take 2 mg by mouth 2 (two) times daily. (Patient not taking: Reported on 12/16/2024)    LIDOcaine (LIDODERM) 5 % Place 1 patch onto the skin once daily. Remove & Discard patch within 12 hours or as directed by MD (Patient not taking: Reported on 12/16/2024)    [START ON 1/22/2025] oxyCODONE-acetaminophen (PERCOCET) 7.5-325 mg per tablet Take 1 tablet by mouth 2 (two) times daily as needed for Pain.     No current facility-administered medications for this visit.       REVIEW OF SYSTEMS:    GENERAL:  No weight loss, malaise or fevers.  HEENT:   No recent changes in vision or hearing  NECK:  Negative for lumps, no difficulty with swallowing.  RESPIRATORY:  Negative for cough, wheezing or shortness of breath, patient denies any recent URI.  CARDIOVASCULAR:  Negative for chest pain, leg swelling or  "palpitations.  GI:  Negative for abdominal discomfort, blood in stools or black stools or change in bowel habits.  MUSCULOSKELETAL:  See HPI.  SKIN:  Negative for lesions, rash, and itching.  PSYCH:  No mood disorder or recent psychosocial stressors.  Patients sleep is not disturbed secondary to pain.  HEMATOLOGY/LYMPHOLOGY:  Negative for prolonged bleeding, bruising easily or swollen nodes.  Patient is not currently taking any anti-coagulants  NEURO:   No history of headaches, syncope, paralysis, seizures or tremors.  All other reviewed and negative other than HPI.    OBJECTIVE:    /89 (BP Location: Left arm, Patient Position: Sitting)   Pulse 73   Ht 5' 11" (1.803 m)   Wt 90.7 kg (199 lb 15.3 oz)   BMI 27.89 kg/m²     PHYSICAL EXAMINATION:    GENERAL: Well appearing, in no acute distress, alert and oriented x3.  PSYCH:  Mood and affect appropriate.  SKIN: Skin color, texture, turgor normal, no rashes or lesions.  HEAD/FACE:  Normocephalic, atraumatic. Cranial nerves grossly intact.  CV: RRR with palpation of the radial artery.  PULM: CTAB. No evidence of respiratory difficulty, symmetric chest rise.  GI:  Soft and non-tender.    Left Shoulder  -TTP  -left clavicle seems to have collapsed?  -soft tissue mass in the base of the left neck    BACK:   - No obvious deformity or signs of trauma, Normal lumbar lordotic curve  - Positive spinous process tenderness  - Positive paravertebral tenderness  - Positive pain to palpation over the facet joints of the lumbar spine.   - Negative QL / Iliac crest / Glut tenderness  - (+) pain with flexion and extension of the back  (+) facet loading b/l  - no pain with facet loading  - lateral flexion causes anterior hip pain on the ipsilateral side    MUSKULOSKELETAL:    EXTREMITIES:   Hip Exam:  - Log Roll Positive  - FADIR Positive  - Stinchfield Positive  - Hip Scour Positive  - GTB Tenderness Negative    MUSCULOSKELETAL:  No atrophy or tone abnormalities are noted in " the UE or LE.  No deformities, edema, or skin discoloration are noted on visible skin. Good capillary refill.    NEURO: Bilateral upper and lower extremity coordination and muscle stretch reflexes are physiologic and symmetric.      NEUROLOGICAL EXAM:  MENTAL STATUS: A x O x 3, good concentration, speech is fluent and goal directed  MEMORY: recent and remote are intact  CN: CN2-12 grossly intact  MOTOR: 5/5 in all muscle groups  DTRs: 2+ intact symmetric  Sensation:    -no Loss of sensation in a left lower and right lower L-1, L-2, L-3, L-4, and L-5 bilaterally distribution.  Gordon: absent on the bilateral side(s)  Clonus: absent on the bilateral side(s)    GAIT: antalgic.

## 2024-12-16 NOTE — TELEPHONE ENCOUNTER
----- Message from Servando Ignacio DO sent at 2024  4:23 PM CST -----  Regarding: Order for JOHANNA GOODMAN JR.    Patient Name: JOHANNA GOODMAN JR.([id number])  Sex: Male  : 1970      PCP: LAN LEBRON    Center: [facility]     Types of orders made on 2024: Lab, Procedure Request    Order Date:2024  Ordering User:SERVANDO IGNACIO [[id number]]  Encounter Provider:Servando Ignacio DO [[id number]]  Authorizing Provider: Servando Ignacio DO [[id number]]  Department:[facility]NA[facility][[id number]]    Common Order Information  Procedure -> Epidural Injection (specify level)     Pre-op Diagnosis -> Lumbar radiculopathy       -> Thoracic radiculopathy     Order Specific Information  Order: Procedure Order to Pain Management [Custom: [id number]]  Order #:          [id number]: 1 FUTURE    Priority: Routine  Class: Clinic Performed    Future Order Information      Expires on:2025            Expected by:2024                   Comment:25 - L4-5 ZEUS - no sed - no AC             25 - T12-L1 ZEUS - no sed - no AC    Associated Diagnoses      T56.91XA Metallosis, accidental or unintentional, initial encounter      M45.6 Ankylosing spondylitis of lumbar region      Physician -> moraimayu         Is patient on anti-coagulants? -> No         Facility Name: -> [facility]           Priority: Routine  Class: Clinic Performed    Future Order Information      Expires on:2025            Expected by:2024                   Comment:25 - L4-5 ZEUS - no sed - no AC             25 - T12-L1 ZEUS - no sed - no AC    Associated Diagnoses      T56.91XA Metallosis, accidental or unintentional, initial encounter      M45.6 Ankylosing spondylitis of lumbar region      Procedure -> Epidural Injection (specify level)         Physician -> moraimayu         Is patient on anti-coagulants? -> No         Pre-op Diagnosis -> Lumbar radiculopathy           -> Thoracic  radiculopathy         Facility Name: -> [facility]

## 2024-12-17 ENCOUNTER — TELEPHONE (OUTPATIENT)
Dept: PAIN MEDICINE | Facility: CLINIC | Age: 54
End: 2024-12-17
Payer: MEDICARE

## 2024-12-17 DIAGNOSIS — M54.14 THORACIC RADICULOPATHY: Primary | ICD-10-CM

## 2024-12-17 LAB — ANA SER QL IF: NORMAL

## 2024-12-18 LAB
COBALT SERPL-MCNC: 4.1 NG/ML
CR SERPL-MCNC: 4.9 NG/ML

## 2024-12-24 ENCOUNTER — TELEPHONE (OUTPATIENT)
Dept: ENDOSCOPY | Facility: HOSPITAL | Age: 54
End: 2024-12-24
Payer: MEDICARE

## 2024-12-24 NOTE — TELEPHONE ENCOUNTER
"Referral for procedure from [facility] Original order below:    Mai Cates MD [facility] Endoscopy Schedulers  Procedure: Colonoscopy (with me)    Diagnosis: Abnormal finding on GI tract imaging    Procedure Timin-12 weeks (Dec 2024)    *If within 4 weeks selected, please corrine as high priority*    *If greater than 12 weeks, please select "5-12 weeks" and delay sending until 3 months prior to requested date*    Location: Any Site    Additional Scheduling Information: No scheduling concerns    Prep Specifications:Standard prep    Is the patient taking a GLP-1 Agonist:no    Have you attached a patient to this message: yes            Spoke to pt to reschedule procedure(s) Colonoscopy due to pt illness       Physician to perform procedure(s) Dr. KALIA Cates  Date of Procedure (s) 2/3/25  Arrival Time 7:10 AM  Time of Procedure(s) 8:10 AM   Location of Procedure(s) [address]  Type of Rx Prep sent to patient: PEG (pt already has)  Instructions provided to patient via [facility]    Patient was informed on the following information and verbalized understanding. Screening questionnaire reviewed with patient and complete. If procedure requires anesthesia, a responsible adult needs to be present to accompany the patient home, patient cannot drive after receiving anesthesia. Appointment details are tentative, especially check-in time. Patient will receive a prep-op call 7 days prior to confirm check-in time for procedure. If applicable the patient should contact their pharmacy to verify Rx for procedure prep is ready for pick-up. Patient was advised to call the scheduling department at [phone] if pharmacy states no Rx is available. Patient was advised to call the endoscopy scheduling department if any questions or concerns arise.       Endoscopy Scheduling Department    "

## 2025-01-06 ENCOUNTER — TELEPHONE (OUTPATIENT)
Dept: PAIN MEDICINE | Facility: CLINIC | Age: 55
End: 2025-01-06
Payer: MEDICARE

## 2025-01-07 ENCOUNTER — TELEPHONE (OUTPATIENT)
Dept: ENDOSCOPY | Facility: HOSPITAL | Age: 55
End: 2025-01-07

## 2025-01-07 NOTE — TELEPHONE ENCOUNTER
Attempted to contact patient to schedule colonoscopy. The patient did not answer the call. Unable to leave voice message. However, patient is already scheduled for procedure on 02/03/25 at [facility].

## 2025-01-09 NOTE — PRE-PROCEDURE INSTRUCTIONS
Unable to leave patient a voicemail. The following portal message was sent.     Dear Keon,         Please read over the following pre-procedure instructions in it's entirety as there is helpful information here to get you well prepared for your upcoming procedure.     You are scheduled for a procedure with Dr. Johns on 1/13/2025.     [facility] is located at the corner of [facility] and [facility]. It is in the [facility] to [facility]. The address is: [address] [place] [address]. Take the elevator to the 2nd floor.      Registration check in time: 1:00 PM  Scheduled procedure time: 2:00 PM      You are scheduled to receive:_______Oral sedation                                                                 _______IV Sedation                                                                 ___X____No Sedation                                                                                           If you are receiving any sedation, you CANNOT drive yourself and must have a responsible friend or family member (no rideshare) to drive you home.     You should take any medications that you routinely take for blood pressure, heart medications, thyroid, cholesterol, etc.      The fasting restrictions are dependent on whether or not you are receiving sedation. Sedation is not available for all procedures.      Your fasting instructions for sedation patients are as follow:  IV sedation. Nothing to eat after midnight the night prior to procedure. Patients are encouraged to consume clear liquids up to 2 hours prior to scheduled arrival time. -Clear liquids include Gatorade, water, soda, black coffee or tea (no milk or creamer), and clear juices. - Clear liquids do NOT include anything with pulp or food particles (chicken broth, ice cream, yogurt, Jello, etc.) You CANNOT drive yourself and must have a .            If you are on blood thinners, you need to follow the anticoagulation  instructions that had been discussed previously. You should only stop the blood thinners if it was approved by your primary care physician or your cardiologist. In the event that you are not able to stop your blood thinners, a blood thinner was not listed on your medication list, or we were not able to get clearance from your cardiologist, then the procedure may have to be postponed/canceled.      IF you were told to stop your blood thinners, this is how long you should generally hold some of the more common ones. Remember that stopping blood thinners is only necessary for certain procedures. If you are unsure of your instructions, please call us.   Aspirin - 5 days  Plavix/Clopidogrel - 7 days  Warfarin / Coumadin - 5 days  Eliquis - 3 days  Pradaxa/Dabigatran - 4 days  Xarelto/Rivaroxaban - 3 days        HOLD all non-insulin injections (shots) until after surgery (Ozempic, Mounjaro, Trulicity, Victoza, Byetta, Wegovy and Adlyxin) (Total of 7 days prior)        If you are a diabetic, do not take your medication if you will be fasting, but bring it with you. Please plan on being here for roughly 2-3 hours. Please note that most procedures will not be performed if you blodd sugar is >200.     Please call us if you have been sick (running fever, having any flu-like symptoms) or have been taking ANTIBIOTICS in the past 2 weeks or had any outpatient procedures other than with us (colonoscopy, endoscopy, OBGYN, dental, etc.).      If you have been previously COVID positive, you will need to hold off on your procedure until you are symptom free for 10 days. If you did not have any symptoms, you can have your procedure 10 days from your positive test result.         On the morning of your procedure:  *HOLD ALL VITAMINS, MINERALS, HERBS (INCLUDING HERBAL TEAS) AND SUPPLEMENTS  *SHOWER WITH ANTIBACTERIAL SOAP (EX. DIAL) NIGHT BEFORE AND MORNING OF PROCEDURE  *DO NOT APPLY ANY LOTIONS, OILS, POWDERS, PERFUME/COLOGNE,  OINTMENTS, GELS, CREAMS, MAKEUP OR DEODORANT TO YOUR SKIN MORNING OF PROCEDURE  *LEAVE JEWELRY AND ANY VALUABLES AT HOME  *WEAR LOOSE COMFORTABLE CLOTHING         Please reply to this portal message as receipt of delivery.     Thank you,  [facility]  Pre-Admit

## 2025-01-10 NOTE — PRE-PROCEDURE INSTRUCTIONS
Hello ,     Your arrival time is 1300 and is roughly 1 hour before your anticipated procedure time to allow sufficient time for pre-op..  This procedure will take place at the [facility] at the corner of [facility] and [facility].  It is in the [place] next to [facility].  The address is:     [address] [facility].  [place] [id number]     After entering the building, you will proceed to the second floor where you can check in with registration. You should take any medications that you routinely take for blood pressure, heart medications, thyroid, cholesterol, etc. As directed      Your fasting instructions are as follow:       No sedation:  You do not need to fast before this procedure.  You can eat and drink like normal.  You can drive yourself (with stipulations).  There are some rare cases where you may need to call an uber if you are unable to drive after the procedure due to weakness/dizziness.           If you are a diabetic, do not take your insulin because you will be fasting, but bring it with you.  You should take any medications that you routinely take for blood pressure, heart medications, thyroid, cholesterol, etc.  If you take any major blood thinners (including Aspirin), please follow the instructions you were given when scheduling the appointment to hold them or not. If you are unsure on your instructions, please call us.      No blood thinners     If you take Ozempic, Trulicity, Mounjaro, Rybelsus, Wegovy or other weight loss, non-insulin injections you must hold this for one week prior if you are having IV sedation.         Please call us if any of the following have occurred:   *running fever or having any flu-like symptoms  *have been taking antibiotics in the past 2 weeks  *have had any or plan on having any immunizations in the 2 weeks before or after your injection (Flu/Shingles/Covid Booster/Pneumonia, etc.)  *had any out patient procedures other  than with us in the past 2 weeks (Colonoscopy, Endoscopy, Biopsy, OBGYN, Dental, etc.)Or received a steroid injection from another provider within the last 2 weeks.  *have any wounds or rashes  *awaiting ANY test results that could result in you having to take antibiotics (Urine Culture, Flu/Covid/Strept Swab, etc)     If you have been COVID positive, you will need to hold off on your procedure until you are symptom free for 10 days. If you did not have any symptoms, you can have your procedure 10 days from your positive test result.         *HOLD ALL VITAMINS, MINERALS, HERBS (INCLUDING HERBAL TEAS) AND SUPPLEMENTS FOR 1 WEEK  *SHOWER WITH ANTIBACTERIAL SOAP (EX. DIAL) NIGHT BEFORE AND MORNING OF PROCEDURE  *DO NOT APPLY ANY LOTIONS, OILS, POWDERS, PERFUME/COLOGNE, OINTMENTS, GELS, CREAMS, MAKEUP OR DEODORANT TO YOUR SKIN MORNING OF PROCEDURE  *LEAVE JEWELRY AND ANY VALUABLES AT HOME  *WEAR LOOSE COMFORTABLE CLOTHING (PREFERABLY A BUTTON UP SHIRT)        If you have any questions please call  [phone].           Thank you,  Bree

## 2025-01-13 ENCOUNTER — TELEPHONE (OUTPATIENT)
Dept: PAIN MEDICINE | Facility: CLINIC | Age: 55
End: 2025-01-13
Payer: MEDICARE

## 2025-01-13 ENCOUNTER — HOSPITAL ENCOUNTER (OUTPATIENT)
Facility: HOSPITAL | Age: 55
Discharge: HOME | End: 2025-01-13
Attending: STUDENT IN AN ORGANIZED HEALTH CARE EDUCATION/TRAINING PROGRAM | Admitting: STUDENT IN AN ORGANIZED HEALTH CARE EDUCATION/TRAINING PROGRAM
Payer: MEDICARE

## 2025-01-13 VITALS
HEIGHT: 72 IN | SYSTOLIC BLOOD PRESSURE: 140 MMHG | BODY MASS INDEX: 27.09 KG/M2 | OXYGEN SATURATION: 96 % | WEIGHT: 200 LBS | HEART RATE: 69 BPM | DIASTOLIC BLOOD PRESSURE: 90 MMHG | TEMPERATURE: 97 F | RESPIRATION RATE: 16 BRPM

## 2025-01-13 DIAGNOSIS — M54.16 LUMBAR RADICULOPATHY: Primary | ICD-10-CM

## 2025-01-13 PROCEDURE — 62323 NJX INTERLAMINAR LMBR/SAC: CPT | Performed by: STUDENT IN AN ORGANIZED HEALTH CARE EDUCATION/TRAINING PROGRAM

## 2025-01-13 PROCEDURE — 62323 NJX INTERLAMINAR LMBR/SAC: CPT | Mod: ,,, | Performed by: STUDENT IN AN ORGANIZED HEALTH CARE EDUCATION/TRAINING PROGRAM

## 2025-01-13 PROCEDURE — 63600175 PHARM REV CODE 636 W HCPCS: Performed by: STUDENT IN AN ORGANIZED HEALTH CARE EDUCATION/TRAINING PROGRAM

## 2025-01-13 PROCEDURE — 25500020 PHARM REV CODE 255: Performed by: STUDENT IN AN ORGANIZED HEALTH CARE EDUCATION/TRAINING PROGRAM

## 2025-01-13 RX ORDER — LIDOCAINE HYDROCHLORIDE 20 MG/ML
INJECTION, SOLUTION EPIDURAL; INFILTRATION; INTRACAUDAL; PERINEURAL
Status: DISCONTINUED | OUTPATIENT
Start: 2025-01-13 | End: 2025-01-13 | Stop reason: HOSPADM

## 2025-01-13 RX ORDER — DEXAMETHASONE SODIUM PHOSPHATE 10 MG/ML
INJECTION INTRAMUSCULAR; INTRAVENOUS
Status: DISCONTINUED | OUTPATIENT
Start: 2025-01-13 | End: 2025-01-13 | Stop reason: HOSPADM

## 2025-01-13 NOTE — OP NOTE
"PROCEDURE:  LUMBAR L4-5 INTERLAMINAR EPIDURAL STEROID INJECTION    Patient Name: Keon Schneider Jr.  MRN: [id number]  DATE OF PROCEDURE: 01/13/2025    INJECTION # 1    DIAGNOSIS: Lumbar Radiculopathy  CPT CODE: 18607      POSTPROCEDURE DIAGNOSIS: Same    PHYSICIAN: Servando Olmstead DO  NEEDLE TYPE: - 20G 3.5" Touhy Needle  MEDICATIONS INJECTED: 8cc mixture of 7cc Normal Saline + 10mg Dexamethasone (10mg/ml)  CONTRAST: Omni 300  LOSS OF RESISTANCE DEPTH: 7 cm    Sedation Medications - None    Estimated Blood Loss - <2ml  Drains: None  Specimens Removed: None  Urine Output - Not Measured  Complications: None  Outcome: Good    Informed Consent:  The patient's condition and proposed procedures, risks, and alternatives were discussed with the patient or responsible party.  The patient's / responsible party's questions were answered.   The patient / responsible party appeared to understand and chose to proceed.  Informed consent was obtained.  After obtaining written consent, an IV hep lock was placed. (See nurses notes for details).     Procedure in Detail:  The patient was taken back to the OR suite and placed in a prone position. The skin overlying the injection site was prepped and draped in an aseptic fashion. The target injection site (see above) was identified with fluoroscopy and marked.     Procedural Pause:  A procedural pause verifying correct patient, medical record number, allergies, medications to be administered, current vital signs, and surgical site was performed immediately prior to beginning the procedure.    The skin and subcutaneous tissue overlying the target site of injection for the L4-5 epidural steroid injection was/were anesthetized using 4 mL of 2% lidocaine with a 25-gauge, 1½-inch needle.  The above noted Tuohy needle was advanced under fluoroscopic guidance towards the epidural space. Lateral fluoroscopic imaging was used to confirm depth. The epidural space was identified using a " loss of resistance to saline technique. (See above for loss of resistance depth). A microbore extension tubing was attached to the needle to minimize any movement of the needle during injection or syringe change.  After negative aspiration for heme or CSF, 1ml of contrast was injected to confirm placement and no intrathecal or vascular spread.  After repeat negative aspiration for heme or CSF, the above noted steroid solution was slowly injected in increments. The needle was then retracted approximately custodial and the needle track was flushed with 0.5 ml of Lidocaine 2% to clear the needle prior to removal. The Tuohy needle was then removed.     The heart rate, pulse oximetry, and blood pressure were continuously monitored throughout the procedure.  The prpocedure was well tolerated. He was carefully escorted to the recovery room in stable condition. Patient was monitored by RN for recovery period.  The patient will be contacted in the next few days to determine extent of relief.  Patient was given post procedure and discharge instructions to follow at home.  The patient was discharged in a stable condition.    Note Electronically Signed By:  Servando Mccartney

## 2025-01-13 NOTE — PLAN OF CARE
Keon Schneider has met all discharge criteria from Phase II. Vital Signs are stable, ambulating  without difficulty. Discharge instructions given, patient verbalized understanding. Discharged from facility via ambulation in stable condition.

## 2025-01-13 NOTE — DISCHARGE INSTRUCTIONS
[facility]  [facility] service # [phone]  On-call pager for emergency# [phone]     POST-PROCEDURE INSTRUCTIONS:    Today you had an injection that included a steroid medications.  The steroid may or may not have been mixed with a local anesthetic when it was injected.   If the injection was in the neck, you may feel some pressure, numbness, or slight weakness in the arm after the procedure for a short period of time (this is a normal response), if this persists for longer than 1 day please contact our office or go to the emergency room.  If the injection was in the low back, you may feel some pressure, numbness, or slight weakness in the leg after the procedure for a short period of time (this is a normal response), if this persists for longer than 1 day please contact our office or go to the emergency room.  You may get side effects from the steroid.  This is not uncommon.  Symptoms include: elevated blood sugar, elevated blood pressure, headache, flushing, nausea, insomnia.  These symptoms are transient and will resolve within 1-3 days.  If symptoms last longer than this please contact our office or head to the emergency room.  Steroid medications can take anywhere from 3-14 days to take effect (rarely longer).  You may notice that your pain worsens for a short period of time after the injection, this would not be unusual due to the pressure and trauma from the needle.    If you do not have a follow up appointment scheduled, please contact my office (or the office of the physician who referred you for the procedure) to get a post-procedure follow up scheduled 2-4 weeks after the procedure.  This can be done as a virtual visit if that is more convenient for you.      What you need to do:    Keep a record of your response to the injection you had today.    How much relief did you get?   When did the relief start and how long did it last?  Were you  able to decrease the use of any of your pain medications?  Were you able to increase your level of activity?  How long did the relief last?    What to watch out for:    If you experience any of the following symptoms after your procedure, please notify the messaging service immediately (see above for contact information):   fever (increased oral temperature)   bleeding or swelling at the injection site,    drainage, rash or redness at the injection site    possible signs of infection    increased pain at the injection site   worsening of your usual pain   severe headache   new or worsening numbness    new arm and/or leg weakness, or    changes in bowel and/or bladder function: urinating or defecating on yourself and not knowing that you did it.    PLEASE FOLLOW ALL INSTRUCTIONS CAREFULLY     Do not engage in strenuous activity (e.g., lifting or pushing heavy objects or repeated bending) for 24 hours.     Do not take a bath, swim or use Jacuzzi for 24 hours after procedure. (A shower is fine).   Remove any Band-Aids when you get home.    Use cold/ice, as needed for comfort.  We recommend the use of cold therapy alternating on for 20 minutes, off for 20 minutes.    Do not apply direct heat (heating pad or heat packs) to the injection site for 24 hours.     Resume your usual medications, unless instructed otherwise by your Pain Physician.     If you are on warfarin (Coumadin) or other blood thinner, resume this medication as instructed by your prescribing Physician.    IF AT ANY POINT YOU ARE VERY CONCERNED ABOUT YOUR SYMPTOMS, PLEASE GO TO THE EMERGENCY ROOM.    If you develop worsening pain, weakness, numbness, lose bowel or bladder control (i.e., having an accident where you did not even know you had to go to the bathroom and suddenly noticed you soiled yourself), saddle anesthesia (a loss of sensation restricted to the area of the buttocks, anus and between the legs -- i.e., those parts of your body that would  touch a saddle if you were sitting on one) you need to go immediately to the emergency department for evaluation and treatment.    ----------------------------------------------------------------------------------------------------------------------------------------------------------------  If you received Sedation please read the following instructions:  POST SEDATION INSTRUCTIONS    Today you received intravenous medication (also known as sedation) that was used to help you relax and/or decrease discomfort during your procedure. This medication will be acting in your body for the next 24 hours, so you might feel a little tired or sleepy. This feeling will slowly wear off.   Common side effects associated with these medications include: drowsiness, dizziness, sleepiness, confusion, feeling excited, difficulty remembering things, lack of steadiness with walking or balance, loss of fine muscle control, slowed reflexes, difficulty focusing, and blurred vision.  Some over-the-counter and prescription medications (e.g., muscle relaxants, opioids, mood-altering medications, sedatives/hypnotics, antihistamines) can interact with the intravenous medication you received and cause an increased risk of the side effects listed above in addition to other potentially life threatening side effects. Use extreme caution if you are taking such medications, and consult with your Pain Physician or prescribing physician if you have any questions.  For the next 12-24 hours:    DO NOT--Drive a car, operate machinery or power tools   DO NOT--Drink any alcoholic beverages (not even beer), they may dangerously increase the risk of side effects.    DO NOT--Make any important legal or business decisions or sign important documents.  We advise you to have someone to assist you at home. Move slowly and carefully. Do not make sudden changes in position. Be aware of dizziness or light-headedness and move accordingly.   If you seek medical  treatment within 24 hours, let the nurse or doctor caring for you know that you have received the above medications. If you have any questions or concerns related to your sedation or treatment today please contact us.

## 2025-02-28 ENCOUNTER — HOSPITAL ENCOUNTER (EMERGENCY)
Facility: HOSPITAL | Age: 55
Discharge: HOME | End: 2025-02-28
Attending: STUDENT IN AN ORGANIZED HEALTH CARE EDUCATION/TRAINING PROGRAM
Payer: MEDICARE

## 2025-02-28 VITALS
SYSTOLIC BLOOD PRESSURE: 125 MMHG | TEMPERATURE: 98 F | DIASTOLIC BLOOD PRESSURE: 87 MMHG | WEIGHT: 200 LBS | OXYGEN SATURATION: 96 % | HEART RATE: 71 BPM | RESPIRATION RATE: 18 BRPM | HEIGHT: 72 IN | BODY MASS INDEX: 27.09 KG/M2

## 2025-02-28 DIAGNOSIS — K62.5 BRBPR (BRIGHT RED BLOOD PER RECTUM): ICD-10-CM

## 2025-02-28 DIAGNOSIS — R10.32 LEFT LOWER QUADRANT ABDOMINAL PAIN: Primary | ICD-10-CM

## 2025-02-28 DIAGNOSIS — D64.9 ANEMIA, UNSPECIFIED TYPE: ICD-10-CM

## 2025-02-28 DIAGNOSIS — K52.9 ENTERITIS: ICD-10-CM

## 2025-02-28 DIAGNOSIS — R11.2 NAUSEA AND VOMITING, UNSPECIFIED VOMITING TYPE: ICD-10-CM

## 2025-02-28 LAB
ALBUMIN SERPL BCP-MCNC: 4.2 G/DL (ref 3.5–5.2)
ALP SERPL-CCNC: 68 U/L (ref 40–150)
ALT SERPL W/O P-5'-P-CCNC: 21 U/L (ref 10–44)
ANION GAP SERPL CALC-SCNC: 8 MMOL/L (ref 8–16)
AST SERPL-CCNC: 27 U/L (ref 10–40)
BASOPHILS # BLD AUTO: 0.06 K/UL (ref 0–0.2)
BASOPHILS NFR BLD: 0.8 % (ref 0–1.9)
BILIRUB SERPL-MCNC: 0.2 MG/DL (ref 0.1–1)
BILIRUB UR QL STRIP: NEGATIVE
BIPAP: 0
BUN SERPL-MCNC: 11 MG/DL (ref 6–20)
BUN SERPL-MCNC: 12 MG/DL (ref 6–30)
CALCIUM SERPL-MCNC: 9 MG/DL (ref 8.7–10.5)
CHLORIDE SERPL-SCNC: 105 MMOL/L (ref 95–110)
CHLORIDE SERPL-SCNC: 108 MMOL/L (ref 95–110)
CLARITY UR REFRACT.AUTO: CLEAR
CO2 SERPL-SCNC: 23 MMOL/L (ref 23–29)
COLOR UR AUTO: COLORLESS
CREAT SERPL-MCNC: 1 MG/DL (ref 0.5–1.4)
CREAT SERPL-MCNC: 1.1 MG/DL (ref 0.5–1.4)
DIFFERENTIAL METHOD BLD: ABNORMAL
EOSINOPHIL # BLD AUTO: 0.6 K/UL (ref 0–0.5)
EOSINOPHIL NFR BLD: 7.7 % (ref 0–8)
ERYTHROCYTE [DISTWIDTH] IN BLOOD BY AUTOMATED COUNT: 13.2 % (ref 11.5–14.5)
EST. GFR  (NO RACE VARIABLE): >60 ML/MIN/1.73 M^2
FIO2: 21 %
GLUCOSE SERPL-MCNC: 139 MG/DL (ref 70–110)
GLUCOSE SERPL-MCNC: 145 MG/DL (ref 70–110)
GLUCOSE UR QL STRIP: NEGATIVE
HCT VFR BLD AUTO: 38.5 % (ref 40–54)
HCT VFR BLD CALC: 40 %PCV (ref 36–54)
HGB BLD-MCNC: 12.9 G/DL (ref 14–18)
HGB UR QL STRIP: NEGATIVE
IMM GRANULOCYTES # BLD AUTO: 0.01 K/UL (ref 0–0.04)
IMM GRANULOCYTES NFR BLD AUTO: 0.1 % (ref 0–0.5)
KETONES UR QL STRIP: NEGATIVE
LDH SERPL L TO P-CCNC: 0.8 MMOL/L
LEUKOCYTE ESTERASE UR QL STRIP: NEGATIVE
LIPASE SERPL-CCNC: 23 U/L (ref 4–60)
LYMPHOCYTES # BLD AUTO: 1.8 K/UL (ref 1–4.8)
LYMPHOCYTES NFR BLD: 25.4 % (ref 18–48)
MCH RBC QN AUTO: 31.2 PG (ref 27–31)
MCHC RBC AUTO-ENTMCNC: 33.5 G/DL (ref 32–36)
MCV RBC AUTO: 93 FL (ref 82–98)
MONOCYTES # BLD AUTO: 1 K/UL (ref 0.3–1)
MONOCYTES NFR BLD: 13.5 % (ref 4–15)
NEUTROPHILS # BLD AUTO: 3.7 K/UL (ref 1.8–7.7)
NEUTROPHILS NFR BLD: 52.5 % (ref 38–73)
NITRITE UR QL STRIP: NEGATIVE
NRBC BLD-RTO: 0 /100 WBC
PH UR STRIP: 7 [PH] (ref 5–8)
PLATELET # BLD AUTO: 219 K/UL (ref 150–450)
PMV BLD AUTO: 9.3 FL (ref 9.2–12.9)
POC IONIZED CALCIUM: 1.16 MMOL/L (ref 1.06–1.42)
POC PERFORMED BY: NORMAL
POC TCO2 (MEASURED): 24 MMOL/L (ref 23–29)
POC TEMPERATURE: 37 C
POTASSIUM BLD-SCNC: 4.3 MMOL/L (ref 3.5–5.1)
POTASSIUM SERPL-SCNC: 4.3 MMOL/L (ref 3.5–5.1)
PROT SERPL-MCNC: 7.3 G/DL (ref 6–8.4)
PROT UR QL STRIP: NEGATIVE
RBC # BLD AUTO: 4.14 M/UL (ref 4.6–6.2)
SAMPLE: ABNORMAL
SODIUM BLD-SCNC: 140 MMOL/L (ref 136–145)
SODIUM SERPL-SCNC: 139 MMOL/L (ref 136–145)
SP GR UR STRIP: >1.03 (ref 1–1.03)
SPECIMEN SOURCE: NORMAL
URN SPEC COLLECT METH UR: ABNORMAL
WBC # BLD AUTO: 7.13 K/UL (ref 3.9–12.7)

## 2025-02-28 PROCEDURE — 96374 THER/PROPH/DIAG INJ IV PUSH: CPT

## 2025-02-28 PROCEDURE — 96375 TX/PRO/DX INJ NEW DRUG ADDON: CPT

## 2025-02-28 PROCEDURE — 93010 ELECTROCARDIOGRAM REPORT: CPT | Mod: ,,, | Performed by: INTERNAL MEDICINE

## 2025-02-28 PROCEDURE — 82330 ASSAY OF CALCIUM: CPT | Mod: 59

## 2025-02-28 PROCEDURE — 25000003 PHARM REV CODE 250: Performed by: PHYSICIAN ASSISTANT

## 2025-02-28 PROCEDURE — 99285 EMERGENCY DEPT VISIT HI MDM: CPT | Mod: 25

## 2025-02-28 PROCEDURE — 99900035 HC TECH TIME PER 15 MIN (STAT)

## 2025-02-28 PROCEDURE — 93005 ELECTROCARDIOGRAM TRACING: CPT

## 2025-02-28 PROCEDURE — 96361 HYDRATE IV INFUSION ADD-ON: CPT

## 2025-02-28 PROCEDURE — 80053 COMPREHEN METABOLIC PANEL: CPT | Performed by: EMERGENCY MEDICINE

## 2025-02-28 PROCEDURE — 80047 BASIC METABLC PNL IONIZED CA: CPT

## 2025-02-28 PROCEDURE — 81003 URINALYSIS AUTO W/O SCOPE: CPT | Performed by: EMERGENCY MEDICINE

## 2025-02-28 PROCEDURE — 83605 ASSAY OF LACTIC ACID: CPT

## 2025-02-28 PROCEDURE — 83690 ASSAY OF LIPASE: CPT | Performed by: EMERGENCY MEDICINE

## 2025-02-28 PROCEDURE — 63600175 PHARM REV CODE 636 W HCPCS: Performed by: PHYSICIAN ASSISTANT

## 2025-02-28 PROCEDURE — 25500020 PHARM REV CODE 255: Performed by: STUDENT IN AN ORGANIZED HEALTH CARE EDUCATION/TRAINING PROGRAM

## 2025-02-28 PROCEDURE — 63600175 PHARM REV CODE 636 W HCPCS: Performed by: EMERGENCY MEDICINE

## 2025-02-28 PROCEDURE — 85025 COMPLETE CBC W/AUTO DIFF WBC: CPT | Performed by: EMERGENCY MEDICINE

## 2025-02-28 PROCEDURE — 96376 TX/PRO/DX INJ SAME DRUG ADON: CPT

## 2025-02-28 RX ORDER — ONDANSETRON 4 MG/1
4 TABLET, FILM COATED ORAL EVERY 6 HOURS
Qty: 12 TABLET | Refills: 0 | Status: SHIPPED | OUTPATIENT
Start: 2025-02-28

## 2025-02-28 RX ORDER — DROPERIDOL 2.5 MG/ML
1.25 INJECTION, SOLUTION INTRAMUSCULAR; INTRAVENOUS ONCE
Status: COMPLETED | OUTPATIENT
Start: 2025-02-28 | End: 2025-02-28

## 2025-02-28 RX ORDER — MORPHINE SULFATE 4 MG/ML
4 INJECTION, SOLUTION INTRAMUSCULAR; INTRAVENOUS
Refills: 0 | Status: COMPLETED | OUTPATIENT
Start: 2025-02-28 | End: 2025-02-28

## 2025-02-28 RX ORDER — ONDANSETRON HYDROCHLORIDE 2 MG/ML
4 INJECTION, SOLUTION INTRAVENOUS
Status: COMPLETED | OUTPATIENT
Start: 2025-02-28 | End: 2025-02-28

## 2025-02-28 RX ORDER — MORPHINE SULFATE 2 MG/ML
6 INJECTION, SOLUTION INTRAMUSCULAR; INTRAVENOUS
Status: COMPLETED | OUTPATIENT
Start: 2025-02-28 | End: 2025-02-28

## 2025-02-28 RX ORDER — DIPHENHYDRAMINE HYDROCHLORIDE 50 MG/ML
25 INJECTION INTRAMUSCULAR; INTRAVENOUS
Status: COMPLETED | OUTPATIENT
Start: 2025-02-28 | End: 2025-02-28

## 2025-02-28 RX ADMIN — MORPHINE SULFATE 4 MG: 4 INJECTION INTRAVENOUS at 08:02

## 2025-02-28 RX ADMIN — ONDANSETRON 4 MG: 2 INJECTION INTRAMUSCULAR; INTRAVENOUS at 06:02

## 2025-02-28 RX ADMIN — MORPHINE SULFATE 6 MG: 2 INJECTION, SOLUTION INTRAMUSCULAR; INTRAVENOUS at 07:02

## 2025-02-28 RX ADMIN — DIPHENHYDRAMINE HYDROCHLORIDE 25 MG: 50 INJECTION, SOLUTION INTRAMUSCULAR; INTRAVENOUS at 08:02

## 2025-02-28 RX ADMIN — IOHEXOL 75 ML: 350 INJECTION, SOLUTION INTRAVENOUS at 07:02

## 2025-02-28 RX ADMIN — SODIUM CHLORIDE 1000 ML: 9 INJECTION, SOLUTION INTRAVENOUS at 07:02

## 2025-02-28 RX ADMIN — DROPERIDOL 1.25 MG: 2.5 INJECTION, SOLUTION INTRAMUSCULAR; INTRAVENOUS at 08:02

## 2025-02-28 NOTE — FIRST PROVIDER EVALUATION
Medical screening examination initiated.  I have conducted a focused provider triage encounter, findings are as follows:    Brief history of present illness:  Patient with 2 weeks of BRBPR now with 2 days of lower abd pain, nausea/vomiting similar to previous episodes of diverticulitis    Vitals:    02/28/25 1734   BP: (!) 167/94   BP Location: Left arm   Pulse: 88   Resp: (!) 22   Temp: 98.7 °F (37.1 °C)   TempSrc: Oral   SpO2: 95%   Weight: 90.7 kg (200 lb)   Height: 6' (1.829 m)       Pertinent physical exam:  + LLQ abd pain    Brief workup plan:  labs, CT a/p     NSR @ 77 bpm, no ischemic ST/Tw changes  Preliminary workup initiated; this workup will be continued and followed by the physician or advanced practice provider that is assigned to the patient when roomed.

## 2025-03-01 LAB
OHS QRS DURATION: 78 MS
OHS QTC CALCULATION: 400 MS

## 2025-03-01 NOTE — DISCHARGE INSTRUCTIONS
Your workup today is reassuring.  CT of your abdomen and pelvis shows possible enteritis.  This may be the cause of your nausea, vomiting, diarrhea.  I am unsure of the cause of your rectal bleeding today.  You were anemic today though your blood counts are stable.  I recommend that you follow up with colorectal surgery.  I placed a referral today.    Strict ED precautions given to return immediately for new, worsening, or concerning symptoms including recurrent episodes of bloody stool, lightheadedness, dizziness, weakness, chest pain, shortness of breath, intractable abdominal pain, intractable vomiting

## 2025-03-01 NOTE — ED TRIAGE NOTES
Pt arrives via POV with reports of 10/10 abdominal pain, nausea, vomiting, fever, diarrhea, SOB, and dizziness. Pt denies chest pain. Px diverticulitis

## 2025-03-01 NOTE — ED NOTES
I-STAT Chem-8+ Results:   Value Reference Range   Sodium 140 136-145 mmol/L   Potassium  4.3 3.5-5.1 mmol/L   Chloride 105  mmol/L   Ionized Calcium 1.16 1.06-1.42 mmol/L   CO2 (measured) 24 23-29 mmol/L   Glucose 145  mg/dL   BUN 12 6-30 mg/dL   Creatinine 1.1 0.5-1.4 mg/dL   Hematocrit 40 36-54%

## 2025-03-01 NOTE — ED PROVIDER NOTES
Encounter Date: 2/28/2025       History     Chief Complaint   Patient presents with    Abdominal Pain     Lower abd pain, vomiting x2 days, BRB rectal bleeding x 2 weeks, hx diverticulitis. Has lesions on back      54 y.o. Male with a PMHx of diverticulitis, internal hemorrhoids, HTN, HLD, Chronic back pain, bipolar disorder presents to ED with BRBPR, abdominal pain, nausea, vomiting.  Two weeks ago he noticed blood in his stool.  He has states that every time he has a bowel movement he has blood mixed in with his stool.  Today hes had BM without blood.  He notes light brown stool today.  Approximately 1 week ago he developed left lower quadrant abdominal pain.  He has associated nausea and vomiting.  He has a history of diverticulitis and this feels similar to prior episodes.  He is not on AC.  He has not eaten since yesterday 2/2 to abdominal pain and nausea.  Denies fever, chest pain, shortness of breath, urinary symptoms, melena.    The history is provided by the patient.     Review of patient's allergies indicates:   Allergen Reactions    Atorvastatin Nausea And Vomiting    Toradol [ketorolac] Hives and Nausea And Vomiting     Past Medical History:   Diagnosis Date    Anxiety     Benzodiazepine dependence    Anxiety disorder, unspecified     Arthritis     Asthma     Avascular necrosis     Carpal tunnel syndrome     Chronic pain     Depression     Diverticulitis     Gout     Mixed hyperlipidemia     Other injury of other sites of trunk 12/28/2011    Pneumonia     Primary hypertension 8/23/2024    Spondylolisthesis     lumbar; myofascial pain    Staph infection     Ulnar neuropathy     left and rt arm     Past Surgical History:   Procedure Laterality Date    COLONOSCOPY N/A 7/6/2020    Procedure: COLONOSCOPY;  Surgeon: Broderick Moise MD;  Location: [facility];  Service: Endoscopy;  Laterality: N/A;    EPIDURAL STEROID INJECTION INTO LUMBAR SPINE N/A 1/13/2025    Procedure: L4-5 ZEUS;  Surgeon: Ishan  DO Servando;  Location: [facility] PAIN MANAGEMENT;  Service: Pain Management;  Laterality: N/A;  no AC    HIP SURGERY  3/06; 6/06    hip arthroplasty    HIP SURGERY      bilateral hip replacement (titanium)    INJECTION OF ANESTHETIC AGENT AROUND MEDIAL BRANCH NERVES INNERVATING LUMBAR FACET JOINT Bilateral 12/9/2024    Procedure: b/l L3,4,5 MBB#1;  Surgeon: Servando Olmstead DO;  Location: [facility] PAIN MANAGEMENT;  Service: Pain Management;  Laterality: Bilateral;  no ac    JOINT REPLACEMENT      OPEN REDUCTION AND INTERNAL FIXATION (ORIF) OF FRACTURE OF CLAVICLE Left 10/5/2023    Procedure: ORIF, FRACTURE, CLAVICLE;  Surgeon: Ozzy Howard MD;  Location: [facility] OR [address] [place] [address];  Service: Orthopedics;  Laterality: Left;    SHOULDER SURGERY  2012    right shoulder    SHOULDER SURGERY       Family History   Problem Relation Name Age of Onset    Cancer Mother      Stroke Father      Cancer Sister      Alzheimer's disease Paternal Grandmother       Social History[1]  Review of Systems    Physical Exam     Initial Vitals [02/28/25 1734]   BP Pulse Resp Temp SpO2   (!) 167/94 88 (!) 22 98.7 °F (37.1 °C) 95 %      MAP       --         Physical Exam    Nursing note and vitals reviewed.  Constitutional: He appears well-developed and well-nourished. He is not diaphoretic. No distress.   HENT:   Head: Normocephalic and atraumatic.   Nose: Nose normal.   Eyes: Conjunctivae and EOM are normal.   Neck: Neck supple.   Cardiovascular:  Normal rate, regular rhythm, normal heart sounds and intact distal pulses.           Pulmonary/Chest: Breath sounds normal. No respiratory distress.   Abdominal: He exhibits no distension. There is abdominal tenderness in the left lower quadrant. There is guarding.   Genitourinary:    Genitourinary Comments: Patient declined rectal exam     Musculoskeletal:      Cervical back: Neck supple.     Neurological: He is alert and oriented to person, place, and time.   Skin: No rash noted.   Psychiatric:  He has a normal mood and affect. Thought content normal.         ED Course   Procedures  Labs Reviewed   CBC W/ AUTO DIFFERENTIAL - Abnormal       Result Value    WBC 7.13      RBC 4.14 (*)     Hemoglobin 12.9 (*)     Hematocrit 38.5 (*)     MCV 93      MCH 31.2 (*)     MCHC 33.5      RDW 13.2      Platelets 219      MPV 9.3      Immature Granulocytes 0.1      Gran # (ANC) 3.7      Immature Grans (Abs) 0.01      Lymph # 1.8      Mono # 1.0      Eos # 0.6 (*)     Baso # 0.06      nRBC 0      Gran % 52.5      Lymph % 25.4      Mono % 13.5      Eosinophil % 7.7      Basophil % 0.8      Differential Method Automated     COMPREHENSIVE METABOLIC PANEL - Abnormal    Sodium 139      Potassium 4.3      Chloride 108      CO2 23      Glucose 139 (*)     BUN 11      Creatinine 1.0      Calcium 9.0      Total Protein 7.3      Albumin 4.2      Total Bilirubin 0.2      Alkaline Phosphatase 68      AST 27      ALT 21      eGFR >60.0      Anion Gap 8     URINALYSIS, REFLEX TO URINE CULTURE - Abnormal    Specimen UA Urine, Clean Catch      Color, UA Colorless (*)     Appearance, UA Clear      pH, UA 7.0      Specific Gravity, UA >1.030 (*)     Protein, UA Negative      Glucose, UA Negative      Ketones, UA Negative      Bilirubin (UA) Negative      Occult Blood UA Negative      Nitrite, UA Negative      Leukocytes, UA Negative      Narrative:     Specimen Source->Urine   ISTAT PROCEDURE - Abnormal    POC Glucose 145 (*)     POC BUN 12      POC Creatinine 1.1      POC Sodium 140      POC Potassium 4.3      POC Chloride 105      POC TCO2 (MEASURED) 24      POC Ionized Calcium 1.16      POC Hematocrit 40      Sample CHRISTINA     LIPASE    Lipase 23       EKG Readings: (Independently Interpreted)   Initial Reading: No STEMI. Rhythm: Normal Sinus Rhythm. Heart Rate: 77. Axis: Normal. Clinical Impression: Normal Sinus Rhythm       Imaging Results              CT Abdomen Pelvis With IV Contrast NO Oral Contrast (Final result)  Result time  02/28/25 19:37:36      Final result by Juan Dexter MD (02/28/25 19:37:36)                   Impression:      1. There are a few fluid-filled mid small bowel loops, without significant distension.  Additionally, there are a few prominent lymph nodes in the region.  Changes of developing enteritis is a consideration although clinical correlation is needed.  2. There are several scattered colonic diverticula, no convincing inflammation to suggest diverticulitis.  3. Possible hepatic steatosis, correlation with LFTs recommended.  4. Please see above for several additional findings.      Electronically signed by: Juan Dexter MD  Date:    02/28/2025  Time:    19:37               Narrative:    EXAMINATION:  CT ABDOMEN PELVIS WITH IV CONTRAST    CLINICAL HISTORY:  LLQ abdominal pain;    TECHNIQUE:  Low dose axial images, sagittal and coronal reformations were obtained from the lung bases to the pubic symphysis following the IV administration of 75 mL of Omnipaque 350 .  Oral contrast was not given.    COMPARISON:  09/29/2024    FINDINGS:  Images of the lower thorax are remarkable for mild bilateral dependent atelectasis.    The liver is mildly hypoattenuating, may be on the basis of contrast phase however steatosis not excluded.  Correlation with LFTs recommended.  The spleen, pancreas, gallbladder and adrenal glands are unremarkable.  The stomach is nondistended, no gastric wall thickening.  The portal vein, splenic vein, SMV, celiac axis and SMA all are patent.  There are a few scattered mildly prominent mason hepatic lymph nodes.    The kidneys enhance symmetrically without hydronephrosis or nephrolithiasis.  There is mild left perinephric fat stranding.  The bilateral ureters are unable to be followed in their entirety to the urinary bladder noting significant artifact from bilateral hip prostheses limits evaluation of the lower pelvis.  The visualized portions of the urinary bladder and prostate are grossly  unremarkable.    There are a few scattered colonic diverticula without convincing inflammation to suggest diverticulitis.  The terminal ileum is unremarkable.  The appendix is unremarkable.  The small bowel is remarkable for a few fluid-filled mid small bowel loops without significant wall thickening.  There are a few scattered shotty to mildly prominent mesenteric lymph nodes.  There is atherosclerotic calcification of the aorta and its branches.  There are a few scattered shotty periaortic and paracaval lymph nodes.    There are bilateral hip prostheses.  There are degenerative changes of the spine.  No significant inguinal lymphadenopathy.                                       Medications   ondansetron injection 4 mg (4 mg Intravenous Given 2/28/25 1825)   morphine injection 6 mg (6 mg Intravenous Given 2/28/25 1903)   sodium chloride 0.9% bolus 1,000 mL 1,000 mL (0 mLs Intravenous Stopped 2/28/25 2010)   iohexoL (OMNIPAQUE 350) injection 75 mL (75 mLs Intravenous Given 2/28/25 1920)   morphine injection 4 mg (4 mg Intravenous Given 2/28/25 2051)   droPERidol injection 1.25 mg (1.25 mg Intravenous Given 2/28/25 2051)   diphenhydrAMINE injection 25 mg (25 mg Intravenous Given 2/28/25 2052)     Medical Decision Making  54 y.o. Male with a PMHx of diverticulitis, internal hemorrhoids, HTN, HLD, Chronic back pain, bipolar disorder presents to ED with BRBPR, abdominal pain, nausea, vomiting.  Nontoxic appearing.  Vitals with hypertension.  Afebrile. Exam as above. I will initiate workup and reassess.      Ddx:  Acute diverticulitis, intra-abdominal abscess, acute bowel perforation, anemia, hemorrhoids, colitis, mass, ischemic colitis, mesenteric ischemia    Workup today is reassuring.  Anemia noted though H&H is stable.  Labs today without leukocytosis.  Normal lactate.  I do not suspect sepsis, ischemic colitis, mesenteric ischemia.  CT AP negative for signs of acute diverticulitis, bowel perforation, obstruction,  intra-abdominal abscess.  Chemistry mostly unremarkable except for hyperglycemia.  Patient denies bloody stool today.  Abdominal pain, nausea, vomiting has resolved.  He was successfully p.o. challenged.  He is hemodynamically stable and well-appearing.  At this time he is stable for discharge.  I did place a referral with colorectal surgery given history of bright red blood per rectum. Strict ED precautions given to return immediately for new, worsening, or concerning symptoms        Amount and/or Complexity of Data Reviewed  Labs:  Decision-making details documented in ED Course.  Radiology:  Decision-making details documented in ED Course.    Risk  Prescription drug management.               ED Course as of 03/01/25 0113   Fri Feb 28, 2025 1904 WBC: 7.13  Labs without leukocytosis [HM]   1904 Hemoglobin(!): 12.9  Anemia noted though H&H is stable []   1904 POC Lactate: 0.8  Normal lactate []   2112 Urinalysis, Reflex to Urine Culture Urine, Clean Catch(!)  Negative for nitrites, leukocytes, blood.  Unconvincing for infection or obstructive uropathy []   Sat Mar 01, 2025   0107 Comp. Metabolic Panel(!)  Notable for mild hyperglycemia without changes in bicarb or anion gap [HM]   0109 CT Abdomen Pelvis With IV Contrast NO Oral Contrast  No signs of acute diverticulitis.  Per Radiology possible signs of developing enteritis [HM]      ED Course User Index  [HM] Haley Castano PA-C                           Clinical Impression:  Final diagnoses:  [R10.32] Left lower quadrant abdominal pain (Primary)  [K52.9] Enteritis  [R11.2] Nausea and vomiting, unspecified vomiting type  [K62.5] BRBPR (bright red blood per rectum)  [D64.9] Anemia, unspecified type          ED Disposition Condition    Discharge Stable          ED Prescriptions       Medication Sig Dispense Start Date End Date Auth. Provider    ondansetron (ZOFRAN) 4 MG tablet Take 1 tablet (4 mg total) by mouth every 6 (six) hours. 12 tablet 2/28/2025  -- Haley Castano PA-C          Follow-up Information       Follow up With Specialties Details Why Contact Info Additional Information    [facility] Emergency Medicine  If symptoms worsen [id number]  [facility] [id number] [phone]     [facility] Colon and Rectal Surgery Schedule an appointment as soon as possible for a visit   [address]  [facility] [phone] [facility] Please park in [facility] and use [facility] elevator               [1]   Social History  Tobacco Use    Smoking status: Former     Current packs/day: 0.00     Average packs/day: 1 pack/day for 10.0 years (10.0 ttl pk-yrs)     Types: Cigarettes     Start date: 10/13/2002     Quit date: 10/13/2012     Years since quittin.3     Passive exposure: Past    Smokeless tobacco: Never   Substance Use Topics    Alcohol use: Yes     Alcohol/week: 12.0 standard drinks of alcohol     Types: 12 Cans of beer per week     Comment: 2x/week    Drug use: No        Haley Castano PA-C  [id number]

## 2025-03-03 ENCOUNTER — TELEPHONE (OUTPATIENT)
Dept: ORTHOPEDICS | Facility: CLINIC | Age: 55
End: 2025-03-03
Payer: MEDICARE

## 2025-03-03 NOTE — TELEPHONE ENCOUNTER
Attempted to call patient regarding no-show for Crispin appt, was unable to LVM or reschedule as the patient's voicemail box has not been set up.

## 2025-03-08 ENCOUNTER — HOSPITAL ENCOUNTER (OUTPATIENT)
Facility: HOSPITAL | Age: 55
Discharge: HOME | End: 2025-03-09
Attending: STUDENT IN AN ORGANIZED HEALTH CARE EDUCATION/TRAINING PROGRAM | Admitting: STUDENT IN AN ORGANIZED HEALTH CARE EDUCATION/TRAINING PROGRAM
Payer: MEDICARE

## 2025-03-08 DIAGNOSIS — K57.92 DIVERTICULITIS: Primary | ICD-10-CM

## 2025-03-08 LAB
ALBUMIN SERPL BCP-MCNC: 4.4 G/DL (ref 3.5–5.2)
ALP SERPL-CCNC: 68 U/L (ref 40–150)
ALT SERPL W/O P-5'-P-CCNC: 20 U/L (ref 10–44)
ANION GAP SERPL CALC-SCNC: 11 MMOL/L (ref 8–16)
AST SERPL-CCNC: 33 U/L (ref 10–40)
BASOPHILS # BLD AUTO: 0.05 K/UL (ref 0–0.2)
BASOPHILS NFR BLD: 0.4 % (ref 0–1.9)
BILIRUB SERPL-MCNC: 0.4 MG/DL (ref 0.1–1)
BILIRUB UR QL STRIP: NEGATIVE
BUN SERPL-MCNC: 13 MG/DL (ref 6–20)
CALCIUM SERPL-MCNC: 9.5 MG/DL (ref 8.7–10.5)
CHLORIDE SERPL-SCNC: 106 MMOL/L (ref 95–110)
CLARITY UR REFRACT.AUTO: CLEAR
CO2 SERPL-SCNC: 21 MMOL/L (ref 23–29)
COLOR UR AUTO: COLORLESS
CREAT SERPL-MCNC: 0.8 MG/DL (ref 0.5–1.4)
CRP SERPL-MCNC: 12.6 MG/L (ref 0–8.2)
DIFFERENTIAL METHOD BLD: ABNORMAL
EOSINOPHIL # BLD AUTO: 0.5 K/UL (ref 0–0.5)
EOSINOPHIL NFR BLD: 4 % (ref 0–8)
ERYTHROCYTE [DISTWIDTH] IN BLOOD BY AUTOMATED COUNT: 13.2 % (ref 11.5–14.5)
EST. GFR  (NO RACE VARIABLE): >60 ML/MIN/1.73 M^2
GLUCOSE SERPL-MCNC: 83 MG/DL (ref 70–110)
GLUCOSE UR QL STRIP: NEGATIVE
HCT VFR BLD AUTO: 39.4 % (ref 40–54)
HCV AB SERPL QL IA: NORMAL
HGB BLD-MCNC: 13.2 G/DL (ref 14–18)
HGB UR QL STRIP: NEGATIVE
HIV 1+2 AB+HIV1 P24 AG SERPL QL IA: NORMAL
IMM GRANULOCYTES # BLD AUTO: 0.04 K/UL (ref 0–0.04)
IMM GRANULOCYTES NFR BLD AUTO: 0.3 % (ref 0–0.5)
KETONES UR QL STRIP: NEGATIVE
LEUKOCYTE ESTERASE UR QL STRIP: NEGATIVE
LIPASE SERPL-CCNC: 22 U/L (ref 4–60)
LYMPHOCYTES # BLD AUTO: 1.7 K/UL (ref 1–4.8)
LYMPHOCYTES NFR BLD: 13.5 % (ref 18–48)
MCH RBC QN AUTO: 31.1 PG (ref 27–31)
MCHC RBC AUTO-ENTMCNC: 33.5 G/DL (ref 32–36)
MCV RBC AUTO: 93 FL (ref 82–98)
MONOCYTES # BLD AUTO: 1.5 K/UL (ref 0.3–1)
MONOCYTES NFR BLD: 11.7 % (ref 4–15)
NEUTROPHILS # BLD AUTO: 8.8 K/UL (ref 1.8–7.7)
NEUTROPHILS NFR BLD: 70.1 % (ref 38–73)
NITRITE UR QL STRIP: NEGATIVE
NRBC BLD-RTO: 0 /100 WBC
PH UR STRIP: 6 [PH] (ref 5–8)
PLATELET # BLD AUTO: 237 K/UL (ref 150–450)
PMV BLD AUTO: 9.3 FL (ref 9.2–12.9)
POTASSIUM SERPL-SCNC: 4.4 MMOL/L (ref 3.5–5.1)
PROT SERPL-MCNC: 7.9 G/DL (ref 6–8.4)
PROT UR QL STRIP: NEGATIVE
RBC # BLD AUTO: 4.25 M/UL (ref 4.6–6.2)
SODIUM SERPL-SCNC: 138 MMOL/L (ref 136–145)
SP GR UR STRIP: 1.01 (ref 1–1.03)
URN SPEC COLLECT METH UR: ABNORMAL
WBC # BLD AUTO: 12.62 K/UL (ref 3.9–12.7)

## 2025-03-08 PROCEDURE — 96372 THER/PROPH/DIAG INJ SC/IM: CPT | Performed by: PHYSICIAN ASSISTANT

## 2025-03-08 PROCEDURE — 96361 HYDRATE IV INFUSION ADD-ON: CPT

## 2025-03-08 PROCEDURE — 96375 TX/PRO/DX INJ NEW DRUG ADDON: CPT

## 2025-03-08 PROCEDURE — 85025 COMPLETE CBC W/AUTO DIFF WBC: CPT | Performed by: PHYSICIAN ASSISTANT

## 2025-03-08 PROCEDURE — 80053 COMPREHEN METABOLIC PANEL: CPT | Performed by: PHYSICIAN ASSISTANT

## 2025-03-08 PROCEDURE — 25000003 PHARM REV CODE 250: Performed by: PHYSICIAN ASSISTANT

## 2025-03-08 PROCEDURE — 86803 HEPATITIS C AB TEST: CPT | Performed by: PHYSICIAN ASSISTANT

## 2025-03-08 PROCEDURE — 25500020 PHARM REV CODE 255: Performed by: STUDENT IN AN ORGANIZED HEALTH CARE EDUCATION/TRAINING PROGRAM

## 2025-03-08 PROCEDURE — 96367 TX/PROPH/DG ADDL SEQ IV INF: CPT

## 2025-03-08 PROCEDURE — 83690 ASSAY OF LIPASE: CPT | Performed by: PHYSICIAN ASSISTANT

## 2025-03-08 PROCEDURE — 99285 EMERGENCY DEPT VISIT HI MDM: CPT | Mod: 25

## 2025-03-08 PROCEDURE — 63600175 PHARM REV CODE 636 W HCPCS: Performed by: PHYSICIAN ASSISTANT

## 2025-03-08 PROCEDURE — G0378 HOSPITAL OBSERVATION PER HR: HCPCS

## 2025-03-08 PROCEDURE — 86140 C-REACTIVE PROTEIN: CPT | Performed by: PHYSICIAN ASSISTANT

## 2025-03-08 PROCEDURE — 96365 THER/PROPH/DIAG IV INF INIT: CPT

## 2025-03-08 PROCEDURE — 96376 TX/PRO/DX INJ SAME DRUG ADON: CPT

## 2025-03-08 PROCEDURE — 87389 HIV-1 AG W/HIV-1&-2 AB AG IA: CPT | Performed by: PHYSICIAN ASSISTANT

## 2025-03-08 PROCEDURE — 81003 URINALYSIS AUTO W/O SCOPE: CPT | Performed by: PHYSICIAN ASSISTANT

## 2025-03-08 RX ORDER — HYDROMORPHONE HYDROCHLORIDE 1 MG/ML
1 INJECTION, SOLUTION INTRAMUSCULAR; INTRAVENOUS; SUBCUTANEOUS
Refills: 0 | Status: COMPLETED | OUTPATIENT
Start: 2025-03-08 | End: 2025-03-08

## 2025-03-08 RX ORDER — AMLODIPINE BESYLATE 5 MG/1
5 TABLET ORAL DAILY
COMMUNITY
Start: 2025-02-13 | End: 2025-03-09 | Stop reason: HOSPADM

## 2025-03-08 RX ORDER — CEFTRIAXONE 2 G/1
2 INJECTION, POWDER, FOR SOLUTION INTRAMUSCULAR; INTRAVENOUS
Status: DISCONTINUED | OUTPATIENT
Start: 2025-03-08 | End: 2025-03-09 | Stop reason: HOSPADM

## 2025-03-08 RX ORDER — ASPIRIN 81 MG/1
81 TABLET ORAL DAILY
Status: DISCONTINUED | OUTPATIENT
Start: 2025-03-09 | End: 2025-03-09 | Stop reason: HOSPADM

## 2025-03-08 RX ORDER — ALLOPURINOL 100 MG/1
100 TABLET ORAL DAILY
Status: DISCONTINUED | OUTPATIENT
Start: 2025-03-09 | End: 2025-03-09 | Stop reason: HOSPADM

## 2025-03-08 RX ORDER — METHOCARBAMOL 500 MG/1
1000 TABLET, FILM COATED ORAL
Status: COMPLETED | OUTPATIENT
Start: 2025-03-08 | End: 2025-03-08

## 2025-03-08 RX ORDER — MORPHINE SULFATE 4 MG/ML
4 INJECTION, SOLUTION INTRAMUSCULAR; INTRAVENOUS
Refills: 0 | Status: COMPLETED | OUTPATIENT
Start: 2025-03-08 | End: 2025-03-08

## 2025-03-08 RX ORDER — HYDROMORPHONE HYDROCHLORIDE 1 MG/ML
1 INJECTION, SOLUTION INTRAMUSCULAR; INTRAVENOUS; SUBCUTANEOUS
Status: DISCONTINUED | OUTPATIENT
Start: 2025-03-08 | End: 2025-03-09

## 2025-03-08 RX ORDER — EZETIMIBE 10 MG/1
10 TABLET ORAL NIGHTLY
COMMUNITY
Start: 2025-02-12

## 2025-03-08 RX ORDER — AMLODIPINE BESYLATE 5 MG/1
5 TABLET ORAL DAILY
Status: DISCONTINUED | OUTPATIENT
Start: 2025-03-08 | End: 2025-03-08

## 2025-03-08 RX ORDER — ONDANSETRON HYDROCHLORIDE 2 MG/ML
4 INJECTION, SOLUTION INTRAVENOUS EVERY 8 HOURS PRN
Status: DISCONTINUED | OUTPATIENT
Start: 2025-03-08 | End: 2025-03-09 | Stop reason: HOSPADM

## 2025-03-08 RX ORDER — SODIUM CHLORIDE 0.9 % (FLUSH) 0.9 %
10 SYRINGE (ML) INJECTION
Status: DISCONTINUED | OUTPATIENT
Start: 2025-03-08 | End: 2025-03-09 | Stop reason: HOSPADM

## 2025-03-08 RX ORDER — OXYCODONE AND ACETAMINOPHEN 10; 325 MG/1; MG/1
1 TABLET ORAL EVERY 4 HOURS PRN
Refills: 0 | Status: DISCONTINUED | OUTPATIENT
Start: 2025-03-08 | End: 2025-03-09 | Stop reason: HOSPADM

## 2025-03-08 RX ORDER — OXYCODONE AND ACETAMINOPHEN 10; 325 MG/1; MG/1
1 TABLET ORAL 2 TIMES DAILY
Refills: 0 | Status: DISCONTINUED | OUTPATIENT
Start: 2025-03-08 | End: 2025-03-09 | Stop reason: HOSPADM

## 2025-03-08 RX ORDER — BACLOFEN 10 MG/1
20 TABLET ORAL 3 TIMES DAILY PRN
Status: DISCONTINUED | OUTPATIENT
Start: 2025-03-08 | End: 2025-03-09 | Stop reason: HOSPADM

## 2025-03-08 RX ORDER — DICYCLOMINE HYDROCHLORIDE 10 MG/ML
20 INJECTION INTRAMUSCULAR
Status: COMPLETED | OUTPATIENT
Start: 2025-03-08 | End: 2025-03-08

## 2025-03-08 RX ORDER — ONDANSETRON HYDROCHLORIDE 2 MG/ML
4 INJECTION, SOLUTION INTRAVENOUS
Status: COMPLETED | OUTPATIENT
Start: 2025-03-08 | End: 2025-03-08

## 2025-03-08 RX ORDER — METRONIDAZOLE 500 MG/100ML
500 INJECTION, SOLUTION INTRAVENOUS
Status: DISCONTINUED | OUTPATIENT
Start: 2025-03-08 | End: 2025-03-09 | Stop reason: HOSPADM

## 2025-03-08 RX ORDER — MORPHINE SULFATE 4 MG/ML
4 INJECTION, SOLUTION INTRAMUSCULAR; INTRAVENOUS
Status: DISCONTINUED | OUTPATIENT
Start: 2025-03-08 | End: 2025-03-08

## 2025-03-08 RX ORDER — ONDANSETRON 4 MG/1
4 TABLET, ORALLY DISINTEGRATING ORAL
Status: DISCONTINUED | OUTPATIENT
Start: 2025-03-08 | End: 2025-03-08

## 2025-03-08 RX ORDER — TIZANIDINE 4 MG/1
4-8 TABLET ORAL NIGHTLY PRN
COMMUNITY
Start: 2025-01-15

## 2025-03-08 RX ORDER — FAMOTIDINE 40 MG/1
40 TABLET, FILM COATED ORAL 2 TIMES DAILY
COMMUNITY
Start: 2025-02-24

## 2025-03-08 RX ORDER — TALC
6 POWDER (GRAM) TOPICAL NIGHTLY PRN
Status: DISCONTINUED | OUTPATIENT
Start: 2025-03-08 | End: 2025-03-09 | Stop reason: HOSPADM

## 2025-03-08 RX ORDER — SODIUM CHLORIDE, SODIUM LACTATE, POTASSIUM CHLORIDE, CALCIUM CHLORIDE 600; 310; 30; 20 MG/100ML; MG/100ML; MG/100ML; MG/100ML
1000 INJECTION, SOLUTION INTRAVENOUS
Status: ACTIVE | OUTPATIENT
Start: 2025-03-08 | End: 2025-03-09

## 2025-03-08 RX ORDER — HYDRALAZINE HYDROCHLORIDE 25 MG/1
25 TABLET, FILM COATED ORAL EVERY 8 HOURS PRN
Status: DISCONTINUED | OUTPATIENT
Start: 2025-03-08 | End: 2025-03-09 | Stop reason: HOSPADM

## 2025-03-08 RX ADMIN — PIPERACILLIN SODIUM AND TAZOBACTAM SODIUM 4.5 G: 4; .5 INJECTION, POWDER, FOR SOLUTION INTRAVENOUS at 07:03

## 2025-03-08 RX ADMIN — METHOCARBAMOL 1000 MG: 500 TABLET ORAL at 06:03

## 2025-03-08 RX ADMIN — ONDANSETRON 4 MG: 2 INJECTION INTRAMUSCULAR; INTRAVENOUS at 05:03

## 2025-03-08 RX ADMIN — HYDROMORPHONE HYDROCHLORIDE 1 MG: 1 INJECTION, SOLUTION INTRAMUSCULAR; INTRAVENOUS; SUBCUTANEOUS at 06:03

## 2025-03-08 RX ADMIN — MORPHINE SULFATE 4 MG: 4 INJECTION INTRAVENOUS at 05:03

## 2025-03-08 RX ADMIN — DICYCLOMINE HYDROCHLORIDE 20 MG: 10 INJECTION, SOLUTION INTRAMUSCULAR at 06:03

## 2025-03-08 RX ADMIN — SODIUM CHLORIDE, POTASSIUM CHLORIDE, SODIUM LACTATE AND CALCIUM CHLORIDE 1000 ML: 600; 310; 30; 20 INJECTION, SOLUTION INTRAVENOUS at 06:03

## 2025-03-08 RX ADMIN — OXYCODONE AND ACETAMINOPHEN 1 TABLET: 325; 10 TABLET ORAL at 09:03

## 2025-03-08 RX ADMIN — HYDROMORPHONE HYDROCHLORIDE 1 MG: 1 INJECTION, SOLUTION INTRAMUSCULAR; INTRAVENOUS; SUBCUTANEOUS at 10:03

## 2025-03-08 RX ADMIN — CEFTRIAXONE SODIUM 2 G: 2 INJECTION, POWDER, FOR SOLUTION INTRAMUSCULAR; INTRAVENOUS at 09:03

## 2025-03-08 RX ADMIN — METRONIDAZOLE 500 MG: 500 INJECTION, SOLUTION INTRAVENOUS at 09:03

## 2025-03-08 RX ADMIN — IOHEXOL 100 ML: 350 INJECTION, SOLUTION INTRAVENOUS at 05:03

## 2025-03-08 NOTE — ED PROVIDER NOTES
Encounter Date: 3/8/2025       History     Chief Complaint   Patient presents with    Abdominal Pain     LLQ pain, hx divertic     54-year-old male with past medical history of hypertension, diverticulitis, asthma, anxiety presents the ED with left lower quadrant abdominal pain.  States his symptoms have been ongoing for 2 weeks.  Previously seen in the ED showed enteritis with no definitive signs of diverticulitis.  States since then his symptoms have been worsening.  States he was told in the past that he would need surgery for recurrent diverticulitis but deferred surgery he when he was told he would need a colostomy bag.  He denies any fevers/chills.  Still having some diarrhea.        Review of patient's allergies indicates:   Allergen Reactions    Atorvastatin Nausea And Vomiting    Toradol [ketorolac] Hives and Nausea And Vomiting     Past Medical History:   Diagnosis Date    Anxiety     Benzodiazepine dependence    Anxiety disorder, unspecified     Arthritis     Asthma     Avascular necrosis     Carpal tunnel syndrome     Chronic pain     Depression     Diverticulitis     Gout     Mixed hyperlipidemia     Other injury of other sites of trunk 12/28/2011    Pneumonia     Primary hypertension 8/23/2024    Spondylolisthesis     lumbar; myofascial pain    Staph infection     Ulnar neuropathy     left and rt arm     Past Surgical History:   Procedure Laterality Date    COLONOSCOPY N/A 7/6/2020    Procedure: COLONOSCOPY;  Surgeon: Broderick Moise MD;  Location: [facility] ENDO;  Service: Endoscopy;  Laterality: N/A;    EPIDURAL STEROID INJECTION INTO LUMBAR SPINE N/A 1/13/2025    Procedure: L4-5 ZEUS;  Surgeon: Servando Olmstead DO;  Location: [facility] PAIN MANAGEMENT;  Service: Pain Management;  Laterality: N/A;  no AC    HIP SURGERY  3/06; 6/06    hip arthroplasty    HIP SURGERY      bilateral hip replacement (titanium)    INJECTION OF ANESTHETIC AGENT AROUND MEDIAL BRANCH NERVES INNERVATING LUMBAR FACET JOINT  Bilateral 12/9/2024    Procedure: b/l L3,4,5 MBB#1;  Surgeon: Servando Olmstead DO;  Location: [facility];  Service: Pain Management;  Laterality: Bilateral;  no ac    JOINT REPLACEMENT      OPEN REDUCTION AND INTERNAL FIXATION (ORIF) OF FRACTURE OF CLAVICLE Left 10/5/2023    Procedure: ORIF, FRACTURE, CLAVICLE;  Surgeon: Ozzy Howard MD;  Location: [facility] OR [address] [place] [address];  Service: Orthopedics;  Laterality: Left;    SHOULDER SURGERY  2012    right shoulder    SHOULDER SURGERY       Family History   Problem Relation Name Age of Onset    Cancer Mother      Stroke Father      Cancer Sister      Alzheimer's disease Paternal Grandmother       Social History[1]  Review of Systems    Physical Exam     Initial Vitals [03/08/25 1535]   BP Pulse Resp Temp SpO2   (!) 174/87 92 18 98.1 °F (36.7 °C) 96 %      MAP       --         Physical Exam    Nursing note and vitals reviewed.  Constitutional: He appears well-developed and well-nourished.   HENT:   Head: Normocephalic and atraumatic.   Eyes: Conjunctivae are normal.   Neck: Neck supple.   Normal range of motion.  Cardiovascular:  Normal rate.           Pulmonary/Chest: Breath sounds normal.   Abdominal: Abdomen is soft. There is abdominal tenderness (LLQ).   Musculoskeletal:         General: Normal range of motion.      Cervical back: Normal range of motion and neck supple.     Neurological: He is alert and oriented to person, place, and time. GCS score is 15. GCS eye subscore is 4. GCS verbal subscore is 5. GCS motor subscore is 6.   Skin: Skin is warm and dry. Capillary refill takes less than 2 seconds.         ED Course   Procedures  Labs Reviewed   CBC W/ AUTO DIFFERENTIAL - Abnormal       Result Value    WBC 12.62      RBC 4.25 (*)     Hemoglobin 13.2 (*)     Hematocrit 39.4 (*)     MCV 93      MCH 31.1 (*)     MCHC 33.5      RDW 13.2      Platelets 237      MPV 9.3      Immature Granulocytes 0.3      Gran # (ANC) 8.8 (*)     Immature Grans (Abs)  0.04      Lymph # 1.7      Mono # 1.5 (*)     Eos # 0.5      Baso # 0.05      nRBC 0      Gran % 70.1      Lymph % 13.5 (*)     Mono % 11.7      Eosinophil % 4.0      Basophil % 0.4      Differential Method Automated     COMPREHENSIVE METABOLIC PANEL - Abnormal    Sodium 138      Potassium 4.4      Chloride 106      CO2 21 (*)     Glucose 83      BUN 13      Creatinine 0.8      Calcium 9.5      Total Protein 7.9      Albumin 4.4      Total Bilirubin 0.4      Alkaline Phosphatase 68      AST 33      ALT 20      eGFR >60.0      Anion Gap 11     URINALYSIS, REFLEX TO URINE CULTURE - Abnormal    Specimen UA Urine, Clean Catch      Color, UA Colorless (*)     Appearance, UA Clear      pH, UA 6.0      Specific Gravity, UA 1.010      Protein, UA Negative      Glucose, UA Negative      Ketones, UA Negative      Bilirubin (UA) Negative      Occult Blood UA Negative      Nitrite, UA Negative      Leukocytes, UA Negative      Narrative:     Specimen Source->Urine   HEPATITIS C ANTIBODY    Hepatitis C Ab Non-reactive      Narrative:     Release to patient->Immediate   HIV 1 / 2 ANTIBODY    HIV 1/2 Ag/Ab Non-reactive      Narrative:     Release to patient->Immediate   LIPASE    Lipase 22            Imaging Results               CT Abdomen Pelvis With IV Contrast NO Oral Contrast (Final result)  Result time 03/08/25 18:28:10      Final result by Juan Dexter MD (03/08/25 18:28:10)                   Impression:      This report was flagged in Epic as abnormal.    1. Findings most consistent with short segment acute diverticulitis involving the distal descending colon.  There is disorganized fluid in the region, no findings to suggest abscess or free air.  Follow-up colonoscopy is recommended to exclude superimposed mass.  2. Liquid stool throughout the large bowel suggests diarrheal component.  3. Please see above for several additional findings.      Electronically signed by: Juan Dexter  MD  Date:    03/08/2025  Time:    18:28               Narrative:    EXAMINATION:  CT ABDOMEN PELVIS WITH IV CONTRAST    CLINICAL HISTORY:  LLQ abdominal pain;    TECHNIQUE:  Low dose axial images, sagittal and coronal reformations were obtained from the lung bases to the pubic symphysis following the IV administration of 100 mL of Omnipaque 350 .  Oral contrast was not given.    COMPARISON:  02/28/2025    FINDINGS:  Images of the lower thorax are remarkable for minimal dependent atelectasis.    The liver is hypoattenuating suggesting steatosis, correlation with LFTs recommended.  The spleen, pancreas, gallbladder and right adrenal gland are grossly unremarkable.  There is a low attenuating lesion arising from the lateral aspect of the left adrenal gland, possibly reflecting adenoma.  The portal vein, splenic vein, SMV, celiac axis and SMA all are patent.  No significant abdominal lymphadenopathy.  The stomach is decompressed without wall thickening.    The kidneys enhance symmetrically without hydronephrosis or nephrolithiasis.  The bilateral ureters are unremarkable without calculi seen noting the ureters cannot be followed in their entirety to the urinary bladder secondary to extensive artifact from bilateral hip prostheses.  Likewise, the urinary bladder and prostate are in effectively evaluated.    There are several scattered colonic diverticula.  There is inflammation and wall thickening involving a short segment of distal descending colon in the region of diverticula most consistent with acute diverticulitis.  There is adjacent disorganized fluid, no findings to suggest abscess or large volume free air.  Diverticula are noted elsewhere along the large bowel.  There is scattered liquid stool throughout the large bowel.  The terminal ileum is unremarkable.  The appendix is unremarkable.  The small bowel is grossly unremarkable.  There are a few scattered shotty periaortic, pericaval, and mesenteric lymph nodes.   No focal organized pelvic fluid collection.  There is atherosclerotic calcification of the aorta and its branches.    There are bilateral hip prostheses.  There are degenerative changes of the spine.  No significant inguinal lymphadenopathy.                                       Medications   piperacillin-tazobactam (ZOSYN) 4.5 g in D5W 100 mL IVPB (MB+) (has no administration in time range)   lactated ringers bolus 1,000 mL (1,000 mLs Intravenous New Bag 3/8/25 1842)   morphine injection 4 mg (4 mg Intravenous Given 3/8/25 1722)   ondansetron injection 4 mg (4 mg Intravenous Given 3/8/25 1721)   dicyclomine injection 20 mg (20 mg Intramuscular Given 3/8/25 1804)   methocarbamoL tablet 1,000 mg (1,000 mg Oral Given 3/8/25 1803)   iohexoL (OMNIPAQUE 350) injection 100 mL (100 mLs Intravenous Given 3/8/25 1759)   HYDROmorphone injection 1 mg (1 mg Intravenous Given 3/8/25 1843)     Medical Decision Making  54-year-old male presents ED with persistent left lower quadrant abdominal pain.  History of recurrent diverticulitis.      Differential includes but not limited to diverticulitis, abscess, perforation, sepsis     Previous ED visits showed enteritis on CT.  His repeat CT today showed acute diverticulitis with no perforation or abscess.  His lab work was reassuring.  Has been taking Percocet 10 mg at home which she typically takes her back pain no relief and reports worsening pain.  Required multiple doses of IV analgesia here in the ED. will start on Zosyn and will observe in the ED observation unit for pain control.  May consider CRS consult if no improvement.    Amount and/or Complexity of Data Reviewed  Labs:  Decision-making details documented in ED Course.  Radiology: ordered.    Risk  Prescription drug management.               ED Course as of 03/08/25 1851   Sat Mar 08, 2025   1743 Lipase: 22 [HJ]   1743 WBC: 12.62  No leukocytosis [HJ]      ED Course User Index  [HJ] Rolando Starkey PA-C                            Clinical Impression:  Final diagnoses:  [K57.92] Diverticulitis (Primary)          ED Disposition Condition    Observation Stable                  [1]   Social History  Tobacco Use    Smoking status: Former     Current packs/day: 0.00     Average packs/day: 1 pack/day for 10.0 years (10.0 ttl pk-yrs)     Types: Cigarettes     Start date: 10/13/2002     Quit date: 10/13/2012     Years since quittin.4     Passive exposure: Past    Smokeless tobacco: Never   Substance Use Topics    Alcohol use: Yes     Alcohol/week: 12.0 standard drinks of alcohol     Types: 12 Cans of beer per week     Comment: 2x/week    Drug use: No        Rolando Starkey PA-C  [id number]

## 2025-03-08 NOTE — FIRST PROVIDER EVALUATION
Emergency Department TeleTriage Encounter Note      CHIEF COMPLAINT    Chief Complaint   Patient presents with    Abdominal Pain     LLQ pain, hx divertic       VITAL SIGNS   Initial Vitals [03/08/25 1535]   BP Pulse Resp Temp SpO2   (!) 174/87 92 18 98.1 °F (36.7 °C) 96 %      MAP       --            ALLERGIES    Review of patient's allergies indicates:   Allergen Reactions    Atorvastatin Nausea And Vomiting    Toradol [ketorolac] Hives and Nausea And Vomiting       PROVIDER TRIAGE NOTE  Patient presents with LLQ abdominal pain nausea. He was seen in the ED last week and told he had minor flare of diverticulitis. Symptoms are worse now. He has not been on any antibiotics.       ORDERS  Labs Reviewed   HEPATITIS C ANTIBODY   HIV 1 / 2 ANTIBODY       ED Orders (720h ago, onward)      Start Ordered     Status Ordering Provider    03/08/25 1537 03/08/25 1537  Hepatitis C Antibody  STAT         Ordered TRICIA BOWLES    03/08/25 1537 03/08/25 1537  HIV 1/2 Ag/Ab (4th Gen)  STAT         Ordered TRICIA BOWLES              Virtual Visit Note: The provider triage portion of this emergency department evaluation and documentation was performed via [employer], a HIPAA-compliant telemedicine application, in concert with a tele-presenter in the room. A face to face patient evaluation with one of my colleagues will occur once the patient is placed in an emergency department room.      DISCLAIMER: This note was prepared with [email or web address]*Fancy voice recognition transcription software. Garbled syntax, mangled pronouns, and other bizarre constructions may be attributed to that software system.

## 2025-03-08 NOTE — ED NOTES
LOC: The patient is awake and alert; oriented x 3 and speaking appropriately.  APPEARANCE: Patient resting comfortably, patient is clean and well groomed  SKIN: warm and dry, normal skin turgor & moist mucus membranes, skin intact, no breakdown noted.  MUSCULOSKELETAL: Patient moving all extremities well, no obvious swelling or deformities noted  RESPIRATORY: Airway is open and patent,  respirations are spontaneous, normal effort and rate  CARDIAC: Patient has a normal rate, no peripheral edema noted, capillary refill < 3 seconds; No complaints of chest pain   ABDOMEN:  tender to palpation, distention noted. Bowel sounds present x 4 - having watery diarrhea x 2 days.

## 2025-03-08 NOTE — ED TRIAGE NOTES
C/o abd pain that began last week-  states it's diverticulitis. Having bloody stools. Seen here in ER last week for same. Having chills  and   has had  watery diarrhea since yesterday. No recent  antibiotic  treatment.

## 2025-03-09 VITALS
HEART RATE: 76 BPM | BODY MASS INDEX: 32.9 KG/M2 | RESPIRATION RATE: 17 BRPM | HEIGHT: 71 IN | DIASTOLIC BLOOD PRESSURE: 78 MMHG | OXYGEN SATURATION: 95 % | WEIGHT: 235 LBS | SYSTOLIC BLOOD PRESSURE: 118 MMHG | TEMPERATURE: 98 F

## 2025-03-09 LAB
BASOPHILS # BLD AUTO: 0.04 K/UL (ref 0–0.2)
BASOPHILS NFR BLD: 0.4 % (ref 0–1.9)
CRP SERPL-MCNC: 28.8 MG/L (ref 0–8.2)
DIFFERENTIAL METHOD BLD: ABNORMAL
EOSINOPHIL # BLD AUTO: 0.4 K/UL (ref 0–0.5)
EOSINOPHIL NFR BLD: 3.9 % (ref 0–8)
ERYTHROCYTE [DISTWIDTH] IN BLOOD BY AUTOMATED COUNT: 13.4 % (ref 11.5–14.5)
HCT VFR BLD AUTO: 34.2 % (ref 40–54)
HGB BLD-MCNC: 11.4 G/DL (ref 14–18)
IMM GRANULOCYTES # BLD AUTO: 0.04 K/UL (ref 0–0.04)
IMM GRANULOCYTES NFR BLD AUTO: 0.4 % (ref 0–0.5)
LYMPHOCYTES # BLD AUTO: 1.9 K/UL (ref 1–4.8)
LYMPHOCYTES NFR BLD: 18.3 % (ref 18–48)
MCH RBC QN AUTO: 30.9 PG (ref 27–31)
MCHC RBC AUTO-ENTMCNC: 33.3 G/DL (ref 32–36)
MCV RBC AUTO: 93 FL (ref 82–98)
MONOCYTES # BLD AUTO: 1.3 K/UL (ref 0.3–1)
MONOCYTES NFR BLD: 12.6 % (ref 4–15)
NEUTROPHILS # BLD AUTO: 6.8 K/UL (ref 1.8–7.7)
NEUTROPHILS NFR BLD: 64.4 % (ref 38–73)
NRBC BLD-RTO: 0 /100 WBC
PLATELET # BLD AUTO: 190 K/UL (ref 150–450)
PMV BLD AUTO: 9 FL (ref 9.2–12.9)
RBC # BLD AUTO: 3.69 M/UL (ref 4.6–6.2)
WBC # BLD AUTO: 10.52 K/UL (ref 3.9–12.7)

## 2025-03-09 PROCEDURE — 63600175 PHARM REV CODE 636 W HCPCS: Performed by: PHYSICIAN ASSISTANT

## 2025-03-09 PROCEDURE — 85025 COMPLETE CBC W/AUTO DIFF WBC: CPT | Performed by: PHYSICIAN ASSISTANT

## 2025-03-09 PROCEDURE — 25000003 PHARM REV CODE 250: Performed by: PHYSICIAN ASSISTANT

## 2025-03-09 PROCEDURE — G0378 HOSPITAL OBSERVATION PER HR: HCPCS

## 2025-03-09 PROCEDURE — 96366 THER/PROPH/DIAG IV INF ADDON: CPT

## 2025-03-09 PROCEDURE — 96376 TX/PRO/DX INJ SAME DRUG ADON: CPT

## 2025-03-09 PROCEDURE — 86140 C-REACTIVE PROTEIN: CPT | Performed by: STUDENT IN AN ORGANIZED HEALTH CARE EDUCATION/TRAINING PROGRAM

## 2025-03-09 RX ORDER — AMOXICILLIN AND CLAVULANATE POTASSIUM 875; 125 MG/1; MG/1
1 TABLET, FILM COATED ORAL EVERY 8 HOURS
Qty: 42 TABLET | Refills: 0 | Status: SHIPPED | OUTPATIENT
Start: 2025-03-09 | End: 2025-03-23

## 2025-03-09 RX ORDER — HYDROMORPHONE HYDROCHLORIDE 1 MG/ML
0.2 INJECTION, SOLUTION INTRAMUSCULAR; INTRAVENOUS; SUBCUTANEOUS
Status: DISCONTINUED | OUTPATIENT
Start: 2025-03-09 | End: 2025-03-09

## 2025-03-09 RX ORDER — ONDANSETRON 4 MG/1
4 TABLET, FILM COATED ORAL EVERY 6 HOURS PRN
Qty: 30 TABLET | Refills: 0 | Status: SHIPPED | OUTPATIENT
Start: 2025-03-09

## 2025-03-09 RX ORDER — HYDROMORPHONE HYDROCHLORIDE 1 MG/ML
0.5 INJECTION, SOLUTION INTRAMUSCULAR; INTRAVENOUS; SUBCUTANEOUS
Status: DISCONTINUED | OUTPATIENT
Start: 2025-03-09 | End: 2025-03-09

## 2025-03-09 RX ADMIN — ALLOPURINOL 100 MG: 100 TABLET ORAL at 08:03

## 2025-03-09 RX ADMIN — HYDROMORPHONE HYDROCHLORIDE 0.5 MG: 0.5 INJECTION, SOLUTION INTRAMUSCULAR; INTRAVENOUS; SUBCUTANEOUS at 12:03

## 2025-03-09 RX ADMIN — OXYCODONE AND ACETAMINOPHEN 1 TABLET: 325; 10 TABLET ORAL at 01:03

## 2025-03-09 RX ADMIN — OXYCODONE AND ACETAMINOPHEN 1 TABLET: 325; 10 TABLET ORAL at 12:03

## 2025-03-09 RX ADMIN — HYDROMORPHONE HYDROCHLORIDE 1 MG: 1 INJECTION, SOLUTION INTRAMUSCULAR; INTRAVENOUS; SUBCUTANEOUS at 03:03

## 2025-03-09 RX ADMIN — HYDROMORPHONE HYDROCHLORIDE 1 MG: 1 INJECTION, SOLUTION INTRAMUSCULAR; INTRAVENOUS; SUBCUTANEOUS at 09:03

## 2025-03-09 RX ADMIN — METRONIDAZOLE 500 MG: 500 INJECTION, SOLUTION INTRAVENOUS at 12:03

## 2025-03-09 RX ADMIN — METRONIDAZOLE 500 MG: 500 INJECTION, SOLUTION INTRAVENOUS at 04:03

## 2025-03-09 RX ADMIN — OXYCODONE AND ACETAMINOPHEN 1 TABLET: 325; 10 TABLET ORAL at 08:03

## 2025-03-09 RX ADMIN — OXYCODONE AND ACETAMINOPHEN 1 TABLET: 325; 10 TABLET ORAL at 05:03

## 2025-03-09 RX ADMIN — ASPIRIN 81 MG: 81 TABLET, COATED ORAL at 08:03

## 2025-03-09 NOTE — DISCHARGE INSTRUCTIONS
Continue with the clear liquid diet today, and advanced to your low fiber diet tomorrow.  Please continue with your low fiber diet.    Colorectal surgery reach out to you about your colonoscopy
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
## 2025-03-09 NOTE — CARE UPDATE
-discussed case with colorectal surgery.  Antibiotics changed to ceftriaxone and Flagyl.  Follow up a.m. CBC and CRP.
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
## 2025-03-09 NOTE — CONSULTS
CRS Follow-up Note    Examined at the bedside - resting comfortably. Abdominal pain improved. Tolerated some water last night. Bowel movements overnight without further bleeding. No other acute events.    Vitals:    03/09/25 0715   BP: (!) 102/51   Pulse: 74   Resp: 18   Temp: 98.6 °F (37 °C)   General: NAD, AAOx3  Resp: non-labored breathing on room air  Abdomen: protuberant, soft, mild tenderness in LLQ, improved from prior exam    Labs: reviewed  WBC: 12.6 -> 10.5  H/H: 11.4/34.2    CRP: 12.6 -> 28.8    A/P:  55 y/o M with recurrent descending/sigmoid colon diverticulitis, now with another acute uncomplicated episode.     - Pain control as needed  - would keep liquid diet at this time and as tolerated, advance as tenderness further improved  - trend CRP while inpatient  - Ceftriaxone/Flagyl for IV Abx coverage while inpatient - may transition to Augmentin for 14 day course upon discharge  - serial exams     - discussed importance of colonoscopy following this episode - will attempt to rearrange  Patient is undecided about pursuing surgery. Fearful of risk of possible colostomy.     CRS will continue to follow
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
## 2025-03-09 NOTE — NURSING
Assumed care for pt after recieving report from previous RN. Pt resting in bed in NAD, RR e/u. Vital signs stable and WDL at this time of assessment.     LOC: The patient is awake, alert, and oriented to self, place, time, and situation. Pt is calm and cooperative. Affect is appropriate.  Speech is appropriate and clear.      APPEARANCE: Patient resting comfortably, in no acute distress.  Patient is clean and well groomed.     SKIN: The skin is warm and dry; color consistent with ethnicity.  Patient has normal skin turgor and moist mucus membranes.  Skin intact; no breakdown or bruising noted.      MUSCULOSKELETAL: Patient moving upper  extremities without difficulty; Denies weakness.      RESPIRATORY: Airway is open and patent. Respirations spontaneous, even, easy, and non-labored.  Patient has a normal effort and rate.  No accessory muscle use noted. Denies cough.      CARDIAC:  No peripheral edema noted. No complaints of chest pain.      ABDOMEN: tender to palpation.  No distention noted. Pt endorses abdominal pain; denies nausea, vomiting, endorses diarrhea.      NEUROLOGIC: Eyes open spontaneously.  Behavior appropriate to situation.  Follows commands; facial expression symmetrical.  Purposeful motor response noted; normal sensation in all extremities. Pt denies headache; denies lightheadedness or dizziness; denies visual disturbances; denies loss of balance; denies unilateral weakness.    Main Complaint: Abd pain

## 2025-03-13 ENCOUNTER — PATIENT OUTREACH (OUTPATIENT)
Dept: ADMINISTRATIVE | Facility: CLINIC | Age: 55
End: 2025-03-13
Payer: MEDICARE

## 2025-03-14 ENCOUNTER — TELEPHONE (OUTPATIENT)
Dept: ENDOSCOPY | Facility: HOSPITAL | Age: 55
End: 2025-03-14
Payer: MEDICARE

## 2025-03-14 ENCOUNTER — PATIENT MESSAGE (OUTPATIENT)
Dept: ENDOSCOPY | Facility: HOSPITAL | Age: 55
End: 2025-03-14
Payer: MEDICARE

## 2025-03-26 ENCOUNTER — TELEPHONE (OUTPATIENT)
Dept: ENDOSCOPY | Facility: HOSPITAL | Age: 55
End: 2025-03-26
Payer: MEDICARE

## 2025-03-26 ENCOUNTER — PATIENT MESSAGE (OUTPATIENT)
Dept: ENDOSCOPY | Facility: HOSPITAL | Age: 55
End: 2025-03-26
Payer: MEDICARE

## 2025-04-02 ENCOUNTER — TELEPHONE (OUTPATIENT)
Dept: SURGERY | Facility: CLINIC | Age: 55
End: 2025-04-02
Payer: MEDICARE

## 2025-04-02 ENCOUNTER — PATIENT MESSAGE (OUTPATIENT)
Dept: SURGERY | Facility: CLINIC | Age: 55
End: 2025-04-02
Payer: MEDICARE

## 2025-04-02 NOTE — TELEPHONE ENCOUNTER
Called pt to confirm appt with Dr. Cates on 4/4/25 at 11:00 AM in [facility] (2nd floor). Pt 's vm is not set up, portal message sent instead.

## 2025-04-02 NOTE — TELEPHONE ENCOUNTER
Called pt to confirm appt with Dr. Cates on 4/4/25 at 11:00 AM in [facility] (2nd floor). Pt's vm was not set up, portal message sent.

## 2025-04-04 ENCOUNTER — OFFICE VISIT (OUTPATIENT)
Dept: ORTHOPEDICS | Facility: CLINIC | Age: 55
End: 2025-04-04
Payer: MEDICARE

## 2025-04-04 ENCOUNTER — OFFICE VISIT (OUTPATIENT)
Dept: SURGERY | Facility: CLINIC | Age: 55
End: 2025-04-04
Attending: COLON & RECTAL SURGERY
Payer: MEDICARE

## 2025-04-04 ENCOUNTER — TELEPHONE (OUTPATIENT)
Dept: ENDOSCOPY | Facility: HOSPITAL | Age: 55
End: 2025-04-04
Payer: MEDICARE

## 2025-04-04 VITALS
HEART RATE: 81 BPM | DIASTOLIC BLOOD PRESSURE: 72 MMHG | BODY MASS INDEX: 33.99 KG/M2 | SYSTOLIC BLOOD PRESSURE: 131 MMHG | HEIGHT: 71 IN | WEIGHT: 242.81 LBS

## 2025-04-04 VITALS — SYSTOLIC BLOOD PRESSURE: 109 MMHG | DIASTOLIC BLOOD PRESSURE: 76 MMHG | HEART RATE: 71 BPM

## 2025-04-04 DIAGNOSIS — M17.11 PRIMARY OSTEOARTHRITIS OF RIGHT KNEE: Primary | ICD-10-CM

## 2025-04-04 DIAGNOSIS — K62.5 BRBPR (BRIGHT RED BLOOD PER RECTUM): ICD-10-CM

## 2025-04-04 DIAGNOSIS — R10.32 LEFT LOWER QUADRANT ABDOMINAL PAIN: ICD-10-CM

## 2025-04-04 DIAGNOSIS — Z12.11 COLON CANCER SCREENING: Primary | ICD-10-CM

## 2025-04-04 PROCEDURE — 99999 PR PBB SHADOW E&M-EST. PATIENT-LVL III: CPT | Mod: PBBFAC,,, | Performed by: COLON & RECTAL SURGERY

## 2025-04-04 PROCEDURE — 99999 PR PBB SHADOW E&M-EST. PATIENT-LVL III: CPT | Mod: PBBFAC,,, | Performed by: NURSE PRACTITIONER

## 2025-04-04 RX ORDER — SOD SULF/POT CHLORIDE/MAG SULF 1.479 G
12 TABLET ORAL DAILY
Qty: 24 TABLET | Refills: 0 | Status: SHIPPED | OUTPATIENT
Start: 2025-04-04

## 2025-04-04 RX ORDER — TRIAMCINOLONE ACETONIDE 40 MG/ML
40 INJECTION, SUSPENSION INTRA-ARTICULAR; INTRAMUSCULAR
Status: DISCONTINUED | OUTPATIENT
Start: 2025-04-04 | End: 2025-04-04 | Stop reason: HOSPADM

## 2025-04-04 RX ORDER — LIDOCAINE HYDROCHLORIDE 10 MG/ML
4 INJECTION, SOLUTION INFILTRATION; PERINEURAL
Status: DISCONTINUED | OUTPATIENT
Start: 2025-04-04 | End: 2025-04-04 | Stop reason: HOSPADM

## 2025-04-04 RX ADMIN — TRIAMCINOLONE ACETONIDE 40 MG: 40 INJECTION, SUSPENSION INTRA-ARTICULAR; INTRAMUSCULAR at 08:04

## 2025-04-04 RX ADMIN — LIDOCAINE HYDROCHLORIDE 4 ML: 10 INJECTION, SOLUTION INFILTRATION; PERINEURAL at 08:04

## 2025-04-04 NOTE — PROCEDURES
Large Joint Aspiration/Injection: R knee    Date/Time: 4/4/2025 8:30 AM    Performed by: Nicolas Maier NP  Authorized by: Nicolas Maier NP    Consent Done?:  Yes (Verbal)  Indications:  Arthritis and pain  Site marked: the procedure site was marked    Timeout: prior to procedure the correct patient, procedure, and site was verified      Local anesthesia used?: Yes    Local anesthetic:  Lidocaine spray    Details:  Needle Size:  22 G  Ultrasonic Guidance for needle placement?: No    Approach:  Anterolateral  Location:  Knee  Site:  R knee  Medications:  4 mL LIDOcaine HCL 10 mg/ml (1%) 10 mg/mL (1 %); 40 mg triamcinolone acetonide 40 mg/mL  Patient tolerance:  Patient tolerated the procedure well with no immediate complications

## 2025-04-04 NOTE — PROGRESS NOTES
"  ICD-10-CM ICD-9-CM   1. Primary osteoarthritis of right knee  M17.11 715.16       Keon Rick Schneider Jr. presents to clinic today for right knee  Kenalog  injection.    Exam demonstrates the no effusion in the  right knee, and the skin is intact.    Diagnosis: osteoarthritis knee    Patient last seen by Dr. Gannon in Dec 2024 for the same issue.  At that visit he got an injection into his right knee which helped.  He reports the medication has "worn off" and would like to repeat it since it helped.  He denies any trauma since his last visit.  No reported fevers.    Wt Readings from Last 3 Encounters:   03/08/25 106.6 kg (235 lb)   02/28/25 90.7 kg (200 lb)   01/13/25 90.7 kg (200 lb)     Temp Readings from Last 3 Encounters:   03/09/25 98 °F (36.7 °C)   02/28/25 98 °F (36.7 °C) (Oral)   01/13/25 97.4 °F (36.3 °C) (Temporal)     BP Readings from Last 3 Encounters:   04/04/25 109/76   03/09/25 118/78   02/28/25 125/87     Pulse Readings from Last 3 Encounters:   04/04/25 71   03/09/25 76   02/28/25 71         Last knee xray was reviewed.    Please see procedure note.    After time out was performed and patient ID, side, and site were verified, the  right  knee was sterilly prepped in the standard fashion.  A 22-gauge needle was introduced into right knee joint from an elaine-lateral site without complication and knee was then injected with 1 ml of  Kenalog and 4 mL of 1% Lidocaine .  Sterile dressing was applied.  The patient was instructed to resume activities as tolerated and to call with any problems.     We will see Keon Schneider Jr. back in 3 months PRN.  "

## 2025-04-04 NOTE — TELEPHONE ENCOUNTER
"Referral for procedure from [facility]      Spoke to pt to schedule procedure(s) Colonoscopy       Physician to perform procedure(s) Dr. KALIA Cates  Date of Procedure (s) 5/5/25  Arrival Time 6:40 AM  Time of Procedure(s) 7:40 AM   Location of Procedure(s) [facility] 4th Floor  Type of Rx Prep sent to patient: Sutab pt requested  Instructions provided to patient via Copy in hand    Patient was informed on the following information and verbalized understanding. Screening questionnaire reviewed with patient and complete. If procedure requires anesthesia, a responsible adult needs to be present to accompany the patient home, patient cannot drive after receiving anesthesia. Appointment details are tentative, especially check-in time. Patient will receive a prep-op call 7 days prior to confirm check-in time for procedure. If applicable the patient should contact their pharmacy to verify Rx for procedure prep is ready for pick-up. Patient was advised to call the scheduling department at [phone] if pharmacy states no Rx is available. Patient was advised to call the endoscopy scheduling department if any questions or concerns arise.       Endoscopy Scheduling Department        ----- Message -----   From: Vero Diaz RN   Sent: 3/10/2025   8:24 AM CDT   To: Mai Cates MD; Navin Brewster MD; Katt*   Subject: Colonoscopy order                                  Procedure: Colonoscopy     Diagnosis: Abnormal finding on GI tract imaging, BRBPR, Hx Acute Diverticulitis     Procedure Timing: Within 4 weeks     *If within 4 weeks selected, please corrine as high priority*     *If greater than 12 weeks, please select "5-12 weeks" and delay sending until 3 months prior to requested date*     Provider: Dr. Mai Cates     Location: Any Site     Additional Scheduling Information: No scheduling concerns     Prep Specifications:Standard prep     Is the patient taking a GLP-1 Agonist:no     Have you attached a patient " to this message: yes

## 2025-04-04 NOTE — PROGRESS NOTES
CRS Office Visit History and Physical      SUBJECTIVE:     Chief Complaint: Diverticulitis    History of Present Illness:  Patient is a 54 y.o. male presents in follow-up.  Missed scheduled colonoscopy because of ride issue.  Was admitted 3/8-3/9 with recurrent episode, improved with antibiotics.   Currently feels well.    (10/23/2024)  Patient was managed with parenteral abx and transitioned to oral at discharged.   Since discharge, he has been doing well. Pain resolved and is feeling healthy and well. No n/v, no fevers, tolerating a normal diet, having soft bowel function without blood.   Seeing [facility] later today to discuss surgery for his back pain.  Last Colonoscopy: July 2020 (fair prep)  Family History of Colon Cancer / IBD: Denies.   Prior abdominal surgery: No  Prior pelvic or anorectal surgery: No  Family history of colon/rectal cancer: No  Family history of IBD: No  Steroid or other immunosuppression: No  Blood thinners: No  Current stool softeners or fiber supplement: Yes   Active smoking: No    [place] ([place]):  Total: 0      Review of patient's allergies indicates:   Allergen Reactions    Atorvastatin Nausea And Vomiting    Toradol [ketorolac] Hives and Nausea And Vomiting       Past Medical History:   Diagnosis Date    Anxiety     Benzodiazepine dependence    Anxiety disorder, unspecified     Arthritis     Asthma     Avascular necrosis     Carpal tunnel syndrome     Chronic pain     Depression     Diverticulitis     Gout     Mixed hyperlipidemia     Other injury of other sites of trunk 12/28/2011    Pneumonia     Primary hypertension 8/23/2024    Spondylolisthesis     lumbar; myofascial pain    Staph infection     Ulnar neuropathy     left and rt arm     Past Surgical History:   Procedure Laterality Date    COLONOSCOPY N/A 7/6/2020    Procedure: COLONOSCOPY;  Surgeon: Broderick Moise MD;  Location: [facility];  Service: Endoscopy;  Laterality: N/A;    EPIDURAL STEROID INJECTION INTO LUMBAR  "SPINE N/A 2025    Procedure: L4-5 ZEUS;  Surgeon: Servando Olmstead DO;  Location: [facility];  Service: Pain Management;  Laterality: N/A;  no AC    HIP SURGERY  3/06;     hip arthroplasty    HIP SURGERY      bilateral hip replacement (titanium)    INJECTION OF ANESTHETIC AGENT AROUND MEDIAL BRANCH NERVES INNERVATING LUMBAR FACET JOINT Bilateral 2024    Procedure: b/l L3,4,5 MBB#1;  Surgeon: Servando Olmstead DO;  Location: [facility] PAIN [facility];  Service: Pain Management;  Laterality: Bilateral;  no ac    JOINT REPLACEMENT      OPEN REDUCTION AND INTERNAL FIXATION (ORIF) OF FRACTURE OF CLAVICLE Left 10/5/2023    Procedure: ORIF, FRACTURE, CLAVICLE;  Surgeon: Ozzy Howard MD;  Location: [address];  Service: Orthopedics;  Laterality: Left;    SHOULDER SURGERY      right shoulder    SHOULDER SURGERY       Family History   Problem Relation Name Age of Onset    Cancer Mother      Stroke Father      Cancer Sister      Alzheimer's disease Paternal Grandmother       Social History     Tobacco Use    Smoking status: Former     Current packs/day: 0.00     Average packs/day: 1 pack/day for 10.0 years (10.0 ttl pk-yrs)     Types: Cigarettes     Start date: 10/13/2002     Quit date: 10/13/2012     Years since quittin.4     Passive exposure: Past    Smokeless tobacco: Never   Substance Use Topics    Alcohol use: Yes     Alcohol/week: 12.0 standard drinks of alcohol     Types: 12 Cans of beer per week     Comment: 2x/week    Drug use: No        Review of Systems:  ROS    OBJECTIVE:     Vital Signs (Most Recent)  /72   Pulse 81   Ht 5' 11" (1.803 m)   Wt 110.2 kg (242 lb 13.4 oz)   BMI 33.87 kg/m²     Physical Exam:  General: White male in no distress   Neuro: Alert and oriented x 4.  Moves all extremities.     HEENT: No icterus.  Trachea midline  Respiratory: Respirations are even and unlabored  Cardiac: Regular rate  Abdomen: Soft, " non-distended  Extremities: Warm dry and intact  Skin: No rashes    Imaging:   CT abd/pel (3/8/2025)  1. Findings most consistent with short segment acute diverticulitis involving the distal descending colon.  There is disorganized fluid in the region, no findings to suggest abscess or free air.  Follow-up colonoscopy is recommended to exclude superimposed mass.  2. Liquid stool throughout the large bowel suggests diarrheal component.    CT abd/pel (9/29/2024)  1. Findings most consistent with acute diverticulitis involving the mid to distal sigmoid colon.  Punctate foci of air in the region may reflect air within adjacent diverticula however micro perforation cannot be excluded.  No abscess.  Correlation is advised.  Follow-up colonoscopy recommended to exclude underlying lesion.  2. Findings suggest hepatic steatosis, correlation with LFTs recommended.    CT abd/pel (6/3/2022)  1. Acute uncomplicated diverticulitis involving the proximal sigmoid colon.  No evidence of abscess or perforation.  2. Otherwise no significant change from recent CT exam 05/30/2022.    CT abd/pel (6/8/2020)  Several diverticula again noted in the sigmoid colon with associated wall thickening, adjacent fat stranding, and prominence of vascular recta.  Findings are slightly improved when compared with prior studies and suggest resolving diverticulitis or colitis.  Given the persistence of findings, sigmoidoscopy follow up to exclude colon carcinoma should also be considered.  No complication such as perforation or abscess.  Hepatomegaly.  Stable 3 mm nodule left lung base posteriorly.  See prior report May 16, 2020 for recommendations.    CT abd/pel (10/18/2019)  Circumferential wall thickening and inflammatory change involving the distal descending colon and sigmoid colon thought likely representative of acute diverticulitis as discussed above.     CT abd/pel (4/8/2018)  1. Interval development of subtle inflammation about a prominent  diverticulum within the mid descending colon, correlation with any focal tenderness in this location is recommended (series 2, image 52).  2. Continued improvement of inflammation about the mid sigmoid colon in this patient with diverticulitis in this region on previous CT.  No new large focal collection or free air.  3. Suspected hepatic steatosis, correlation with LFTs advised.    ASSESSMENT/PLAN:     53yo M w/ recurrent, uncomplicated descending/sigmoid diverticulitis    Patient was walked over to Endoscopy schedulers today to reschedule colonoscopy.    Mai Cates MD  Staff Surgeon, Colon and Rectal Surgery  [facility]

## 2025-04-10 DIAGNOSIS — R93.7 ABNORMAL FINDINGS ON DIAGNOSTIC IMAGING OF OTHER PARTS OF MUSCULOSKELETAL SYSTEM: ICD-10-CM

## 2025-04-10 DIAGNOSIS — M89.9 LESION OF BONE OF THORACIC SPINE: Primary | ICD-10-CM

## 2025-04-10 NOTE — Clinical Note
Good morning,   Following up on some patients. Wondering your thoughts on repeating an MRI of the T spine now given growth in the T5 region, w/ possible IR guided biopsy thereafter depending on findings?

## 2025-04-10 NOTE — PROGRESS NOTES
Consider PSA. Consider repeat imaging of spine given increasing size at T5 vertebral body on repeat MRI imaging, with consideration of IR guided bx vs tumor board discussion (though noting imaging more consistent with benign process). Message sent to PCP.

## 2025-05-02 ENCOUNTER — TELEPHONE (OUTPATIENT)
Dept: ENDOSCOPY | Facility: HOSPITAL | Age: 55
End: 2025-05-02
Payer: MEDICARE

## 2025-05-02 NOTE — TELEPHONE ENCOUNTER
Spoke with patient confirming colonoscopy. Verbalized understanding of instructions. Confirmed has ride. Questions/concerns addressed.

## 2025-05-04 ENCOUNTER — ANESTHESIA EVENT (OUTPATIENT)
Dept: ENDOSCOPY | Facility: HOSPITAL | Age: 55
End: 2025-05-04
Payer: MEDICARE

## 2025-05-05 ENCOUNTER — HOSPITAL ENCOUNTER (OUTPATIENT)
Facility: HOSPITAL | Age: 55
Discharge: HOME | End: 2025-05-05
Attending: COLON & RECTAL SURGERY | Admitting: COLON & RECTAL SURGERY
Payer: MEDICARE

## 2025-05-05 ENCOUNTER — ANESTHESIA (OUTPATIENT)
Dept: ENDOSCOPY | Facility: HOSPITAL | Age: 55
End: 2025-05-05
Payer: MEDICARE

## 2025-05-05 VITALS
SYSTOLIC BLOOD PRESSURE: 114 MMHG | DIASTOLIC BLOOD PRESSURE: 88 MMHG | RESPIRATION RATE: 16 BRPM | HEART RATE: 67 BPM | TEMPERATURE: 98 F | OXYGEN SATURATION: 98 % | BODY MASS INDEX: 32.53 KG/M2 | HEIGHT: 71 IN | WEIGHT: 232.38 LBS

## 2025-05-05 DIAGNOSIS — K57.32 DIVERTICULITIS OF SIGMOID COLON: Primary | ICD-10-CM

## 2025-05-05 DIAGNOSIS — Z12.11 SCREENING FOR MALIGNANT NEOPLASM OF COLON: ICD-10-CM

## 2025-05-05 PROCEDURE — G0105 COLORECTAL SCRN; HI RISK IND: HCPCS | Mod: ,,, | Performed by: COLON & RECTAL SURGERY

## 2025-05-05 PROCEDURE — 37000008 HC ANESTHESIA 1ST 15 MINUTES: Performed by: COLON & RECTAL SURGERY

## 2025-05-05 PROCEDURE — 63600175 PHARM REV CODE 636 W HCPCS

## 2025-05-05 PROCEDURE — 37000009 HC ANESTHESIA EA ADD 15 MINS: Performed by: COLON & RECTAL SURGERY

## 2025-05-05 PROCEDURE — E9220 PRA ENDO ANESTHESIA: HCPCS | Mod: ,,,

## 2025-05-05 PROCEDURE — 25000003 PHARM REV CODE 250

## 2025-05-05 PROCEDURE — G0105 COLORECTAL SCRN; HI RISK IND: HCPCS | Performed by: COLON & RECTAL SURGERY

## 2025-05-05 RX ORDER — SODIUM CHLORIDE 9 MG/ML
INJECTION, SOLUTION INTRAVENOUS CONTINUOUS
Status: DISCONTINUED | OUTPATIENT
Start: 2025-05-05 | End: 2025-05-05 | Stop reason: HOSPADM

## 2025-05-05 RX ORDER — PROPOFOL 10 MG/ML
VIAL (ML) INTRAVENOUS
Status: DISCONTINUED | OUTPATIENT
Start: 2025-05-05 | End: 2025-05-05

## 2025-05-05 RX ORDER — LIDOCAINE HYDROCHLORIDE 20 MG/ML
INJECTION, SOLUTION EPIDURAL; INFILTRATION; INTRACAUDAL; PERINEURAL
Status: DISCONTINUED | OUTPATIENT
Start: 2025-05-05 | End: 2025-05-05

## 2025-05-05 RX ADMIN — LIDOCAINE HYDROCHLORIDE 50 MG: 20 INJECTION, SOLUTION EPIDURAL; INFILTRATION; INTRACAUDAL; PERINEURAL at 07:05

## 2025-05-05 RX ADMIN — SODIUM CHLORIDE: 9 INJECTION, SOLUTION INTRAVENOUS at 07:05

## 2025-05-05 RX ADMIN — PROPOFOL 150 MCG/KG/MIN: 10 INJECTION, EMULSION INTRAVENOUS at 07:05

## 2025-05-05 RX ADMIN — PROPOFOL 60 MG: 10 INJECTION, EMULSION INTRAVENOUS at 07:05

## 2025-05-05 NOTE — ANESTHESIA POSTPROCEDURE EVALUATION
Anesthesia Post Evaluation    Patient: Keon Schneider Jr.    Procedure(s) Performed: Procedure(s) (LRB):  COLONOSCOPY (N/A)    Final Anesthesia Type: general      Patient location during evaluation: GI PACU  Patient participation: Yes- Able to Participate  Level of consciousness: awake and alert  Post-procedure vital signs: reviewed and stable  Pain management: adequate  Airway patency: patent    PONV status at discharge: No PONV  Anesthetic complications: no      Cardiovascular status: hemodynamically stable  Respiratory status: unassisted and spontaneous ventilation  Hydration status: euvolemic  Follow-up not needed.              Vitals Value Taken Time   /88 05/05/25 08:41   Temp 36.7 °C (98 °F) 05/05/25 08:10   Pulse 67 05/05/25 08:41   Resp 16 05/05/25 08:41   SpO2 98 % 05/05/25 08:41         No case tracking events are documented in the log.      Pain/Ilda Score: Ilda Score: 10 (5/5/2025  8:25 AM)

## 2025-05-05 NOTE — ANESTHESIA PREPROCEDURE EVALUATION
05/05/2025  Keon Schneider Jr. is a 54 y.o., male.    Past Medical History:   Diagnosis Date    Anxiety     Benzodiazepine dependence    Anxiety disorder, unspecified     Arthritis     Asthma     Avascular necrosis     Carpal tunnel syndrome     Chronic pain     Depression     Diverticulitis     Gout     Mixed hyperlipidemia     Other injury of other sites of trunk 12/28/2011    Pneumonia     Primary hypertension 8/23/2024    Spondylolisthesis     lumbar; myofascial pain    Staph infection     Ulnar neuropathy     left and rt arm     Past Surgical History:   Procedure Laterality Date    COLONOSCOPY N/A 7/6/2020    Procedure: COLONOSCOPY;  Surgeon: Broderick Moise MD;  Location: [facility] ENDO;  Service: Endoscopy;  Laterality: N/A;    EPIDURAL STEROID INJECTION INTO LUMBAR SPINE N/A 1/13/2025    Procedure: L4-5 ZEUS;  Surgeon: Servando Olmstead DO;  Location: [facility];  Service: Pain Management;  Laterality: N/A;  no AC    HIP SURGERY  3/06; 6/06    hip arthroplasty    HIP SURGERY      bilateral hip replacement (titanium)    INJECTION OF ANESTHETIC AGENT AROUND MEDIAL BRANCH NERVES INNERVATING LUMBAR FACET JOINT Bilateral 12/9/2024    Procedure: b/l L3,4,5 MBB#1;  Surgeon: Servando Olmstead DO;  Location: [facility];  Service: Pain Management;  Laterality: Bilateral;  no ac    JOINT REPLACEMENT      OPEN REDUCTION AND INTERNAL FIXATION (ORIF) OF FRACTURE OF CLAVICLE Left 10/5/2023    Procedure: ORIF, FRACTURE, CLAVICLE;  Surgeon: Ozzy Howard MD;  Location: [address];  Service: Orthopedics;  Laterality: Left;    SHOULDER SURGERY  2012    right shoulder    SHOULDER SURGERY             Pre-op Assessment    I have reviewed the Patient Summary Reports.     I have reviewed the Nursing Notes. I have reviewed the NPO Status.   I have reviewed the Medications.      Review of Systems  Anesthesia Hx:  No problems with previous Anesthesia             Denies Family Hx of Anesthesia complications.    Denies Personal Hx of Anesthesia complications.                    Hematology/Oncology:  Hematology Normal   Oncology Normal                                   EENT/Dental:  EENT/Dental Normal           Cardiovascular:     Hypertension   CAD      Angina                                    Pulmonary:    Asthma                    Renal/:  Renal/ Normal                 Hepatic/GI:     GERD Liver Disease,               Musculoskeletal:  Musculoskeletal Normal                Neurological:    Neuromuscular Disease,                                   Endocrine:  Endocrine Normal            Dermatological:  Skin Normal    Psych:  Psychiatric History                  Physical Exam  General: Oriented, Alert, Cooperative and Well nourished    Airway:  Mallampati: II   Mouth Opening: Normal  TM Distance: Normal  Tongue: Normal  Neck ROM: Normal ROM    Dental:  Intact        Anesthesia Plan  Type of Anesthesia, risks & benefits discussed:    Anesthesia Type: Gen Natural Airway  Intra-op Monitoring Plan: Standard ASA Monitors  Induction:  IV  Informed Consent: Informed consent signed with the Patient and all parties understand the risks and agree with anesthesia plan.  All questions answered. Patient consented to blood products? No  ASA Score: 3  Day of Surgery Review of History & Physical: H&P Update referred to the surgeon/provider.    Ready For Surgery From Anesthesia Perspective.     .

## 2025-05-05 NOTE — H&P
"COLONOSCOPY HISTORY AND PHYSICAL EXAM    Procedure : Colonoscopy      INDICATIONS: personal history of colon polyps and diverticulitis    Family Hx of CRC: None    Last Colonoscopy:  2020  Findings: polyp x 1       Past Medical History:   Diagnosis Date    Anxiety     Benzodiazepine dependence    Anxiety disorder, unspecified     Arthritis     Asthma     Avascular necrosis     Carpal tunnel syndrome     Chronic pain     Depression     Diverticulitis     Gout     Mixed hyperlipidemia     Other injury of other sites of trunk 12/28/2011    Pneumonia     Primary hypertension 8/23/2024    Spondylolisthesis     lumbar; myofascial pain    Staph infection     Ulnar neuropathy     left and rt arm     Sedation Problems: NO  Family History   Problem Relation Name Age of Onset    Pancreatic cancer Mother      Stroke Father      Bone cancer Sister      Alzheimer's disease Paternal Grandmother       Fam Hx of Sedation Problems: NO  Social History[1]    Review of Systems - Negative except   Respiratory ROS: no dyspnea  Cardiovascular ROS: no exertional chest pain  Gastrointestinal ROS: NO abdominal discomfort,  NO rectal bleeding  Musculoskeletal ROS: no muscular pain  Neurological ROS: no recent stroke    Physical Exam:  /66   Pulse 74   Temp 97.3 °F (36.3 °C)   Resp 16   Ht 5' 11" (1.803 m)   Wt 105.4 kg (232 lb 5.8 oz)   SpO2 95%   BMI 32.41 kg/m²   General: no distress  Head: normocephalic  Mallampati Score   Neck: supple, symmetrical, trachea midline  Lungs:  clear to auscultation bilaterally and normal respiratory effort  Heart: regular rate and rhythm and no murmur  Abdomen: soft, non-tender non-distented; bowel sounds normal; no masses,  no organomegaly  Extremities: no cyanosis or edema, or clubbing    PLAN  COLONOSCOPY.    SedationPlan :MAC    The details of the procedure, the possible need for biopsy or polypectomy and the potential risks including bleeding, perforation, missed polyps were discussed in " detail.         [1]   Social History  Socioeconomic History    Marital status: Single   Tobacco Use    Smoking status: Former     Current packs/day: 0.00     Average packs/day: 1 pack/day for 10.0 years (10.0 ttl pk-yrs)     Types: Cigarettes     Start date: 10/13/2002     Quit date: 10/13/2012     Years since quittin.5     Passive exposure: Past    Smokeless tobacco: Never   Substance and Sexual Activity    Alcohol use: Yes     Comment: 2 beers per month    Drug use: No    Sexual activity: Yes     Partners: Female     Social Drivers of Health     Financial Resource Strain: Low Risk  (2025)    Overall Financial Resource Strain (CARDIA)     Difficulty of Paying Living Expenses: Not very hard   Food Insecurity: Food Insecurity Present (2025)    Hunger Vital Sign     Worried About Running Out of Food in the Last Year: Never true     Ran Out of Food in the Last Year: Sometimes true   Transportation Needs: No Transportation Needs (2025)    PRAPARE - Transportation     Lack of Transportation (Medical): No     Lack of Transportation (Non-Medical): No   Physical Activity: Inactive (2025)    Exercise Vital Sign     Days of Exercise per Week: 7 days     Minutes of Exercise per Session: 0 min   Stress: No Stress Concern Present (2025)    Citizen of [place] [employer] - Occupational Stress Questionnaire     Feeling of Stress : Not at all   Housing Stability: Low Risk  (2025)    Housing Stability Vital Sign     Unable to Pay for Housing in the Last Year: No     Homeless in the Last Year: No

## 2025-05-05 NOTE — PROVATION PATIENT INSTRUCTIONS
Discharge Summary/Instructions after an Endoscopic Procedure  Patient Name: Keon Schneider  Patient MRN: [id number]  Patient YOB: 1970  Monday, May 5, 2025  Mai Cates MD  Dear patient,  As a result of recent federal legislation (The Federal Cures Act), you may   receive lab or pathology results from your procedure in your [facility]   account before your physician is able to contact you. Your physician or   their representative will relay the results to you with their   recommendations at their soonest availability.  Thank you,  RESTRICTIONS:  During your procedure today, you received medications for sedation.  These   medications may affect your judgment, balance and coordination.  Therefore,   for 24 hours, you have the following restrictions:   - DO NOT drive a car, operate machinery, make legal/financial decisions,   sign important papers or drink alcohol.    ACTIVITY:  Today: no heavy lifting, straining or running due to procedural   sedation/anesthesia.  The following day: return to full activity including work.  DIET:  Eat and drink normally unless instructed otherwise.     TREATMENT FOR COMMON SIDE EFFECTS:  - Mild abdominal pain, nausea, belching, bloating or excessive gas:  rest,   eat lightly and use a heating pad.  - Sore Throat: treat with throat lozenges and/or gargle with warm salt   water.  - Because air was used during the procedure, expelling large amounts of air   from your rectum or belching is normal.  - If a bowel prep was taken, you may not have a bowel movement for 1-3 days.    This is normal.  SYMPTOMS TO WATCH FOR AND REPORT TO YOUR PHYSICIAN:  1. Abdominal pain or bloating, other than gas cramps.  2. Chest pain.  3. Back pain.  4. Signs of infection such as: chills or fever occurring within 24 hours   after the procedure.  5. Rectal bleeding, which would show as bright red, maroon, or black stools.   (A tablespoon of blood from the rectum is not serious, especially if    hemorrhoids are present.)  6. Vomiting.  7. Weakness or dizziness.  GO DIRECTLY TO THE NEAREST EMERGENCY ROOM IF YOU HAVE ANY OF THE FOLLOWING:      Difficulty breathing              Chills and/or fever over 101 F   Persistent vomiting and/or vomiting blood   Severe abdominal pain   Severe chest pain   Black, tarry stools   Bleeding- more than one tablespoon   Any other symptom or condition that you feel may need urgent attention  Your doctor recommends these additional instructions:  If any biopsies were taken, your doctors clinic will contact you in 1 to 2   weeks with any results.  - Discharge patient to home.   - Resume previous diet.   - Continue present medications.   - Repeat colonoscopy in 10 years for screening purposes.   - Return to my office at appointment to be scheduled.   - Written discharge instructions were provided to the patient.   - The signs and symptoms of potential delayed complications were discussed   with the patient.   - Patient has a contact number available for emergencies.   - Return to normal activities tomorrow.  For questions, problems or results please call your physician - Mai Cates MD at Work:  [phone].  [facility], EMERGENCY ROOM PHONE NUMBER: [phone]  IF A COMPLICATION OR EMERGENCY SITUATION ARISES AND YOU ARE UNABLE TO REACH   YOUR PHYSICIAN - GO DIRECTLY TO THE EMERGENCY ROOM.  Mai Cates MD  5/5/2025 8:06:53 AM  This report has been verified and signed electronically.  Dear patient,  As a result of recent federal legislation (The Federal Cures Act), you may   receive lab or pathology results from your procedure in your [facility]   account before your physician is able to contact you. Your physician or   their representative will relay the results to you with their   recommendations at their soonest availability.  Thank you,  PROVATION

## 2025-05-05 NOTE — TRANSFER OF CARE
"Anesthesia Transfer of Care Note    Patient: Keon Schneider Jr.    Procedure(s) Performed: Procedure(s) (LRB):  COLONOSCOPY (N/A)    Patient location: PACU    Anesthesia Type: general    Transport from OR: Transported from OR on room air with adequate spontaneous ventilation    Post pain: adequate analgesia    Post assessment: no apparent anesthetic complications and tolerated procedure well    Post vital signs: stable    Level of consciousness: responds to stimulation    Nausea/Vomiting: no nausea/vomiting    Complications: none    Transfer of care protocol was followed    Last vitals: Visit Vitals  /69   Pulse 73   Temp 36.3 °C (97.3 °F)   Resp 16   Ht 5' 11" (1.803 m)   Wt 105.4 kg (232 lb 5.8 oz)   SpO2 95%   BMI 32.41 kg/m²   "

## 2025-06-26 ENCOUNTER — HOSPITAL ENCOUNTER (INPATIENT)
Facility: HOSPITAL | Age: 55
LOS: 1 days | Discharge: HOME | DRG: 392 | End: 2025-06-28
Attending: STUDENT IN AN ORGANIZED HEALTH CARE EDUCATION/TRAINING PROGRAM | Admitting: INTERNAL MEDICINE
Payer: MEDICARE

## 2025-06-26 DIAGNOSIS — K57.92 DIVERTICULITIS: Primary | ICD-10-CM

## 2025-06-26 DIAGNOSIS — R07.9 CHEST PAIN: ICD-10-CM

## 2025-06-26 PROBLEM — M10.9 GOUT: Status: ACTIVE | Noted: 2025-06-26

## 2025-06-26 LAB
ABSOLUTE EOSINOPHIL (OHS): 0.34 K/UL
ABSOLUTE MONOCYTE (OHS): 0.99 K/UL (ref 0.3–1)
ABSOLUTE NEUTROPHIL COUNT (OHS): 4.18 K/UL (ref 1.8–7.7)
ALBUMIN SERPL BCP-MCNC: 4.7 G/DL (ref 3.5–5.2)
ALP SERPL-CCNC: 62 UNIT/L (ref 40–150)
ALT SERPL W/O P-5'-P-CCNC: 33 UNIT/L (ref 10–44)
ANION GAP (OHS): 12 MMOL/L (ref 8–16)
AST SERPL-CCNC: 30 UNIT/L (ref 11–45)
BASOPHILS # BLD AUTO: 0.03 K/UL
BASOPHILS NFR BLD AUTO: 0.4 %
BILIRUB SERPL-MCNC: 0.3 MG/DL (ref 0.1–1)
BILIRUB UR QL STRIP.AUTO: NEGATIVE
BUN SERPL-MCNC: 19 MG/DL (ref 6–20)
BUN SERPL-MCNC: 20 MG/DL (ref 6–30)
CALCIUM SERPL-MCNC: 9.2 MG/DL (ref 8.7–10.5)
CHLORIDE SERPL-SCNC: 102 MMOL/L (ref 95–110)
CHLORIDE SERPL-SCNC: 106 MMOL/L (ref 95–110)
CLARITY UR: CLEAR
CO2 SERPL-SCNC: 20 MMOL/L (ref 23–29)
COLOR UR AUTO: COLORLESS
CREAT SERPL-MCNC: 1 MG/DL (ref 0.5–1.4)
CREAT SERPL-MCNC: 1.1 MG/DL (ref 0.5–1.4)
ERYTHROCYTE [DISTWIDTH] IN BLOOD BY AUTOMATED COUNT: 13.4 % (ref 11.5–14.5)
GFR SERPLBLD CREATININE-BSD FMLA CKD-EPI: >60 ML/MIN/1.73/M2
GLUCOSE SERPL-MCNC: 101 MG/DL (ref 70–110)
GLUCOSE SERPL-MCNC: 107 MG/DL (ref 70–110)
GLUCOSE UR QL STRIP: NEGATIVE
HCT VFR BLD AUTO: 41.8 % (ref 40–54)
HCT VFR BLD CALC: 42 %PCV (ref 36–54)
HGB BLD-MCNC: 13.8 GM/DL (ref 14–18)
HGB UR QL STRIP: NEGATIVE
IMM GRANULOCYTES # BLD AUTO: 0.01 K/UL (ref 0–0.04)
IMM GRANULOCYTES NFR BLD AUTO: 0.1 % (ref 0–0.5)
KETONES UR QL STRIP: NEGATIVE
LEUKOCYTE ESTERASE UR QL STRIP: NEGATIVE
LIPASE SERPL-CCNC: 19 U/L (ref 4–60)
LYMPHOCYTES # BLD AUTO: 1.89 K/UL (ref 1–4.8)
MCH RBC QN AUTO: 30.7 PG (ref 27–31)
MCHC RBC AUTO-ENTMCNC: 33 G/DL (ref 32–36)
MCV RBC AUTO: 93 FL (ref 82–98)
NITRITE UR QL STRIP: NEGATIVE
NUCLEATED RBC (/100WBC) (OHS): 0 /100 WBC
PH UR STRIP: 6 [PH]
PLATELET # BLD AUTO: 212 K/UL (ref 150–450)
PMV BLD AUTO: 9.1 FL (ref 9.2–12.9)
POC IONIZED CALCIUM: 1.2 MMOL/L (ref 1.06–1.42)
POC TCO2 (MEASURED): 21 MMOL/L (ref 23–29)
POTASSIUM BLD-SCNC: 4.1 MMOL/L (ref 3.5–5.1)
POTASSIUM SERPL-SCNC: 4.2 MMOL/L (ref 3.5–5.1)
PROT SERPL-MCNC: 7.9 GM/DL (ref 6–8.4)
PROT UR QL STRIP: NEGATIVE
RBC # BLD AUTO: 4.49 M/UL (ref 4.6–6.2)
RELATIVE EOSINOPHIL (OHS): 4.6 %
RELATIVE LYMPHOCYTE (OHS): 25.4 % (ref 18–48)
RELATIVE MONOCYTE (OHS): 13.3 % (ref 4–15)
RELATIVE NEUTROPHIL (OHS): 56.2 % (ref 38–73)
SAMPLE: ABNORMAL
SODIUM BLD-SCNC: 138 MMOL/L (ref 136–145)
SODIUM SERPL-SCNC: 138 MMOL/L (ref 136–145)
SP GR UR STRIP: 1.01
UROBILINOGEN UR STRIP-ACNC: NEGATIVE EU/DL
WBC # BLD AUTO: 7.44 K/UL (ref 3.9–12.7)

## 2025-06-26 PROCEDURE — 25000003 PHARM REV CODE 250

## 2025-06-26 PROCEDURE — G0378 HOSPITAL OBSERVATION PER HR: HCPCS

## 2025-06-26 PROCEDURE — 63600175 PHARM REV CODE 636 W HCPCS

## 2025-06-26 PROCEDURE — 25000003 PHARM REV CODE 250: Performed by: STUDENT IN AN ORGANIZED HEALTH CARE EDUCATION/TRAINING PROGRAM

## 2025-06-26 PROCEDURE — 96376 TX/PRO/DX INJ SAME DRUG ADON: CPT

## 2025-06-26 PROCEDURE — 83690 ASSAY OF LIPASE: CPT | Performed by: STUDENT IN AN ORGANIZED HEALTH CARE EDUCATION/TRAINING PROGRAM

## 2025-06-26 PROCEDURE — 25500020 PHARM REV CODE 255: Performed by: STUDENT IN AN ORGANIZED HEALTH CARE EDUCATION/TRAINING PROGRAM

## 2025-06-26 PROCEDURE — 96366 THER/PROPH/DIAG IV INF ADDON: CPT

## 2025-06-26 PROCEDURE — 80053 COMPREHEN METABOLIC PANEL: CPT | Performed by: STUDENT IN AN ORGANIZED HEALTH CARE EDUCATION/TRAINING PROGRAM

## 2025-06-26 PROCEDURE — 85025 COMPLETE CBC W/AUTO DIFF WBC: CPT | Performed by: STUDENT IN AN ORGANIZED HEALTH CARE EDUCATION/TRAINING PROGRAM

## 2025-06-26 PROCEDURE — 99285 EMERGENCY DEPT VISIT HI MDM: CPT | Mod: 25

## 2025-06-26 PROCEDURE — 96375 TX/PRO/DX INJ NEW DRUG ADDON: CPT

## 2025-06-26 PROCEDURE — 80047 BASIC METABLC PNL IONIZED CA: CPT

## 2025-06-26 PROCEDURE — 96365 THER/PROPH/DIAG IV INF INIT: CPT

## 2025-06-26 PROCEDURE — 63600175 PHARM REV CODE 636 W HCPCS: Performed by: STUDENT IN AN ORGANIZED HEALTH CARE EDUCATION/TRAINING PROGRAM

## 2025-06-26 PROCEDURE — 81003 URINALYSIS AUTO W/O SCOPE: CPT | Performed by: STUDENT IN AN ORGANIZED HEALTH CARE EDUCATION/TRAINING PROGRAM

## 2025-06-26 RX ORDER — EZETIMIBE 10 MG/1
10 TABLET ORAL NIGHTLY
COMMUNITY

## 2025-06-26 RX ORDER — OXYCODONE HYDROCHLORIDE 10 MG/1
10 TABLET ORAL EVERY 6 HOURS PRN
Refills: 0 | Status: DISCONTINUED | OUTPATIENT
Start: 2025-06-26 | End: 2025-06-28 | Stop reason: HOSPADM

## 2025-06-26 RX ORDER — MORPHINE SULFATE 4 MG/ML
4 INJECTION, SOLUTION INTRAMUSCULAR; INTRAVENOUS
Refills: 0 | Status: COMPLETED | OUTPATIENT
Start: 2025-06-26 | End: 2025-06-26

## 2025-06-26 RX ORDER — PROCHLORPERAZINE EDISYLATE 5 MG/ML
5 INJECTION INTRAMUSCULAR; INTRAVENOUS EVERY 6 HOURS PRN
Status: DISCONTINUED | OUTPATIENT
Start: 2025-06-26 | End: 2025-06-26

## 2025-06-26 RX ORDER — GLUCAGON 1 MG
1 KIT INJECTION
Status: DISCONTINUED | OUTPATIENT
Start: 2025-06-26 | End: 2025-06-28 | Stop reason: HOSPADM

## 2025-06-26 RX ORDER — HYDROMORPHONE HYDROCHLORIDE 1 MG/ML
0.5 INJECTION, SOLUTION INTRAMUSCULAR; INTRAVENOUS; SUBCUTANEOUS
Status: COMPLETED | OUTPATIENT
Start: 2025-06-26 | End: 2025-06-26

## 2025-06-26 RX ORDER — SODIUM CHLORIDE 0.9 % (FLUSH) 0.9 %
10 SYRINGE (ML) INJECTION EVERY 12 HOURS PRN
Status: DISCONTINUED | OUTPATIENT
Start: 2025-06-26 | End: 2025-06-28 | Stop reason: HOSPADM

## 2025-06-26 RX ORDER — FAMOTIDINE 20 MG/1
40 TABLET, FILM COATED ORAL DAILY
Status: DISCONTINUED | OUTPATIENT
Start: 2025-06-26 | End: 2025-06-28 | Stop reason: HOSPADM

## 2025-06-26 RX ORDER — ONDANSETRON 4 MG/1
4 TABLET, ORALLY DISINTEGRATING ORAL EVERY 6 HOURS PRN
Status: DISCONTINUED | OUTPATIENT
Start: 2025-06-26 | End: 2025-06-28 | Stop reason: HOSPADM

## 2025-06-26 RX ORDER — PROCHLORPERAZINE EDISYLATE 5 MG/ML
5 INJECTION INTRAMUSCULAR; INTRAVENOUS EVERY 6 HOURS PRN
Status: DISCONTINUED | OUTPATIENT
Start: 2025-06-26 | End: 2025-06-28 | Stop reason: HOSPADM

## 2025-06-26 RX ORDER — ENOXAPARIN SODIUM 100 MG/ML
40 INJECTION SUBCUTANEOUS EVERY 24 HOURS
Status: DISCONTINUED | OUTPATIENT
Start: 2025-06-26 | End: 2025-06-28 | Stop reason: HOSPADM

## 2025-06-26 RX ORDER — ALLOPURINOL 100 MG/1
100 TABLET ORAL DAILY
Status: DISCONTINUED | OUTPATIENT
Start: 2025-06-26 | End: 2025-06-28 | Stop reason: HOSPADM

## 2025-06-26 RX ORDER — POLYETHYLENE GLYCOL 3350 17 G/17G
17 POWDER, FOR SOLUTION ORAL DAILY
Status: DISCONTINUED | OUTPATIENT
Start: 2025-06-26 | End: 2025-06-28 | Stop reason: HOSPADM

## 2025-06-26 RX ORDER — METRONIDAZOLE 500 MG/1
500 TABLET ORAL EVERY 6 HOURS
Status: ON HOLD | COMMUNITY
Start: 2025-06-19 | End: 2025-06-28 | Stop reason: HOSPADM

## 2025-06-26 RX ORDER — ATORVASTATIN CALCIUM 40 MG/1
80 TABLET, FILM COATED ORAL DAILY
Status: DISCONTINUED | OUTPATIENT
Start: 2025-06-26 | End: 2025-06-26

## 2025-06-26 RX ORDER — ACETAMINOPHEN 325 MG/1
650 TABLET ORAL EVERY 8 HOURS PRN
Status: DISCONTINUED | OUTPATIENT
Start: 2025-06-26 | End: 2025-06-28 | Stop reason: HOSPADM

## 2025-06-26 RX ORDER — IBUPROFEN 200 MG
24 TABLET ORAL
Status: DISCONTINUED | OUTPATIENT
Start: 2025-06-26 | End: 2025-06-28 | Stop reason: HOSPADM

## 2025-06-26 RX ORDER — ASPIRIN 81 MG/1
81 TABLET ORAL DAILY
Status: DISCONTINUED | OUTPATIENT
Start: 2025-06-26 | End: 2025-06-28 | Stop reason: HOSPADM

## 2025-06-26 RX ORDER — AMLODIPINE BESYLATE 5 MG/1
5 TABLET ORAL DAILY PRN
COMMUNITY
Start: 2025-05-24

## 2025-06-26 RX ORDER — IBUPROFEN 200 MG
16 TABLET ORAL
Status: DISCONTINUED | OUTPATIENT
Start: 2025-06-26 | End: 2025-06-28 | Stop reason: HOSPADM

## 2025-06-26 RX ORDER — ACETAMINOPHEN 325 MG/1
650 TABLET ORAL EVERY 6 HOURS PRN
Status: DISCONTINUED | OUTPATIENT
Start: 2025-06-26 | End: 2025-06-28 | Stop reason: HOSPADM

## 2025-06-26 RX ORDER — NALOXONE HCL 0.4 MG/ML
0.02 VIAL (ML) INJECTION
Status: DISCONTINUED | OUTPATIENT
Start: 2025-06-26 | End: 2025-06-28 | Stop reason: HOSPADM

## 2025-06-26 RX ORDER — ONDANSETRON HYDROCHLORIDE 2 MG/ML
4 INJECTION, SOLUTION INTRAVENOUS
Status: COMPLETED | OUTPATIENT
Start: 2025-06-26 | End: 2025-06-26

## 2025-06-26 RX ORDER — HYDROMORPHONE HYDROCHLORIDE 1 MG/ML
1 INJECTION, SOLUTION INTRAMUSCULAR; INTRAVENOUS; SUBCUTANEOUS EVERY 6 HOURS PRN
Status: DISCONTINUED | OUTPATIENT
Start: 2025-06-26 | End: 2025-06-28

## 2025-06-26 RX ADMIN — IOHEXOL 100 ML: 350 INJECTION, SOLUTION INTRAVENOUS at 04:06

## 2025-06-26 RX ADMIN — PIPERACILLIN SODIUM AND TAZOBACTAM SODIUM 4.5 G: 4; .5 INJECTION, POWDER, FOR SOLUTION INTRAVENOUS at 10:06

## 2025-06-26 RX ADMIN — OXYCODONE 15 MG: 5 TABLET ORAL at 08:06

## 2025-06-26 RX ADMIN — HYDROMORPHONE HYDROCHLORIDE 0.5 MG: 0.5 INJECTION, SOLUTION INTRAMUSCULAR; INTRAVENOUS; SUBCUTANEOUS at 05:06

## 2025-06-26 RX ADMIN — ONDANSETRON 4 MG: 2 INJECTION INTRAMUSCULAR; INTRAVENOUS at 04:06

## 2025-06-26 RX ADMIN — OXYCODONE HYDROCHLORIDE 10 MG: 10 TABLET ORAL at 01:06

## 2025-06-26 RX ADMIN — FAMOTIDINE 40 MG: 20 TABLET, FILM COATED ORAL at 10:06

## 2025-06-26 RX ADMIN — ONDANSETRON 4 MG: 4 TABLET, ORALLY DISINTEGRATING ORAL at 08:06

## 2025-06-26 RX ADMIN — ALLOPURINOL 100 MG: 100 TABLET ORAL at 10:06

## 2025-06-26 RX ADMIN — OXYCODONE 15 MG: 5 TABLET ORAL at 10:06

## 2025-06-26 RX ADMIN — HYDROMORPHONE HYDROCHLORIDE 1 MG: 1 INJECTION, SOLUTION INTRAMUSCULAR; INTRAVENOUS; SUBCUTANEOUS at 05:06

## 2025-06-26 RX ADMIN — ASPIRIN 81 MG: 81 TABLET, COATED ORAL at 10:06

## 2025-06-26 RX ADMIN — PIPERACILLIN SODIUM AND TAZOBACTAM SODIUM 4.5 G: 4; .5 INJECTION, POWDER, LYOPHILIZED, FOR SOLUTION INTRAVENOUS at 05:06

## 2025-06-26 RX ADMIN — MORPHINE SULFATE 4 MG: 4 INJECTION INTRAVENOUS at 04:06

## 2025-06-26 RX ADMIN — POLYETHYLENE GLYCOL 3350 17 G: 17 POWDER, FOR SOLUTION ORAL at 08:06

## 2025-06-26 RX ADMIN — PIPERACILLIN SODIUM AND TAZOBACTAM SODIUM 4.5 G: 4; .5 INJECTION, POWDER, FOR SOLUTION INTRAVENOUS at 01:06

## 2025-06-26 RX ADMIN — HYDROMORPHONE HYDROCHLORIDE 1 MG: 1 INJECTION, SOLUTION INTRAMUSCULAR; INTRAVENOUS; SUBCUTANEOUS at 10:06

## 2025-06-26 NOTE — ASSESSMENT & PLAN NOTE
- here with acute flare of diverticulitis x3 weeks  - trialed augmentin and flagyl as outpatient, was not improving, did not finish course  - follows closely with CRS, ED provider spoke to them prior to admission   - no surgical intevention during acute flare unless unresponsive to IV abx  - patient would like to hold off on surg if possible given busy with life events  - Zosyn given in ed, will continue for now  - PRN pain management and antiemetics  - continue to monitor

## 2025-06-26 NOTE — ED NOTES
I-STAT Chem-8+ Results:   Value Reference Range   Sodium  138 136-145 mmol/L   Potassium   4.1 3.5-5.1 mmol/L   Chloride  102  mmol/L   Ionized Calcium  1.20 1.06-1.42 mmol/L   CO2 (measured)  21 23-29 mmol/L   Glucose  107  mg/dL   BUN  20 6-30 mg/dL   Creatinine  1.0 0.5-1.4 mg/dL   Hematocrit  42 36-54%

## 2025-06-26 NOTE — ASSESSMENT & PLAN NOTE
- patient takes rosuvastatin at home, not on hospital formulary  - patient does not want to take atorvastatin secondary to intolerance  - will hold for now

## 2025-06-26 NOTE — H&P
[facility] - Emergency Dept  [facility]  History & Physical    Patient Name: Keon Schneider Jr.  MRN: [id number]  Patient Class: OP- Observation  Admission Date: 6/26/2025  Attending Physician: Kapil Walden MD   Primary Care Provider: Floridalma Carrasco MD         Patient information was obtained from patient and ER records.     Subjective:     Principal Problem:Diverticulitis of sigmoid colon    Chief Complaint:   Chief Complaint   Patient presents with    Abdominal Pain     Pt has c/o LLQ abd pain, N/V x 2 wks. Hx of diverticulitis states feels same.         HPI: Keon Schneider Jr. Is a 55 y.o. with pmh of depression, anxiety, spondylolisthesis, recurrent diverticulitis, hyperlipidemia, hypertension presents to the ED for LLQ pain. Patient reports this pain has been ongoing for 3 weeks. He went to his PCP ~1 week ago and was placed on augmentin and flagyl for a diverticulitis flare. He has a hx of diverticulitis requiring hospitalizations in the past. He follows closely with CRS who have recommended surgery in the past however he has deffered as he is very busy with life events. He reports an increase in flares, he normally has them once every few years, this is his 3rd within the past year. At time of examinations, he reports severe LLQ pain with associated nausea. He reports a few episodes of vomiting over the last few days. His food intake has been minimum secondary to his symptoms. He also reports subjective fevers throughout the past week but none in the last 24 hours. He denies any chillls, SOB, chest pain, constipation, diarrhea, syncope.    In the ED: afebrile, VSS. Labs largely unremarkable. UA non infectious. CT abdomen/pelvis with findings of diverticulitis. Given Morphine, dilaudid, zofran, and zosyn    Past Medical History:   Diagnosis Date    Anxiety     Benzodiazepine dependence    Anxiety disorder, unspecified     Arthritis     Asthma     Avascular necrosis     Carpal tunnel  syndrome     Chronic pain     Depression     Diverticulitis     Gout     Mixed hyperlipidemia     Other injury of other sites of trunk 12/28/2011    Pneumonia     Primary hypertension 8/23/2024    Spondylolisthesis     lumbar; myofascial pain    Staph infection     Ulnar neuropathy     left and rt arm       Past Surgical History:   Procedure Laterality Date    COLONOSCOPY N/A 7/6/2020    Procedure: COLONOSCOPY;  Surgeon: Broderick Moise MD;  Location: [facility] ENDO;  Service: Endoscopy;  Laterality: N/A;    COLONOSCOPY N/A 5/5/2025    Procedure: COLONOSCOPY;  Surgeon: Mai Cates MD;  Location: [facility] ENDO (4TH FLR);  Service: Endoscopy;  Laterality: N/A;  10/23 ref by Dr. Cates, Peg, instructions handed to pt in ofc and portal. ciaran  12/5/24- vm unavailable, portal msg sent for pc. DBM  12/24/24-Pt r/s to 2/3/25 due to illness, updated instr portal-DS  1/27/25- pc complete. DB  4/4 - R/S: Dr. Cates / janel (pt requested    EPIDURAL STEROID INJECTION INTO LUMBAR SPINE N/A 1/13/2025    Procedure: L4-5 ZEUS;  Surgeon: Servando Olmstead DO;  Location: [facility] PAIN MANAGEMENT;  Service: Pain Management;  Laterality: N/A;  no AC    HIP SURGERY  3/06; 6/06    hip arthroplasty    HIP SURGERY      bilateral hip replacement (titanium)    INJECTION OF ANESTHETIC AGENT AROUND MEDIAL BRANCH NERVES INNERVATING LUMBAR FACET JOINT Bilateral 12/9/2024    Procedure: b/l L3,4,5 MBB#1;  Surgeon: Servando Olmstead DO;  Location: [facility] PAIN MANAGEMENT;  Service: Pain Management;  Laterality: Bilateral;  no ac    JOINT REPLACEMENT      OPEN REDUCTION AND INTERNAL FIXATION (ORIF) OF FRACTURE OF CLAVICLE Left 10/5/2023    Procedure: ORIF, FRACTURE, CLAVICLE;  Surgeon: Ozzy Howard MD;  Location: [facility] OR 2ND FLR;  Service: Orthopedics;  Laterality: Left;    SHOULDER SURGERY  2012    right shoulder    SHOULDER SURGERY         Review of patient's allergies indicates:   Allergen Reactions    Atorvastatin Nausea And  Vomiting    Toradol [ketorolac] Hives and Nausea And Vomiting       No current facility-administered medications on file prior to encounter.     Current Outpatient Medications on File Prior to Encounter   Medication Sig    allopurinoL (ZYLOPRIM) 100 MG tablet Take 1 tablet (100 mg total) by mouth once daily.    aspirin (ECOTRIN) 81 MG EC tablet Take 1 tablet (81 mg total) by mouth once daily.    baclofen (LIORESAL) 10 MG tablet Take 1-2 tablets (10-20 mg total) by mouth 3 (three) times daily as needed (muscle pain).    famotidine (PEPCID) 40 MG tablet Take 1 tablet (40 mg total) by mouth once daily.    nitroGLYCERIN (NITROSTAT) 0.4 MG SL tablet Place 1 tablet (0.4 mg total) under the tongue every 5 (five) minutes as needed for Chest pain.    ondansetron (ZOFRAN) 4 MG tablet Take 1 tablet (4 mg total) by mouth every 6 (six) hours as needed for Nausea.    oxyCODONE-acetaminophen (PERCOCET)  mg per tablet Take 1 tablet by mouth 2 (two) times a day.    oxyCODONE-acetaminophen (PERCOCET)  mg per tablet Take 1 tablet by mouth 3 (three) times daily as needed.    rosuvastatin (CRESTOR) 20 MG tablet Take 1 tablet (20 mg total) by mouth once daily at bedtime.    sod sulf-pot chloride-mag sulf (SUTAB) 1.479-0.188- 0.225 gram tablet Take 12 tablets by mouth once daily. Take according to package instructions with indicated amount of water.    tiZANidine (ZANAFLEX) 4 MG tablet Take 4-8 mg by mouth nightly as needed.    tiZANidine (ZANAFLEX) 4 MG tablet Take 1-2 tablet (4 mg total) by mouth daily as needed.    [DISCONTINUED] allopurinoL (ZYLOPRIM) 100 MG tablet Take 100 mg by mouth.    [DISCONTINUED] ezetimibe (ZETIA) 10 mg tablet Take 10 mg by mouth every evening.    [DISCONTINUED] famotidine (PEPCID) 40 MG tablet Take 40 mg by mouth 2 (two) times daily.    [DISCONTINUED] rosuvastatin (CRESTOR) 20 MG tablet Take 1 tablet (20 mg total) by mouth at bedtime.    [DISCONTINUED] tiZANidine (ZANAFLEX) 4 MG tablet Take 1-2  tablets (4-8 mg total) by mouth daily as needed.     Family History       Problem Relation (Age of Onset)    Alzheimer's disease Paternal Grandmother    Bone cancer Sister    Pancreatic cancer Mother    Stroke Father          Tobacco Use    Smoking status: Former     Current packs/day: 0.00     Average packs/day: 1 pack/day for 10.0 years (10.0 ttl pk-yrs)     Types: Cigarettes     Start date: 10/13/2002     Quit date: 10/13/2012     Years since quittin.7     Passive exposure: Past    Smokeless tobacco: Never   Substance and Sexual Activity    Alcohol use: Yes     Comment: 2 beers per month    Drug use: No    Sexual activity: Yes     Partners: Female     Review of Systems   Constitutional:  Positive for fever (subjective). Negative for activity change and chills.   HENT:  Negative for trouble swallowing.    Eyes:  Negative for photophobia and visual disturbance.   Respiratory:  Negative for cough, chest tightness and shortness of breath.    Cardiovascular:  Negative for chest pain, palpitations and leg swelling.   Gastrointestinal:  Positive for abdominal pain, nausea and vomiting. Negative for constipation and diarrhea.   Genitourinary:  Negative for dysuria, frequency and hematuria.   Musculoskeletal:  Negative for back pain and gait problem.   Skin:  Negative for rash and wound.   Neurological:  Negative for dizziness, syncope, speech difficulty and light-headedness.   Psychiatric/Behavioral:  Negative for agitation and confusion. The patient is not nervous/anxious.      Objective:     Vital Signs (Most Recent):  Temp: 97.8 °F (36.6 °C) (25 0815)  Pulse: (!) 53 (25 0815)  Resp: 18 (25 0838)  BP: 123/86 (25 0815)  SpO2: 97 % (25 0815) Vital Signs (24h Range):  Temp:  [97.8 °F (36.6 °C)-98.2 °F (36.8 °C)] 97.8 °F (36.6 °C)  Pulse:  [53-82] 53  Resp:  [18-19] 18  SpO2:  [97 %-99 %] 97 %  BP: (123-147)/(86-96) 123/86     Weight: 105.4 kg (232 lb 5.8 oz)  Body mass index is 32.41  kg/m².     Physical Exam  Vitals and nursing note reviewed.   Constitutional:       General: He is not in acute distress.     Appearance: Normal appearance. He is not ill-appearing.   HENT:      Head: Normocephalic and atraumatic.      Right Ear: External ear normal.      Left Ear: External ear normal.   Eyes:      Extraocular Movements: Extraocular movements intact.      Conjunctiva/sclera: Conjunctivae normal.      Pupils: Pupils are equal, round, and reactive to light.   Cardiovascular:      Rate and Rhythm: Normal rate and regular rhythm.      Pulses: Normal pulses.      Heart sounds: Normal heart sounds. No murmur heard.  Pulmonary:      Effort: Pulmonary effort is normal. No respiratory distress.      Breath sounds: Normal breath sounds.   Abdominal:      General: Abdomen is flat. Bowel sounds are normal. There is no distension.      Palpations: Abdomen is soft.      Tenderness: There is abdominal tenderness (LLQ). There is no guarding.   Musculoskeletal:         General: Normal range of motion.      Cervical back: Normal range of motion and neck supple. No tenderness.      Right lower leg: No edema.      Left lower leg: No edema.   Skin:     General: Skin is warm and dry.      Capillary Refill: Capillary refill takes less than 2 seconds.      Coloration: Skin is not jaundiced.   Neurological:      General: No focal deficit present.      Mental Status: He is alert and oriented to person, place, and time. Mental status is at baseline.   Psychiatric:         Mood and Affect: Mood normal.         Behavior: Behavior normal.              CRANIAL NERVES     CN III, IV, VI   Pupils are equal, round, and reactive to light.       Significant Labs: All pertinent labs within the past 24 hours have been reviewed.  CBC:   Recent Labs   Lab 06/26/25  0357 06/26/25  0402   WBC  --  7.44   HGB  --  13.8*   HCT 42 41.8   PLT  --  212     CMP:   Recent Labs   Lab 06/26/25  0402      K 4.2      CO2 20*       BUN 19   CREATININE 1.1   CALCIUM 9.2   PROT 7.9   ALBUMIN 4.7   BILITOT 0.3   ALKPHOS 62   AST 30   ALT 33   ANIONGAP 12       Significant Imaging: I have reviewed all pertinent imaging results/findings within the past 24 hours.  Assessment/Plan:     Assessment & Plan  Diverticulitis of sigmoid colon  - here with acute flare of diverticulitis x3 weeks  - trialed augmentin and flagyl as outpatient, was not improving, did not finish course  - follows closely with CRS, ED provider spoke to them prior to admission   - no surgical intevention during acute flare unless unresponsive to IV abx  - patient would like to hold off on surg if possible given busy with life events  - Zosyn given in ed, will continue for now  - PRN pain management and antiemetics  - continue to monitor    Anxiety  - noted hx, patient was taken off xanax a few months ago  - mood appropriate for situation    GERD (gastroesophageal reflux disease)  - continue home pepcid    Dyslipidemia  - patient takes rosuvastatin at home, not on hospital formulary  - patient does not want to take atorvastatin secondary to intolerance  - will hold for now    Gout  - known hx, no acute issues at this time  - continue home allopurinol     VTE Risk Mitigation (From admission, onward)           Ordered     enoxaparin injection 40 mg  Daily         06/26/25 0753     IP VTE HIGH RISK PATIENT  Once         06/26/25 0753     Place sequential compression device  Until discontinued         06/26/25 0753                                     On 06/26/2025, patient should be placed in hospital observation services under my care in collaboration with Kapil Walden MD.           Hair Arias PA-C  Department of Hospital Medicine  [facility] - Emergency Dept

## 2025-06-26 NOTE — SUBJECTIVE & OBJECTIVE
Past Medical History:   Diagnosis Date    Anxiety     Benzodiazepine dependence    Anxiety disorder, unspecified     Arthritis     Asthma     Avascular necrosis     Carpal tunnel syndrome     Chronic pain     Depression     Diverticulitis     Gout     Mixed hyperlipidemia     Other injury of other sites of trunk 12/28/2011    Pneumonia     Primary hypertension 8/23/2024    Spondylolisthesis     lumbar; myofascial pain    Staph infection     Ulnar neuropathy     left and rt arm       Past Surgical History:   Procedure Laterality Date    COLONOSCOPY N/A 7/6/2020    Procedure: COLONOSCOPY;  Surgeon: Broderick Moise MD;  Location: [facility] ENDO;  Service: Endoscopy;  Laterality: N/A;    COLONOSCOPY N/A 5/5/2025    Procedure: COLONOSCOPY;  Surgeon: Mai Cates MD;  Location: [facility] ENDO (4TH FLR);  Service: Endoscopy;  Laterality: N/A;  10/23 ref by Oksana Carter, instructions handed to pt in ofc and portal. ciaran  12/5/24- vm unavailable, portal msg sent for pc. DBM  12/24/24-Pt r/s to 2/3/25 due to illness, updated instr portal-DS  1/27/25- pc complete. DBM  4/4 - R/S: Dr. Cates / janel (pt requested    EPIDURAL STEROID INJECTION INTO LUMBAR SPINE N/A 1/13/2025    Procedure: L4-5 ZEUS;  Surgeon: Servando Olmstead DO;  Location: [facility] beds at [facility] PAIN MANAGEMENT;  Service: Pain Management;  Laterality: N/A;  no AC    HIP SURGERY  3/06; 6/06    hip arthroplasty    HIP SURGERY      bilateral hip replacement (titanium)    INJECTION OF ANESTHETIC AGENT AROUND MEDIAL BRANCH NERVES INNERVATING LUMBAR FACET JOINT Bilateral 12/9/2024    Procedure: b/l L3,4,5 MBB#1;  Surgeon: Servando Olmstead DO;  Location: [facility] beds at the [facility] PAIN MANAGEMENT;  Service: Pain Management;  Laterality: Bilateral;  no ac    JOINT REPLACEMENT      OPEN REDUCTION AND INTERNAL FIXATION (ORIF) OF FRACTURE OF CLAVICLE Left 10/5/2023    Procedure: ORIF, FRACTURE, CLAVICLE;  Surgeon: Ozzy Howard MD;  [facility];  Service: Orthopedics;   Laterality: Left;    SHOULDER SURGERY  2012    right shoulder    SHOULDER SURGERY         Review of patient's allergies indicates:   Allergen Reactions    Atorvastatin Nausea And Vomiting    Toradol [ketorolac] Hives and Nausea And Vomiting       No current facility-administered medications on file prior to encounter.     Current Outpatient Medications on File Prior to Encounter   Medication Sig    allopurinoL (ZYLOPRIM) 100 MG tablet Take 1 tablet (100 mg total) by mouth once daily.    aspirin (ECOTRIN) 81 MG EC tablet Take 1 tablet (81 mg total) by mouth once daily.    baclofen (LIORESAL) 10 MG tablet Take 1-2 tablets (10-20 mg total) by mouth 3 (three) times daily as needed (muscle pain).    famotidine (PEPCID) 40 MG tablet Take 1 tablet (40 mg total) by mouth once daily.    nitroGLYCERIN (NITROSTAT) 0.4 MG SL tablet Place 1 tablet (0.4 mg total) under the tongue every 5 (five) minutes as needed for Chest pain.    ondansetron (ZOFRAN) 4 MG tablet Take 1 tablet (4 mg total) by mouth every 6 (six) hours as needed for Nausea.    oxyCODONE-acetaminophen (PERCOCET)  mg per tablet Take 1 tablet by mouth 2 (two) times a day.    oxyCODONE-acetaminophen (PERCOCET)  mg per tablet Take 1 tablet by mouth 3 (three) times daily as needed.    rosuvastatin (CRESTOR) 20 MG tablet Take 1 tablet (20 mg total) by mouth once daily at bedtime.    sod sulf-pot chloride-mag sulf (SUTAB) 1.479-0.188- 0.225 gram tablet Take 12 tablets by mouth once daily. Take according to package instructions with indicated amount of water.    tiZANidine (ZANAFLEX) 4 MG tablet Take 4-8 mg by mouth nightly as needed.    tiZANidine (ZANAFLEX) 4 MG tablet Take 1-2 tablet (4 mg total) by mouth daily as needed.    [DISCONTINUED] allopurinoL (ZYLOPRIM) 100 MG tablet Take 100 mg by mouth.    [DISCONTINUED] ezetimibe (ZETIA) 10 mg tablet Take 10 mg by mouth every evening.    [DISCONTINUED] famotidine (PEPCID) 40 MG tablet Take 40 mg by mouth 2 (two)  times daily.    [DISCONTINUED] rosuvastatin (CRESTOR) 20 MG tablet Take 1 tablet (20 mg total) by mouth at bedtime.    [DISCONTINUED] tiZANidine (ZANAFLEX) 4 MG tablet Take 1-2 tablets (4-8 mg total) by mouth daily as needed.     Family History       Problem Relation (Age of Onset)    Alzheimer's disease Paternal Grandmother    Bone cancer Sister    Pancreatic cancer Mother    Stroke Father          Tobacco Use    Smoking status: Former     Current packs/day: 0.00     Average packs/day: 1 pack/day for 10.0 years (10.0 ttl pk-yrs)     Types: Cigarettes     Start date: 10/13/2002     Quit date: 10/13/2012     Years since quittin.7     Passive exposure: Past    Smokeless tobacco: Never   Substance and Sexual Activity    Alcohol use: Yes     Comment: 2 beers per month    Drug use: No    Sexual activity: Yes     Partners: Female     Review of Systems   Constitutional:  Positive for fever (subjective). Negative for activity change and chills.   HENT:  Negative for trouble swallowing.    Eyes:  Negative for photophobia and visual disturbance.   Respiratory:  Negative for cough, chest tightness and shortness of breath.    Cardiovascular:  Negative for chest pain, palpitations and leg swelling.   Gastrointestinal:  Positive for abdominal pain, nausea and vomiting. Negative for constipation and diarrhea.   Genitourinary:  Negative for dysuria, frequency and hematuria.   Musculoskeletal:  Negative for back pain and gait problem.   Skin:  Negative for rash and wound.   Neurological:  Negative for dizziness, syncope, speech difficulty and light-headedness.   Psychiatric/Behavioral:  Negative for agitation and confusion. The patient is not nervous/anxious.      Objective:     Vital Signs (Most Recent):  Temp: 97.8 °F (36.6 °C) (25)  Pulse: (!) 53 (25)  Resp: 18 (25 0838)  BP: 123/86 (25)  SpO2: 97 % (25) Vital Signs (24h Range):  Temp:  [97.8 °F (36.6 °C)-98.2 °F (36.8 °C)]  97.8 °F (36.6 °C)  Pulse:  [53-82] 53  Resp:  [18-19] 18  SpO2:  [97 %-99 %] 97 %  BP: (123-147)/(86-96) 123/86     Weight: 105.4 kg (232 lb 5.8 oz)  Body mass index is 32.41 kg/m².     Physical Exam  Vitals and nursing note reviewed.   Constitutional:       General: He is not in acute distress.     Appearance: Normal appearance. He is not ill-appearing.   HENT:      Head: Normocephalic and atraumatic.      Right Ear: External ear normal.      Left Ear: External ear normal.   Eyes:      Extraocular Movements: Extraocular movements intact.      Conjunctiva/sclera: Conjunctivae normal.      Pupils: Pupils are equal, round, and reactive to light.   Cardiovascular:      Rate and Rhythm: Normal rate and regular rhythm.      Pulses: Normal pulses.      Heart sounds: Normal heart sounds. No murmur heard.  Pulmonary:      Effort: Pulmonary effort is normal. No respiratory distress.      Breath sounds: Normal breath sounds.   Abdominal:      General: Abdomen is flat. Bowel sounds are normal. There is no distension.      Palpations: Abdomen is soft.      Tenderness: There is abdominal tenderness (LLQ). There is no guarding.   Musculoskeletal:         General: Normal range of motion.      Cervical back: Normal range of motion and neck supple. No tenderness.      Right lower leg: No edema.      Left lower leg: No edema.   Skin:     General: Skin is warm and dry.      Capillary Refill: Capillary refill takes less than 2 seconds.      Coloration: Skin is not jaundiced.   Neurological:      General: No focal deficit present.      Mental Status: He is alert and oriented to person, place, and time. Mental status is at baseline.   Psychiatric:         Mood and Affect: Mood normal.         Behavior: Behavior normal.              CRANIAL NERVES     CN III, IV, VI   Pupils are equal, round, and reactive to light.       Significant Labs: All pertinent labs within the past 24 hours have been reviewed.  CBC:   Recent Labs   Lab  06/26/25  0357 06/26/25  0402   WBC  --  7.44   HGB  --  13.8*   HCT 42 41.8   PLT  --  212     CMP:   Recent Labs   Lab 06/26/25  0402      K 4.2      CO2 20*      BUN 19   CREATININE 1.1   CALCIUM 9.2   PROT 7.9   ALBUMIN 4.7   BILITOT 0.3   ALKPHOS 62   AST 30   ALT 33   ANIONGAP 12       Significant Imaging: I have reviewed all pertinent imaging results/findings within the past 24 hours.

## 2025-06-26 NOTE — ACP (ADVANCE CARE PLANNING)
Advance Care Planning     Date: 06/26/2025    Code Status  In light of the patients advanced and life limiting illness,I engaged the the patient in a voluntary conversation about the patient's preferences for care  at the very end of life. The patient wishes to have a natural, peaceful death.  Along those lines, the patient does not wish to have CPR or other invasive treatments performed when his heart and/or breathing stops. I communicated to the patient that a DNR order would be placed in his medical record to reflect this preference.    A total of 15 min was spent on advance care planning, goals of care discussion, emotional support, formulating and communicating prognosis and exploring burden/benefit of various approaches of treatment. This discussion occurred on a fully voluntary basis with the verbal consent of the patient and/or family.

## 2025-06-26 NOTE — ED PROVIDER NOTES
Encounter Date: 6/26/2025       History     Chief Complaint   Patient presents with    Abdominal Pain     Pt has c/o LLQ abd pain, N/V x 2 wks. Hx of diverticulitis states feels same.      The history is provided by the patient.     Patient is a 55-year-old male with a past medical history of depression, anxiety, spondylolisthesis, recurrent diverticulitis, hyperlipidemia, hypertension who presents due to significant left lower quadrant abdominal pain.  The pain has been going on for around 2 weeks and he states that it is not improving.  He believes that he is suffering from diverticulitis as he has had this exact pain before in the past and has been diagnosed with diverticulitis requiring hospitalizations multiple times in the past.  He saw his primary care doctor who prescribed him both Augmentin as well as metronidazole a week ago, and he has been taking those medications for the past week.  He states that despite taking them he has still been having worsening left lower quadrant abdominal pain.  He is concerned that outpatient antibiotics we will not be able to treat this and that he will need to be admitted for IV antibiotics.  He states that he is following with colorectal surgery as they are evaluating him for the removal of a segment of his colon secondary to his recurrent episodes of diverticulitis.  Denying all other symptoms at this time.    Review of patient's allergies indicates:   Allergen Reactions    Atorvastatin Nausea And Vomiting    Toradol [ketorolac] Hives and Nausea And Vomiting     Past Medical History:   Diagnosis Date    Anxiety     Benzodiazepine dependence    Anxiety disorder, unspecified     Arthritis     Asthma     Avascular necrosis     Carpal tunnel syndrome     Chronic pain     Depression     Diverticulitis     Gout     Mixed hyperlipidemia     Other injury of other sites of trunk 12/28/2011    Pneumonia     Primary hypertension 8/23/2024    Spondylolisthesis     lumbar; myofascial  pain    Staph infection     Ulnar neuropathy     left and rt arm     Past Surgical History:   Procedure Laterality Date    COLONOSCOPY N/A 7/6/2020    Procedure: COLONOSCOPY;  Surgeon: Broderick Moise MD;  Location: [facility] ENDO;  Service: Endoscopy;  Laterality: N/A;    COLONOSCOPY N/A 5/5/2025    Procedure: COLONOSCOPY;  Surgeon: Mai Cates MD;  Location: [facility] ENDO (4TH FLR);  Service: Endoscopy;  Laterality: N/A;  10/23 ref by Dr. Cates, Peg, instructions handed to pt in ofc and portal. ciaran  12/5/24- vm unavailable, portal msg sent for pc. DBM  12/24/24-Pt r/s to 2/3/25 due to illness, updated instr portal-DS  1/27/25- pc complete. DBM  4/4 - R/S: Dr. Cates / janel (pt requested    EPIDURAL STEROID INJECTION INTO LUMBAR SPINE N/A 1/13/2025    Procedure: L4-5 ZEUS;  Surgeon: Servando Olmstead DO;  [facility];  Service: Pain Management;  Laterality: N/A;  no AC    HIP SURGERY  3/06; 6/06    hip arthroplasty    HIP SURGERY      bilateral hip replacement (titanium)    INJECTION OF ANESTHETIC AGENT AROUND MEDIAL BRANCH NERVES INNERVATING LUMBAR FACET JOINT Bilateral 12/9/2024    Procedure: b/l L3,4,5 MBB#1;  Surgeon: Servando Olmstead DO;  [facility];  Service: Pain Management;  Laterality: Bilateral;  no ac    JOINT REPLACEMENT      OPEN REDUCTION AND INTERNAL FIXATION (ORIF) OF FRACTURE OF CLAVICLE Left 10/5/2023    Procedure: ORIF, FRACTURE, CLAVICLE;  Surgeon: Ozzy Howard MD;  Location: [facility] OR 2ND FLR;  Service: Orthopedics;  Laterality: Left;    SHOULDER SURGERY  2012    right shoulder    SHOULDER SURGERY       Family History   Problem Relation Name Age of Onset    Pancreatic cancer Mother      Stroke Father      Bone cancer Sister      Alzheimer's disease Paternal Grandmother       Social History[1]      Physical Exam     Initial Vitals [06/26/25 0245]   BP Pulse Resp Temp SpO2   (!) 147/96 82 18 98.2 °F (36.8 °C) 99 %      MAP       --          Physical Exam    Nursing note and vitals reviewed.  Constitutional: He appears well-developed. No distress.   Overall comfortable sitting in the bed though in some pain, able to communicate with me without difficulty.   HENT:   Head: Normocephalic and atraumatic. Mouth/Throat: Oropharynx is clear and moist and mucous membranes are normal.   Eyes: EOM are normal. Pupils are equal, round, and reactive to light.   Neck:   Normal range of motion.  Cardiovascular:  Normal rate, regular rhythm, normal heart sounds and intact distal pulses.           No murmur heard.  Pulmonary/Chest: Breath sounds normal. No respiratory distress. He has no wheezes. He has no rhonchi. He has no rales.   Abdominal:   There is tenderness to palpation in the left lower quadrant of the patient's abdomen.  There was no guarding or rebound tenderness.  The abdomen is non peritonitic.  No epigastric tenderness.  Abdomen is overall soft and compressible.   Musculoskeletal:         General: No edema.      Cervical back: Normal range of motion.     Neurological: He is alert and oriented to person, place, and time.   Skin: Skin is warm.   Psychiatric: He has a normal mood and affect. His behavior is normal. Thought content normal.         ED Course   Procedures  Labs Reviewed   COMPREHENSIVE METABOLIC PANEL - Abnormal       Result Value    Sodium 138      Potassium 4.2      Chloride 106      CO2 20 (*)     Glucose 101      BUN 19      Creatinine 1.1      Calcium 9.2      Protein Total 7.9      Albumin 4.7      Bilirubin Total 0.3      ALP 62      AST 30      ALT 33      Anion Gap 12      eGFR >60     URINALYSIS, REFLEX TO URINE CULTURE - Abnormal    Color, UA Colorless (*)     Appearance, UA Clear      pH, UA 6.0      Spec Grav UA 1.010      Protein, UA Negative      Glucose, UA Negative      Ketones, UA Negative      Bilirubin, UA Negative      Blood, UA Negative      Nitrites, UA Negative      Urobilinogen, UA Negative      Leukocyte  Esterase, UA Negative     CBC WITH DIFFERENTIAL - Abnormal    WBC 7.44      RBC 4.49 (*)     HGB 13.8 (*)     HCT 41.8      MCV 93      MCH 30.7      MCHC 33.0      RDW 13.4      Platelet Count 212      MPV 9.1 (*)     Nucleated RBC 0      Neut % 56.2      Lymph % 25.4      Mono % 13.3      Eos % 4.6      Basophil % 0.4      Imm Grans % 0.1      Neut # 4.18      Lymph # 1.89      Mono # 0.99      Eos # 0.34      Baso # 0.03      Imm Grans # 0.01     ISTAT PROCEDURE - Abnormal    POC Glucose 107      POC BUN 20      POC Creatinine 1.0      POC Sodium 138      POC Potassium 4.1      POC Chloride 102      POC TCO2 (MEASURED) 21 (*)     POC Ionized Calcium 1.20      POC Hematocrit 42      Sample CHRISTINA     LIPASE - Normal    Lipase Level 19     CBC W/ AUTO DIFFERENTIAL    Narrative:     The following orders were created for panel order CBC W/ AUTO DIFFERENTIAL.  Procedure                               Abnormality         Status                     ---------                               -----------         ------                     CBC with Differential[[id number]]       Abnormal            Final result                 Please view results for these tests on the individual orders.   GREY TOP URINE HOLD   EXTRA TUBES    Narrative:     The following orders were created for panel order EXTRA TUBES.  Procedure                               Abnormality         Status                     ---------                               -----------         ------                     Gold Top Hold[[id number]]                                                              Gold Top Hold[[id number]]                                                                Please view results for these tests on the individual orders.   GOLD TOP HOLD   GOLD TOP HOLD   ISTAT CHEM8          Imaging Results               CT Abdomen Pelvis With IV Contrast NO Oral Contrast (Final result)  Result time 06/26/25 05:34:09      Final result by Smith Ahumada MD  (06/26/25 05:34:09)                   Impression:      Findings referable to the sigmoid colon suggest mild sigmoid colitis or diverticulitis, as discussed above.    There are a few mildly prominent small bowel loops, not a pattern to suggest obstruction may relate to ileus or enteritis.    Findings referable to the proximal femur bilaterally that correlates with the appearance of particle disease noted on the MRI examination of November 2022.    Additional findings as above.    This report was flagged in Epic as abnormal.      Electronically signed by: Smith Ahumada  Date:    06/26/2025  Time:    05:34               Narrative:    EXAMINATION:  CT ABDOMEN PELVIS WITH IV CONTRAST    CLINICAL HISTORY:  LLQ abdominal pain;High clinical suspicion for diverticulitis;    TECHNIQUE:  Low dose axial images, sagittal and coronal reformations were obtained from the lung bases to the pubic symphysis following the IV administration of 100 mL of Omnipaque 350 oral contrast was not utilized.  Single phase postcontrast CT examination of the abdomen and pelvis is submitted peer    COMPARISON:  CT examination of the abdomen and pelvis March 8, 2025, MRI examination of the hips bilaterally November 18, 2022    FINDINGS:  The visualized lung bases demonstrate mild atelectatic change.  The stomach demonstrates nonspecific appearance of mild fluid and air within the gastric lumen.    Mild diminished attenuation of the liver may relate to mild diffuse fatty infiltrate.  There is no evidence for acute process of the liver, gallbladder, pancreas, spleen or adrenal glands.  There is a prominent lymph node adjacent to the duodenum anterior to the infra hepatic IVC, inferior to the main portal vein measuring approximately 1.4 x 1.6 cm appearing similar to the prior study.    There is no evidence for ureteral calculus or obstructive uropathy or perinephric inflammatory change bilaterally, noting that the distal ureters are somewhat obscured  due to artifact secondary to bilateral hip arthroplasty.  The abdominal aorta appears normal in caliber, demonstrates appropriate opacification.  Atherosclerotic change noted.    As above structures of the lower pelvis are somewhat obscured due to bilateral hip arthroplasty artifact.  The urinary bladder is not well delineated.  Mild calcification of the prostate is noted.  Small fat-density inguinal hernias are noted without bowel involvement.    There are a few mildly prominent small bowel loops along the mid to distal small bowel, extending to the terminal ileum does not appear to represent obstruction may relate to ileus or enteritis.  The appendix is identified, it does not appear inflamed.    Diverticula of the colon are noted.  There is a portion of the sigmoid colon that is obscured due to the aforementioned artifact, the proximal segment of this portion of the sigmoid colon demonstrates subtle suggestion of mild wall thickening and pericolonic haziness, correlation for mild sigmoid colitis or diverticulitis is needed.  There is no additional evidence for colonic inflammatory or obstructive change.  There is no evidence for abscess collection and no evidence for free intraperitoneal air.    The visualized osseous structures demonstrate chronic change, as above bilateral hip arthroplasty noted, there are areas of lucency involving the proximal femurs adjacent to the bilateral hip arthroplasty that would correlate to the appearance of particle disease noted on MRI examination of November 2022.                                       Medications   piperacillin-tazobactam (ZOSYN) 4.5 g in D5W 100 mL IVPB (MB+) (4.5 g Intravenous Incomplete 6/26/25 8809)   morphine injection 4 mg (4 mg Intravenous Given 6/26/25 7172)   iohexoL (OMNIPAQUE 350) injection 100 mL (100 mLs Intravenous Given 6/26/25 0438)   ondansetron injection 4 mg (4 mg Intravenous Given 6/26/25 0238)   HYDROmorphone injection 0.5 mg (0.5 mg  Intravenous Given 25 0509)     Medical Decision Making  Patient is a 55-year-old male who presents due to left lower quadrant abdominal pain.  Differential diagnosis includes but is not limited to recurrent diverticulitis, nephrolithiasis, UTI, pyelonephritis, pancreatitis, electrolyte abnormality, polypharmacy, medication side effect, dehydration, intra-abdominal abscess.  Vital signs noted to be reassuring but given his story/physical exam, high clinical suspicion/concern for recurrent diverticulitis.  Especially concerning given that he has been on outpatient antibiotics for the past week without significant improvement in his symptoms.  As part of the workup for my differential, I will obtain a CBC, CMP, lipase, urinalysis and a CT abdomen with contrast.    Laboratory workup shown to be unremarkable.  CT abdomen significant for recurrent diverticulitis vs colitis.  Given failed outpatient management with Augmentin/Flagyl, we will admit to Hospital Medicine for IV antibiotics.  Patient given a dose of Zosyn here in the emergency department.    Amount and/or Complexity of Data Reviewed  External Data Reviewed: notes.  Labs:  Decision-making details documented in ED Course.  Radiology: ordered.    Risk  Prescription drug management.  Decision regarding hospitalization.               ED Course as of 25 0551   Thu 2025   0415 POC Glucose: 107 [AC]   0415 POC Creatinine: 1.0 [AC]      ED Course User Index  [AC] Stephen Abreu DO                           Clinical Impression:  Final diagnoses:  [K57.92] Diverticulitis (Primary)                              [1]   Social History  Tobacco Use    Smoking status: Former     Current packs/day: 0.00     Average packs/day: 1 pack/day for 10.0 years (10.0 ttl pk-yrs)     Types: Cigarettes     Start date: 10/13/2002     Quit date: 10/13/2012     Years since quittin.7     Passive exposure: Past    Smokeless tobacco: Never   Substance Use Topics     Alcohol use: Yes     Comment: 2 beers per month    Drug use: No        Amos De Leon MD  Resident  06/26/25 0615

## 2025-06-26 NOTE — HPI
Keon Schnieder Jr. Is a 55 y.o. with pmh of depression, anxiety, spondylolisthesis, recurrent diverticulitis, hyperlipidemia, hypertension presents to the ED for LLQ pain. Patient reports this pain has been ongoing for 3 weeks. He went to his PCP ~1 week ago and was placed on augmentin and flagyl for a diverticulitis flare. He has a hx of diverticulitis requiring hospitalizations in the past. He follows closely with CRS who have recommended surgery in the past however he has deffered as he is very busy with life events. He reports an increase in flares, he normally has them once every few years, this is his 3rd within the past year. At time of examinations, he reports severe LLQ pain with associated nausea. He reports a few episodes of vomiting over the last few days. His food intake has been minimum secondary to his symptoms. He also reports subjective fevers throughout the past week but none in the last 24 hours. He denies any chillls, SOB, chest pain, constipation, diarrhea, syncope.    In the ED: afebrile, VSS. Labs largely unremarkable. UA non infectious. CT abdomen/pelvis with findings of diverticulitis. Given Morphine, dilaudid, zofran, and zosyn

## 2025-06-27 LAB
ABSOLUTE EOSINOPHIL (OHS): 0.31 K/UL
ABSOLUTE MONOCYTE (OHS): 0.65 K/UL (ref 0.3–1)
ABSOLUTE NEUTROPHIL COUNT (OHS): 3.97 K/UL (ref 1.8–7.7)
ALBUMIN SERPL BCP-MCNC: 4.3 G/DL (ref 3.5–5.2)
ALP SERPL-CCNC: 56 UNIT/L (ref 40–150)
ALT SERPL W/O P-5'-P-CCNC: 34 UNIT/L (ref 10–44)
ANION GAP (OHS): 7 MMOL/L (ref 8–16)
AST SERPL-CCNC: 33 UNIT/L (ref 11–45)
BASOPHILS # BLD AUTO: 0.04 K/UL
BASOPHILS NFR BLD AUTO: 0.6 %
BILIRUB SERPL-MCNC: 0.4 MG/DL (ref 0.1–1)
BUN SERPL-MCNC: 11 MG/DL (ref 6–20)
CALCIUM SERPL-MCNC: 8.9 MG/DL (ref 8.7–10.5)
CHLORIDE SERPL-SCNC: 106 MMOL/L (ref 95–110)
CO2 SERPL-SCNC: 23 MMOL/L (ref 23–29)
CREAT SERPL-MCNC: 1 MG/DL (ref 0.5–1.4)
ERYTHROCYTE [DISTWIDTH] IN BLOOD BY AUTOMATED COUNT: 13.5 % (ref 11.5–14.5)
GFR SERPLBLD CREATININE-BSD FMLA CKD-EPI: >60 ML/MIN/1.73/M2
GLUCOSE SERPL-MCNC: 88 MG/DL (ref 70–110)
HCT VFR BLD AUTO: 37.9 % (ref 40–54)
HGB BLD-MCNC: 12.6 GM/DL (ref 14–18)
HOLD SPECIMEN: NORMAL
IMM GRANULOCYTES # BLD AUTO: 0.02 K/UL (ref 0–0.04)
IMM GRANULOCYTES NFR BLD AUTO: 0.3 % (ref 0–0.5)
LYMPHOCYTES # BLD AUTO: 1.71 K/UL (ref 1–4.8)
MAGNESIUM SERPL-MCNC: 1.9 MG/DL (ref 1.6–2.6)
MCH RBC QN AUTO: 31.2 PG (ref 27–31)
MCHC RBC AUTO-ENTMCNC: 33.2 G/DL (ref 32–36)
MCV RBC AUTO: 94 FL (ref 82–98)
NUCLEATED RBC (/100WBC) (OHS): 0 /100 WBC
PHOSPHATE SERPL-MCNC: 3.4 MG/DL (ref 2.7–4.5)
PLATELET # BLD AUTO: 185 K/UL (ref 150–450)
PMV BLD AUTO: 9.4 FL (ref 9.2–12.9)
POTASSIUM SERPL-SCNC: 4.2 MMOL/L (ref 3.5–5.1)
PROT SERPL-MCNC: 7 GM/DL (ref 6–8.4)
RBC # BLD AUTO: 4.04 M/UL (ref 4.6–6.2)
RELATIVE EOSINOPHIL (OHS): 4.6 %
RELATIVE LYMPHOCYTE (OHS): 25.5 % (ref 18–48)
RELATIVE MONOCYTE (OHS): 9.7 % (ref 4–15)
RELATIVE NEUTROPHIL (OHS): 59.3 % (ref 38–73)
SODIUM SERPL-SCNC: 136 MMOL/L (ref 136–145)
WBC # BLD AUTO: 6.7 K/UL (ref 3.9–12.7)

## 2025-06-27 PROCEDURE — 83735 ASSAY OF MAGNESIUM: CPT

## 2025-06-27 PROCEDURE — 63600175 PHARM REV CODE 636 W HCPCS

## 2025-06-27 PROCEDURE — 96376 TX/PRO/DX INJ SAME DRUG ADON: CPT

## 2025-06-27 PROCEDURE — 36415 COLL VENOUS BLD VENIPUNCTURE: CPT

## 2025-06-27 PROCEDURE — 85025 COMPLETE CBC W/AUTO DIFF WBC: CPT

## 2025-06-27 PROCEDURE — 84100 ASSAY OF PHOSPHORUS: CPT

## 2025-06-27 PROCEDURE — 96366 THER/PROPH/DIAG IV INF ADDON: CPT

## 2025-06-27 PROCEDURE — 25000003 PHARM REV CODE 250: Performed by: PHYSICIAN ASSISTANT

## 2025-06-27 PROCEDURE — 25000003 PHARM REV CODE 250

## 2025-06-27 PROCEDURE — 80053 COMPREHEN METABOLIC PANEL: CPT

## 2025-06-27 PROCEDURE — 11000001 HC ACUTE MED/SURG PRIVATE ROOM

## 2025-06-27 RX ORDER — SENNOSIDES 8.6 MG/1
8.6 TABLET ORAL DAILY PRN
Status: DISCONTINUED | OUTPATIENT
Start: 2025-06-27 | End: 2025-06-28 | Stop reason: HOSPADM

## 2025-06-27 RX ADMIN — OXYCODONE 15 MG: 5 TABLET ORAL at 06:06

## 2025-06-27 RX ADMIN — HYDROMORPHONE HYDROCHLORIDE 1 MG: 1 INJECTION, SOLUTION INTRAMUSCULAR; INTRAVENOUS; SUBCUTANEOUS at 01:06

## 2025-06-27 RX ADMIN — ENOXAPARIN SODIUM 40 MG: 40 INJECTION SUBCUTANEOUS at 06:06

## 2025-06-27 RX ADMIN — PIPERACILLIN SODIUM AND TAZOBACTAM SODIUM 4.5 G: 4; .5 INJECTION, POWDER, FOR SOLUTION INTRAVENOUS at 01:06

## 2025-06-27 RX ADMIN — OXYCODONE HYDROCHLORIDE 10 MG: 10 TABLET ORAL at 12:06

## 2025-06-27 RX ADMIN — SENNOSIDES 8.6 MG: 8.6 TABLET, FILM COATED ORAL at 06:06

## 2025-06-27 RX ADMIN — PIPERACILLIN SODIUM AND TAZOBACTAM SODIUM 4.5 G: 4; .5 INJECTION, POWDER, FOR SOLUTION INTRAVENOUS at 05:06

## 2025-06-27 RX ADMIN — PIPERACILLIN SODIUM AND TAZOBACTAM SODIUM 4.5 G: 4; .5 INJECTION, POWDER, FOR SOLUTION INTRAVENOUS at 11:06

## 2025-06-27 RX ADMIN — FAMOTIDINE 40 MG: 20 TABLET, FILM COATED ORAL at 08:06

## 2025-06-27 RX ADMIN — HYDROMORPHONE HYDROCHLORIDE 1 MG: 1 INJECTION, SOLUTION INTRAMUSCULAR; INTRAVENOUS; SUBCUTANEOUS at 12:06

## 2025-06-27 RX ADMIN — HYDROMORPHONE HYDROCHLORIDE 1 MG: 1 INJECTION, SOLUTION INTRAMUSCULAR; INTRAVENOUS; SUBCUTANEOUS at 08:06

## 2025-06-27 RX ADMIN — ALLOPURINOL 100 MG: 100 TABLET ORAL at 08:06

## 2025-06-27 RX ADMIN — ASPIRIN 81 MG: 81 TABLET, COATED ORAL at 08:06

## 2025-06-27 RX ADMIN — POLYETHYLENE GLYCOL 3350 17 G: 17 POWDER, FOR SOLUTION ORAL at 08:06

## 2025-06-27 RX ADMIN — OXYCODONE 15 MG: 5 TABLET ORAL at 07:06

## 2025-06-27 NOTE — PROGRESS NOTES
[facility]ation [address] [place] [address] Medicine  Progress Note    Patient Name: Keon Schneider Jr.  MRN: [id number]  Patient Class: OP- Observation   Admission Date: 6/26/2025  Length of Stay: 0 days  Attending Physician: Kapil Walden MD  Primary Care Provider: Floridalma Carrasco MD        Subjective     Principal Problem:Diverticulitis of sigmoid colon        HPI:  Keon Schneider Jr. Is a 55 y.o. with pmh of depression, anxiety, spondylolisthesis, recurrent diverticulitis, hyperlipidemia, hypertension presents to the ED for LLQ pain. Patient reports this pain has been ongoing for 3 weeks. He went to his PCP ~1 week ago and was placed on augmentin and flagyl for a diverticulitis flare. He has a hx of diverticulitis requiring hospitalizations in the past. He follows closely with CRS who have recommended surgery in the past however he has deffered as he is very busy with life events. He reports an increase in flares, he normally has them once every few years, this is his 3rd within the past year. At time of examinations, he reports severe LLQ pain with associated nausea. He reports a few episodes of vomiting over the last few days. His food intake has been minimum secondary to his symptoms. He also reports subjective fevers throughout the past week but none in the last 24 hours. He denies any chillls, SOB, chest pain, constipation, diarrhea, syncope.    In the ED: afebrile, VSS. Labs largely unremarkable. UA non infectious. CT abdomen/pelvis with findings of diverticulitis. Given Morphine, dilaudid, zofran, and zosyn    Overview/Hospital Course:  Patient admitted to hospital medicine for diverticulitis. Started on zosyn. Discussed care with CRS, who state patient would not be candidate for surgery during active infection, unless unresponsive to IV abx. Patient with some improvement of pain, continue to monitor.        Interval History: Patient examined at bedside who is resting comfortably. VSS, no acute  distress. No overnight events reported. Patient reports some improvement of abdominal pain, however still experiencing signficant pain. Will continue zosyn. Patient requesting to upgrade diet to GI soft.      Review of Systems   Constitutional:  Negative for chills and fever.   Respiratory:  Negative for chest tightness and shortness of breath.    Cardiovascular:  Negative for chest pain and leg swelling.   Gastrointestinal:  Positive for abdominal pain. Negative for nausea.   Neurological:  Negative for dizziness and weakness.     Objective:     Vital Signs (Most Recent):  Temp: 98.2 °F (36.8 °C) (06/27/25 0742)  Pulse: 65 (06/27/25 0742)  Resp: 18 (06/27/25 0742)  BP: 126/78 (06/27/25 0742)  SpO2: 95 % (06/27/25 0742) Vital Signs (24h Range):  Temp:  [97.9 °F (36.6 °C)-98.2 °F (36.8 °C)] 98.2 °F (36.8 °C)  Pulse:  [54-65] 65  Resp:  [18] 18  SpO2:  [92 %-97 %] 95 %  BP: ()/(55-84) 126/78     Weight: 105.4 kg (232 lb 5.8 oz)  Body mass index is 32.41 kg/m².  No intake or output data in the 24 hours ending 06/27/25 0844      Physical Exam  Vitals and nursing note reviewed.   Constitutional:       General: He is not in acute distress.     Appearance: Normal appearance. He is not ill-appearing.   HENT:      Head: Normocephalic and atraumatic.   Eyes:      Extraocular Movements: Extraocular movements intact.      Conjunctiva/sclera: Conjunctivae normal.   Cardiovascular:      Rate and Rhythm: Normal rate and regular rhythm.      Pulses: Normal pulses.      Heart sounds: Normal heart sounds. No murmur heard.  Pulmonary:      Effort: Pulmonary effort is normal. No respiratory distress.      Breath sounds: Normal breath sounds.   Abdominal:      General: Abdomen is flat.      Palpations: Abdomen is soft.      Tenderness: There is abdominal tenderness (LLQ).   Musculoskeletal:         General: Normal range of motion.      Right lower leg: No edema.      Left lower leg: No edema.   Skin:     General: Skin is warm and  dry.      Coloration: Skin is not jaundiced.   Neurological:      General: No focal deficit present.      Mental Status: He is alert. Mental status is at baseline.   Psychiatric:         Mood and Affect: Mood normal.         Behavior: Behavior normal.               Significant Labs: All pertinent labs within the past 24 hours have been reviewed.  CBC:   Recent Labs   Lab 06/26/25  0357 06/26/25  0402 06/27/25  0620   WBC  --  7.44 6.70   HGB  --  13.8* 12.6*   HCT 42 41.8 37.9*   PLT  --  212 185     CMP:   Recent Labs   Lab 06/26/25  0402 06/27/25  0620    136   K 4.2 4.2    106   CO2 20* 23    88   BUN 19 11   CREATININE 1.1 1.0   CALCIUM 9.2 8.9   PROT 7.9 7.0   ALBUMIN 4.7 4.3   BILITOT 0.3 0.4   ALKPHOS 62 56   AST 30 33   ALT 33 34   ANIONGAP 12 7*       Significant Imaging: I have reviewed all pertinent imaging results/findings within the past 24 hours.      Assessment & Plan  Diverticulitis of sigmoid colon  - here with acute flare of diverticulitis x3 weeks  - trialed augmentin and flagyl as outpatient, was not improving, did not finish course  - follows closely with CRS, ED provider spoke to them prior to admission   - no surgical intevention during acute flare unless unresponsive to IV abx  - patient would like to hold off on surg if possible given busy with life events  - Zosyn given in ed, will continue for now  - PRN pain management and antiemetics  - continue to monitor    Anxiety  - noted hx, patient was taken off xanax a few months ago  - mood appropriate for situation    GERD (gastroesophageal reflux disease)  - continue home pepcid    Dyslipidemia  - patient takes rosuvastatin at home, not on hospital formulary  - patient does not want to take atorvastatin secondary to intolerance  - will hold for now    Gout  - known hx, no acute issues at this time  - continue home allopurinol     VTE Risk Mitigation (From admission, onward)           Ordered     enoxaparin injection 40 mg   Daily         06/26/25 0753     IP VTE HIGH RISK PATIENT  Once         06/26/25 0753     Place sequential compression device  Until discontinued         06/26/25 0753                    Discharge Planning   VIVIANE: 6/28/2025     Code Status: DNR   Medical Readiness for Discharge Date:                            Hair Arias PA-C  Department of Hospital Medicine   [facility] - Observation 11[facility]

## 2025-06-27 NOTE — PLAN OF CARE
[facility]  Initial Discharge Assessment       Primary Care Provider: Floridalma Carrasco MD    Admission Diagnosis: Diverticulitis [K57.92]  Chest pain [R07.9]    Admission Date: 6/26/2025  Expected Discharge Date: 6/28/2025    Transition of Care Barriers: None    Payor: HU[employer] MANAGED MEDICARE / Plan: [employer] SNP HMO PPO SPECIAL NEEDS / Product Type: Medicare Advantage /     Extended Emergency Contact Information  Primary Emergency Contact: OLGA TOVAR  Mobile Phone: [phone]  Relation: Relative    Discharge Plan A: (P) Home  Discharge Plan B: (P) Home      [facility]  [address] [facility] [facility] [address] [address]  Phone: [phone] Fax: [phone]    [facility] - [place], [place] - [address] [facility] [address]  [address]  [place] [address]  Phone: [phone] Fax: [phone]      Initial Assessment (most recent)       Adult Discharge Assessment - 06/27/25 1237          Discharge Assessment    Assessment Type Discharge Planning Assessment (P)      Confirmed/corrected address, phone number and insurance Yes (P)      Confirmed Demographics Correct on Facesheet (P)      Source of Information patient (P)      People in Home alone (P)      Do you expect to return to your current living situation? Yes (P)      Prior to hospitilization cognitive status: Alert/Oriented (P)      Current cognitive status: Alert/Oriented (P)      Equipment Currently Used at Home none (P)      Readmission within 30 days? No (P)      Patient currently being followed by outpatient case management? No (P)      Do you have prescription coverage? Yes (P)      Do you have any problems affording any of your prescribed medications? TBD (P)      Are you on dialysis? No (P)      Do you take coumadin? No (P)      Discharge Plan A Home (P)      Discharge Plan B Home (P)                       SW completed Discharge Planning Assessment with patient via bedside. Discharge planning booklet given to patient/family and  whiteboard updated with VIVIANE and phone #. All questions answered.    Patient reported that he will have transportation upon discharge.     Patient reported that he lives alone, and prior to hospitalization he was independent with his ADL's. Patient reported that he is not on dialysis and does not go to a Coumadin clinic.     Patient lives in a Single Story Raised Home with multiple steps to enter with railings. Patient reported that he does not have difficulty climbing the stairs.     Discharge Plan A and Plan B have been determined by review of patient's clinical status, future medical and therapeutic needs, and coverage/benefits for post-acute care in coordination with multidisciplinary team members.      Linh Joel LMSW  [facility]  Ext. [phone]

## 2025-06-27 NOTE — PLAN OF CARE
Problem: Adult Inpatient Plan of Care  Goal: Plan of Care Review  Outcome: Progressing  Goal: Patient-Specific Goal (Individualized)  Outcome: Progressing  Goal: Absence of Hospital-Acquired Illness or Injury  Outcome: Progressing  Goal: Optimal Comfort and Wellbeing  Outcome: Progressing  Goal: Readiness for Transition of Care  Outcome: Progressing     Problem: Nausea and Vomiting  Goal: Nausea and Vomiting Relief  Outcome: Progressing

## 2025-06-27 NOTE — SUBJECTIVE & OBJECTIVE
Interval History: Patient examined at bedside who is resting comfortably. VSS, no acute distress. No overnight events reported. Patient reports some improvement of abdominal pain, however still experiencing signficant pain. Will continue zosyn. Patient requesting to upgrade diet to GI soft.      Review of Systems   Constitutional:  Negative for chills and fever.   Respiratory:  Negative for chest tightness and shortness of breath.    Cardiovascular:  Negative for chest pain and leg swelling.   Gastrointestinal:  Positive for abdominal pain. Negative for nausea.   Neurological:  Negative for dizziness and weakness.     Objective:     Vital Signs (Most Recent):  Temp: 98.2 °F (36.8 °C) (06/27/25 0742)  Pulse: 65 (06/27/25 0742)  Resp: 18 (06/27/25 0742)  BP: 126/78 (06/27/25 0742)  SpO2: 95 % (06/27/25 0742) Vital Signs (24h Range):  Temp:  [97.9 °F (36.6 °C)-98.2 °F (36.8 °C)] 98.2 °F (36.8 °C)  Pulse:  [54-65] 65  Resp:  [18] 18  SpO2:  [92 %-97 %] 95 %  BP: ()/(55-84) 126/78     Weight: 105.4 kg (232 lb 5.8 oz)  Body mass index is 32.41 kg/m².  No intake or output data in the 24 hours ending 06/27/25 0844      Physical Exam  Vitals and nursing note reviewed.   Constitutional:       General: He is not in acute distress.     Appearance: Normal appearance. He is not ill-appearing.   HENT:      Head: Normocephalic and atraumatic.   Eyes:      Extraocular Movements: Extraocular movements intact.      Conjunctiva/sclera: Conjunctivae normal.   Cardiovascular:      Rate and Rhythm: Normal rate and regular rhythm.      Pulses: Normal pulses.      Heart sounds: Normal heart sounds. No murmur heard.  Pulmonary:      Effort: Pulmonary effort is normal. No respiratory distress.      Breath sounds: Normal breath sounds.   Abdominal:      General: Abdomen is flat.      Palpations: Abdomen is soft.      Tenderness: There is abdominal tenderness (LLQ).   Musculoskeletal:         General: Normal range of motion.      Right  lower leg: No edema.      Left lower leg: No edema.   Skin:     General: Skin is warm and dry.      Coloration: Skin is not jaundiced.   Neurological:      General: No focal deficit present.      Mental Status: He is alert. Mental status is at baseline.   Psychiatric:         Mood and Affect: Mood normal.         Behavior: Behavior normal.               Significant Labs: All pertinent labs within the past 24 hours have been reviewed.  CBC:   Recent Labs   Lab 06/26/25  0357 06/26/25  0402 06/27/25  0620   WBC  --  7.44 6.70   HGB  --  13.8* 12.6*   HCT 42 41.8 37.9*   PLT  --  212 185     CMP:   Recent Labs   Lab 06/26/25  0402 06/27/25  0620    136   K 4.2 4.2    106   CO2 20* 23    88   BUN 19 11   CREATININE 1.1 1.0   CALCIUM 9.2 8.9   PROT 7.9 7.0   ALBUMIN 4.7 4.3   BILITOT 0.3 0.4   ALKPHOS 62 56   AST 30 33   ALT 33 34   ANIONGAP 12 7*       Significant Imaging: I have reviewed all pertinent imaging results/findings within the past 24 hours.

## 2025-06-27 NOTE — HOSPITAL COURSE
Patient admitted to hospital medicine for diverticulitis. Started on zosyn. Discussed care with CRS, who state patient would not be candidate for surgery during active infection, unless unresponsive to IV abx. Patient with some improvement of pain. Continue zosyn. Patient with considerable improvement. Okay to DC on oral regimen.    Pt was seen and evaluated by me this morning, reports feeling well, and is eager to discharge home. All questions were answered. Patient acknowledged understanding of discharge instructions and feels safe to discharge home. Patient was discharged on 6/28/2025 in stable condition with GI follow-up. Education regarding condition provided and return precautions given.     Physical Exam  Gen: in NAD, appears stated age  Neuro: AAOx3, motor, sensory, and strength grossly intact BL  HEENT: EOMI, PERRLA; no JVD appreciated  CVS: RRR, no m/r/g  Resp: lungs CTAB, no w/r/r; no belabored breathing or accessory muscle use appreciated   Abd: NTND, soft to palpation  Extrem: no UE or LE edema BL

## 2025-06-28 VITALS
OXYGEN SATURATION: 93 % | WEIGHT: 231.5 LBS | DIASTOLIC BLOOD PRESSURE: 80 MMHG | HEIGHT: 71 IN | SYSTOLIC BLOOD PRESSURE: 127 MMHG | BODY MASS INDEX: 32.41 KG/M2 | TEMPERATURE: 98 F | RESPIRATION RATE: 16 BRPM | HEART RATE: 71 BPM

## 2025-06-28 LAB
ABSOLUTE EOSINOPHIL (OHS): 0.44 K/UL
ABSOLUTE MONOCYTE (OHS): 0.84 K/UL (ref 0.3–1)
ABSOLUTE NEUTROPHIL COUNT (OHS): 4.16 K/UL (ref 1.8–7.7)
ALBUMIN SERPL BCP-MCNC: 4.6 G/DL (ref 3.5–5.2)
ALP SERPL-CCNC: 56 UNIT/L (ref 40–150)
ALT SERPL W/O P-5'-P-CCNC: 31 UNIT/L (ref 10–44)
ANION GAP (OHS): 9 MMOL/L (ref 8–16)
AST SERPL-CCNC: 27 UNIT/L (ref 11–45)
BASOPHILS # BLD AUTO: 0.06 K/UL
BASOPHILS NFR BLD AUTO: 0.8 %
BILIRUB SERPL-MCNC: 0.4 MG/DL (ref 0.1–1)
BUN SERPL-MCNC: 9 MG/DL (ref 6–20)
CALCIUM SERPL-MCNC: 9.1 MG/DL (ref 8.7–10.5)
CHLORIDE SERPL-SCNC: 105 MMOL/L (ref 95–110)
CO2 SERPL-SCNC: 22 MMOL/L (ref 23–29)
CREAT SERPL-MCNC: 1 MG/DL (ref 0.5–1.4)
ERYTHROCYTE [DISTWIDTH] IN BLOOD BY AUTOMATED COUNT: 13.3 % (ref 11.5–14.5)
GFR SERPLBLD CREATININE-BSD FMLA CKD-EPI: >60 ML/MIN/1.73/M2
GLUCOSE SERPL-MCNC: 90 MG/DL (ref 70–110)
HCT VFR BLD AUTO: 39.2 % (ref 40–54)
HGB BLD-MCNC: 12.9 GM/DL (ref 14–18)
IMM GRANULOCYTES # BLD AUTO: 0.02 K/UL (ref 0–0.04)
IMM GRANULOCYTES NFR BLD AUTO: 0.3 % (ref 0–0.5)
LYMPHOCYTES # BLD AUTO: 2.13 K/UL (ref 1–4.8)
MAGNESIUM SERPL-MCNC: 2 MG/DL (ref 1.6–2.6)
MCH RBC QN AUTO: 30.8 PG (ref 27–31)
MCHC RBC AUTO-ENTMCNC: 32.9 G/DL (ref 32–36)
MCV RBC AUTO: 94 FL (ref 82–98)
NUCLEATED RBC (/100WBC) (OHS): 0 /100 WBC
PHOSPHATE SERPL-MCNC: 3.6 MG/DL (ref 2.7–4.5)
PLATELET # BLD AUTO: 216 K/UL (ref 150–450)
PMV BLD AUTO: 9.6 FL (ref 9.2–12.9)
POTASSIUM SERPL-SCNC: 4.3 MMOL/L (ref 3.5–5.1)
PROT SERPL-MCNC: 7.5 GM/DL (ref 6–8.4)
RBC # BLD AUTO: 4.19 M/UL (ref 4.6–6.2)
RELATIVE EOSINOPHIL (OHS): 5.8 %
RELATIVE LYMPHOCYTE (OHS): 27.8 % (ref 18–48)
RELATIVE MONOCYTE (OHS): 11 % (ref 4–15)
RELATIVE NEUTROPHIL (OHS): 54.3 % (ref 38–73)
SODIUM SERPL-SCNC: 136 MMOL/L (ref 136–145)
WBC # BLD AUTO: 7.65 K/UL (ref 3.9–12.7)

## 2025-06-28 PROCEDURE — 83735 ASSAY OF MAGNESIUM: CPT

## 2025-06-28 PROCEDURE — 36415 COLL VENOUS BLD VENIPUNCTURE: CPT

## 2025-06-28 PROCEDURE — 84100 ASSAY OF PHOSPHORUS: CPT

## 2025-06-28 PROCEDURE — 80053 COMPREHEN METABOLIC PANEL: CPT

## 2025-06-28 PROCEDURE — 63600175 PHARM REV CODE 636 W HCPCS

## 2025-06-28 PROCEDURE — 85025 COMPLETE CBC W/AUTO DIFF WBC: CPT

## 2025-06-28 PROCEDURE — 25000003 PHARM REV CODE 250

## 2025-06-28 RX ORDER — METRONIDAZOLE 500 MG/1
500 TABLET ORAL EVERY 12 HOURS
Qty: 14 TABLET | Refills: 0 | Status: SHIPPED | OUTPATIENT
Start: 2025-06-28 | End: 2025-07-05

## 2025-06-28 RX ORDER — CIPROFLOXACIN 500 MG/1
500 TABLET, FILM COATED ORAL EVERY 12 HOURS
Qty: 14 TABLET | Refills: 0 | Status: SHIPPED | OUTPATIENT
Start: 2025-06-28 | End: 2025-07-05

## 2025-06-28 RX ORDER — HYDROMORPHONE HYDROCHLORIDE 1 MG/ML
0.5 INJECTION, SOLUTION INTRAMUSCULAR; INTRAVENOUS; SUBCUTANEOUS EVERY 6 HOURS PRN
Status: DISCONTINUED | OUTPATIENT
Start: 2025-06-28 | End: 2025-06-28 | Stop reason: HOSPADM

## 2025-06-28 RX ADMIN — ASPIRIN 81 MG: 81 TABLET, COATED ORAL at 08:06

## 2025-06-28 RX ADMIN — PIPERACILLIN SODIUM AND TAZOBACTAM SODIUM 4.5 G: 4; .5 INJECTION, POWDER, FOR SOLUTION INTRAVENOUS at 06:06

## 2025-06-28 RX ADMIN — OXYCODONE 15 MG: 5 TABLET ORAL at 01:06

## 2025-06-28 RX ADMIN — POLYETHYLENE GLYCOL 3350 17 G: 17 POWDER, FOR SOLUTION ORAL at 08:06

## 2025-06-28 RX ADMIN — FAMOTIDINE 40 MG: 20 TABLET, FILM COATED ORAL at 08:06

## 2025-06-28 RX ADMIN — OXYCODONE 15 MG: 5 TABLET ORAL at 08:06

## 2025-06-28 RX ADMIN — HYDROMORPHONE HYDROCHLORIDE 1 MG: 1 INJECTION, SOLUTION INTRAMUSCULAR; INTRAVENOUS; SUBCUTANEOUS at 02:06

## 2025-06-28 RX ADMIN — ALLOPURINOL 100 MG: 100 TABLET ORAL at 08:06

## 2025-06-28 NOTE — PLAN OF CARE
Problem: Adult Inpatient Plan of Care  Goal: Plan of Care Review  Outcome: Progressing  Flowsheets (Taken 6/28/2025 0141)  Plan of Care Reviewed With: patient  Goal: Patient-Specific Goal (Individualized)  Outcome: Progressing  Goal: Absence of Hospital-Acquired Illness or Injury  Outcome: Progressing  Intervention: Identify and Manage Fall Risk  Flowsheets (Taken 6/28/2025 0141)  Safety Promotion/Fall Prevention:   bed alarm refused   high risk medications identified   lighting adjusted   medications reviewed   room near unit station   side rails raised x 2  Intervention: Prevent Skin Injury  Flowsheets (Taken 6/28/2025 0141)  Body Position: position changed independently  Intervention: Prevent and Manage VTE (Venous Thromboembolism) Risk  Flowsheets (Taken 6/28/2025 0141)  VTE Prevention/Management: ROM (active) performed  Goal: Optimal Comfort and Wellbeing  Outcome: Progressing  Intervention: Monitor Pain and Promote Comfort  Flowsheets (Taken 6/28/2025 0141)  Pain Management Interventions: pain management plan reviewed with patient/caregiver  Intervention: Provide Person-Centered Care  Flowsheets (Taken 6/28/2025 0141)  Trust Relationship/Rapport:   care explained   choices provided   questions answered   questions encouraged   reassurance provided   thoughts/feelings acknowledged  Goal: Readiness for Transition of Care  Outcome: Progressing     Problem: Nausea and Vomiting  Goal: Nausea and Vomiting Relief  Outcome: Progressing     Problem: Infection  Goal: Absence of Infection Signs and Symptoms  Outcome: Progressing  Intervention: Prevent or Manage Infection  Flowsheets (Taken 6/28/2025 0141)  Infection Management: (IV antibiotic therapy)   aseptic technique maintained   cultures obtained and sent to lab   other (see comments)

## 2025-06-28 NOTE — ASSESSMENT & PLAN NOTE
- here with acute flare of diverticulitis x3 weeks  - trialed augmentin and flagyl as outpatient, was not improving, did not finish course  - follows closely with CRS, ED provider spoke to them prior to admission   - no surgical intevention during acute flare unless unresponsive to IV abx  - patient would like to hold off on surg if possible given busy with life events  - Zosyn given in ed, will continue for now  - PRN pain management and antiemetics  - continue to monitor     11-Oct-2024 15:34

## 2025-06-28 NOTE — DISCHARGE SUMMARY
[facility] [address] [place] [address] Medicine  Discharge Summary      Patient Name: Keon Schneider Jr.  MRN: [id number]  APRIL: [id number]  Patient Class: IP- Inpatient  Admission Date: 6/26/2025  Hospital Length of Stay: 1 days  Discharge Date and Time: 6/28/2025 12:12 PM  Attending Physician: Anjelica att. providers found   Discharging Provider: Hair Arias PA-C  Primary Care Provider: Floridalma Carrasco MD  [facility] Medicine Team: [facility] F Hair Arias PA-C  Primary Care Team: [facility] F    HPI:   Keon Schneider Jr. Is a 55 y.o. with pmh of depression, anxiety, spondylolisthesis, recurrent diverticulitis, hyperlipidemia, hypertension presents to the ED for LLQ pain. Patient reports this pain has been ongoing for 3 weeks. He went to his PCP ~1 week ago and was placed on augmentin and flagyl for a diverticulitis flare. He has a hx of diverticulitis requiring hospitalizations in the past. He follows closely with CRS who have recommended surgery in the past however he has deffered as he is very busy with life events. He reports an increase in flares, he normally has them once every few years, this is his 3rd within the past year. At time of examinations, he reports severe LLQ pain with associated nausea. He reports a few episodes of vomiting over the last few days. His food intake has been minimum secondary to his symptoms. He also reports subjective fevers throughout the past week but none in the last 24 hours. He denies any chillls, SOB, chest pain, constipation, diarrhea, syncope.    In the ED: afebrile, VSS. Labs largely unremarkable. UA non infectious. CT abdomen/pelvis with findings of diverticulitis. Given Morphine, dilaudid, zofran, and zosyn    * No surgery found *      Hospital Course:   Patient admitted to hospital medicine for diverticulitis. Started on zosyn. Discussed care with CRS, who state patient would not be candidate for surgery during active infection, unless unresponsive to IV  abx. Patient with some improvement of pain. Continue zosyn. Patient with considerable improvement. Okay to DC on oral regimen.    Pt was seen and evaluated by me this morning, reports feeling well, and is eager to discharge home. All questions were answered. Patient acknowledged understanding of discharge instructions and feels safe to discharge home. Patient was discharged on 6/28/2025 in stable condition with GI follow-up. Education regarding condition provided and return precautions given.     Physical Exam  Gen: in NAD, appears stated age  Neuro: AAOx3, motor, sensory, and strength grossly intact BL  HEENT: EOMI, PERRLA; no JVD appreciated  CVS: RRR, no m/r/g  Resp: lungs CTAB, no w/r/r; no belabored breathing or accessory muscle use appreciated   Abd: NTND, soft to palpation  Extrem: no UE or LE edema BL           Goals of Care Treatment Preferences:  Code Status: DNR         Consults:     Assessment & Plan  Diverticulitis of sigmoid colon  - here with acute flare of diverticulitis x3 weeks  - trialed augmentin and flagyl as outpatient, was not improving, did not finish course  - follows closely with CRS, ED provider spoke to them prior to admission   - no surgical intevention during acute flare unless unresponsive to IV abx  - patient would like to hold off on surg if possible given busy with life events  - Zosyn given in ed, will continue for now  - PRN pain management and antiemetics  - continue to monitor    Anxiety  - noted hx, patient was taken off xanax a few months ago  - mood appropriate for situation    GERD (gastroesophageal reflux disease)  - continue home pepcid    Dyslipidemia  - patient takes rosuvastatin at home, not on hospital formulary  - patient does not want to take atorvastatin secondary to intolerance  - will hold for now    Gout  - known hx, no acute issues at this time  - continue home allopurinol     Final Active Diagnoses:    Diagnosis Date Noted POA    PRINCIPAL PROBLEM:   Diverticulitis of sigmoid colon [K57.32] 03/24/2018 Yes    Gout [M10.9] 06/26/2025 Yes    Dyslipidemia [E78.5] 05/31/2023 Yes    GERD (gastroesophageal reflux disease) [K21.9] 05/17/2020 Yes    Anxiety [F41.9] 11/24/2013 Yes     Chronic      Problems Resolved During this Admission:       Discharged Condition: good    Disposition: Home or Self Care    Follow Up:    Patient Instructions:      Ambulatory referral/consult to Gastroenterology   Standing Status: Future   Referral Priority: Routine Referral Type: Consultation   Referral Reason: Specialty Services Required   Requested Specialty: Gastroenterology   Number of Visits Requested: 1       Significant Diagnostic Studies: N/A    Pending Diagnostic Studies:       Procedure Component Value Units Date/Time    EXTRA TUBES [[id number]]     Order Status: Sent Lab Status: No result     Specimen: Blood, Venous     Narrative:      The following orders were created for panel order EXTRA TUBES.  Procedure                               Abnormality         Status                     ---------                               -----------         ------                     Gold Top Hold[[id number]]                                                              Gold Top Hold[[id number]]                                                                Please view results for these tests on the individual orders.    Gold Top Hold [[id number]]     Order Status: Sent Lab Status: No result     Specimen: Blood, Venous     Gold Top Hold [[id number]]     Order Status: Sent Lab Status: No result     Specimen: Blood, Venous            Medications:  Reconciled Home Medications:      Medication List        START taking these medications      ciprofloxacin HCl 500 MG tablet  Commonly known as: CIPRO  Take 1 tablet (500 mg total) by mouth every 12 (twelve) hours. for 7 days            CHANGE how you take these medications      allopurinoL 100 MG tablet  Commonly known as: ZYLOPRIM  Take 1 tablet (100  mg total) by mouth once daily.  What changed:   when to take this  reasons to take this     baclofen 10 MG tablet  Commonly known as: LIORESAL  Take 1-2 tablets (10-20 mg total) by mouth 3 (three) times daily as needed (muscle pain).  What changed:   how much to take  when to take this     famotidine 40 MG tablet  Commonly known as: PEPCID  Take 1 tablet (40 mg total) by mouth once daily.  What changed:   when to take this  reasons to take this     metroNIDAZOLE 500 MG tablet  Commonly known as: FLAGYL  Take 1 tablet (500 mg total) by mouth every 12 (twelve) hours. for 7 days  What changed: when to take this     oxyCODONE-acetaminophen  mg per tablet  Commonly known as: PERCOCET  Take 1 tablet by mouth 2 (two) times a day.  What changed:   when to take this  reasons to take this     tiZANidine 4 MG tablet  Commonly known as: ZANAFLEX  Take 1-2 tablet (4 mg total) by mouth daily as needed.  What changed: when to take this            CONTINUE taking these medications      amLODIPine 5 MG tablet  Commonly known as: NORVASC  Take 5 mg by mouth daily as needed.     aspirin 81 MG EC tablet  Commonly known as: ECOTRIN  Take 1 tablet (81 mg total) by mouth once daily.     ezetimibe 10 mg tablet  Commonly known as: ZETIA  Take 10 mg by mouth every evening.     IRON ORAL  Take 1 tablet by mouth Daily.     nitroGLYCERIN 0.4 MG SL tablet  Commonly known as: NITROSTAT  Place 1 tablet (0.4 mg total) under the tongue every 5 (five) minutes as needed for Chest pain.     ondansetron 4 MG tablet  Commonly known as: ZOFRAN  Take 1 tablet (4 mg total) by mouth every 6 (six) hours as needed for Nausea.     rosuvastatin 20 MG tablet  Commonly known as: CRESTOR  Take 1 tablet (20 mg total) by mouth once daily at bedtime.     VITAMIN B-12 ORAL  Take 1 tablet by mouth every morning.     VITAMIN C ORAL  Take 1 tablet by mouth Daily.     VITAMIN D ORAL  Take 1 tablet by mouth Daily.              Indwelling Lines/Drains at time of  discharge:   Lines/Drains/Airways       None                       Time spent on the discharge of patient: 45 minutes         Hair Arias PA-C  Department of Hospital Medicine  [facility]

## 2025-06-28 NOTE — PLAN OF CARE
[facility]  Discharge Final Note    Primary Care Provider: Floridalma Carrasco MD    Expected Discharge Date: 6/28/2025    Patient cleared for discharge from case management standpoint.    Final Discharge Note (most recent)       Final Note - 06/28/25 1205          Final Note    Assessment Type Final Discharge Note     Anticipated Discharge Disposition Home or Self Care     What phone number can be called within the next 1-3 days to see how you are doing after discharge? [phone]     Hospital Resources/Appts/Education Provided Provided patient/caregiver with written discharge plan information;Appointments scheduled and added to AVS        Post-Acute Status    Discharge Delays None known at this time                   Future Appointments   Date Time Provider Department Center   7/8/2025  2:00 PM Driss Barragan MD [facility]       Nancy Molina RN  Weekend  - [facility]  Ext. [phone]

## 2025-06-28 NOTE — PLAN OF CARE
Future Appointments   Date Time Provider Department Center   7/8/2025  2:00 PM Driss Barragan MD [facility] [place]         Gastro f/u appointment scheduled.          Magui Ratliff, ZUNILDAW, RSW, BSW  Case Management  [phone]

## 2025-06-28 NOTE — PLAN OF CARE
Problem: Adult Inpatient Plan of Care  Goal: Plan of Care Review  Outcome: Met  Goal: Patient-Specific Goal (Individualized)  Outcome: Met  Goal: Absence of Hospital-Acquired Illness or Injury  Outcome: Met  Goal: Optimal Comfort and Wellbeing  Outcome: Met  Goal: Readiness for Transition of Care  Outcome: Met     Problem: Nausea and Vomiting  Goal: Nausea and Vomiting Relief  Outcome: Met     Problem: Infection  Goal: Absence of Infection Signs and Symptoms  Outcome: Met

## 2025-07-02 ENCOUNTER — PATIENT MESSAGE (OUTPATIENT)
Dept: FAMILY MEDICINE | Facility: CLINIC | Age: 55
End: 2025-07-02
Payer: MEDICARE

## 2025-07-02 ENCOUNTER — PATIENT MESSAGE (OUTPATIENT)
Dept: ADMINISTRATIVE | Facility: CLINIC | Age: 55
End: 2025-07-02
Payer: MEDICARE

## 2025-07-02 ENCOUNTER — PATIENT OUTREACH (OUTPATIENT)
Dept: ADMINISTRATIVE | Facility: CLINIC | Age: 55
End: 2025-07-02
Payer: MEDICARE

## 2025-07-02 NOTE — PROGRESS NOTES
C3 nurse attempted to contact Keon Schneider Jr.  for a TCC post hospital discharge follow up call. No answer. Reached no VM.    The patient does not have a scheduled HOSFU appointment. Message sent to PCP's staff to assist with HOSFU appointment scheduling.

## 2025-07-03 NOTE — PROGRESS NOTES
C3 nurse attempted to contact Keon Schneider Jr.  for a TCC post hospital discharge follow up call. Pt relative Kevin stated pt is not available at this time.    The patient does not have a scheduled HOSFU appointment. Message sent to PCP's staff to assist with HOSFU appointment scheduling.

## 2025-07-08 ENCOUNTER — OFFICE VISIT (OUTPATIENT)
Dept: GASTROENTEROLOGY | Facility: CLINIC | Age: 55
End: 2025-07-08
Payer: MEDICARE

## 2025-07-08 VITALS — WEIGHT: 234.81 LBS | HEART RATE: 63 BPM | HEIGHT: 71 IN | BODY MASS INDEX: 32.87 KG/M2

## 2025-07-08 DIAGNOSIS — K57.92 DIVERTICULITIS: ICD-10-CM

## 2025-07-08 PROCEDURE — 99999 PR PBB SHADOW E&M-EST. PATIENT-LVL IV: CPT | Mod: PBBFAC,,, | Performed by: INTERNAL MEDICINE

## 2025-07-08 PROCEDURE — 1111F DSCHRG MED/CURRENT MED MERGE: CPT | Mod: CPTII,S$GLB,, | Performed by: INTERNAL MEDICINE

## 2025-07-08 PROCEDURE — 99204 OFFICE O/P NEW MOD 45 MIN: CPT | Mod: S$GLB,,, | Performed by: INTERNAL MEDICINE

## 2025-07-08 PROCEDURE — G2211 COMPLEX E/M VISIT ADD ON: HCPCS | Mod: S$GLB,,, | Performed by: INTERNAL MEDICINE

## 2025-07-08 PROCEDURE — 1160F RVW MEDS BY RX/DR IN RCRD: CPT | Mod: CPTII,S$GLB,, | Performed by: INTERNAL MEDICINE

## 2025-07-08 PROCEDURE — 3008F BODY MASS INDEX DOCD: CPT | Mod: CPTII,S$GLB,, | Performed by: INTERNAL MEDICINE

## 2025-07-08 PROCEDURE — 1159F MED LIST DOCD IN RCRD: CPT | Mod: CPTII,S$GLB,, | Performed by: INTERNAL MEDICINE

## 2025-07-08 NOTE — PROGRESS NOTES
GI Consultation Note  C/C:  HPI: Patient is a 55 y.o. male presents with follow up from hospitalization for diverticulitis.  He has had recurrent diverticulitis and has been followed by colorectal surgery.  He had colonoscopy 2 months ago with Dr. Cates.  .  Past Medical History:   Diagnosis Date    Anxiety     Benzodiazepine dependence    Anxiety disorder, unspecified     Arthritis     Asthma     Avascular necrosis     Carpal tunnel syndrome     Chronic pain     Depression     Diverticulitis     Gout     Mixed hyperlipidemia     Other injury of other sites of trunk 12/28/2011    Pneumonia     Primary hypertension 8/23/2024    Spondylolisthesis     lumbar; myofascial pain    Staph infection     Ulnar neuropathy     left and rt arm     Past Surgical History:   Procedure Laterality Date    COLONOSCOPY N/A 7/6/2020    Procedure: COLONOSCOPY;  Surgeon: Broderick Moise MD;  Location: [facility] ENDO;  Service: Endoscopy;  Laterality: N/A;    COLONOSCOPY N/A 5/5/2025    Procedure: COLONOSCOPY;  Surgeon: Mai Cates MD;  [facility]  Service: Endoscopy;  Laterality: N/A;  10/23 ref by Dr. Cates, Peg, instructions handed to pt in ofc and portal. ciaran  12/5/24- vm unavailable, portal msg sent for pc. DBM  12/24/24-Pt r/s to 2/3/25 due to illness, updated instr portal-DS  1/27/25- pc complete. DBM  4/4 - R/S: Dr. Cates / janel (pt requested    EPIDURAL STEROID INJECTION INTO LUMBAR SPINE N/A 1/13/2025    Procedure: L4-5 ZEUS;  Surgeon: Servando Olmstead DO;  Location: [facility] PAIN MANAGEMENT;  Service: Pain Management;  Laterality: N/A;  no AC    HIP SURGERY  3/06; 6/06    hip arthroplasty    HIP SURGERY      bilateral hip replacement (titanium)    INJECTION OF ANESTHETIC AGENT AROUND MEDIAL BRANCH NERVES INNERVATING LUMBAR FACET JOINT Bilateral 12/9/2024    Procedure: b/l L3,4,5 MBB#1;  Surgeon: Servando Olmstead DO;  Location: [facility] PAIN [facility]NA[facility]EMENT;  Service: Pain Management;  Laterality:  Bilateral;  no ac    JOINT REPLACEMENT      OPEN REDUCTION AND INTERNAL FIXATION (ORIF) OF FRACTURE OF CLAVICLE Left 10/5/2023    Procedure: ORIF, FRACTURE, CLAVICLE;  Surgeon: Ozzy Howard MD;  Location: [facility] OR [address] [place] [address];  Service: Orthopedics;  Laterality: Left;    SHOULDER SURGERY      right shoulder    SHOULDER SURGERY       Current Medications[1]  Review of patient's allergies indicates:   Allergen Reactions    Atorvastatin Nausea And Vomiting    Toradol [ketorolac] Hives and Nausea And Vomiting     Social History     Tobacco Use    Smoking status: Former     Current packs/day: 0.00     Average packs/day: 1 pack/day for 10.0 years (10.0 ttl pk-yrs)     Types: Cigarettes     Start date: 10/13/2002     Quit date: 10/13/2012     Years since quittin.7     Passive exposure: Past    Smokeless tobacco: Never   Substance Use Topics    Alcohol use: Yes     Comment: 2 beers per month     Family History   Problem Relation Name Age of Onset    Pancreatic cancer Mother      Stroke Father      Bone cancer Sister      Alzheimer's disease Paternal Grandmother       ROS:  Constitutional - No fever, chills, night sweats  Cardiovascular - No CP/SOB  Pulmonary - No sore throat/cough  Neuro - No diplopia/blurred vision  Musculoskeletal - No joint pain/swelling   - No dysuria, hesitancy, urgency  GI - No dysphagia/odynophagia  Integument - No echymoses/rash  Ext - No edema, pain  Objective:  @IPVITALS(8)@  PE:  General - NAD, Appears stated age, No jaundice  ENT - OP clear, No sublingual jaundice  Eyes - No pallor, scleral icterus, or conjunctival injection  Chest - Regular rate, No spider angiomata  Pulmonary - No DTP, No accessory muscle use  Abdomen - BS present, Soft, NT, ND, No guarding  Ext - No cyanosis, clubbing, or edema  Neuro - A/O x 4, No asterixis, No ataxia  Integument - No rash, No joshua erythema  Musculoskeletal - No joint effusion/erythema  Data Review  Lab Results   Component Value Date     WBC 7.65 06/28/2025    HGB 12.9 (L) 06/28/2025    HCT 39.2 (L) 06/28/2025    MCV 94 06/28/2025     06/28/2025     Lab Results   Component Value Date    ALT 31 06/28/2025    AST 27 06/28/2025    ALKPHOS 56 06/28/2025    BILITOT 0.4 06/28/2025     Lab Results   Component Value Date    CREATININE 1.0 06/28/2025    BUN 9 06/28/2025     06/28/2025    K 4.3 06/28/2025     06/28/2025    CO2 22 (L) 06/28/2025       Assessment:  Diverticulitis - recurrent     Plan:  Patient has had recurrent diverticulitis.  He has been followed by [facility].  Suggest return to [facility] to consider potential hemicolectomy.  Further GI workup not indicated as he had colonoscopy 2 months ago.    Driss Barragan         [1]   Current Outpatient Medications:     allopurinoL (ZYLOPRIM) 100 MG tablet, Take 1 tablet (100 mg total) by mouth once daily., Disp: 90 tablet, Rfl: 1    amLODIPine (NORVASC) 5 MG tablet, Take 5 mg by mouth daily as needed., Disp: , Rfl:     ascorbic acid (VITAMIN C ORAL), Take 1 tablet by mouth Daily., Disp: , Rfl:     aspirin (ECOTRIN) 81 MG EC tablet, Take 1 tablet (81 mg total) by mouth once daily., Disp: , Rfl:     baclofen (LIORESAL) 10 MG tablet, Take 1-2 tablets (10-20 mg total) by mouth 3 (three) times daily as needed (muscle pain). (Patient taking differently: Take 10 mg by mouth 2 (two) times daily.), Disp: 180 tablet, Rfl: 5    cyanocobalamin, vitamin B-12, (VITAMIN B-12 ORAL), Take 1 tablet by mouth every morning., Disp: , Rfl:     ergocalciferol, vitamin D2, (VITAMIN D ORAL), Take 1 tablet by mouth Daily., Disp: , Rfl:     ezetimibe (ZETIA) 10 mg tablet, Take 10 mg by mouth every evening., Disp: , Rfl:     famotidine (PEPCID) 40 MG tablet, Take 1 tablet (40 mg total) by mouth once daily., Disp: 90 tablet, Rfl: 3    ferrous sulfate (IRON ORAL), Take 1 tablet by mouth Daily., Disp: , Rfl:     nitroGLYCERIN (NITROSTAT) 0.4 MG SL tablet, Place 1 tablet (0.4 mg total) under the tongue every 5 (five) minutes as  needed for Chest pain., Disp: 30 tablet, Rfl: 1    ondansetron (ZOFRAN) 4 MG tablet, Take 1 tablet (4 mg total) by mouth every 6 (six) hours as needed for Nausea., Disp: 30 tablet, Rfl: 0    oxyCODONE-acetaminophen (PERCOCET)  mg per tablet, Take 1 tablet by mouth 2 (two) times a day., Disp: 60 tablet, Rfl: 0    rosuvastatin (CRESTOR) 20 MG tablet, Take 1 tablet (20 mg total) by mouth once daily at bedtime., Disp: 90 tablet, Rfl: 0    tiZANidine (ZANAFLEX) 4 MG tablet, Take 1-2 tablet (4 mg total) by mouth daily as needed. (Patient taking differently: Take 4 mg by mouth nightly as needed.), Disp: 60 tablet, Rfl: 1

## 2025-07-09 ENCOUNTER — RESULTS FOLLOW-UP (OUTPATIENT)
Dept: FAMILY MEDICINE | Facility: CLINIC | Age: 55
End: 2025-07-09

## 2025-07-09 ENCOUNTER — TELEPHONE (OUTPATIENT)
Dept: FAMILY MEDICINE | Facility: CLINIC | Age: 55
End: 2025-07-09

## 2025-07-09 ENCOUNTER — OFFICE VISIT (OUTPATIENT)
Dept: FAMILY MEDICINE | Facility: CLINIC | Age: 55
End: 2025-07-09
Payer: MEDICARE

## 2025-07-09 VITALS
OXYGEN SATURATION: 97 % | WEIGHT: 232.13 LBS | HEART RATE: 64 BPM | DIASTOLIC BLOOD PRESSURE: 80 MMHG | HEIGHT: 71 IN | SYSTOLIC BLOOD PRESSURE: 122 MMHG | BODY MASS INDEX: 32.5 KG/M2

## 2025-07-09 DIAGNOSIS — I10 PRIMARY HYPERTENSION: ICD-10-CM

## 2025-07-09 DIAGNOSIS — M45.6 ANKYLOSING SPONDYLITIS OF LUMBAR REGION: ICD-10-CM

## 2025-07-09 DIAGNOSIS — D64.9 ANEMIA, UNSPECIFIED TYPE: ICD-10-CM

## 2025-07-09 DIAGNOSIS — M54.40 CHRONIC MIDLINE LOW BACK PAIN WITH SCIATICA, SCIATICA LATERALITY UNSPECIFIED: Primary | ICD-10-CM

## 2025-07-09 DIAGNOSIS — E78.2 MIXED HYPERLIPIDEMIA: ICD-10-CM

## 2025-07-09 DIAGNOSIS — G89.29 CHRONIC MIDLINE LOW BACK PAIN WITH SCIATICA, SCIATICA LATERALITY UNSPECIFIED: Primary | ICD-10-CM

## 2025-07-09 DIAGNOSIS — Z87.19 HISTORY OF DIVERTICULITIS: ICD-10-CM

## 2025-07-09 DIAGNOSIS — Z12.5 ENCOUNTER FOR SCREENING FOR MALIGNANT NEOPLASM OF PROSTATE: ICD-10-CM

## 2025-07-09 DIAGNOSIS — D53.9 NUTRITIONAL ANEMIA, UNSPECIFIED: ICD-10-CM

## 2025-07-09 DIAGNOSIS — F31.9 BIPOLAR AFFECTIVE DISORDER, REMISSION STATUS UNSPECIFIED: ICD-10-CM

## 2025-07-09 DIAGNOSIS — Z00.00 HEALTHCARE MAINTENANCE: ICD-10-CM

## 2025-07-09 PROCEDURE — 99999 PR PBB SHADOW E&M-EST. PATIENT-LVL V: CPT | Mod: PBBFAC,,, | Performed by: STUDENT IN AN ORGANIZED HEALTH CARE EDUCATION/TRAINING PROGRAM

## 2025-07-09 NOTE — PROGRESS NOTES
Patient ID: Keon Schneider Jr. is a 55 y.o. male.    Chief Complaint: Hospital Follow Up    History of Present Illness    CHIEF COMPLAINT:  Keon presents today for follow up of diverticulitis.    DIVERTICULITIS:  He has 3 remaining antibiotic pills for diverticulitis treatment. The inflammation appears to be resolving with current antibiotic treatment, and he denies any current abdominal pain. He recently underwent colonoscopy. He was previously under the care of Dr. Mai Koehler but changed providers due to interpersonal concerns.    CHRONIC BACK PAIN:  He experiences persistent back and shoulder pain, currently managed with Percocet under Dr. Gerard's care at [facility]. He has back lesions of uncertain nature, awaiting follow-up with his cancer team in one year to determine if they are cancerous. He had negative experience with previous twelve back injections, reporting they were extremely painful and worsened his condition. He prefers pain medication management and declines further injection treatments.    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 He understands the need for ongoing monitoring of cobalt and chromium levels and is willing to undergo revision surgery if necessary to remove metal components. He remains active and mobile.    NIGHT SWEATS:  He reports positional night sweats occurring specifically when lying on his side, but not when supine. The sweating is localized and occurred within the last day.    DIET AND WEIGHT:  His diet consists primarily of peanut butter, mashed potatoes, and boiled eggs, with some variation between morning and evening meals. He reports unintentional weight loss of 15 lbs attributed to changes in eating habits. He has stopped alcohol intake completely.      ROS:  General: -fever, -chills, -fatigue, -weight gain, +weight loss, +night sweats  Eyes: -vision changes,  -redness, -discharge  ENT: -ear pain, -nasal congestion, -sore throat  Cardiovascular: -chest pain, -palpitations, -lower extremity edema  Respiratory: -cough, -shortness of breath  Gastrointestinal: -abdominal pain, -nausea, -vomiting, -diarrhea, -constipation, -blood in stool  Genitourinary: -dysuria, -hematuria, -frequency  Musculoskeletal: -joint pain, -muscle pain, +back pain  Skin: -rash, -lesion  Neurological: -headache, -dizziness, -numbness, -tingling  Psychiatric: -anxiety, -depression, -sleep difficulty, +impulsive decisions or behaviors         Physical Exam    General: No acute distress. Well-developed. Well-nourished.  Eyes: EOMI. Sclerae anicteric.  HENT: Normocephalic. Atraumatic. Nares patent. Moist oral mucosa.  Ears: Bilateral TMs clear. Bilateral EACs clear.  Cardiovascular: Regular rate. Regular rhythm. No murmurs. No rubs. No gallops. Normal S1, S2.  Respiratory: Normal respiratory effort. Clear to auscultation bilaterally. No rales. No rhonchi. No wheezing.  Abdomen: Soft. Non-tender. Non-distended. Normoactive bowel sounds.  Musculoskeletal: No  obvious deformity.  Extremities: No lower extremity edema.  Neurological: Alert & oriented x3. No slurred speech. Normal gait.  Psychiatric: Normal mood. Normal affect. Good insight. Good judgment.  Skin: Warm. Dry. No rash.         Assessment & Plan    BENZODIAZEPINE DEPENDENCE:  - Assessed the patient's past use of Xanax and current inability to obtain Xanax with Percocet.    DIVERTICULITIS:  - Reviewed the patient's recent hospitalization for diverticulitis.  - Keon is not currently experiencing pain and is completing a course of antibiotics.  - Referred to a colorectal surgeon for evaluation and potential surgery.  - Arranged for Luda to assist with setting up this appointment.    NEOPLASM OF UNCERTAIN BEHAVIOR:  - Noted lesions on the patient's back requiring monitoring for potential cancerous growth.  - Ordered repeat MRI of the spine to  ensure lesions are not growing.  - Ordered PSA test to rule out prostate cancer as potential cause of bone lesions.    CHRONIC PAIN:  - Keon experiences chronic back pain that persists despite current treatment.  - Currently managed with Percocet prescribed by Dr. Gerard.  - Discussed the need to manage pain without injections.    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  - Ordered labs to monitor cobalt and chromium levels.    APOCRINE SWEAT DISORDER:  - Keon experiences unusual night sweats that occur only on one side of the body when lying on that side.  - Etiology remains unclear.    ABNORMAL WEIGHT LOSS:  - Documented recent weight loss of 15 lbs, which patient attributes to dietary changes and reduced food intake.    LONG-TERM USE OF OPIATE ANALGESIC:  - Keon is on long-term Percocet therapy for chronic pain management, prescribed by Dr. Gerard.  - This is preferred over injections for pain control.    FOLLOW-UP:  - Ordered labs: PSA, A1C, and lipid panel.  - Tenna to follow up at first floor lab after appointment.       1. Chronic midline low back pain with sciatica, sciatica laterality unspecified  -     MRI Thoracic Spine W WO Contrast; Future; Expected date: 07/09/2025  -     PSA, Screening; Future; Expected date: 07/09/2025    2. Encounter for screening for malignant neoplasm of prostate  -     PSA, Screening; Future; Expected date: 07/09/2025    3. Mixed hyperlipidemia  -     Lipid Panel; Future; Expected date: 07/09/2025  -     Chromium and Cobalt, WB (MOM); Future; Expected date: 07/09/2025    4. Healthcare maintenance  -     Hemoglobin A1C; Future; Expected date: 07/09/2025    5. Ankylosing spondylitis of lumbar region    6. Bipolar affective disorder, remission status unspecified    7. Primary hypertension  -     Hypertension Digital Medicine (HDMP) Enrollment Order    8. Anemia, unspecified  type  -     Ferritin; Future; Expected date: 07/09/2025  -     Iron and TIBC; Future; Expected date: 07/09/2025  -     Transferrin; Future; Expected date: 07/09/2025  -     VITAMIN B12; Future; Expected date: 07/09/2025    9. Nutritional anemia, unspecified  -     VITAMIN B12; Future; Expected date: 07/09/2025    10. History of diverticulitis  -     Ambulatory referral/consult to Colorectal Surgery; Future; Expected date: 07/16/2025              No follow-ups on file.    This note was generated with the assistance of ambient listening technology. Verbal consent was obtained by the patient and accompanying visitor(s) for the recording of patient appointment to facilitate this note. I attest to having reviewed and edited the generated note for accuracy, though some syntax or spelling errors may persist. Please contact the author of this note for any clarification.

## 2025-07-09 NOTE — TELEPHONE ENCOUNTER
----- Message from Floridalma Carrasco MD sent at 7/9/2025  4:41 PM CDT -----  Your A1c of 6.1% puts you in the prediabetes range, meaning your blood sugar is slightly elevated but not in the diabetic range.  The good news is that this can often be reversed or stabilized with lifestyle changes. I recommend:  Exercise: Aim for 30 minutes of walking or activity most days  Weight management: Even a 5-10 lb loss can reduce risk  Reduce sugar and processed carbs: Focus on vegetables, lean protein, whole grains  Monitor yearly: We'll recheck your A1c in 6-12 months  ----- Message -----  From: Lab, Background User  Sent: 7/9/2025  10:50 AM CDT  To: Floridalma Carrasco MD

## 2025-07-16 ENCOUNTER — TELEPHONE (OUTPATIENT)
Dept: FAMILY MEDICINE | Facility: CLINIC | Age: 55
End: 2025-07-16
Payer: MEDICARE

## 2025-07-16 NOTE — TELEPHONE ENCOUNTER
Spoke to pt and informed him per Dr Carrasco:    Please call the patient with the attached results. Your spine imaging shows age-related changes in the mid and lower thoracic spine. There are areas of fatty tissue inside the bone, which are   generally benign and not concerning. It also shows bony overgrowths (osteophytes) along the front of the spine, which are common with arthritis and aging.  The good news is that there is no evidence of any worrisome lesions, severe spinal canal narrowing, or nerve compression. These findings can sometimes contribute to stiffness or discomfort but do not   require any urgent treatment.    Pt stated verbal understanding with no other questions/concerns. Stated the medication has been helping with the pain.

## 2025-07-16 NOTE — TELEPHONE ENCOUNTER
----- Message from Floridalma Carrasco MD sent at 7/16/2025  2:51 PM CDT -----  Please call the patient with the attached results. Your spine imaging shows age-related changes in the mid and lower thoracic spine. There are areas of fatty tissue inside the bone, which are   generally benign and not concerning. It also shows bony overgrowths (osteophytes) along the front of the spine, which are common with arthritis and aging.  The good news is that there is no evidence of any worrisome lesions, severe spinal canal narrowing, or nerve compression. These findings can sometimes contribute to stiffness or discomfort but do not   require any urgent treatment.  ----- Message -----  From: Interface, Rad Results In  Sent: 7/16/2025  11:23 AM CDT  To: Floridalma Carrasco MD

## 2025-08-11 ENCOUNTER — TELEPHONE (OUTPATIENT)
Dept: SURGERY | Facility: CLINIC | Age: 55
End: 2025-08-11
Payer: MEDICARE

## (undated) DEVICE — SUT VICRYL PLUS 0 CT1 18IN

## (undated) DEVICE — ADHESIVE DERMABOND ADVANCED

## (undated) DEVICE — DRESSING AQUACEL AG 3.5X10IN

## (undated) DEVICE — Device

## (undated) DEVICE — SUT VICRYL PLUS 2-0 CT1 18

## (undated) DEVICE — DRAPE C-ARM ELAS CLIP 42X120IN

## (undated) DEVICE — DRAPE STERI U-SHAPED 47X51IN

## (undated) DEVICE — SUT MONOCRYL 3-0 PS-2 UND

## (undated) DEVICE — STOCKINET 4INX48

## (undated) DEVICE — TAPE SURG DURAPORE 2 X10YD

## (undated) DEVICE — APPLICATOR CHLORAPREP ORN 26ML

## (undated) DEVICE — DRAPE THREE-QUARTER 53X77IN

## (undated) DEVICE — ELECTRODE REM PLYHSV RETURN 9

## (undated) DEVICE — COUNTER SINK

## (undated) DEVICE — 2.4 DRILL

## (undated) DEVICE — BNDG COFLEX FOAM LF2 ST 4X5YD

## (undated) DEVICE — TIP YANKAUERS BULB NO VENT

## (undated) DEVICE — TRAY MINOR ORTHO OMC

## (undated) DEVICE — SUT VICRYL PLUS 3-0 SH 18IN

## (undated) DEVICE — DRAPE INCISE IOBAN 2 23X33IN

## (undated) DEVICE — SPONGE COTTON TRAY 4X4IN

## (undated) DEVICE — BIT DRILL AO SPEEDGUIDE 2X135M

## (undated) DEVICE — GAUZE AVANT SPNG 4PLY STRL 4X4

## (undated) DEVICE — DRAPE STERI-DRAPE 1000 17X11IN

## (undated) DEVICE — DRAPE C-ARMOR EQUIPMENT COVER

## (undated) DEVICE — TOWEL OR DISP STRL BLUE 4/PK

## (undated) DEVICE — DRAPE U SPLIT SHEET 54X76IN

## (undated) DEVICE — SEE MEDLINE ITEM 157150

## (undated) DEVICE — DRESSING TEGADERM IV 3.5 X 4.5

## (undated) DEVICE — SLING ARM LARGE

## (undated) DEVICE — GUIDEWIRE ORTHO 1.6X150MM
Type: IMPLANTABLE DEVICE | Site: CLAVICLE | Status: NON-FUNCTIONAL
Removed: 2023-10-05

## (undated) DEVICE — DRAPE TOP 53X102IN

## (undated) DEVICE — DRAPE ORTH SPLIT 77X108IN

## (undated) DEVICE — CATH SUCTION 10FR